# Patient Record
Sex: MALE | Race: BLACK OR AFRICAN AMERICAN | NOT HISPANIC OR LATINO | Employment: OTHER
[De-identification: names, ages, dates, MRNs, and addresses within clinical notes are randomized per-mention and may not be internally consistent; named-entity substitution may affect disease eponyms.]

---

## 2019-09-20 ENCOUNTER — RECORDS - HEALTHEAST (OUTPATIENT)
Dept: ADMINISTRATIVE | Facility: OTHER | Age: 39
End: 2019-09-20

## 2019-09-30 ENCOUNTER — RECORDS - HEALTHEAST (OUTPATIENT)
Dept: ADMINISTRATIVE | Facility: OTHER | Age: 39
End: 2019-09-30

## 2020-04-28 ENCOUNTER — RECORDS - HEALTHEAST (OUTPATIENT)
Dept: ADMINISTRATIVE | Facility: OTHER | Age: 40
End: 2020-04-28

## 2020-05-20 ENCOUNTER — RECORDS - HEALTHEAST (OUTPATIENT)
Dept: ADMINISTRATIVE | Facility: OTHER | Age: 40
End: 2020-05-20

## 2020-06-03 ENCOUNTER — RECORDS - HEALTHEAST (OUTPATIENT)
Dept: ADMINISTRATIVE | Facility: OTHER | Age: 40
End: 2020-06-03

## 2020-06-08 ENCOUNTER — RECORDS - HEALTHEAST (OUTPATIENT)
Dept: ADMINISTRATIVE | Facility: OTHER | Age: 40
End: 2020-06-08

## 2020-06-15 ENCOUNTER — RECORDS - HEALTHEAST (OUTPATIENT)
Dept: ADMINISTRATIVE | Facility: OTHER | Age: 40
End: 2020-06-15

## 2020-07-23 ENCOUNTER — RECORDS - HEALTHEAST (OUTPATIENT)
Dept: ADMINISTRATIVE | Facility: OTHER | Age: 40
End: 2020-07-23

## 2020-08-04 ENCOUNTER — RECORDS - HEALTHEAST (OUTPATIENT)
Dept: ADMINISTRATIVE | Facility: OTHER | Age: 40
End: 2020-08-04

## 2020-08-25 ENCOUNTER — RECORDS - HEALTHEAST (OUTPATIENT)
Dept: ADMINISTRATIVE | Facility: OTHER | Age: 40
End: 2020-08-25

## 2020-09-02 ENCOUNTER — RECORDS - HEALTHEAST (OUTPATIENT)
Dept: ADMINISTRATIVE | Facility: OTHER | Age: 40
End: 2020-09-02

## 2020-10-17 ENCOUNTER — RECORDS - HEALTHEAST (OUTPATIENT)
Dept: ADMINISTRATIVE | Facility: OTHER | Age: 40
End: 2020-10-17

## 2020-11-04 ENCOUNTER — RECORDS - HEALTHEAST (OUTPATIENT)
Dept: ADMINISTRATIVE | Facility: OTHER | Age: 40
End: 2020-11-04

## 2020-12-02 ENCOUNTER — RECORDS - HEALTHEAST (OUTPATIENT)
Dept: ADMINISTRATIVE | Facility: OTHER | Age: 40
End: 2020-12-02

## 2021-05-05 ENCOUNTER — COMMUNICATION - HEALTHEAST (OUTPATIENT)
Dept: ONCOLOGY | Facility: HOSPITAL | Age: 41
End: 2021-05-05

## 2021-05-05 ENCOUNTER — RECORDS - HEALTHEAST (OUTPATIENT)
Dept: ADMINISTRATIVE | Facility: OTHER | Age: 41
End: 2021-05-05

## 2021-05-05 ENCOUNTER — OFFICE VISIT - HEALTHEAST (OUTPATIENT)
Dept: FAMILY MEDICINE | Facility: CLINIC | Age: 41
End: 2021-05-05

## 2021-05-05 DIAGNOSIS — K25.9 GASTRIC ULCER, UNSPECIFIED CHRONICITY, UNSPECIFIED WHETHER GASTRIC ULCER HEMORRHAGE OR PERFORATION PRESENT: ICD-10-CM

## 2021-05-05 DIAGNOSIS — M54.50 CHRONIC LOW BACK PAIN, UNSPECIFIED BACK PAIN LATERALITY, UNSPECIFIED WHETHER SCIATICA PRESENT: ICD-10-CM

## 2021-05-05 DIAGNOSIS — I10 ESSENTIAL HYPERTENSION, BENIGN: ICD-10-CM

## 2021-05-05 DIAGNOSIS — R51.9 NONINTRACTABLE HEADACHE, UNSPECIFIED CHRONICITY PATTERN, UNSPECIFIED HEADACHE TYPE: ICD-10-CM

## 2021-05-05 DIAGNOSIS — I89.0 LYMPHEDEMA: ICD-10-CM

## 2021-05-05 DIAGNOSIS — G89.29 CHRONIC LOW BACK PAIN, UNSPECIFIED BACK PAIN LATERALITY, UNSPECIFIED WHETHER SCIATICA PRESENT: ICD-10-CM

## 2021-05-05 DIAGNOSIS — F43.21 GRIEF REACTION: ICD-10-CM

## 2021-05-05 DIAGNOSIS — G89.4 CHRONIC PAIN SYNDROME: ICD-10-CM

## 2021-05-05 DIAGNOSIS — J30.81 ALLERGIC RHINITIS DUE TO ANIMALS: ICD-10-CM

## 2021-05-05 DIAGNOSIS — Z85.71 H/O HODGKIN'S LYMPHOMA: ICD-10-CM

## 2021-05-05 LAB
ANION GAP SERPL CALCULATED.3IONS-SCNC: 15 MMOL/L (ref 5–18)
BUN SERPL-MCNC: 7 MG/DL (ref 8–22)
CALCIUM SERPL-MCNC: 9.6 MG/DL (ref 8.5–10.5)
CHLORIDE BLD-SCNC: 105 MMOL/L (ref 98–107)
CO2 SERPL-SCNC: 23 MMOL/L (ref 22–31)
CREAT SERPL-MCNC: 0.73 MG/DL (ref 0.7–1.3)
GFR SERPL CREATININE-BSD FRML MDRD: >60 ML/MIN/1.73M2
GLUCOSE BLD-MCNC: 90 MG/DL (ref 70–125)
POTASSIUM BLD-SCNC: 4.1 MMOL/L (ref 3.5–5)
SODIUM SERPL-SCNC: 143 MMOL/L (ref 136–145)

## 2021-05-05 RX ORDER — AMLODIPINE AND BENAZEPRIL HYDROCHLORIDE 5; 10 MG/1; MG/1
1 CAPSULE ORAL DAILY
Status: SHIPPED | COMMUNITY
Start: 2021-05-05 | End: 2021-07-12

## 2021-05-05 RX ORDER — AMLODIPINE BESYLATE 5 MG/1
2.5 TABLET ORAL DAILY
Qty: 45 TABLET | Refills: 0 | Status: SHIPPED | OUTPATIENT
Start: 2021-05-05 | End: 2021-11-02

## 2021-05-05 RX ORDER — LOSARTAN POTASSIUM 50 MG/1
50 TABLET ORAL DAILY
Qty: 90 TABLET | Refills: 5 | Status: SHIPPED | OUTPATIENT
Start: 2021-05-05 | End: 2021-11-17

## 2021-05-05 RX ORDER — HYDROCHLOROTHIAZIDE 50 MG/1
50 TABLET ORAL DAILY
Qty: 90 TABLET | Refills: 0 | Status: SHIPPED | OUTPATIENT
Start: 2021-05-05 | End: 2021-07-12

## 2021-05-05 RX ORDER — FAMOTIDINE 40 MG/1
40 TABLET, FILM COATED ORAL EVERY EVENING
Qty: 180 TABLET | Refills: 0 | Status: SHIPPED | OUTPATIENT
Start: 2021-05-05 | End: 2021-12-27

## 2021-05-05 RX ORDER — LORATADINE 10 MG/1
10 TABLET ORAL DAILY
Qty: 90 TABLET | Refills: 0 | Status: ON HOLD | COMMUNITY
Start: 2021-05-05 | End: 2021-08-26

## 2021-05-06 ENCOUNTER — AMBULATORY - HEALTHEAST (OUTPATIENT)
Dept: VASCULAR SURGERY | Facility: CLINIC | Age: 41
End: 2021-05-06

## 2021-05-06 DIAGNOSIS — M79.89 LEG SWELLING: ICD-10-CM

## 2021-05-07 ENCOUNTER — HOSPITAL ENCOUNTER (OUTPATIENT)
Dept: PHYSICAL MEDICINE AND REHAB | Facility: CLINIC | Age: 41
Discharge: HOME | End: 2021-05-07
Attending: FAMILY MEDICINE

## 2021-05-07 ENCOUNTER — COMMUNICATION - HEALTHEAST (OUTPATIENT)
Dept: INTERNAL MEDICINE | Facility: CLINIC | Age: 41
End: 2021-05-07

## 2021-05-07 DIAGNOSIS — G89.29 CHRONIC BILATERAL THORACIC BACK PAIN: ICD-10-CM

## 2021-05-07 DIAGNOSIS — I89.0 LYMPHEDEMA: ICD-10-CM

## 2021-05-07 DIAGNOSIS — M79.671 CHRONIC PAIN OF BOTH FEET: ICD-10-CM

## 2021-05-07 DIAGNOSIS — M54.16 LUMBAR RADICULITIS: ICD-10-CM

## 2021-05-07 DIAGNOSIS — G89.29 CHRONIC BILATERAL LOW BACK PAIN WITH RIGHT-SIDED SCIATICA: ICD-10-CM

## 2021-05-07 DIAGNOSIS — I89.0 LYMPHEDEMA OF RIGHT LOWER EXTREMITY: ICD-10-CM

## 2021-05-07 DIAGNOSIS — G89.4 CHRONIC PAIN SYNDROME: ICD-10-CM

## 2021-05-07 DIAGNOSIS — G89.29 CHRONIC LEFT SHOULDER PAIN: ICD-10-CM

## 2021-05-07 DIAGNOSIS — Z85.71 H/O HODGKIN'S LYMPHOMA: ICD-10-CM

## 2021-05-07 DIAGNOSIS — G89.29 CHRONIC PAIN OF BOTH FEET: ICD-10-CM

## 2021-05-07 DIAGNOSIS — M79.604 PAIN OF RIGHT LOWER EXTREMITY: ICD-10-CM

## 2021-05-07 DIAGNOSIS — M54.41 CHRONIC BILATERAL LOW BACK PAIN WITH RIGHT-SIDED SCIATICA: ICD-10-CM

## 2021-05-07 DIAGNOSIS — M79.672 CHRONIC PAIN OF BOTH FEET: ICD-10-CM

## 2021-05-07 DIAGNOSIS — M85.00 FIBROUS DYSPLASIA OF BONE: ICD-10-CM

## 2021-05-07 DIAGNOSIS — M54.6 CHRONIC BILATERAL THORACIC BACK PAIN: ICD-10-CM

## 2021-05-07 DIAGNOSIS — M51.369 BULGING LUMBAR DISC: ICD-10-CM

## 2021-05-07 DIAGNOSIS — M25.512 CHRONIC LEFT SHOULDER PAIN: ICD-10-CM

## 2021-05-07 ASSESSMENT — MIFFLIN-ST. JEOR: SCORE: 2179.99

## 2021-05-11 ENCOUNTER — COMMUNICATION - HEALTHEAST (OUTPATIENT)
Dept: ONCOLOGY | Facility: HOSPITAL | Age: 41
End: 2021-05-11

## 2021-05-12 ENCOUNTER — HOSPITAL ENCOUNTER (OUTPATIENT)
Dept: RADIOLOGY | Facility: HOSPITAL | Age: 41
Discharge: HOME | End: 2021-05-12

## 2021-05-12 ENCOUNTER — RECORDS - HEALTHEAST (OUTPATIENT)
Dept: ADMINISTRATIVE | Facility: OTHER | Age: 41
End: 2021-05-12

## 2021-05-12 ENCOUNTER — OFFICE VISIT - HEALTHEAST (OUTPATIENT)
Dept: FAMILY MEDICINE | Facility: CLINIC | Age: 41
End: 2021-05-12

## 2021-05-12 ENCOUNTER — HOSPITAL ENCOUNTER (OUTPATIENT)
Dept: MRI IMAGING | Facility: HOSPITAL | Age: 41
Discharge: HOME | End: 2021-05-12

## 2021-05-12 DIAGNOSIS — M51.369 BULGING LUMBAR DISC: ICD-10-CM

## 2021-05-12 DIAGNOSIS — G89.29 CHRONIC LEFT SHOULDER PAIN: ICD-10-CM

## 2021-05-12 DIAGNOSIS — M54.41 CHRONIC BILATERAL LOW BACK PAIN WITH RIGHT-SIDED SCIATICA: ICD-10-CM

## 2021-05-12 DIAGNOSIS — M25.512 CHRONIC LEFT SHOULDER PAIN: ICD-10-CM

## 2021-05-12 DIAGNOSIS — M79.672 CHRONIC PAIN OF BOTH FEET: ICD-10-CM

## 2021-05-12 DIAGNOSIS — G89.29 CHRONIC PAIN OF BOTH FEET: ICD-10-CM

## 2021-05-12 DIAGNOSIS — Z76.0 ENCOUNTER FOR MEDICATION REFILL: ICD-10-CM

## 2021-05-12 DIAGNOSIS — G89.29 CHRONIC BILATERAL LOW BACK PAIN WITH RIGHT-SIDED SCIATICA: ICD-10-CM

## 2021-05-12 DIAGNOSIS — M79.671 CHRONIC PAIN OF BOTH FEET: ICD-10-CM

## 2021-05-12 DIAGNOSIS — M54.16 LUMBAR RADICULITIS: ICD-10-CM

## 2021-05-12 DIAGNOSIS — G89.4 CHRONIC PAIN SYNDROME: ICD-10-CM

## 2021-05-13 ENCOUNTER — AMBULATORY - HEALTHEAST (OUTPATIENT)
Dept: BEHAVIORAL HEALTH | Facility: CLINIC | Age: 41
End: 2021-05-13

## 2021-05-13 ENCOUNTER — COMMUNICATION - HEALTHEAST (OUTPATIENT)
Dept: PHYSICAL MEDICINE AND REHAB | Facility: CLINIC | Age: 41
End: 2021-05-13

## 2021-05-17 ENCOUNTER — HOSPITAL ENCOUNTER (OUTPATIENT)
Dept: PHYSICAL MEDICINE AND REHAB | Facility: CLINIC | Age: 41
Discharge: HOME | End: 2021-05-17
Attending: NURSE PRACTITIONER

## 2021-05-17 DIAGNOSIS — M54.16 LUMBAR RADICULITIS: ICD-10-CM

## 2021-05-17 DIAGNOSIS — M48.061 LUMBAR FORAMINAL STENOSIS: ICD-10-CM

## 2021-05-17 DIAGNOSIS — M54.41 CHRONIC BILATERAL LOW BACK PAIN WITH RIGHT-SIDED SCIATICA: ICD-10-CM

## 2021-05-17 DIAGNOSIS — M79.672 BILATERAL FOOT PAIN: ICD-10-CM

## 2021-05-17 DIAGNOSIS — M25.512 CHRONIC LEFT SHOULDER PAIN: ICD-10-CM

## 2021-05-17 DIAGNOSIS — G89.29 CHRONIC BILATERAL THORACIC BACK PAIN: ICD-10-CM

## 2021-05-17 DIAGNOSIS — Z85.71 H/O HODGKIN'S LYMPHOMA: ICD-10-CM

## 2021-05-17 DIAGNOSIS — G89.29 CHRONIC LEFT SHOULDER PAIN: ICD-10-CM

## 2021-05-17 DIAGNOSIS — M54.6 CHRONIC BILATERAL THORACIC BACK PAIN: ICD-10-CM

## 2021-05-17 DIAGNOSIS — I89.0 LYMPHEDEMA OF RIGHT LOWER EXTREMITY: ICD-10-CM

## 2021-05-17 DIAGNOSIS — M85.00 FIBROUS DYSPLASIA OF BONE: ICD-10-CM

## 2021-05-17 DIAGNOSIS — M51.369 BULGING LUMBAR DISC: ICD-10-CM

## 2021-05-17 DIAGNOSIS — M79.671 BILATERAL FOOT PAIN: ICD-10-CM

## 2021-05-17 DIAGNOSIS — M79.604 PAIN OF RIGHT LOWER EXTREMITY: ICD-10-CM

## 2021-05-17 DIAGNOSIS — G89.29 CHRONIC BILATERAL LOW BACK PAIN WITH RIGHT-SIDED SCIATICA: ICD-10-CM

## 2021-05-17 RX ORDER — DIAZEPAM 5 MG
TABLET ORAL
Qty: 1 TABLET | Refills: 0 | Status: SHIPPED | OUTPATIENT
Start: 2021-05-17 | End: 2021-07-12

## 2021-05-17 RX ORDER — NABUMETONE 500 MG/1
500-1000 TABLET, FILM COATED ORAL 2 TIMES DAILY PRN
Qty: 60 TABLET | Refills: 1 | Status: SHIPPED | OUTPATIENT
Start: 2021-05-17 | End: 2021-08-09

## 2021-05-19 ENCOUNTER — COMMUNICATION - HEALTHEAST (OUTPATIENT)
Dept: PHYSICAL MEDICINE AND REHAB | Facility: CLINIC | Age: 41
End: 2021-05-19

## 2021-05-20 ENCOUNTER — COMMUNICATION - HEALTHEAST (OUTPATIENT)
Dept: ONCOLOGY | Facility: HOSPITAL | Age: 41
End: 2021-05-20

## 2021-05-21 ENCOUNTER — OFFICE VISIT - HEALTHEAST (OUTPATIENT)
Dept: FAMILY MEDICINE | Facility: CLINIC | Age: 41
End: 2021-05-21

## 2021-05-21 DIAGNOSIS — G89.4 CHRONIC PAIN SYNDROME: ICD-10-CM

## 2021-05-25 ENCOUNTER — OFFICE VISIT - HEALTHEAST (OUTPATIENT)
Dept: FAMILY MEDICINE | Facility: CLINIC | Age: 41
End: 2021-05-25

## 2021-05-25 DIAGNOSIS — G89.29 CHRONIC LOW BACK PAIN, UNSPECIFIED BACK PAIN LATERALITY, UNSPECIFIED WHETHER SCIATICA PRESENT: ICD-10-CM

## 2021-05-25 DIAGNOSIS — M62.9 HAMSTRING TIGHTNESS OF BOTH LOWER EXTREMITIES: ICD-10-CM

## 2021-05-25 DIAGNOSIS — M72.2 BILATERAL PLANTAR FASCIITIS: ICD-10-CM

## 2021-05-25 DIAGNOSIS — I89.0 LYMPHEDEMA OF RIGHT LOWER EXTREMITY: ICD-10-CM

## 2021-05-25 DIAGNOSIS — G89.4 CHRONIC PAIN SYNDROME: ICD-10-CM

## 2021-05-25 DIAGNOSIS — M54.50 CHRONIC LOW BACK PAIN, UNSPECIFIED BACK PAIN LATERALITY, UNSPECIFIED WHETHER SCIATICA PRESENT: ICD-10-CM

## 2021-05-25 RX ORDER — HYDROCODONE BITARTRATE AND ACETAMINOPHEN 10; 325 MG/1; MG/1
1 TABLET ORAL EVERY 6 HOURS PRN
Qty: 28 TABLET | Refills: 0 | Status: SHIPPED | OUTPATIENT
Start: 2021-05-25 | End: 2021-07-12

## 2021-05-25 RX ORDER — AMITRIPTYLINE HYDROCHLORIDE 10 MG/1
TABLET ORAL
Qty: 90 TABLET | Refills: 0 | Status: SHIPPED | OUTPATIENT
Start: 2021-05-25 | End: 2021-07-12

## 2021-05-25 ASSESSMENT — ANXIETY QUESTIONNAIRES
7. FEELING AFRAID AS IF SOMETHING AWFUL MIGHT HAPPEN: NOT AT ALL
6. BECOMING EASILY ANNOYED OR IRRITABLE: NOT AT ALL
5. BEING SO RESTLESS THAT IT IS HARD TO SIT STILL: NOT AT ALL
4. TROUBLE RELAXING: SEVERAL DAYS
1. FEELING NERVOUS, ANXIOUS, OR ON EDGE: NOT AT ALL
3. WORRYING TOO MUCH ABOUT DIFFERENT THINGS: NOT AT ALL
GAD7 TOTAL SCORE: 1
2. NOT BEING ABLE TO STOP OR CONTROL WORRYING: NOT AT ALL

## 2021-05-25 ASSESSMENT — PATIENT HEALTH QUESTIONNAIRE - PHQ9: SUM OF ALL RESPONSES TO PHQ QUESTIONS 1-9: 3

## 2021-05-26 ENCOUNTER — HOSPITAL ENCOUNTER (OUTPATIENT)
Dept: PHYSICAL MEDICINE AND REHAB | Facility: CLINIC | Age: 41
Discharge: HOME | End: 2021-05-26
Attending: PAIN MEDICINE

## 2021-05-26 DIAGNOSIS — M48.061 LUMBAR FORAMINAL STENOSIS: ICD-10-CM

## 2021-05-26 DIAGNOSIS — M54.16 LUMBAR RADICULITIS: ICD-10-CM

## 2021-05-26 DIAGNOSIS — M51.369 BULGING LUMBAR DISC: ICD-10-CM

## 2021-05-26 DIAGNOSIS — M54.41 CHRONIC BILATERAL LOW BACK PAIN WITH RIGHT-SIDED SCIATICA: ICD-10-CM

## 2021-05-26 DIAGNOSIS — G89.29 CHRONIC BILATERAL LOW BACK PAIN WITH RIGHT-SIDED SCIATICA: ICD-10-CM

## 2021-05-27 ENCOUNTER — SURGERY - HEALTHEAST (OUTPATIENT)
Dept: PODIATRY | Facility: CLINIC | Age: 41
End: 2021-05-27

## 2021-05-27 ENCOUNTER — OFFICE VISIT - HEALTHEAST (OUTPATIENT)
Dept: PODIATRY | Facility: CLINIC | Age: 41
End: 2021-05-27

## 2021-05-27 VITALS — BODY MASS INDEX: 38.36 KG/M2 | WEIGHT: 274 LBS | HEIGHT: 71 IN

## 2021-05-27 DIAGNOSIS — M72.2 PLANTAR FASCIITIS: ICD-10-CM

## 2021-05-27 DIAGNOSIS — M77.32 CALCANEAL SPUR, LEFT: ICD-10-CM

## 2021-05-29 ENCOUNTER — RECORDS - HEALTHEAST (OUTPATIENT)
Dept: ADMINISTRATIVE | Facility: CLINIC | Age: 41
End: 2021-05-29

## 2021-06-02 ENCOUNTER — HOSPITAL ENCOUNTER (OUTPATIENT)
Dept: PHYSICAL MEDICINE AND REHAB | Facility: CLINIC | Age: 41
Discharge: HOME | End: 2021-06-02
Attending: PAIN MEDICINE

## 2021-06-02 ENCOUNTER — COMMUNICATION - HEALTHEAST (OUTPATIENT)
Dept: ONCOLOGY | Facility: HOSPITAL | Age: 41
End: 2021-06-02

## 2021-06-09 ENCOUNTER — AMBULATORY - HEALTHEAST (OUTPATIENT)
Dept: SURGERY | Facility: HOSPITAL | Age: 41
End: 2021-06-09

## 2021-06-09 ENCOUNTER — COMMUNICATION - HEALTHEAST (OUTPATIENT)
Dept: PODIATRY | Facility: CLINIC | Age: 41
End: 2021-06-09

## 2021-06-09 DIAGNOSIS — Z11.59 ENCOUNTER FOR SCREENING FOR OTHER VIRAL DISEASES: ICD-10-CM

## 2021-06-15 ENCOUNTER — HOSPITAL ENCOUNTER (OUTPATIENT)
Dept: PALLIATIVE MEDICINE | Facility: OTHER | Age: 41
Discharge: HOME | End: 2021-06-15
Attending: FAMILY MEDICINE
Payer: COMMERCIAL

## 2021-06-15 ENCOUNTER — AMBULATORY - HEALTHEAST (OUTPATIENT)
Dept: LAB | Facility: CLINIC | Age: 41
End: 2021-06-15

## 2021-06-15 ENCOUNTER — COMMUNICATION - HEALTHEAST (OUTPATIENT)
Dept: INTERNAL MEDICINE | Facility: CLINIC | Age: 41
End: 2021-06-15

## 2021-06-15 DIAGNOSIS — G89.4 CHRONIC PAIN SYNDROME: ICD-10-CM

## 2021-06-15 DIAGNOSIS — M51.369 DEGENERATION OF LUMBAR INTERVERTEBRAL DISC: ICD-10-CM

## 2021-06-15 DIAGNOSIS — M77.32 CALCANEAL SPUR, LEFT: ICD-10-CM

## 2021-06-15 DIAGNOSIS — Z85.71 H/O HODGKIN'S LYMPHOMA: ICD-10-CM

## 2021-06-15 DIAGNOSIS — Z79.891 ENCOUNTER FOR LONG-TERM OPIATE ANALGESIC USE: ICD-10-CM

## 2021-06-15 DIAGNOSIS — M85.00 FIBROUS DYSPLASIA OF BONE: ICD-10-CM

## 2021-06-15 DIAGNOSIS — I89.0 LYMPHEDEMA: ICD-10-CM

## 2021-06-16 PROBLEM — R51.9 NONINTRACTABLE HEADACHE, UNSPECIFIED CHRONICITY PATTERN, UNSPECIFIED HEADACHE TYPE: Status: ACTIVE | Noted: 2021-05-06

## 2021-06-16 PROBLEM — Z85.71 H/O HODGKIN'S LYMPHOMA: Status: ACTIVE | Noted: 2021-05-06

## 2021-06-16 PROBLEM — J30.81 ALLERGIC RHINITIS DUE TO ANIMALS: Status: ACTIVE | Noted: 2021-05-06

## 2021-06-16 PROBLEM — M77.32 CALCANEAL SPUR, LEFT: Status: ACTIVE | Noted: 2021-06-09

## 2021-06-16 PROBLEM — K25.9 GASTRIC ULCER, UNSPECIFIED CHRONICITY, UNSPECIFIED WHETHER GASTRIC ULCER HEMORRHAGE OR PERFORATION PRESENT: Status: ACTIVE | Noted: 2021-05-06

## 2021-06-16 PROBLEM — M54.50 CHRONIC LOW BACK PAIN, UNSPECIFIED BACK PAIN LATERALITY, UNSPECIFIED WHETHER SCIATICA PRESENT: Status: ACTIVE | Noted: 2021-05-06

## 2021-06-16 PROBLEM — G89.29 CHRONIC LOW BACK PAIN, UNSPECIFIED BACK PAIN LATERALITY, UNSPECIFIED WHETHER SCIATICA PRESENT: Status: ACTIVE | Noted: 2021-05-06

## 2021-06-16 PROBLEM — E66.01 MORBID OBESITY (H): Status: ACTIVE | Noted: 2021-05-21

## 2021-06-16 PROBLEM — G89.4 CHRONIC PAIN SYNDROME: Status: ACTIVE | Noted: 2021-05-06

## 2021-06-17 ENCOUNTER — HOSPITAL ENCOUNTER (OUTPATIENT)
Dept: PALLIATIVE MEDICINE | Facility: OTHER | Age: 41
Discharge: HOME | End: 2021-06-18
Payer: COMMERCIAL

## 2021-06-17 DIAGNOSIS — G89.4 CHRONIC PAIN SYNDROME: ICD-10-CM

## 2021-06-17 NOTE — PROGRESS NOTES
Assessment / Plan  1. Lymphedema  Ambulatory referral to Lymphedema Care   2. Allergic rhinitis due to animals  loratadine (CLARITIN) 10 mg tablet   3. Nonintractable headache, unspecified chronicity pattern, unspecified headache type  aspirin-acetaminophen-caffeine (EXCEDRIN MIGRAINE) 250-250-65 mg per tablet   4. Essential hypertension, benign  amLODIPine (NORVASC) 5 MG tablet    hydroCHLOROthiazide (HYDRODIURIL) 50 MG tablet    potassium chloride (KLOR-CON) 10 MEQ CR tablet    losartan (COZAAR) 50 MG tablet    Basic Metabolic Panel   5. Chronic pain syndrome  HYDROcodone-acetaminophen (NORCO )  mg per tablet    Ambulatory referral to Pain Clinic   6. Gastric ulcer, unspecified chronicity, unspecified whether gastric ulcer hemorrhage or perforation present  famotidine (PEPCID) 40 MG tablet   7. Chronic low back pain, unspecified back pain laterality, unspecified whether sciatica present  Ambulatory referral to [facility]   8. H/O Hodgkin's lymphoma  Ambulatory referral to Oncology/Hematology Adult   New patient to the clinic, previously residing in [place] for many years.  He reports history of essential hypertension, previous gastric ulcer, chronic low back pain with chronic pain syndrome on chronic opioid pain control, lymphedema, allergic rhinitis, recurrent headache, and a history of Hodgkin's lymphoma.  Patient is given refills on all of his medications except is only given a 10 tablet quantity of previously used hydrocodone/acetaminophen 10/325, as I was unable to query the [place] or [place] prescription monitoring program's to corroborate patient's previous prescriptions for this.    Recommend check of BMP for routine monitoring purposes.     Referrals placed to spine care for patient's ongoing low back pain, oncology for longitudinal follow-up of his history of Hodgkin's Lymphoma, Lymphedema clinic, and pain clinic.  Patient is advised that he will hear from our schedulers  to assist him in setting up these visits.     Patient is provided with information regarding providers here at the [facility] who have open practices.  He was encouraged to establish care with one of them after reviewing their bios.    Return in about 3 months (around 8/5/2021) for establish care with new primary care provider. .     43 minutes spent on the date of the encounter doing chart review, history and exam, documentation and further activities per the note.    Subjective   Romeo Chong is a 40 y.o. year old male new patient to the clinic who presents with the following concerns:     Patient reports here to establish care and for follow-up on the following concerns.    Chronic pain at right leg related to multiple surgeries during his youth and at bilateral low back.  He reports that he was seeing a pain specialist in [place].  He notes that he will try to get previous records from that clinic to bring with him to establish care with new pain provider here he has been out of his previously used hydrocodone/acetaminophen for about a month and finds that his pain control has been rather poor over that time.  Following his surgeries patient has developed chronic lymphedema.  He does have one compression garment available at home to him but is not wearing this today.  He would like to have a referral to see one of our local lymphedema specialist and to review additional treatment options.    Hypertension-patient currently managed on losartan and amlodipine as noted below.  No side effects reported on either of these agents.  He has been out of his blood pressure medicines for the last several days.No chest pain, palpitations, shortness of breath, or peripheral swelling.     Patient has a history of mood dysfunction primarily related to the loss of his young daughter a couple of years ago in [place].  Patient notes continued grief over this loss.  Denies thoughts or plans of harm to himself or  others.    Patient has history of gastric ulcer and GERD for which he uses Pepcid with good response.  No side effects reported on this medication.    Patient notes history of Hodgkin's lymphoma in the past.  He describes that he does not have active changes present currently.  He would like referral to establish care with a local hematologist/oncologist..    Patient Active Problem List   Diagnosis     Asthma     Edema     Fibrous dysplasia of bone     Essential hypertension, benign     Hypertriglyceridemia     Lymphedema     Obesity, unspecified     Stress hyperglycemia     Vitamin D deficiency     Allergic rhinitis due to animals     Nonintractable headache, unspecified chronicity pattern, unspecified headache type     Chronic pain syndrome     Gastric ulcer, unspecified chronicity, unspecified whether gastric ulcer hemorrhage or perforation present     Chronic low back pain, unspecified back pain laterality, unspecified whether sciatica present     H/O Hodgkin's lymphoma     History reviewed. No pertinent surgical history.    Social History     Tobacco Use     Smoking status: Former Smoker     Packs/day: 1.00     Start date: 1999     Quit date: 2009     Years since quittin.0     Smokeless tobacco: Never Used   Substance Use Topics     Alcohol use: Yes     Frequency: Monthly or less     Family History   Problem Relation Age of Onset     Cirrhosis Mother      Hypertension Mother      Diabetes type II Father      Kidney failure Father      Stroke Father      Stroke Sister          Current Outpatient Medications   Medication Sig Dispense Refill     amLODIPine (NORVASC) 5 MG tablet Take 0.5 tablets (2.5 mg total) by mouth daily. 45 tablet 0     amLODIPine-benazepril (LOTREL) 5-10 mg per capsule Take 1 capsule by mouth daily.       aspirin-acetaminophen-caffeine (EXCEDRIN MIGRAINE) 250-250-65 mg per tablet Take 1 tablet by mouth every 6 (six) hours as needed for pain.  0     hydroCHLOROthiazide  (HYDRODIURIL) 50 MG tablet Take 1 tablet (50 mg total) by mouth daily. 90 tablet 0     HYDROcodone-acetaminophen (NORCO )  mg per tablet Take 1 tablet by mouth every 6 (six) hours as needed for pain. 10 tablet 0     loratadine (CLARITIN) 10 mg tablet Take 1 tablet (10 mg total) by mouth daily. 90 tablet 0     potassium chloride (KLOR-CON) 10 MEQ CR tablet 2 tablets by mouth each morning and 1 tablet by mouth each evening 270 tablet 0     vitamin D3-folic acid 5,000 unit- 1 mg Tab Take 5,000 Int'l Units by mouth daily.       famotidine (PEPCID) 40 MG tablet Take 1 tablet (40 mg total) by mouth every evening. 180 tablet 0     losartan (COZAAR) 50 MG tablet Take 1 tablet (50 mg total) by mouth daily. 90 tablet 5     No current facility-administered medications for this visit.      Allergies   Allergen Reactions     Dust [Other Environmental Allergy]      Erythromycin      Pollen Extracts      Latex Rash       ROS:   Negative except as noted above in HPI.     Objective  BP (!) 138/92 (Patient Site: Right Arm, Patient Position: Sitting, Cuff Size: Adult Large)   Pulse 77   Wt (!) 274 lb (124.3 kg)   SpO2 98%      General: Alert, no acute distress.   Affect: pleasant, cooperative.  Lungs: Clear to auscultation without wheezes, rales or rhonci.   Heart: regular rate and rhythm, normal S1 and S2, no murmurs        Shaahb Perez MD   Family medicine physician  [facility]

## 2021-06-17 NOTE — PATIENT INSTRUCTIONS - HE
~Please call our [facility] Nurse Navigation line [phone] with any questions or concerns about your treatment plan, if symptoms worsen and you would like to be seen urgently, or if you have problems controlling bladder and bowel function.    You have been referred for Physical Therapy to [facility]. They will call you to schedule an appointment.  Scheduling phone number is [phone].  Discussed the importance of core strengthening, ROM, stretching exercises and how each of these entities is important in decreasing pain and improving long term spine health.  The purpose of physical therapy is to teach you an individualized home exercise program.  These exercises need to be performed every day in order to decrease pain and prevent future occurrences of pain.          Please complete paperwork for [facility] pain clinic and return to the pain clinic in order to schedule your first evaluation for potential medication management.      [facility]  [address]  [place] [address]   [phone]

## 2021-06-17 NOTE — TELEPHONE ENCOUNTER
Phone call to patient to review results and provider's recommendations. Results given and explained. Patient would like to have the injections, but he would also like to discuss results and recommendations further with [facility]. Transferred to scheduling to make appointment.

## 2021-06-17 NOTE — TELEPHONE ENCOUNTER
Dr. Perez    Patient is calling and he is wondering if you were able to pull his records from [place].  He is wanting a larger quantity of Norco.  He would like a call back at

## 2021-06-17 NOTE — TELEPHONE ENCOUNTER
Patient calling back in regards to VM left below.  Message from Dr Perez relayed.  Patient expressed understanding and is willing to work with Dr Perez on a non-opioid treatment if needed.  Patient did state the muscle relaxers are tough for him as they make him incredibily groggy and the gabapentin has a negative effect on his lymph nodes.  So he's not very interested in those, but he will contact pain clinic to see where things are at with his intake process there.  He's willing to work with Dr Perez on other non-opioid treatments if pain clinic is going to take some time yet.  Patient will call patient clinic today to check status and call back if needed.

## 2021-06-17 NOTE — TELEPHONE ENCOUNTER
PHONE CONSENT:     The patient has been offered other conservative treatment (physical therapy, medications, etc.) but has opted to proceed with an injection.  The risks and benefits of the injection lateral L5-S1 transforaminal epidural steroid injections were discussed with the patient over the phone on 5/19/2021.  The patient was told that the corticosteroid injection will somewhat suppress the immune system.  The patient opted to proceed with the injection at this time.   Other risks of the injection include infection, bleeding, damage to surrounding structures, nerve injury, permanent weakness, permanent paralysis, worsened pain, soreness, headache, allergic reaction, no change in pain.       The patient understands that they cannot have symptoms of an infection or be on antibiotics at the time of the injection as this would increase their risk of infection. If they start antibiotics prior to the procedure, they should call the clinic to see if the procedure will need to be rescheduled.  The patient understands that if they start any blood thinners prior to the injection, the provider must be notified immediately.  The patient denies any allergies to iodine contrast dye or iodine products.  The patient denies any symptoms of an active infection (cough, fevers, myalgias) and denies taking antibiotics.  The patient knows not to come in to clinic if they have any symptoms of an active infection, particularly cough, fevers, myalgias.   The patient denies taking any prescription blood thinning medications. The patient denies any allergies to iodine or iodine contrast.       The patient verbalized understanding of the information given. The patient was given the opportunity to ask questions of the physician.  The patient elected to proceed and gave consent over the phone with Flaca Roberts as a witness.  Informed consent form was signed by the physician and the witness.

## 2021-06-17 NOTE — PROGRESS NOTES
ASSESSMENT: Romeo Chong is a 40 y.o. male who presents for consultation at the request of PCP Provider, No Primary Care, with a past medical history significant for hypertension, hypertriglyceridemia, lymphedema, history of Hodgkin's lymphoma, obesity, asthma, vitamin D deficiency, stress hyperglycemia, gastric ulcer, headache, chronic pain syndrome, chronic low back pain, fibrous dysplasia of the bone-right tibia discovered at age 13 had surgery at age 14 with 17 surgeries since then, who presents today for new patient evaluation of:    -Chronic bilateral low back pain with generalized radiculitis, leg pain greater on the right however he does have chronic right lymphedema therefore difficult to identify how much of his pain is from his lymphedema versus potential radicular symptoms.    Low back pain is actually greater on the left however.    -Chronic left shoulder pain with left shoulder injury, no improvement with prior physical therapy in [place] he reports.    -Bilateral feet pain that is fairly severe, intermittent numbness and tingling.    -Chronic more mild intermittent thoracic pain.    Patient is neurologically intact on exam. No myelopathic or red flag symptoms.      Patient did defer NIC given all of the paperwork that we had him complete today.          Diagnoses and all orders for this visit:    Chronic bilateral low back pain with right-sided sciatica  -     MR Lumbar Spine Without Contrast; Future; Expected date: 05/07/2021  -     Ambulatory referral to PT/OT    Lumbar radiculitis  -     MR Lumbar Spine Without Contrast; Future; Expected date: 05/07/2021  -     Ambulatory referral to PT/OT    Bulging lumbar disc  -     MR Lumbar Spine Without Contrast; Future; Expected date: 05/07/2021  -     Ambulatory referral to PT/OT    Pain of right lower extremity  -     Ambulatory referral to PT/OT    Lymphedema of right lower extremity  -     Ambulatory referral to PT/OT    Chronic left shoulder  pain  -     XR Shoulder Left 2 or More VWS; Future; Expected date: 05/07/2021  -     Ambulatory referral to PT/OT    Chronic bilateral thoracic back pain  -     Ambulatory referral to PT/OT    H/O Hodgkin's lymphoma  -     Ambulatory referral to PT/OT    Fibrous dysplasia of bone  -     Ambulatory referral to PT/OT    Chronic pain of both feet  -     XR Feet Bilateral 2 VWS; Future; Expected date: 05/07/2021  -     Ambulatory referral to PT/OT    Lymphedema  -     Ambulatory referral to PT/OT    Chronic pain syndrome  -     Ambulatory referral to PT/OT      PLAN:  Reviewed spine anatomy and disease process. Discussed diagnosis and treatment options with the patient today. A shared decision making model was used.  The patient's values and choices were respected. The following represents what was discussed and decided upon by the provider and the patient.      -DIAGNOSTIC TESTS:  Images were personally reviewed and interpreted and explained to patient today using spine model.   --Consider bilateral lower extremity EMG pending imaging review for bilateral feet pain, radicular pain and intermittent paresthesias.  --Ordered lumbar spine MRI to further evaluate progressive bilateral low back pain left rater than right and lumbar radiculitis right greater than left.  Has a history of L4-5 disc bulge.  --Ordered left shoulder x-ray to evaluate chronic left shoulder pain.  --Ordered bilateral foot x-ray to evaluate chronic bilateral foot pain.    -PHYSICAL THERAPY: Referral to physical therapy placed for multifactorial pain.  Discussed the importance of core strengthening, ROM, stretching exercises with the patient and how each of these entities is important in decreasing pain.  Explained to the patient that the purpose of physical therapy is to teach the patient a home exercise program.  These exercises need to be performed every day in order to decrease pain and prevent future occurrences of pain.      -Patient was  referred to [facility] pain clinic per his primary care provider for long-term Vicodin management.  Patient is aware that we will not prescribe that here for him at the spine center since it is a chronic medication management.  Patient is aware of this and will schedule with the pain clinic.      -MEDICATIONS: No changes in medications.    -INTERVENTIONS: Definitely consider interventions medial branch block versus LARON pending MRI review.  Discussed risks and benefits of injections with patient today.    -PATIENT EDUCATION:  Total time of 62 minutes spent with the patient, reviewing the chart, placing orders, and documenting.   -Today we also discussed the issues related to the current COVID-19 pandemic, the pros and cons of the current treatment plan, the CDC guidelines such as social distancing, washing hands, masking, and covering the cough.    -FOLLOW-UP:   Follow-up after obtaining MRI and further imaging to discuss injection options which she is interested in.  Can do a video or in person visit.    Advised patient to call the Spine Center if symptoms worsen or you have problems controlling bladder and bowel function.   ______________________________________________________________________    SUBJECTIVE:  HPI:  Romeo Chong  Is a 40 y.o. male who presents today for new patient evaluation of low back pain chronic generalized lumbar spine that is been ongoing for over 10 years that is progressive in the last couple of months and typically worse on the left lumbosacral junction.  With lower extremity pain that is greater on the right with generalized pain below the knee which typically is related to his lymphedema and multiple prior surgeries.  He also has intermittent pain that is fairly significant and burning type of sensation into the anterior shin however.  Patient does report chronic bilateral feet pain specifically with walking, intermittent numbness and tingling but he describes it as more of a  sharp pain versus burning type of sensation.  Patient does report more mild intermittent thoracic pain.  Also endorses chronic left shoulder pain is intermittent.  Does report that he had a lifting injury years ago and has had left shoulder pain since then, did therapy for this back in [place] with no benefit.  Patient has been walking with a cane for right lower extremity weakness due to his multiple reconstructive surgeries since a young teenager.  He denies any new weakness.  Denies any bowel or bladder loss control, denies saddle anesthesia.    *Patient reports that he unfortunately lost his daughter last year to sex trafficking.    -Treatment to Date: No prior spinal surgery.  Again multiple history of right leg reconstructive surgery.  Physical therapy on multiple occasions previously for lymphedema, no specific physical therapy for back pain recently.    -Medications:  Hydrocodone prescribed 5/5/2020 by family medicine for chronic low back pain, referral was placed to the Pain clinic at that time as well as the Spine center.    -Prior failed medications:  Gabapentin with increased swelling in his right lower extremity    Current Outpatient Medications on File Prior to Encounter   Medication Sig Dispense Refill     HYDROcodone-acetaminophen (NORCO )  mg per tablet Take 1 tablet by mouth every 6 (six) hours as needed for pain. 10 tablet 0     amLODIPine (NORVASC) 5 MG tablet Take 0.5 tablets (2.5 mg total) by mouth daily. 45 tablet 0     amLODIPine-benazepril (LOTREL) 5-10 mg per capsule Take 1 capsule by mouth daily.       aspirin-acetaminophen-caffeine (EXCEDRIN MIGRAINE) 250-250-65 mg per tablet Take 1 tablet by mouth every 6 (six) hours as needed for pain.  0     famotidine (PEPCID) 40 MG tablet Take 1 tablet (40 mg total) by mouth every evening. 180 tablet 0     hydroCHLOROthiazide (HYDRODIURIL) 50 MG tablet Take 1 tablet (50 mg total) by mouth daily. 90 tablet 0     loratadine (CLARITIN) 10  mg tablet Take 1 tablet (10 mg total) by mouth daily. 90 tablet 0     losartan (COZAAR) 50 MG tablet Take 1 tablet (50 mg total) by mouth daily. 90 tablet 5     potassium chloride (KLOR-CON) 10 MEQ CR tablet 2 tablets by mouth each morning and 1 tablet by mouth each evening 270 tablet 0     vitamin D3-folic acid 5,000 unit- 1 mg Tab Take 5,000 Int'l Units by mouth daily.       No current facility-administered medications on file prior to encounter.        Allergies   Allergen Reactions     Dust [Other Environmental Allergy]      Erythromycin      Pollen Extracts      Latex Rash       No past medical history on file.     Patient Active Problem List   Diagnosis     Asthma     Edema     Fibrous dysplasia of bone     Essential hypertension, benign     Hypertriglyceridemia     Lymphedema     Obesity, unspecified     Stress hyperglycemia     Vitamin D deficiency     Allergic rhinitis due to animals     Nonintractable headache, unspecified chronicity pattern, unspecified headache type     Chronic pain syndrome     Gastric ulcer, unspecified chronicity, unspecified whether gastric ulcer hemorrhage or perforation present     Chronic low back pain, unspecified back pain laterality, unspecified whether sciatica present     H/O Hodgkin's lymphoma       No past surgical history on file.    Family History   Problem Relation Age of Onset     Cirrhosis Mother      Hypertension Mother      Diabetes type II Father      Kidney failure Father      Stroke Father      Stroke Sister        Reviewed past medical, surgical, and family history with patient found on new patient intake packet located in EMR Media tab.     SOCIAL HX: Patient is single works as an .  Patient denies smoking/tobacco use.  Does report drinking alcohol on rare occasions, has a history of being a heavy drinker but stopped in 2014.  Patient denies recreational drug use.    ROS: Positive for joint pain, muscle pain, sciatica, enlarged lymph nodes due to  "lymphoma, reflux, leg swelling due to lymphedema, headache.  Specifically negative for bowel/bladder dysfunction, dizziness, fevers, chills, appetite changes, nausea/vomiting, unexplained weight loss. Otherwise 13 systems reviewed are negative. Please see the patient's intake questionnaire from today for details.    OBJECTIVE:  BP (!) 154/103 (Patient Site: Right Arm, Patient Position: Sitting, Cuff Size: Adult Regular)   Pulse 77   Temp 98.1  F (36.7  C) (Oral)   Ht 5' 11\" (1.803 m)   Wt (!) 274 lb (124.3 kg)   BMI 38.22 kg/m      PHYSICAL EXAMINATION:    --CONSTITUTIONAL:  Vital signs as above.  No acute distress.  The patient is well nourished and well groomed.  --PSYCHIATRIC:  Appropriate mood and affect. The patient is awake, alert, oriented to person, place, time and answering questions appropriately with clear speech.    --SKIN:  Skin over the face, bilateral lower extremities, and posterior torso is clean, dry, intact without rashes.    --RESPIRATORY: Normal rhythm and effort. No abnormal accessory muscle breathing patterns noted.   --ABDOMINAL:  Non-distended.  --STANDING EXAMINATION:  Normal lumbar lordosis noted, no lateral shift.  --MUSCULOSKELETAL: Lumbar spine inspection reveals no evidence of deformity. Range of motion is limited in all movements forward flexion extension as well as lateral rotation.  No tenderness to palpation lumbar spine. Straight leg raising in the supine position is negative to radicular pain. Sciatic notch non-tender.  --SACROILIAC JOINT: One Finger point test negative.  --GROSS MOTOR: Gait is antalgic.  Patient does walk with a cane due to right lower extremity weakness since teenage years.  --LOWER EXTREMITY MOTOR TESTING:  Plantar flexion left 5/5, right 4/5   Dorsiflexion left 5/5, right 4/5   Great toe MTP extension left 5/5, right 4/5  Knee flexion left 5/5, right 4/5  Knee extension left 5/5, right 4/5   Hip flexion left 5/5, right 4/5  --HIPS: Full range of motion " bilaterally.   --NEUROLOGICAL:  1/4 patellar, medial hamstring, and achilles reflexes bilaterally.  Sensation to light touch is intact on the left diminished on the right globally except for normal sensation right medial thigh. Babinski is negative. No clonus.  Negative Clara reflex bilaterally.  --VASCULAR:  2/4 dorsalis pedis and posterior tibialsi pulses bilaterally.  Bilateral lower extremities are warm.  Significant edema right lower extremity in its entirety.    RESULTS: Prior medical records from [facility] and Care Everywhere were reviewed today.    Imaging: Lumbar spine Imaging was personally reviewed and interpreted today. The images were shown to the patient and the findings were explained using a spine model.

## 2021-06-17 NOTE — TELEPHONE ENCOUNTER
----- Message from Shaina Yu CNP sent at 5/12/2021  4:40 PM CDT -----  Please call patient and notify him that his imaging was reviewed.  Lumbar spine MRI does show degenerative changes greatest at lower level L5-S1 with left disc protrusion with mild right and mild to moderate left nerve compression.  No high-grade nerve compression on the right therefore a lot of his leg pain could likely be from his lymphedema.    Recommend physical therapy as ordered prior.  We definitely could try bilateral L5-S1 transforaminal epidural steroid injection at any time to see if we can improve some of his symptoms as well, can place order if he is interested in this.  He can follow-up to discuss this more in detail if you would like, video or telephone visit.    Otherwise bilateral foot x-ray does show some bone spurring.  Would be reasonable to have him follow-up with podiatry if you would like.  Left shoulder x-ray otherwise looks normal alignment.

## 2021-06-17 NOTE — PROGRESS NOTES
This therapist attempted to connect with patient via [employer] but patient did not connect. This therapist then attempted to call patient at 10:05 and 10:15 but patient did not answer. VM was left indicating pt can call clinic and schedule again in a new pt slot.

## 2021-06-17 NOTE — TELEPHONE ENCOUNTER
Dr. Perez    I notified pt of your message and he is wondering if you can give him some medication until he has an appt with the pain clinic.  He doesn't have an appt set up yet.  He would like a call back at .

## 2021-06-17 NOTE — PROGRESS NOTES
Chief Complaint   Patient presents with     Leg Pain     Lymphedema in the R leg, bone spurs in both feet, has pain management appt set up in June       ASSESSMENT & PLAN:   Diagnoses and all orders for this visit:    Chronic pain syndrome        Patient with multiple chronic medical conditions.  Moved here from [place].  Has no PCP.  Came to the walk-in clinic requesting refills and opiates.  He was told last time that he cannot do this.  It does sound that patient was legitimately confused that we are not walk-in primary care.  He is agreeable to going to the  to make a primary care appointment.  Does have a pain clinic appointment set up that was verified by me on Poppy 15.  Explained walk-in clinic is not appropriate for opiate refills for chronic conditions.    Patient and/or caregiver understood and agreed to plan. Patient was stable for discharge.      SUBJECTIVE    HPI:  HPI  Romeo Chong presents to the walk-in clinic with   Chief Complaint   Patient presents with     Leg Pain     Lymphedema in the R leg, bone spurs in both feet, has pain management appt set up in June     Patient was seen in the walk-in clinic and given 2 days worth of hydrocodone for chronic pain issues.  He is from [place].  He has a pain clinic appointment on June 15th.  He was instructed at that time to not return to the walk-in clinic for refills of medication.  Patient is here for opiate refills.  Patient says he was not aware that this was not primary care.  He is currently on medications.  Is wearing lymphedema wraps right side related to lymphedema secondary to Hodgkin's lymphoma.  Does have a lymphedema clinic appointment set up.    He does not have a PCP appointment set up with the [facility] system.  He has been seeing the spine clinic, and has multiple other specialist appointments set up for the coming month.    Associated with: History of Hodgkin's lymphoma.    See ROS for additional symptoms and/or  pertinent negatives.       History obtained from the patient.      Review of Systems  Denies any acute issues.  OBJECTIVE    Vitals:    05/21/21 1632   BP: 140/88   Patient Site: Right Arm   Patient Position: Sitting   Cuff Size: Adult Large   Pulse: 88   Temp: 98.6  F (37  C)   TempSrc: Oral   SpO2: 96%   Weight: (!) 270 lb (122.5 kg)       Physical Exam  Constitutional:       General: He is not in acute distress.     Appearance: He is well-developed.   Eyes:      General:         Right eye: No discharge.         Left eye: No discharge.      Conjunctiva/sclera: Conjunctivae normal.   Pulmonary:      Effort: Pulmonary effort is normal.   Musculoskeletal: Normal range of motion.         General: Swelling (Wearing lymphedema wraps on right side.  ) present.   Skin:     General: Skin is warm and dry.      Capillary Refill: Capillary refill takes less than 2 seconds.   Neurological:      Mental Status: He is alert and oriented to person, place, and time.   Psychiatric:         Mood and Affect: Mood normal.         Behavior: Behavior normal.         Thought Content: Thought content normal.         Judgment: Judgment normal.         Labs/EKG:          Radiology:      PATIENT INSTRUCTIONS:   Patient Instructions   Schedule primary care appointment at the .  Try for internal medicine.

## 2021-06-17 NOTE — TELEPHONE ENCOUNTER
Please contact patient to notify him that I wouldn't be comfortable with prescribing additional opioid pain medication for him.  If he would like to consider alternative pain medication options, such as gabapentin or a muscle relaxant for use at bedtime, I would be willing to prescribe one of those.   Thanks,  Shahab Perez MD

## 2021-06-17 NOTE — TELEPHONE ENCOUNTER
Please contact patient.  I appreciate that he had his records sent over.  I did have a chance to review these.  It looks like for the last few months of last year (December was the most recent note) his primary care provider had recommended that he consult with a pain specialist, as they weren't comfortable continuing to prescribe his pain medications. I'm not sure exactly what their reasoning was in not wanting to continue to prescribe these meds, but based on that recommendation, I would also have to advise that he see the pain specialist to receive these medications.  A referral had been placed at his appointment with me earlier in the week.  Please ensure that he has connected with the pain clinic for an intake.     Thanks,  Shahab Perez MD

## 2021-06-17 NOTE — TELEPHONE ENCOUNTER
"I called Romeo to see if he can send me his ALVAREZ.  He cannot do so now electronically.  I have now mailed his whole \"welcome packet\" including 2 ALVAREZ's to date and sign.  I have also included a self-addressed stamped envelope to return the ALVAREZ's asap to [facility] medical records.  "

## 2021-06-17 NOTE — PATIENT INSTRUCTIONS - HE
~Please call our [facility] Nurse Navigation line [phone] with any questions or concerns about your treatment plan, if symptoms worsen and you would like to be seen urgently, or if you have problems controlling bladder and bowel function.      You have been referred for Physical Therapy to [facility]. They will call you to schedule an appointment.  Scheduling phone number is [phone].  Discussed the importance of core strengthening, ROM, stretching exercises and how each of these entities is important in decreasing pain and improving long term spine health.  The purpose of physical therapy is to teach you an individualized home exercise program.  These exercises need to be performed every day in order to decrease pain and prevent future occurrences of pain.          Prescribed nabumetone today. Please take as prescribed, as needed for pain control as well as to aid in decreasing inflammation. Take medication with a full glass of water and food.   *Do not take Advil, Ibuprofen, Aleve, or Naproxen while taking this medication as it can cause organ failure if taken together*  This medication does have risks if taken long term, these risks include: gastrointestinal irritation, kidney dysfunction, and cardiovascular effects.      Valium 5 mg prescribed for preprocedure sedation.  Take 1 tablet po q 2 hours prior to the lumbar spine injection procedure.  You will need a  after taking this medication.      A bilateral L5-S1 epidural steroid injection has been ordered today to potentially help with your pain symptoms. These injections do not fix what is going on in your back, therefore they typically do not take away the pain completely, however they can many times help improve symptoms. Injections should always be completed along with other modalities such as physical therapy for the best long term outcomes. If injections alone are done, then pain will likely return.      [facility] Injection Requirements      A  is required for all fluoroscopically-guided injections.    Injection appointments may be cancelled if there are signs/symptoms of an active infection or if the patient is being actively treated with antibiotics for a diagnosed infection.    Patients may have their steroid injection cancelled if they have had another steroid injection within 2 weeks.    Diabetic patients will have their blood glucose levels checked the day of their injection and the appointment will be rescheduled if the blood glucose level is 300 or higher.    Patients with allergies to cortisone, local anesthetics, iodine, or contrast dye should contact the Spine Center to further discuss these considerations.    Patients scheduled for medial branch block diagnostic injections should refrain from taking pain medication the day of the procedure.  The medial branch block injection appointment will be rescheduled if the patient's pain rating is not 5/10 or greater at the time of the procedure.    Patients taking warfarin/Coumadin will have their INR checked the day of the procedure and the procedure may be rescheduled if the INR is greater than 3.0.    Please contact the Spine Center (#[phone]) if you are taking any prescription blood-thinning medications (warfarin, Plavix, Lovenox, Eliquis, Brilinta, Effient, etc.) as special dosing adjustments may need to be made depending on the type of injection you are scheduled to receive.    It is recommended that you delay having your steroid injection if you have received a flu shot or shingles vaccine within 2 weeks.      **Please wait to get COVID-19 vaccine injection until at least 2 weeks after steroid injection.  The steroid can cause that vaccine to be less effective if given 2 weeks before or 2 weeks after steroid injection.    Please note that this injection uses cortisone.  The cortisone may somewhat weaken the  immune system.  It is unknown how much the immune system is weakened.  It is unknown if it is weakened to the point that you may be more likely to get the COVID-19 virus, or if you do get the COVID-19 virus, if you would be sicker than you would have been if you had not had the cortisone injection.  If you do not wish to proceed with the injection, please let the nurse/physician know and do NOT schedule the injection.       Please note that since your immune system is weakened from the cortisone, having a flu vaccine/shot may be less effective if you have this vaccine within 2 weeks from your cortisone injection.  It is advised to wait 2 weeks after your cortisone injection to have the flu shot (or if you have the flu shot first, wait 2 weeks before you have the cortisone injection).     Please schedule this injection at least 1 week  from now to allow time for insurance prior authorization.       On the day of your injection, you cannot be sick or taking antibiotics.  If you become sick and are prescribed, please call the clinic so your injection can be rescheduled for once you have completed your antibiotics.  You will need to bring a  with you for your injection.   If you have any questions or concerns prior to your injection, please do not hesitate to call the nurse navigation line at [phone]

## 2021-06-17 NOTE — TELEPHONE ENCOUNTER
New Patient Oncology Nurse Navigator Note     Referring provider: Dr. Shahab Perez     Referring Clinic/Organization: [facility]      Referred to (specialty): medical oncology    Requested provider (if applicable): n/a     Date Referral Received: 5-5-2021     Evaluation for : h/o Hodgkin lymphoma--verified with patient that he has followed oncology since 2006 for suspected Hodgkin's, but has never had any treatment nor a positive pathology.  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  :  Many issues including cellulitis, sepsis  CT c/a/p:  [facility]  CONCLUSION:    1.  ANTERIOR MEDIASTINAL MASS IN THE DISTRIBUTION OF THE THYMUS.  THIS      IS NONSPECIFIC AND MAY REPRESENT THYMIC HYPERPLASIA, THYMOMA, OR      POSSIBLY LYMPHOMA.    2.  CHEST IS OTHERWISE NEGATIVE.    12-:  Biopsy--[facility]  DIAGNOSIS  A) LYMPH NODE, RIGHT AXILLA, EXCISIONAL BIOPSY:  1. Reactive-appearing lymph node  2. No morphologic or immunophenotypic evidence of malignant lymphoma    B) LYMPH NODE, RIGHT GROIN, EXCISIONAL BIOPSY:  1. Reactive-appearing lymph node  2. No morphologic or immunophenotypic evidence of malignant lymphoma  ~States he has been followed by oncology in [place] ([facility] about 2006 to 2015) then [place] (2015? To present)--recently moved from [place].  ~Recently moved to [place] and is establishing care.  Clinical Assessment / Barriers to Care (Per Nurse): recently moved back from [place]      Records Location (Care Everywhere, Media, etc.): [facility] (2006[facility] Katheryn Levin (. [place] @ [phone]     Records Needed: [facility] and [place] ([facility] e-mailed 5-5-2021)     Additional testing needed prior to consult: none

## 2021-06-17 NOTE — PROGRESS NOTES
Assessment / Plan  1. Chronic pain syndrome  HYDROcodone-acetaminophen (NORCO )  mg per tablet    amitriptyline (ELAVIL) 10 MG tablet   2. Lymphedema of right lower extremity     3. Hamstring tightness of both lower extremities  Ambulatory referral to Adult PT- Internal   4. Chronic low back pain     5. Bilateral plantar fasciitis     Patient with history of chronic pain related to chronic lymphedema at the right lower extremity, chronic hamstring tightness, bilateral plantar fasciitis, and mechanical low back pain.  Currently seeing spine clinic with plans to pursue epidural lumbar spine injections in 2 weeks for radicular low back pain.  Recommend formal physical therapy for the patient's significant hamstring tightness bilaterally.    Reviewed treatment options for patient's chronic pain.  Recommend initiation of amitriptyline at bedtime.  Potential side effects and reasonable expectations of this medication are reviewed with patient.  Recommend visit to establish care with new primary care provider in 4 to 6 weeks and should also follow-up regarding use of the amitriptyline at that time.  Patient is provided with short-term supply of hydrocodone as per orders for severe breakthrough pains.  Would recommend compliance drug screen be performed at next visit if not already performed by Pain Clinic, who he will be seeing on 6/15/21.      Patient Instructions   For your plantar fasciitis, please follow-up with podiatry.      For the hamstring strain, I would recommend Physical therapy  And home exercises.  You could consider applying heat to the stiff muscles as needed, as well.      Please start amitriptyline to help with sleep and pain.  Potential side effects include dry mouth , constipation and sleepiness.      Please follow-up with pain clinic in 2 weeks.      Return in about 4 weeks (around 6/22/2021) for Follow up.     48 minutes spent on the date of the encounter doing chart review, history and  exam, documentation and further activities per the note.    Subjective   Romeo Chong is a 40 y.o. year old male who presents with the following concerns:     Patient with history of chronic lymphedema, chronic bilateral LBP with right sided sciatica, chronic pain syndrome, and history of Hodgkin's Lymphoma presents to clinic to discuss pain modalities. I had seen him for an initial visit on 5/5/21, at which time he did request new prescriptions for Norco 10-325mg previously prescribed for him in [place], which he was taking 4 times a day. As I did not have his records at the time of his visit, I had only provided him with a few days of the hydrocodone.  He has contacted me on a couple of occasions to request further supply of the hydrocodone and I have declined to fill this, as I don't have a diagnosis to justify chronic opioid use.  Previous records from [place] are reviewed.  It is notable that they do not specify the source for his chronic pain in their office notes.      He has been seeing spine clinic for his back pain and is being treated with Relafen and physical therapy.  They had not recommended that his pain be treated with opioid pain medication.  He is planning to have lumbar epidural steroid injections performed next week.    Patient reports he has been noting continued severe pain at the right lower extremity which he relates to chronic lymphedema.  Is scheduled to see the lymphedema clinic next month.  He continues to wrap this leg as he had been instructed to by previous provider in [place].  He notes that there is pain behind both of his knees.  No locking or instability noted.  He does walk with the assistance of a single pronged cane.  He notes that he has an appointment scheduled to see the pain clinic coming up on 6/15/2021.  He relates that he is having a hard time pursuing his typical ADLs without use of hydrocodone, which she has been out of for the past week.  No side effects are noted  from that medication.  He does report difficulties with falling and staying asleep at night secondary to his pain.  He also describes significant muscle tightness at the low back and at the knees.     Patient Active Problem List   Diagnosis     Asthma     Edema     Fibrous dysplasia of bone     Essential hypertension, benign     Hypertriglyceridemia     Lymphedema     Obesity, unspecified     Stress hyperglycemia     Vitamin D deficiency     Allergic rhinitis due to animals     Nonintractable headache, unspecified chronicity pattern, unspecified headache type     Chronic pain syndrome     Gastric ulcer, unspecified chronicity, unspecified whether gastric ulcer hemorrhage or perforation present     Chronic low back pain, unspecified back pain laterality, unspecified whether sciatica present     H/O Hodgkin's lymphoma     Obesity (BMI 35.0-39.9) with comorbidity (H)     No past surgical history on file.    Social History     Tobacco Use     Smoking status: Former Smoker     Packs/day: 1.00     Start date: 1999     Quit date: 2009     Years since quittin.0     Smokeless tobacco: Never Used   Substance Use Topics     Alcohol use: Yes     Frequency: Monthly or less     Family History   Problem Relation Age of Onset     Cirrhosis Mother      Hypertension Mother      Diabetes type II Father      Kidney failure Father      Stroke Father      Stroke Sister          Current Outpatient Medications   Medication Sig Dispense Refill     amLODIPine (NORVASC) 5 MG tablet Take 0.5 tablets (2.5 mg total) by mouth daily. 45 tablet 0     amLODIPine-benazepril (LOTREL) 5-10 mg per capsule Take 1 capsule by mouth daily.       aspirin-acetaminophen-caffeine (EXCEDRIN MIGRAINE) 250-250-65 mg per tablet Take 1 tablet by mouth every 6 (six) hours as needed for pain.  0     diazePAM (VALIUM) 5 MG tablet 5 mg tablets, take 1 tablet PO, 2 hours prior to Procedure. 1 tablet 0     famotidine (PEPCID) 40 MG tablet Take 1 tablet (40 mg  total) by mouth every evening. 180 tablet 0     hydroCHLOROthiazide (HYDRODIURIL) 50 MG tablet Take 1 tablet (50 mg total) by mouth daily. 90 tablet 0     HYDROcodone-acetaminophen (NORCO )  mg per tablet Take 1 tablet by mouth every 6 (six) hours as needed for pain. 28 tablet 0     HYDROcodone-acetaminophen 5-325 mg per tablet Take 1 tablet by mouth 4 (four) times a day as needed for pain. 10 tablet 0     loratadine (CLARITIN) 10 mg tablet Take 1 tablet (10 mg total) by mouth daily. 90 tablet 0     losartan (COZAAR) 50 MG tablet Take 1 tablet (50 mg total) by mouth daily. 90 tablet 5     nabumetone (RELAFEN) 500 MG tablet Take 1-2 tablets (500-1,000 mg total) by mouth 2 (two) times a day as needed. 60 tablet 1     potassium chloride (KLOR-CON) 10 MEQ CR tablet 2 tablets by mouth each morning and 1 tablet by mouth each evening 270 tablet 0     vitamin D3-folic acid 5,000 unit- 1 mg Tab Take 5,000 Int'l Units by mouth daily.       amitriptyline (ELAVIL) 10 MG tablet 1 tab po at bedtime x 3 nights then 2 tabs po at bedtime x 3 nights then 3 tabs po qhs 90 tablet 0     No current facility-administered medications for this visit.      Allergies   Allergen Reactions     Dust [Other Environmental Allergy]      Erythromycin      Pollen Extracts      Latex Rash       ROS:   Negative except as noted above in HPI.     Objective  /68 (Patient Site: Right Arm, Patient Position: Sitting, Cuff Size: Adult Large)   Pulse 67   Resp 16   Wt (!) 279 lb 9.6 oz (126.8 kg) Comment: With Cane/shoes  BMI 39.00 kg/m       General: Alert, no acute distress.   Affect: pleasant, cooperative.  Musculoskeletal: 1+ pitting noted throughout the right lower extremity.  Significant limitation in the patient's ability to flex at bilateral hips with significantly tight hamstring muscles bilaterally.  Knee exams show no ligamentous instability.  Negative Lachman's and Xu's bilaterally.  Patient describes tenderness at the  insertion of hamstrings bilaterally as well as at the medial joint lines.    Shahab Perez MD   Family medicine physician  [facility]

## 2021-06-17 NOTE — PROGRESS NOTES
Romeo Chong is a 40 y.o. male who is being evaluated via a billable video visit.      How would you like to obtain your AVS? Jeovannyhart.        Video Start Time: 9:07 AM      Video-Visit Details    Type of service:  Video Visit    Video End Time (time video stopped): 9:23 AM  Originating Location (pt. Location): Home    Distant Location (provider location):  [facility]     [facility]    Assessment:     Diagnoses and all orders for this visit:    Chronic bilateral low back pain with right-sided sciatica  -     Ambulatory referral to PT/OT  -     OPS TFESI Lumbar Bilateral; Future; Expected date: 05/17/2021  -     Ambulatory referral to Podiatry - [facility]  (includes FPA groups)  -     nabumetone (RELAFEN) 500 MG tablet; Take 1-2 tablets (500-1,000 mg total) by mouth 2 (two) times a day as needed.  Dispense: 60 tablet; Refill: 1  -     diazePAM (VALIUM) 5 MG tablet; 5 mg tablets, take 1 tablet PO, 2 hours prior to Procedure.  Dispense: 1 tablet; Refill: 0    Lumbar radiculitis  -     Ambulatory referral to PT/OT  -     OPS TFESI Lumbar Bilateral; Future; Expected date: 05/17/2021  -     nabumetone (RELAFEN) 500 MG tablet; Take 1-2 tablets (500-1,000 mg total) by mouth 2 (two) times a day as needed.  Dispense: 60 tablet; Refill: 1  -     diazePAM (VALIUM) 5 MG tablet; 5 mg tablets, take 1 tablet PO, 2 hours prior to Procedure.  Dispense: 1 tablet; Refill: 0    Bulging lumbar disc  -     Ambulatory referral to PT/OT  -     OPS TFESI Lumbar Bilateral; Future; Expected date: 05/17/2021    Pain of right lower extremity  -     Ambulatory referral to PT/OT    Lymphedema of right lower extremity  -     Ambulatory referral to PT/OT    Chronic left shoulder pain  -     Ambulatory referral to PT/OT    Chronic bilateral thoracic back pain  -     Ambulatory referral to PT/OT    Fibrous dysplasia of bone    H/O Hodgkin's lymphoma    Lumbar foraminal  stenosis  -     Ambulatory referral to PT/OT  -     OPS TFESI Lumbar Bilateral; Future; Expected date: 05/17/2021    Bilateral foot pain  -     Ambulatory referral to PT/OT  -     Ambulatory referral to Podiatry [facility] (includes FPA groups)  -     nabumetone (RELAFEN) 500 MG tablet; Take 1-2 tablets (500-1,000 mg total) by mouth 2 (two) times a day as needed.  Dispense: 60 tablet; Refill: 1       Romeo Chong is a 40 y.o. y.o. male with past medical history significant for hypertension, hypertriglyceridemia, lymphedema, history of Hodgkin's lymphoma, obesity, asthma, vitamin D deficiency, stress hyperglycemia, gastric ulcer, headache, chronic pain syndrome, chronic low back pain, fibrous dysplasia of the bone-right tibia discovered at age 13 had surgery at age 14 with 17 surgeries since then who presents today for follow-up regarding:    -Chronic significant bilateral low back pain with radiculitis.    -Chronic significant right lower extremity lymphedema with pain.      -Chronic severe bilateral foot pain    -Chronic intermittent thoracic pain more mild in nature.    -Chronic intermittent left shoulder pain.    Plan:     A shared decision making plan was used. The patient's values and choices were respected. Prior medical records from 5/7/2021 to current were reviewed today. The following represents what was discussed and decided upon by the provider and the patient.        -DIAGNOSTIC TESTS: Images were personally reviewed and interpreted.   --Lumbar spine MRI 5/12/2021 with multilevel degenerative changes greatest at L5-S1 with mild to moderate disc height loss, disc bulge, left disc protrusion with mild right and mild to moderate left foraminal stenosis.  L4-5 mild left lateral recess with left L5 nerve root compression.  --Left shoulder x-ray 5/12/2021.  --Bilateral feet x-ray 5/12/2021 with mild valgus valgus deformity and mild plantar calcaneal bone spur on the left.  --Ordered left  shoulder x-ray to evaluate chronic left shoulder pain.  --Ordered bilateral foot x-ray to evaluate chronic bilateral foot pain.    -Podiatry referral was placed today to evaluate chronic bilateral foot pain.    -INTERVENTIONS: Ordered bilateral L5-S1 transforaminal epidural steroid injection.  He does have degenerative changes at this level with disc protrusion with bilateral foraminal stenosis.    -Patient was referred to [facility] pain clinic per primary care provider 5/7/2021 for long-term Vicodin management.    -MEDICATIONS: Prescribe nabumetone 500 mg 1 to 2 tablets twice daily as needed for pain and inflammation.  Advised patient to not take OTC NSAIDs while taking this medication take it with full glass water and food.  This is not a good long-term option due to his history of gastric ulcer as well.  Patient is aware that if this does cause any GI upset he needs to stop taking this medication immediately.  -Prescribed Valium 5 mg to take 1 tablet prior to procedure for preprocedure sedation.  Discussed side effects of medications and proper use. Patient verbalized understanding.  -Did discuss with patient that we will not prescribe chronic opioid medications here at the spine center as he was referred to the pain clinic as well and they will determine if this is something that is reasonable to continue.    -PHYSICAL THERAPY: Referral to physical therapy placed to porsche Gibbs to work on multifactorial pain as well.  Discussed the importance of core strengthening, ROM, stretching exercises with the patient and how each of these entities is important in decreasing pain.  Explained to the patient that the purpose of physical therapy is to teach the patient a home exercise program.  These exercises need to be performed every day in order to decrease pain and prevent future occurrences of pain.        -PATIENT EDUCATION:  Total time of 35 minutes spent with the patient, reviewing the chart, placing  orders, and documenting.   -Today we also discussed the issues related to the current COVID-19 pandemic, the pros and cons of the current treatment plan, the CDC guidelines such as social distancing, washing the hands, and covering the cough.    -FOLLOW UP: Follow-up for injection with Dr. Lange, then 2 weeks postinjection  Advised to contact clinic if symptoms worsen or change.    Subjective:     Romeo Chong is a 40 y.o. male who presents today for follow-up regarding chronic bilateral low back pain for many years however worse in the last couple of months left greater than right with generalized lower extremity pain.  Patient does have chronic right lower extremity pain he reports related to his lymphedema but this is different than the worsening pain he had over the last couple of months.  Patient also endorsing chronic bilateral feet pain with walking that is significant.  Patient does have more mild chronic intermittent thoracic pain.  Chronic left shoulder pain is more intermittent.    *Patient reports that he unfortunately lost his daughter last year to sex trafficking.     -Treatment to Date: No prior spinal surgery.  Again multiple history of right leg reconstructive surgery.  Physical therapy on multiple occasions previously for lymphedema, no specific physical therapy for back pain recently.     -Medications:  Hydrocodone prescribed 5/5/2020 by family medicine for chronic low back pain, referral was placed to the Pain clinic at that time as well as the Spine center.     -Prior failed medications:  Gabapentin with increased swelling in his right lower extremity    Patient Active Problem List   Diagnosis     Asthma     Edema     Fibrous dysplasia of bone     Essential hypertension, benign     Hypertriglyceridemia     Lymphedema     Obesity, unspecified     Stress hyperglycemia     Vitamin D deficiency     Allergic rhinitis due to animals     Nonintractable headache, unspecified chronicity pattern,  unspecified headache type     Chronic pain syndrome     Gastric ulcer, unspecified chronicity, unspecified whether gastric ulcer hemorrhage or perforation present     Chronic low back pain, unspecified back pain laterality, unspecified whether sciatica present     H/O Hodgkin's lymphoma       Current Outpatient Medications on File Prior to Encounter   Medication Sig Dispense Refill     amLODIPine (NORVASC) 5 MG tablet Take 0.5 tablets (2.5 mg total) by mouth daily. 45 tablet 0     amLODIPine-benazepril (LOTREL) 5-10 mg per capsule Take 1 capsule by mouth daily.       aspirin-acetaminophen-caffeine (EXCEDRIN MIGRAINE) 250-250-65 mg per tablet Take 1 tablet by mouth every 6 (six) hours as needed for pain.  0     famotidine (PEPCID) 40 MG tablet Take 1 tablet (40 mg total) by mouth every evening. 180 tablet 0     hydroCHLOROthiazide (HYDRODIURIL) 50 MG tablet Take 1 tablet (50 mg total) by mouth daily. 90 tablet 0     HYDROcodone-acetaminophen (NORCO )  mg per tablet Take 1 tablet by mouth every 6 (six) hours as needed for pain. 10 tablet 0     HYDROcodone-acetaminophen 5-325 mg per tablet Take 1 tablet by mouth 4 (four) times a day as needed for pain. 10 tablet 0     loratadine (CLARITIN) 10 mg tablet Take 1 tablet (10 mg total) by mouth daily. 90 tablet 0     losartan (COZAAR) 50 MG tablet Take 1 tablet (50 mg total) by mouth daily. 90 tablet 5     potassium chloride (KLOR-CON) 10 MEQ CR tablet 2 tablets by mouth each morning and 1 tablet by mouth each evening 270 tablet 0     vitamin D3-folic acid 5,000 unit- 1 mg Tab Take 5,000 Int'l Units by mouth daily.       No current facility-administered medications on file prior to encounter.        Allergies   Allergen Reactions     Dust [Other Environmental Allergy]      Erythromycin      Pollen Extracts      Latex Rash       No past medical history on file.     Review of Systems  ROS:  Specifically negative for bowel/bladder dysfunction, balance changes,  headache, dizziness, foot drop, fevers, chills, appetite changes, nausea/vomiting, unexplained weight loss. Otherwise 13 systems reviewed are negative. Please see the patient's intake questionnaire from today for details.    Reviewed Social, Family, Past Medical and Past Surgical history with patient, no significant changes noted since prior visit.     Objective:     VIRTUAL PHYSICAL EXAM:   --CONSTITUTIONAL: Well developed, well nourished, healthy appearing individual.  --PSYCHIATRIC: Appropriate mood and affect. No difficulty interacting due to temper, social withdrawal, or memory issues.  --SKIN: Lumbar region is visually dry and intact.   --RESPIRATORY: Normal rhythm and effort. No abnormal accessory muscle breathing patterns noted.   --STANDING EXAMINATION:  Normal lumbar lordosis noted, no lateral shift.  --MUSCULOSKELETAL: Lumbar spine inspection reveals no evidence of deformity.    RESULTS:   Imaging: Lumbar spine imaging was reviewed today. The images were shown to the patient and the findings were explained using a spine model.    Mr Lumbar Spine Without Contrast  Result Date: 5/12/2021  EXAM: MR LUMBAR SPINE WO CONTRAST LOCATION: [facility] DATE/TIME: 5/12/2021 7:14 AM INDICATION: Back pain or radiculopathy, > 6 wks; Progressive chronic bilateral low back pain with right lumbar radiculitis, history L4-5 disc bulge on previous MRIs in [place] COMPARISON: None available. TECHNIQUE: Routine Lumbar Spine MRI without IV contrast. FINDINGS: Nomenclature is based on 5 lumbar type vertebral bodies. Normal vertebral body heights. Mild straightening of the normal lumbar lordosis, though without spondylolisthesis. Sclerotic focus in the posterior/inferior L2 vertebral body, likely sclerotic bone  island. No pathologic bone marrow signal abnormality. Normal distal spinal cord and cauda equina with conus medullaris at L1-L2. No extraspinal abnormality. Unremarkable visualized bony pelvis.  T12-L1: Normal disc height and signal. No herniation. No facet arthropathy. No spinal canal stenosis. No right neural foraminal stenosis. No left neural foraminal stenosis. L1-L2: Normal disc height and signal. No herniation. No facet arthropathy. No spinal canal stenosis. No right neural foraminal stenosis. No left neural foraminal stenosis. L2-L3: Normal disc height and signal. Shallow left foraminal disc protrusion. No facet arthropathy. No spinal canal stenosis. No right neural foraminal stenosis. No left neural foraminal stenosis. L3-L4: Mild loss of disc height and signal. Broad-based posterior disc bulge with small superimposed right foraminal/far lateral recess disc protrusion. Mild facet arthropathy. No spinal canal stenosis. Mild right neural foraminal stenosis. No left neural foraminal stenosis. L4-L5: Mild loss of disc height and signal. Broad-based circumferential disc bulge with small superimposed central disc protrusion. Mild facet arthropathy. No central spinal canal stenosis, though there is mild left lateral recess stenosis with possible abutment of the traversing left L5 nerve root. No right neural foraminal stenosis. No left neural foraminal stenosis. L5-S1: Mild to moderate loss of disc height and signal. Broad-based circumferential disc bulge with superimposed left subarticular disc protrusion. Mild facet arthropathy. No central spinal canal stenosis, though there is moderate left lateral recess stenosis with abutment and mild displacement of the traversing left S1 nerve root (axial T2 series 9, image #3). Mild right neural foraminal stenosis. Mild to moderate left neural foraminal stenosis.   CONCLUSION: 1.  Multilevel degenerative changes of the lumbar spine as described in detail above, greatest at L5-S1, where there is moderate lateral recess stenosis with abutment and mild displacement of the traversing left S1 nerve root, mild right neuroforaminal stenosis, and mild to moderate left  neuroforaminal stenosis. 2.  At L4-L5, there is mild left lateral recess stenosis with possible abutment of the traversing left L5 nerve root.    Xr Shoulder Left 2 Or More Vws  Result Date: 5/12/2021  EXAM: XR SHOULDER LEFT 2 OR MORE VWS LOCATION: [facility] DATE/TIME: 5/12/2021 7:36 AM INDICATION: Chronic left shoulder pain COMPARISON: None.   Normal alignment without a fracture or dislocation. Acromiohumeral space is preserved. No evidence of calcific tendinitis. Adjacent chest wall is unremarkable. Numerous clips in left axilla.    Xr Feet Bilateral 2 Vws  Result Date: 5/12/2021  EXAM: XR FEET BILATERAL 2 VWS LOCATION: [facility] DATE/TIME: 5/12/2021 7:36 AM INDICATION: Chronic bilateral foot pain COMPARISON: None.   Normal alignment without a fracture or dislocation. No effusions at the ankle There is a very small plantar calcaneal spur in the left foot. Mild hallux valgus deformity bilaterally. No other degenerative changes noted.

## 2021-06-18 LAB — DRUGS UR SCN NOM: NORMAL

## 2021-06-18 NOTE — PATIENT INSTRUCTIONS - HE
Patient Instructions by Abner Reich PA-C at 5/12/2021 10:10 AM     Author: Abner Reich PA-C Service: -- Author Type: Physician Assistant    Filed: 5/12/2021 10:57 AM Encounter Date: 5/12/2021 Status: Addendum    : Abner Reich PA-C (Physician Assistant)    Related Notes: Original Note by Abner Reich PA-C (Physician Assistant) filed at 5/12/2021 10:57 AM       You were given 2-1/2 days worth of pain medication.  Follow-up with the pain clinic as previously instructed and as under review currently.  No further refills will be given out of the urgent care.  Work with your family practice provider and pain management clinic to come up with a comprehensive long-term pain management plan.      Patient Education     Chronic Pain  Pain serves an important role. It lets you know something is wrong that needs your attention. When the body heals, pain normally goes away.  When pain lasts longer than 6 months, it is called chronic pain. This is pain that is present even after the body has healed. Chronic pain can cause mood problems and get in the way of your relationships and your daily life.  A number of conditions can cause chronic pain. Some of the more common include:    Previous surgery    An old injury    Infection    Diseases such as diabetes    Nerve damage    Back injury    Arthritis    Migraine or other headaches    Fibromyalgia    Cancer  Depression and stress can make chronic pain symptoms worse. In some cases, a cause for the pain can't be found.   Treatment  Treatment can greatly reduce pain. In many cases, pain can become less severe, occur less often, and interfere less with your daily life. Chronic pain is often treated with a combination of medicines, therapies, and lifestyle changes. You will work closely with your healthcare provider to find a treatment plan that works best for you.    Ask your healthcare provider for a referral to a pain management specialty center. These can provide the most  recent and proven pain management strategies, along with emotional support and comprehensive services.    Several different types of medicines may be prescribed for chronic pain. Work with your healthcare provider to develop a medicine plan that helps manage your pain.    Physical therapy can help reduce certain types of chronic pain.    Occupational therapy teaches you how to do routine tasks of daily living in ways that lessen your discomfort.    Counseling can help you cope better with stress and pain.    Other therapies such as meditation, yoga, biofeedback, massage, and acupuncture can also help manage chronic pain.    Changing certain habits can help reduce chronic pain. They include:  ? Eating healthy  ? Developing an exercise routine  ? Getting enough sleep   ? Stopping smoking and limiting alcohol use  ? Losing excess weight  Follow-up care  Follow up with your healthcare provider, or as advised. Let your healthcare provider know if your current treatment plan is working or if changes are needed.  Resources  For more information, contact:    American Headache and Migraine Associationmaycol.memberclPrimekss.net or 222-687-3865    American Chronic Pain Association, theacpa.org or 134-638-3560  Date Last Reviewed: 8/1/2017 2000-2017 [employer]. [address] [place] [address]. All rights reserved. This information is not intended as a substitute for professional medical care. Always follow your healthcare professional's instructions.

## 2021-06-19 ENCOUNTER — COMMUNICATION - HEALTHEAST (OUTPATIENT)
Dept: FAMILY MEDICINE | Facility: CLINIC | Age: 41
End: 2021-06-19

## 2021-06-19 DIAGNOSIS — G89.4 CHRONIC PAIN SYNDROME: ICD-10-CM

## 2021-06-21 ENCOUNTER — COMMUNICATION - HEALTHEAST (OUTPATIENT)
Dept: INTERNAL MEDICINE | Facility: CLINIC | Age: 41
End: 2021-06-21

## 2021-06-21 ENCOUNTER — OFFICE VISIT - HEALTHEAST (OUTPATIENT)
Dept: PHYSICAL THERAPY | Facility: REHABILITATION | Age: 41
End: 2021-06-21

## 2021-06-21 DIAGNOSIS — M25.562 ARTHRALGIA OF BOTH LOWER LEGS: ICD-10-CM

## 2021-06-21 DIAGNOSIS — M54.50 CHRONIC BILATERAL LOW BACK PAIN WITHOUT SCIATICA: ICD-10-CM

## 2021-06-21 DIAGNOSIS — I10 ESSENTIAL HYPERTENSION, BENIGN: ICD-10-CM

## 2021-06-21 DIAGNOSIS — G89.4 CHRONIC PAIN SYNDROME: ICD-10-CM

## 2021-06-21 DIAGNOSIS — M25.561 ARTHRALGIA OF BOTH LOWER LEGS: ICD-10-CM

## 2021-06-21 DIAGNOSIS — G89.29 CHRONIC BILATERAL LOW BACK PAIN WITHOUT SCIATICA: ICD-10-CM

## 2021-06-21 DIAGNOSIS — I89.0 LYMPHEDEMA: ICD-10-CM

## 2021-06-21 DIAGNOSIS — M62.81 GENERALIZED MUSCLE WEAKNESS: ICD-10-CM

## 2021-06-21 RX ORDER — LOSARTAN POTASSIUM 50 MG/1
50 TABLET ORAL DAILY
Qty: 90 TABLET | Refills: 0 | Status: SHIPPED | OUTPATIENT
Start: 2021-06-21 | End: 2021-07-12

## 2021-06-21 RX ORDER — AMITRIPTYLINE HYDROCHLORIDE 10 MG/1
TABLET ORAL
Qty: 90 TABLET | Refills: 0 | Status: SHIPPED | OUTPATIENT
Start: 2021-06-21 | End: 2021-07-12

## 2021-06-21 RX ORDER — AMLODIPINE BESYLATE 5 MG/1
2.5 TABLET ORAL DAILY
Qty: 45 TABLET | Refills: 0 | Status: SHIPPED | OUTPATIENT
Start: 2021-06-21 | End: 2021-07-12

## 2021-06-21 RX ORDER — HYDROCHLOROTHIAZIDE 50 MG/1
50 TABLET ORAL DAILY
Qty: 90 TABLET | Refills: 0 | Status: SHIPPED | OUTPATIENT
Start: 2021-06-21 | End: 2021-07-29

## 2021-06-21 NOTE — LETTER
Letter by Jennifer Godinez RN at      Author: Jennifer Godinez RN Service: -- Author Type: --    Filed:  Encounter Date: 5/5/2021 Status: (Other)       Dear Romeo Chong    Thank you for choosing [facility] for your care.  We are committed to providing you with the highest quality and compassionate healthcare services.  The following information pertains to your first appointment with our clinic.    Date/Time of appointment: Wednesday, May 26th, 2021 arrival of 10:45 am please!    Note: This allows time to complete forms, possible labs and nursing assessment.     Name of your Physician: Fabrice Yuen MD    What to bring to your appointment:    Completed Patient History/Initial Nursing Assessment and Medication/Allergy List (these forms were sent to you).    Any paperwork or films from your physician that we have asked you to bring.    Your current insurance card(s).    Parking:    Please refer to the map included to direct you.  The [facility] is located at the [place] end of [facility] in [place], [place].      After turning onto [address] from [facility], take a right turn at the first stop sign.  We have designated parking on the left, identified as parking for Cancer Care patients (Lot D).     The Code to Enter Lot D is: 0501. This code changes monthly and will always coincide with the current month followed by 01. For example August will be 0801.  The month will continue to change but the 01 will remain constant.  If lot D is full please use Parking Lot A, directly across the street.    Please enter the Cancer Care Center on the north end of the [facility].  You will see a sign on the building.        For Medical Oncology or Hematology appointments, please take the elevator to the second floor to check in.   For Radiation Oncology appointments, please go straight through the double doors and check in.     Also please note appointments can last  1.5-2 hours.      We hope these instructions are helpful to you.  If you have any questions or concerns, please call us at [phone].  It is our pleasure to assist you.    Warm Regards,  Jennifer Godinez  Nurse Navigator  [phone]

## 2021-06-21 NOTE — LETTER
Letter by Jennifer Godinez RN at      Author: Jennifer Godinez RN Service: -- Author Type: --    Filed:  Encounter Date: 5/20/2021 Status: (Other)       Dear Romeo Chong    Thank you for choosing [facility] for your care.  We are committed to providing you with the highest quality and compassionate healthcare services.  The following information pertains to your first appointment with our clinic.    Date/Time of appointment: Friday, June 4th, 2021 arrival of 12:45 pm please!    Note: This allows time to complete forms, possible labs and nursing assessment.     Name of your Physician: Fabrice Yuen MD    What to bring to your appointment:    Completed Patient History/Initial Nursing Assessment and Medication/Allergy List (these forms were sent to you).    Any paperwork or films from your physician that we have asked you to bring.    Your current insurance card(s).    Parking:    Please refer to the map included to direct you.  The [facility] is located at the [place] end of [facility] in [place], [place].      After turning onto [address] from [facility], take a right turn at the first stop sign.  We have designated parking on the left, identified as parking for Cancer Care patients (Lot D).     The Code to Enter Lot D is: 0601 . This code changes monthly and will [id number] coincide with the current month followed by 01. For example August will be 0801.  The month will continue to change but the 01 will remain constant.  If lot D is full please use Parking Lot A, directly across the street.    Please enter the Cancer Care Center on the north end of the [facility].  You will see a sign on the building.        For Medical Oncology or Hematology appointments, please take the elevator to the second floor to check in.   For Radiation Oncology appointments, please go straight through the double doors and check in.     Also please note appointments can last  1.5-2 hours.      We hope these instructions are helpful to you.  If you have any questions or concerns, please call us at [phone].  It is our pleasure to assist you.    Warm Regards,  Jennifer Godinez  Nurse Navigator  [phone]

## 2021-06-22 ENCOUNTER — AMBULATORY - HEALTHEAST (OUTPATIENT)
Dept: LAB | Facility: CLINIC | Age: 41
End: 2021-06-22

## 2021-06-22 DIAGNOSIS — Z11.59 ENCOUNTER FOR SCREENING FOR OTHER VIRAL DISEASES: ICD-10-CM

## 2021-06-22 LAB
6MAM UR QL: NOT DETECTED
7AMINOCLONAZEPAM UR QL: NOT DETECTED
A-OH ALPRAZ UR QL: NOT DETECTED
ALPHA-OH-MIDAZOLAM (CUTOFF 20 NG/ML) - HISTORICAL: NOT DETECTED
ALPRAZ UR QL: NOT DETECTED
AMPHET UR QL SCN: NOT DETECTED
BARBITURATES UR QL: NOT DETECTED
BUPRENORPHINE UR QL: NOT DETECTED
BZE UR QL: NOT DETECTED
CARBOXYTHC UR QL: NOT DETECTED
CARISOPRODOL UR QL: NOT DETECTED
CLONAZEPAM UR QL: NOT DETECTED
CODEINE UR QL: NOT DETECTED
CREAT UR-MCNC: 236.5 MG/DL (ref 20–400)
DIAZEPAM UR QL: NOT DETECTED
ETHYL GLUCURONIDE UR QL: PRESENT
FENTANYL UR QL: NOT DETECTED
GABAPENTIN (CUTOFF 100 NG/ML) - HISTORICAL: NOT DETECTED
HYDROCODONE UR QL: NOT DETECTED
HYDROMORPHONE UR QL: NOT DETECTED
LORAZEPAM UR QL: NOT DETECTED
MDA UR QL: NOT DETECTED
MDEA UR QL: NOT DETECTED
MDMA UR QL: NOT DETECTED
ME-PHENIDATE UR QL: NOT DETECTED
MEPERIDINE UR QL: NOT DETECTED
METHADONE UR QL: NOT DETECTED
METHAMPHET UR QL: NOT DETECTED
MIDAZOLAM UR QL SCN: NOT DETECTED
MORPHINE UR QL: NOT DETECTED
NALOXONE (CUTOFF 100 NG/ML) - HISTORICAL: NOT DETECTED
NORBUPRENORPHINE UR QL CFM: NOT DETECTED
NORDIAZEPAM UR QL: NOT DETECTED
NORFENTANYL UR QL: NOT DETECTED
NORHYDROCODONE UR QL CFM: NOT DETECTED
NOROXYCODONE UR QL CFM: NOT DETECTED
NOROXYMORPHONE UR QL SCN: NOT DETECTED
OXAZEPAM UR QL: NOT DETECTED
OXYCODONE UR QL: NOT DETECTED
OXYMORPHONE UR QL: PRESENT
PATHOLOGY STUDY: NORMAL
PCP UR QL: NOT DETECTED
PHENTERMINE UR QL: NOT DETECTED
PREGABALIN (CUTOFF 100 NG/ML) - HISTORICAL: NOT DETECTED
SERVICE CMNT-IMP: NORMAL
TAPENTADOL UR QL SCN: NOT DETECTED
TAPENTADOL UR QL SCN: NOT DETECTED
TEMAZEPAM UR QL: NOT DETECTED
TRAMADOL UR QL: NOT DETECTED
ZOLPIDEM METABOLITE (CUTOFF 100 NG/ML) - HISTORICAL: NOT DETECTED
ZOLPIDEM UR QL: NOT DETECTED

## 2021-06-23 LAB
SARS-COV-2 PCR COMMENT: NORMAL
SARS-COV-2 RNA SPEC QL NAA+PROBE: NEGATIVE
SARS-COV-2 VIRUS SPECIMEN SOURCE: NORMAL

## 2021-06-24 ENCOUNTER — COMMUNICATION - HEALTHEAST (OUTPATIENT)
Dept: SCHEDULING | Facility: CLINIC | Age: 41
End: 2021-06-24

## 2021-06-25 ENCOUNTER — SURGERY - HEALTHEAST (OUTPATIENT)
Dept: SURGERY | Facility: HOSPITAL | Age: 41
End: 2021-06-25
Payer: COMMERCIAL

## 2021-06-25 ENCOUNTER — RECORDS - HEALTHEAST (OUTPATIENT)
Dept: ADMINISTRATIVE | Facility: OTHER | Age: 41
End: 2021-06-25

## 2021-06-25 NOTE — TELEPHONE ENCOUNTER
I left a message for a call back to schedule COVID and post ops and to ask him to schedule pre-op px with PCP.    Surgery/Procedure: Bone Spur Excision with Plantar fascia Release left foot    Special Equipment: None    Location: [facility]    Date: 06/25/2021    Time: 7:30am    Surgeon: Dr. Singleton    Assist: None    Length of Surgery: 30 minutes    OR Confirmed/ :  Yes with Catie on 06/09/21    Orders In:  Yes    Provider Team Notified:  Yes    Entered on [employer] / [employer] Calendar:  Yes    Post Op: TBD

## 2021-06-25 NOTE — TELEPHONE ENCOUNTER
Patient called and advised as below, patient expressed understanding and will come today at 2pm to leave urine    Kristy Mccarthy, CMA

## 2021-06-25 NOTE — TELEPHONE ENCOUNTER
Refill Request  Did you contact pharmacy: No  Medication name:   Requested Prescriptions     Pending Prescriptions Disp Refills     HYDROcodone-acetaminophen (NORCO )  mg per tablet 28 tablet 0     Sig: Take 1 tablet by mouth every 6 (six) hours as needed for pain.     Who prescribed the medication: Dr. Perez  Requested Pharmacy: [facility]  Is patient out of medication: Yes  Patient notified refills processed in 3 business days:  yes  Okay to leave a detailed message: yes

## 2021-06-25 NOTE — PROGRESS NOTES
Romeo Chong is here today for Bilateral L5-S1 TFESIs.  The patient is not able to have the injection today because he was not able to secure a .  The patient will be rescheduled at his earliest convenience. Pt reports he does not need oral sedation for rescheduled appointment.       No charge for today s visit.

## 2021-06-25 NOTE — PROGRESS NOTES
FOOT AND ANKLE SURGERY/PODIATRY CONSULT NOTE         ASSESSMENT:   Plantar fasciitis bilateral feet  Calcaneal spur left foot      TREATMENT:  I have recommended excision of the plantar calcaneal spur of the left foot and a plantar fasciotomy of the right foot in an attempt to alleviate the patient's symptoms.  I informed the patient that since he has had this heel pain for 10 years I recommend treating his heel pain aggressively.  The patient was told that the procedures will be done on an outpatient basis under local anesthesia with IV sedation.  He was told the procedures take approximately 10 minutes to perform.  He would then be discharged weightbearing in a postop shoe for 2 weeks.  The left foot will be done initially.  I informed the patient he would be required to go n.p.o. at midnight prior to the procedure and he will have to see his primary care physician for preoperative consultation.  All pertinent questions were invited and answered.          HPI: I was asked to see Romeo Chong today to evaluate and treat chronic bilateral heel pain.  The patient stated he has had this heel pain for 10 years.  He has not had any treatment over the past 10 years.  He has tried over-the-counter shoe inserts with very little relief.  He had a history of morbid obesity.  He was able to be successful and significant amount of weight loss.  He stated that his heel pain has never improved during the past 10 years.  The pain is located in the bottom of both heels and is aggravated with weightbearing and ambulation.  He denies any particular trauma to his feet.  He has not had any redness or swelling surrounding his heels.  He does have lymphedema of the right lower extremity which is currently being treated by vascular.  He uses compression therapy to reduce the edema.  The patient was seen in consultation at the request of Shaina Green MD for evaluation and treatment of chronic bilateral heel pain.     History  reviewed. No pertinent past medical history.    History reviewed. No pertinent surgical history.    Allergies   Allergen Reactions     Dust [Other Environmental Allergy]      Erythromycin      Pollen Extracts      Latex Rash         Current Outpatient Medications:      amitriptyline (ELAVIL) 10 MG tablet, 1 tab po at bedtime x 3 nights then 2 tabs po at bedtime x 3 nights then 3 tabs po qhs, Disp: 90 tablet, Rfl: 0     amLODIPine (NORVASC) 5 MG tablet, Take 0.5 tablets (2.5 mg total) by mouth daily., Disp: 45 tablet, Rfl: 0     amLODIPine-benazepril (LOTREL) 5-10 mg per capsule, Take 1 capsule by mouth daily., Disp: , Rfl:      aspirin-acetaminophen-caffeine (EXCEDRIN MIGRAINE) 250-250-65 mg per tablet, Take 1 tablet by mouth every 6 (six) hours as needed for pain., Disp: , Rfl: 0     diazePAM (VALIUM) 5 MG tablet, 5 mg tablets, take 1 tablet PO, 2 hours prior to Procedure., Disp: 1 tablet, Rfl: 0     famotidine (PEPCID) 40 MG tablet, Take 1 tablet (40 mg total) by mouth every evening., Disp: 180 tablet, Rfl: 0     hydroCHLOROthiazide (HYDRODIURIL) 50 MG tablet, Take 1 tablet (50 mg total) by mouth daily., Disp: 90 tablet, Rfl: 0     HYDROcodone-acetaminophen (NORCO )  mg per tablet, Take 1 tablet by mouth every 6 (six) hours as needed for pain., Disp: 28 tablet, Rfl: 0     HYDROcodone-acetaminophen 5-325 mg per tablet, Take 1 tablet by mouth 4 (four) times a day as needed for pain., Disp: 10 tablet, Rfl: 0     loratadine (CLARITIN) 10 mg tablet, Take 1 tablet (10 mg total) by mouth daily., Disp: 90 tablet, Rfl: 0     losartan (COZAAR) 50 MG tablet, Take 1 tablet (50 mg total) by mouth daily., Disp: 90 tablet, Rfl: 5     nabumetone (RELAFEN) 500 MG tablet, Take 1-2 tablets (500-1,000 mg total) by mouth 2 (two) times a day as needed., Disp: 60 tablet, Rfl: 1     potassium chloride (KLOR-CON) 10 MEQ CR tablet, 2 tablets by mouth each morning and 1 tablet by mouth each evening, Disp: 270 tablet, Rfl: 0      vitamin D3-folic acid 5,000 unit- 1 mg Tab, Take 5,000 Int'l Units by mouth daily., Disp: , Rfl:     Family History   Problem Relation Age of Onset     Cirrhosis Mother      Hypertension Mother      Diabetes type II Father      Kidney failure Father      Stroke Father      Stroke Sister        Social History     Socioeconomic History     Marital status: Single     Spouse name: Not on file     Number of children: Not on file     Years of education: Not on file     Highest education level: Not on file   Occupational History     Not on file   Social Needs     Financial resource strain: Not on file     Food insecurity     Worry: Not on file     Inability: Not on file     Transportation needs     Medical: Not on file     Non-medical: Not on file   Tobacco Use     Smoking status: Former Smoker     Packs/day: 1.00     Start date: 1999     Quit date: 2009     Years since quittin.0     Smokeless tobacco: Never Used   Substance and Sexual Activity     Alcohol use: Yes     Frequency: Monthly or less     Drug use: Never     Sexual activity: Not Currently   Lifestyle     Physical activity     Days per week: Not on file     Minutes per session: Not on file     Stress: Not on file   Relationships     Social connections     Talks on phone: Not on file     Gets together: Not on file     Attends [employer] service: Not on file     Active member of club or organization: Not on file     Attends meetings of clubs or organizations: Not on file     Relationship status: Not on file     Intimate partner violence     Fear of current or ex partner: Not on file     Emotionally abused: Not on file     Physically abused: Not on file     Forced sexual activity: Not on file   Other Topics Concern     Not on file   Social History Narrative     Not on file       Review of Systems - Patient denies fever, chills, rash, wound, stiffness, limping, numbness, weakness, heart burn, blood in stool, chest pain with activity, calf pain when  walking, shortness of breath with activity, chronic cough, easy bleeding/bruising, swelling of ankles, excessive thirst, fatigue, depression, anxiety.  Patient admits to severe heel pain both feet.      OBJECTIVE:  Appearance: alert, well appearing, and in no distress.    There were no vitals filed for this visit.    BMI= There is no height or weight on file to calculate BMI.    General appearance: Patient is alert and fully cooperative with history & exam.  No sign of distress is noted during the visit.  Psychiatric: Affect is pleasant & appropriate.  Patient appears motivated to improve health.  Respiratory: Breathing is regular & unlabored while sitting.  HEENT: Hearing is intact to spoken word.  Speech is clear.  No gross evidence of visual impairment that would impact ambulation.    Vascular: Dorsalis pedis and posterior tibial pulses are palpable. There is pedal hair growth bilaterally.  CFT < 3 sec from anterior tibial surface to distal digits bilaterally. There is no appreciable edema noted right lower extremity.  Dermatologic: Turgor and texture are within normal limits. No coloration or temperature changes. No primary or secondary lesions noted.  Neurologic: All epicritic and proprioceptive sensations are grossly intact bilaterally.  Musculoskeletal: All active and passive ankle, subtalar, midtarsal, and 1st MPJ range of motion are grossly intact without pain or crepitus, with the exception of none. Manual muscle strength is within normal limits bilaterally. All dorsiflexors, plantarflexors, invertors, evertors are intact bilaterally. Tenderness present to the plantar medial aspect of both heels on palpation.  No tenderness to bilateral feet or ankles with range of motion. Calf is soft/non-tender without warmth/induration    Imaging:         Mr Lumbar Spine Without Contrast    Result Date: 5/12/2021  EXAM: MR LUMBAR SPINE WO CONTRAST LOCATION: [facility] DATE/TIME: 5/12/2021 7:14  AM INDICATION: Back pain or radiculopathy, > 6 wks; Progressive chronic bilateral low back pain with right lumbar radiculitis, history L4-5 disc bulge on previous MRIs in [place] COMPARISON: None available. TECHNIQUE: Routine Lumbar Spine MRI without IV contrast. FINDINGS: Nomenclature is based on 5 lumbar type vertebral bodies. Normal vertebral body heights. Mild straightening of the normal lumbar lordosis, though without spondylolisthesis. Sclerotic focus in the posterior/inferior L2 vertebral body, likely sclerotic bone  island. No pathologic bone marrow signal abnormality. Normal distal spinal cord and cauda equina with conus medullaris at L1-L2. No extraspinal abnormality. Unremarkable visualized bony pelvis. T12-L1: Normal disc height and signal. No herniation. No facet arthropathy. No spinal canal stenosis. No right neural foraminal stenosis. No left neural foraminal stenosis. L1-L2: Normal disc height and signal. No herniation. No facet arthropathy. No spinal canal stenosis. No right neural foraminal stenosis. No left neural foraminal stenosis. L2-L3: Normal disc height and signal. Shallow left foraminal disc protrusion. No facet arthropathy. No spinal canal stenosis. No right neural foraminal stenosis. No left neural foraminal stenosis. L3-L4: Mild loss of disc height and signal. Broad-based posterior disc bulge with small superimposed right foraminal/far lateral recess disc protrusion. Mild facet arthropathy. No spinal canal stenosis. Mild right neural foraminal stenosis. No left neural foraminal stenosis. L4-L5: Mild loss of disc height and signal. Broad-based circumferential disc bulge with small superimposed central disc protrusion. Mild facet arthropathy. No central spinal canal stenosis, though there is mild left lateral recess stenosis with possible abutment of the traversing left L5 nerve root. No right neural foraminal stenosis. No left neural foraminal stenosis. L5-S1: Mild to moderate loss of  disc height and signal. Broad-based circumferential disc bulge with superimposed left subarticular disc protrusion. Mild facet arthropathy. No central spinal canal stenosis, though there is moderate left lateral recess stenosis with abutment and mild displacement of the traversing left S1 nerve root (axial T2 series 9, image #3). Mild right neural foraminal stenosis. Mild to moderate left neural foraminal stenosis.     CONCLUSION: 1.  Multilevel degenerative changes of the lumbar spine as described in detail above, greatest at L5-S1, where there is moderate lateral recess stenosis with abutment and mild displacement of the traversing left S1 nerve root, mild right neuroforaminal stenosis, and mild to moderate left neuroforaminal stenosis. 2.  At L4-L5, there is mild left lateral recess stenosis with possible abutment of the traversing left L5 nerve root.    Xr Shoulder Left 2 Or More Vws    Result Date: 5/12/2021  EXAM: XR SHOULDER LEFT 2 OR MORE VWS LOCATION: [facility] DATE/TIME: 5/12/2021 7:36 AM INDICATION: Chronic left shoulder pain COMPARISON: None.     Normal alignment without a fracture or dislocation. Acromiohumeral space is preserved. No evidence of calcific tendinitis. Adjacent chest wall is unremarkable. Numerous clips in left axilla.    Xr Feet Bilateral 2 Vws    Result Date: 5/12/2021  EXAM: XR FEET BILATERAL 2 VWS LOCATION: [facility] DATE/TIME: 5/12/2021 7:36 AM INDICATION: Chronic bilateral foot pain COMPARISON: None.     Normal alignment without a fracture or dislocation. No effusions at the ankle There is a very small plantar calcaneal spur in the left foot. Mild hallux valgus deformity bilaterally. No other degenerative changes noted.         Stephon Singleton; TASHA  [facility] Foot & Ankle Surgery/Podiatry

## 2021-06-25 NOTE — TELEPHONE ENCOUNTER
Please advise that patient stop in for lab only visit today for routine compliance drug screen prior to refill being considered.  Order placed for test.    Shahab Perez MD

## 2021-06-25 NOTE — TELEPHONE ENCOUNTER
I called and spoke with Romeo again.  This is my 3rd time calling him asking for his ALVAREZ.  I explained until we get his records from the cancer center in [place], I cannot reschedule him.  I have e-mailed and mailed a ALVAREZ (including a self-addressed stamped envelope) to Romeo.  I told him to call once he has the ALVAREZ in the mail and I can get him on the schedule again.  I have cancelled this Friday's, June 4th appointment.

## 2021-06-26 LAB
6MAM UR CFM-MCNC: <10 NG/ML
CODEINE UR CFM-MCNC: <20 NG/ML
HYDROCODONE UR CFM-MCNC: <20 NG/ML
HYDROMORPHONE UR CFM-MCNC: <20 NG/ML
MORPHINE UR CFM-MCNC: <20 NG/ML
NORHYDROCODONE UR CFM-MCNC: <20 NG/ML
NOROXYCODONE UR CFM-MCNC: 23 NG/ML
NOROXYMORPHONE UR QL SCN: <20 NG/ML
OXYCODONE UR CFM-MCNC: 23 NG/ML
OXYMORPHONE UR CFM-MCNC: <20 NG/ML

## 2021-06-26 NOTE — PROGRESS NOTES
Romeo Chong is a 40 y.o. male who is being evaluated via a billable video visit.      How would you like to obtain your AVS? Mail- pt gave a temporary address  If dropped from the video visit, the video invitation should be resent by: [phone] [facility]  Will anyone else be joining your video visit? no    Pain score: 9  Constant   What does your pain feel like: sharp, dull, aching, shooting, stabbing, deep, throbbing, pressure, tender, swollen  Does the pain interfere with:  Work: yes  Walking/distance: yes  Sleep: yes  Daily activities: yes  Relationships/social life: yes  Mood: yes  F= 8

## 2021-06-26 NOTE — PATIENT INSTRUCTIONS - HE
DISCHARGE INSTRUCTIONS    During office hours (8:00 a.m.- 4:00 p.m.) questions or concerns may be answered  by calling [facility] Center Navigation Nurses at  [phone].  Messages received after hours will be returned the following business day.      In the case of an emergency, please dial 911 or seek assistance at the nearest Emergency Room/Urgent Care facility.     All Patients:    ? You may experience an increase in your symptoms for the first 2 days (It may take anywhere between 2 days- 2 weeks for the steroid to have maximum effect).    ? You may use ice on the injection site, as frequently as 20 minutes each hour if needed.    ? You may take your pain medicine.    ? You may continue taking your regular medication after your injection. If you have had a Medial Branch Block you may resume pain medication once your pain diary is completed.    ? You may shower. No swimming, tub bath or hot tub for 48 hours.  You may remove your bandaid/bandage as soon as you are home.    ? You may resume light activities, as tolerated.    ? Resume your usual diet as tolerated.    ? It is strongly advised that you do not drive for 1-3 hours post injection.    ? If you have had oral sedation:  Do not drive for 8 hours post injection.      ? If you have had IV sedation:  Do not drive for 24 hours post injection.  Do not operate hazardous machinery or make important personal/business decisions for 24 hours.      POSSIBLE STEROID SIDE EFFECTS (If steroid/cortisone was used for your procedure)    -If you experience these symptoms, it should only last for a short period      Swelling of the legs                Skin redness (flushing)       Mouth (oral) irritation     Blood sugar (glucose) levels              Sweats                      Mood changes    Headache    Sleeplessness    Weakened immune system for up to 14 days, which could increase the risk of sandi the COVID-19 virus and/or experiencing more severe symptoms of the  disease, if exposed.    Decreased effectiveness of the flu vaccine if given within 2 weeks of the steroid.         POSSIBLE PROCEDURE SIDE EFFECTS  -Call the Spine Center if you are concerned    Increased Pain             Increased numbness/tingling        Nausea/Vomiting            Bruising/bleeding at site        Redness or swelling                                                Difficulty walking        Weakness             Fever greater than 100.5    *In the event of a severe headache after an epidural steroid injection that is relieved by lying down, please call the [facility] to speak with a clinical staff member*

## 2021-06-26 NOTE — TELEPHONE ENCOUNTER
Please contact patient.  Given the absence of any prescription medications in his urine drug screen, I'm unfortunately not able to refill the hydrocodone/acetaminophen.  I'm happy to fill the other non-opioid medications until he is able to establish care with a new primary care provider.  It looks like he has an appointment scheduled with Dr Correia on Wednesday.     Prescriptions are sent to patient's pharmacy.      Thanks  Shahab Perez MD

## 2021-06-26 NOTE — PROGRESS NOTES
Urine Drug Test: 6/17/2021     Medication:   Last dose of scheduled / tracked / medical cannabis / CBD: N/A- pt is out    Witnessed Patch Location: n/a    Medication Current List    Current Outpatient Medications:      amitriptyline (ELAVIL) 10 MG tablet, 1 tab po at bedtime x 3 nights then 2 tabs po at bedtime x 3 nights then 3 tabs po qhs, Disp: 90 tablet, Rfl: 0     amLODIPine (NORVASC) 5 MG tablet, Take 0.5 tablets (2.5 mg total) by mouth daily., Disp: 45 tablet, Rfl: 0     amLODIPine-benazepril (LOTREL) 5-10 mg per capsule, Take 1 capsule by mouth daily., Disp: , Rfl:      aspirin-acetaminophen-caffeine (EXCEDRIN MIGRAINE) 250-250-65 mg per tablet, Take 1 tablet by mouth every 6 (six) hours as needed for pain., Disp: , Rfl: 0     diazePAM (VALIUM) 5 MG tablet, 5 mg tablets, take 1 tablet PO, 2 hours prior to Procedure., Disp: 1 tablet, Rfl: 0     famotidine (PEPCID) 40 MG tablet, Take 1 tablet (40 mg total) by mouth every evening., Disp: 180 tablet, Rfl: 0     hydroCHLOROthiazide (HYDRODIURIL) 50 MG tablet, Take 1 tablet (50 mg total) by mouth daily., Disp: 90 tablet, Rfl: 0     HYDROcodone-acetaminophen (NORCO )  mg per tablet, Take 1 tablet by mouth every 6 (six) hours as needed for pain., Disp: 28 tablet, Rfl: 0     loratadine (CLARITIN) 10 mg tablet, Take 1 tablet (10 mg total) by mouth daily., Disp: 90 tablet, Rfl: 0     losartan (COZAAR) 50 MG tablet, Take 1 tablet (50 mg total) by mouth daily., Disp: 90 tablet, Rfl: 5     nabumetone (RELAFEN) 500 MG tablet, Take 1-2 tablets (500-1,000 mg total) by mouth 2 (two) times a day as needed., Disp: 60 tablet, Rfl: 1     potassium chloride (KLOR-CON) 10 MEQ CR tablet, 2 tablets by mouth each morning and 1 tablet by mouth each evening, Disp: 270 tablet, Rfl: 0     vitamin D3-folic acid 5,000 unit- 1 mg Tab, Take 5,000 Int'l Units by mouth daily., Disp: , Rfl:     Personal belongings: Left outside the bathroom.    Comments: n/a

## 2021-06-26 NOTE — PATIENT INSTRUCTIONS - HE
Surgery Information    **ALL [facility] PAPERWORK IS TO BE FAXED -[phone], IT WILL BE PROCESSED AFTER SURGERY IS COMPLETE.  You MUST have a Preoperative physical within 30 days before surgery!!!      Surgery Date TBD    Surgery Time: The Preop Nurse will call you 1-2 days before with exact arrival time  ( Arrive at the hospital two hours prior to your surgery and 1 hour prior at the surgery center. Check in at the . The only exception is if you are scheduled for surgery at 7am, you should arrive at 5:30am)    IF YOU NEED TO RESCHEDULE OR CANCEL YOUR SURGERY FOR ANY REASON PLEASE CALL THE CLINIC AS SOON AS POSSIBLE -[phone].    Admission Type: Outpatient    Surgeon: Dr. Singleton    Surgery Procedure: Excision heel spur left foot    Surgery Location: [facility]    COVID TEST:     POST OPERATIVE APPT:     If you take Blood Thinners Such as:  Warfarin, Xarelto, Plavix, Aspirin or Eliquis this will need to be HELD prior to procedure according to primary care provider/doctor or cardiology who prescribes this medication. Generally this is 5 days.    Additional Information: If you use a CPAP machine for sleep apnea please bring it with you for surgery. We will want to monitor your breathing using your normal equipment.   If you need to reach the surgery scheduler please call the main number at [phone] and ask for Nataliia for Dr. Singleton    [facility] [place] AND SURGERY CENTER INFORMATION    We need to know a lot of information about you before surgery.     1-5 Days Before:     A nurse will call you for a pre-screening interview. The phone call with the nurse will be much faster and easier if you  Have organized your information prior to the call.     Please have the following information available:    Preoperative Exam completed and faxed to our office    Primary care provider's and any specialty providers' contact information available. .    If requested by your primary care provider, have any heart and  lung exams at least 3 days before surgery.    Have a complete and accurate list of medications available.     Have a list of your allergies/sensitivities and reactions    Know your health history, surgical history, and medical problems    Know any anesthesia issues with you or within your family.     BE SURE TO NOTIFY US IF YOU ARE TAKING ANY BLOOD THINNING AGENTS: Coumadin, Plavix, Aspirin, Xarelto, Eliquis    Someone planned to bring you and stay with you after the surgery    Day Before Surgery    1. STOP Smoking and Drinking: It is important to stop smoking and drinking at least 24 hours before surgery. Smoking and drinking may cause complications in your recovery from anesthesia and may lengthen the healing process.    2. Pack for your hospital stay: If it will be required for you to stay at the hospital after surgery, bring personal items such as a robe, slippers, pajamas, additional clothing and toiletries. Don't forget:    List of medication    Eyeglass case or contact case with solution. You cannot wear contacts during surgery    Copies of your physical exam , lab results and EKG    Copy of Health Care Directives, Living Will or Power of     Insurance Cards    Photo ID    CPAP machine    3. NOTHING BY MOUTH 8 HOURS BEFORE. This includes gum, hard candy, water and mints. The only exception is if you have been instructed by your doctor to take your medications with sips of water. You may rinse your mouth or brush your teeth, but do not swallow water.     4. Remove Nail Polish  Day of Surgery    1. Medications- Take as indicated with sips of water     2. Wear comfortable loose fitting clothes. Wear your glasses-Not contacts. Do not wear jewelry and remove body piercing's. Surgery may be cancelled if they are not removed.     3. If same day surgery-Have a someone come with you to surgery that can help you understand the surgeon's instructions, drive you home and stay with you overnight the first night.      4. A nurse will call you at home within 72 hours following surgery to see how you are doing. Our nurses and doctors will discuss recovery with you.      The surgery scheduler Nataliia Singleton will contact you to schedule if you were not scheduled today or you need to make any adjustments to your surgery.     You will need a preop physical within 30 days before surgery with your primary care provider. Please note if you do not get a complete History and Physical your surgery will be cancelled.   Please contact your primary to schedule.     A nurse should contact you from the hospital 1-2 days before surgery to review with you.    If you would like a Good [place] Estimate for your upcoming service/procedure contact Cost of Care Estimates at [phone], advocates are available Monday through Friday 8am - 5pm. You may also submit a request online at [email or web address]    [place] [address] [address]  [address]     [phone] for facility charge    Anesthesiology charge  [phone]        Please don't hesitate to call us with any additional question or problems  Our number is [phone]     Instructions for Patients Scheduled for Vascular or Podiatry Procedures During the COVID 19 Pandemic    Your Provider has determined that your condition warrants going ahead with your procedure at this time.  You will need to be tested for COVID19 within 72 hours of your procedure. We highly encourage patients to get tested for COVID-19 at one of our designated [facility] testing sites. We process the tests in our lab, which allows us to get the results quickly. If you choose to get tested at a non-[facility] location, you will need to contact your primary care provider to make those arrangements and ensure the results are available to your surgeon before you are arriving for your procedure. If we do not receive the  results in time, your procedure may be postponed or canceled.  Please make sure your test is collected 3-days prior to your procedure date.  The results will need to get faxed to [phone].  After testing, you will need to remain in self-quarantine until your procedure.  If you are notified of a positive COVID-19 result; you will need to call your provider for further recommendations.    Please call [phone] and press option 2 to speak to a nurse.  If the test is positive; DO NOT PRESENT FOR YOUR PROCEDURE until you have been given further instructions.  You will not be called with negative results so arrive as instructed unless otherwise notified.  Don't:    Don't invite visitors or friends into your home.    Don't leave your home unless absolutely necessary.    Don't share utensils and other household items with others in the home.  Do:    Wash your hands regularly with soap and water and use hand  with at least 60% alcohol if you don't have easy access to soap and water.     Disinfect surface areas daily, including doorknobs, electronics - especially phones, laptops and other devices.     Wash utensils and other items thoroughly.     Separate yourself from others in the household as best you can, including pets.    Keep your hands away from your face.     Practice all other prevention tips the CDC recommends, including covering your coughs and sneezes with a tissue or your sleeve and immediately throwing the tissue into the trash and washing your hands.    Call your hospital if you develop the following signs before your surgery:    Fever.    Cough.    Shortness of breath.    Sore throat.    Runny or stuffy nose.    Muscle or body aches.    Headaches.    Fatigue.    Vomiting and diarrhea.    These steps will help keep you & your family, other patients, and hospital staff safe from COVID19.         Please call one of the [place] locations below to schedule an appointment. If you received a  prescription please bring it with you to your appointment. Some locations are limited to what they carry.    Office Locations    [facility]  [address] [place] [address]  Home Medical Equipment, [address]   Phone: [phone]   Orthotics and Prosthetics, [address]   Phone: [phone]    [facility]  Home Medical Equipment  [address], [place] [address]   Phone: [phone]    Orthotics and Prosthetics ([place] [facility])    [address], [address], [place] [address]  Phone: [phone]    On license of [facility] [place]  [address]   [place] [address]   Phone: [phone]    [facility]  [address]  [place] [address]  Phone: [phone]    [facility]   [address]  [place] [address]  Phone: [phone]    [facility]  [address]  [place] [address]  Phone: [phone]    [facility]  [address]  [place] [address]  Phone: [phone]    [place]   [address] [place] [address]   Phone: [phone]    What are Prescription Custom Orthotics?  Custom orthotics are specially-made devices designed to support and comfort your feet. Prescription orthotics are crafted for you and no one else. They match the contours of your feet precisely and are designed for the way you move. Orthotics are only manufactured after a podiatrist has conducted a complete evaluation of your feet, ankles, and legs, so the orthotic can accommodate your unique foot structure and pathology.  Prescription orthotics are divided into two categories:    Functional orthotics are designed to control abnormal motion. They may be used to treat foot pain  caused by abnormal motion; they can also be used to treat injuries such as shin splints or tendinitis. Functional orthotics are usually crafted of a semi-rigid material such as plastic or graphite.    Accommodative orthotics are softer and meant to provide additional cushioning and support. They can be used to treat diabetic foot ulcers, painful calluses on the bottom of the foot, and other uncomfortable conditions.  Podiatrists use orthotics to treat foot problems such as plantar fasciitis, bursitis, tendinitis, diabetic foot ulcers, and foot, ankle, and heel pain. Clinical research studies have shown that podiatrist-prescribed foot orthotics decrease foot pain and improve function.  Orthotics typically cost more than shoe inserts purchased in a retail store, but the additional cost is usually well worth it. Unlike shoe inserts, orthotics are molded to fit each individual foot, so you can be sure that your orthotics fit and do what they're supposed to do. Prescription orthotics are also made of top-notch materials and last many years when cared for properly. Insurance often helps pay for prescription orthotics.  What are Shoe Inserts?   You've seen them at the grocery store and at the mall. You've probably even seen them on TV and online. Shoe inserts are any kind of non-prescription foot support designed to be worn inside a shoe. Pre-packaged, mass produced, arch supports are shoe inserts. So are the  custom-made  insoles and foot supports that you can order online or at retail stores. Unless the device has been prescribed by a doctor and crafted for your specific foot, it's a shoe insert, not a custom orthotic device--despite what the ads might say.  Shoe inserts can be very helpful for a variety of foot ailments, including flat arches and foot and leg pain. They can cushion your feet, provide comfort, and support your arches, but they can't correct biomechanical foot problems or cure long-standing foot  issues.  The most common types of shoe inserts are:    Arch supports: Some people have high arches. Others have low arches or flat feet. Arch supports generally have a  bumped-up  appearance and are designed to support the foot's natural arch.     Insoles: Insoles slip into your shoe to provide extra cushioning and support. Insoles are often made of gel, foam, or plastic.     Heel liners: Heel liners, sometimes called heel pads or heel cups, provide extra cushioning in the heel region. They may be especially useful for patients who have foot pain caused by age-related thinning of the heels' natural fat pads.     Foot cushions: Do your shoes rub against your heel or your toes? Foot cushions come in many different shapes and sizes and can be used as a barrier between you and your shoe.  Choosing an Over-the-Counter Shoe Insert  Selecting a shoe insert from the wide variety of devices on the market can be overwhelming. Here are some podiatrist-tested tips to help you find the insert that best meets your needs:    Consider your health. Do you have diabetes? Problems with circulation? An over-the-counter insert may not be your best bet. Diabetes and poor circulation increase your risk of foot ulcers and infections, so schedule an appointment with a podiatrist. He or she can help you select a solution that won't cause additional health problems.     Think about the purpose. Are you planning to run a marathon, or do you just need a little arch support in your work shoes? Look for a product that fits your planned level of activity.     Bring your shoes. For the insert to be effective, it has to fit into your shoes. So bring your sneakers, dress shoes, or work boots--whatever you plan to wear with your insert. Look for an insert that will fit the contours of your shoe.     Try them on. If all possible, slip the insert into your shoe and try it out. Walk around a little. How does it feel? Don't assume that feelings of  pressure will go away with continued wear. (If you can't try the inserts at the store, ask about the store's return policy and hold on to your receipt.).pod

## 2021-06-26 NOTE — TELEPHONE ENCOUNTER
Refill Request  Did you contact pharmacy: No  Medication name:   Requested Prescriptions     Pending Prescriptions Disp Refills     HYDROcodone-acetaminophen (NORCO )  mg per tablet 28 tablet 0     Sig: Take 1 tablet by mouth every 6 (six) hours as needed for pain.     Who prescribed the medication: Dr. Perez  [facility]ed Pharmacy[facility]  Is patient out of medication: Yes  Patient notified refills processed in 3 business days:  yes  Okay to leave a detailed message: yes

## 2021-06-26 NOTE — PATIENT INSTRUCTIONS - HE
Plan recommended today:    Follow up in 4-6 weeks with Mony CUELLO     Sign a Release of Information for [facility] in [place], [place] so we can obtain your records for our next visit    Continue your current regimen of alleviating factors as reviewed today    Please see your current provider for any refill of an opioid prescription; they may choose to provide a refill until we have your urine screen back. They will continue to manage your general health and have requested you see the pain center for pain management. Please discuss any health concerns with your primary care physician.    [place] Precautions are taken with every patient which includes a  report and urine drug test which will be taken as scheduled for baseline screening.     Behavioral Therapy- referral to pain psychotherapy for chronic pain and grief therapy    Physical and Occupational Therapy- scheduled 06/21/21    Continue with Dr. Singleton for left foot upcoming surgery    Injections- recently had TFESI with spine center, no benefit.  Follow-up scheduled tomorrow    MTM visit with the pharmacist Kodak Lyons PharmD    Medication management includes - Discussed taking over Vicodin 10/325 mg two times a day prn once UDT returns as expected and paperwork (consent and agreement) completed.  Will review records and consider other non opioid options as well, possibly ketamine trial.    Education was provided today in regards to the patient's specific diagnosis

## 2021-06-26 NOTE — PATIENT INSTRUCTIONS - HE
For your plantar fasciitis, please follow-up with podiatry.      For the hamstring strain, I would recommend Physical therapy  And home exercises.  You could consider applying heat to the stiff muscles as needed, as well.      Please start amitriptyline to help with sleep and pain.  Potential side effects include dry mouth , constipation and sleepiness.      Please follow-up with pain clinic in 2 weeks.

## 2021-06-26 NOTE — TELEPHONE ENCOUNTER
Spoke with patient regarding medications. He stated that he was staying in a house that would not allow him to take the other medications and only was able to take ibuprofen and tylenol. He only took these for a couple of weeks for pain and nothing else.    He is asking for a refill now since he is living in a better place now.    Please advise on medication refill.

## 2021-06-26 NOTE — TELEPHONE ENCOUNTER
Refill Approved    Rx renewed per Medication Renewal Policy. Medication was last renewed on 5/25/21, last OV .5/25/21    Jennifer Rivers, [facility] Triage/Med Refill 6/19/2021     Requested Prescriptions   Pending Prescriptions Disp Refills     amitriptyline (ELAVIL) 10 MG tablet [Pharmacy Med Name: AMITRIPTYLINE 10MG TABLETS] 90 tablet 0     Sig: TAKE 1 TABLET BY MOUTH AT BEDTIME FOR 3 NIGHTS THEN 2 TABLET BY MOUTH AT BEDTIME FOR 3 NIGHTS, THEN 3 BY MOUTH EVERY NIGHT AT BEDTIME.       Tricyclics/Misc Antidepressant/Antianxiety Meds Refill Protocol Passed - 6/19/2021 12:40 PM        Passed - PCP or prescribing provider visit in last year     Last office visit with prescriber/PCP: 5/25/2021 Shahab Perez MD OR same dept: 5/25/2021 Shahab Perez MD OR same specialty: 5/25/2021 Shahab Perez MD  Last physical: Visit date not found Last MTM visit: Visit date not found   Next visit within 3 mo: Visit date not found  Next physical within 3 mo: Visit date not found  Prescriber OR PCP: Shahab Perez MD  Last diagnosis associated with med order: 1. Chronic pain syndrome  - amitriptyline (ELAVIL) 10 MG tablet [Pharmacy Med Name: AMITRIPTYLINE 10MG TABLETS]; TAKE 1 TABLET BY MOUTH AT BEDTIME FOR 3 NIGHTS THEN 2 TABLET BY MOUTH AT BEDTIME FOR 3 NIGHTS, THEN 3 BY MOUTH EVERY NIGHT AT BEDTIME.  Dispense: 90 tablet; Refill: 0    If protocol passes may refill for 12 months if within 3 months of last provider visit (or a total of 15 months).

## 2021-06-28 ENCOUNTER — COMMUNICATION - HEALTHEAST (OUTPATIENT)
Dept: PODIATRY | Facility: CLINIC | Age: 41
End: 2021-06-28

## 2021-06-30 ENCOUNTER — COMMUNICATION - HEALTHEAST (OUTPATIENT)
Dept: PALLIATIVE MEDICINE | Facility: OTHER | Age: 41
End: 2021-06-30

## 2021-06-30 LAB
ETHYL GLUCURONIDE UR CFM-MCNC: 2040 NG/ML
ETHYL SULFATE UR CFM-MCNC: 277 NG/ML

## 2021-06-30 NOTE — PROGRESS NOTES
Progress Notes by Abner Reich PA-C at 5/12/2021 10:10 AM     Author: Abner Reich PA-C Service: -- Author Type: Physician Assistant    Filed: 5/12/2021  1:04 PM Encounter Date: 5/12/2021 Status: Signed    : Abner Reich PA-C (Physician Assistant)       Chief Complaint   Patient presents with   ? Back Pain     chronic back pain and leg pain getting worse, waiting for pain clinic to see him         Clinical Decision Making:  I advised the patient that he should keep his appointment with the pain clinic.  He states that he is on intake with the pain clinic.  Additionally he was not able to make his appointment with his primary care provider.  My intention is to give him 2-1/2 days worth of medication that will allow him to see his primary care provider to discuss a pain contract and chronic pain management also to have intake with the pain clinic.  I advised the patient he should transfer all of his records from [place] for the pain clinic [facility] practice to review.  He was informed that it is our policy that chronic medications do not get refilled out of the urgent care and that this is also problematic with narcotic refills.  Patient is given a short term amount with a one-time understanding that the patient is using this to bridge until he is seen by the pain clinic.  Is instructed not to return to the urgent care for refills.  Patient voiced understanding of this and said he will contact the pain clinic as primary care provider today and tomorrow.    30 min spent on the date of the encounter in chart review, patient visit, review of tests, documentation and/or discussion with other providers about the issues documented above.       1. Chronic pain syndrome  HYDROcodone-acetaminophen 5-325 mg per tablet   2. Encounter for medication refill  HYDROcodone-acetaminophen 5-325 mg per tablet         Patient Instructions   You were given 2-1/2 days worth of pain medication.  Follow-up with the pain clinic as  previously instructed and as under review currently.  No further refills will be given out of the urgent care.  Work with your family practice provider and pain management clinic to come up with a comprehensive long-term pain management plan.      Patient Education     Chronic Pain  Pain serves an important role. It lets you know something is wrong that needs your attention. When the body heals, pain normally goes away.  When pain lasts longer than 6 months, it is called chronic pain. This is pain that is present even after the body has healed. Chronic pain can cause mood problems and get in the way of your relationships and your daily life.  A number of conditions can cause chronic pain. Some of the more common include:    Previous surgery    An old injury    Infection    Diseases such as diabetes    Nerve damage    Back injury    Arthritis    Migraine or other headaches    Fibromyalgia    Cancer  Depression and stress can make chronic pain symptoms worse. In some cases, a cause for the pain can't be found.   Treatment  Treatment can greatly reduce pain. In many cases, pain can become less severe, occur less often, and interfere less with your daily life. Chronic pain is often treated with a combination of medicines, therapies, and lifestyle changes. You will work closely with your healthcare provider to find a treatment plan that works best for you.    Ask your healthcare provider for a referral to a pain management specialty center. These can provide the most recent and proven pain management strategies, along with emotional support and comprehensive services.    Several different types of medicines may be prescribed for chronic pain. Work with your healthcare provider to develop a medicine plan that helps manage your pain.    Physical therapy can help reduce certain types of chronic pain.    Occupational therapy teaches you how to do routine tasks of daily living in ways that lessen your  discomfort.    Counseling can help you cope better with stress and pain.    Other therapies such as meditation, yoga, biofeedback, massage, and acupuncture can also help manage chronic pain.    Changing certain habits can help reduce chronic pain. They include:  ? Eating healthy  ? Developing an exercise routine  ? Getting enough sleep   ? Stopping smoking and limiting alcohol use  ? Losing excess weight  Follow-up care  Follow up with your healthcare provider, or as advised. Let your healthcare provider know if your current treatment plan is working or if changes are needed.  Resources  For more information, contact:    [employer], maycol.memberclicks.net or 713-185-7738    American Chronic Pain Association, theacpa.org or 783-600-6280  Date Last Reviewed: 8/1/2017 2000-2017 [employer]. [address] [place] [address]. All rights reserved. This information is not intended as a substitute for professional medical care. Always follow your healthcare professional's instructions.                HPI:  Romeo Chong is a 40 y.o. male who presents today ostensibly for pain medication refill.  States that he recently relocated from [place] to [place].  Has had a long history of chronic pain medication use for right lower extremity pain, right low back pain and right lower extremity lymphedema.  Patient shares that he has contacted his primary care provider Dr. Perez who gave Norco 10 tablets on 5/5/2021.  This was for the patient to transition and schedule with pain clinic.  Patient has not had intake with pain clinic at this time.  Dr. Perez discussed alternative therapies other than narcotics.  Patient states that he has difficulties with NSAIDs does not use NSAIDs and also has difficulty with muscle relaxers.     History obtained from chart review and the patient.    Problem List:  2021-05: Allergic rhinitis due to animals  2021-05: Nonintractable  headache, unspecified chronicity pattern,   unspecified headache type  2021: Chronic pain syndrome  2021: Gastric ulcer, unspecified chronicity, unspecified whether   gastric ulcer hemorrhage or perforation present  2021: Chronic low back pain, unspecified back pain laterality,   unspecified whether sciatica present  2021: H/O Hodgkin's lymphoma  2012: Lymphedema  2011: Hypertriglyceridemia  2011: Vitamin D deficiency  2010: Asthma  2010: Essential hypertension, benign  2010: Obesity, unspecified  2009: Edema  2009: Fibrous dysplasia of bone  2009: Stress hyperglycemia      History reviewed. No pertinent past medical history.    Social History     Tobacco Use   ? Smoking status: Former Smoker     Packs/day: 1.00     Start date: 1999     Quit date: 2009     Years since quittin.0   ? Smokeless tobacco: Never Used   Substance Use Topics   ? Alcohol use: Yes     Frequency: Monthly or less       Review of Systems  As above in HPI otherwise negative.    Vitals:    21 1012   BP: 145/90   Patient Site: Right Arm   Patient Position: Sitting   Cuff Size: Adult Regular   Pulse: 73   Resp: 16   Temp: 98.4  F (36.9  C)   TempSrc: Oral   SpO2: 98%       Physical Exam    General: Patient is resting in the office and sitting.  He ambulates into the office encounter with a cane.  He has marked swelling on the right lower extremity.  HEENT: Head is normocephalic atraumatic   Skin: Without rash non-diaphoretic  Musculoskeletal: Note was made that the right leg was much larger than the left leg with swelling.     No other physical examination was done.

## 2021-07-02 ENCOUNTER — COMMUNICATION - HEALTHEAST (OUTPATIENT)
Dept: PODIATRY | Facility: CLINIC | Age: 41
End: 2021-07-02

## 2021-07-04 NOTE — PROGRESS NOTES
"Progress Notes by Mony Monsivais PA-C at 6/15/2021  8:40 AM     Author: Mony Monsivais PA-C Service: -- Author Type: Physician Assistant    Filed: 6/15/2021 12:51 PM Date of Service: 6/15/2021  8:40 AM Status: Signed    : Mony Monsivais PA-C (Physician Assistant)         Video Start Time: 8:59 AM     [facility]  New Patient Consultation        HPI  Romeo Chong 40 y.o. is here today, sent to me by  to discuss   Chief Complaint   Patient presents with   ? Consult     right leg, low back and bilateral feet     Comorbid conditions include Asthma, HTN, hypertriglyceridemia, lymphedema, obesity, Vitamin D deficiency, allergic rhinitis, nonintractable headache, gastric ulcer, h/o hodgkin's lymphoma,     Pain Story:     He states pain started when he was 13.  He states diagnosis of fibrous dysplasia of bone and had surgery on his right tibia.  Reports lymphedema didn't come until 2006 with his treatment for lymphoma.  Pain became more chronic than what he was experiencing when he was younger.  He states he has followed a pain center in [place] for many years.    Reports aching stabbing pain daily in his right lower extremity from the knee down and into his achilles tendon.  Pain on left lower extremity but no lymphedema.  He states he is scheduled to see vascular but not until after summer months due to availability.  He denies any current lower extremity sores, drainage, erythema, warmth.  He does report recurrent cellulitis which has turned to sepsis requiring hospitalizations.    He states he did have LESI on 06/02/2021 states procedure was painful but seemed that his chronic pain did not change or improve.  Denies steroid side effects or procedure complications. He states his lower back pain is stabbing, pulling, sometimes aching and feels like \"someone punching him.\"  Location is equal midline bilateral location and radiates into left leg at times " "(shooting) sometimes radiates into buttocks.  Intermittent numbness/tinging into lower extremities but nothing constant.  He states he feels weakness only because of lymphedema.  Denies bowel or bladder incontinence or saddle anesthesia. Denies fever/chills, unexplained weight loss. He states he sometimes has neck pain when he sleeps wrong, has to stretch neck when he gets up.  Denies any history of spine surgery.    Reports accident at work in 2014 slipped and fell which caused some nerve damage in right carpal tunnel - denies surgery.  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  Denies median nerve block.  Has tried acupuncture for this but didn't help after 4 visits.    He has surgery scheduled for left bone spur with plantar fascia release with Dr. Singleton on 06/25/2021. He denies any prior foot surgeries.    He has a history of chronic pain related to chronic lymphedema of right LE, chronic hamstring tightness, bilateral plantar fasciitis and low back pain.     Relocated from [place] in which he was treated for chronic pain.  He had walk in visit on 05/21/21 due to pain:  ASSESSMENT & PLAN:   Diagnoses and all orders for this visit:     Chronic pain syndrome     Patient with multiple chronic medical conditions.  Moved here from [place].  Has no PCP.  Came to the walk-in clinic requesting refills and opiates.  He was told last time that he cannot do this.  It does sound that patient was legitimately confused that we are not walk-in primary care.  He is agreeable to going to the  to make a primary care appointment.  Does have a pain clinic appointment set up that was verified by me on Poppy 15.  Explained walk-in clinic is not appropriate for opiate refills for chronic conditions.     Reviewed last visit with [facility] on 05/17/2021:  \"Video-Visit Details     Type of service:  Video Visit     Video End Time (time video stopped): 9:23 AM  Originating Location (pt. " Location): Home     Distant Location (provider location):  [facility] [place]      Platform used for Video Visit: Doximity     Assessment:      Diagnoses and all orders for this visit:     Chronic bilateral low back pain with right-sided sciatica  -     Ambulatory referral to PT/OT  -     OPS TFESI Lumbar Bilateral; Future; Expected date: 05/17/2021  -     Ambulatory referral to Podiatry - [facility]  (includes FPA groups)  -     nabumetone (RELAFEN) 500 MG tablet; Take 1-2 tablets (500-1,000 mg total) by mouth 2 (two) times a day as needed.  Dispense: 60 tablet; Refill: 1  -     diazePAM (VALIUM) 5 MG tablet; 5 mg tablets, take 1 tablet PO, 2 hours prior to Procedure.  Dispense: 1 tablet; Refill: 0     Lumbar radiculitis  -     Ambulatory referral to PT/OT  -     OPS TFESI Lumbar Bilateral; Future; Expected date: 05/17/2021  -     nabumetone (RELAFEN) 500 MG tablet; Take 1-2 tablets (500-1,000 mg total) by mouth 2 (two) times a day as needed.  Dispense: 60 tablet; Refill: 1  -     diazePAM (VALIUM) 5 MG tablet; 5 mg tablets, take 1 tablet PO, 2 hours prior to Procedure.  Dispense: 1 tablet; Refill: 0     Bulging lumbar disc  -     Ambulatory referral to PT/OT  -     OPS TFESI Lumbar Bilateral; Future; Expected date: 05/17/2021     Pain of right lower extremity  -     Ambulatory referral to PT/OT     Lymphedema of right lower extremity  -     Ambulatory referral to PT/OT     Chronic left shoulder pain  -     Ambulatory referral to PT/OT     Chronic bilateral thoracic back pain  -     Ambulatory referral to PT/OT     Fibrous dysplasia of bone     H/O Hodgkin's lymphoma     Lumbar foraminal stenosis  -     Ambulatory referral to PT/OT  -     OPS TFESI Lumbar Bilateral; Future; Expected date: 05/17/2021     Bilateral foot pain  -     Ambulatory referral to PT/OT  -     Ambulatory referral to Podiatry - [facility]  (includes FPA groups)  -     nabumetone (RELAFEN)  500 MG tablet; Take 1-2 tablets (500-1,000 mg total) by mouth 2 (two) times a day as needed.  Dispense: 60 tablet; Refill: 1        Romeo Chong is a 40 y.o. y.o. male with past medical history significant for hypertension, hypertriglyceridemia, lymphedema, history of Hodgkin's lymphoma, obesity, asthma, vitamin D deficiency, stress hyperglycemia, gastric ulcer, headache, chronic pain syndrome, chronic low back pain, fibrous dysplasia of the bone-right tibia discovered at age 13 had surgery at age 14 with 17 surgeries since then who presents today for follow-up regarding:     -Chronic significant bilateral low back pain with radiculitis.     -Chronic significant right lower extremity lymphedema with pain.      -Chronic severe bilateral foot pain     -Chronic intermittent thoracic pain more mild in nature.     -Chronic intermittent left shoulder pain.     Plan:      A shared decision making plan was used. The patient's values and choices were respected. Prior medical records from 5/7/2021 to current were reviewed today. The following represents what was discussed and decided upon by the provider and the patient.        -DIAGNOSTIC TESTS: Images were personally reviewed and interpreted.   --Lumbar spine MRI 5/12/2021 with multilevel degenerative changes greatest at L5-S1 with mild to moderate disc height loss, disc bulge, left disc protrusion with mild right and mild to moderate left foraminal stenosis.  L4-5 mild left lateral recess with left L5 nerve root compression.  --Left shoulder x-ray 5/12/2021.  --Bilateral feet x-ray 5/12/2021 with mild valgus valgus deformity and mild plantar calcaneal bone spur on the left.  --Ordered left shoulder x-ray to evaluate chronic left shoulder pain.  --Ordered bilateral foot x-ray to evaluate chronic bilateral foot pain.     -Podiatry referral was placed today to evaluate chronic bilateral foot pain.     -INTERVENTIONS: Ordered bilateral L5-S1 transforaminal epidural steroid  injection.  He does have degenerative changes at this level with disc protrusion with bilateral foraminal stenosis.     -Patient was referred to [facility] pain clinic per primary care provider 5/7/2021 for long-term Vicodin management.     -MEDICATIONS: Prescribe nabumetone 500 mg 1 to 2 tablets twice daily as needed for pain and inflammation.  Advised patient to not take OTC NSAIDs while taking this medication take it with full glass water and food.  This is not a good long-term option due to his history of gastric ulcer as well.  Patient is aware that if this does cause any GI upset he needs to stop taking this medication immediately.  -Prescribed Valium 5 mg to take 1 tablet prior to procedure for preprocedure sedation.  Discussed side effects of medications and proper use. Patient verbalized understanding.  -Did discuss with patient that we will not prescribe chronic opioid medications here at the spine center as he was referred to the pain clinic as well and they will determine if this is something that is reasonable to continue.     -PHYSICAL THERAPY: Referral to physical therapy placed to porsche Gibbs to work on multifactorial pain as well.  Discussed the importance of core strengthening, ROM, stretching exercises with the patient and how each of these entities is important in decreasing pain.  Explained to the patient that the purpose of physical therapy is to teach the patient a home exercise program.  These exercises need to be performed every day in order to decrease pain and prevent future occurrences of pain.         -PATIENT EDUCATION:  Total time of 35 minutes spent with the patient, reviewing the chart, placing orders, and documenting.   -Today we also discussed the issues related to the current COVID-19 pandemic, the pros and cons of the current treatment plan, the CDC guidelines such as social distancing, washing the hands, and covering the cough.     -FOLLOW UP: Follow-up for injection  "with Dr. Lange, then 2 weeks postinjection  Advised to contact clinic if symptoms worsen or change.\"    Reviewed most recent note from [facility] on 2020 regarding pain and PMHX:  \"  Past pain specialist was Dr. Nelson Joel in [facility], last seen 2020. He states this location was shut down and opened different practice. Then went to [facility] [place] referred from PCP.    States he is having difficulty sleeping due to pain and mental health.      Alleviating factors: Medications, sleep, laying down  Aggravating factors: Any movement  Pain level today: On a scale of 1-10, the patient rates their pain at a 0.  Pain ratings vary between 5/10 and 10/10.  Function Ratin meaning pain affects the following:     3 - Enjoy     4 - Work, Enjoy     5 - Active, Mood, Work, Enjoy     6 - Sleep, Active, Mood, Work, Enjoy     7 - Walk, Sleep Active, Mood, Work, Enjoy     8 - Relate, Walk, Sleep, Active, Mood, Work, Enjoy  Associated Symptoms:   Previous Diagnostics & Treatments for Pain:  Surgery - Right lower extremity and 18 surgeries total   Injections - TFESI bilateral L5-S1 with Dr. Dobson on 2021  Imaging - See below  Physical Therapy - Lymphedema therapy daily at home.  Chiropractor/Acupuncture - Tried acupuncture for carpal tunel  Massage - lymphedema massage of leg  TENS or bracing - Doesn't use back brace due to lymphedema, denies TENs  Pain Psychology or biofeedback - Previous therapist.    Other - compression wraps, heating pad and tiger balm, compression shirt helps low back pain a bit    Social History: states he is single and unemployed, homeless right now living with family.  Reports he has support from a [employer].  States sometimes needs help with daily routine putting on shoes but tries to remain independent, sometimes people driving him to appointments if pain level is high. He states it is painful to walk and uses a cane.   He states he had appointment for " SSDI yesterday and had physical.  States he cannot keep a job due to physical symptoms and in and out of hospital (reports recurrent cellulitis/sepsis).      Patient set goals today in the office to achieve with the pain centers help. They are:    1. To manage my chronic pain  2. Walk longer to get more cardio done    Past Medical History:   Diagnosis Date   ? Cancer (H)    ? Hyperlipidemia    ? Hypertension      Past Surgical History:   Procedure Laterality Date   ? FRACTURE SURGERY         Social  Family History   Problem Relation Age of Onset   ? Cirrhosis Mother    ? Hypertension Mother    ? Diabetes type II Father    ? Kidney failure Father    ? Stroke Father    ? Stroke Sister      Social History     Tobacco Use   Smoking Status Former Smoker   ? Packs/day: 1.00   ? Start date: 1999   ? Quit date: 2009   ? Years since quittin.1   Smokeless Tobacco Never Used     Social History     Substance and Sexual Activity   Alcohol Use Yes   ? Frequency: Monthly or less     Social History     Substance and Sexual Activity   Drug Use Never     Social History     Substance and Sexual Activity   Sexual Activity Not Currently       Chemical Dependency History: Patient Denies tobacco use has history of 2 ppd x 13 years, quit 2009, denies alcohol use or illicit substance use including THC.  Denies any chemical dependency evaluation or treatment in the past.  Denies any legal issues related to substance use.    Psychiatric History:  Patient reports no current psychiatrist or psychologist.  Was following [facility] in [place], last record from 10/17/2020 reviewed was on Buspar 10 mg 2 tabs at bedtime and trazodone 100 mg at bedtime.  Reporting depression and grief over daughter being murdered age 17 due to sex trafficking. Is speaking with  here for counseling.  Denies any suicidal ideation.  Denies any hospitalizations for mental illness.    Pertinent Pain Medications:  He currently  "takes Vicodin 10/325 mg up to four times a day, usually two times a day dosing or once every 6 hours if pain is severe.  Helped to function during the day or to help sleep at night, dose relieved pain for 4-8 hours.  Denies side effects.  Reports prescription for past 7 years.  Amitriptyline 10 mg titrating up to 30 mg at bedtime states he is only taking Amitriptyline 10 mg at night, was prescribed Relafen 500 mg two times a day for his back not helpful.      Previously tried medications:    NSAIDs: Not helpful, Ibuprofen helps headaches (stress)    Acetaminophen: Not helpful    Antidepressants: Cymbalta in the past,     Gabapentinoids: Gabapentin was \"the worst\" caused lymph node swelling, denies Lyrica    Opioids: oxycodone also helpful, states given other opioids in the ED (fentanyl helped but was scared to have it), Morphine in IV caused headaches, Dilaudid     Topicals: Tiger Balm    Supplements: Tried turmeric, apple cider vinegar     Muscle Relaxants: Tries to avoid as it has him feel groggy in the morning previously on flexeril and tizanidine, valium    Other: Trazodone feels \"zombie\" like in the morning, tried THC in [place] which helped sleep.        Current Outpatient Medications:   ?  amitriptyline (ELAVIL) 10 MG tablet, 1 tab po at bedtime x 3 nights then 2 tabs po at bedtime x 3 nights then 3 tabs po qhs, Disp: 90 tablet, Rfl: 0  ?  amLODIPine (NORVASC) 5 MG tablet, Take 0.5 tablets (2.5 mg total) by mouth daily., Disp: 45 tablet, Rfl: 0  ?  amLODIPine-benazepril (LOTREL) 5-10 mg per capsule, Take 1 capsule by mouth daily., Disp: , Rfl:   ?  aspirin-acetaminophen-caffeine (EXCEDRIN MIGRAINE) 250-250-65 mg per tablet, Take 1 tablet by mouth every 6 (six) hours as needed for pain., Disp: , Rfl: 0  ?  diazePAM (VALIUM) 5 MG tablet, 5 mg tablets, take 1 tablet PO, 2 hours prior to Procedure., Disp: 1 tablet, Rfl: 0  ?  famotidine (PEPCID) 40 MG tablet, Take 1 tablet (40 mg total) by mouth every " evening., Disp: 180 tablet, Rfl: 0  ?  hydroCHLOROthiazide (HYDRODIURIL) 50 MG tablet, Take 1 tablet (50 mg total) by mouth daily., Disp: 90 tablet, Rfl: 0  ?  HYDROcodone-acetaminophen (NORCO )  mg per tablet, Take 1 tablet by mouth every 6 (six) hours as needed for pain., Disp: 28 tablet, Rfl: 0  ?  HYDROcodone-acetaminophen 5-325 mg per tablet, Take 1 tablet by mouth 4 (four) times a day as needed for pain., Disp: 10 tablet, Rfl: 0  ?  loratadine (CLARITIN) 10 mg tablet, Take 1 tablet (10 mg total) by mouth daily., Disp: 90 tablet, Rfl: 0  ?  losartan (COZAAR) 50 MG tablet, Take 1 tablet (50 mg total) by mouth daily., Disp: 90 tablet, Rfl: 5  ?  nabumetone (RELAFEN) 500 MG tablet, Take 1-2 tablets (500-1,000 mg total) by mouth 2 (two) times a day as needed., Disp: 60 tablet, Rfl: 1  ?  potassium chloride (KLOR-CON) 10 MEQ CR tablet, 2 tablets by mouth each morning and 1 tablet by mouth each evening, Disp: 270 tablet, Rfl: 0  ?  vitamin D3-folic acid 5,000 unit- 1 mg Tab, Take 5,000 Int'l Units by mouth daily., Disp: , Rfl:     Review of Systems:  12 point systems were reviewed with patient as documented on the intake form of 6/10/2021. ROS was positive for see above the rest of the systems were pertinent negative.  (General, Cardiac, Pulmonary, Integumentary, Musculoskeletal, Neurological,GI, , GYN, Endocrine, Hematological and Psychological)    Exam  There were no vitals filed for this visit.    Constitutional-General:  Well nourished, well developed, well groomed, healthy appearing individual.  Weight is obese, appears stated age and presents alone in a parked car.  Psych-Mental Status: A & O in no acute distress. Speech is fluent. Thought process normal. Recent and remote memory are intact.  Attention span and concentration are normal. Displays appropriate mood and affect.   H,E,N,T- Symmetrical, Eyes- Perr, Nares- patents bilaterally, throat- trachea is midline, airway is patent, unlabored  respiratory effort.  Pulmonary- No cough, wheezing, shortness of breath.  Musckuloskeletal  Gait:  Seated for entire visit.    Lab:  None reviewed today, UDT will be scheduled for controlled substance use.    Imaging:  MR Lumbar Spine without Contrast 05/12/2021:  IMPRESSION:   CONCLUSION:  1.  Multilevel degenerative changes of the lumbar spine as described in detail above, greatest at L5-S1, where there is moderate lateral recess stenosis with abutment and mild displacement of the traversing left S1 nerve root, mild right neuroforaminal   stenosis, and mild to moderate left neuroforaminal stenosis.  2.  At L4-L5, there is mild left lateral recess stenosis with possible abutment of the traversing left L5 nerve root.    EXAM: XR SHOULDER LEFT 2 OR MORE VWS  LOCATION: [facility]  DATE/TIME: 5/12/2021 7:36 AM     INDICATION: Chronic left shoulder pain  COMPARISON: None.     IMPRESSION:   Normal alignment without a fracture or dislocation. Acromiohumeral space is preserved. No evidence of calcific tendinitis. Adjacent chest wall is unremarkable. Numerous clips in left axilla.    EXAM: XR FEET BILATERAL 2 VWS  LOCATION: [facility]  DATE/TIME: 5/12/2021 7:36 AM     INDICATION: Chronic bilateral foot pain  COMPARISON: None.     IMPRESSION:   Normal alignment without a fracture or dislocation. No effusions at the ankle     There is a very small plantar calcaneal spur in the left foot. Mild hallux valgus deformity bilaterally.     No other degenerative changes noted.    :  Dated 6/10/2021 was reviewed with the patient    ORT: 2 (low)    Assessment :  After reviewing the patients chart and physical findings I agree that the patient would benefit from our pain center multidisciplinary treatment approach.   I have discussed with the  patient today the diagnosis of low back pain, assessed at the [facility] with MRI demonstrating multilevel degenerative changes, greatest at  L5-S1.  Recently had TFESI bilateral L5-S1 which patient reports did not lessen pain symptoms.  Patient also has history of lymphedema in right LE following treatment for Non-Hodgkin's lymphoma and history of right LE surgery for fibrous dysplasia of bone.  He reports right carpal tunnel, left calcaneal spur, and shoulder pain which will need to be followed by podiatry/orthopedic speciality.  We reviewed the natural progression of this diagnosis.  We discussed possible benefits and detriments of physical therapy, integrative care such as acupuncture/chiropractor, medication management, behavorial therapy, and other alternative treatments including supplements and diet.  We also discussed future possible treatment options in a stepwise fashion, including interventional pain procedures and injections and possible surgical referral if needed.   Interventions: I discussed risks versus benefits of repeating LESI possibly another level L4-5 or considering alternate procedure for lower back pain such as lumbar facet injection for facet arthropathy seen at L3-4, L4-5, and L5-S1 levels on MRI.  Will wait to order until able to perform in office physical exam and he does have appointment with Spine Center tomorrow to discuss further.    Physical Medicine and rehabilitations: Patient is already scheduled for a physical therapy evaluation and treatment for pain control, improvement of function, and assisting the patient to develop a daily routine to support achievement and, where necessary, readjustment of habits and roles according to the patient capacity and life situation.   Medication Management: We discussed medication options to manage the patient. After discussion of potential adverse effects versus benefits, the patient will come in for opioid universal precautions and would then resume the Vicodin 10/325 mg two times a day prn pain dosing as this provided good pain relief and function per patient report, within CDC  guideline.  Will attempt other medication trials after review of pain center records.  He is advised to trial increasing his Amitriptyline to 20 mg x 3 nights then 30 mg at night as advised per PCP due to poor sleep.  May consider ketamine as an alternative to opioids, may be limited with out of pocket expense.  Behavioral Health: We discussed the body mind process of pain. We discussed the importance of cognitive behavioral therapy to help patient to control pain, address any psychiatric condition that may contribute to patient's experience of pain. The patient agreed to be referred for a behavioral health diagnostic assessment.    The patient understands that opiate/controlled pain medication is not prescribed during the initial patient consultation at the pain center. If the patient is on pain medications for chronic pain, the PCP or prescribing provider may choose  to prescribe until the patient presents for follow up at the pain center. Medication prescriptions provided by the pain center will depend on the UA results, safe prescribing practices established by the CDC guidelines and patient response to their current treatment regimen at the follow up visit.      Plan recommended today:    Follow up in 4-6 weeks with Mony CUELLO     Sign a Release of Information for [facility] in [place], [place] so we can obtain your records for our next visit    Continue your current regimen of alleviating factors as reviewed today    Please see your current provider for any refill of an opioid prescription; they may choose to provide a refill until we have your urine screen back. They will continue to manage your general health and have requested you see the pain center for pain management. Please discuss any health concerns with your primary care physician.    [place] Precautions are taken with every patient which includes a  report and urine drug test which will be taken as scheduled for baseline screening.      Behavioral Therapy- referral to pain psychotherapy for chronic pain and grief therapy    Physical and Occupational Therapy- scheduled 06/21/21    Continue with Dr. Singleton for left foot upcoming surgery    Injections- recently had TFESI with spine center, no benefit.  Follow-up scheduled tomorrow    MT visit with the pharmacist Kodak Lyons PharmD    Medication management includes - Discussed taking over Vicodin 10/325 mg two times a day prn once UDT returns as expected and paperwork (consent and agreement) completed.  Will review records and consider other non opioid options as well, possibly ketamine trial.    Education was provided today in regards to the patient's specific diagnosis     Diagnosis  1. Chronic pain syndrome    2. H/O Hodgkin's lymphoma    3. Fibrous dysplasia of bone    4. Lymphedema    5. Calcaneal spur, left    6. Degeneration of lumbar intervertebral disc        Patient reminders:   Diagnostics: UDT/Blood will be scheduled and results are pending.  UDT/SWAB:  Patient required a random Urine Drug Testing, due to the need to comply with [place] Model Policy Guidelines and CDC Guideline for the use of any controlled substances. This is to ensure that patient is compliant with treatment, and monitor for risks such as diversion, abuse, or any other aberrant behaviors. Patient is either being considered for or taking a controlled substance. Unexpected findings will be discussed and treatment decision may be adjusted. Testing is being implemented across the board randomly w/o bias related to age, race, gender, socioeconomic status or Sabianism affiliation.     SAFETY REMINDERS  No alcohol while taking controlled substances. Alcohol is not an illegal substance, it is unsafe to use in combination. It is a build up of substances in the body that can be extremely hazardous and may cause respirations to slow to a dangerous rate resulting in hospitalization, brain damage, or death.    Opioid  medications have been associated with sharp rise in unintentional overdose and death.  Overdose is a condition characterized by the consumption in excess of a particular drug causing adverse effects. This can happen b/c you are sick, accidentally or intentionally took an extra dose, are on multiple medication that can interact. Someone took your medication and they are not use to the medication.  Symptoms of overdose include:   !breathing slow and shallow, erratic or not at all  !pinpoint pupils, hallucinations  !confusion  !muscle jerks, slack muscles   !extreme sleepiness or loss of alertness   !awake but not able to talk   !face pale or clammy, vomiting, for lighter skinned people, the skin tone turns bluish purple, for darker skinned people, it turns grayish or ashen   If in a situation where overdose is a concern engage the emergency response system (dial 911).    In one study it was noted that 80% of unintentional overdoses occurred in people who were taking a combination of opioids and benzodiazepines.    Do not sell, loan, borrow or share your opioid medication with anyone. Deaths have occurred as a result of this practice. It is illegal and patients are being prosecuted.     Prevent unexpected access/loss of medication: Keep medication locked. Only carry what you need with you.    The patient agrees to the plan and has no further questions, if questions arise the patient knows to call [phone].     Thank you for this consult and opportunity to assist with this patients care.    Mony Monsivais PA-C  [facility]   [address]  [place] [address]  Ph: [phone]  Fax: [phone]      Video-Visit Details    Type of service:  Video Visit    Video End Time (time video stopped): 9:40 AM   Originating Location (pt. Location): Home    Distant Location (provider location):  [facility]     Platform used for Video Visit: Nancy

## 2021-07-04 NOTE — TELEPHONE ENCOUNTER
"Telephone Encounter by Jasmin Germain RN at 6/30/2021  9:51 AM     Author: Jasmin Germain RN Service: -- Author Type: Registered Nurse    Filed: 6/30/2021 10:01 AM Encounter Date: 6/30/2021 Status: Signed    : Jasmin Germain RN (Registered Nurse)       Pt calls asking if there's anything he can do about his pain until he's seen in clinic. States he's getting a small supply of Vicodin from his doctor, but \"I still has a lot of pain, and I'm not sleeping.\"  Returned call to pt and reminded him that Mony had recommended that he see MTM. Pt agrees with the plan and voices understanding that he needs to keep his follow-up appt with Mony, and that she won't be prescribing opioids at this time.       "

## 2021-07-04 NOTE — ADDENDUM NOTE
Addendum Note by Preeti Pimentel CMA at 6/15/2021  8:40 AM     Author: Preeti Pimentel CMA Service: -- Author Type: Certified Medical Assistant    Filed: 6/23/2021 10:16 AM Date of Service: 6/15/2021  8:40 AM Status: Signed    : Preeti Pimentel CMA (Certified Medical Assistant)    Encounter addended by: Preeti Pimentel CMA on: 6/23/2021 10:16 AM      Actions taken: Order list changed, Diagnosis association updated

## 2021-07-04 NOTE — PROGRESS NOTES
Progress Notes by Kesha Parra PT at 6/21/2021  7:30 AM     Author: Kesha Parra PT Service: -- Author Type: Physical Therapist    Filed: 6/21/2021  8:31 AM Encounter Date: 6/21/2021 Status: Attested    : Kesha Parra PT (Physical Therapist) Cosigner: Shahab Perez MD at 6/21/2021  8:37 AM    Attestation signed by Shahab Perez MD at 6/21/2021  8:37 AM    I agree with the above physical therapist's recommendations and plan of care as documented.   -Shahab Perez MD  6/21/2021                     [facility] Certification Request    June 21, 2021      Patient: Romeo Chong  MR Number: [id number]  YOB: 1980  Date of Visit: 6/21/2021      Dear Shahab Ramos MD:    Thank you for this referral.   We are seeing Romeo Chong in Physical Therapy for low back pain and leg pain.    Medicare and/or Medicaid requires physician review and approval of the treatment plan. Please review the plan of care and verify that you agree with the therapy plan of care by co-signing this note.      Plan of Care  Authorization / Certification Start Date: 06/21/21  Authorization / Certification End Date: 09/19/21  Authorization / Certification Number of Visits: 12  Communication with: Referral Source  Patient Related Instruction: Nature of Condition;Treatment plan and rationale;Self Care instruction;Basis of treatment;Body mechanics;Posture;Precautions;Next steps;Expected outcome  Times per Week: 1-2  Number of Weeks: 6-12  Number of Visits: 6-12  Discharge Planning: goals met or independent in management  Therapeutic Exercise: ROM;Stretching;Strengthening  Neuromuscular Reeducation: kinesio tape;posture;balance/proprioception;TNE;core  Manual Therapy: soft tissue mobilization;myofascial release;lymphatic drainage massage;joint mobilization;muscle energy;visceral manipulation  Modalities: TENS;cold pack;hot pack  Gait Training: as indicated      Goals:  Pt. will  demonstrate/verbalize independence in self-management of condition in : 6 weeks  Pt. will be independent with home exercise program in : 6 weeks    Pt will: improve NIC by 6 points or more to show significant improvement in impact pain is having on daily activities, within 12 weeks.  Pt will: have improved tolerance to standing and sitting for 20 min or longer, within 12 weeks.        If you have any questions or concerns, please don't hesitate to call.    Sincerely,      Kesha Parra, PT, DPT        Physician recommendation:     ___ Follow therapist's recommendation        ___ Modify therapy      *Physician co-signature indicates they certify the need for these services furnished within this plan and while under their care.        [facility]   Low back and LE Initial Evaluation    Patient Name: Romeo Chong  Date of evaluation: 6/21/2021  Referral Diagnosis: Chronic bilateral low back pain with right-sided sciatica  Referring provider: Shahab Perez MD  Visit Diagnosis:     ICD-10-CM    1. Chronic bilateral low back pain without sciatica  M54.5     G89.29    2. Arthralgia of both lower legs  M25.561     M25.562    3. Lymphedema  I89.0    4. Generalized muscle weakness  M62.81        Assessment:      Romeo Chong is a 40 y.o. male who presents to therapy today with chief complaints of low back pain and hamstring tightness. Onset date of sx was many years ago chronic in nature.  Pt reported h/o lymphedema and many surgeries in the right leg, use of cane for years. Has planned plantar fascitis surgery this week, may limit participation in therapy.  Pain symptoms are worse in the low back.  Functional impairments include sleeping through the night, lifting, bending, sitting and standing for longer than 15min.  Pt demo's signs and sx consistent with chronic low back back, decreased hamstring mobility and weakness.   The POC is dynamic and will be modified on an ongoing basis.  Barriers  to achieving goals as noted in the assessment section may affect outcome.  Prognosis to achieve goals is  fair   Pt. is appropriate for skilled PT intervention as outlined in the Plan of Care (POC).  Pt. is a good candidate for skilled PT services to improve pain levels and function.    Goals:  Pt. will demonstrate/verbalize independence in self-management of condition in : 6 weeks  Pt. will be independent with home exercise program in : 6 weeks    Pt will: improve NIC by 6 points or more to show significant improvement in impact pain is having on daily activities, within 12 weeks.  Pt will: have improved tolerance to standing and sitting for 20 min or longer, within 12 weeks.      Patient's expectations/goals are realistic.    Barriers to Learning or Achieving Goals:  Chronicity of the problem.  Co-morbidities or other medical factors.  .  Financial situation.  Living situation.  Mental illness or emotional factors.  .       Plan / Patient Instructions:      Plan of Care:   Authorization / Certification Start Date: 06/21/21  Authorization / Certification End Date: 09/19/21  Authorization / Certification Number of Visits: 12  Communication with: Referral Source  Patient Related Instruction: Nature of Condition;Treatment plan and rationale;Self Care instruction;Basis of treatment;Body mechanics;Posture;Precautions;Next steps;Expected outcome  Times per Week: 1-2  Number of Weeks: 6-12  Number of Visits: 6-12  Discharge Planning: goals met or independent in management  Therapeutic Exercise: ROM;Stretching;Strengthening  Neuromuscular Reeducation: kinesio tape;posture;balance/proprioception;TNE;core  Manual Therapy: soft tissue mobilization;myofascial release;lymphatic drainage massage;joint mobilization;muscle energy;visceral manipulation  Modalities: TENS;cold pack;hot pack  Gait Training: as indicated      POC and pathology of condition were reviewed with patient.  Pt. is in agreement with the Plan of Care  A Home  Exercise Program (HEP) was initiated today.  Pt. was instructed in exercises by PT and patient was given a handout with detailed instructions.  Treatment techniques, plan of care, and goals were discussed with the patient.  The patient agrees to the plan as outlined.  The plan of care is dynamic and will be modified on an ongoing basis.    Plan for next visit: progress exercise, may need to adjust due to foot surgery     Subjective:      Patient reports he has lymphedema in right leg, already working on. Has had this since he was 7. He has a compression for this. Left side hamstring he did pull, is no longer bothering him think it is healed. Feet he is having surgery on Friday left first and then right at a later date.     Patient also reports low back pain that is most bothersome. Back is still painful. He was supposed to follow up but hasnt. Back bothers him all the time-- lower back but not down legs. This started when he was 19, chronic back pain.     Social information:   Living Situation: do not have a residence right now, seeking housing   Occupation: working on getting on disability   Work Status:NA   Equipment Available: cane use today, of fand on since 13 (because of the leg, right leg has ad 18 surgeries.-- fibrodysplagia)    Fall history: pain of plantar fascitis coming down stairs, fell two weeks ago. And slipped with hamstring but did not fall     Pain Ratin-9  Pain rating at best: Always in pain 5/10  Pain rating at worst: 10  Pain description:aching and stabbing ,shooting    Hobbies (like boxing)    Functional limitations are described as occurring with:   Sitting longer than 15 min, standing longer than 15 min before pain, bending, lifting, twisting, sleeping through the night (waking up multiple times).      Patient reports benefit from heating pad and pain medicine, no benefit from injection massage     Objective:      Note: Items left blank indicates the item was not performed or not  indicated at the time of the evaluation.    Patient Outcome Measures :    Modified Oswestry Low Back Pain Disablity Questionnaire  in %: 74     Scores range from 0-100%, where a score of 0% represents minimal pain and maximal function. The minimal clinically important difference is a score reduction of 12%.    Knee Examination  1. Chronic bilateral low back pain without sciatica     2. Arthralgia of both lower legs     3. Lymphedema     4. Generalized muscle weakness       Involved Side: Bilateral  Posture Observation:      General sitting posture is  poor.  General standing posture is poor.  Lumbopelvic complex: Moderately decreased lumbar lordosis    Assistive Device: SEC  Gait Observation: antalgic on right side limp      Lumbar ROM:  Date:      *Indicate scale AROM AROM AROM   Lumbar Flexion 10 pain      Lumbar Extension 15 pain      Right Left Right Left Right Left   Lumbar Sidebending Very limited pain  Very limited more pain       Lumbar Rotation Moderate limitation Moderate limitation       Thoracic Flexion      Thoracic Extension      Thoracic Sidebending         Thoracic Rotation           Sensation Intact to light touch gregg LE and UE         Hip/Knee Strength   gregg LE strength 5/5 unless noted below  Date: 6/21/2021     Hip/Knee Strength (/5) MMT MMT MMT    Right Left Right Left Right Left   Hip Flexion 4- 4       Hip Abduction 4 4+       Hip Adduction         Hip Extension         Hip External Rotation         Hip Internal Rotation         Knee Extension 4 5       Knee Flexion 4 5         Palpation: pain with PA mobilizations of lower lumbar spine    Lumbar Special Tests:  Lumbar Special Tests Right Left SI Tests Right  Left   Quadrant test + + SI Compression - -   Straight leg raise - + SI Distraction - -   Crossover response   POSH Test - -   Slump - - Sacral Thrust     Sit-up test  FADIR     Trunk extensor endurance test  Resisted Abduction     Prone instability test  Other:     Pubic shotgun  Other:          Treatment Today     TREATMENT MINUTES COMMENTS   Evaluation 20 -Patient educated on pathology  -Discussed POC   Self-care/ Home management     Manual therapy 9 sidelying lumbar mobilizations lower lumbar spine grade II-III   Neuromuscular Re-education     Therapeutic Activity     Therapeutic Exercises 15 -Demo/performance of HEP  Exercise #1: LTR x20  Comment #1: Bridge x10  Exercise #2: SLR hamstring mobility x20 gregg  Comment #2: Abdominal strenghtening gregg leg lift     Gait training     Modality__________________                Total 44    Blank areas are intentional and mean the treatment did not include these items.       PT Evaluation Code: (Please list factors)  Patient History/Comorbidities: see PMH  Examination: see above  Clinical Presentation: stable  Clinical Decision Making: low    Patient History/  Comorbidities Examination  (body structures and functions, activity limitations, and/or participation restrictions) Clinical Presentation Clinical Decision Making (Complexity)   No documented Comorbidities or personal factors 1-2 Elements Stable and/or uncomplicated Low   1-2 documented comorbidities or personal factor 3 Elements Evolving clinical presentation with changing characteristics Moderate   3-4 documented comorbidities or personal factors 4 or more Unstable and unpredictable High                Kesha Parra, PT, DPT  6/21/2021  3:18 PM

## 2021-07-04 NOTE — TELEPHONE ENCOUNTER
Telephone Encounter by Flaca Tolentino at 6/30/2021 11:41 AM     Author: Flaca Tolentino Service: -- Author Type: Patient Access    Filed: 6/30/2021 11:41 AM Encounter Date: 6/30/2021 Status: Signed    : Flaca Tolentino (Patient Access)       Scheduled for 1st available, 07-12-21

## 2021-07-04 NOTE — ADDENDUM NOTE
Addendum Note by Shaina Yu CNP at 5/7/2021  8:20 AM     Author: Shaina Yu CNP Service: -- Author Type: Nurse Practitioner    Filed: 5/7/2021  9:23 AM Date of Service: 5/7/2021  8:20 AM Status: Signed    : Shaina Yu CNP (Nurse Practitioner)    Encounter addended by: Shaina Yu CNP on: 5/7/2021  9:23 AM      Actions taken: Clinical Note Signed

## 2021-07-04 NOTE — ADDENDUM NOTE
Addendum Note by Kathy Almonte at 6/15/2021  8:40 AM     Author: Kathy Almonte Service: -- Author Type: --    Filed: 6/17/2021  7:18 AM Date of Service: 6/15/2021  8:40 AM Status: Signed    : Kathy Almonte    Encounter addended by: Kathy Almonte on: 6/17/2021  7:18 AM      Actions taken: Charge Capture section accepted

## 2021-07-06 ENCOUNTER — AMBULATORY - HEALTHEAST (OUTPATIENT)
Dept: PODIATRY | Facility: CLINIC | Age: 41
End: 2021-07-06

## 2021-07-06 VITALS — WEIGHT: 279.7 LBS | BODY MASS INDEX: 39.01 KG/M2

## 2021-07-07 NOTE — TELEPHONE ENCOUNTER
Pt called to discuss some questions he has: dressing has dry blood on it; is this normal if its on the day or or day after surgery? Please call pt to discuss pain issues as well.

## 2021-07-08 ASSESSMENT — ANXIETY QUESTIONNAIRES: GAD7 TOTAL SCORE: 1

## 2021-07-08 ASSESSMENT — ASTHMA QUESTIONNAIRES: ACT_TOTALSCORE: 25

## 2021-07-12 ENCOUNTER — TELEPHONE (OUTPATIENT)
Dept: FAMILY MEDICINE | Facility: CLINIC | Age: 41
End: 2021-07-12

## 2021-07-12 ENCOUNTER — OFFICE VISIT (OUTPATIENT)
Dept: PODIATRY | Facility: CLINIC | Age: 41
End: 2021-07-12
Payer: COMMERCIAL

## 2021-07-12 ENCOUNTER — VIRTUAL VISIT (OUTPATIENT)
Dept: PHARMACY | Facility: OTHER | Age: 41
End: 2021-07-12
Payer: COMMERCIAL

## 2021-07-12 VITALS — DIASTOLIC BLOOD PRESSURE: 110 MMHG | HEART RATE: 60 BPM | SYSTOLIC BLOOD PRESSURE: 158 MMHG | TEMPERATURE: 98.9 F

## 2021-07-12 DIAGNOSIS — G47.00 INSOMNIA, UNSPECIFIED TYPE: ICD-10-CM

## 2021-07-12 DIAGNOSIS — J30.81 ALLERGIC RHINITIS DUE TO ANIMALS: ICD-10-CM

## 2021-07-12 DIAGNOSIS — K25.9 GASTRIC ULCER, UNSPECIFIED CHRONICITY, UNSPECIFIED WHETHER GASTRIC ULCER HEMORRHAGE OR PERFORATION PRESENT: ICD-10-CM

## 2021-07-12 DIAGNOSIS — M54.50 CHRONIC LOW BACK PAIN, UNSPECIFIED BACK PAIN LATERALITY, UNSPECIFIED WHETHER SCIATICA PRESENT: Primary | ICD-10-CM

## 2021-07-12 DIAGNOSIS — G89.4 CHRONIC PAIN SYNDROME: ICD-10-CM

## 2021-07-12 DIAGNOSIS — E55.9 VITAMIN D DEFICIENCY: ICD-10-CM

## 2021-07-12 DIAGNOSIS — M77.32 CALCANEAL SPUR, LEFT: ICD-10-CM

## 2021-07-12 DIAGNOSIS — M77.32 CALCANEAL SPUR, LEFT: Primary | ICD-10-CM

## 2021-07-12 DIAGNOSIS — G89.29 CHRONIC LOW BACK PAIN, UNSPECIFIED BACK PAIN LATERALITY, UNSPECIFIED WHETHER SCIATICA PRESENT: Primary | ICD-10-CM

## 2021-07-12 DIAGNOSIS — I10 ESSENTIAL HYPERTENSION, BENIGN: ICD-10-CM

## 2021-07-12 PROCEDURE — 99605 MTMS BY PHARM NP 15 MIN: CPT | Performed by: PHARMACIST

## 2021-07-12 PROCEDURE — 99607 MTMS BY PHARM ADDL 15 MIN: CPT | Performed by: PHARMACIST

## 2021-07-12 PROCEDURE — 99024 POSTOP FOLLOW-UP VISIT: CPT | Performed by: PODIATRIST

## 2021-07-12 RX ORDER — NORTRIPTYLINE HYDROCHLORIDE 50 MG/1
50 CAPSULE ORAL AT BEDTIME
Qty: 30 CAPSULE | Refills: 1 | Status: SHIPPED | OUTPATIENT
Start: 2021-07-12 | End: 2021-07-29 | Stop reason: SINTOL

## 2021-07-12 ASSESSMENT — PAIN SCALES - GENERAL: PAINLEVEL: SEVERE PAIN (7)

## 2021-07-12 NOTE — PATIENT INSTRUCTIONS
Please call [facility] to schedule an appointment. If you received a prescription please bring it with you to your appointment. You may call one of the locations below, although some locations are limited to what they carry.    Office Locations  New Locations  [facility]  Home Medical Equipment  [address], [place] [address]  Orthotics and Prosthetics ([facility])  [address], [place] [address]  Phone [phone] /Fax [phone]    [place]   [address]   Medical Equipment [address]/Orthotics and Prosthetics [address]  [place] [address]   Phone: [phone]  Fax [phone]    [facility]  [address]  [place] [address]  Phone: [phone]  Fax: [phone]    [facility] Medical [facility]ld[facility]   [address]  [place] [address]  Phone: [phone]  Fax: [phone]    [facility] Professional Bldg[facility]  [address]  [place] [address]  Phone: [phone]  Fax: [phone]    [facility]  [address]  [address]  [place] [address]  Phone: [phone]  Fax: [phone]    [facility]  [address]   [place] [address]   Phone: [phone]  Fax: [phone]    [place]   [address] [place] [address]   Phone: [phone]  Fax: [phone]

## 2021-07-12 NOTE — PROGRESS NOTES
Medication Therapy Management (MTM) Encounter    ASSESSMENT:                            Medication Adherence/Access: No issues identified      Chronic Pain: Continued chronic back pain and leg pain. Higher doses of amitriptyline, though likely to be helpful, would probably worsen dry mouth and daytime grogginess. May benefit from switch to Nortriptyline.     Given GERD and ulcer history, NSAIDs not ideal. Okay to continue for short period in the meantime.     Long-term plan would be for Mony Monsivais PA-C to take over opioid prescribing. Will discuss with PCP if they're willing to provide short supply until next visit with Mony.     No Vitamin D levels on file. Pt was previously using supplement. Will restart and plan for level recheck in 12 weeks (mid October). Target levels 45-60 for mood/chronic pain.       Mood/Sleep: Mood stable, but sleep not well managed with Amitriptyline. As above, may benefit from higher doses of nortriptyline.       Hypertension: Last blood pressure just above goal less than 140/90.  Increase in BP may be due to uncontrolled pain.  Will focus on pain management at this time and recheck at follow-up.  Does have some opportunities for simplification of regimen.  Amlodipine may be increasing edema.  Higher doses of hydrochlorothiazide likely not more helpful for BP and edema, and may be contributing to more K+ wasting.  Increasing doses of losartan may help to hold potassium WNL. Will defer these adjustments until follow-up.     Gastric Ulcer: Reviewed that Rx was sent for 90 day supply. Likely only received 30 days due to insurance limits.     Allergies: No symptoms at this time. Okay to keep Loratadine on hand for PRN use.       PLAN:                              -Stop Amitriptyline. Start Nortriptyline 50 mg at bedtime.   -Start Vitamin D3 5000 units daily   -PharmD to discuss short supply of [place] with PCP clinic.   -Patient to call pharmacy to refill famotidine    Follow-up: No  "follow-ups on file.  7/29    SUBJECTIVE/OBJECTIVE:                          Romeo Chong is a 40 year old male called for an initial visit. He was referred to me from Mony Monsivais PA-C.      Reason for visit: Medication - \"can I get medicine to help with my chronic pain\".    Allergies/ADRs: Reviewed in chart  Tobacco: He reports that he quit smoking about 12 years ago. He started smoking about 22 years ago. He smoked 1.00 pack per day. He has never used smokeless tobacco.  Alcohol: Social History    Substance and Sexual Activity      Alcohol use: Yes    Past Medical History: Reviewed in chart      Medication Adherence/Access: Medications reconciled. Pt able to state prescribed directions and indication for most of his medications.     Out of [place]. Had a pain management provider in [place]. Stretched out his supply for quite a while.     Currently staying at a friends house. Keeping medicines in a locked suitcase.        Chronic Pain: Established with Mony Monsivais PA-C on 6/15/2021. Had bone spur excision on 6/25 with podiatry.     Currently prescribed amitriptyline 10 mg 3 tabs at bedtime, hydrocodone-acetaminophen  mg every 6 hours as needed, nabumetone 500 mg 1-2 tabs BID as needed,    Using 2 amitriptyline at bedtime.     Told not to use with Ibuprofen or Acetaminophen. Hasn't used in the last couple of weeks. Surgeon had recommended using Ibuprofen.     Reports L4-L5 back pain. LESI on 6/2/21 - pain did not change or improve.     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 Chronic Lymphedema and plantars fasciitis.     In the legs notes stabbing in one area, throbbing in another, sharp pain in another. Sometimes they're simultaneous. The constant pain is stabbing in the lower part of the leg.     Finds the amitriptyline somewhat helpful. Able to fall asleep, but not stay asleep. Pain wakes him.     Didn't try three at bedtime. Notes he gets dizzy/groggy in the AM. Some " dry mouth, no constipation.     Has tried Gabapentin in the past and notes it caused swelling.     Ibuprofen is good for a slight headache. Doesn't use much otherwise.       Mood/Sleep: Prescribed amitriptyline 10 mg 3 tabs at bedtime. Difficulty with sleep. Seeing a counselor.     Notes he's very irritable due to pain.   Chart review indicates a history of trauma. Denies any depression concerns.      PHQ-9 score:    PHQ 5/25/2021   PHQ-9 Total Score 3   Q9: Thoughts of better off dead/self-harm past 2 weeks Not at all       Hypertension: Prescribed amlodipine 5 mg 0.5 tab daily, hydrochlorothiazide 50 mg daily, losartan 50 mg daily, potassium 10 mEq 2 tabs AM and 1 tab PM.     Notes this BP regimen is from cardiology - notes no heart issues.     BP Readings from Last 3 Encounters:   06/23/21 (!) 138/98   05/27/21 136/86   05/25/21 (!) 147/68     Pulse Readings from Last 1 Encounters:   07/06/21 88       Gastric Ulcer: Prescribed famotidine 40 mg daily. Using as needed. Notes he ran out and symptoms haven't been very well controlled.     Allergies: Prescribe loratadine 10 mg daily. Using as needed. No current symptoms.       Today's Vitals: There were no vitals taken for this visit.  ----------------      I spent 34 minutes with this patient today. All changes were made via collaborative practice agreement with Mony Monsivais PA-C. A copy of the visit note was provided to the patient's referring provider.    The patient declined a summary of these recommendations.     Kodak Lyons PharmD  Medication Therapy Management (MTM) Pharmacist  [facility]      Telemedicine Visit Details  Type of service:  Telephone visit  Start Time: 11:09 AM  End Time: 11:43 AM  Originating Location (patient location): Home  Distant Location (provider location):  [facility]      Medication Therapy Recommendations  No medication therapy recommendations to display

## 2021-07-12 NOTE — TELEPHONE ENCOUNTER
Dr. Perez    Patient is wondering if you are going to refill his [place] rx.  He would like a call back at

## 2021-07-12 NOTE — PROGRESS NOTES
Subjective findings: The patient return to the clinic today for postop visit #2, 2 weeks status post excision calcaneal spur left  foot.  The patient is in good spirits and had no complaints.    Objective findings: The dressings were removed and wound margins are well coaptated and maintained.  There is no edema, erythema, cellulitis, drainage or bleeding noted.  Neurovascular status is intact.  Vital signs stable.    Assessment: Calcaneal spur left foot    Plan: All sutures were removed today.  The patient was instructed to gradually return to normal activities.  The patient was told to expect some mild to moderate discomfort for the next 3 to 4 weeks.  I have also recommended orthotics.

## 2021-07-13 RX ORDER — HYDROCODONE BITARTRATE AND ACETAMINOPHEN 5; 325 MG/1; MG/1
1 TABLET ORAL 3 TIMES DAILY PRN
Qty: 21 TABLET | Refills: 0 | Status: SHIPPED | OUTPATIENT
Start: 2021-07-13 | End: 2021-07-21

## 2021-07-13 NOTE — TELEPHONE ENCOUNTER
Please contact patient.  I sent in a refill for his hydrocodone/acetaminophen 5/325mg tablets to his [facility] pharmacy.  Rx is for use of 1 tablet three times daily for severe pain.  I can only send one week at a time so he will need to contact us again to request refill in a week.    Thanks,  Shahab Perez MD

## 2021-07-13 NOTE — TELEPHONE ENCOUNTER
Spoke with patient and relayed below message. Patient verbalized understanding with no further questions at this time   Dioni Major CMA on 7/13/2021 at 12:04 PM

## 2021-07-19 ENCOUNTER — VIRTUAL VISIT (OUTPATIENT)
Dept: PALLIATIVE MEDICINE | Facility: OTHER | Age: 41
End: 2021-07-19
Payer: COMMERCIAL

## 2021-07-19 DIAGNOSIS — F43.23 ADJUSTMENT DISORDER WITH MIXED ANXIETY AND DEPRESSED MOOD: Primary | ICD-10-CM

## 2021-07-19 DIAGNOSIS — F43.12 CHRONIC POST-TRAUMATIC STRESS DISORDER (PTSD): ICD-10-CM

## 2021-07-19 DIAGNOSIS — G89.4 CHRONIC PAIN SYNDROME: ICD-10-CM

## 2021-07-19 DIAGNOSIS — F43.21 GRIEF: ICD-10-CM

## 2021-07-19 PROCEDURE — 90834 PSYTX W PT 45 MINUTES: CPT | Mod: 95 | Performed by: COUNSELOR
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
## 2021-07-20 ENCOUNTER — TELEPHONE (OUTPATIENT)
Dept: FAMILY MEDICINE | Facility: CLINIC | Age: 41
End: 2021-07-20
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
## 2021-07-21 ENCOUNTER — NURSE TRIAGE (OUTPATIENT)
Dept: NURSING | Facility: CLINIC | Age: 41
End: 2021-07-21

## 2021-07-21 DIAGNOSIS — M77.32 CALCANEAL SPUR, LEFT: ICD-10-CM

## 2021-07-21 RX ORDER — HYDROCODONE BITARTRATE AND ACETAMINOPHEN 5; 325 MG/1; MG/1
1 TABLET ORAL 3 TIMES DAILY PRN
Qty: 21 TABLET | Refills: 0 | Status: SHIPPED | OUTPATIENT
Start: 2021-07-21 | End: 2021-07-29

## 2021-07-21 NOTE — TELEPHONE ENCOUNTER
Reason for Disposition    Caller requesting a NON-URGENT new prescription or refill and triager unable to refill per department policy    Additional Information    Negative: Drug overdose and triager unable to answer question    Negative: Caller requesting information unrelated to medicine    Negative: Caller requesting a prescription for Strep throat and has a positive culture result    Negative: Rash while taking a medication or within 3 days of stopping it    Negative: Immunization reaction suspected    Negative: Asthma and having symptoms of asthma (cough, wheezing, etc.)    Negative: Breastfeeding questions about mother's medicines and diet    Negative: MORE THAN A DOUBLE DOSE of a prescription or over-the-counter (OTC) drug    Negative: DOUBLE DOSE (an extra dose or lesser amount) of over-the-counter (OTC) drug and any symptoms (e.g., dizziness, nausea, pain, sleepiness)    Negative: DOUBLE DOSE (an extra dose or lesser amount) of prescription drug and any symptoms (e.g., dizziness, nausea, pain, sleepiness)    Negative: Took another person's prescription drug    Negative: DOUBLE DOSE (an extra dose or lesser amount) of prescription drug and NO symptoms (Exception: a double dose of antibiotics)    Negative: Diabetes drug error or overdose (e.g., took wrong type of insulin or took extra dose)    Negative: Caller has medication question about med not prescribed by PCP and triager unable to answer question (e.g., compatibility with other med, storage)    Negative: Request for URGENT new prescription or refill of 'essential' medication (i.e., likelihood of harm to patient if not taken) and triager unable to fill per department policy    Negative: Prescription not at pharmacy and was prescribed today by PCP    Negative: Pharmacy calling with prescription questions and triager unable to answer question    Negative: Caller has urgent medication question about med that PCP prescribed and triager unable to answer  question    Negative: Caller has NON-URGENT medication question about med that PCP prescribed and triager unable to answer question    Protocols used: MEDICATION QUESTION CALL-A-OH

## 2021-07-21 NOTE — TELEPHONE ENCOUNTER
Clinic Action Needed:Yes, Please call patient [phone] (home)     Reason for Call:  Patient calling requesting status of refill request of hydrocodone. Last filled 7/13/21.    HYDROcodone-acetaminophen (NORCO) 5-325 MG tablet 21 tablet 0 7/13/2021 7/20/2021 No   Sig - Route: Take 1 tablet by mouth 3 times daily as needed for severe pain - Oral     See telephone encounter from 7/12/21 per Dr Perez: Rx is for use of 1 tablet three times daily for severe pain.  I can only send one week at a time so he will need to contact us again to request refill in a week.      Patient is currently out of medication.    Hodan Juan, RN  [facility]

## 2021-07-21 NOTE — TELEPHONE ENCOUNTER
Please advise patient that refill was sent to his [facility]'s pharmacy as requested.  It looks like it requires a PA, so may not immediately be available.  Please ask that he contact pharmacy to ensure that it has been filled prior to proceeding to the pharmacy.      Thanks,  Shahab Perez MD

## 2021-07-28 NOTE — PROGRESS NOTES
PAIN CENTER PROGRESS NOTE    Subjective:   Romeo Chong is a 40 year old male who presents for evaluation of low back pain.  Also right leg and bilateral feet, history of fibrous dysplasia of bone since age 13.  Has had previous surgery on right tibia, has resultant lymphedema since 2006.  Comorbid conditions include Asthma, HTN, hypertriglyceridemia, lymphedema, obesity, Vitamin D deficiency, allergic rhinitis, nonintractable headache, gastric ulcer, h/o hodgkin's lymphoma    Major issues:  1. Chronic pain syndrome    2. Fibrous dysplasia of bone    3. Degeneration of lumbar intervertebral disc      Pain location and description: See CMA note  Radiation of pain: legs to feet  Gait disturbance: None reported  Exacerbating factors: Any movement  Alleviating factors: Medications, sleep, laying down  Associated symptoms: Denies weight loss, fever/chills, bowel or bladder incontinence.  Functional pain symptoms:  See CMA note  Adverse effects of medications: None reported  Current treatment efficacy: Fair  Current treatment compliance: New to pain center - unexpected UDT at consult.    Consult recommendations:  LESI L4-5 or lumbar facet injection - will wait to order until after physical exam  PT evaluation and treatment as referred  Medication trial amitriptyline 20 mg x 3 nights, then 30 mg for insomnia/pain.    Discussed Vicodin 10/325 mg BID prn after UDT results, consider ketamine trial (out of pocket expense)  Behavioral health diagnostic assessment    He had PT evaluation on 06/21/2021:  Plan of Care  Authorization / Certification Start Date: 06/21/21  Authorization / Certification End Date: 09/19/21  Authorization / Certification Number of Visits: 12  Communication with: Referral Source  Patient Related Instruction: Nature of Condition;Treatment plan and rationale;Self Care instruction;Basis of treatment;Body mechanics;Posture;Precautions;Next steps;Expected outcome  Times per Week: 1-2  Number of Weeks:  "6-12  Number of Visits: 6-12  Discharge Planning: goals met or independent in management  Therapeutic Exercise: ROM;Stretching;Strengthening  Neuromuscular Reeducation: kinesio tape;posture;balance/proprioception;TNE;core  Manual Therapy: soft tissue mobilization;myofascial release;lymphatic drainage massage;joint mobilization;muscle energy;visceral manipulation  Modalities: TENS;cold pack;hot pack  Gait Training: as indicated     Goals:  Pt. will demonstrate/verbalize independence in self-management of condition in : 6 weeks  Pt. will be independent with home exercise program in : 6 weeks     Pt will: improve NIC by 6 points or more to show significant improvement in impact pain is having on daily activities, within 12 weeks.  Pt will: have improved tolerance to standing and sitting for 20 min or longer, within 12 weeks.\"    He states he stopped follow-up due to surgery but plans to return.    He saw Dr. Correia for preop visit on 06/23/2021 and had Excision calcaneal spur left foot with plantar fasciotomy left foot. 07/07/21 with Dr. Stephon Singleton DPM.  Had surgical follow-up on 07/06/2021:  \"   Assessment: Plantar calcaneal spur and plantar fasciitis left foot     Plan: Applied a large Band-Aid over the incision site.  The patient was instructed to keep the wound clean and dry.  He is to return to the clinic in 1 week for postop visit #2 at which time the sutures will be removed.\"    Was seen back on 07/12/201:  \"Assessment: Calcaneal spur left foot     Plan: All sutures were removed today.  The patient was instructed to gradually return to normal activities.  The patient was told to expect some mild to moderate discomfort for the next 3 to 4 weeks.  I have also recommended orthotics.\"    Feels pain in the left heel and arch since surgery.  Denies incision pain, just tender.  Wearing normal tennis shoes.  Using cane prior to surgery.  Orthotics were ordered but hasn't obtained yet.  He states he couldn't stand " "for long periods of time prior to surgery but now is shorter due to surgery can tolerate 5-10 minutes at a time.  He states trying to relax and not move around too much.   He is using heating pad.  He is elevating to relieve pressure.      States right leg pain from knee down is where most of pain is.  With chronic lymphedema has achilles pain, inflamed from time to time.  Quad muscle surgery in the past and has chronic numbness in right thigh also pain with stabbing burning, weakness.      Low back pain -   06/02/2021 TFESI L5-S1 bilateral \"hurt like hell\" during and after the procedure like someone was stabbing the nerve.  He states didn't help for more than a few days, unsure if pain is worse now.  Trying to find different ways to lay on back and elevate with pillows.  Denies steroid side effects or complications.  He plans [facility] with [facility].  Hasn't seen surgeon yet in consult.    Dr. Perez sent in weekly refills of Vicodin 5/325 mg TID prn.  He states he usually takes 10/325 mg BID in the morning and before bed at night usually helps him throughout the day.      Discuss UDT results unexpected, he reports his sister gave one Percocet 5/325 mg states it was either that or to use THC.  His sister has cancer, he thinks uterine and therefore has these medications prescribed to her.  He states he never mixes alcohol with medications, is a social thing when he drinks.  He states body doesn't agree with it and consumes 1 shot in social outing.  States last night had wine as he has been out of his Vicodin.     Diagnostic assessment with Kiana Masterson on 07/19/2021:  \"Recommendations: Follow pain center plan of care. Schedule and attend psychotherapy to further address mental health and chronic pain symptoms. Consider EMDR. Follow up with psychiatry to address mental health medication concerns.      Diagnosis: Adjustment disorder with anxiety and depressed mood  Chronic Pain Syndrome  Grief/loss  PTSD, " "chronic\"  States this went well and wants to use psychotherapy services..  He is living with elderly aunt right now.  Adaptive apartment which helps him out.  He states he has SSDI shouldn't be working, can't keep a job due to health.  No recent hospitalizations.    Had acute hamstring injury, stretching, ice/heat and this has resolved.     Previous Diagnostics & Treatments for Pain:  Surgery - Right lower extremity and 18 surgeries total   Injections - TFESI bilateral L5-S1 with Dr. Dobson on 06/02/2021  Imaging - See below  Physical Therapy - Lymphedema therapy daily at home.  Chiropractor/Acupuncture - Tried acupuncture for carpal tunel  Massage - lymphedema massage of leg  TENS or bracing - Doesn't use back brace due to lymphedema, denies TENs  Pain Psychology or biofeedback - Previous therapist.    Other - compression wraps, heating pad and tiger balm, compression shirt helps low back pain a bit     Pertinent Pain Medications:  See med list.     Previously tried medications:    NSAIDs: Not helpful, Ibuprofen helps headaches (stress)    Acetaminophen: Not helpful    Antidepressants: Cymbalta in the past, nortriptyline was asleep for 24 hours    Gabapentinoids: Gabapentin was \"the worst\" caused lymph node swelling, denies Lyrica    Opioids: oxycodone also helpful, states given other opioids in the ED (fentanyl helped but was scared to have it), Morphine in IV caused headaches, Dilaudid     Topicals: Tiger Balm    Supplements: Tried turmeric, apple cider vinegar     Muscle Relaxants: Tries to avoid as it has him feel groggy in the morning previously on flexeril and tizanidine, valium    Other: Trazodone feels \"zombie\" like in the morning, tried THC in [place] which helped sleep.      Review of Systems  Constitutional: Sleep disturbance due to pain.  Denies fever, chills, night sweats, lethargy, weight loss, weight gain.  Musculoskeletal: Positive for back pain, foot pain, muscle pain.  Denies recent " "falls.  Gastrointestional: Denies difficulty swallowing, change in appetite, abdominal pain, constipation, nausea, vomiting, diarrhea, fecal incontinence.  Genitourinary: Denies urinary incontinence, dysuria, hematuria, UTI, frequency, hesitancy, change in libido/erectile dysfunction.  Neurologic: Denies headaches, confusion, seizure,  changes in balance, changes in speech.  Psychiatric: Depression, anxiety, grief.  Denies memory loss, psychoses, suicidal ideation, substance use/abuse.     Objective:     Vitals:    07/29/21 1332   BP: (!) 141/92   Pulse: 70   Resp: 16   Weight: 122.5 kg (270 lb)   Height: 1.803 m (5' 11\")   PainSc: Severe Pain (7)     Physical Exam  Constitutional- General appearance: Normal.  Well developed, uncomfortable, obese and appearance reflects stated age.  No acute distress or pain behaviors noted.  Presents alone today.  Psychiatric- Judgment and insight: Fair.  Speech: Normal rhythm.  Thought process: Normal.  No abnormal thoughts reported. Alert & Oriented to person, place, and time.  Recent and remote memory: Normal.  Mood and affect: Normal.  Observed mood: concerned.  Respiratory- Breathing is non-labored; normal rhythm and rate.  Cardiovascular- Extremities warm and well perfused, no peripheral edema or varicosities.  Dermatologic- Exposed skin is clean, dry, and intact to inspection and palpation.  Musculoskeletal- Gait and station: Abnormal, antalgic. Gait evaluation demonstrates ambulating with difficulty, unable to tip toe walk or heel walk.  Tandem walk difficult. Patient does not have difficulty rising from a seated position.  Lumbar Spine- Normal gross alignment, limited lumbar flexion, extension, lateral bend right and left due to pain, focal tenderness on palpation L4-S1, no spasms.   Extremities- Right leg significant lymphedema, scarring, tender to palpation.  No drainage or sores.  Left lower leg normal appearance and non tender to palpation.  Neurologic- Reduced " strength right leg +4/5, left leg +5/5, reduced sensation to light touch in RLE, normal in LLE, bilateral upper extremities equal in strength +5/5 and light touch sensation.    Lab:  Last UDT was reviewed and unexpected for oxycodone and metabolites.  Negative for hydrocodone.  Positive alcohol EtG and EtS.    [place]  Reviewed on 07/29/2021    Imaging:  MR Lumbar Spine without Contrast 05/12/2021:  IMPRESSION:   CONCLUSION:  1.  Multilevel degenerative changes of the lumbar spine as described in detail above, greatest at L5-S1, where there is moderate lateral recess stenosis with abutment and mild displacement of the traversing left S1 nerve root, mild right neuroforaminal   stenosis, and mild to moderate left neuroforaminal stenosis.  2.  At L4-L5, there is mild left lateral recess stenosis with possible abutment of the traversing left L5 nerve root.     EXAM: XR SHOULDER LEFT 2 OR MORE VWS  LOCATION: [facility]  DATE/TIME: 5/12/2021 7:36 AM     INDICATION: Chronic left shoulder pain  COMPARISON: None.     IMPRESSION:   Normal alignment without a fracture or dislocation. Acromiohumeral space is preserved. No evidence of calcific tendinitis. Adjacent chest wall is unremarkable. Numerous clips in left axilla.     EXAM: XR FEET BILATERAL 2 VWS  LOCATION: [facility]  DATE/TIME: 5/12/2021 7:36 AM     INDICATION: Chronic bilateral foot pain  COMPARISON: None.     IMPRESSION:   Normal alignment without a fracture or dislocation. No effusions at the ankle     There is a very small plantar calcaneal spur in the left foot. Mild hallux valgus deformity bilaterally.     No other degenerative changes noted.    Assessment:   Romeo Chong is a 40 year old male seen in clinic today for  low back pain, [facility] with MRI demonstrating multilevel degenerative changes, greatest at L5-S1.  Recently had TFESI bilateral L5-S1 which patient reports did not lessen pain  "symptoms.  He plans to return to discuss plan or surgical consult.  Patient also has history of lymphedema in right LE following treatment for Non-Hodgkin's lymphoma and history of right LE surgery for fibrous dysplasia of bone.  He recently had left foot surgery and recovering as expected per Dr. Singleton, will obtain orthotics.      He asks about refilling Vicodin today - review that his UDT came back unexpected and he took medication not prescribed to him which is unsafe and illegal.  He is aware and also advised never to mix alcohol with opioids due to risk of respiratory depression.  He states drinking is social and not a problem to stop with medications.  His DA did not indicate substance use concerns.  Will need close monitoring and recheck UDT today - if expected, will resume Vicodin 10/325 mg BID prn dosing for pain.  He is also interested in medical cannabis trial to assist in managing chronic pain, review that in detail today and he does qualify for enrollment due to chronic pain.  He will continue with behavioral health and physical therapy recommendations as well.    Plan:   Repeat UDT today - will wait for results to come back.  If as expected, will prescribe Vicodin 10/325 mg 1 tab twice a day as needed for pain.  Avoid alcohol while taking controlled substances, as the combination can be extremely hazardous and may cause respirations to slow to a dangerous rate resulting in hospitalization, brain damage, or death  Will also enroll for medical cannabis here in [place] for pain.  Please review the following websites for information on locations, and also other frequently asked questions:  [email or web address]  [email or web address]  Http://[email or web address]/index.html (look on the right column \"Featured Sites\" and choose Medical Cannabis tab  Important information and warnings about using medical cannabis:  *Use of medical cannabis products is experimental  *Common side effects " include dizziness, fatigue, dry mouth, lightheadedness, drowsiness, nausea  *Tell all your health care professionals about the medical cannabis you are using  *The following groups are at increased risk of harm from use - those under 18, women who are pregnant or breast-feeding, persons with a personal or family history of psychotic disorder  *Risks for use by persons with serious heart or liver disease  *Do not drive, operate machinery, or do work that could harm people under the influence of medical cannabis  *Keep medication secure and in their original containers  *Do not use medical cannabis where it is illegal  *Do not give or sell to others medical cannabis that you purchase  *Risk of dependence and addiction (similar to caffeine)  Continue with [facility] for further lumbar recommendations or spine surgery consult  Resume PT at [facility] as you are done with foot surgery  Continue with Kiana Masterson for behavioral health visits.  Follow-up in 8 weeks with Mony CUELLO to evaluate the above plan of care.    Mony Monsivais PA-C  [facility]   [address]  [place] [address]  Ph: [phone]  Fax: [phone]    45 minutes spent on the date of the encounter doing chart review, history and exam, documentation,and further activities.

## 2021-07-29 ENCOUNTER — OFFICE VISIT (OUTPATIENT)
Dept: PALLIATIVE MEDICINE | Facility: OTHER | Age: 41
End: 2021-07-29
Payer: COMMERCIAL

## 2021-07-29 VITALS
BODY MASS INDEX: 37.8 KG/M2 | DIASTOLIC BLOOD PRESSURE: 92 MMHG | RESPIRATION RATE: 16 BRPM | HEIGHT: 71 IN | WEIGHT: 270 LBS | HEART RATE: 70 BPM | SYSTOLIC BLOOD PRESSURE: 141 MMHG

## 2021-07-29 DIAGNOSIS — M85.00 FIBROUS DYSPLASIA OF BONE: ICD-10-CM

## 2021-07-29 DIAGNOSIS — G89.4 CHRONIC PAIN SYNDROME: Primary | ICD-10-CM

## 2021-07-29 DIAGNOSIS — M51.369 DEGENERATION OF LUMBAR INTERVERTEBRAL DISC: ICD-10-CM

## 2021-07-29 PROCEDURE — G0463 HOSPITAL OUTPT CLINIC VISIT: HCPCS

## 2021-07-29 PROCEDURE — 99215 OFFICE O/P EST HI 40 MIN: CPT | Performed by: PHYSICIAN ASSISTANT

## 2021-07-29 ASSESSMENT — PAIN SCALES - GENERAL: PAINLEVEL: SEVERE PAIN (7)

## 2021-07-29 ASSESSMENT — MIFFLIN-ST. JEOR: SCORE: 2161.84

## 2021-07-29 NOTE — LETTER
7/29/2021         RE: Romeo Chong  [address]  [place] [address]        Dear Colleague,    Thank you for referring your patient, Romeo Chong, to the [facility]. Please see a copy of my visit note below.    PAIN CENTER PROGRESS NOTE    Subjective:   Romeo Chong is a 40 year old male who presents for evaluation of low back pain.  Also right leg and bilateral feet, history of fibrous dysplasia of bone since age 13.  Has had previous surgery on right tibia, has resultant lymphedema since 2006.  Comorbid conditions include Asthma, HTN, hypertriglyceridemia, lymphedema, obesity, Vitamin D deficiency, allergic rhinitis, nonintractable headache, gastric ulcer, h/o hodgkin's lymphoma    Major issues:  1. Chronic pain syndrome    2. Fibrous dysplasia of bone    3. Degeneration of lumbar intervertebral disc      Pain location and description: See CMA note  Radiation of pain: legs to feet  Gait disturbance: None reported  Exacerbating factors: Any movement  Alleviating factors: Medications, sleep, laying down  Associated symptoms: Denies weight loss, fever/chills, bowel or bladder incontinence.  Functional pain symptoms:  See CMA note  Adverse effects of medications: None reported  Current treatment efficacy: Fair  Current treatment compliance: New to pain center - unexpected UDT at consult.    Consult recommendations:  LESI L4-5 or lumbar facet injection - will wait to order until after physical exam  PT evaluation and treatment as referred  Medication trial amitriptyline 20 mg x 3 nights, then 30 mg for insomnia/pain.    Discussed Vicodin 10/325 mg BID prn after UDT results, consider ketamine trial (out of pocket expense)  Behavioral health diagnostic assessment    He had PT evaluation on 06/21/2021:  Plan of Care  Authorization / Certification Start Date: 06/21/21  Authorization / Certification End Date: 09/19/21  Authorization / Certification Number of Visits: 12  Communication with:  "Referral Source  Patient Related Instruction: Nature of Condition;Treatment plan and rationale;Self Care instruction;Basis of treatment;Body mechanics;Posture;Precautions;Next steps;Expected outcome  Times per Week: 1-2  Number of Weeks: 6-12  Number of Visits: 6-12  Discharge Planning: goals met or independent in management  Therapeutic Exercise: ROM;Stretching;Strengthening  Neuromuscular Reeducation: kinesio tape;posture;balance/proprioception;TNE;core  Manual Therapy: soft tissue mobilization;myofascial release;lymphatic drainage massage;joint mobilization;muscle energy;visceral manipulation  Modalities: TENS;cold pack;hot pack  Gait Training: as indicated     Goals:  Pt. will demonstrate/verbalize independence in self-management of condition in : 6 weeks  Pt. will be independent with home exercise program in : 6 weeks     Pt will: improve NIC by 6 points or more to show significant improvement in impact pain is having on daily activities, within 12 weeks.  Pt will: have improved tolerance to standing and sitting for 20 min or longer, within 12 weeks.\"    He states he stopped follow-up due to surgery but plans to return.    He saw Dr. Correia for preop visit on 06/23/2021 and had Excision calcaneal spur left foot with plantar fasciotomy left foot. 07/07/21 with Dr. Stephon Singleton DPM.  Had surgical follow-up on 07/06/2021:  \"   Assessment: Plantar calcaneal spur and plantar fasciitis left foot     Plan: Applied a large Band-Aid over the incision site.  The patient was instructed to keep the wound clean and dry.  He is to return to the clinic in 1 week for postop visit #2 at which time the sutures will be removed.\"    Was seen back on 07/12/201:  \"Assessment: Calcaneal spur left foot     Plan: All sutures were removed today.  The patient was instructed to gradually return to normal activities.  The patient was told to expect some mild to moderate discomfort for the next 3 to 4 weeks.  I have also recommended " "orthotics.\"    Feels pain in the left heel and arch since surgery.  Denies incision pain, just tender.  Wearing normal tennis shoes.  Using cane prior to surgery.  Orthotics were ordered but hasn't obtained yet.  He states he couldn't stand for long periods of time prior to surgery but now is shorter due to surgery can tolerate 5-10 minutes at a time.  He states trying to relax and not move around too much.   He is using heating pad.  He is elevating to relieve pressure.      States right leg pain from knee down is where most of pain is.  With chronic lymphedema has achilles pain, inflamed from time to time.  Quad muscle surgery in the past and has chronic numbness in right thigh also pain with stabbing burning, weakness.      Low back pain -   06/02/2021 TFESI L5-S1 bilateral \"hurt like hell\" during and after the procedure like someone was stabbing the nerve.  He states didn't help for more than a few days, unsure if pain is worse now.  Trying to find different ways to lay on back and elevate with pillows.  Denies steroid side effects or complications.  [facility].  Hasn't seen surgeon yet in consult.    Dr. Perez sent in weekly refills of Vicodin 5/325 mg TID prn.  He states he usually takes 10/325 mg BID in the morning and before bed at night usually helps him throughout the day.      Discuss UDT results unexpected, he reports his sister gave one Percocet 5/325 mg states it was either that or to use THC.  His sister has cancer, he thinks uterine and therefore has these medications prescribed to her.  He states he never mixes alcohol with medications, is a social thing when he drinks.  He states body doesn't agree with it and consumes 1 shot in social outing.  States last night had wine as he has been out of his Vicodin.     Diagnostic assessment with Kiana Masterson on 07/19/2021:  \"Recommendations: Follow pain center plan of care. Schedule and attend psychotherapy to further address " "mental health and chronic pain symptoms. Consider EMDR. Follow up with psychiatry to address mental health medication concerns.      Diagnosis: Adjustment disorder with anxiety and depressed mood  Chronic Pain Syndrome  Grief/loss  PTSD, chronic\"  States this went well and wants to use psychotherapy services..  He is living with elderly aunt right now.  Adaptive apartment which helps him out.  He states he has SSDI shouldn't be working, can't keep a job due to health.  No recent hospitalizations.    Had acute hamstring injury, stretching, ice/heat and this has resolved.     Previous Diagnostics & Treatments for Pain:  Surgery - Right lower extremity and 18 surgeries total   Injections - TFESI bilateral L5-S1 with Dr. Dobson on 06/02/2021  Imaging - See below  Physical Therapy - Lymphedema therapy daily at home.  Chiropractor/Acupuncture - Tried acupuncture for carpal tunel  Massage - lymphedema massage of leg  TENS or bracing - Doesn't use back brace due to lymphedema, denies TENs  Pain Psychology or biofeedback - Previous therapist.    Other - compression wraps, heating pad and tiger balm, compression shirt helps low back pain a bit     Pertinent Pain Medications:  See med list.     Previously tried medications:    NSAIDs: Not helpful, Ibuprofen helps headaches (stress)    Acetaminophen: Not helpful    Antidepressants: Cymbalta in the past, nortriptyline was asleep for 24 hours    Gabapentinoids: Gabapentin was \"the worst\" caused lymph node swelling, denies Lyrica    Opioids: oxycodone also helpful, states given other opioids in the ED (fentanyl helped but was scared to have it), Morphine in IV caused headaches, Dilaudid     Topicals: Tiger Balm    Supplements: Tried turmeric, apple cider vinegar     Muscle Relaxants: Tries to avoid as it has him feel groggy in the morning previously on flexeril and tizanidine, valium    Other: Trazodone feels \"zombie\" like in the morning, tried THC in [place] which helped " "sleep.      Review of Systems  Constitutional: Sleep disturbance due to pain.  Denies fever, chills, night sweats, lethargy, weight loss, weight gain.  Musculoskeletal: Positive for back pain, foot pain, muscle pain.  Denies recent falls.  Gastrointestional: Denies difficulty swallowing, change in appetite, abdominal pain, constipation, nausea, vomiting, diarrhea, fecal incontinence.  Genitourinary: Denies urinary incontinence, dysuria, hematuria, UTI, frequency, hesitancy, change in libido/erectile dysfunction.  Neurologic: Denies headaches, confusion, seizure,  changes in balance, changes in speech.  Psychiatric: Depression, anxiety, grief.  Denies memory loss, psychoses, suicidal ideation, substance use/abuse.     Objective:     Vitals:    07/29/21 1332   BP: (!) 141/92   Pulse: 70   Resp: 16   Weight: 122.5 kg (270 lb)   Height: 1.803 m (5' 11\")   PainSc: Severe Pain (7)     Physical Exam  Constitutional- General appearance: Normal.  Well developed, uncomfortable, obese and appearance reflects stated age.  No acute distress or pain behaviors noted.  Presents alone today.  Psychiatric- Judgment and insight: Fair.  Speech: Normal rhythm.  Thought process: Normal.  No abnormal thoughts reported. Alert & Oriented to person, place, and time.  Recent and remote memory: Normal.  Mood and affect: Normal.  Observed mood: concerned.  Respiratory- Breathing is non-labored; normal rhythm and rate.  Cardiovascular- Extremities warm and well perfused, no peripheral edema or varicosities.  Dermatologic- Exposed skin is clean, dry, and intact to inspection and palpation.  Musculoskeletal- Gait and station: Abnormal, antalgic. Gait evaluation demonstrates ambulating with difficulty, unable to tip toe walk or heel walk.  Tandem walk difficult. Patient does not have difficulty rising from a seated position.  Lumbar Spine- Normal gross alignment, limited lumbar flexion, extension, lateral bend right and left due to pain, focal " tenderness on palpation L4-S1, no spasms.   Extremities- Right leg significant lymphedema, scarring, tender to palpation.  No drainage or sores.  Left lower leg normal appearance and non tender to palpation.  Neurologic- Reduced strength right leg +4/5, left leg +5/5, reduced sensation to light touch in RLE, normal in LLE, bilateral upper extremities equal in strength +5/5 and light touch sensation.    Lab:  Last UDT was reviewed and unexpected for oxycodone and metabolites.  Negative for hydrocodone.  Positive alcohol EtG and EtS.    [place]  Reviewed on 07/29/2021    Imaging:  MR Lumbar Spine without Contrast 05/12/2021:  IMPRESSION:   CONCLUSION:  1.  Multilevel degenerative changes of the lumbar spine as described in detail above, greatest at L5-S1, where there is moderate lateral recess stenosis with abutment and mild displacement of the traversing left S1 nerve root, mild right neuroforaminal   stenosis, and mild to moderate left neuroforaminal stenosis.  2.  At L4-L5, there is mild left lateral recess stenosis with possible abutment of the traversing left L5 nerve root.     EXAM: XR SHOULDER LEFT 2 OR MORE VWS  LOCATION: [facility]  DATE/TIME: 5/12/2021 7:36 AM     INDICATION: Chronic left shoulder pain  COMPARISON: None.     IMPRESSION:   Normal alignment without a fracture or dislocation. Acromiohumeral space is preserved. No evidence of calcific tendinitis. Adjacent chest wall is unremarkable. Numerous clips in left axilla.     EXAM: XR FEET BILATERAL 2 VWS  LOCATION: [facility]  DATE/TIME: 5/12/2021 7:36 AM     INDICATION: Chronic bilateral foot pain  COMPARISON: None.     IMPRESSION:   Normal alignment without a fracture or dislocation. No effusions at the ankle     There is a very small plantar calcaneal spur in the left foot. Mild hallux valgus deformity bilaterally.     No other degenerative changes noted.    Assessment:   Romeo UMESH Chong is a 40 year  old male seen in clinic today for  low back pain, [facility] with [facility] demonstrating multilevel degenerative changes, greatest at L5-S1.  Recently had TFESI bilateral L5-S1 which patient reports did not lessen pain symptoms.  He plans to return to discuss plan or surgical consult.  Patient also has history of lymphedema in right LE following treatment for Non-Hodgkin's lymphoma and history of right LE surgery for fibrous dysplasia of bone.  He recently had left foot surgery and recovering as expected per Dr. Singleton, will obtain orthotics.      He asks about refilling Vicodin today - review that his UDT came back unexpected and he took medication not prescribed to him which is unsafe and illegal.  He is aware and also advised never to mix alcohol with opioids due to risk of respiratory depression.  He states drinking is social and not a problem to stop with medications.  His DA did not indicate substance use concerns.  Will need close monitoring and recheck UDT today - if expected, will resume Vicodin 10/325 mg BID prn dosing for pain.  He is also interested in medical cannabis trial to assist in managing chronic pain, review that in detail today and he does qualify for enrollment due to chronic pain.  He will continue with behavioral health and physical therapy recommendations as well.    Plan:   Repeat UDT today - will wait for results to come back.  If as expected, will prescribe Vicodin 10/325 mg 1 tab twice a day as needed for pain.  Avoid alcohol while taking controlled substances, as the combination can be extremely hazardous and may cause respirations to slow to a dangerous rate resulting in hospitalization, brain damage, or death  Will also enroll for medical cannabis here in [place] for pain.  Please review the following websites for information on locations, and also other frequently asked  "questions:  https://[employer]  https://[employer]idee  Http://www.[employer].[facility].[place]/index.html (look on the right column \"Featured Sites\" and choose Medical Cannabis tab  Important information and warnings about using medical cannabis:  *Use of medical cannabis products is experimental  *Common side effects include dizziness, fatigue, dry mouth, lightheadedness, drowsiness, nausea  *Tell all your health care professionals about the medical cannabis you are using  *The following groups are at increased risk of harm from use - those under 18, women who are pregnant or breast-feeding, persons with a personal or family history of psychotic disorder  *Risks for use by persons with serious heart or liver disease  *Do not drive, operate machinery, or do work that could harm people under the influence of medical cannabis  *Keep medication secure and in their original containers  *Do not use medical cannabis where it is illegal  *Do not give or sell to others medical cannabis that you purchase  *Risk of dependence and addiction (similar to caffeine)  [facility] with [facility] for further lumbar recommendations or spine surgery consult  Resume PT at [facility] as you are done with foot surgery  Continue with Kiana Masterson for behavioral health visits.  Follow-up in 8 weeks with Mony CUELLO to evaluate the above plan of care.    Mony Monsivais PA-C  [facility]   [address]  [place] [address]  Ph: [phone]  Fax: [phone]    45 minutes spent on the date of the encounter doing chart review, history and exam, documentation,and further activities.    Patient presents to the clinic today for a follow up with Mony Monsivais PA-C.    Pain score: 7  Constant   What does your pain feel like: sharp, dull, aching, shooting, stabbing, deep, throbbing, pressure, tender, swollen  Does the pain interfere with:  Work: yes  Walking/distance: yes  Sleep: " yes  Daily activities: yes  Relationships/social life: yes  Mood: yes  F= 8      Again, thank you for allowing me to participate in the care of your patient.        Sincerely,        Mony Monsivais PA-C

## 2021-07-29 NOTE — PATIENT INSTRUCTIONS
"Repeat UDT today - will wait for results to come back.  If as expected, will prescribe Vicodin 10/325 mg 1 tab twice a day as needed for pain.  Avoid alcohol while taking controlled substances, as the combination can be extremely hazardous and may cause respirations to slow to a dangerous rate resulting in hospitalization, brain damage, or death  Will also enroll for medical cannabis here in [place] for pain.  Please review the following websites for information on locations, and also other frequently asked questions:  https://[employer]  https://[employer]  Http://www.[employer].[facility].[place]/index.html (look on the right column \"Featured Sites\" and choose Medical Cannabis tab  Important information and warnings about using medical cannabis:  *Use of medical cannabis products is experimental  *Common side effects include dizziness, fatigue, dry mouth, lightheadedness, drowsiness, nausea  *Tell all your health care professionals about the medical cannabis you are using  *The following groups are at increased risk of harm from use - those under 18, women who are pregnant or breast-feeding, persons with a personal or family history of psychotic disorder  *Risks for use by persons with serious heart or liver disease  *Do not drive, operate machinery, or do work that could harm people under the influence of medical cannabis  *Keep medication secure and in their original containers  *Do not use medical cannabis where it is illegal  *Do not give or sell to others medical cannabis that you purchase  *Risk of dependence and addiction (similar to caffeine)  Continue with [facility] for further lumbar recommendations or spine surgery consult  Resume PT at [facility] as you are done with foot surgery  Continue with Kiana Masterson for behavioral health visits.  Follow-up in 8 weeks with Mony CUELLO to evaluate the above plan of care.    "

## 2021-07-29 NOTE — PROGRESS NOTES
Patient presents to the clinic today for a follow up with Mony Monsivais PA-C.    Pain score: 7  Constant   What does your pain feel like: sharp, dull, aching, shooting, stabbing, deep, throbbing, pressure, tender, swollen  Does the pain interfere with:  Work: yes  Walking/distance: yes  Sleep: yes  Daily activities: yes  Relationships/social life: yes  Mood: yes  F= 8

## 2021-07-30 ENCOUNTER — TELEPHONE (OUTPATIENT)
Dept: FAMILY MEDICINE | Facility: CLINIC | Age: 41
End: 2021-07-30

## 2021-07-30 DIAGNOSIS — G89.4 CHRONIC PAIN SYNDROME: Primary | ICD-10-CM

## 2021-07-30 PROCEDURE — 80320 DRUG SCREEN QUANTALCOHOLS: CPT | Performed by: PHYSICIAN ASSISTANT

## 2021-07-30 PROCEDURE — 80307 DRUG TEST PRSMV CHEM ANLYZR: CPT | Performed by: PHYSICIAN ASSISTANT

## 2021-07-30 PROCEDURE — 82570 ASSAY OF URINE CREATININE: CPT | Performed by: PHYSICIAN ASSISTANT

## 2021-07-30 RX ORDER — HYDROCODONE BITARTRATE AND ACETAMINOPHEN 5; 325 MG/1; MG/1
2 TABLET ORAL 2 TIMES DAILY PRN
Qty: 18 TABLET | Refills: 0 | Status: SHIPPED | OUTPATIENT
Start: 2021-07-30 | End: 2021-08-06

## 2021-07-30 NOTE — TELEPHONE ENCOUNTER
Rx request approved and sent to pharmacy.   Please advise patient that he should schedule appointment with new primary care provider at his convenience to establish care.   Shahab Perez MD

## 2021-07-30 NOTE — TELEPHONE ENCOUNTER
Dr. Perez    Patient said that he needs a refill of his hydrocodone.  He said that he saw his pain Dr yesterday, and they are still waiting for his urine test to come back before they will start filling his medication.  He is wondering if you will give him one more week of hydrocodone.  He uses [facility] in his chart.  He would like a call back at .

## 2021-07-31 LAB
BARBITURATES UR QL: NORMAL
CANNABINOIDS UR QL SCN: NORMAL
CREAT UR-MCNC: 340 MG/DL
CREAT UR-MCNC: 340 MG/DL
ETHANOL UR QL SCN: NORMAL

## 2021-08-02 DIAGNOSIS — I10 ESSENTIAL HYPERTENSION, BENIGN: ICD-10-CM

## 2021-08-03 LAB — ETHYL GLUCURONIDE UR QL SCN: NEGATIVE NG/ML

## 2021-08-04 LAB
CREATININE URINE MG/DL  (SYNCED VALUE): 340 MG/DL
DHC UR CFM-MCNC: 79 NG/ML
DHC/CREAT UR: 23 NG/MG {CREAT}
HYDROCODONE UR CFM-MCNC: 90 NG/ML
HYDROCODONE/CREAT UR: 26 NG/MG {CREAT}
HYDROMORPHONE UR CFM-MCNC: 68 NG/ML
HYDROMORPHONE/CREAT UR: 20 NG/MG {CREAT}

## 2021-08-05 ENCOUNTER — DOCUMENTATION ONLY (OUTPATIENT)
Dept: PALLIATIVE MEDICINE | Facility: OTHER | Age: 41
End: 2021-08-05

## 2021-08-05 RX ORDER — POTASSIUM CHLORIDE 750 MG/1
TABLET, EXTENDED RELEASE ORAL
Qty: 270 TABLET | Refills: 2 | Status: SHIPPED | OUTPATIENT
Start: 2021-08-05 | End: 2023-02-09

## 2021-08-05 NOTE — TELEPHONE ENCOUNTER
D) Sent patient a video invitation for his scheduled psychotherapy visit today. He did not respond, therefore, the patient was called regarding the visit. He stated he had a family emergency and could not follow through on the visit today. Patient was informed his appointment would be cancelled today and scheduling would call him later to re-schedule. He agreed with this plan.

## 2021-08-05 NOTE — TELEPHONE ENCOUNTER
"Last Written Prescription Date:  5/5/21  Last Fill Quantity: 270,  # refills: 0   Last office visit provider:  6/23/21     Requested Prescriptions   Pending Prescriptions Disp Refills     potassium chloride ER (K-TAB/KLOR-CON) 10 MEQ CR tablet [Pharmacy Med Name: POTASSIUM CL 10MEQ ER TABLETS] 270 tablet 0     Sig: TAKE 2 TABLETS BY MOUTH EVERY MORNING AND 1 EVERY EVENING       Potassium Supplements Protocol Passed - 8/2/2021 11:26 AM        Passed - Recent (12 mo) or future (30 days) visit within the authorizing provider's department     Patient has had an office visit with the authorizing provider or a provider within the authorizing providers department within the previous 12 mos or has a future within next 30 days. See \"Patient Info\" tab in inbasket, or \"Choose Columns\" in Meds & Orders section of the refill encounter.              Passed - Medication is active on med list        Passed - Patient is age 18 or older        Passed - Normal serum potassium in past 12 months     Recent Labs   Lab Test 06/23/21  0804   POTASSIUM 4.1                         Salinas Hebert RN 08/05/21 2:52 PM  "

## 2021-08-09 ENCOUNTER — VIRTUAL VISIT (OUTPATIENT)
Dept: PHARMACY | Facility: OTHER | Age: 41
End: 2021-08-09
Payer: COMMERCIAL

## 2021-08-09 DIAGNOSIS — G89.29 CHRONIC LOW BACK PAIN, UNSPECIFIED BACK PAIN LATERALITY, UNSPECIFIED WHETHER SCIATICA PRESENT: Primary | ICD-10-CM

## 2021-08-09 DIAGNOSIS — M54.50 CHRONIC LOW BACK PAIN, UNSPECIFIED BACK PAIN LATERALITY, UNSPECIFIED WHETHER SCIATICA PRESENT: Primary | ICD-10-CM

## 2021-08-09 DIAGNOSIS — E55.9 VITAMIN D DEFICIENCY: ICD-10-CM

## 2021-08-09 DIAGNOSIS — G47.00 INSOMNIA, UNSPECIFIED TYPE: ICD-10-CM

## 2021-08-09 DIAGNOSIS — K25.9 GASTRIC ULCER, UNSPECIFIED CHRONICITY, UNSPECIFIED WHETHER GASTRIC ULCER HEMORRHAGE OR PERFORATION PRESENT: ICD-10-CM

## 2021-08-09 DIAGNOSIS — J30.81 ALLERGIC RHINITIS DUE TO ANIMALS: ICD-10-CM

## 2021-08-09 PROCEDURE — 99606 MTMS BY PHARM EST 15 MIN: CPT | Performed by: PHARMACIST

## 2021-08-09 PROCEDURE — 99607 MTMS BY PHARM ADDL 15 MIN: CPT | Performed by: PHARMACIST

## 2021-08-09 RX ORDER — NORTRIPTYLINE HCL 25 MG
25 CAPSULE ORAL AT BEDTIME
Qty: 30 CAPSULE | Refills: 1 | Status: ON HOLD | OUTPATIENT
Start: 2021-08-09 | End: 2021-08-26

## 2021-08-09 NOTE — PROGRESS NOTES
Medication Therapy Management (MTM) Encounter    ASSESSMENT:                            Medication Adherence/Access: No issues identified      Chronic Pain: Pain was improved when using Norco - ran out due to confusion on the process and who would continue prescribed. Last urine was as expected. Will discuss refills with Mony Monsivais PA-C.     Appropriately started Vitamin D supplement. Plan for repeat levels after 12 weeks of therapy (Mid October).     Mood/Sleep: Excessive sedation with Nortriptyline 50 mg. As pt was in increased pain at last MTM visit, this was an increased dose from previous equivalent of amitriptyline. Pt agreeable to trying a lower dose. Preferred to try 25 mg vs 10 mg. However, if continued excessive sedation on 25 mg, pt will call back and we can decrease dose.       Hypertension: Last blood pressure just above goal less than 140/90.  Pt was encouraged to schedule with a primary to manage (pt thought Dr. Perez was his primary, but Dr. Perez is part of the covering pool and not permanent provider at [employer]).  Does have some opportunities for simplification of regimen.  Amlodipine may be increasing edema.  Higher doses of hydrochlorothiazide likely not more helpful for BP and edema, and may be contributing to more K+ wasting.  Increasing doses of losartan may help to hold potassium WNL. Will defer these adjustments until follow-up.     Gastric Ulcer: Improved with continued Famotidine use. Continue to monitor ibuprofen use as this can worsen symptoms, but likely okay to continue for now.     Allergies: No symptoms at this time. Okay to keep Loratadine on hand for PRN use.       PLAN:                            -PharmD to discuss [place] refills with Mony Monsivais PA-C   -Restart Nortriptyline at 25 mg daily   -Pt to establish with new PCP       Follow-up: 9/21 with Mony Monsivais PA-C   Pt preferred to wait on scheduling with MTM until 9/21 visit.     SUBJECTIVE/OBJECTIVE:         "                  Romeo Chong is a 40 year old male called for an initial visit. He was referred to me from Mony Monsivais PA-C.      Reason for visit: Medication - Norco - took a urine test out of pain medicine.       Allergies/ADRs: Reviewed in chart  Tobacco: He reports that he quit smoking about 12 years ago. He started smoking about 22 years ago. He smoked 1.00 pack per day. He has never used smokeless tobacco.  Alcohol: Social History    Substance and Sexual Activity      Alcohol use: Yes    Past Medical History: Reviewed in chart      Medication Adherence/Access: Medications reconciled. Pt able to state prescribed directions and indication for most of his medications.       Currently staying at a friends house. Keeping medicines in a locked suitcase.        Chronic Pain: Established with Mony Monsivais PA-C on 6/15/2021. Had bone spur excision on 6/25 with podiatry.     Had UDS on 7/29. Received 9 day prescription for [place] from PCP. Ran out last Saturday. Notes he was unable to get out of bed yesterday.       At last [facility] visit, was prescribed Nortriptyline 50 mg daily. It caused a lot of drowsiness. Pt has discontinued use. Notes that it was very similar to what he has experienced when using Benadryl - which is also severe drowsiness.     Has previously used gabapentin which affects lymph nodes and caused a lot of swelling.      No longer taking Nabumetone. Was told he can't take Acetaminophen with this, so he switched to Ibuprofen 200 mg as needed.     When he had Norco, pain control was \"fairly decent.\" Pain dials down from 9-10 to a 5-7 and it's more manageable. Able to walk, able to go to the movies with the kids. Able to do simple cleaning -  his clothes. Still uses cane to help  instead of bending.          Mood/Sleep: As noted above, nortriptyline caused excessive sedation. Amitriptyline caused a lot of grogginess.     Chart indicates that he has tried Duloxetine, but doesn't " recall today.     Has previously had Buspirone. That was helpful for falling asleep, but not staying asleep. Was then given trazodone to stay asleep - caused priapism.     Doesn't feel down/depressed. Had a few days of anxiety over the couple days. Used to have more of a problem with it, but has some coping tools from previous therapists.     Missed recent visit with Shel[facility].     PHQ-9 score:    PHQ 5/25/2021   PHQ-9 Total Score 3   Q9: Thoughts of better off dead/self-harm past 2 weeks Not at all         Low Vitamin D: Prescribed Vitamin D3 5000 units daily. .     Vitamin D Deficiency Screening Results:  No results found for: VITDT       Hypertension: Prescribed amlodipine 5 mg 0.5 tab daily, hydrochlorothiazide 50 mg daily, losartan 50 mg daily, potassium 10 mEq 2 tabs AM and 1 tab PM.     Notes this BP regimen is from cardiology - notes no heart issues.     BP Readings from Last 3 Encounters:   07/29/21 (!) 141/92   07/12/21 (!) 158/110   06/23/21 (!) 138/98     Pulse Readings from Last 1 Encounters:   07/29/21 70       Gastric Ulcer: Prescribed famotidine 40 mg daily. Using as needed. Denies heartburn symptoms.       Allergies: Prescribe loratadine 10 mg daily. Using as needed. No current symptoms.       Today's Vitals: There were no vitals taken for this visit.  ----------------      I spent 21 minutes with this patient today. All changes were made via collaborative practice agreement with Mony Monsivais PA-C. A copy of the visit note was provided to the patient's referring provider.    The patient declined a summary of these recommendations.     Kodak Lyons, GraceD  Medication Therapy Management (MTM) Pharmacist  [facility]      Telemedicine Visit Details  Type of service:  Telephone visit  Start Time: 11:08 AM  End Time: 11:29 AM  Originating Location (patient location): Home  Distant Location (provider location):  [facility]        Medication Therapy  Recommendations  Chronic low back pain, unspecified back pain laterality, unspecified whether sciatica present    Current Medication: HYDROcodone-acetaminophen (NORCO) 5-325 MG tablet ()   Rationale: Medication product not available - Adherence - Adherence   Recommendation: Provide Adherence Intervention   Status: Accepted per CPA          Current Medication: ibuprofen (ADVIL,MOTRIN) 200 MG tablet   Rationale: Synergistic therapy - Needs additional medication therapy - Indication   Recommendation: Start Medication - nortriptyline 25 MG capsule - HS   Status: Accepted per CPA

## 2021-08-09 NOTE — Clinical Note
Ran out of [place] from Dr. Perez. Looks like urine came back as expected. Are you okay sending the Norco  mg. Pt didn't know if you were going to call after the urine or if he needed to call.     Also - Dr. Perez is a covering provider, not a permanent PCP at [facility], which pt was also a little confused about. I did ask him to establish with a new PCP there.     He wanted to wait on MTM follow-up and see how things go in September. Certainly feel free to have him schedule with me again, if you think that would be beneficial.

## 2021-08-10 DIAGNOSIS — G89.4 CHRONIC PAIN SYNDROME: Primary | ICD-10-CM

## 2021-08-10 RX ORDER — HYDROCODONE BITARTRATE AND ACETAMINOPHEN 10; 325 MG/1; MG/1
1 TABLET ORAL 2 TIMES DAILY PRN
Qty: 56 TABLET | Refills: 0 | Status: SHIPPED | OUTPATIENT
Start: 2021-08-10 | End: 2021-08-31

## 2021-08-17 ENCOUNTER — HOSPITAL ENCOUNTER (INPATIENT)
Facility: HOSPITAL | Age: 41
LOS: 8 days | Discharge: HOME | End: 2021-08-26
Attending: EMERGENCY MEDICINE | Admitting: INTERNAL MEDICINE
Payer: COMMERCIAL

## 2021-08-17 DIAGNOSIS — R79.89 ELEVATED LACTIC ACID LEVEL: ICD-10-CM

## 2021-08-17 DIAGNOSIS — M54.50 CHRONIC LOW BACK PAIN, UNSPECIFIED BACK PAIN LATERALITY, UNSPECIFIED WHETHER SCIATICA PRESENT: ICD-10-CM

## 2021-08-17 DIAGNOSIS — R00.0 TACHYCARDIA: ICD-10-CM

## 2021-08-17 DIAGNOSIS — G89.29 CHRONIC LOW BACK PAIN, UNSPECIFIED BACK PAIN LATERALITY, UNSPECIFIED WHETHER SCIATICA PRESENT: ICD-10-CM

## 2021-08-17 DIAGNOSIS — L03.115 CELLULITIS OF RIGHT LOWER EXTREMITY: ICD-10-CM

## 2021-08-17 DIAGNOSIS — G89.4 CHRONIC PAIN SYNDROME: ICD-10-CM

## 2021-08-17 DIAGNOSIS — R78.81 GRAM-POSITIVE BACTEREMIA: Primary | ICD-10-CM

## 2021-08-17 LAB
ALBUMIN SERPL-MCNC: 3.9 G/DL (ref 3.5–5)
ALP SERPL-CCNC: 72 U/L (ref 45–120)
ALT SERPL W P-5'-P-CCNC: 33 U/L (ref 0–45)
ANION GAP SERPL CALCULATED.3IONS-SCNC: 12 MMOL/L (ref 5–18)
AST SERPL W P-5'-P-CCNC: 25 U/L (ref 0–40)
BASOPHILS # BLD AUTO: 0 10E3/UL (ref 0–0.2)
BASOPHILS NFR BLD AUTO: 0 %
BILIRUB SERPL-MCNC: 0.5 MG/DL (ref 0–1)
BUN SERPL-MCNC: 6 MG/DL (ref 8–22)
CALCIUM SERPL-MCNC: 9.5 MG/DL (ref 8.5–10.5)
CHLORIDE BLD-SCNC: 105 MMOL/L (ref 98–107)
CO2 SERPL-SCNC: 22 MMOL/L (ref 22–31)
CREAT SERPL-MCNC: 0.73 MG/DL (ref 0.7–1.3)
EOSINOPHIL # BLD AUTO: 0.1 10E3/UL (ref 0–0.7)
EOSINOPHIL NFR BLD AUTO: 2 %
ERYTHROCYTE [DISTWIDTH] IN BLOOD BY AUTOMATED COUNT: 14.3 % (ref 10–15)
GFR SERPL CREATININE-BSD FRML MDRD: >90 ML/MIN/1.73M2
GLUCOSE BLD-MCNC: 150 MG/DL (ref 70–125)
HCT VFR BLD AUTO: 48.5 % (ref 40–53)
HGB BLD-MCNC: 15.6 G/DL (ref 13.3–17.7)
IMM GRANULOCYTES # BLD: 0 10E3/UL
IMM GRANULOCYTES NFR BLD: 0 %
LACTATE SERPL-SCNC: 3 MMOL/L (ref 0.7–2)
LYMPHOCYTES # BLD AUTO: 0.5 10E3/UL (ref 0.8–5.3)
LYMPHOCYTES NFR BLD AUTO: 12 %
MCH RBC QN AUTO: 26.1 PG (ref 26.5–33)
MCHC RBC AUTO-ENTMCNC: 32.2 G/DL (ref 31.5–36.5)
MCV RBC AUTO: 81 FL (ref 78–100)
MONOCYTES # BLD AUTO: 0.1 10E3/UL (ref 0–1.3)
MONOCYTES NFR BLD AUTO: 2 %
NEUTROPHILS # BLD AUTO: 3.3 10E3/UL (ref 1.6–8.3)
NEUTROPHILS NFR BLD AUTO: 84 %
NRBC # BLD AUTO: 0 10E3/UL
NRBC BLD AUTO-RTO: 0 /100
PLATELET # BLD AUTO: 229 10E3/UL (ref 150–450)
POTASSIUM BLD-SCNC: 3.7 MMOL/L (ref 3.5–5)
PROT SERPL-MCNC: 7.8 G/DL (ref 6–8)
RBC # BLD AUTO: 5.98 10E6/UL (ref 4.4–5.9)
SODIUM SERPL-SCNC: 139 MMOL/L (ref 136–145)
WBC # BLD AUTO: 4 10E3/UL (ref 4–11)

## 2021-08-17 PROCEDURE — 87186 SC STD MICRODIL/AGAR DIL: CPT | Performed by: EMERGENCY MEDICINE

## 2021-08-17 PROCEDURE — 36415 COLL VENOUS BLD VENIPUNCTURE: CPT | Performed by: EMERGENCY MEDICINE

## 2021-08-17 PROCEDURE — 87149 DNA/RNA DIRECT PROBE: CPT | Performed by: EMERGENCY MEDICINE

## 2021-08-17 PROCEDURE — 96367 TX/PROPH/DG ADDL SEQ IV INF: CPT

## 2021-08-17 PROCEDURE — C9803 HOPD COVID-19 SPEC COLLECT: HCPCS

## 2021-08-17 PROCEDURE — 96365 THER/PROPH/DIAG IV INF INIT: CPT

## 2021-08-17 PROCEDURE — 96366 THER/PROPH/DIAG IV INF ADDON: CPT

## 2021-08-17 PROCEDURE — 85025 COMPLETE CBC W/AUTO DIFF WBC: CPT | Performed by: EMERGENCY MEDICINE

## 2021-08-17 PROCEDURE — 83605 ASSAY OF LACTIC ACID: CPT | Performed by: EMERGENCY MEDICINE

## 2021-08-17 PROCEDURE — 83735 ASSAY OF MAGNESIUM: CPT | Performed by: EMERGENCY MEDICINE

## 2021-08-17 PROCEDURE — 80053 COMPREHEN METABOLIC PANEL: CPT | Performed by: EMERGENCY MEDICINE

## 2021-08-17 PROCEDURE — 96361 HYDRATE IV INFUSION ADD-ON: CPT

## 2021-08-17 PROCEDURE — 99285 EMERGENCY DEPT VISIT HI MDM: CPT | Mod: 25

## 2021-08-17 PROCEDURE — 96376 TX/PRO/DX INJ SAME DRUG ADON: CPT

## 2021-08-17 PROCEDURE — 96375 TX/PRO/DX INJ NEW DRUG ADDON: CPT

## 2021-08-17 ASSESSMENT — MIFFLIN-ST. JEOR: SCORE: 2139.16

## 2021-08-18 ENCOUNTER — APPOINTMENT (OUTPATIENT)
Dept: RADIOLOGY | Facility: HOSPITAL | Age: 41
End: 2021-08-18
Attending: EMERGENCY MEDICINE
Payer: COMMERCIAL

## 2021-08-18 ENCOUNTER — APPOINTMENT (OUTPATIENT)
Dept: MRI IMAGING | Facility: HOSPITAL | Age: 41
End: 2021-08-18
Attending: INTERNAL MEDICINE
Payer: COMMERCIAL

## 2021-08-18 ENCOUNTER — APPOINTMENT (OUTPATIENT)
Dept: ULTRASOUND IMAGING | Facility: HOSPITAL | Age: 41
End: 2021-08-18
Attending: EMERGENCY MEDICINE
Payer: COMMERCIAL

## 2021-08-18 ENCOUNTER — APPOINTMENT (OUTPATIENT)
Dept: CT IMAGING | Facility: HOSPITAL | Age: 41
End: 2021-08-18
Attending: INTERNAL MEDICINE
Payer: COMMERCIAL

## 2021-08-18 PROBLEM — L03.115 CELLULITIS OF RIGHT LOWER EXTREMITY: Status: ACTIVE | Noted: 2021-08-18

## 2021-08-18 PROBLEM — R00.0 TACHYCARDIA: Status: ACTIVE | Noted: 2021-08-18

## 2021-08-18 PROBLEM — R79.89 ELEVATED LACTIC ACID LEVEL: Status: ACTIVE | Noted: 2021-08-18

## 2021-08-18 LAB
ALBUMIN UR-MCNC: 10 MG/DL
ANION GAP SERPL CALCULATED.3IONS-SCNC: 11 MMOL/L (ref 5–18)
APPEARANCE UR: CLEAR
BILIRUB UR QL STRIP: NEGATIVE
BUN SERPL-MCNC: 8 MG/DL (ref 8–22)
C REACTIVE PROTEIN LHE: 20 MG/DL (ref 0–0.8)
CALCIUM SERPL-MCNC: 8.5 MG/DL (ref 8.5–10.5)
CHLORIDE BLD-SCNC: 107 MMOL/L (ref 98–107)
CK SERPL-CCNC: 115 U/L (ref 30–190)
CK SERPL-CCNC: 116 U/L (ref 30–190)
CO2 SERPL-SCNC: 19 MMOL/L (ref 22–31)
COLOR UR AUTO: YELLOW
CREAT SERPL-MCNC: 0.74 MG/DL (ref 0.7–1.3)
ENTEROCOCCUS FAECALIS: NOT DETECTED
ENTEROCOCCUS FAECIUM: NOT DETECTED
ERYTHROCYTE [DISTWIDTH] IN BLOOD BY AUTOMATED COUNT: 14.7 % (ref 10–15)
GFR SERPL CREATININE-BSD FRML MDRD: >90 ML/MIN/1.73M2
GLUCOSE BLD-MCNC: 163 MG/DL (ref 70–125)
GLUCOSE UR STRIP-MCNC: NEGATIVE MG/DL
HCT VFR BLD AUTO: 42.1 % (ref 40–53)
HGB BLD-MCNC: 13.2 G/DL (ref 13.3–17.7)
HGB UR QL STRIP: NEGATIVE
KETONES UR STRIP-MCNC: NEGATIVE MG/DL
LACTATE SERPL-SCNC: 1.8 MMOL/L (ref 0.7–2)
LACTATE SERPL-SCNC: 3.4 MMOL/L (ref 0.7–2)
LACTATE SERPL-SCNC: 4 MMOL/L (ref 0.7–2)
LEUKOCYTE ESTERASE UR QL STRIP: NEGATIVE
LISTERIA SPECIES (DETECTED/NOT DETECTED): NOT DETECTED
MAGNESIUM SERPL-MCNC: 1.5 MG/DL (ref 1.8–2.6)
MCH RBC QN AUTO: 25.9 PG (ref 26.5–33)
MCHC RBC AUTO-ENTMCNC: 31.4 G/DL (ref 31.5–36.5)
MCV RBC AUTO: 83 FL (ref 78–100)
MUCOUS THREADS #/AREA URNS LPF: PRESENT /LPF
NITRATE UR QL: NEGATIVE
PH UR STRIP: 7 [PH] (ref 5–7)
PLATELET # BLD AUTO: 184 10E3/UL (ref 150–450)
POTASSIUM BLD-SCNC: 4 MMOL/L (ref 3.5–5)
PROCALCITONIN SERPL-MCNC: 16.05 NG/ML (ref 0–0.49)
RBC # BLD AUTO: 5.09 10E6/UL (ref 4.4–5.9)
RBC URINE: 1 /HPF
SARS-COV-2 RNA RESP QL NAA+PROBE: NEGATIVE
SODIUM SERPL-SCNC: 137 MMOL/L (ref 136–145)
SP GR UR STRIP: 1.02 (ref 1–1.03)
SQUAMOUS EPITHELIAL: <1 /HPF
STAPHYLOCOCCUS AUREUS: NOT DETECTED
STAPHYLOCOCCUS EPIDERMIDIS: NOT DETECTED
STAPHYLOCOCCUS LUGDUNENSIS: NOT DETECTED
STAPHYLOCOCCUS SPECIES: NOT DETECTED
STREPTOCOCCUS AGALACTIAE: DETECTED
STREPTOCOCCUS ANGINOSUS GROUP: NOT DETECTED
STREPTOCOCCUS PNEUMONIAE: NOT DETECTED
STREPTOCOCCUS PYOGENES: NOT DETECTED
UROBILINOGEN UR STRIP-MCNC: <2 MG/DL
WBC # BLD AUTO: 11.3 10E3/UL (ref 4–11)
WBC URINE: <1 /HPF

## 2021-08-18 PROCEDURE — 258N000003 HC RX IP 258 OP 636: Performed by: EMERGENCY MEDICINE

## 2021-08-18 PROCEDURE — 86141 C-REACTIVE PROTEIN HS: CPT | Performed by: INTERNAL MEDICINE

## 2021-08-18 PROCEDURE — 82550 ASSAY OF CK (CPK): CPT | Performed by: INTERNAL MEDICINE

## 2021-08-18 PROCEDURE — 250N000011 HC RX IP 250 OP 636: Performed by: EMERGENCY MEDICINE

## 2021-08-18 PROCEDURE — 99207 PR CONSULT E&M CHANGED TO INITIAL LEVEL: CPT | Performed by: CLINICAL NURSE SPECIALIST

## 2021-08-18 PROCEDURE — G0378 HOSPITAL OBSERVATION PER HR: HCPCS

## 2021-08-18 PROCEDURE — 83605 ASSAY OF LACTIC ACID: CPT | Performed by: INTERNAL MEDICINE

## 2021-08-18 PROCEDURE — 99207 PR CDG-CODE CATEGORY CHANGED: CPT | Performed by: INTERNAL MEDICINE

## 2021-08-18 PROCEDURE — 36415 COLL VENOUS BLD VENIPUNCTURE: CPT | Performed by: STUDENT IN AN ORGANIZED HEALTH CARE EDUCATION/TRAINING PROGRAM

## 2021-08-18 PROCEDURE — 87040 BLOOD CULTURE FOR BACTERIA: CPT | Performed by: INTERNAL MEDICINE

## 2021-08-18 PROCEDURE — 81001 URINALYSIS AUTO W/SCOPE: CPT | Performed by: EMERGENCY MEDICINE

## 2021-08-18 PROCEDURE — 93005 ELECTROCARDIOGRAM TRACING: CPT | Performed by: EMERGENCY MEDICINE

## 2021-08-18 PROCEDURE — 250N000009 HC RX 250: Performed by: INTERNAL MEDICINE

## 2021-08-18 PROCEDURE — 999N000127 HC STATISTIC PERIPHERAL IV START W US GUIDANCE

## 2021-08-18 PROCEDURE — 84145 PROCALCITONIN (PCT): CPT | Performed by: INTERNAL MEDICINE

## 2021-08-18 PROCEDURE — 99223 1ST HOSP IP/OBS HIGH 75: CPT | Performed by: INTERNAL MEDICINE

## 2021-08-18 PROCEDURE — 73720 MRI LWR EXTREMITY W/O&W/DYE: CPT | Mod: RT,XS

## 2021-08-18 PROCEDURE — 71250 CT THORAX DX C-: CPT

## 2021-08-18 PROCEDURE — 258N000001 HC RX 258: Performed by: INTERNAL MEDICINE

## 2021-08-18 PROCEDURE — 87635 SARS-COV-2 COVID-19 AMP PRB: CPT | Performed by: EMERGENCY MEDICINE

## 2021-08-18 PROCEDURE — 258N000003 HC RX IP 258 OP 636: Performed by: STUDENT IN AN ORGANIZED HEALTH CARE EDUCATION/TRAINING PROGRAM

## 2021-08-18 PROCEDURE — 85027 COMPLETE CBC AUTOMATED: CPT | Performed by: INTERNAL MEDICINE

## 2021-08-18 PROCEDURE — 71045 X-RAY EXAM CHEST 1 VIEW: CPT

## 2021-08-18 PROCEDURE — 80048 BASIC METABOLIC PNL TOTAL CA: CPT | Performed by: INTERNAL MEDICINE

## 2021-08-18 PROCEDURE — 73720 MRI LWR EXTREMITY W/O&W/DYE: CPT | Mod: RT

## 2021-08-18 PROCEDURE — 36415 COLL VENOUS BLD VENIPUNCTURE: CPT | Performed by: INTERNAL MEDICINE

## 2021-08-18 PROCEDURE — 250N000011 HC RX IP 250 OP 636

## 2021-08-18 PROCEDURE — 250N000013 HC RX MED GY IP 250 OP 250 PS 637: Performed by: CLINICAL NURSE SPECIALIST

## 2021-08-18 PROCEDURE — A9585 GADOBUTROL INJECTION: HCPCS | Performed by: INTERNAL MEDICINE

## 2021-08-18 PROCEDURE — 99223 1ST HOSP IP/OBS HIGH 75: CPT | Mod: AI | Performed by: INTERNAL MEDICINE

## 2021-08-18 PROCEDURE — 93971 EXTREMITY STUDY: CPT | Mod: RT

## 2021-08-18 PROCEDURE — 99223 1ST HOSP IP/OBS HIGH 75: CPT | Performed by: CLINICAL NURSE SPECIALIST

## 2021-08-18 PROCEDURE — 250N000011 HC RX IP 250 OP 636: Performed by: INTERNAL MEDICINE

## 2021-08-18 PROCEDURE — 258N000003 HC RX IP 258 OP 636: Performed by: INTERNAL MEDICINE

## 2021-08-18 PROCEDURE — 250N000013 HC RX MED GY IP 250 OP 250 PS 637: Performed by: EMERGENCY MEDICINE

## 2021-08-18 PROCEDURE — 250N000013 HC RX MED GY IP 250 OP 250 PS 637: Performed by: INTERNAL MEDICINE

## 2021-08-18 PROCEDURE — 120N000001 HC R&B MED SURG/OB

## 2021-08-18 PROCEDURE — 83605 ASSAY OF LACTIC ACID: CPT | Performed by: STUDENT IN AN ORGANIZED HEALTH CARE EDUCATION/TRAINING PROGRAM

## 2021-08-18 PROCEDURE — 255N000002 HC RX 255 OP 636: Performed by: INTERNAL MEDICINE

## 2021-08-18 RX ORDER — KETOROLAC TROMETHAMINE 15 MG/ML
15 INJECTION, SOLUTION INTRAMUSCULAR; INTRAVENOUS ONCE
Status: COMPLETED | OUTPATIENT
Start: 2021-08-18 | End: 2021-08-18

## 2021-08-18 RX ORDER — AMLODIPINE BESYLATE 2.5 MG/1
2.5 TABLET ORAL DAILY
Status: DISCONTINUED | OUTPATIENT
Start: 2021-08-18 | End: 2021-08-26 | Stop reason: HOSPADM

## 2021-08-18 RX ORDER — DIPHENHYDRAMINE HYDROCHLORIDE 50 MG/ML
50 INJECTION INTRAMUSCULAR; INTRAVENOUS ONCE
Status: COMPLETED | OUTPATIENT
Start: 2021-08-18 | End: 2021-08-18

## 2021-08-18 RX ORDER — NALOXONE HYDROCHLORIDE 0.4 MG/ML
0.4 INJECTION, SOLUTION INTRAMUSCULAR; INTRAVENOUS; SUBCUTANEOUS
Status: DISCONTINUED | OUTPATIENT
Start: 2021-08-18 | End: 2021-08-26 | Stop reason: HOSPADM

## 2021-08-18 RX ORDER — FENTANYL CITRATE 50 UG/ML
75 INJECTION, SOLUTION INTRAMUSCULAR; INTRAVENOUS ONCE
Status: COMPLETED | OUTPATIENT
Start: 2021-08-18 | End: 2021-08-18

## 2021-08-18 RX ORDER — DIPHENHYDRAMINE HYDROCHLORIDE 50 MG/ML
INJECTION INTRAMUSCULAR; INTRAVENOUS
Status: COMPLETED
Start: 2021-08-18 | End: 2021-08-18

## 2021-08-18 RX ORDER — POLYETHYLENE GLYCOL 3350 17 G/17G
17 POWDER, FOR SOLUTION ORAL DAILY
Status: DISCONTINUED | OUTPATIENT
Start: 2021-08-18 | End: 2021-08-26 | Stop reason: HOSPADM

## 2021-08-18 RX ORDER — HYDROXYZINE HYDROCHLORIDE 25 MG/1
25-50 TABLET, FILM COATED ORAL EVERY 4 HOURS PRN
Status: DISCONTINUED | OUTPATIENT
Start: 2021-08-18 | End: 2021-08-24

## 2021-08-18 RX ORDER — SODIUM CHLORIDE 9 MG/ML
INJECTION, SOLUTION INTRAVENOUS CONTINUOUS
Status: DISCONTINUED | OUTPATIENT
Start: 2021-08-18 | End: 2021-08-21

## 2021-08-18 RX ORDER — HYDROXYZINE PAMOATE 25 MG/1
25 CAPSULE ORAL EVERY 4 HOURS PRN
Status: DISCONTINUED | OUTPATIENT
Start: 2021-08-18 | End: 2021-08-18 | Stop reason: CLARIF

## 2021-08-18 RX ORDER — DIPHENHYDRAMINE HYDROCHLORIDE 50 MG/ML
INJECTION INTRAMUSCULAR; INTRAVENOUS
Status: DISCONTINUED
Start: 2021-08-18 | End: 2021-08-18 | Stop reason: HOSPADM

## 2021-08-18 RX ORDER — AZTREONAM 2 G/1
2 INJECTION, POWDER, LYOPHILIZED, FOR SOLUTION INTRAMUSCULAR; INTRAVENOUS EVERY 6 HOURS
Status: DISCONTINUED | OUTPATIENT
Start: 2021-08-18 | End: 2021-08-19

## 2021-08-18 RX ORDER — CEFTRIAXONE 2 G/1
2 INJECTION, POWDER, FOR SOLUTION INTRAMUSCULAR; INTRAVENOUS ONCE
Status: COMPLETED | OUTPATIENT
Start: 2021-08-18 | End: 2021-08-18

## 2021-08-18 RX ORDER — NALOXONE HYDROCHLORIDE 0.4 MG/ML
0.2 INJECTION, SOLUTION INTRAMUSCULAR; INTRAVENOUS; SUBCUTANEOUS
Status: DISCONTINUED | OUTPATIENT
Start: 2021-08-18 | End: 2021-08-26 | Stop reason: HOSPADM

## 2021-08-18 RX ORDER — DOXYCYCLINE 100 MG/10ML
100 INJECTION, POWDER, LYOPHILIZED, FOR SOLUTION INTRAVENOUS EVERY 12 HOURS
Status: DISCONTINUED | OUTPATIENT
Start: 2021-08-18 | End: 2021-08-20

## 2021-08-18 RX ORDER — HYDROCODONE BITARTRATE AND ACETAMINOPHEN 5; 325 MG/1; MG/1
1-2 TABLET ORAL EVERY 4 HOURS PRN
Status: DISCONTINUED | OUTPATIENT
Start: 2021-08-18 | End: 2021-08-18

## 2021-08-18 RX ORDER — PIPERACILLIN SODIUM, TAZOBACTAM SODIUM 3; .375 G/15ML; G/15ML
3.38 INJECTION, POWDER, LYOPHILIZED, FOR SOLUTION INTRAVENOUS ONCE
Status: COMPLETED | OUTPATIENT
Start: 2021-08-18 | End: 2021-08-18

## 2021-08-18 RX ORDER — CEFTRIAXONE 2 G/1
2 INJECTION, POWDER, FOR SOLUTION INTRAMUSCULAR; INTRAVENOUS EVERY 24 HOURS
Status: DISCONTINUED | OUTPATIENT
Start: 2021-08-19 | End: 2021-08-19

## 2021-08-18 RX ORDER — MAGNESIUM SULFATE 4 G/50ML
4 INJECTION INTRAVENOUS ONCE
Status: COMPLETED | OUTPATIENT
Start: 2021-08-18 | End: 2021-08-18

## 2021-08-18 RX ORDER — HYDROXYZINE HYDROCHLORIDE 25 MG/1
25 TABLET, FILM COATED ORAL EVERY 4 HOURS PRN
Status: DISCONTINUED | OUTPATIENT
Start: 2021-08-18 | End: 2021-08-18

## 2021-08-18 RX ORDER — METOPROLOL TARTRATE 25 MG/1
25 TABLET, FILM COATED ORAL 2 TIMES DAILY
Status: DISCONTINUED | OUTPATIENT
Start: 2021-08-18 | End: 2021-08-19

## 2021-08-18 RX ORDER — GADOBUTROL 604.72 MG/ML
10 INJECTION INTRAVENOUS ONCE
Status: COMPLETED | OUTPATIENT
Start: 2021-08-18 | End: 2021-08-18

## 2021-08-18 RX ORDER — NORTRIPTYLINE HCL 25 MG
25 CAPSULE ORAL AT BEDTIME
Status: DISCONTINUED | OUTPATIENT
Start: 2021-08-18 | End: 2021-08-21

## 2021-08-18 RX ORDER — ACETAMINOPHEN 325 MG/1
975 TABLET ORAL 3 TIMES DAILY
Status: DISCONTINUED | OUTPATIENT
Start: 2021-08-18 | End: 2021-08-24

## 2021-08-18 RX ORDER — BISACODYL 10 MG
10 SUPPOSITORY, RECTAL RECTAL DAILY PRN
Status: DISCONTINUED | OUTPATIENT
Start: 2021-08-18 | End: 2021-08-26 | Stop reason: HOSPADM

## 2021-08-18 RX ORDER — AMOXICILLIN 250 MG
1 CAPSULE ORAL 2 TIMES DAILY PRN
Status: DISCONTINUED | OUTPATIENT
Start: 2021-08-18 | End: 2021-08-26 | Stop reason: HOSPADM

## 2021-08-18 RX ORDER — ACETAMINOPHEN 325 MG/1
650 TABLET ORAL ONCE
Status: COMPLETED | OUTPATIENT
Start: 2021-08-18 | End: 2021-08-18

## 2021-08-18 RX ORDER — ACETAMINOPHEN 325 MG/1
325 TABLET ORAL ONCE
Status: COMPLETED | OUTPATIENT
Start: 2021-08-18 | End: 2021-08-18

## 2021-08-18 RX ORDER — OXYCODONE HYDROCHLORIDE 5 MG/1
10 TABLET ORAL EVERY 4 HOURS PRN
Status: DISCONTINUED | OUTPATIENT
Start: 2021-08-18 | End: 2021-08-19

## 2021-08-18 RX ORDER — KETOROLAC TROMETHAMINE 30 MG/ML
30 INJECTION, SOLUTION INTRAMUSCULAR; INTRAVENOUS EVERY 6 HOURS
Status: DISCONTINUED | OUTPATIENT
Start: 2021-08-18 | End: 2021-08-20

## 2021-08-18 RX ORDER — FAMOTIDINE 20 MG/1
40 TABLET, FILM COATED ORAL EVERY MORNING
Status: DISCONTINUED | OUTPATIENT
Start: 2021-08-18 | End: 2021-08-26 | Stop reason: HOSPADM

## 2021-08-18 RX ADMIN — FAMOTIDINE 40 MG: 20 TABLET, FILM COATED ORAL at 11:23

## 2021-08-18 RX ADMIN — OXYCODONE HYDROCHLORIDE 10 MG: 5 TABLET ORAL at 16:34

## 2021-08-18 RX ADMIN — ACETAMINOPHEN 975 MG: 325 TABLET ORAL at 16:34

## 2021-08-18 RX ADMIN — HYDROCODONE BITARTRATE AND ACETAMINOPHEN 2 TABLET: 5; 325 TABLET ORAL at 14:21

## 2021-08-18 RX ADMIN — ACETAMINOPHEN 650 MG: 325 TABLET ORAL at 00:40

## 2021-08-18 RX ADMIN — HYDROXYZINE HYDROCHLORIDE 25 MG: 25 TABLET, FILM COATED ORAL at 08:18

## 2021-08-18 RX ADMIN — METOPROLOL TARTRATE 25 MG: 25 TABLET, FILM COATED ORAL at 21:00

## 2021-08-18 RX ADMIN — HYDROCODONE BITARTRATE AND ACETAMINOPHEN 2 TABLET: 5; 325 TABLET ORAL at 09:54

## 2021-08-18 RX ADMIN — DIPHENHYDRAMINE HYDROCHLORIDE 50 MG: 50 INJECTION INTRAMUSCULAR; INTRAVENOUS at 01:10

## 2021-08-18 RX ADMIN — FENTANYL CITRATE 75 MCG: 50 INJECTION, SOLUTION INTRAMUSCULAR; INTRAVENOUS at 03:32

## 2021-08-18 RX ADMIN — FENTANYL CITRATE 75 MCG: 50 INJECTION, SOLUTION INTRAMUSCULAR; INTRAVENOUS at 00:18

## 2021-08-18 RX ADMIN — KETOROLAC TROMETHAMINE 30 MG: 30 INJECTION, SOLUTION INTRAMUSCULAR; INTRAVENOUS at 12:20

## 2021-08-18 RX ADMIN — SODIUM CHLORIDE 1000 ML: 9 INJECTION, SOLUTION INTRAVENOUS at 04:46

## 2021-08-18 RX ADMIN — SODIUM CHLORIDE 1000 ML: 4.5 INJECTION, SOLUTION INTRAVENOUS at 12:20

## 2021-08-18 RX ADMIN — ACETAMINOPHEN 650 MG: 325 TABLET ORAL at 20:58

## 2021-08-18 RX ADMIN — METOPROLOL TARTRATE 25 MG: 25 TABLET, FILM COATED ORAL at 14:18

## 2021-08-18 RX ADMIN — HYDROXYZINE HYDROCHLORIDE 25 MG: 25 TABLET, FILM COATED ORAL at 12:20

## 2021-08-18 RX ADMIN — KETOROLAC TROMETHAMINE 15 MG: 15 INJECTION, SOLUTION INTRAMUSCULAR; INTRAVENOUS at 08:18

## 2021-08-18 RX ADMIN — KETOROLAC TROMETHAMINE 15 MG: 15 INJECTION, SOLUTION INTRAMUSCULAR; INTRAVENOUS at 05:42

## 2021-08-18 RX ADMIN — SODIUM CHLORIDE: 9 INJECTION, SOLUTION INTRAVENOUS at 08:41

## 2021-08-18 RX ADMIN — SODIUM CHLORIDE 1000 ML: 9 INJECTION, SOLUTION INTRAVENOUS at 18:32

## 2021-08-18 RX ADMIN — CEFTRIAXONE SODIUM 2 G: 2 INJECTION, POWDER, FOR SOLUTION INTRAMUSCULAR; INTRAVENOUS at 03:33

## 2021-08-18 RX ADMIN — DOXYCYCLINE 100 MG: 100 INJECTION, POWDER, LYOPHILIZED, FOR SOLUTION INTRAVENOUS at 19:56

## 2021-08-18 RX ADMIN — OXYCODONE HYDROCHLORIDE 10 MG: 5 TABLET ORAL at 20:57

## 2021-08-18 RX ADMIN — GADOBUTROL 10 ML: 604.72 INJECTION INTRAVENOUS at 22:59

## 2021-08-18 RX ADMIN — PIPERACILLIN SODIUM AND TAZOBACTAM SODIUM 3.38 G: 3; .375 INJECTION, POWDER, LYOPHILIZED, FOR SOLUTION INTRAVENOUS at 00:39

## 2021-08-18 RX ADMIN — AMLODIPINE BESYLATE 2.5 MG: 2.5 TABLET ORAL at 11:23

## 2021-08-18 RX ADMIN — SODIUM CHLORIDE: 9 INJECTION, SOLUTION INTRAVENOUS at 19:56

## 2021-08-18 RX ADMIN — KETOROLAC TROMETHAMINE 30 MG: 30 INJECTION, SOLUTION INTRAMUSCULAR; INTRAVENOUS at 18:32

## 2021-08-18 RX ADMIN — SODIUM CHLORIDE 1000 ML: 9 INJECTION, SOLUTION INTRAVENOUS at 03:33

## 2021-08-18 RX ADMIN — ACETAMINOPHEN 325 MG: 325 TABLET ORAL at 05:37

## 2021-08-18 RX ADMIN — MAGNESIUM SULFATE HEPTAHYDRATE 4 G: 80 INJECTION, SOLUTION INTRAVENOUS at 08:36

## 2021-08-18 RX ADMIN — SODIUM CHLORIDE 1000 ML: 9 INJECTION, SOLUTION INTRAVENOUS at 01:28

## 2021-08-18 RX ADMIN — AZTREONAM 2 G: 2 INJECTION, POWDER, LYOPHILIZED, FOR SOLUTION INTRAMUSCULAR; INTRAVENOUS at 17:26

## 2021-08-18 ASSESSMENT — ACTIVITIES OF DAILY LIVING (ADL): DEPENDENT_IADLS:: INDEPENDENT

## 2021-08-18 ASSESSMENT — MIFFLIN-ST. JEOR: SCORE: 2335.12

## 2021-08-18 NOTE — ED NOTES
The patient's temp continues to be elevated. The patient has received 3 liters of fluid. The patient is complaining of continued pain in the right leg and rated by the patient as 10/10. The patient is alert and oriented x 3.  The patient's GU=055, 130/66, O2 sat = 97% on RA. The Lungs are clear bilaterally.  The right leg is grossly edematous.

## 2021-08-18 NOTE — CONSULTS
"[facility]    Infectious Disease Consultation     Date of Admission:  8/17/2021  Date of Consult (When I saw the patient): 08/18/21    Assessment & Plan   Romeo Chong is a 40 year old male who was admitted on 8/17/2021.     Impression:  1. Sepsis  2. Gram-positive cocci in pairs and chains, bacteremia  3. History of Hodgkin's lymphoma, right lower extremity severe lymphedema  4. Right lower extremity cellulitis  5. Severe allergies, anaphylaxis with vancomycin, developed tinnitus and dizziness with Zosyn, and patient was told in [facility] where he was previously living, that he should avoid all antibiotics that end with \"mycin\".  Patient is very hesitant to take daptomycin even though it is from a different class as compared to vancomycin  6. Elevated BMI  7. According to patient's records, he has been on cephalexin 500 mg twice daily for 7 days from 6/25/2021 to 7/2/2021.    8. We need to obtain detailed records from [facility] in [facility] where patient had been previously hospitalized and received antibiotics for previous episodes of sepsis    Photo taken with patient's permission on 8/18/2021:        Recommendations:    1. Detailed discussion with patient regarding severe allergies  2. Empiric regimen: Ceftriaxone as patient has tolerated this previously, aztreonam, doxycycline, metronidazole  3. Follow identification and susceptibility results of gram-positive cocci in pairs and chains  4. CT chest abdomen and pelvis, imaging lower extremity, echocardiogram  5. Monitor CBC CMP  6. Patient will need allergy referral as an outpatient  7. Please obtain records from [facility] in [place] to determine which antibiotics patient has tolerated in the past and has received a flu during previous hospitalizations in [place]  8. Needs Lymphedema management  9. Discussed with patient and questions answered  10. Updated patient's nurse at " bedside  11. Thank you for consulting infectious disease.  Will follow with you    Sherley Cabrera MD  [facility]  [phone]        Reason for Consult   Reason for consult: I was asked to evaluate this patient for cellulitis, bacteremia    Primary Care Physician   Physician No Ref-Primary    Chief Complaint   Cellulitis, worsening right lower extremity swelling    History is obtained from the patient and medical records    History of Present Illness   Romeo Chong is a 40 year old male who presents with increasing right lower extremity swelling, fevers, weakness and not feeling well.  Patient has history of Hodgkin's lymphoma and lymphedema, attributed to large lymph nodes.  He has been treated with resection and radiation therapy.  Patient stated that he has had previous episodes of sepsis and was hospitalized at [facility] in [facility].  He relocated to [place] in April 2021.  Patient stated that over the last few days he has been having worsening weakness, fevers, and swelling as described above  He has severe pain in the leg      Past Medical History   I have reviewed this patient's medical history and updated it with pertinent information if needed.   Past Medical History:   Diagnosis Date     Cancer (H)      Hyperlipidemia      Hypertension    Hodgkin's lymphoma  Elevated BMI Body mass index is 42.99 kg/m .      Past Surgical History   I have reviewed this patient's surgical history and updated it with pertinent information if needed.  Past Surgical History:   Procedure Laterality Date     FRACTURE SURGERY       HC REMOVAL HEEL SPUR, CALCANEUS Left 6/25/2021    Procedure: EXCISION, BONE SPUR, FOOT, WITH PLANTAR FASCIA RELEASE;  Surgeon: Stephon Singleton DPM;  Location: [facility];  Service: Podiatry       Prior to Admission Medications   Prior to Admission Medications   Prescriptions Last Dose Informant Patient Reported? Taking?    HYDROcodone-acetaminophen (NORCO)  MG per tablet 8/17/2021 at Unknown time  No Yes   Sig: Take 1 tablet by mouth 2 times daily as needed for severe pain   amLODIPine (NORVASC) 5 MG tablet 8/17/2021 at AM  No Yes   Sig: [AMLODIPINE (NORVASC) 5 MG TABLET] Take 0.5 tablets (2.5 mg total) by mouth daily.   aspirin-acetaminophen-caffeine (EXCEDRIN MIGRAINE) 250-250-65 mg per tablet   No Yes   Sig: [ASPIRIN-ACETAMINOPHEN-CAFFEINE (EXCEDRIN MIGRAINE) 250-250-65 MG PER TABLET] Take 1 tablet by mouth every 6 (six) hours as needed for pain.   famotidine (PEPCID) 40 MG tablet 8/16/2021 at AM  No Yes   Sig: [FAMOTIDINE (PEPCID) 40 MG TABLET] Take 1 tablet (40 mg total) by mouth every evening.   Patient taking differently: Take 40 mg by mouth every morning    hydrochlorothiazide (HYDRODIURIL) 50 MG tablet Past Week at Unknown time  No Yes   Sig: Take 1 tablet (50 mg) by mouth daily   ibuprofen (ADVIL,MOTRIN) 200 MG tablet 8/17/2021 at Unknown time  No Yes   Sig: [IBUPROFEN (ADVIL,MOTRIN) 200 MG TABLET] Take 3 tablets (600 mg total) by mouth every 6 (six) hours as needed for pain.   loratadine (CLARITIN) 10 mg tablet   No Yes   Sig: [LORATADINE (CLARITIN) 10 MG TABLET] Take 1 tablet (10 mg total) by mouth daily.   Patient taking differently: Take 10 mg by mouth daily as needed for allergies    losartan (COZAAR) 50 MG tablet 8/16/2021 at AM  No Yes   Sig: [LOSARTAN (COZAAR) 50 MG TABLET] Take 1 tablet (50 mg total) by mouth daily.   nortriptyline (PAMELOR) 25 MG capsule not started yet  No Yes   Sig: Take 1 capsule (25 mg) by mouth At Bedtime   potassium chloride ER (K-TAB/KLOR-CON) 10 MEQ CR tablet 8/16/2021  No Yes   Sig: TAKE 2 TABLETS BY MOUTH EVERY MORNING AND 1 EVERY EVENING   vitamin D3 (CHOLECALCIFEROL) 125 MCG (5000 UT) tablet 8/16/2021  No Yes   Sig: Take 1 tablet (125 mcg) by mouth daily      Facility-Administered Medications: None     Allergies   Allergies   Allergen Reactions     Vancomycin Anaphylaxis      Peanut Oil Itching     Tree Nuts [Nuts] Itching     Erythromycin Unknown     Latex Unknown     Added based on information entered during case entry, please review and add reactions, type, and severity as needed     Other Environmental Allergy Unknown     DUST     Pollen Extracts [Pollen Extract] Unknown     Zosyn [Piperacillin-Tazobactam In D5w] Tinnitus and Itching     Oxycodone Itching and Rash       Immunization History   Immunization History   Administered Date(s) Administered     Influenza (IIV3) PF 10/03/2009     TD (ADULT, 7+) 10/22/2004     Tdap (Adacel,Boostrix) 04/22/2011       Social History   I have reviewed this patient's social history and updated it with pertinent information if needed. Romeo Chong  reports that he quit smoking about 12 years ago. He started smoking about 22 years ago. He smoked 1.00 pack per day. He has never used smokeless tobacco. He reports current alcohol use. He reports that he does not use drugs.    Family History   I have reviewed this patient's family history and updated it with pertinent information if needed.   Family History   Problem Relation Age of Onset     Cirrhosis Mother      Hypertension Mother      Diabetes Type 2  Father      Kidney failure Father      Cerebrovascular Disease Father      Hypertension Father      Cerebrovascular Disease Sister      Hypertension Paternal Grandmother      Hypertension Paternal Grandfather        Review of Systems   The 10 point Review of Systems is negative other than noted in the HPI or here.     Physical Exam   Temp: 99.1  F (37.3  C) Temp src: Oral BP: 139/85 Pulse: 118   Resp: 22 SpO2: 98 % O2 Device: None (Room air)    Vital Signs with Ranges  Temp:  [99.1  F (37.3  C)-103  F (39.4  C)] 99.1  F (37.3  C)  Pulse:  [113-150] 118  Resp:  [18-38] 22  BP: (102-159)/(52-91) 139/85  SpO2:  [95 %-99 %] 98 %  308 lbs 3.2 oz  Body mass index is 42.99 kg/m .    GENERAL APPEARANCE:  Awake, moderate distress, febrile  EYES: Eyes grossly  normal to inspection,  and conjunctivae and sclerae normal  HENT:mouth without ulcers or lesions  NECK: Right groin tenderness  RESP:distant  CV: tachycardia, distant S1 S2, no S3 or S4  LYMPHATICS: edema, RLE, inguinal tenderness  ABDOMEN: firm, RLQ tenderness  MS: edema  SKIN: RLE tenderness, edema    Photo 8/18/2021              Data   Reviewed    Lab Results   Component Value Date    WBC 11.3 08/18/2021     Lab Results   Component Value Date    RBC 5.09 08/18/2021     Lab Results   Component Value Date    HGB 13.2 08/18/2021     Lab Results   Component Value Date    HCT 42.1 08/18/2021     Lab Results   Component Value Date    MCV 83 08/18/2021     Lab Results   Component Value Date    MCH 25.9 08/18/2021     Lab Results   Component Value Date    MCHC 31.4 08/18/2021     Lab Results   Component Value Date    RDW 14.7 08/18/2021     Lab Results   Component Value Date     08/18/2021     Last Comprehensive Metabolic Panel:  Sodium   Date Value Ref Range Status   08/18/2021 137 136 - 145 mmol/L Final     Potassium   Date Value Ref Range Status   08/18/2021 4.0 3.5 - 5.0 mmol/L Final     Chloride   Date Value Ref Range Status   08/18/2021 107 98 - 107 mmol/L Final     Carbon Dioxide (CO2)   Date Value Ref Range Status   08/18/2021 19 (L) 22 - 31 mmol/L Final     Anion Gap   Date Value Ref Range Status   08/18/2021 11 5 - 18 mmol/L Final     Glucose   Date Value Ref Range Status   08/18/2021 163 (H) 70 - 125 mg/dL Final     Urea Nitrogen   Date Value Ref Range Status   08/18/2021 8 8 - 22 mg/dL Final     Creatinine   Date Value Ref Range Status   08/18/2021 0.74 0.70 - 1.30 mg/dL Final     GFR Estimate   Date Value Ref Range Status   08/18/2021 >90 >60 mL/min/1.73m2 Final     Comment:     As of July 11, 2021, eGFR is calculated by the CKD-EPI creatinine equation, without race adjustment. eGFR can be influenced by muscle mass, exercise, and diet. The reported eGFR is an estimation only and is only applicable if  the renal function is stable.   06/23/2021 >60 >60 mL/min/1.73m2 Final     Calcium   Date Value Ref Range Status   08/18/2021 8.5 8.5 - 10.5 mg/dL Final     Bilirubin Total   Date Value Ref Range Status   08/17/2021 0.5 0.0 - 1.0 mg/dL Final     Alkaline Phosphatase   Date Value Ref Range Status   08/17/2021 72 45 - 120 U/L Final     ALT   Date Value Ref Range Status   08/17/2021 33 0 - 45 U/L Final     AST   Date Value Ref Range Status   08/17/2021 25 0 - 40 U/L Final     30-Day Micro Results    Collected Updated Procedure Result Status    08/18/2021 1608 08/18/2021 1618 Blood Culture Peripheral Blood [[id number]]   Peripheral Blood    In process Component Value   No component results              08/18/2021 1415 08/18/2021 1415 Blood Culture Peripheral Blood [[id number]]   Peripheral Blood    In process Component Value   No component results              08/18/2021 0129 08/18/2021 0201 Symptomatic COVID-19 Virus (Coronavirus) by PCR Nasopharyngeal [[id number]]    Swab from Nasopharyngeal    Final result Component Value   SARS CoV2 PCR Negative   NEGATIVE: SARS-CoV-2 (COVID-19) RNA not detected, presumed negative.           08/17/2021 2256 08/18/2021 1855 Blood Culture Line, venous [[id number]]   (Abnormal)   Blood from Line, venous    Preliminary result Component Value   Culture Positive on the 1st day of incubationAbnormal  P    Gram positive cocci in pairs and chainsPanic  P    2 of 2 bottles              08/17/2021 2256 08/18/2021 1855 Verigene GP Panel [[id number]]   Blood from Line, venous    In process Component Value   No component results           06/22/2021 1027 06/23/2021 0839 SARS-CoV-2 COVID-19 Virus (Coronavirus) by PCR [[id number]]   Respiratory    Final result Component Value   SARS-CoV-2 Virus Specimen Source Nasopharyngeal   SARS-CoV-2 PCR Result NEGATIVE   SARS-CoV2 (COVID-19) RNA not detected, presumed negative.   SARS-CoV-2 PCR Comment Testing was performed using the [employer]  SARS-CoV-2 Assay on the [place]                    RADIOLOGY:  XR Chest 1 View    Result Date: 8/18/2021  EXAM: XR CHEST 1 VIEW LOCATION: [facility] DATE/TIME: 8/18/2021 1:26 AM INDICATION: fever, chills COMPARISON: 10/29/2012.     IMPRESSION: Negative chest.    US Lower Extremity Venous Duplex Right    Result Date: 8/18/2021  EXAM: US LOWER EXTREMITY VENOUS DUPLEX RIGHT LOCATION: [facility] DATE/TIME: 8/18/2021 1:40 AM INDICATION: Right leg pain, swelling and redness. COMPARISON: None. TECHNIQUE: Venous Duplex ultrasound of the right lower extremity with and without compression, augmentation and duplex. Color flow and spectral Doppler with waveform analysis performed. FINDINGS: Exam includes the common femoral, femoral, popliteal, and contralateral common femoral veins as well as segmentally visualized deep calf veins and greater saphenous vein. Evaluation of the calf veins is limited by leg swelling and body habitus. RIGHT: No deep vein thrombosis. No superficial thrombophlebitis. No popliteal cyst.     IMPRESSION: 1.  No deep venous thrombosis in the right lower extremity.

## 2021-08-18 NOTE — ED NOTES
"[facility] ED Handoff Report    ED Chief Complaint:  chief complaint    ED Diagnosis:  (L03.115) Cellulitis of right lower extremity  Comment: Reports the swelling has increased, but the right extremity is always more swollen.   Plan: ID consult for antibiotics.     (R79.89) Elevated lactic acid level  Comment: over 4 liters have been administered.   Plan: Continue to watch     (R00.0) Tachycardia  Comment: Unknown   Plan: Monitor for signs of opioid withdrawal.        PMH:    Past Medical History:   Diagnosis Date     Cancer (H)      Hyperlipidemia      Hypertension         Code Status:  Full Code     Falls Risk: Yes    Current Living Situation/Residence: Home    Elimination Status:  Makes needs known. Bedside urinal     Activity Level:  Assist of 1-2 with cane     Patients Preferred Language:  English     Needed: No    Infection:  [unfilled]     Vital Signs:  /64   Pulse 114   Temp 99.4  F (37.4  C)   Resp 20   Ht 1.803 m (5' 11\")   Wt 120.2 kg (265 lb)   SpO2 97%   BMI 36.96 kg/m       Cardiac Rhythm: ST    Pain Score:  7/10    Is the Patient Confused:  No    Last Food or Drink: 1330 on 8/18/2021    Focused Assessment:  Right lower leg is swollen and warm to touch.     Tests Performed:  Ultra sound, chest x-ray, and labs     Abnormal Results:    Abnormal Labs Reviewed   COMPREHENSIVE METABOLIC PANEL - Abnormal; Notable for the following components:       Result Value    Urea Nitrogen 6 (*)     Glucose 150 (*)     All other components within normal limits   LACTIC ACID WHOLE BLOOD - Abnormal; Notable for the following components:    Lactic Acid 3.0 (*)     All other components within normal limits   CBC WITH PLATELETS AND DIFFERENTIAL - Abnormal; Notable for the following components:    RBC Count 5.98 (*)     MCH 26.1 (*)     Absolute Lymphocytes 0.5 (*)     All other components within normal limits   MAGNESIUM - Abnormal; Notable for the following components:    Magnesium 1.5 (*)  "    All other components within normal limits   ROUTINE UA WITH MICROSCOPIC REFLEX TO CULTURE - Abnormal; Notable for the following components:    Protein Albumin Urine 10  (*)     Mucus Urine Present (*)     All other components within normal limits    Narrative:     Urine Culture not indicated       US Lower Extremity Venous Duplex Right   Final Result   IMPRESSION:   1.  No deep venous thrombosis in the right lower extremity.      XR Chest 1 View   Final Result   IMPRESSION: Negative chest.      MR Tibia Fibula Lower Leg Left wo & w Contr    (Results Pending)        Treatments Provided:  Ultrasound of extremity, chest x-ray, and labs     Family Dynamics/Concerns: No    Family Updated On Visitor Policy: Yes    Plan of Care Communicated to Family: Yes    Who Was Updated about Plan of Care: N/A     Belongings Checklist Done and Signed by Patient: Yes    Negative     ED Medications:    Medications   sodium chloride 0.9% infusion ( Intravenous Rate/Dose Verify 8/18/21 1117)   ketorolac (TORADOL) injection 30 mg (30 mg Intravenous Given 8/18/21 1220)   HYDROcodone-acetaminophen (NORCO) 5-325 MG per tablet 1-2 tablet (2 tablets Oral Given 8/18/21 0954)   hydrOXYzine (ATARAX) tablet 25 mg (25 mg Oral Given 8/18/21 1220)   famotidine (PEPCID) tablet 40 mg (40 mg Oral Given 8/18/21 1123)   nortriptyline (PAMELOR) capsule 25 mg (has no administration in time range)   amLODIPine (NORVASC) tablet 2.5 mg (2.5 mg Oral Given 8/18/21 1123)   enoxaparin ANTICOAGULANT (LOVENOX) injection 40 mg (40 mg Subcutaneous Not Given 8/18/21 1116)   bisacodyl (DULCOLAX) Suppository 10 mg (has no administration in time range)   magnesium hydroxide (MILK OF MAGNESIA) suspension 30 mL (has no administration in time range)   polyethylene glycol (MIRALAX) Packet 17 g (17 g Oral Not Given 8/18/21 1116)   senna-docusate (SENOKOT-S/PERICOLACE) 8.6-50 MG per tablet 1 tablet (has no administration in time range)   sodium chloride 0.45% BOLUS (1,000 mLs  Intravenous New Bag 8/18/21 1220)   metoprolol tartrate (LOPRESSOR) tablet 25 mg (has no administration in time range)   fentaNYL (PF) (SUBLIMAZE) injection 75 mcg (75 mcg Intravenous Given 8/18/21 0018)   0.9% sodium chloride BOLUS (0 mLs Intravenous Stopped 8/18/21 0334)   piperacillin-tazobactam (ZOSYN) 3.375 g vial to attach to  mL bag (0 g Intravenous Stopped 8/18/21 0111)   acetaminophen (TYLENOL) tablet 650 mg (650 mg Oral Given 8/18/21 0040)   diphenhydrAMINE (BENADRYL) injection 50 mg (0 mg Intravenous Hold 8/18/21 0328)   cefTRIAXone (ROCEPHIN) 2 g vial to attach to  ml bag for ADULTS or NS 50 ml bag for PEDS (0 g Intravenous Stopped 8/18/21 0409)   0.9% sodium chloride BOLUS (0 mLs Intravenous Stopped 8/18/21 0446)   fentaNYL (PF) (SUBLIMAZE) injection 75 mcg (75 mcg Intravenous Given 8/18/21 0332)   0.9% sodium chloride BOLUS (0 mLs Intravenous Stopped 8/18/21 0740)   acetaminophen (TYLENOL) tablet 325 mg (325 mg Oral Given 8/18/21 0537)   ketorolac (TORADOL) injection 15 mg (15 mg Intravenous Given 8/18/21 0542)   magnesium sulfate 4 g in 50 mL sterile water (premade) (0 g Intravenous Stopped 8/18/21 1117)   ketorolac (TORADOL) injection 15 mg (15 mg Intravenous Given 8/18/21 0818)        Additional Information: Pt reports pain in lower extremity. Per hospitalist avoid using IV pain medication as the patient has a pain team as an outpatient. ID and Pain team consulted.      Brandie Andrews RN  8/18/2021 1:22 PM

## 2021-08-18 NOTE — CONSULTS
[facility] ACUTE PAIN SERVICE    ([facility], [facility], [facility])   Consult Note    Date of Admission:  8/17/2021  Date of Consult: 08/18/21    Physician requesting consult: Yessenia Guzmán MD   Reason for consult: acute on chronic pain please assist in pain management     Assessment/Plan:     Romeo Chong is a 40 year old male who was admitted on 8/17/2021.   Pain Service is asked to see the patient for acute on chronic pain to assist with pain management. Admitted for evaluation of leg pain with fever chills and warmth of lower extremity. History of obesity, fibrous dysplasia of bone, chronic lymphedema, chronic pain syndrome, hypertension and asthma, gastric ulcer.  Patient identifies 2 days of warmth his right lower leg with concern due to previous sepsis from cellulitis of RLE.  He developed,fevers, chills and vomiting yesterday.  He was unable to control leg pain with his home prescription of Norco.      Patient follows with [facility].  Established care 6/2021.   From note dated 7/29/21  Major issues:  Chronic pain Syndrome, fibrous dysplasia of bone since age 14 yo, degenerative lumbar disc disease.     Consult recommendations:  LESI L4-5 or lumbar facet injection - will wait to order until after physical exam  PT evaluation and treatment as referred  Medication trial amitriptyline 20 mg x 3 nights, then 30 mg for insomnia/pain.    Discussed Vicodin 10/325 mg BID prn after UDT results, consider ketamine trial (out of pocket expense)  Behavioral health diagnostic assessment (patient was a no show for an appointment 8/5/21)  .      Patient rests in bed, appears painful, discussed pain plan with patient and he was agreeable to oxycodone from hydrocodone/APAP.        PLAN:   1) Pain is consistent with acute upon chronic pain. The patient's home MME was 22-30mg daily. Agree with avoid IV opioids at this time.   2)Multimodal Medication Therapy  Topical:  "consider  NSAID'S: Toradol 30 mg every 6 hours prn   Muscle Relaxants: none  Adjuvants: Tylenol 650 mg tid, vistaril 25-50 mg every 4 hours prn (pain, pruritis)  Antidepressants/anxiolytics: nortriptyline 25 mg every hs  Opioids: hydrocodone/APAP 10/325 mg tablet every 6 hours prn   IV Pain medication: none (consider 1 mg IV hydromorphone prior to MRI)   3)Non-medication interventions  Pharmacy consult- appreciate recommendations   Acupuncture consult- as available Mon and Thursday   Integrative consult - called referral to [phone]   4)Constipation Prophylaxis  Daily stool softener/laxative  5) Follow up   -Opioid prescriber has been Pain Specialist  -Discharge Recommendations - We recommend prescribing the following at the time of discharge: follow up with Chronic Pain Provider.  Should not require any additional prescriptions          History of Present Illness (HPI):       Romeo Chong is a 40 year old old male with acute upon chronic pain.  The pain is reported to be acute pain in right lower extremity with increased swelling.  Patient has history of chronic pain for many years.  States he is originallly from [place] and moved back her this past April 2021.  He has recently established care with [facility].    Current pain entire leg is \"10\" out of \"10\".      Past pain treatments have included pain clinic, injections.  Noted allergies.     Last UA:[facility] notes.  Utox with alcohol, oxycodone     5 A's of treatment:   1. Level of analgesia: poor  2. Presence of adverse effects: mild/pruritis from oxycodone in the past  3. Level of activity decreased  4. Aberrant drug related behavior: none  5. Affect flat      [place]  pulled from system on 08/18/21. Last refill on 8/10/21. This indicated chronic daily opioid use. Last prescription 56 tablets for 28 days.  Hydrocodone/APAP 5/325 mg tablets and 10/325 mg tablets between 20-30 MME daily. Most recent prescriber is BRANDON Roberson " [facility].        Medical History  Patient Active Problem List    Diagnosis Date Noted     Tachycardia 08/18/2021     Priority: Medium     Cellulitis of right lower extremity 08/18/2021     Priority: Medium     Elevated lactic acid level 08/18/2021     Priority: Medium     Calcaneal spur, left 06/09/2021     Priority: Medium     Added automatically from request for surgery [id number]         Obesity (BMI 35.0-39.9) with comorbidity (H) 05/21/2021     Priority: Medium     Allergic rhinitis due to animals 05/06/2021     Priority: Medium     Nonintractable headache, unspecified chronicity pattern, unspecified headache type 05/06/2021     Priority: Medium     Chronic pain syndrome 05/06/2021     Priority: Medium     Gastric ulcer, unspecified chronicity, unspecified whether gastric ulcer hemorrhage or perforation present 05/06/2021     Priority: Medium     Chronic low back pain, unspecified back pain laterality, unspecified whether sciatica present 05/06/2021     Priority: Medium     H/O Hodgkin's lymphoma 05/06/2021     Priority: Medium     Lymphedema 01/10/2012     Priority: Medium     Hypertriglyceridemia 05/08/2011     Priority: Medium     Vitamin D deficiency 05/08/2011     Priority: Medium     Asthma 07/14/2010     Priority: Medium     Essential hypertension, benign 07/14/2010     Priority: Medium     Obesity, unspecified 07/14/2010     Priority: Medium     Edema 04/27/2009     Priority: Medium     Formatting of this note might be different from the original.  Chronic right lower extremity edema, S/P multiple surgical procedures         Fibrous dysplasia of bone 04/26/2009     Priority: Medium     Formatting of this note might be different from the original.  Discovered in right tibia and femur at 13 years old.  He had surgery when   he was 14 years old.    Last Assessment & Plan:   Formatting of this note might be different from the original.  Diagnosed at 13, 14 surgeries since that time          Stress hyperglycemia 2009     Priority: Medium        Surgical History  He  has a past surgical history that includes fracture surgery and REMOVAL OF HEEL SPUR (Left, 2021).     Past Surgical History:   Procedure Laterality Date     FRACTURE SURGERY       HC REMOVAL HEEL SPUR, CALCANEUS Left 2021    Procedure: EXCISION, BONE SPUR, FOOT, WITH PLANTAR FASCIA RELEASE;  Surgeon: Stephon Singleton DPM;  Location: [facility];  Service: Podiatry       Allergies  Allergies   Allergen Reactions     Drug Ingredient [Vancomycin] Anaphylaxis     Peanut Oil Itching     Tree Nuts [Nuts] Itching     Erythromycin Unknown     Latex Unknown     Added based on information entered during case entry, please review and add reactions, type, and severity as needed     Other Environmental Allergy Unknown     DUST     Pollen Extracts [Pollen Extract] Unknown     Zosyn [Piperacillin-Tazobactam In D5w] Tinnitus and Itching     Oxycodone Itching and Rash       Prior to Admission Medications   (Not in a hospital admission)      Social History  Reviewed, and he  reports that he quit smoking about 12 years ago. He started smoking about 22 years ago. He smoked 1.00 pack per day. He has never used smokeless tobacco. He reports current alcohol use. He reports that he does not use drugs.  Social History     Tobacco Use     Smoking status: Former Smoker     Packs/day: 1.00     Start date: 1999     Quit date: 2009     Years since quittin.3     Smokeless tobacco: Never Used   Substance Use Topics     Alcohol use: Yes       Family History  Reviewed, and family history includes Cerebrovascular Disease in his father and sister; Cirrhosis in his mother; Diabetes Type 2  in his father; Hypertension in his father, mother, paternal grandfather, and paternal grandmother; Kidney failure in his father.    Review of Systems  Complete ROS reviewed, unless noted, all other systems reviewed and found to be negative.       "  Objective:     Physical Exam:  /64   Pulse 114   Temp 99.4  F (37.4  C)   Resp 20   Ht 1.803 m (5' 11\")   Wt 120.2 kg (265 lb)   SpO2 97%   BMI 36.96 kg/m    Weight:   Weight change:   Body mass index is 36.96 kg/m .      General Appearance:  Alert, cooperative, rests in bed, shaking lower extremity appears uncomfortable   Head:  Normocephalic, without obvious abnormality, atraumatic   Eyes:  PERRL, conjunctiva/corneas clear, EOM's intact   ENT/Throat: Lips, mucosa, and tongue normal; teeth and gums normal   Lymph/Neck: Supple, symmetrical, trachea midline   Lungs:   respirations unlabored   Chest Wall:  No tenderness or deformity   Cardiovascular/Heart:  Regular rate and rhythm Edema: right lower extremity   Abdomen:   Soft, non-tender, bowel sounds active all four quadrants,  no masses, no organomegaly   Musculoskeletal: Right lower extremity with lymphedema, foot is warm, pedal pulses    Skin: Skin color, texture, turgor normal, no apparent redness or breakdown noted   Neurologic: Reflexes intact, cooridanated movement           Psych: Affect is limited range, cooperative, A/O x3            Imaging Reviewed   XR Chest 1 View    Result Date: 8/18/2021  EXAM: XR CHEST 1 VIEW LOCATION: [facility] DATE/TIME: 8/18/2021 1:26 AM INDICATION: fever, chills COMPARISON: 10/29/2012.     IMPRESSION: Negative chest.    US Lower Extremity Venous Duplex Right    Result Date: 8/18/2021  EXAM: US LOWER EXTREMITY VENOUS DUPLEX RIGHT LOCATION: [facility] DATE/TIME: 8/18/2021 1:40 AM INDICATION: Right leg pain, swelling and redness. COMPARISON: None. TECHNIQUE: Venous Duplex ultrasound of the right lower extremity with and without compression, augmentation and duplex. Color flow and spectral Doppler with waveform analysis performed. FINDINGS: Exam includes the common femoral, femoral, popliteal, and contralateral common femoral veins as well as segmentally visualized " deep calf veins and greater saphenous vein. Evaluation of the calf veins is limited by leg swelling and body habitus. RIGHT: No deep vein thrombosis. No superficial thrombophlebitis. No popliteal cyst.     IMPRESSION: 1.  No deep venous thrombosis in the right lower extremity.      Labs Reviewed Personally By Myself  Results for orders placed or performed during the hospital encounter of 08/17/21   US Lower Extremity Venous Duplex Right     Status: None    Narrative    EXAM: US LOWER EXTREMITY VENOUS DUPLEX RIGHT  LOCATION: [facility]  DATE/TIME: 8/18/2021 1:40 AM    INDICATION: Right leg pain, swelling and redness.  COMPARISON: None.  TECHNIQUE: Venous Duplex ultrasound of the right lower extremity with and without compression, augmentation and duplex. Color flow and spectral Doppler with waveform analysis performed.    FINDINGS: Exam includes the common femoral, femoral, popliteal, and contralateral common femoral veins as well as segmentally visualized deep calf veins and greater saphenous vein. Evaluation of the calf veins is limited by leg swelling and body habitus.    RIGHT: No deep vein thrombosis. No superficial thrombophlebitis. No popliteal cyst.      Impression    IMPRESSION:  1.  No deep venous thrombosis in the right lower extremity.   XR Chest 1 View     Status: None    Narrative    EXAM: XR CHEST 1 VIEW  LOCATION: [facility]  DATE/TIME: 8/18/2021 1:26 AM    INDICATION: fever, chills  COMPARISON: 10/29/2012.      Impression    IMPRESSION: Negative chest.   CBC with platelets + differential     Status: Abnormal    Narrative    The following orders were created for panel order CBC with platelets + differential.  Procedure                               Abnormality         Status                     ---------                               -----------         ------                     CBC with platelets and d...[[id number]]  Abnormal            Final result                  Please view results for these tests on the individual orders.   Comprehensive metabolic panel     Status: Abnormal   Result Value Ref Range    Sodium 139 136 - 145 mmol/L    Potassium 3.7 3.5 - 5.0 mmol/L    Chloride 105 98 - 107 mmol/L    Carbon Dioxide (CO2) 22 22 - 31 mmol/L    Anion Gap 12 5 - 18 mmol/L    Urea Nitrogen 6 (L) 8 - 22 mg/dL    Creatinine 0.73 0.70 - 1.30 mg/dL    Calcium 9.5 8.5 - 10.5 mg/dL    Glucose 150 (H) 70 - 125 mg/dL    Alkaline Phosphatase 72 45 - 120 U/L    AST 25 0 - 40 U/L    ALT 33 0 - 45 U/L    Protein Total 7.8 6.0 - 8.0 g/dL    Albumin 3.9 3.5 - 5.0 g/dL    Bilirubin Total 0.5 0.0 - 1.0 mg/dL    GFR Estimate >90 >60 mL/min/1.73m2   Lactic acid whole blood     Status: Abnormal   Result Value Ref Range    Lactic Acid 3.0 (H) 0.7 - 2.0 mmol/L   CBC with platelets and differential     Status: Abnormal   Result Value Ref Range    WBC Count 4.0 4.0 - 11.0 10e3/uL    RBC Count 5.98 (H) 4.40 - 5.90 10e6/uL    Hemoglobin 15.6 13.3 - 17.7 g/dL    Hematocrit 48.5 40.0 - 53.0 %    MCV 81 78 - 100 fL    MCH 26.1 (L) 26.5 - 33.0 pg    MCHC 32.2 31.5 - 36.5 g/dL    RDW 14.3 10.0 - 15.0 %    Platelet Count 229 150 - 450 10e3/uL    % Neutrophils 84 %    % Lymphocytes 12 %    % Monocytes 2 %    % Eosinophils 2 %    % Basophils 0 %    % Immature Granulocytes 0 %    NRBCs per 100 WBC 0 <1 /100    Absolute Neutrophils 3.3 1.6 - 8.3 10e3/uL    Absolute Lymphocytes 0.5 (L) 0.8 - 5.3 10e3/uL    Absolute Monocytes 0.1 0.0 - 1.3 10e3/uL    Absolute Eosinophils 0.1 0.0 - 0.7 10e3/uL    Absolute Basophils 0.0 0.0 - 0.2 10e3/uL    Absolute Immature Granulocytes 0.0 <=0.0 10e3/uL    Absolute NRBCs 0.0 10e3/uL   Symptomatic COVID-19 Virus (Coronavirus) by PCR Nasopharyngeal     Status: Normal    Specimen: Nasopharyngeal; Swab   Result Value Ref Range    SARS CoV2 PCR Negative Negative    Narrative    Testing was performed using the [employer]  SARS-CoV-2 & Influenza A/B Assay on the [employer]  Julia   System.  This test should be ordered for the detection of SARS-COV-2 in individuals who meet SARS-CoV-2 clinical and/or epidemiological criteria. Test performance is unknown in asymptomatic patients.  This test is for in vitro diagnostic use under the FDA EUA for laboratories certified under CLIA to perform moderate and/or high complexity testing. This test has not been FDA cleared or approved.  A negative test does not rule out the presence of PCR inhibitors in the specimen or target RNA in concentration below the limit of detection for the assay. The possibility of a false negative should be considered if the patient's recent exposure or clinical presentation suggests COVID-19.  [facility] are certified under the Clinical Laboratory Improvement Amendments of 1988 (CLIA-88) as qualified to perform moderate and/or high complexity laboratory testing.   Magnesium     Status: Abnormal   Result Value Ref Range    Magnesium 1.5 (L) 1.8 - 2.6 mg/dL   UA with Microscopic reflex to Culture     Status: Abnormal    Specimen: Urine, Clean Catch   Result Value Ref Range    Color Urine Yellow Colorless, Straw, Light Yellow, Yellow    Appearance Urine Clear Clear    Glucose Urine Negative Negative mg/dL    Bilirubin Urine Negative Negative    Ketones Urine Negative Negative mg/dL    Specific Gravity Urine 1.024 1.001 - 1.030    Blood Urine Negative Negative    pH Urine 7.0 5.0 - 7.0    Protein Albumin Urine 10  (A) Negative mg/dL    Urobilinogen Urine <2.0 <2.0 mg/dL    Nitrite Urine Negative Negative    Leukocyte Esterase Urine Negative Negative    Mucus Urine Present (A) None Seen /LPF    RBC Urine 1 <=2 /HPF    WBC Urine <1 <=5 /HPF    Squamous Epithelials Urine <1 <=1 /HPF    Narrative    Urine Culture not indicated   Social Work/ Care Management IP Consult     Status: None ()    Narrative    Darby Garner, RN     8/18/2021 11:22 AM  Care Management Initial Consult    General Information  Assessment  "completed with: Romeo Rivera  Type of CM/SW Visit: Initial Assessment    Primary Care Provider verified and updated as needed: No (\"no   PMD\")   Readmission within the last 30 days: no previous admission in   last 30 days      Reason for Consult: discharge planning  Advance Care Planning: Advance Care Planning Reviewed: other   (comment) (\"I don't have one\")          Communication Assessment  Patient's communication style: spoken language (English or   Bilingual)    Hearing Difficulty or Deaf: no   Wear Glasses or Blind: yes    Cognitive  Cognitive/Neuro/Behavioral: WDL                      Living Environment:   People in home: other relative(s) (\"with my Auntie\")     Current living Arrangements: apartment (\"first level apartment\")        Able to return to prior arrangements: yes       Family/Social Support:  Care provided by: self  Provides care for: no one     Other (specify) (Auntie)          Description of Support System: Supportive, Involved    Support Assessment: Adequate family and caregiver support,   Adequate social supports    Current Resources:   Patient receiving home care services: No     Community Resources: None  Equipment currently used at home: cane, straight  Supplies currently used at home: Other (\"glasses\")    Employment/Financial:  Employment Status: unemployed     Employment/ Comments: \"no  history\"  Financial Concerns:     Referral to Financial Counselor: No       Lifestyle & Psychosocial Needs:  Social Determinants of Health     Tobacco Use: Medium Risk     Smoking Tobacco Use: Former Smoker     Smokeless Tobacco Use: Never Used   Alcohol Use:      Frequency of Alcohol Consumption:      Average Number of Drinks:      Frequency of Binge Drinking:    Financial Resource Strain:      Difficulty of Paying Living Expenses:    Food Insecurity:      Worried About Running Out of Food in the Last Year:      Ran Out of Food in the Last Year:    Transportation Needs:      Lack of " Transportation (Medical):      Lack of Transportation (Non-Medical):    Physical Activity:      Days of Exercise per Week:      Minutes of Exercise per Session:    Stress:      Feeling of Stress :    Social Connections:      Frequency of Communication with Friends and Family:      Frequency of Social Gatherings with Friends and Family:      Attends Jehovah's witness Services:      Active Member of Clubs or Organizations:      Attends Club or Organization Meetings:      Marital Status:    Intimate Partner Violence:      Fear of Current or Ex-Partner:      Emotionally Abused:      Physically Abused:      Sexually Abused:    Depression: Not at risk     PHQ-2 Score: 0   Housing Stability:      Unable to Pay for Housing in the Last Year:      Number of Places Lived in the Last Year:      Unstable Housing in the Last Year:        Functional Status:  Prior to admission patient needed assistance:   Dependent ADLs:: Ambulation-cane  Dependent IADLs:: Independent       Mental Health Status:          Chemical Dependency Status:                Values/Beliefs:  Spiritual, Cultural Beliefs, Jehovah's witness Practices, Values that   affect care:                 Additional Information:  Romeo lives in an apartment with his Auntie. He is independent   with ADLs at baseline. Unknown discharge needs at this time.    He uses a cane for mobility.    Family to transport at discharge.    Darby Garner RN              Total time spent 78 minutes with greater than 50% in consultation, education and coordination of care.     Also discussed with RN and MD    Thank you for this consultation.      Libby Carmona APRN, CNS-BC, DNP  Acute Care Pain Management Program  [facility] ([facility], [place])   With questions call [phone]  Preference if for Daniele Carmona  Click HERE to page Hannah

## 2021-08-18 NOTE — SIGNIFICANT EVENT
CODE SEPSIS CALLED AT 1657. Lactic acid of 4.0    Patient is drowsy but easily rousable to verbal stimuli. Oriented x4.  98.9F l  26  l  130bpm  l  136/68mmHg l 96% room air.   Apical pulse is 125bpm. Placed on tele monitoring - sinus tachycardia.  Pain on right leg is 9/10, aching/sharp/stabbing.  Seen by ID at bedside at an earlier time. Awaiting for IV abx med supply to administer.   Maintenance IV infusing.   Primary hospitalist notified @ 1756.  Please see resident's note re: code sepsis response.   Devin Castellanos RN  2003  08/18/21

## 2021-08-18 NOTE — PLAN OF CARE
Romeo Chong arrived to P2, room 217 via cart at 1510.  Settled into room.  Call light within reach.  Primary Problem: Tachycardia.    [facility], Dr. Guzmán admitted.    Patient reports chills and is found to be experiencing full body shakes upon arrival to unit.  Warming blanket applied for comfort.  T: 99.1, RR: 22, HR: 118, /85, O2: 98% on RA.    Bedside handoff report completed with oncoming Nurse. Per [facility] note it appears this patient should be on telemetry monitor.  No order currently found.  Evening nurse to clarify.  Patient is tachycardic.    Patent reports right lower extremity leg pain 8/10.   Pain team here now to see.  Will medicate as appropriate based off new orders for pain management.     Patient arrives with NS @ 125ml/hour.    Infectious Diease to see.    Rosalie Hall RN

## 2021-08-18 NOTE — CODE DOCUMENTATION
Sepsis Rapid Response called due to Lactic acid 4.0--see vitals and labs. Pt admitted with cellulitis of right leg. Per MD, litre bolus and start abx as soon as they arrive to floor.

## 2021-08-18 NOTE — ED TRIAGE NOTES
Pt c/o fever, chills, right leg pain intermittently for several days, worse today.. has hx lymphedema and hodgkins

## 2021-08-18 NOTE — ED NOTES
Pt presents nausea and vomited x1 today. Fever and chills started a couple hours ago (8pm). Right leg swollen at baseline, abnormal for it to be red and painful. Pain rated 10/10 in right leg. Pt has history of sepsis, and lymphoedema   SKIN: right leg edema/swelling and hot  HEENT: Normocephalic, alert and oriented x 3.   CHEST: Symmetrical rise, breath sounds are equal and clear bilaterally. No reported CP or SOB.  ABD: NTND. Nausea and vomiting  EXT: THOMPSON x 4  Rest of exam unremarkable.

## 2021-08-18 NOTE — H&P
Admission History and Physical   Romeo Chong, 1980, [id number]  [facility]  Tachycardia [R00.0]  PCP:No Ref-Primary, Physician, None   Admitting provider: Yessenia Guzmán MD.    Code status:  No Order          Extended Emergency Contact Information  Primary Emergency Contact: Dioni Doherty Phone: [phone]  Relation: Cousin       Assessment and Plan  Active Problems:    Fibrous dysplasia of bone    Essential hypertension, benign    Lymphedema    Obesity, unspecified    H/O Hodgkin's lymphoma    Tachycardia    Cellulitis of right lower extremity    Elevated lactic acid level    Romeo Chong is a 40 year old old male presenting with increased leg swelling and fevers boarding in the ED.     Cellulitis possible sepsis with elevated lactic but normal WBC and febrile  - no blood cx taken previously will check now 2 sets, procal, CBC and lactic and CRP   - given 2gm IV ceftriaxone x1 at 0230 this am,   - planned for vanco but patient has allergy just listed incorrectly, will await ID to see for further abx recs   - ID consult for further abx regiment   - IVF and 1liter fluid bolus  - check MRI w/wo contrast for osteo, or deep tissue infection     Elevated lactic  - given liter bolus and continuous IVF  - recheck once completed     Tachycardia   - treating infection   - IVF  - telemetry   - trial of BB     Chronic pain   - norco 1-2 tabs with pain scale  - pain team to see  -hold on IV pain med for now   - vistaril prn   - IV toradol scheduled     HTN  - continue norvasc  - holding hydrochlorothiazide   - trial of BB for tachycardiac, aware of AA.     Lymphedema with lymphoma in this leg treated outside state.     COVID STATUS: negative  Date:8/18     VTE prophylaxis:  Enoxaparin (Lovenox) SQ  DIET: Orders Placed This Encounter      Regular Diet Adult    Drains/Lines: none  Weight bearing status: WBAT  Disposition/Barriers to discharge: patient boarding IN ED   Code Status:No  Order    HPI: Romeo Chong is a 40 year old old male with h/o hypertension, asthma, chronic pain syndrome, fibrous dysplasia of bone, chronic lymphedema, and obesity who presents to this ED via private car for evaluation of chills and leg pain.      2 days ago patient noticed warmth and pain to his right lower extremity. This concerned him as he has a history of sepsis from cellulitis in his right lower extremity. Yesterday, patient developed fevers, chills, and vomiting. He felt like his pain was no longer tolerable so he took one dose of Narco this morning without any improvement. The pain radiates diffusely over the right leg and is exacerbated with touch or movement of his right lower extremity. Patient denies any recent falls or trauma to his lower extremities. Denies chest pain, cough, shortness of breath, bowel or bladder changes, diarrhea, dysuria, focal weakness, no open wounds or drainage. He does have chronic lymphedema and this leg following his hodgkin lymphoma and multiple surgeries and states his leg is twice the size as it normally is but has been using his compression stockings           Past Medical History:   Diagnosis Date     Cancer (H)      Hyperlipidemia      Hypertension      Past Surgical History:   Procedure Laterality Date     FRACTURE SURGERY       HC REMOVAL HEEL SPUR, CALCANEUS Left 6/25/2021    Procedure: EXCISION, BONE SPUR, FOOT, WITH PLANTAR FASCIA RELEASE;  Surgeon: Stephon Singleton DPM;  Location: [facility] [place];  Service: Podiatry     Family History   Problem Relation Age of Onset     Cirrhosis Mother      Hypertension Mother      Diabetes Type 2  Father      Kidney failure Father      Cerebrovascular Disease Father      Hypertension Father      Cerebrovascular Disease Sister      Hypertension Paternal Grandmother      Hypertension Paternal Grandfather      Social History     Socioeconomic History     Marital status: Single     Spouse name: Not on file     Number of  children: Not on file     Years of education: Not on file     Highest education level: Not on file   Occupational History     Not on file   Tobacco Use     Smoking status: Former Smoker     Packs/day: 1.00     Start date: 1999     Quit date: 2009     Years since quittin.3     Smokeless tobacco: Never Used   Substance and Sexual Activity     Alcohol use: Yes     Drug use: Never     Sexual activity: Not Currently   Other Topics Concern     Not on file   Social History Narrative    , 3 children, 1  recently. Non smoker. Socially drinks alcohol. Not working.      Social Determinants of Health     Financial Resource Strain:      Difficulty of Paying Living Expenses:    Food Insecurity:      Worried About Running Out of Food in the Last Year:      Ran Out of Food in the Last Year:    Transportation Needs:      Lack of Transportation (Medical):      Lack of Transportation (Non-Medical):    Physical Activity:      Days of Exercise per Week:      Minutes of Exercise per Session:    Stress:      Feeling of Stress :    Social Connections:      Frequency of Communication with Friends and Family:      Frequency of Social Gatherings with Friends and Family:      Attends Caodaism Services:      Active Member of Clubs or Organizations:      Attends Club or Organization Meetings:      Marital Status:    Intimate Partner Violence:      Fear of Current or Ex-Partner:      Emotionally Abused:      Physically Abused:      Sexually Abused:      Allergies   Allergen Reactions     Drug Ingredient [Vancomycin] Anaphylaxis     Peanut Oil Itching     Tree Nuts [Nuts] Itching     Erythromycin Unknown     Latex Unknown     Added based on information entered during case entry, please review and add reactions, type, and severity as needed     Other Environmental Allergy Unknown     DUST     Pollen Extracts [Pollen Extract] Unknown     Zosyn [Piperacillin-Tazobactam In D5w] Tinnitus and Itching     Oxycodone Itching and  Rash       PRIOR TO ADMISSION MEDICATIONS   (Not in a hospital admission)       REVIEW OF SYSTEMS:  12 point reviewed pertinent negatives and positives in HPI all others negative     PHYSICAL EXAM  B/P:141/78 T:99.4 P:126 R: 23   Body mass index is 36.96 kg/m .  Constitutional: ill-appearing, uncomfortable nontoxic, morbidly obese   Eyes: anicteric   ENT: Normocephalic, without obvious abnormality, atraumatic, sinuses nontender on palpation, external ears without lesions, oral pharynx with moist mucous membranes, tonsils without erythema or exudates, gums normal and good dentition.  Respiratory: nonlabored breathing no W/R/R  Cardiovascular: tachycardic reg rhythm No M/R/G but tachy   GI: No scars, normal bowel sounds, soft, non-distended, non-tender, no masses palpated, no hepatosplenomegally  Skin: dark skin color unable to discern erythema, no open cuts, lacerations or drainage on RLE    Musculoskeletal: RLE significantly edematous large scar on right anterior leg, no fluctuance  tight skin and hot to touch. LLE no edema, scar healing on heal  Neurologic: Awake, alert, oriented to name, place and time.    Neuropsychiatric: General: normal, calm and normal eye contact    PERTINENT LABS and RADIOLOGY   Results for orders placed or performed during the hospital encounter of 08/17/21   US Lower Extremity Venous Duplex Right    Impression    IMPRESSION:  1.  No deep venous thrombosis in the right lower extremity.   XR Chest 1 View    Impression    IMPRESSION: Negative chest.     Most Recent 3 CBC's:Recent Labs   Lab Test 08/17/21 2256 06/23/21  0804   WBC 4.0 4.6   HGB 15.6 14.8   MCV 81 81    255     Most Recent 3 BMP's:Recent Labs   Lab Test 08/17/21 2256 06/23/21  0804 05/05/21  1137    142 143   POTASSIUM 3.7 4.1 4.1   CHLORIDE 105 105 105   CO2 22 25 23   BUN 6* 5* 7*   CR 0.73 0.69* 0.73   ANIONGAP 12 12 15   NATALYA 9.5 9.7 9.6   * 84 90     Most Recent 2 LFT's:Recent Labs   Lab Test  08/17/21  2256   AST 25   ALT 33   ALKPHOS 72   BILITOTAL 0.5     Most Recent 3 INR's:No lab results found.  Most Recent D-dimer:No lab results found.  Most Recent 6 Bacteria Isolates From Any Culture (See EPIC Reports for Culture Details):No lab results found.  Most Recent 6 glucoses:Recent Labs   Lab Test 08/17/21  2256 06/23/21  0804 05/05/21  1137   * 84 90     Most Recent ESR & CRP:No lab results found.  EKG:sinus tachycardia       Yessenia Guzmán MD  [facility]  [phone]

## 2021-08-18 NOTE — PHARMACY-ADMISSION MEDICATION HISTORY
Pharmacy Note - Admission Medication History    Pertinent Provider Information: Pt reports he hasn't taken his nortriptyline recently since he has been feeling like a zombie. Dose was recently decreased from 50 mg to 25 mg.      ______________________________________________________________________    Prior To Admission (PTA) med list completed and updated in EMR.       PTA Med List   Medication Sig Last Dose     amLODIPine (NORVASC) 5 MG tablet [AMLODIPINE (NORVASC) 5 MG TABLET] Take 0.5 tablets (2.5 mg total) by mouth daily. 8/17/2021 at AM     aspirin-acetaminophen-caffeine (EXCEDRIN MIGRAINE) 250-250-65 mg per tablet [ASPIRIN-ACETAMINOPHEN-CAFFEINE (EXCEDRIN MIGRAINE) 250-250-65 MG PER TABLET] Take 1 tablet by mouth every 6 (six) hours as needed for pain.      famotidine (PEPCID) 40 MG tablet [FAMOTIDINE (PEPCID) 40 MG TABLET] Take 1 tablet (40 mg total) by mouth every evening. (Patient taking differently: Take 40 mg by mouth every morning ) 8/16/2021 at AM     hydrochlorothiazide (HYDRODIURIL) 50 MG tablet Take 1 tablet (50 mg) by mouth daily Past Week at Unknown time     HYDROcodone-acetaminophen (NORCO)  MG per tablet Take 1 tablet by mouth 2 times daily as needed for severe pain 8/17/2021 at Unknown time     ibuprofen (ADVIL,MOTRIN) 200 MG tablet [IBUPROFEN (ADVIL,MOTRIN) 200 MG TABLET] Take 3 tablets (600 mg total) by mouth every 6 (six) hours as needed for pain. 8/17/2021 at Unknown time     loratadine (CLARITIN) 10 mg tablet [LORATADINE (CLARITIN) 10 MG TABLET] Take 1 tablet (10 mg total) by mouth daily. (Patient taking differently: Take 10 mg by mouth daily as needed for allergies )      losartan (COZAAR) 50 MG tablet [LOSARTAN (COZAAR) 50 MG TABLET] Take 1 tablet (50 mg total) by mouth daily. 8/16/2021 at AM     nortriptyline (PAMELOR) 25 MG capsule Take 1 capsule (25 mg) by mouth At Bedtime not started yet     potassium chloride ER (K-TAB/KLOR-CON) 10 MEQ CR tablet TAKE 2 TABLETS BY MOUTH EVERY  MORNING AND 1 EVERY EVENING 8/16/2021     vitamin D3 (CHOLECALCIFEROL) 125 MCG (5000 UT) tablet Take 1 tablet (125 mcg) by mouth daily 8/16/2021       Information source(s): Patient and CareEverywhere/SureScripts  Method of interview communication: in-person    Summary of Changes to PTA Med List  New: N/A  Discontinued: N/A  Changed: Claritin prn    Patient was asked about OTC/herbal products specifically.  PTA med list reflects this.    In the past week, patient estimated taking medication this percent of the time:  50-90% due to illness.    Allergies were reviewed, assessed, and updated with the patient.      Patient does not use any multi-dose medications prior to admission.    The information provided in this note is only as accurate as the sources available at the time of the update(s).    Thank you for the opportunity to participate in the care of this patient.    Sivakumar Pate [facility]  8/18/2021 9:00 AM

## 2021-08-18 NOTE — CONSULTS
"Care Management Initial Consult    General Information  Assessment completed with: Romeo Rivera  Type of CM/SW Visit: Initial Assessment    Primary Care Provider verified and updated as needed: No (\"no PMD\")   Readmission within the last 30 days: no previous admission in last 30 days      Reason for Consult: discharge planning  Advance Care Planning: Advance Care Planning Reviewed: other (comment) (\"I don't have one\")          Communication Assessment  Patient's communication style: spoken language (English or Bilingual)    Hearing Difficulty or Deaf: no   Wear Glasses or Blind: yes    Cognitive  Cognitive/Neuro/Behavioral: WDL                      Living Environment:   People in home: other relative(s) (\"with my Auntie\")     Current living Arrangements: apartment (\"first level apartment\")      Able to return to prior arrangements: yes       Family/Social Support:  Care provided by: self  Provides care for: no one     Other (specify) (Auntie)          Description of Support System: Supportive, Involved    Support Assessment: Adequate family and caregiver support, Adequate social supports    Current Resources:   Patient receiving home care services: No     Community Resources: None  Equipment currently used at home: cane, straight  Supplies currently used at home: Other (\"glasses\")    Employment/Financial:  Employment Status: unemployed     Employment/ Comments: \"no  history\"  Financial Concerns:     Referral to Financial Counselor: No       Lifestyle & Psychosocial Needs:  Social Determinants of Health     Tobacco Use: Medium Risk     Smoking Tobacco Use: Former Smoker     Smokeless Tobacco Use: Never Used   Alcohol Use:      Frequency of Alcohol Consumption:      Average Number of Drinks:      Frequency of Binge Drinking:    Financial Resource Strain:      Difficulty of Paying Living Expenses:    Food Insecurity:      Worried About Running Out of Food in the Last Year:      Ran Out of Food in the " Last Year:    Transportation Needs:      Lack of Transportation (Medical):      Lack of Transportation (Non-Medical):    Physical Activity:      Days of Exercise per Week:      Minutes of Exercise per Session:    Stress:      Feeling of Stress :    Social Connections:      Frequency of Communication with Friends and Family:      Frequency of Social Gatherings with Friends and Family:      Attends Rastafari Services:      Active Member of Clubs or Organizations:      Attends Club or Organization Meetings:      Marital Status:    Intimate Partner Violence:      Fear of Current or Ex-Partner:      Emotionally Abused:      Physically Abused:      Sexually Abused:    Depression: Not at risk     PHQ-2 Score: 0   Housing Stability:      Unable to Pay for Housing in the Last Year:      Number of Places Lived in the Last Year:      Unstable Housing in the Last Year:        Functional Status:  Prior to admission patient needed assistance:   Dependent ADLs:: Ambulation-cane  Dependent IADLs:: Independent       Mental Health Status:          Chemical Dependency Status:                Values/Beliefs:  Spiritual, Cultural Beliefs, Rastafari Practices, Values that affect care:                 Additional Information:  Romeo lives in an apartment with his Auntie. He is independent with ADLs at baseline. Unknown discharge needs at this time.    He uses a cane for mobility.    Family to transport at discharge.    Darby Garner RN

## 2021-08-18 NOTE — SIGNIFICANT EVENT
Sepsis Evaluation Progress Note    I was called to see Romeo Chong due to a lactate of 4 triggering the Code Sepsis alert. He is known to have an infection. Admitted today for cellulitis of RLE (history of lymphedema) and possible sepsis. He was given 2g Ceftri in ED but has allergies to Vanc. ID consulted for antibiotic guidance. Given 1L bolus in ED. Patient feels okay but has leg pain in RLE, but not worsened. Doesn't feel feverish right now.     Physical Exam   Vital Signs:  Temp: 98.9  F (37.2  C) Temp src: Oral BP: 136/84 Pulse: (!) 130   Resp: 26 SpO2: 98 % O2 Device: None (Room air)       General: mild distress   CV: tachycardic, regular rhythm  Resp: CTABL  Extremities: RLE significantly edematous, tight skin, not obviously threatened. No erythema but hot to touch. Sensation and motor intact. Unable to palpate pulses due to edema  Mental Status: AAOx4.         Data   Lactic Acid   Date Value Ref Range Status   08/18/2021 4.0 (HH) 0.7 - 2.0 mmol/L Final   08/18/2021 3.4 (H) 0.7 - 2.0 mmol/L Final       Assessment & Plan   Romeo Chong meets SIRS criteria AND has a lactate >2 or other evidence of acute organ damage.  These vital signs, lab and physical exam findings constitute a diagnosis of SEVERE SEPSIS.    Sepsis Time-Zero (time severe sepsis diagnosis confirmed): 1700  08/18/21 as this was the time when Lactate resulted, and the level was > 2.0     Anti-infectives (From now, onward)    Start     Dose/Rate Route Frequency Ordered Stop    08/18/21 1900  metroNIDAZOLE (FLAGYL) infusion 500 mg      500 mg  over 60 Minutes Intravenous EVERY 12 HOURS 08/18/21 1614 08/18/21 1800  aztreonam (AZACTAM) 2 g vial to attach to  mL bag      2 g  over 20-60 Minutes Intravenous EVERY 6 HOURS 08/18/21 1614 08/18/21 1730  doxycycline (VIBRAMYCIN) 100 mg vial to attach to  mL bag      100 mg  over 1-2 Hours Intravenous EVERY 12 HOURS 08/18/21 1718          Current antibiotic coverage is  "appropriate for source of infection. ID has been consulted and placed patient on the above regimen.    3 Hour Severe Sepsis Bundle Completion:  1. Initial Lactic Acid result shown above. Repeat lactic acid ordered for 2 hours from now and q6 hours.  2. Blood Cultures before Antibiotics: Yes  3. Broad Spectrum Antibiotics Administered: yes  4. Fluids: Patient received 1L IVF. 2nd liter ordered to be given now. Continue mIVF. 30 ml/kg fluid order = 4,190 mL    Disposition: The patient will remain on the current unit. We will continue to monitor this patient closely.     Leila \"Delores\" MD Sal  [facility] Resident  P: [phone]    Sepsis Criteria   Sepsis: 2+ SIRS criteria due to infection  Severe Sepsis: Sepsis AND 1+ new sign of acute organ dysfunction (Note: lactate >2 or acute encephalopathy each qualify as organ dysfunction)  Septic Shock: Sepsis AND hypotension despite volume resuscitation with 30 ml/kg crystalloid or lactate >=4  Note: HYPOTENSION is defined as 2 BP readings measured 3 hrs apart that have a SBP <90, MAP <65, or decrease >40 mmHg, occurring 6 hrs before or after t-zero    "

## 2021-08-18 NOTE — ED PROVIDER NOTES
EMERGENCY DEPARTMENT ENCOUNTER      NAME: Romeo Chong  AGE: 40 year old male  YOB: 1980  MRN: [id number]  EVALUATION DATE & TIME: 8/17/2021 11:47 PM    PCP: No Ref-Primary, Physician    ED PROVIDER: Humberto Carroll M.D.      Chief Complaint   Patient presents with     Chills     Leg Pain         FINAL IMPRESSION:  1. Cellulitis of right lower extremity    2. Elevated lactic acid level    3. Tachycardia          ED COURSE & MEDICAL DECISION MAKING:    Pertinent Labs & Imaging studies reviewed. (See chart for details)    40 year old male presents to the Emergency Department for evaluation of fever, chills, leg pain and warmth. Patient appears non toxic with stable vitals signs, patient is tachycardic, respiratory rate of 20, temp of 99.1  F.  Exam significant for chronic swelling of the right lower extremity but right lower extremity is warm to touch and painful, I did not appreciate any specific joint swelling or warmth, no specific skin changes.  Concern for cellulitis, considered but less likely DVT, concern for sepsis.  Considered but lower suspicion for pneumonia or Covid, abdomen is benign with no focal tenderness suggest cholecystitis, pancreatitis, appendicitis or diverticulitis, moves neck freely and is not reporting new headache, nothing to suggest meningitis.  We will obtain screening labs, blood cultures, lactate, ultrasound imaging of the right lower extremity and chest x-ray, Covid screen.  Patient was given Tylenol, fluids and pain medications.    12:10 AM I met with the patient, obtained history, performed an initial exam, and discussed options and plan for diagnostics and treatment here in the ED. Proper PPE was worn during the entire patient encounter.  1:03 AM Nurse reports patient is feeling itchy.   2:06 AM I spoke with the pharmacist and discussed antibiotic coverage.   2:55 AM I updated the patient on care plan and need for admission.   4:24 AM I spoke with , the  Hospitalist who agrees with plan for care and accepted the patient for admission.  5:06 AM did obtain ECG which showed no ischemic changes, did note QTC of 508, we did add on a magnesium level, the rest the electrolytes are within normal limits.      Reassessment: Labs significant for elevated lactic acid, otherwise no elevated white blood cell count, the rest of labs showed no acute concerning findings.  Chest x-ray and ultrasound imaging reported no acute concerning findings.  Patient remained tachycardic likely secondary to infection and pain.  Given second liter of fluid and second round of pain medications, discussed antibiotic selection with pharmacy as the patient stated he felt itchy after a dose of Zosyn, was given Benadryl.  He was never given Vanco as I noted this as an allergy and we discontinued the Zosyn and switch patient over to ceftriaxone after discussion with pharmacy.  Patient will need to be admitted for continued antibiotics and observation, pain control.  Blood cultures are pending, Covid was negative.  Currently, no beds available, patient will board until inpatient placement can be obtained.  Discussed these findings and recommendations with the patient who is in agreement. Patient was signed out to the hospitalist service.    At the conclusion of the encounter I discussed the results of all of the tests and the disposition. The questions were answered and return precautions provided. The patient or family acknowledged understanding and was agreeable with the care plan.         MEDICATIONS GIVEN IN THE EMERGENCY:  Medications   fentaNYL (PF) (SUBLIMAZE) injection 75 mcg (75 mcg Intravenous Given 8/18/21 0018)   0.9% sodium chloride BOLUS (0 mLs Intravenous Stopped 8/18/21 0334)   piperacillin-tazobactam (ZOSYN) 3.375 g vial to attach to  mL bag (0 g Intravenous Stopped 8/18/21 0111)   acetaminophen (TYLENOL) tablet 650 mg (650 mg Oral Given 8/18/21 0040)   diphenhydrAMINE (BENADRYL)  injection 50 mg (0 mg Intravenous Hold 8/18/21 0328)   cefTRIAXone (ROCEPHIN) 2 g vial to attach to  ml bag for ADULTS or NS 50 ml bag for PEDS (0 g Intravenous Stopped 8/18/21 0409)   0.9% sodium chloride BOLUS (1,000 mLs Intravenous New Bag 8/18/21 0333)   fentaNYL (PF) (SUBLIMAZE) injection 75 mcg (75 mcg Intravenous Given 8/18/21 0332)       NEW PRESCRIPTIONS STARTED AT TODAY'S ER VISIT  New Prescriptions    No medications on file            =================================================================    HPI    Patient information was obtained from: patient    Use of Intrepreter: N/A         Romeo Chong is a 40 year old male with a pertinent history of hypertension, asthma, chronic pain syndrome, fibrous dysplasia of bone, chronic lymphedema, and obesity who presents to this ED via private car for evaluation of chills and leg pain.     2 days ago patient noticed warmth and pain to his right lower extremity. This concerned him as he has a history of sepsis from cellulitis in his right lower extremity. Yesterday, patient developed fevers, chills, and vomiting. He felt like his pain was no longer tolerable so he took one dose of Narco this morning without any improvement. The pain radiates diffusely over the right leg and is exacerbated with touch or movement of his right lower extremity. Patient denies any recent falls or trauma to his lower extremities. Denies chest pain, cough, shortness of breath, bowel or bladder changes, diarrhea, dysuria, focal weakness, or any additional symptoms at this time.       REVIEW OF SYSTEMS   Constitutional:  Positive for fevers and chills.  Respiratory:  Denies productive cough or increased work of breathing  Cardiovascular:  Denies chest pain, palpitations  GI:  Denies abdominal pain, nausea, or change in bowel or bladder habits. Positive for vomiting.  Musculoskeletal:  Denies any new muscle/joint swelling  Skin:  Denies rash   Neurologic:  Denies focal  weakness  All systems negative except as marked.     PAST MEDICAL HISTORY:  Past Medical History:   Diagnosis Date     Cancer (H)      Hyperlipidemia      Hypertension        PAST SURGICAL HISTORY:  Past Surgical History:   Procedure Laterality Date     FRACTURE SURGERY       HC REMOVAL HEEL SPUR, CALCANEUS Left 6/25/2021    Procedure: EXCISION, BONE SPUR, FOOT, WITH PLANTAR FASCIA RELEASE;  Surgeon: Stephon Singleton DPM;  Location: [facility];  Service: Podiatry         CURRENT MEDICATIONS:    Prior to Admission medications    Medication Sig Start Date End Date Taking? Authorizing Provider   amLODIPine (NORVASC) 5 MG tablet [AMLODIPINE (NORVASC) 5 MG TABLET] Take 0.5 tablets (2.5 mg total) by mouth daily. 5/5/21   Shahab Perez MD   aspirin-acetaminophen-caffeine (EXCEDRIN MIGRAINE) 250-250-65 mg per tablet [ASPIRIN-ACETAMINOPHEN-CAFFEINE (EXCEDRIN MIGRAINE) 250-250-65 MG PER TABLET] Take 1 tablet by mouth every 6 (six) hours as needed for pain. 5/5/21   Shahab Perez MD   famotidine (PEPCID) 40 MG tablet [FAMOTIDINE (PEPCID) 40 MG TABLET] Take 1 tablet (40 mg total) by mouth every evening. 5/5/21   Shahab Perez MD   hydrochlorothiazide (HYDRODIURIL) 50 MG tablet Take 1 tablet (50 mg) by mouth daily 7/29/21   Shahab Perez MD   HYDROcodone-acetaminophen (NORCO)  MG per tablet Take 1 tablet by mouth 2 times daily as needed for severe pain 8/10/21 9/7/21  Mony Monsivais PA-C   ibuprofen (ADVIL,MOTRIN) 200 MG tablet [IBUPROFEN (ADVIL,MOTRIN) 200 MG TABLET] Take 3 tablets (600 mg total) by mouth every 6 (six) hours as needed for pain. 6/25/21   Stephon Singleton DPM   loratadine (CLARITIN) 10 mg tablet [LORATADINE (CLARITIN) 10 MG TABLET] Take 1 tablet (10 mg total) by mouth daily. 5/5/21   Shahab Perez MD   losartan (COZAAR) 50 MG tablet [LOSARTAN (COZAAR) 50 MG TABLET] Take 1 tablet (50 mg total) by mouth daily. 5/5/21   Shahab Perez MD nortriptyline (PAMELOR)  25 MG capsule Take 1 capsule (25 mg) by mouth At Bedtime 21   Mony Monsivais PA-C   potassium chloride ER (K-TAB/KLOR-CON) 10 MEQ CR tablet TAKE 2 TABLETS BY MOUTH EVERY MORNING AND 1 EVERY EVENING 21   Shahab Perez MD   vitamin D3 (CHOLECALCIFEROL) 125 MCG (5000 UT) tablet Take 1 tablet (125 mcg) by mouth daily 21   Mony Monsivais PA-C        ALLERGIES:  Allergies   Allergen Reactions     Drug Ingredient [Vancomycin] Anaphylaxis     Peanut Oil Itching     Tree Nuts [Nuts] Itching     Erythromycin Unknown     Latex Unknown     Added based on information entered during case entry, please review and add reactions, type, and severity as needed     Other Environmental Allergy Unknown     DUST     Pollen Extracts [Pollen Extract] Unknown     Zosyn [Piperacillin-Tazobactam In D5w] Tinnitus and Itching     Oxycodone Itching and Rash       FAMILY HISTORY:  Family History   Problem Relation Age of Onset     Cirrhosis Mother      Hypertension Mother      Diabetes Type 2  Father      Kidney failure Father      Cerebrovascular Disease Father      Hypertension Father      Cerebrovascular Disease Sister      Hypertension Paternal Grandmother      Hypertension Paternal Grandfather        SOCIAL HISTORY:   Social History     Socioeconomic History     Marital status: Single     Spouse name: Not on file     Number of children: Not on file     Years of education: Not on file     Highest education level: Not on file   Occupational History     Not on file   Tobacco Use     Smoking status: Former Smoker     Packs/day: 1.00     Start date: 1999     Quit date: 2009     Years since quittin.3     Smokeless tobacco: Never Used   Substance and Sexual Activity     Alcohol use: Yes     Drug use: Never     Sexual activity: Not Currently   Other Topics Concern     Not on file   Social History Narrative    , 3 children, 1  recently. Non smoker. Socially drinks alcohol. Not working.      Social  "Determinants of Health     Financial Resource Strain:      Difficulty of Paying Living Expenses:    Food Insecurity:      Worried About Running Out of Food in the Last Year:      Ran Out of Food in the Last Year:    Transportation Needs:      Lack of Transportation (Medical):      Lack of Transportation (Non-Medical):    Physical Activity:      Days of Exercise per Week:      Minutes of Exercise per Session:    Stress:      Feeling of Stress :    Social Connections:      Frequency of Communication with Friends and Family:      Frequency of Social Gatherings with Friends and Family:      Attends [employer] Services:      Active Member of Clubs or Organizations:      Attends Club or Organization Meetings:      Marital Status:    Intimate Partner Violence:      Fear of Current or Ex-Partner:      Emotionally Abused:      Physically Abused:      Sexually Abused:        VITALS:  Patient Vitals for the past 24 hrs:   BP Temp Pulse Resp SpO2 Height Weight   08/18/21 0330 132/77 -- (!) 149 30 98 % -- --   08/18/21 0300 (!) 143/79 -- (!) 144 -- 97 % -- --   08/18/21 0100 134/74 -- (!) 127 24 98 % -- --   08/18/21 0045 136/80 -- (!) 125 23 98 % -- --   08/18/21 0030 133/81 -- (!) 131 18 98 % -- --   08/18/21 0015 (!) 140/85 -- (!) 132 22 98 % -- --   08/18/21 0000 136/79 -- (!) 130 (!) 38 98 % -- --   08/17/21 2348 133/75 -- -- -- -- -- --   08/17/21 2242 (!) 150/79 99.1  F (37.3  C) (!) 150 20 97 % 1.803 m (5' 11\") 120.2 kg (265 lb)        PHYSICAL EXAM    Constitutional:  Awake, alert, in no apparent distress  HENT:  Normocephalic, Atraumatic. Bilateral external ears normal. Oropharynx moist. Nose normal. Neck- Normal range of motion with no guarding, No midline cervical tenderness, Supple, No stridor.   Eyes:  PERRL, EOMI with no signs of entrapment, Conjunctiva normal, No discharge.   Respiratory:  Normal breath sounds, No respiratory distress, No wheezing.    Cardiovascular: Tachycardia, Normal rhythm, No appreciable rubs " or gallops.   GI:  Soft, No tenderness, No distension, No palpable masses  Musculoskeletal:  Intact distal pulses, chronic edema right lower extremity.  No gross deformities, diffuse tenderness to palpation over the right lower extremity with no specific joint swelling, erythema or warmth, right lower extremity is warm to touch.  Integument:  Warm, Dry, No erythema, No rash.   Neurologic:  Alert & oriented, Normal motor function, Normal sensory function, No focal deficits noted.   Psychiatric:  Affect normal, Judgment normal, Mood normal.     LAB:  All pertinent labs reviewed and interpreted.  Results for orders placed or performed during the hospital encounter of 08/17/21   US Lower Extremity Venous Duplex Right    Impression    IMPRESSION:  1.  No deep venous thrombosis in the right lower extremity.   XR Chest 1 View    Impression    IMPRESSION: Negative chest.   Comprehensive metabolic panel   Result Value Ref Range    Sodium 139 136 - 145 mmol/L    Potassium 3.7 3.5 - 5.0 mmol/L    Chloride 105 98 - 107 mmol/L    Carbon Dioxide (CO2) 22 22 - 31 mmol/L    Anion Gap 12 5 - 18 mmol/L    Urea Nitrogen 6 (L) 8 - 22 mg/dL    Creatinine 0.73 0.70 - 1.30 mg/dL    Calcium 9.5 8.5 - 10.5 mg/dL    Glucose 150 (H) 70 - 125 mg/dL    Alkaline Phosphatase 72 45 - 120 U/L    AST 25 0 - 40 U/L    ALT 33 0 - 45 U/L    Protein Total 7.8 6.0 - 8.0 g/dL    Albumin 3.9 3.5 - 5.0 g/dL    Bilirubin Total 0.5 0.0 - 1.0 mg/dL    GFR Estimate >90 >60 mL/min/1.73m2   Lactic acid whole blood   Result Value Ref Range    Lactic Acid 3.0 (H) 0.7 - 2.0 mmol/L   CBC with platelets and differential   Result Value Ref Range    WBC Count 4.0 4.0 - 11.0 10e3/uL    RBC Count 5.98 (H) 4.40 - 5.90 10e6/uL    Hemoglobin 15.6 13.3 - 17.7 g/dL    Hematocrit 48.5 40.0 - 53.0 %    MCV 81 78 - 100 fL    MCH 26.1 (L) 26.5 - 33.0 pg    MCHC 32.2 31.5 - 36.5 g/dL    RDW 14.3 10.0 - 15.0 %    Platelet Count 229 150 - 450 10e3/uL    % Neutrophils 84 %    %  Lymphocytes 12 %    % Monocytes 2 %    % Eosinophils 2 %    % Basophils 0 %    % Immature Granulocytes 0 %    NRBCs per 100 WBC 0 <1 /100    Absolute Neutrophils 3.3 1.6 - 8.3 10e3/uL    Absolute Lymphocytes 0.5 (L) 0.8 - 5.3 10e3/uL    Absolute Monocytes 0.1 0.0 - 1.3 10e3/uL    Absolute Eosinophils 0.1 0.0 - 0.7 10e3/uL    Absolute Basophils 0.0 0.0 - 0.2 10e3/uL    Absolute Immature Granulocytes 0.0 <=0.0 10e3/uL    Absolute NRBCs 0.0 10e3/uL   Symptomatic COVID-19 Virus (Coronavirus) by PCR Nasopharyngeal    Specimen: Nasopharyngeal; Swab   Result Value Ref Range    SARS CoV2 PCR Negative Negative       RADIOLOGY:  US Lower Extremity Venous Duplex Right   Final Result   IMPRESSION:   1.  No deep venous thrombosis in the right lower extremity.      XR Chest 1 View   Final Result   IMPRESSION: Negative chest.             EKG:    Sinus tachycardia, right bundle branch block, no specific ST acute ischemic changes, no concerning dysrhythmias, did note QTC of 508 ms    PROCEDURES:   None        I, Teri Thomas, am serving as a scribe to document services personally performed by Humberto Carroll MD, based on my observation and the provider's statements to me. I, Humberto Carroll MD attest that Teri Thomas is acting in a scribe capacity, has observed my performance of the services and has documented them in accordance with my direction.    Humberto Carroll M.D.  Emergency Medicine  [facility] EMERGENCY DEPARTMENT  [facility] [facility] [id number] [phone]  Dept: [phone]       Humberto Carroll MD  08/18/21 8463       Humberto Carroll MD  08/18/21 6906

## 2021-08-18 NOTE — ED NOTES
Pt started to c/o tongue burning and itching, ears itching.  Zosyn stopped, Dr. Carroll alerted, md evaluated pt.  Benadryl given.

## 2021-08-19 ENCOUNTER — APPOINTMENT (OUTPATIENT)
Dept: MRI IMAGING | Facility: HOSPITAL | Age: 41
End: 2021-08-19
Attending: INTERNAL MEDICINE
Payer: COMMERCIAL

## 2021-08-19 ENCOUNTER — APPOINTMENT (OUTPATIENT)
Dept: CT IMAGING | Facility: HOSPITAL | Age: 41
End: 2021-08-19
Attending: PHYSICIAN ASSISTANT
Payer: COMMERCIAL

## 2021-08-19 ENCOUNTER — APPOINTMENT (OUTPATIENT)
Dept: CARDIOLOGY | Facility: HOSPITAL | Age: 41
End: 2021-08-19
Attending: INTERNAL MEDICINE
Payer: COMMERCIAL

## 2021-08-19 PROBLEM — R78.81 GRAM-POSITIVE BACTEREMIA: Status: ACTIVE | Noted: 2021-08-19

## 2021-08-19 PROBLEM — A41.9 BACTERIAL SEPSIS (H): Status: ACTIVE | Noted: 2021-08-19

## 2021-08-19 LAB
ANION GAP SERPL CALCULATED.3IONS-SCNC: 10 MMOL/L (ref 5–18)
ATRIAL RATE - MUSE: 148 BPM
BASOPHILS # BLD MANUAL: 0 10E3/UL (ref 0–0.2)
BASOPHILS NFR BLD MANUAL: 0 %
BUN SERPL-MCNC: 14 MG/DL (ref 8–22)
C REACTIVE PROTEIN LHE: 41.3 MG/DL (ref 0–0.8)
CALCIUM SERPL-MCNC: 9.2 MG/DL (ref 8.5–10.5)
CHLORIDE BLD-SCNC: 108 MMOL/L (ref 98–107)
CO2 SERPL-SCNC: 21 MMOL/L (ref 22–31)
CREAT SERPL-MCNC: 0.83 MG/DL (ref 0.7–1.3)
DIASTOLIC BLOOD PRESSURE - MUSE: NORMAL MMHG
EOSINOPHIL # BLD MANUAL: 0 10E3/UL (ref 0–0.7)
EOSINOPHIL NFR BLD MANUAL: 0 %
ERYTHROCYTE [DISTWIDTH] IN BLOOD BY AUTOMATED COUNT: 15.2 % (ref 10–15)
GFR SERPL CREATININE-BSD FRML MDRD: >90 ML/MIN/1.73M2
GLUCOSE BLD-MCNC: 111 MG/DL (ref 70–125)
HCT VFR BLD AUTO: 40.8 % (ref 40–53)
HGB BLD-MCNC: 12.9 G/DL (ref 13.3–17.7)
HOLD SPECIMEN: NORMAL
INTERPRETATION ECG - MUSE: NORMAL
LACTATE SERPL-SCNC: 1.4 MMOL/L (ref 0.7–2)
LACTATE SERPL-SCNC: 1.9 MMOL/L (ref 0.7–2)
LACTATE SERPL-SCNC: 2.1 MMOL/L (ref 0.7–2)
LACTATE SERPL-SCNC: 3.8 MMOL/L (ref 0.7–2)
LVEF ECHO: NORMAL
LYMPHOCYTES # BLD MANUAL: 0.5 10E3/UL (ref 0.8–5.3)
LYMPHOCYTES NFR BLD MANUAL: 4 %
MCH RBC QN AUTO: 26.1 PG (ref 26.5–33)
MCHC RBC AUTO-ENTMCNC: 31.6 G/DL (ref 31.5–36.5)
MCV RBC AUTO: 82 FL (ref 78–100)
METAMYELOCYTES # BLD MANUAL: 1.4 10E3/UL
METAMYELOCYTES NFR BLD MANUAL: 11 %
MONOCYTES # BLD MANUAL: 0.4 10E3/UL (ref 0–1.3)
MONOCYTES NFR BLD MANUAL: 3 %
MYELOCYTES # BLD MANUAL: 0.3 10E3/UL
MYELOCYTES NFR BLD MANUAL: 2 %
NEUTROPHILS # BLD MANUAL: 10 10E3/UL (ref 1.6–8.3)
NEUTROPHILS NFR BLD MANUAL: 80 %
P AXIS - MUSE: 51 DEGREES
PLAT MORPH BLD: ABNORMAL
PLATELET # BLD AUTO: 203 10E3/UL (ref 150–450)
POTASSIUM BLD-SCNC: 3.8 MMOL/L (ref 3.5–5)
PR INTERVAL - MUSE: 134 MS
QRS DURATION - MUSE: 124 MS
QT - MUSE: 324 MS
QTC - MUSE: 508 MS
R AXIS - MUSE: -55 DEGREES
RBC # BLD AUTO: 4.95 10E6/UL (ref 4.4–5.9)
RBC MORPH BLD: ABNORMAL
SODIUM SERPL-SCNC: 139 MMOL/L (ref 136–145)
SYSTOLIC BLOOD PRESSURE - MUSE: NORMAL MMHG
T AXIS - MUSE: 19 DEGREES
VENTRICULAR RATE- MUSE: 148 BPM
WBC # BLD AUTO: 12.5 10E3/UL (ref 4–11)

## 2021-08-19 PROCEDURE — 87040 BLOOD CULTURE FOR BACTERIA: CPT | Performed by: INTERNAL MEDICINE

## 2021-08-19 PROCEDURE — A9585 GADOBUTROL INJECTION: HCPCS | Performed by: INTERNAL MEDICINE

## 2021-08-19 PROCEDURE — 99233 SBSQ HOSP IP/OBS HIGH 50: CPT | Performed by: CLINICAL NURSE SPECIALIST

## 2021-08-19 PROCEDURE — 258N000003 HC RX IP 258 OP 636: Performed by: INTERNAL MEDICINE

## 2021-08-19 PROCEDURE — 250N000011 HC RX IP 250 OP 636: Performed by: INTERNAL MEDICINE

## 2021-08-19 PROCEDURE — 73723 MRI JOINT LWR EXTR W/O&W/DYE: CPT | Mod: RT

## 2021-08-19 PROCEDURE — 36415 COLL VENOUS BLD VENIPUNCTURE: CPT | Performed by: INTERNAL MEDICINE

## 2021-08-19 PROCEDURE — 83605 ASSAY OF LACTIC ACID: CPT | Performed by: STUDENT IN AN ORGANIZED HEALTH CARE EDUCATION/TRAINING PROGRAM

## 2021-08-19 PROCEDURE — 99233 SBSQ HOSP IP/OBS HIGH 50: CPT | Performed by: INTERNAL MEDICINE

## 2021-08-19 PROCEDURE — 85027 COMPLETE CBC AUTOMATED: CPT | Performed by: INTERNAL MEDICINE

## 2021-08-19 PROCEDURE — 255N000002 HC RX 255 OP 636: Performed by: INTERNAL MEDICINE

## 2021-08-19 PROCEDURE — 86141 C-REACTIVE PROTEIN HS: CPT | Performed by: INTERNAL MEDICINE

## 2021-08-19 PROCEDURE — 36415 COLL VENOUS BLD VENIPUNCTURE: CPT | Performed by: STUDENT IN AN ORGANIZED HEALTH CARE EDUCATION/TRAINING PROGRAM

## 2021-08-19 PROCEDURE — 258N000001 HC RX 258: Performed by: INTERNAL MEDICINE

## 2021-08-19 PROCEDURE — 999N000208 ECHOCARDIOGRAM COMPLETE

## 2021-08-19 PROCEDURE — 250N000013 HC RX MED GY IP 250 OP 250 PS 637: Performed by: CLINICAL NURSE SPECIALIST

## 2021-08-19 PROCEDURE — 120N000001 HC R&B MED SURG/OB

## 2021-08-19 PROCEDURE — 82374 ASSAY BLOOD CARBON DIOXIDE: CPT | Performed by: INTERNAL MEDICINE

## 2021-08-19 PROCEDURE — 250N000013 HC RX MED GY IP 250 OP 250 PS 637: Performed by: INTERNAL MEDICINE

## 2021-08-19 PROCEDURE — 93306 TTE W/DOPPLER COMPLETE: CPT | Mod: 26 | Performed by: GENERAL ACUTE CARE HOSPITAL

## 2021-08-19 PROCEDURE — 250N000009 HC RX 250: Performed by: INTERNAL MEDICINE

## 2021-08-19 PROCEDURE — 73700 CT LOWER EXTREMITY W/O DYE: CPT | Mod: RT

## 2021-08-19 PROCEDURE — 99253 IP/OBS CNSLTJ NEW/EST LOW 45: CPT | Performed by: INTERNAL MEDICINE

## 2021-08-19 RX ORDER — HYDROMORPHONE HYDROCHLORIDE 2 MG/1
2-4 TABLET ORAL
Status: DISCONTINUED | OUTPATIENT
Start: 2021-08-19 | End: 2021-08-24

## 2021-08-19 RX ORDER — PREGABALIN 25 MG/1
25 CAPSULE ORAL AT BEDTIME
Status: DISCONTINUED | OUTPATIENT
Start: 2021-08-20 | End: 2021-08-20

## 2021-08-19 RX ORDER — CEFTRIAXONE 2 G/1
2 INJECTION, POWDER, FOR SOLUTION INTRAMUSCULAR; INTRAVENOUS EVERY 12 HOURS
Status: DISCONTINUED | OUTPATIENT
Start: 2021-08-19 | End: 2021-08-21

## 2021-08-19 RX ORDER — GADOBUTROL 604.72 MG/ML
10 INJECTION INTRAVENOUS ONCE
Status: COMPLETED | OUTPATIENT
Start: 2021-08-19 | End: 2021-08-19

## 2021-08-19 RX ORDER — METOPROLOL TARTRATE 25 MG/1
25 TABLET, FILM COATED ORAL EVERY 6 HOURS
Status: DISCONTINUED | OUTPATIENT
Start: 2021-08-19 | End: 2021-08-20

## 2021-08-19 RX ORDER — LIDOCAINE 40 MG/G
CREAM TOPICAL 4 TIMES DAILY
Status: ACTIVE | OUTPATIENT
Start: 2021-08-19 | End: 2021-08-19

## 2021-08-19 RX ADMIN — PERFLUTREN 3 ML: 6.52 INJECTION, SUSPENSION INTRAVENOUS at 09:15

## 2021-08-19 RX ADMIN — PROCHLORPERAZINE EDISYLATE 10 MG: 5 INJECTION INTRAMUSCULAR; INTRAVENOUS at 16:40

## 2021-08-19 RX ADMIN — METOPROLOL TARTRATE 25 MG: 25 TABLET, FILM COATED ORAL at 06:54

## 2021-08-19 RX ADMIN — ACETAMINOPHEN 975 MG: 325 TABLET ORAL at 06:55

## 2021-08-19 RX ADMIN — HYDROMORPHONE HYDROCHLORIDE 4 MG: 2 TABLET ORAL at 14:16

## 2021-08-19 RX ADMIN — OXYCODONE HYDROCHLORIDE 10 MG: 5 TABLET ORAL at 04:35

## 2021-08-19 RX ADMIN — DOXYCYCLINE 100 MG: 100 INJECTION, POWDER, LYOPHILIZED, FOR SOLUTION INTRAVENOUS at 09:12

## 2021-08-19 RX ADMIN — SODIUM CHLORIDE: 9 INJECTION, SOLUTION INTRAVENOUS at 17:39

## 2021-08-19 RX ADMIN — ACETAMINOPHEN 975 MG: 325 TABLET ORAL at 22:49

## 2021-08-19 RX ADMIN — AZTREONAM 2 G: 2 INJECTION, POWDER, LYOPHILIZED, FOR SOLUTION INTRAMUSCULAR; INTRAVENOUS at 09:26

## 2021-08-19 RX ADMIN — METRONIDAZOLE 500 MG: 500 INJECTION, SOLUTION INTRAVENOUS at 00:59

## 2021-08-19 RX ADMIN — KETOROLAC TROMETHAMINE 30 MG: 30 INJECTION, SOLUTION INTRAMUSCULAR; INTRAVENOUS at 13:34

## 2021-08-19 RX ADMIN — KETOROLAC TROMETHAMINE 30 MG: 30 INJECTION, SOLUTION INTRAMUSCULAR; INTRAVENOUS at 00:23

## 2021-08-19 RX ADMIN — OXYCODONE HYDROCHLORIDE 10 MG: 5 TABLET ORAL at 00:58

## 2021-08-19 RX ADMIN — KETOROLAC TROMETHAMINE 30 MG: 30 INJECTION, SOLUTION INTRAMUSCULAR; INTRAVENOUS at 06:23

## 2021-08-19 RX ADMIN — METRONIDAZOLE 500 MG: 500 INJECTION, SOLUTION INTRAVENOUS at 10:57

## 2021-08-19 RX ADMIN — METRONIDAZOLE 500 MG: 500 INJECTION, SOLUTION INTRAVENOUS at 23:47

## 2021-08-19 RX ADMIN — KETOROLAC TROMETHAMINE 30 MG: 30 INJECTION, SOLUTION INTRAMUSCULAR; INTRAVENOUS at 18:05

## 2021-08-19 RX ADMIN — GADOBUTROL 10 ML: 604.72 INJECTION INTRAVENOUS at 19:29

## 2021-08-19 RX ADMIN — ENOXAPARIN SODIUM 40 MG: 100 INJECTION SUBCUTANEOUS at 13:35

## 2021-08-19 RX ADMIN — METOPROLOL TARTRATE 25 MG: 25 TABLET, FILM COATED ORAL at 20:36

## 2021-08-19 RX ADMIN — DOXYCYCLINE 100 MG: 100 INJECTION, POWDER, LYOPHILIZED, FOR SOLUTION INTRAVENOUS at 20:37

## 2021-08-19 RX ADMIN — ACETAMINOPHEN 975 MG: 325 TABLET ORAL at 13:34

## 2021-08-19 RX ADMIN — CEFTRIAXONE SODIUM 2 G: 2 INJECTION, POWDER, FOR SOLUTION INTRAMUSCULAR; INTRAVENOUS at 16:10

## 2021-08-19 RX ADMIN — AMLODIPINE BESYLATE 2.5 MG: 2.5 TABLET ORAL at 09:16

## 2021-08-19 RX ADMIN — SODIUM CHLORIDE: 9 INJECTION, SOLUTION INTRAVENOUS at 11:01

## 2021-08-19 RX ADMIN — OXYCODONE HYDROCHLORIDE 10 MG: 5 TABLET ORAL at 09:26

## 2021-08-19 RX ADMIN — CEFTRIAXONE SODIUM 2 G: 2 INJECTION, POWDER, FOR SOLUTION INTRAMUSCULAR; INTRAVENOUS at 02:13

## 2021-08-19 RX ADMIN — SODIUM CHLORIDE 1000 ML: 4.5 INJECTION, SOLUTION INTRAVENOUS at 13:35

## 2021-08-19 RX ADMIN — AZTREONAM 2 G: 2 INJECTION, POWDER, LYOPHILIZED, FOR SOLUTION INTRAMUSCULAR; INTRAVENOUS at 01:45

## 2021-08-19 RX ADMIN — PROCHLORPERAZINE EDISYLATE 10 MG: 5 INJECTION INTRAMUSCULAR; INTRAVENOUS at 23:59

## 2021-08-19 RX ADMIN — ACETAMINOPHEN, ASPIRIN AND CAFFEINE 1 TABLET: 250; 250; 65 TABLET, FILM COATED ORAL at 17:39

## 2021-08-19 RX ADMIN — FAMOTIDINE 40 MG: 20 TABLET, FILM COATED ORAL at 09:15

## 2021-08-19 RX ADMIN — METOPROLOL TARTRATE 25 MG: 25 TABLET, FILM COATED ORAL at 14:42

## 2021-08-19 RX ADMIN — HYDROMORPHONE HYDROCHLORIDE 4 MG: 2 TABLET ORAL at 20:36

## 2021-08-19 ASSESSMENT — MIFFLIN-ST. JEOR: SCORE: 2353.26

## 2021-08-19 NOTE — PROGRESS NOTES
[facility]    PROGRESS NOTE - Hospitalist Service    Assessment and Plan    Active Problems:    Fibrous dysplasia of bone    Essential hypertension, benign    Lymphedema    Obesity, unspecified    H/O Hodgkin's lymphoma    Tachycardia    Cellulitis of right lower extremity    Elevated lactic acid level    Bacterial sepsis (H)    Gram-positive bacteremia    Romeo Chong is a 40 year old old male with h/o increased leg swelling and fevers.     Sepsis with GPC bacteremia   - Appreciate ID consult   - IV aztreonam, ceftriaxone and doxycycline   - MRI of leg suggest possible osteo, Ortho consulted    - IVF  - trending labs   - Echo negative   - elevated procal and lactic, WBC trending up slightly was normal on admission      Cellulitis possible sepsis with elevated lactic but normal WBC and febrile  - no blood cx taken previously in ED and unfortunately obtained after IV abx started  2 sets,   - given 2gm IV ceftriaxone x1 at 0230 on admission   - planned for vanco but patient has allergy   - ID consult, managing IV abx as above   - IVF  - MRI completed   - ortho following and rec'd CT with h/o Fibrous dysplasia with history of previous pathologic femur and tibia fractures if needs surgery they recommend [facility] to [facility] or [facility] Otherwise immobilizer otherwise  - NWB RLE   - pain control   - lymphedema care pending CTand Orthos further recs if ok to start compression   - CK total normal      Elevated lactic still elevated   - given liter bolus and continuous IVF  - checking Q6hrs      Tachycardia persists likely form infection   - treating infection   - IVF  - telemetry   - trial of BB metoprolol 25mg Q6hrs with hold parameters      H/O NHL  - oncology consulted  -Chest CT shows increased in mass in chest patient aware of this and has not followed up with oncology for some time     Chronic pain   - norco 1-2 tabs with pain scale  - pain team to see  -hold on IV pain med for now   -  vistaril prn   - IV toradol scheduled for a few doses      HTN  - continue norvasc  - holding hydrochlorothiazide   - trial of BB for tachycardiac, aware of AA.     VTE prophylaxis:  Enoxaparin (Lovenox) SQ  DIET: Orders Placed This Encounter      Regular Diet Adult    Drains/Lines: none  Weight bearing status: NWB RLE   Disposition/Barriers to discharge: multiple days   Code Status: Full Code    Subjective:  Is feeling better since admission     PHYSICAL EXAM  Vitals:    08/17/21 2242 08/18/21 1523   Weight: 120.2 kg (265 lb) 139.8 kg (308 lb 3.2 oz)     B/P:115/62 T:100.7[rn notified [ P:132 R:20     Intake/Output Summary (Last 24 hours) at 8/19/2021 0808  Last data filed at 8/19/2021 0625  Gross per 24 hour   Intake 2470 ml   Output 750 ml   Net 1720 ml      Body mass index is 42.99 kg/m .    Constitutional: awake, alert, cooperative, appears stated age and morbidly obese  Eyes: EOM intact no scleral icterus   ENT: Normocephalic, without obvious abnormality, atraumatic, sinuses nontender on palpation, external ears without lesions, oral pharynx with moist mucous membranes, tonsils without erythema or exudates, gums normal and good dentition.  Respiratory: CTA bl/ No W/R/R  Cardiovascular: tachycardic reg rhythm   GI: No scars, normal bowel sounds, soft, non-distended, non-tender, no masses palpated, no hepatosplenomegally  Skin: no erythema noted but skin dark, surgical scar right knee, warm and tender to touch   Musculoskeletal: significant edema RLE, see Dr. Ann note for picture from 8/18.  Neurologic: no focal deficits   Neuropsychiatric: General: normal, calm and normal eye contact      PERTINENT LABS/IMAGING:  Results for orders placed or performed during the hospital encounter of 08/17/21   US Lower Extremity Venous Duplex Right    Impression    IMPRESSION:  1.  No deep venous thrombosis in the right lower extremity.   XR Chest 1 View    Impression    IMPRESSION: Negative chest.   CT Chest Abdomen  Pelvis w/o Contrast    Impression    IMPRESSION:  1.  Inflammatory lymphadenopathy of the retroperitoneum of the abdomen, right pelvis, and right inguinal station likely secondary to the presumed infectious process involving the right leg as demonstrated on the MR examination from earlier today.   Involvement with lymphoma is not excluded.  2.  Lobulated soft tissue of the anterior mediastinum has mildly increased in size compared back to the remote study of 04/22/2013. There are now some enlarged lymph nodes superior to this near the left carotid and jugular vessels. Findings are   concerning for active lymphoma. Clinical correlation recommended.  3.  Hepatic steatosis.    I discussed the findings with at on .   MR Femur Thigh Right wo & w Contrast    Impression    IMPRESSION:  1.  Findings highly suspicious for osteomyelitis involving the distal aspect of the right femur with replacement of normal T1 signal distally and surrounding reactive edema. Additionally, there are linear areas of increased T2 signal abnormality   extending through the anterior cortex possibly representing small sinus tracts draining from the right femur suggesting chronic osteomyelitis. There is cortical thickening throughout this area and fluid along the anterior cortex in the distal right   thigh.    2.  Additionally, there is diffuse edema in the anterior musculature of the distal right thigh with minimal layering fluid which can be seen with myositis and fasciitis.    3.  Subcutaneous edema diffusely throughout the right thigh distally and anteriorly in the right thigh with reactive lymphadenopathy right inguinal region.    4.  Final report following review by musculoskeletal radiologist.    Preliminary Interpretation Dictated By: Curt L. Behrns, MD  Date: 8/19/2021   MR Tibia Fibula Lower Leg Right wo & w Contr    Impression    IMPRESSION:  1.  Heterogeneous decreased T1 signal throughout the proximal right tibia with irregularity of  the anterior cortex with draining linear areas of fluid signal intensity into the soft tissue suspicious for draining sinus tracts from an area of suspected   chronic osteomyelitis in the proximal right tibia.    2.  Severe subcutaneous edema right lower extremity.    3.  Final report following review by musculoskeletal radiologist.    Preliminary Interpretation Dictated By: Curt L. Behrns, MD  Date: 8/19/2021      Most Recent 3 CBC's:Recent Labs   Lab Test 08/19/21  0746 08/18/21  1410 08/17/21  2256   WBC 12.5* 11.3* 4.0   HGB 12.9* 13.2* 15.6   MCV 82 83 81    184 229     Most Recent 3 BMP's:Recent Labs   Lab Test 08/19/21  0746 08/18/21  1410 08/17/21  2256    137 139   POTASSIUM 3.8 4.0 3.7   CHLORIDE 108* 107 105   CO2 21* 19* 22   BUN 14 8 6*   CR 0.83 0.74 0.73   ANIONGAP 10 11 12   NATALYA 9.2 8.5 9.5    163* 150*     Most Recent 2 LFT's:Recent Labs   Lab Test 08/17/21  2256   AST 25   ALT 33   ALKPHOS 72   BILITOTAL 0.5     Most Recent D-dimer:No lab results found.  Most Recent 6 Bacteria Isolates From Any Culture (See EPIC Reports for Culture Details):No lab results found.  Most Recent ESR & CRP:Recent Labs   Lab Test 08/19/21  0746   CRP 41.3*     Most Recent CPK:Recent Labs   Lab Test 08/18/21  1410     116     Yessenia Guzmán MD  [facility] Medicine Service  [phone]

## 2021-08-19 NOTE — PROVIDER NOTIFICATION
Text paged Dr Shepard about fever and HR. Verbal order received to ok to give scheduled tylenol and scheduled metoprolol early.

## 2021-08-19 NOTE — PLAN OF CARE
Problem: Pain Chronic (Persistent)  Goal: Acceptable Pain Control and Functional Ability  Outcome: No Change  Intervention: Develop Pain Management Plan  Recent Flowsheet Documentation  Taken 8/19/2021 1416 by Isis Baumann, RN  Pain Management Interventions: medication (see MAR)  Taken 8/19/2021 0926 by Isis Baumann, RN  Pain Management Interventions:   medication (see MAR)   pillow support provided   Patient remains in bed with RLE elevated. Reports 9/10 pain RLE. Given Oxycodone prn once today, and Dilaudid po at 1400. Some relief noted.   Scheduled for MRI and CT sometime tonight.

## 2021-08-19 NOTE — CONSULTS
[facility]ology [facility] Inpatient Consult Note    Patient: Romeo Chong  MRN: [id number]  Date of Service: 8/19/2021      Reason for Visit    I was consulted by   regarding cancer history    Assessment/Plan    Hodgkin's lymphoma, type not available and stage not available, managed in [phone] by Fran Escobar MD from [facility] with radiation therapy to the upper body.  The patient has never had chemotherapy and after follow up for several years, has not been getting any interval surveillance for cancer.    The current admission for lymphedema and cellulits appears to be unrelated to the remote history of Hodgkins.    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    ECOG Performance Status: 4 due to apparent infection.    ______________________________________________________________________________      Staging History    Cancer Staging  No matching staging information was found for the patient.      History  . Romeo Chong is a 40 year old with history per above.  Records of treatment are unobtainable.  His care was initially through Dr. Escobar, and he subsequently was seen by a provider in [place].  He has not had any active oncologic oversight for a number of years    Review of systems.  No fever or night sweats.  No loss of weight.  No lumps or bumps anywhere.  No unusual headaches or eyesight issues.  No dizziness.  No bleeding from the nose.  No sores in the mouth. No problems with swallowing.  No chest pain. No shortness of breath. No cough.  No abdominal pain. No nausea or vomiting.  No diarrhea or constipation.  No blood in stool or black colored stools.  No problems passing urine.    His limbs, especially his right leg, are quite sore and swollen.0    No skin rashes.  A 14 point review of systems is otherwise negative.        Past History  Past Medical History:    Diagnosis Date     Cancer (H)      Hyperlipidemia      Hypertension      Past Surgical History:   Procedure Laterality Date     FRACTURE SURGERY       HC REMOVAL HEEL SPUR, CALCANEUS Left 2021    Procedure: EXCISION, BONE SPUR, FOOT, WITH PLANTAR FASCIA RELEASE;  Surgeon: Stephon Singleton DPM;  Location: [facility] [place];  Service: Podiatry     Family History   Problem Relation Age of Onset     Cirrhosis Mother      Hypertension Mother      Diabetes Type 2  Father      Kidney failure Father      Cerebrovascular Disease Father      Hypertension Father      Cerebrovascular Disease Sister      Hypertension Paternal Grandmother      Hypertension Paternal Grandfather      Social History     Socioeconomic History     Marital status: Single     Spouse name: Not on file     Number of children: Not on file     Years of education: Not on file     Highest education level: Not on file   Occupational History     Not on file   Tobacco Use     Smoking status: Former Smoker     Packs/day: 1.00     Start date: 1999     Quit date: 2009     Years since quittin.3     Smokeless tobacco: Never Used   Substance and Sexual Activity     Alcohol use: Yes     Drug use: Never     Sexual activity: Not Currently   Other Topics Concern     Not on file   Social History Narrative    , 3 children, 1  recently. Non smoker. Socially drinks alcohol. Not working.      Social Determinants of Health     Financial Resource Strain:      Difficulty of Paying Living Expenses:    Food Insecurity:      Worried About Running Out of Food in the Last Year:      Ran Out of Food in the Last Year:    Transportation Needs:      Lack of Transportation (Medical):      Lack of Transportation (Non-Medical):    Physical Activity:      Days of Exercise per Week:      Minutes of Exercise per Session:    Stress:      Feeling of Stress :    Social Connections:      Frequency of Communication with Friends and Family:      Frequency of Social  "Gatherings with Friends and Family:      Attends [employer] Services:      Active Member of Clubs or Organizations:      Attends Club or Organization Meetings:      Marital Status:    Intimate Partner Violence:      Fear of Current or Ex-Partner:      Emotionally Abused:      Physically Abused:      Sexually Abused:        Allergies    Allergies   Allergen Reactions     Vancomycin Anaphylaxis     Peanut Oil Itching     Tree Nuts [Nuts] Itching     Erythromycin Unknown     Latex Unknown     Added based on information entered during case entry, please review and add reactions, type, and severity as needed     Other Environmental Allergy Unknown     DUST     Pollen Extracts [Pollen Extract] Unknown     Zosyn [Piperacillin-Tazobactam In D5w] Tinnitus and Itching     Oxycodone Itching and Rash          Physical Exam    BP (!) 140/79 (BP Location: Left arm)   Pulse 108   Temp 98.4  F (36.9  C) (Oral)   Resp 20   Ht 1.803 m (5' 11\")   Wt 139.8 kg (308 lb 3.2 oz)   SpO2 97%   BMI 42.99 kg/m      Obese Black male.    GENERAL: Alert and oriented to time place and person. Lying in bed.  Cooperative.  Somewhat uncomfortable    HEAD: Atraumatic and normocephalic.    EYES: AMRIE, EOMI.  No pallor.  No icterus.    Oral cavity: no mucosal lesion or tonsillar enlargement.    NECK: supple. JVP normal.  No thyroid enlargement.    LYMPH NODES: No palpable, cervical, axillary or inguinal lymphadenopathy.    CHEST: clear to auscultation bilaterally.  Resonant to percussion throughout bilaterally.  Symmetrical breath movements bilaterally.    CVS: S1 and S2 are heard. Regular rate and rhythm.  No murmur or gallop or rub heard.  No peripheral edema.    ABDOMEN: Soft. Not tender. Not distended.  No palpable hepatomegaly or splenomegaly.  No other mass palpable.  Bowel sounds heard.    EXTREMITIES: Right leg is quite swollen and warm to the touch.    SKIN: no rash, or bruising or purpura.  Has a full head of hair.    Lab " Results  Recent Results (from the past 24 hour(s))   Lactic acid whole blood    Collection Time: 08/18/21  7:57 PM   Result Value Ref Range    Lactic Acid 1.8 0.7 - 2.0 mmol/L   Lactic acid whole blood    Collection Time: 08/19/21 12:58 AM   Result Value Ref Range    Lactic Acid 1.4 0.7 - 2.0 mmol/L   Lactic acid whole blood    Collection Time: 08/19/21  7:45 AM   Result Value Ref Range    Lactic Acid 2.1 (H) 0.7 - 2.0 mmol/L   Basic metabolic panel    Collection Time: 08/19/21  7:46 AM   Result Value Ref Range    Sodium 139 136 - 145 mmol/L    Potassium 3.8 3.5 - 5.0 mmol/L    Chloride 108 (H) 98 - 107 mmol/L    Carbon Dioxide (CO2) 21 (L) 22 - 31 mmol/L    Anion Gap 10 5 - 18 mmol/L    Urea Nitrogen 14 8 - 22 mg/dL    Creatinine 0.83 0.70 - 1.30 mg/dL    Calcium 9.2 8.5 - 10.5 mg/dL    Glucose 111 70 - 125 mg/dL    GFR Estimate >90 >60 mL/min/1.73m2   CRP inflammation    Collection Time: 08/19/21  7:46 AM   Result Value Ref Range    CRP 41.3 (H) 0.0-<0.8 mg/dL   Extra Red Top Tube    Collection Time: 08/19/21  7:46 AM   Result Value Ref Range    Hold Specimen JIC    CBC with platelets and differential    Collection Time: 08/19/21  7:46 AM   Result Value Ref Range    WBC Count 12.5 (H) 4.0 - 11.0 10e3/uL    RBC Count 4.95 4.40 - 5.90 10e6/uL    Hemoglobin 12.9 (L) 13.3 - 17.7 g/dL    Hematocrit 40.8 40.0 - 53.0 %    MCV 82 78 - 100 fL    MCH 26.1 (L) 26.5 - 33.0 pg    MCHC 31.6 31.5 - 36.5 g/dL    RDW 15.2 (H) 10.0 - 15.0 %    Platelet Count 203 150 - 450 10e3/uL   Manual Differential    Collection Time: 08/19/21  7:46 AM   Result Value Ref Range    % Neutrophils 80 %    % Lymphocytes 4 %    % Monocytes 3 %    % Eosinophils 0 %    % Basophils 0 %    % Metamyelocytes 11 %    % Myelocytes 2 %    Absolute Neutrophils 10.0 (H) 1.6 - 8.3 10e3/uL    Absolute Lymphocytes 0.5 (L) 0.8 - 5.3 10e3/uL    Absolute Monocytes 0.4 0.0 - 1.3 10e3/uL    Absolute Eosinophils 0.0 0.0 - 0.7 10e3/uL    Absolute Basophils 0.0 0.0 - 0.2  10e3/uL    Absolute Metamyelocytes 1.4 (H) <=0.0 10e3/uL    Absolute Myelocytes 0.3 (H) <=0.0 10e3/uL    RBC Morphology Confirmed RBC Indices     Platelet Assessment  Automated Count Confirmed. Platelet morphology is normal.     Automated Count Confirmed. Platelet morphology is normal.   Echocardiogram Complete    Collection Time: 08/19/21  9:22 AM   Result Value Ref Range    LVEF  55-60%    Lactic acid whole blood    Collection Time: 08/19/21  1:04 PM   Result Value Ref Range    Lactic Acid 3.8 (H) 0.7 - 2.0 mmol/L        Imaging Results    XR Chest 1 View    Result Date: 8/18/2021  EXAM: XR CHEST 1 VIEW LOCATION: [facility] DATE/TIME: 8/18/2021 1:26 AM INDICATION: fever, chills COMPARISON: 10/29/2012.     IMPRESSION: Negative chest.    MR Femur Thigh Right wo & w Contrast    Result Date: 8/19/2021  FINAL REPORT I agree with the preliminary report. There are findings highly suspicious for acute on chronic osteomyelitis involving the distal aspect of the right femur. Linear areas of signal abnormality may represent small sinus tract. There are surrounding soft tissue changes in the distal, anterior thigh which can be seen with infection including myositis and fasciitis. Superimposed pathologic fracture of the right femur may also be present as there is some cortical offset in the distal femoral shaft. Correlation with CT of the femur may be useful for further assessment of possible distal femoral fracture. Subcutaneous edema diffusely throughout the right thigh distally and anteriorly in the right thigh with reactive lymphadenopathy right inguinal region. Final Interpretation Dictated By: Lee Leos Date: 8/19/2021 PRELIMINARY REPORT EXAM: MR FEMUR THIGH RIGHT WO AND W CONTRAST LOCATION: [facility] DATE/TIME: 8/18/2021 10:18 PM INDICATION: Cellulitis with sepsis with elevated lactate and right leg pain. Evaluate for osteomyelitis or deep tissue space infection.  COMPARISON: MRI of the tibia and fibula 08/18/2021. TECHNIQUE: Routine. Additional postgadolinium T1 sequences were obtained. IV CONTRAST: 10 ml. FINDINGS: JOINTS AND BONES: -Replacement of the normal T1 signal involving the distal right femur particularly along the lateral aspect of the distal right femur above the level of the condyles (series 2, image 54). There is associated reactive edema in this area. Additionally, there appears to be breach of the cortex by linear fluid extending along the anterior cortex and through the anterior cortex into the intramedullary space (series 5, image 48-50). These findings are suspicious for osteomyelitis with draining sinus tracts. More proximal aspect of the right femur demonstrates no evidence for replacement of the normal T1 signal or evidence for intramedullary edema. No fracture or dislocation. TENDONS: -No tendon tear, tendinopathy, or tenosynovitis. MUSCLES AND SOFT TISSUES: -Diffuse muscular edema involving the anterior compartment of the distal right thigh particularly surrounding the area of the draining presumed sinus tracts. There is small amount of fluid between the muscle and the anterior cortex of the distal right femur. Minimal amount of interfascial fluid involving the distal medial and lateral aspects of the right thigh. Severe subcutaneous edema diffusely throughout the right thigh. Moderate fluid involving the subcutaneous tissues of the mid and anterior right thigh. Enlarged right inguinal lymph node, likely reactive.     IMPRESSION: 1.  Findings highly suspicious for osteomyelitis involving the distal aspect of the right femur with replacement of normal T1 signal distally and surrounding reactive edema. Additionally, there are linear areas of increased T2 signal abnormality extending through the anterior cortex possibly representing small sinus tracts draining from the right femur suggesting chronic osteomyelitis. There is cortical thickening throughout  this area and fluid along the anterior cortex in the distal right thigh. 2.  Additionally, there is diffuse edema in the anterior musculature of the distal right thigh with minimal layering fluid which can be seen with myositis and fasciitis. 3.  Subcutaneous edema diffusely throughout the right thigh distally and anteriorly in the right thigh with reactive lymphadenopathy right inguinal region. 4.  Final report following review by musculoskeletal radiologist. Preliminary Interpretation Dictated By: Curt L. Behrns, MD Date: 8/19/2021    MR Tibia Fibula Lower Leg Right wo & w Contr    Result Date: 8/19/2021  FINAL REPORT I agree with the preliminary report. There are signal changes in the right proximal tibia which raises concern for an acute on chronic osteomyelitis. Additional soft tissue findings suspicious for draining sinus tracts as described. Final Interpretation Dictated By: Lee Leos Date: 8/19/2021 PRELIMINARY REPORT EXAM: MR TIBIA FIBULA LOWER LEG RIGHT WO AND W CONTR LOCATION: [facility] DATE/TIME: 8/18/2021 10:18 PM INDICATION: Evaluate for osteomyelitis involving the tibia and fibula with severe right lower extremity pain and soft tissue swelling. COMPARISON: None. TECHNIQUE: Routine. Additional postgadolinium T1 sequences were obtained. IV CONTRAST: 10 ml Gadavist. FINDINGS: BONES: -Heterogeneous areas of decreased T1 signal throughout the proximal right tibia with irregularity to the cortex with linear fluidlike areas in the anterior right cortex. These linear areas of fluid in the anterior medial right cortex (series 6, image 13-17) are suspicious for linear draining sinus tracts. Overall findings are suspicious for osteomyelitis in the proximal right tibia, likely chronic. Distal right tibia and fibula demonstrate no evidence for other areas suspicious for osteomyelitis. SOFT TISSUES:  -Severe subcutaneous edema diffusely throughout the right lower extremity greatest  near the mid to lower right lower extremity down to the level of the ankle. MUSCLES: -No abnormality in the visualized musculature.     IMPRESSION: 1.  Heterogeneous decreased T1 signal throughout the proximal right tibia with irregularity of the anterior cortex with draining linear areas of fluid signal intensity into the soft tissue suspicious for draining sinus tracts from an area of suspected chronic osteomyelitis in the proximal right tibia. 2.  Severe subcutaneous edema right lower extremity. 3.  Final report following review by musculoskeletal radiologist. Preliminary Interpretation Dictated By: Curt L. Behrns, MD Date: 2021     Echocardiogram Complete    Result Date: 2021  [id number]^[address] [address]  Name: CABRERA GHOSH MRN: [id number] : 1980 Study Date: 2021 08:46 AM Age: 40 yrs Gender: Male Patient Location: [facility] Reason For Study: Endocarditis Ordering Physician: VANNA GARCIA Performed By: CANDIDO  BSA: 2.5 m2 Height: 71 in Weight: 308 lb HR: 107 BP: 114/59 mmHg ______________________________________________________________________________ Procedure Definity ([facility]C #64152-057) given intravenously. Complete Echo Adult. Technically difficult study.Extremely difficult acoustic windows despite the use of contrast for endcardial border definition. ______________________________________________________________________________ Interpretation Summary  1. Left ventricular size, wall thickness, and systolic function are normal. The estimated left ventricular ejection fraction is 55%. 2. Right ventricular size and systolic function are normal. 3. No hemodynamically significant valvular abnormalities. 4. No prior study available for comparison. ______________________________________________________________________________ I      WMSI = 1.00     % Normal = 100  X - Cannot   0 -                      (2) - Mildly 2 -           Segments  Size Interpret    Hyperkinetic 1 - Normal  Hypokinetic  Hypokinetic  1-2     small                                                    7 -          3-5    moderate 3 - Akinetic 4 -          5 -         6 - Akinetic Dyskinetic   6-14    large              Dyskinetic   Aneurysmal  w/scar       w/scar       15-16   diffuse  Left Ventricle The left ventricle is normal in size. There is mild concentric left ventricular hypertrophy. Left ventricular systolic function is normal. The visual ejection fraction is 55-60%. Diastolic function not assessed due to tachycardia. No regional wall motion abnormalities noted.  Right Ventricle Normal right ventricle size and systolic function.  Atria Normal left atrial size. Right atrial size is normal.  Mitral Valve Mitral valve leaflets appear normal. There is trace mitral regurgitation. There is no mitral valve stenosis.  Tricuspid Valve Tricuspid valve leaflets appear normal. There is trace to mild tricuspid regurgitation. Right ventricle systolic pressure estimate normal. The right ventricular systolic pressure is elevated at 12.4 mmHg. There is no tricuspid stenosis.  Aortic Valve Aortic valve leaflets appear normal. No aortic regurgitation is present. No aortic stenosis is present.  Pulmonic Valve The pulmonic valve is not well visualized. There is trace pulmonic valvular regurgitation. There is no pulmonic valvular stenosis.  Vessels The aorta root is normal. IVC diameter <2.1 cm collapsing >50% with sniff suggests a normal RA pressure of 3 mmHg.  Pericardium There is no pericardial effusion.  Rhythm The rhythm was sinus tachycardia.  ______________________________________________________________________________ MMode/2D Measurements & Calculations RVDd: 4.2 cm IVSd: 1.2 cm LVIDd: 4.5 cm LVIDs: 3.0 cm LVPWd: 1.2 cm FS: 32.9 %  LV mass(C)d: 203.0 grams LV mass(C)dI: 80.1 grams/m2 Ao root diam: 3.9 cm LA dimension: 3.6 cm asc Aorta Diam: 3.1 cm LA/Ao: 0.93 LVOT  diam: 2.3 cm LVOT area: 4.0 cm2 LA Volume Indexed (AL/bp): 27.4 ml/m2 RWT: 0.54  Doppler Measurements & Calculations Ao V2 max: 156.1 cm/sec Ao max PG: 10.0 mmHg Ao V2 mean: 105.9 cm/sec Ao mean P.3 mmHg Ao V2 VTI: 28.7 cm GREGORIO(I,D): 3.4 cm2 GREGORIO(V,D): 3.3 cm2 LV V1 max P.5 mmHg LV V1 max: 127.9 cm/sec LV V1 VTI: 24.3 cm  SV(LVOT): 97.4 ml SI(LVOT): 38.4 ml/m2 PA acc time: 0.12 sec TR max aaron: 176.2 cm/sec TR max P.4 mmHg AV Aaron Ratio (DI): 0.82 GREGORIO Index (cm2/m2): 1.3  ______________________________________________________________________________ Report approved by: Miladis Holguin 2021 09:44 AM       CT Chest Abdomen Pelvis w/o Contrast    Result Date: 2021  EXAM: CT CHEST ABDOMEN PELVIS W/O CONTRAST LOCATION: [facility] DATE/TIME: 2021 11:42 PM INDICATION: Sepsis COMPARISON: CTA chest 2013. CT abdomen and pelvis 2005. Chest radiographs 2021. MR of the right lower extremity earlier today. TECHNIQUE: CT scan of the chest, abdomen, and pelvis was performed without IV contrast. Multiplanar reformats were obtained. Dose reduction techniques were used. CONTRAST: None. FINDINGS: LUNGS AND PLEURA: Mild atelectasis bilaterally. Lungs otherwise clear. No pleural fluid or pneumothorax. MEDIASTINUM/AXILLAE: There is lobulated soft tissue intermingled with lobules of fat in the left anterior mediastinum which measures up to 7.5 x 3.6 cm on axial images (image 51 series 4). A similar finding with less intermixed fat measured up to 6.7 x 3.2 cm on 2013. There are now enlarged lymph nodes extending superiorly lateral to the left common carotid artery and internal jugular vein which measure up to 2 cm axillary dissection clips on the left. (image 16 of series 4). CORONARY ARTERY CALCIFICATION: None. HEPATOBILIARY: Hepatic steatosis. Normal gallbladder and bile ducts. PANCREAS: Normal. SPLEEN: Normal. ADRENAL GLANDS: Normal. KIDNEYS/BLADDER: Excreted  contrast in the urinary system from a prior study. Kidneys and bladder otherwise normal. BOWEL: Normal. No obstruction or inflammation. Normal appendix. LYMPH NODES: Multiple enlarged and inflamed lymph nodes of the abdominal retroperitoneum measure up to 2.3 cm near the IVC. Inflamed retroperitoneal lymphadenopathy in the pelvis near the iliac vessels is asymmetric on the right, notably the same side as  the presumed infectious process involving the right leg on the prior MR lower extremity study from today. The largest right external iliac chain lymph node is 3.2 cm. The largest included right inguinal lymph node is 3.0 cm. Right inguinal lymphadenectomy clips are present. VASCULATURE: Unremarkable. PELVIC ORGANS: Normal. MUSCULOSKELETAL: Edema of the subcutaneous fat of the right groin and included anterior right thigh consistent with cellulitis. This was more fully imaged on the MR examination from earlier today.     IMPRESSION: 1.  Inflammatory lymphadenopathy of the retroperitoneum of the abdomen, right pelvis, and right inguinal station likely secondary to the presumed infectious process involving the right leg as demonstrated on the MR examination from earlier today. Involvement with lymphoma is not excluded. 2.  Lobulated soft tissue of the anterior mediastinum has mildly increased in size compared back to the remote study of 04/22/2013. There are now some enlarged lymph nodes superior to this near the left carotid and jugular vessels. Findings are concerning for active lymphoma. Clinical correlation recommended. 3.  Hepatic steatosis. I discussed the findings with at on .    US Lower Extremity Venous Duplex Right    Result Date: 8/18/2021  EXAM: US LOWER EXTREMITY VENOUS DUPLEX RIGHT LOCATION: [facility] DATE/TIME: 8/18/2021 1:40 AM INDICATION: Right leg pain, swelling and redness. COMPARISON: None. TECHNIQUE: Venous Duplex ultrasound of the right lower extremity with and without  compression, augmentation and duplex. Color flow and spectral Doppler with waveform analysis performed. FINDINGS: Exam includes the common femoral, femoral, popliteal, and contralateral common femoral veins as well as segmentally visualized deep calf veins and greater saphenous vein. Evaluation of the calf veins is limited by leg swelling and body habitus. RIGHT: No deep vein thrombosis. No superficial thrombophlebitis. No popliteal cyst.     IMPRESSION: 1.  No deep venous thrombosis in the right lower extremity.       Signed by: Lani Fox MD

## 2021-08-19 NOTE — PROGRESS NOTES
Paged regarding positive BC's 8/17 GPC chains/pairs.  ID has on CTX, doxy, flagyl. Daily BC's ordered.

## 2021-08-19 NOTE — PROGRESS NOTES
[facility]    Infectious Disease Progress Note    Date of Service (when I saw the patient): 08/19/2021     Assessment & Plan   Romeo Chong is a 40 year old male who was admitted on 8/17/2021.     1. Right LE cellulitis, osteomyelitis, lymphedema  2. Group B Streptococcus bacteremia- Streptococcus agalactiae  3. Orthopedic following  4. Sepsis  5. History of Lymphoma  6. Inguinal lymphadenopathy  Multiple enlarged and inflamed lymph nodes of the abdominal retroperitoneum measure up to 2.3 cm near the IVC. Inflamed retroperitoneal lymphadenopathy in the pelvis near the iliac vessels is asymmetric on the right, notably the same side as   the presumed infectious process involving the right leg on the prior MR lower extremity study from today. The largest right external iliac chain lymph node is 3.2 cm. The largest included right inguinal lymph node is 3.0 cm. Right inguinal lymphadenectomy clips are present.  7. Morbid obesity Body mass index is 42.99 kg/m .   8. 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 Several allergies    Recommendations:    1. Patient has tolerated Ceftriaxone and dose increased  2. Orthopedic Surgery recommending CT scan and transfer [facility] or [facility]. Agree with transfer  3. Stopped Aztreonam.   4. Doxycycline and Metronidazole until cultures finalized. Vancomycin, and Zosyn allergy so abx choice limited. Will deescalate based on final susceptibilities results  5. Echocardiogram technically difficult- report reviewed  6. Monitor CBC, CMP, blood cultures  7. Discussed with patient, and nurse  8. Awaiting records from [facility]. Patient had been hospitalized for cellulitis and Sepsis in the past.           Sherley Cabrera MD  [facility]  [phone]      Interval History   Fever, swelling and pain in leg  No new allergic reaction    Physical Exam   Temp: 98.8  F (37.1  C) Temp src: Oral BP: 137/83  Pulse: 115   Resp: 20 SpO2: 96 % O2 Device: None (Room air)    Vitals:    08/17/21 2242 08/18/21 1523   Weight: 120.2 kg (265 lb) 139.8 kg (308 lb 3.2 oz)     Vital Signs with Ranges  Temp:  [97.9  F (36.6  C)-102.8  F (39.3  C)] 98.8  F (37.1  C)  Pulse:  [104-138] 115  Resp:  [19-26] 20  BP: (110-145)/(59-90) 137/83  SpO2:  [90 %-98 %] 96 %    Constitutional: Awake, alert, cooperative, fatigued  Lungs: Distant  Cardiovascular: S1 S2  Abdomen: obese tenderness  Skin: RLE lymphedema, swelling, tenderness  Neuro: AOx 3    Medications     sodium chloride 125 mL/hr at 08/19/21 1101       acetaminophen  975 mg Oral TID     amLODIPine  2.5 mg Oral Daily     aztreonam  2 g Intravenous Q6H     cefTRIAXone  2 g Intravenous Q24H     doxycycline (VIBRAMYCIN) IV  100 mg Intravenous Q12H     enoxaparin ANTICOAGULANT  40 mg Subcutaneous Q12H     famotidine  40 mg Oral QAM     ketorolac  30 mg Intravenous Q6H     lidocaine 4%   Topical 4x Daily     metoprolol tartrate  25 mg Oral Q6H     metroNIDAZOLE  500 mg Intravenous Q12H     nortriptyline  25 mg Oral At Bedtime     polyethylene glycol  17 g Oral Daily     sodium chloride 0.45%  1,000 mL Intravenous Once       Data   All microbiology laboratory data reviewed.  Recent Labs   Lab Test 08/19/21  0746 08/18/21  1410 08/17/21  2256   WBC 12.5* 11.3* 4.0   HGB 12.9* 13.2* 15.6   HCT 40.8 42.1 48.5   MCV 82 83 81    184 229     Recent Labs   Lab Test 08/19/21  0746 08/18/21  1410 08/17/21  2256   CR 0.83 0.74 0.73     No lab results found.  No lab results found.    Invalid input(s):   08/17/2021 2256 08/19/2021 1219 Blood Culture Line, venous [[id number]]   (Abnormal)   Blood from Line, venous    Preliminary result Component Value   Culture Positive on the 1st day of incubationAbnormal  P    Streptococcus agalactiae (Group B Streptococcus)Panic  P    2 of 2 bottles              08/17/2021 2256 08/18/2021 2137 Verigene GP Panel [[id number]]    (Abnormal)   Blood from  Line, venous    Final result Component Value   Staphylococcus species Not Detected   Staphylococcus aureus Not Detected   Staphylococcus epidermidis Not Detected   Staphylococcus lugdunensis Not Detected   Enterococcus faecalis Not Detected   Enterococcus faecium Not Detected   Streptococcus agalactiae DetectedAbnormal    Positive for Streptococcus agalactiae (Group B Streptococcus) by [employer] multiplex nucleic acid test. Penicillin and ampicillin are drugs of choice; non-susceptible isolates have not been reported. Final identification and antimicrobial susceptibility testing will be verified by standard methods.   Streptococcus anginosus group Not Detected   Streptococcus pneumoniae Not Detected   Streptococcus pyogenes Not Detected   Listeria species Not Detected             RADIOLOGY:    XR Chest 1 View    Result Date: 8/18/2021  EXAM: XR CHEST 1 VIEW LOCATION: [facility] DATE/TIME: 8/18/2021 1:26 AM INDICATION: fever, chills COMPARISON: 10/29/2012.     IMPRESSION: Negative chest.    MR Femur Thigh Right wo & w Contrast    Result Date: 8/19/2021  FINAL REPORT I agree with the preliminary report. There are findings highly suspicious for acute on chronic osteomyelitis involving the distal aspect of the right femur. Linear areas of signal abnormality may represent small sinus tract. There are surrounding soft tissue changes in the distal, anterior thigh which can be seen with infection including myositis and fasciitis. Superimposed pathologic fracture of the right femur may also be present as there is some cortical offset in the distal femoral shaft. Correlation with CT of the femur may be useful for further assessment of possible distal femoral fracture. Subcutaneous edema diffusely throughout the right thigh distally and anteriorly in the right thigh with reactive lymphadenopathy right inguinal region. Final Interpretation Dictated By: Lee Leos Date: 8/19/2021 PRELIMINARY REPORT  EXAM: MR FEMUR THIGH RIGHT WO AND W CONTRAST LOCATION: [facility] DATE/TIME: 8/18/2021 10:18 PM INDICATION: Cellulitis with sepsis with elevated lactate and right leg pain. Evaluate for osteomyelitis or deep tissue space infection. COMPARISON: MRI of the tibia and fibula 08/18/2021. TECHNIQUE: Routine. Additional postgadolinium T1 sequences were obtained. IV CONTRAST: 10 ml. FINDINGS: JOINTS AND BONES: -Replacement of the normal T1 signal involving the distal right femur particularly along the lateral aspect of the distal right femur above the level of the condyles (series 2, image 54). There is associated reactive edema in this area. Additionally, there appears to be breach of the cortex by linear fluid extending along the anterior cortex and through the anterior cortex into the intramedullary space (series 5, image 48-50). These findings are suspicious for osteomyelitis with draining sinus tracts. More proximal aspect of the right femur demonstrates no evidence for replacement of the normal T1 signal or evidence for intramedullary edema. No fracture or dislocation. TENDONS: -No tendon tear, tendinopathy, or tenosynovitis. MUSCLES AND SOFT TISSUES: -Diffuse muscular edema involving the anterior compartment of the distal right thigh particularly surrounding the area of the draining presumed sinus tracts. There is small amount of fluid between the muscle and the anterior cortex of the distal right femur. Minimal amount of interfascial fluid involving the distal medial and lateral aspects of the right thigh. Severe subcutaneous edema diffusely throughout the right thigh. Moderate fluid involving the subcutaneous tissues of the mid and anterior right thigh. Enlarged right inguinal lymph node, likely reactive.     IMPRESSION: 1.  Findings highly suspicious for osteomyelitis involving the distal aspect of the right femur with replacement of normal T1 signal distally and surrounding reactive  edema. Additionally, there are linear areas of increased T2 signal abnormality extending through the anterior cortex possibly representing small sinus tracts draining from the right femur suggesting chronic osteomyelitis. There is cortical thickening throughout this area and fluid along the anterior cortex in the distal right thigh. 2.  Additionally, there is diffuse edema in the anterior musculature of the distal right thigh with minimal layering fluid which can be seen with myositis and fasciitis. 3.  Subcutaneous edema diffusely throughout the right thigh distally and anteriorly in the right thigh with reactive lymphadenopathy right inguinal region. 4.  Final report following review by musculoskeletal radiologist. Preliminary Interpretation Dictated By: Curt L. Behrns, MD Date: 8/19/2021    MR Tibia Fibula Lower Leg Right wo & w Contr    Result Date: 8/19/2021  FINAL REPORT I agree with the preliminary report. There are signal changes in the right proximal tibia which raises concern for an acute on chronic osteomyelitis. Additional soft tissue findings suspicious for draining sinus tracts as described. Final Interpretation Dictated By: Lee Leos Date: 8/19/2021 PRELIMINARY REPORT EXAM: MR TIBIA FIBULA LOWER LEG RIGHT WO AND W CONTR LOCATION: [facility] DATE/TIME: 8/18/2021 10:18 PM INDICATION: Evaluate for osteomyelitis involving the tibia and fibula with severe right lower extremity pain and soft tissue swelling. COMPARISON: None. TECHNIQUE: Routine. Additional postgadolinium T1 sequences were obtained. IV CONTRAST: 10 ml Gadavist. FINDINGS: BONES: -Heterogeneous areas of decreased T1 signal throughout the proximal right tibia with irregularity to the cortex with linear fluidlike areas in the anterior right cortex. These linear areas of fluid in the anterior medial right cortex (series 6, image 13-17) are suspicious for linear draining sinus tracts. Overall findings are suspicious  for osteomyelitis in the proximal right tibia, likely chronic. Distal right tibia and fibula demonstrate no evidence for other areas suspicious for osteomyelitis. SOFT TISSUES:  -Severe subcutaneous edema diffusely throughout the right lower extremity greatest near the mid to lower right lower extremity down to the level of the ankle. MUSCLES: -No abnormality in the visualized musculature.     IMPRESSION: 1.  Heterogeneous decreased T1 signal throughout the proximal right tibia with irregularity of the anterior cortex with draining linear areas of fluid signal intensity into the soft tissue suspicious for draining sinus tracts from an area of suspected chronic osteomyelitis in the proximal right tibia. 2.  Severe subcutaneous edema right lower extremity. 3.  Final report following review by musculoskeletal radiologist. Preliminary Interpretation Dictated By: Curt L. Behrns, MD Date: 2021     Echocardiogram Complete    Result Date: 2021  [id number]^[place]SE^[place] [address]  Name: CABRERA GHOSH MRN: [id number] : 1980 Study Date: 2021 08:46 AM Age: 40 yrs Gender: Male Patient Location: [facility] Reason For Study: Endocarditis Ordering Physician: VANNA GARCIA Performed By: CANDIDO  BSA: 2.5 m2 Height: 71 in Weight: 308 lb HR: 107 BP: 114/59 mmHg ______________________________________________________________________________ Procedure Definity (NDC #97665-559) given intravenously. Complete Echo Adult. Technically difficult study.Extremely difficult acoustic windows despite the use of contrast for endcardial border definition. ______________________________________________________________________________ Interpretation Summary  1. Left ventricular size, wall thickness, and systolic function are normal. The estimated left ventricular ejection fraction is 55%. 2. Right ventricular size and systolic function are normal. 3. No  hemodynamically significant valvular abnormalities. 4. No prior study available for comparison. ______________________________________________________________________________ I      WMSI = 1.00     % Normal = 100  X - Cannot   0 -                      (2) - Mildly 2 -          Segments  Size Interpret    Hyperkinetic 1 - Normal  Hypokinetic  Hypokinetic  1-2     small                                                    7 -          3-5    moderate 3 - Akinetic 4 -          5 -         6 - Akinetic Dyskinetic   6-14    large              Dyskinetic   Aneurysmal  w/scar       w/scar       15-16   diffuse  Left Ventricle The left ventricle is normal in size. There is mild concentric left ventricular hypertrophy. Left ventricular systolic function is normal. The visual ejection fraction is 55-60%. Diastolic function not assessed due to tachycardia. No regional wall motion abnormalities noted.  Right Ventricle Normal right ventricle size and systolic function.  Atria Normal left atrial size. Right atrial size is normal.  Mitral Valve Mitral valve leaflets appear normal. There is trace mitral regurgitation. There is no mitral valve stenosis.  Tricuspid Valve Tricuspid valve leaflets appear normal. There is trace to mild tricuspid regurgitation. Right ventricle systolic pressure estimate normal. The right ventricular systolic pressure is elevated at 12.4 mmHg. There is no tricuspid stenosis.  Aortic Valve Aortic valve leaflets appear normal. No aortic regurgitation is present. No aortic stenosis is present.  Pulmonic Valve The pulmonic valve is not well visualized. There is trace pulmonic valvular regurgitation. There is no pulmonic valvular stenosis.  Vessels The aorta root is normal. IVC diameter <2.1 cm collapsing >50% with sniff suggests a normal RA pressure of 3 mmHg.  Pericardium There is no pericardial effusion.  Rhythm The rhythm was sinus tachycardia.   ______________________________________________________________________________ MMode/2D Measurements & Calculations RVDd: 4.2 cm IVSd: 1.2 cm LVIDd: 4.5 cm LVIDs: 3.0 cm LVPWd: 1.2 cm FS: 32.9 %  LV mass(C)d: 203.0 grams LV mass(C)dI: 80.1 grams/m2 Ao root diam: 3.9 cm LA dimension: 3.6 cm asc Aorta Diam: 3.1 cm LA/Ao: 0.93 LVOT diam: 2.3 cm LVOT area: 4.0 cm2 LA Volume Indexed (AL/bp): 27.4 ml/m2 RWT: 0.54  Doppler Measurements & Calculations Ao V2 max: 156.1 cm/sec Ao max PG: 10.0 mmHg Ao V2 mean: 105.9 cm/sec Ao mean P.3 mmHg Ao V2 VTI: 28.7 cm GREGORIO(I,D): 3.4 cm2 GREGORIO(V,D): 3.3 cm2 LV V1 max P.5 mmHg LV V1 max: 127.9 cm/sec LV V1 VTI: 24.3 cm  SV(LVOT): 97.4 ml SI(LVOT): 38.4 ml/m2 PA acc time: 0.12 sec TR max aaron: 176.2 cm/sec TR max P.4 mmHg AV Aaron Ratio (DI): 0.82 GREGORIO Index (cm2/m2): 1.3  ______________________________________________________________________________ Report approved by: Miladis Holguin 2021 09:44 AM       CT Chest Abdomen Pelvis w/o Contrast    Result Date: 2021  EXAM: CT CHEST ABDOMEN PELVIS W/O CONTRAST LOCATION: [facility] DATE/TIME: 2021 11:42 PM INDICATION: Sepsis COMPARISON: CTA chest 2013. CT abdomen and pelvis 2005. Chest radiographs 2021. MR of the right lower extremity earlier today. TECHNIQUE: CT scan of the chest, abdomen, and pelvis was performed without IV contrast. Multiplanar reformats were obtained. Dose reduction techniques were used. CONTRAST: None. FINDINGS: LUNGS AND PLEURA: Mild atelectasis bilaterally. Lungs otherwise clear. No pleural fluid or pneumothorax. MEDIASTINUM/AXILLAE: There is lobulated soft tissue intermingled with lobules of fat in the left anterior mediastinum which measures up to 7.5 x 3.6 cm on axial images (image 51 series 4). A similar finding with less intermixed fat measured up to 6.7 x 3.2 cm on 2013. There are now enlarged lymph nodes extending superiorly lateral to the  left common carotid artery and internal jugular vein which measure up to 2 cm axillary dissection clips on the left. (image 16 of series 4). CORONARY ARTERY CALCIFICATION: None. HEPATOBILIARY: Hepatic steatosis. Normal gallbladder and bile ducts. PANCREAS: Normal. SPLEEN: Normal. ADRENAL GLANDS: Normal. KIDNEYS/BLADDER: Excreted contrast in the urinary system from a prior study. Kidneys and bladder otherwise normal. BOWEL: Normal. No obstruction or inflammation. Normal appendix. LYMPH NODES: Multiple enlarged and inflamed lymph nodes of the abdominal retroperitoneum measure up to 2.3 cm near the IVC. Inflamed retroperitoneal lymphadenopathy in the pelvis near the iliac vessels is asymmetric on the right, notably the same side as  the presumed infectious process involving the right leg on the prior MR lower extremity study from today. The largest right external iliac chain lymph node is 3.2 cm. The largest included right inguinal lymph node is 3.0 cm. Right inguinal lymphadenectomy clips are present. VASCULATURE: Unremarkable. PELVIC ORGANS: Normal. MUSCULOSKELETAL: Edema of the subcutaneous fat of the right groin and included anterior right thigh consistent with cellulitis. This was more fully imaged on the MR examination from earlier today.     IMPRESSION: 1.  Inflammatory lymphadenopathy of the retroperitoneum of the abdomen, right pelvis, and right inguinal station likely secondary to the presumed infectious process involving the right leg as demonstrated on the MR examination from earlier today. Involvement with lymphoma is not excluded. 2.  Lobulated soft tissue of the anterior mediastinum has mildly increased in size compared back to the remote study of 04/22/2013. There are now some enlarged lymph nodes superior to this near the left carotid and jugular vessels. Findings are concerning for active lymphoma. Clinical correlation recommended. 3.  Hepatic steatosis. I discussed the findings with at on .    US  Lower Extremity Venous Duplex Right    Result Date: 8/18/2021  EXAM: US LOWER EXTREMITY VENOUS DUPLEX RIGHT LOCATION: [facility] DATE/TIME: 8/18/2021 1:40 AM INDICATION: Right leg pain, swelling and redness. COMPARISON: None. TECHNIQUE: Venous Duplex ultrasound of the right lower extremity with and without compression, augmentation and duplex. Color flow and spectral Doppler with waveform analysis performed. FINDINGS: Exam includes the common femoral, femoral, popliteal, and contralateral common femoral veins as well as segmentally visualized deep calf veins and greater saphenous vein. Evaluation of the calf veins is limited by leg swelling and body habitus. RIGHT: No deep vein thrombosis. No superficial thrombophlebitis. No popliteal cyst.     IMPRESSION: 1.  No deep venous thrombosis in the right lower extremity.    Attestation:  Total time on the floor involved in the patient's care: 35 minutes. Total time spent in counseling/care coordination: >50%

## 2021-08-19 NOTE — PROGRESS NOTES
[facility] ACUTE PAIN SERVICE    ([facility], [facility], [facility])   Daily PAIN Progress Note    Assessment/Plan:  Romeo Chong is a 40 year old male who was admitted on 8/17/2021.   Pain Service is asked to see the patient for acute on chronic pain to assist with pain management. Admitted for evaluation of leg pain with fever chills and warmth of lower extremity. History of obesity, Hodgkins Lymphoma, fibrous dysplasia of bone, chronic lymphedema, chronic pain syndrome, hypertension and asthma, gastric ulcer.  Patient identifies 2 days of warmth in his right lower leg with concern due to previous sepsis from cellulitis of RLE.  He developed,fevers, chills and vomiting yesterday.  He was unable to control leg pain with his home prescription of Norco.     Patient currently being followed by Infectious Disease for sepsis, severe allergic reaction to antibiotics in the past, on IV antibiotics, lactic acid elevated to 4.0, having fevers.  Lymphedema worsened.      Over the past 24 hours patient received 4(5/325mg Hydrocodone/APAP) and 4(10mg oxycodone) for MME of around 80 mg.      He is receiving 30 mg toradol every 6 hours scheduled.      Patient with ongoing complaint of pain, not noticing much relief from the oxycodone.  States the vistaril only makes him sleepy.      Discussed adding lyrica, change oxycodone to hydromorphone and lidocaine cream.    He is in agreement with this.    Ortho notes reviewed plan for Will MRI this afternoon.            PLAN:   1) Pain is consistent with acute associated with cellulitis of lower extremity with worsening lymphedema.  Patient on chronic opioids with opioid tolerance.  The patient's home MME was 22-30mg daily.   2)Multimodal Medication Therapy  Topical: lidocaine cream qid   NSAID'S: Toradol 30 mg every 6 hours per Hospital Medicine    Muscle Relaxants: none  Adjuvants: Tylenol 975 mg tid , vistaril 25-50 mg every 4 hours prn (pain, pruritis), lyrica  "25 mg will start tomorrow PM  Antidepressants/anxiolytics: nortriptyline 25 mg every hs  Opioids: hydromorphone 2-4 mg every three hours prn   IV Pain medication: none (consider 1 mg IV hydromorphone prior to MRI)   3)Non-medication interventions  Pharmacy consult- appreciate recommendations   Acupuncture consult- as available Mon and Thursday   Integrative consult - called referral to [phone]   4)Constipation Prophylaxis  Daily stool softener/laxative  5) Follow up   -Opioid prescriber has been Pain Specialist  -Discharge Recommendations - We recommend prescribing the following at the time of discharge: follow up with Chronic Pain Provider.  Should not require any additional prescriptions          Active Problems:    Fibrous dysplasia of bone    Essential hypertension, benign    Lymphedema    Obesity, unspecified    H/O Hodgkin's lymphoma    Tachycardia    Cellulitis of right lower extremity    Elevated lactic acid level    Bacterial sepsis (H)     LOS: 1 day       Subjective:  Patient reports pain is right leg sharp stabbing pain, constant \"8-10\"  Chronic pain around a \"5\" after receiving pain medications.  Not getting much noticeable relief from pain medications.      acetaminophen  975 mg Oral TID     amLODIPine  2.5 mg Oral Daily     aztreonam  2 g Intravenous Q6H     cefTRIAXone  2 g Intravenous Q24H     doxycycline (VIBRAMYCIN) IV  100 mg Intravenous Q12H     enoxaparin ANTICOAGULANT  40 mg Subcutaneous Q24H     famotidine  40 mg Oral QAM     ketorolac  30 mg Intravenous Q6H     metoprolol tartrate  25 mg Oral BID     metroNIDAZOLE  500 mg Intravenous Q12H     nortriptyline  25 mg Oral At Bedtime     polyethylene glycol  17 g Oral Daily       Objective:  Vital signs in last 24 hours:  Temp:  [97.9  F (36.6  C)-102.8  F (39.3  C)] 100.7  F (38.2  C)  Pulse:  [104-138] 132  Resp:  [19-28] 20  BP: (110-157)/(58-90) 115/62  SpO2:  [90 %-99 %] 90 %  Weight:   Weight change: 19.6 kg (43 lb 3.2 oz)  Body mass index " is 42.99 kg/m .    Intake/Output last 3 shifts:  I/O last 3 completed shifts:  In: 3470 [P.O.:480; I.V.:1990; IV Piggyback:1000]  Out: 750 [Urine:750]  Intake/Output this shift:  No intake/output data recorded.    Review of Systems:   As per subjective, all others negative.    Physical Exam:    General Appearance:  Rests in bed, calm cooperative, fatigued   Head:  Normocephalic, without obvious abnormality, atraumatic   Eyes:  PERRL, conjunctiva/corneas clear, EOM's intact   Nose: Nares normal, septum midline, mucosa normal, no drainage   Throat: Lips, mucosa, and tongue normal; teeth and gums normal   Neck: Supple, symmetrical, trachea midline, no adenopathy, thyroid: not enlarged, symmetric, no carotid bruit or JVD   Back:   Symmetric, no curvature, ROM normal, no CVA tenderness   Lungs:   respirations unlabored   Chest Wall:  No tenderness or deformity   Heart:  Regular rate and rhythm   Abdomen:   Soft, non-tender, non distended   Extremities: Right leg with extensive lymphedema, tender   Skin: Skin color, texture, turgor normal, no rashes or lesions   Neurologic: Alert and oriented X 3, Moves all 4 extremities          Imaging:  Personally Reviewed.  XR Chest 1 View    Result Date: 8/18/2021  EXAM: XR CHEST 1 VIEW LOCATION: [facility] DATE/TIME: 8/18/2021 1:26 AM INDICATION: fever, chills COMPARISON: 10/29/2012.     IMPRESSION: Negative chest.    MR Femur Thigh Right wo & w Contrast    Result Date: 8/19/2021  FINAL REPORT I agree with the preliminary report. There are findings highly suspicious for acute on chronic osteomyelitis involving the distal aspect of the right femur. Linear areas of signal abnormality may represent small sinus tract. There are surrounding soft tissue changes in the distal, anterior thigh which can be seen with infection including myositis and fasciitis. Superimposed pathologic fracture of the right femur may also be present as there is some cortical offset in  the distal femoral shaft. Correlation with CT of the femur may be useful for further assessment of possible distal femoral fracture. Subcutaneous edema diffusely throughout the right thigh distally and anteriorly in the right thigh with reactive lymphadenopathy right inguinal region. Final Interpretation Dictated By: Lee Leos Date: 8/19/2021 PRELIMINARY REPORT EXAM: MR FEMUR THIGH RIGHT WO AND W CONTRAST LOCATION: [facility] DATE/TIME: 8/18/2021 10:18 PM INDICATION: Cellulitis with sepsis with elevated lactate and right leg pain. Evaluate for osteomyelitis or deep tissue space infection. COMPARISON: MRI of the tibia and fibula 08/18/2021.      IMPRESSION: 1.  Findings highly suspicious for osteomyelitis involving the distal aspect of the right femur with replacement of normal T1 signal distally and surrounding reactive edema. Additionally, there are linear areas of increased T2 signal abnormality extending through the anterior cortex possibly representing small sinus tracts draining from the right femur suggesting chronic osteomyelitis. There is cortical thickening throughout this area and fluid along the anterior cortex in the distal right thigh. 2.  Additionally, there is diffuse edema in the anterior musculature of the distal right thigh with minimal layering fluid which can be seen with myositis and fasciitis. 3.  Subcutaneous edema diffusely throughout the right thigh distally and anteriorly in the right thigh with reactive lymphadenopathy right inguinal region. 4.  Final report following review by musculoskeletal radiologist. Preliminary Interpretation Dictated By: Curt L. Behrns, MD Date: 8/19/2021    MR Tibia Fibula Lower Leg Right wo & w Contr    Result Date: 8/19/2021  FINAL REPORT I agree with the preliminary report. There are signal changes in the right proximal tibia which raises concern for an acute on chronic osteomyelitis. Additional soft tissue findings suspicious for  draining sinus tracts as described. Final Interpretation Dictated By: Lee Leos Date: 8/19/2021 PRELIMINARY REPORT EXAM: MR TIBIA FIBULA LOWER LEG RIGHT WO AND W CONTR LOCATION: [facility] DATE/TIME: 8/18/2021 10:18 PM INDICATION: Evaluate for osteomyelitis involving the tibia and fibula with severe right lower extremity pain and soft tissue swelling. COMPARISON: None. TECHNIQUE: Routine. Additional postgadolinium T1 sequences were obtained. IV CONTRAST: 10 ml Gadavist. FINDINGS: BONES: -Heterogeneous areas of decreased T1 signal throughout the proximal right tibia with irregularity to the cortex with linear fluidlike areas in the anterior right cortex. These linear areas of fluid in the anterior medial right cortex (series 6, image 13-17) are suspicious for linear draining sinus tracts. Overall findings are suspicious for osteomyelitis in the proximal right tibia, likely chronic. Distal right tibia and fibula demonstrate no evidence for other areas suspicious for osteomyelitis. SOFT TISSUES:  -Severe subcutaneous edema diffusely throughout the right lower extremity greatest near the mid to lower right lower extremity down to the level of the ankle. MUSCLES: -No abnormality in the visualized musculature.     IMPRESSION: 1.  Heterogeneous decreased T1 signal throughout the proximal right tibia with irregularity of the anterior cortex with draining linear areas of fluid signal intensity into the soft tissue suspicious for draining sinus tracts from an area of suspected chronic osteomyelitis in the proximal right tibia. 2.  Severe subcutaneous edema right lower extremity. 3.  Final report following review by musculoskeletal radiologist. Preliminary Interpretation Dictated By: Curt L. Behrns, MD Date: 8/19/2021     CT Chest Abdomen Pelvis w/o Contrast    Result Date: 8/19/2021  EXAM: CT CHEST ABDOMEN PELVIS W/O CONTRAST LOCATION: [facility] DATE/TIME: 8/18/2021  11:42 PM INDICATION: Sepsis COMPARISON: CTA chest 04/22/2013. CT abdomen and pelvis 12/07/2005. Chest radiographs 08/18/2021. MR of the right lower extremity earlier today. TECHNIQUE: CT scan of the chest, abdomen, and pelvis was performed without IV contrast. Multiplanar reformats were obtained. Dose reduction techniques were used. CONTRAST: None. FINDINGS: LUNGS AND PLEURA: Mild atelectasis bilaterally. Lungs otherwise clear. No pleural fluid or pneumothorax. MEDIASTINUM/AXILLAE: There is lobulated soft tissue intermingled with lobules of fat in the left anterior mediastinum which measures up to 7.5 x 3.6 cm on axial images (image 51 series 4). A similar finding with less intermixed fat measured up to 6.7 x 3.2 cm on 04/22/2013. There are now enlarged lymph nodes extending superiorly lateral to the left common carotid artery and internal jugular vein which measure up to 2 cm axillary dissection clips on the left. (image 16 of series 4). CORONARY ARTERY CALCIFICATION: None. HEPATOBILIARY: Hepatic steatosis. Normal gallbladder and bile ducts. PANCREAS: Normal. SPLEEN: Normal. ADRENAL GLANDS: Normal. KIDNEYS/BLADDER: Excreted contrast in the urinary system from a prior study. Kidneys and bladder otherwise normal. BOWEL: Normal. No obstruction or inflammation. Normal appendix. LYMPH NODES: Multiple enlarged and inflamed lymph nodes of the abdominal retroperitoneum measure up to 2.3 cm near the IVC. Inflamed retroperitoneal lymphadenopathy in the pelvis near the iliac vessels is asymmetric on the right, notably the same side as  the presumed infectious process involving the right leg on the prior MR lower extremity study from today. The largest right external iliac chain lymph node is 3.2 cm. The largest included right inguinal lymph node is 3.0 cm. Right inguinal lymphadenectomy clips are present. VASCULATURE: Unremarkable. PELVIC ORGANS: Normal. MUSCULOSKELETAL: Edema of the subcutaneous fat of the right groin and  included anterior right thigh consistent with cellulitis. This was more fully imaged on the MR examination from earlier today.     IMPRESSION: 1.  Inflammatory lymphadenopathy of the retroperitoneum of the abdomen, right pelvis, and right inguinal station likely secondary to the presumed infectious process involving the right leg as demonstrated on the MR examination from earlier today. Involvement with lymphoma is not excluded. 2.  Lobulated soft tissue of the anterior mediastinum has mildly increased in size compared back to the remote study of 04/22/2013. There are now some enlarged lymph nodes superior to this near the left carotid and jugular vessels. Findings are concerning for active lymphoma. Clinical correlation recommended. 3.  Hepatic steatosis. I discussed the findings with at on .    US Lower Extremity Venous Duplex Right    Result Date: 8/18/2021  EXAM: US LOWER EXTREMITY VENOUS DUPLEX RIGHT LOCATION: [facility] DATE/TIME: 8/18/2021 1:40 AM INDICATION: Right leg pain, swelling and redness. COMPARISON: None. TECHNIQUE: Venous Duplex ultrasound of the right lower extremity with and without compression, augmentation and duplex. Color flow and spectral Doppler with waveform analysis performed. FINDINGS: Exam includes the common femoral, femoral, popliteal, and contralateral common femoral veins as well as segmentally visualized deep calf veins and greater saphenous vein. Evaluation of the calf veins is limited by leg swelling and body habitus. RIGHT: No deep vein thrombosis. No superficial thrombophlebitis. No popliteal cyst.     IMPRESSION: 1.  No deep venous thrombosis in the right lower extremity.      Lab Results:  Personally Reviewed.   Recent Labs   Lab 08/18/21  1410 08/17/21  2256   WBC 11.3* 4.0   HGB 13.2* 15.6   HCT 42.1 48.5    229     Recent Labs   Lab 08/18/21  1410 08/17/21  2256    139   CO2 19* 22   BUN 8 6*   ALBUMIN  --  3.9   ALKPHOS  --  72   ALT   --  33   AST  --  25     No results for input(s): INR in the last 168 hours.      Total time spent 35 minutes with greater than 50% in consultation, education and coordination of care.     Also discussed with RN, MD and  time spent in discussion with Acute Inpatient Pain Pharmacist.       Libby Carmona APRN, CNS-BC, DNP  Acute Care Pain Management Program  [facility] ([place], [place], [place])   With questions call [phone]  Preference if for [facility]  Click HERE to page Hannah

## 2021-08-19 NOTE — PLAN OF CARE
Pain on RLE managed with prn oxycodone and scheduled toradol. Offered patient prn hydroxyzine as well but he says he will think about it. Pain at 8/10 at rest. Receiving I.v antibiotics for cellulitis and I.v fluids. R LE lymphedema. Uses urinal at bedside. Urine is dark denny, not new per patient. Sinus tachycardia on tele monitor and fever of 102.7. Scheduled metoprolol and tylenol given.will continue to monitor.

## 2021-08-19 NOTE — PROVIDER NOTIFICATION
PROVIDER NOTIFICATION    Reason for communication:  c/o headache, uses Excedrin @ home. Not due for Toradol/Tylenol yet. Declines Atarax.  Team member name: Dr. Guzmán  Team member role: Hospitalist  Method of Communication: sharla paged  Response: awaiting for response  Response time:

## 2021-08-19 NOTE — CONSULTS
ORTHOPEDIC CONSULTATION    Consultation  Romeo Chong,  1980, MRN [id number]    [unfilled]  Tachycardia [R00.0]  Cellulitis of right lower extremity [L03.115]  Elevated lactic acid level [R79.89]    PCP: No Ref-Primary, Physician, None   Code status:  Full Code       Extended Emergency Contact Information  Primary Emergency Contact: Dioni Doherty  Home Phone: [phone]  Relation: Cousin         CHIEF COMPLAINT: Right thigh pain and swelling      HISTORY OF PRESENT ILLNESS:  The patient is seen in orthopedic consultation at the request of Dr. Ramirez MD. Patient has history of chronic lymphedema right lower extremity since . Also history of fibrous dysplasia resulting in multiple fractures of tibia/femur.  He presented to ED with increased pain, swelling, fever.   He reports he has chronic pain from the lymphedema, but that is usually localized to the calf. Thigh pain is new. He endorses nausea.   History of 18 surgeries over the years on right lower extremity. Multiple surgeries were for pathologic fractures of femur and tibia. Surgeries for quad resection and knee washout as well.   On antibiotics per ID for sepsis. Gram pos cocci. Ceftriaxone, aztreonam, doxycycline, metronidazole.     ALLERGIES:   Review of patient's allergies indicates   Allergies   Allergen Reactions     Vancomycin Anaphylaxis     Peanut Oil Itching     Tree Nuts [Nuts] Itching     Erythromycin Unknown     Latex Unknown     Added based on information entered during case entry, please review and add reactions, type, and severity as needed     Other Environmental Allergy Unknown     DUST     Pollen Extracts [Pollen Extract] Unknown     Zosyn [Piperacillin-Tazobactam In D5w] Tinnitus and Itching     Oxycodone Itching and Rash         MEDICATIONS UPON ADMISSION:  Medications were reviewed.  They include:   Medications Prior to Admission   Medication Sig Dispense Refill Last Dose     amLODIPine (NORVASC) 5 MG tablet  [AMLODIPINE (NORVASC) 5 MG TABLET] Take 0.5 tablets (2.5 mg total) by mouth daily. 45 tablet 0 8/17/2021 at AM     aspirin-acetaminophen-caffeine (EXCEDRIN MIGRAINE) 250-250-65 mg per tablet [ASPIRIN-ACETAMINOPHEN-CAFFEINE (EXCEDRIN MIGRAINE) 250-250-65 MG PER TABLET] Take 1 tablet by mouth every 6 (six) hours as needed for pain.  0      famotidine (PEPCID) 40 MG tablet [FAMOTIDINE (PEPCID) 40 MG TABLET] Take 1 tablet (40 mg total) by mouth every evening. (Patient taking differently: Take 40 mg by mouth every morning ) 180 tablet 0 8/16/2021 at AM     hydrochlorothiazide (HYDRODIURIL) 50 MG tablet Take 1 tablet (50 mg) by mouth daily 90 tablet 0 Past Week at Unknown time     HYDROcodone-acetaminophen (NORCO)  MG per tablet Take 1 tablet by mouth 2 times daily as needed for severe pain 56 tablet 0 8/17/2021 at Unknown time     ibuprofen (ADVIL,MOTRIN) 200 MG tablet [IBUPROFEN (ADVIL,MOTRIN) 200 MG TABLET] Take 3 tablets (600 mg total) by mouth every 6 (six) hours as needed for pain. 42 tablet 0 8/17/2021 at Unknown time     loratadine (CLARITIN) 10 mg tablet [LORATADINE (CLARITIN) 10 MG TABLET] Take 1 tablet (10 mg total) by mouth daily. (Patient taking differently: Take 10 mg by mouth daily as needed for allergies ) 90 tablet 0      losartan (COZAAR) 50 MG tablet [LOSARTAN (COZAAR) 50 MG TABLET] Take 1 tablet (50 mg total) by mouth daily. 90 tablet 5 8/16/2021 at AM     nortriptyline (PAMELOR) 25 MG capsule Take 1 capsule (25 mg) by mouth At Bedtime 30 capsule 1 not started yet     potassium chloride ER (K-TAB/KLOR-CON) 10 MEQ CR tablet TAKE 2 TABLETS BY MOUTH EVERY MORNING AND 1 EVERY EVENING 270 tablet 2 8/16/2021     vitamin D3 (CHOLECALCIFEROL) 125 MCG (5000 UT) tablet Take 1 tablet (125 mcg) by mouth daily 30 tablet 1 8/16/2021         SOCIAL HISTORY:   he  reports that he quit smoking about 12 years ago. He started smoking about 22 years ago. He smoked 1.00 pack per day. He has never used smokeless  tobacco. He reports current alcohol use. He reports that he does not use drugs.      FAMILY HISTORY:  family history includes Cerebrovascular Disease in his father and sister; Cirrhosis in his mother; Diabetes Type 2  in his father; Hypertension in his father, mother, paternal grandfather, and paternal grandmother; Kidney failure in his father.      REVIEW OF SYSTEMS:   See HPI, otherwise negative      PHYSICAL EXAMINATION:  Vitals: Temp:  [97.9  F (36.6  C)-102.8  F (39.3  C)] 100.7  F (38.2  C)  Pulse:  [104-138] 132  Resp:  [19-26] 20  BP: (110-145)/(59-90) 115/62  SpO2:  [90 %-98 %] 90 %  General: On examination, the patient is resting comfortably, NAD, awake and alert and oriented to person, place, time, and and general circumstances   SKIN: lymphedema right lower extremity. No open wounds. Right lower leg is warm to the touch.   Pulses: difficult to palpate due to lymphedema. Skin is warm to touch with brisk cap refill.   Sensation: intact and equal bilaterally  Tenderness: Pain with palpation of thigh and calf.   ROM: not assessed   Motor: decreased dorsiflexion right ankle. Likely secondary to pain.   Contralateral side= Full range of motion, Negative joint instability findings, 5/5 motor groups about the joint, Non-tender.       /RADIOGRAPHIC EVALUATION:  US LOWER EXTREMITY  8/17/21:  IMPRESSION:  1.  No deep venous thrombosis in the right lower extremity.    MR RIGHT FEMUR 8/1721  IMPRESSION:  1.  Findings highly suspicious for osteomyelitis involving the distal aspect of the right femur with replacement of normal T1 signal distally and surrounding reactive edema. Additionally, there are linear areas of increased T2 signal abnormality   extending through the anterior cortex possibly representing small sinus tracts draining from the right femur suggesting chronic osteomyelitis. There is cortical thickening throughout this area and fluid along the anterior cortex in the distal right   thigh.     2.   Additionally, there is diffuse edema in the anterior musculature of the distal right thigh with minimal layering fluid which can be seen with myositis and fasciitis.     3.  Subcutaneous edema diffusely throughout the right thigh distally and anteriorly in the right thigh with reactive lymphadenopathy right inguinal region.    MR RIGHT TIBIA 8/17/21  IMPRESSION:  1.  Heterogeneous decreased T1 signal throughout the proximal right tibia with irregularity of the anterior cortex with draining linear areas of fluid signal intensity into the soft tissue suspicious for draining sinus tracts from an area of suspected   chronic osteomyelitis in the proximal right tibia.     2.  Severe subcutaneous edema right lower extremity.  Pertinent Labs  Lab Results: personally reviewed.   Lab Results   Component Value Date     08/19/2021    CO2 21 08/19/2021    BUN 14 08/19/2021       IMPRESSION:  Right thigh pain in the setting of chronic lymphedema. Rule out pathologic fracture.  Fibrous dysplasia with history of previous pathologic femur and tibia fractures  Chronic lymphedema      PLAN:  This patient was discussed with Dr. Mckenzie, on-call surgeon for [facility] and they are in agreement with the following plan.  - NWB right lower extremity  - CT right femur to rule out fracture. If fracture, immobilizer.   - Pain control  - Abx per ID  - consider IR obtaining bone biopsy  - If surgical management needed, will need to be transferred to [facility] or of.     Thank you for including [facility] in the care of Romeo Chong. It has been a pleasure participating in their care.    Julio Alvarez PA-C      CC1:   Yessenia Guzmán MD    CC2:   No Ref-Primary, Physician

## 2021-08-19 NOTE — PLAN OF CARE
NURSING NOTE  1500 - 2300    Problem: Adult Inpatient Plan of Care  Goal: Patient-Specific Goal (Individualized)  Outcome: Improving    D: Reports of constant pain on RLE, aching/stabbing/sharp <9/10.      Decreased sensation. Limited ROM, unable to lift leg without assistance.      Pedal & post tibial pulses per doppler present.   A: Due scheduled meds given. PRN Oxycodone given x2.   R: 6-7/10, able to rest in between cares. Pain team following.      Problem: Skin or Soft Tissue Infection  Goal: Infection Symptom Resolution  Outcome: Improving    D: Upon admission, was drowsy but easily rousable to verbal stimuli.        Tmax: 99.1F, tachycardic, tachypneic. Improved later in the evening,        VSS except for HR <109bpm. RLE swollen, warm to touch; not weeping.       Code sepsis called r/t lactic acid of 4 (see note). Voiding. ID following.  A: Due IV abx given. 1L NS bolus given. Ed given re: plan of care. Positive      blood cx from 08/17/21 called per lab - relayed to evening hospitalist.  R: Post bolus, lactic acid WNL; on serial check q6hrs. Off unit for MRI & CT.      Echo ordered.

## 2021-08-19 NOTE — PROGRESS NOTES
Care Management Follow Up    Length of Stay (days): 1    Expected Discharge Date: 08/20/2021     Concerns to be Addressed:  Medical mgmt of Tachycardia / sepsis; Pain mgmt and ortho following    Anticipated Discharge Disposition:  Goal is home     Anticipated Discharge Services:  No therapy RECs yet  Anticipated Discharge DME:        Additional Information:  Per notes, pt is from an apartment with his Auntie. He is mostly independent with ADLs at baseline, uses a cane. Goal to return home with family to transport at discharge, pend therapy RECs and medical clearance.    TONY Owens

## 2021-08-20 LAB
ALBUMIN SERPL-MCNC: 2.2 G/DL (ref 3.5–5)
ALP SERPL-CCNC: 53 U/L (ref 45–120)
ALT SERPL W P-5'-P-CCNC: 28 U/L (ref 0–45)
ANION GAP SERPL CALCULATED.3IONS-SCNC: 9 MMOL/L (ref 5–18)
AST SERPL W P-5'-P-CCNC: 23 U/L (ref 0–40)
ATRIAL RATE - MUSE: 127 BPM
BASOPHILS # BLD MANUAL: 0 10E3/UL (ref 0–0.2)
BASOPHILS NFR BLD MANUAL: 0 %
BILIRUB SERPL-MCNC: 0.4 MG/DL (ref 0–1)
BUN SERPL-MCNC: 8 MG/DL (ref 8–22)
C REACTIVE PROTEIN LHE: 44.1 MG/DL (ref 0–0.8)
CALCIUM SERPL-MCNC: 8.8 MG/DL (ref 8.5–10.5)
CHLORIDE BLD-SCNC: 107 MMOL/L (ref 98–107)
CO2 SERPL-SCNC: 22 MMOL/L (ref 22–31)
CREAT SERPL-MCNC: 0.63 MG/DL (ref 0.7–1.3)
DIASTOLIC BLOOD PRESSURE - MUSE: NORMAL MMHG
EOSINOPHIL # BLD MANUAL: 0 10E3/UL (ref 0–0.7)
EOSINOPHIL NFR BLD MANUAL: 0 %
ERYTHROCYTE [DISTWIDTH] IN BLOOD BY AUTOMATED COUNT: 15.4 % (ref 10–15)
GFR SERPL CREATININE-BSD FRML MDRD: >90 ML/MIN/1.73M2
GLUCOSE BLD-MCNC: 85 MG/DL (ref 70–125)
HCT VFR BLD AUTO: 34.9 % (ref 40–53)
HGB BLD-MCNC: 11.1 G/DL (ref 13.3–17.7)
INTERPRETATION ECG - MUSE: NORMAL
LACTATE SERPL-SCNC: 1.2 MMOL/L (ref 0.7–2)
LACTATE SERPL-SCNC: 1.4 MMOL/L (ref 0.7–2)
LYMPHOCYTES # BLD MANUAL: 0.4 10E3/UL (ref 0.8–5.3)
LYMPHOCYTES NFR BLD MANUAL: 3 %
MCH RBC QN AUTO: 26 PG (ref 26.5–33)
MCHC RBC AUTO-ENTMCNC: 31.8 G/DL (ref 31.5–36.5)
MCV RBC AUTO: 82 FL (ref 78–100)
MONOCYTES # BLD MANUAL: 0.2 10E3/UL (ref 0–1.3)
MONOCYTES NFR BLD MANUAL: 2 %
NEUTROPHILS # BLD MANUAL: 11.5 10E3/UL (ref 1.6–8.3)
NEUTROPHILS NFR BLD MANUAL: 95 %
P AXIS - MUSE: 58 DEGREES
PLAT MORPH BLD: ABNORMAL
PLATELET # BLD AUTO: 167 10E3/UL (ref 150–450)
POTASSIUM BLD-SCNC: 3.4 MMOL/L (ref 3.5–5)
PR INTERVAL - MUSE: 152 MS
PROT SERPL-MCNC: 6.3 G/DL (ref 6–8)
QRS DURATION - MUSE: 132 MS
QT - MUSE: 314 MS
QTC - MUSE: 456 MS
R AXIS - MUSE: -12 DEGREES
RBC # BLD AUTO: 4.27 10E6/UL (ref 4.4–5.9)
RBC MORPH BLD: ABNORMAL
SODIUM SERPL-SCNC: 138 MMOL/L (ref 136–145)
SYSTOLIC BLOOD PRESSURE - MUSE: NORMAL MMHG
T AXIS - MUSE: 19 DEGREES
VENTRICULAR RATE- MUSE: 127 BPM
WBC # BLD AUTO: 12.1 10E3/UL (ref 4–11)

## 2021-08-20 PROCEDURE — 36415 COLL VENOUS BLD VENIPUNCTURE: CPT | Performed by: INTERNAL MEDICINE

## 2021-08-20 PROCEDURE — 86141 C-REACTIVE PROTEIN HS: CPT | Performed by: INTERNAL MEDICINE

## 2021-08-20 PROCEDURE — 258N000003 HC RX IP 258 OP 636: Performed by: INTERNAL MEDICINE

## 2021-08-20 PROCEDURE — 250N000013 HC RX MED GY IP 250 OP 250 PS 637: Performed by: CLINICAL NURSE SPECIALIST

## 2021-08-20 PROCEDURE — 99233 SBSQ HOSP IP/OBS HIGH 50: CPT | Performed by: INTERNAL MEDICINE

## 2021-08-20 PROCEDURE — 93005 ELECTROCARDIOGRAM TRACING: CPT | Performed by: STUDENT IN AN ORGANIZED HEALTH CARE EDUCATION/TRAINING PROGRAM

## 2021-08-20 PROCEDURE — 83605 ASSAY OF LACTIC ACID: CPT | Performed by: STUDENT IN AN ORGANIZED HEALTH CARE EDUCATION/TRAINING PROGRAM

## 2021-08-20 PROCEDURE — 83605 ASSAY OF LACTIC ACID: CPT | Performed by: INTERNAL MEDICINE

## 2021-08-20 PROCEDURE — 250N000011 HC RX IP 250 OP 636: Performed by: INTERNAL MEDICINE

## 2021-08-20 PROCEDURE — 36415 COLL VENOUS BLD VENIPUNCTURE: CPT | Performed by: STUDENT IN AN ORGANIZED HEALTH CARE EDUCATION/TRAINING PROGRAM

## 2021-08-20 PROCEDURE — 250N000013 HC RX MED GY IP 250 OP 250 PS 637: Performed by: INTERNAL MEDICINE

## 2021-08-20 PROCEDURE — 99232 SBSQ HOSP IP/OBS MODERATE 35: CPT | Performed by: CLINICAL NURSE SPECIALIST

## 2021-08-20 PROCEDURE — 85027 COMPLETE CBC AUTOMATED: CPT | Performed by: INTERNAL MEDICINE

## 2021-08-20 PROCEDURE — 93005 ELECTROCARDIOGRAM TRACING: CPT

## 2021-08-20 PROCEDURE — 250N000009 HC RX 250: Performed by: INTERNAL MEDICINE

## 2021-08-20 PROCEDURE — 87040 BLOOD CULTURE FOR BACTERIA: CPT | Performed by: INTERNAL MEDICINE

## 2021-08-20 PROCEDURE — 93010 ELECTROCARDIOGRAM REPORT: CPT | Performed by: INTERNAL MEDICINE

## 2021-08-20 PROCEDURE — 120N000001 HC R&B MED SURG/OB

## 2021-08-20 PROCEDURE — 250N000011 HC RX IP 250 OP 636: Performed by: CLINICAL NURSE SPECIALIST

## 2021-08-20 PROCEDURE — 82374 ASSAY BLOOD CARBON DIOXIDE: CPT | Performed by: INTERNAL MEDICINE

## 2021-08-20 RX ORDER — KETOROLAC TROMETHAMINE 30 MG/ML
30 INJECTION, SOLUTION INTRAMUSCULAR; INTRAVENOUS EVERY 8 HOURS
Status: DISCONTINUED | OUTPATIENT
Start: 2021-08-20 | End: 2021-08-20

## 2021-08-20 RX ORDER — PREGABALIN 25 MG/1
25 CAPSULE ORAL DAILY
Status: DISCONTINUED | OUTPATIENT
Start: 2021-08-20 | End: 2021-08-21

## 2021-08-20 RX ORDER — METOPROLOL TARTRATE 25 MG/1
50 TABLET, FILM COATED ORAL EVERY 6 HOURS
Status: DISCONTINUED | OUTPATIENT
Start: 2021-08-20 | End: 2021-08-21

## 2021-08-20 RX ORDER — KETOROLAC TROMETHAMINE 15 MG/ML
15 INJECTION, SOLUTION INTRAMUSCULAR; INTRAVENOUS EVERY 6 HOURS
Status: DISCONTINUED | OUTPATIENT
Start: 2021-08-20 | End: 2021-08-21

## 2021-08-20 RX ADMIN — SODIUM CHLORIDE: 9 INJECTION, SOLUTION INTRAVENOUS at 10:45

## 2021-08-20 RX ADMIN — PREGABALIN 25 MG: 25 CAPSULE ORAL at 10:56

## 2021-08-20 RX ADMIN — METRONIDAZOLE 500 MG: 500 INJECTION, SOLUTION INTRAVENOUS at 10:45

## 2021-08-20 RX ADMIN — ACETAMINOPHEN 975 MG: 325 TABLET ORAL at 08:16

## 2021-08-20 RX ADMIN — KETOROLAC TROMETHAMINE 30 MG: 30 INJECTION, SOLUTION INTRAMUSCULAR; INTRAVENOUS at 06:14

## 2021-08-20 RX ADMIN — CEFTRIAXONE SODIUM 2 G: 2 INJECTION, POWDER, FOR SOLUTION INTRAMUSCULAR; INTRAVENOUS at 02:20

## 2021-08-20 RX ADMIN — DOXYCYCLINE 100 MG: 100 INJECTION, POWDER, LYOPHILIZED, FOR SOLUTION INTRAVENOUS at 08:18

## 2021-08-20 RX ADMIN — HYDROMORPHONE HYDROCHLORIDE 4 MG: 2 TABLET ORAL at 21:56

## 2021-08-20 RX ADMIN — FAMOTIDINE 40 MG: 20 TABLET, FILM COATED ORAL at 08:17

## 2021-08-20 RX ADMIN — AMLODIPINE BESYLATE 2.5 MG: 2.5 TABLET ORAL at 08:16

## 2021-08-20 RX ADMIN — KETOROLAC TROMETHAMINE 15 MG: 15 INJECTION, SOLUTION INTRAMUSCULAR; INTRAVENOUS at 17:05

## 2021-08-20 RX ADMIN — METOPROLOL TARTRATE 50 MG: 25 TABLET, FILM COATED ORAL at 13:08

## 2021-08-20 RX ADMIN — METRONIDAZOLE 500 MG: 500 INJECTION, SOLUTION INTRAVENOUS at 22:03

## 2021-08-20 RX ADMIN — ACETAMINOPHEN 975 MG: 325 TABLET ORAL at 13:08

## 2021-08-20 RX ADMIN — HYDROMORPHONE HYDROCHLORIDE 4 MG: 2 TABLET ORAL at 06:14

## 2021-08-20 RX ADMIN — METOPROLOL TARTRATE 25 MG: 25 TABLET, FILM COATED ORAL at 02:27

## 2021-08-20 RX ADMIN — ACETAMINOPHEN, ASPIRIN AND CAFFEINE 1 TABLET: 250; 250; 65 TABLET, FILM COATED ORAL at 19:56

## 2021-08-20 RX ADMIN — HYDROMORPHONE HYDROCHLORIDE 4 MG: 2 TABLET ORAL at 00:16

## 2021-08-20 RX ADMIN — METOPROLOL TARTRATE 50 MG: 25 TABLET, FILM COATED ORAL at 19:56

## 2021-08-20 RX ADMIN — KETOROLAC TROMETHAMINE 15 MG: 15 INJECTION, SOLUTION INTRAMUSCULAR; INTRAVENOUS at 23:59

## 2021-08-20 RX ADMIN — SODIUM CHLORIDE 125 ML/HR: 9 INJECTION, SOLUTION INTRAVENOUS at 21:51

## 2021-08-20 RX ADMIN — HYDROMORPHONE HYDROCHLORIDE 4 MG: 2 TABLET ORAL at 10:45

## 2021-08-20 RX ADMIN — KETOROLAC TROMETHAMINE 30 MG: 30 INJECTION, SOLUTION INTRAMUSCULAR; INTRAVENOUS at 00:24

## 2021-08-20 RX ADMIN — CEFTRIAXONE SODIUM 2 G: 2 INJECTION, POWDER, FOR SOLUTION INTRAMUSCULAR; INTRAVENOUS at 13:08

## 2021-08-20 RX ADMIN — HYDROMORPHONE HYDROCHLORIDE 4 MG: 2 TABLET ORAL at 17:05

## 2021-08-20 RX ADMIN — KETOROLAC TROMETHAMINE 15 MG: 15 INJECTION, SOLUTION INTRAMUSCULAR; INTRAVENOUS at 11:51

## 2021-08-20 RX ADMIN — METOPROLOL TARTRATE 25 MG: 25 TABLET, FILM COATED ORAL at 08:16

## 2021-08-20 RX ADMIN — ACETAMINOPHEN 975 MG: 325 TABLET ORAL at 21:08

## 2021-08-20 NOTE — PROGRESS NOTES
[facility]    PROGRESS NOTE - Hospitalist Service    Assessment and Plan    Active Problems:    Fibrous dysplasia of bone    Essential hypertension, benign    Lymphedema    Obesity, unspecified    H/O Hodgkin's lymphoma    Tachycardia    Cellulitis of right lower extremity    Elevated lactic acid level    Bacterial sepsis (H)    Gram-positive bacteremia    Romeo Chong is a 40 year old old male with h/o increased leg swelling and fevers.     Sepsis with GPC bacteremia   - Appreciate ID consult discussed with Dr. Cabrera   - IV aztreonam, ceftriaxone and doxycycline, changed to IV ceftriaxone alone for now based on cultures   - repeating daily blood cultures and once negative can place PICC line if needed. Defer to ID  - MRI of leg suggests chronic osteo, per patient he denies every being told he has osteomyelitis. ID awaiting outside records from [place]  where he was hospitalized for this   - IVF  - trending labs   - Echo negative   - elevated procal and lactic on admission   -  WBC trending up slightly was normal on admission   - lactic now normal checking Q12 now      Cellulitis possible sepsis with elevated lactic but normal WBC and febrile  - no blood cx taken previously in ED and unfortunately obtained after IV abx started  2 sets,   - given 2gm IV ceftriaxone x1 at 0230 on admission   - planned for vanco but patient has allergy   - ID consult, managing IV abx as above   - IVF  - MRI completed as above ortho rec'd CT with h/o Fibrous dysplasia with history of previous pathologic femur and tibia fractures. Reviewed with ortho and no surgery at this time. If he sofia  need surgery for this recommend transferring to [facility] or [facility], but for sure follow-up with either after discharge and improvement. Per ID Patient prefers to go to [facility]. He does not want to go to [facility] due to previous bad experience  - WBAT per ortho, hold on compression at this time  until improving   - pain control   - elevated leg  - CK normal      Elevated lactic now normal changed to Q12 hrs   - continuous IVF      Tachycardia persists likely form infection   - treating infection   - IVF  - telemetry   - metoprolol 50mg Q6hrs better then previous but still 110's      H/O NHL  - oncology consulted  -Chest CT shows increased in mass in chest patient aware of this and has not followed up with oncology for some time     Chronic pain   - norco 1-2 tabs with pain scale  - pain team to see  -hold on IV pain med for now   - vistaril prn   - IV toradol scheduled for a few doses      HTN  - continue norvasc  - holding hydrochlorothiazide   - trial of BB for tachycardiac, aware of AA.     VTE prophylaxis:  Enoxaparin (Lovenox) subcutaneous, patient refusing and discussed with and will continue to offer this. He is aware of the risks of a blood clot   DIET: Orders Placed This Encounter      Regular Diet Adult    Drains/Lines: none  Weight bearing status: NWB RLE   Disposition/Barriers to discharge: multiple days pending negative blood cultures and further abx regiment   Code Status: Full Code    Subjective:  Pain controlled.patient disappointed that he wasn't aware of chronic osteo and why no one has told him this in the past.     PHYSICAL EXAM  Vitals:    08/17/21 2242 08/18/21 1523 08/19/21 2017   Weight: 120.2 kg (265 lb) 139.8 kg (308 lb 3.2 oz) 141.6 kg (312 lb 3.2 oz)     B/P:115/62 T:100.7 P:132 R:20     Intake/Output Summary (Last 24 hours) at 8/19/2021 0808  Last data filed at 8/19/2021 0625  Gross per 24 hour   Intake 2470 ml   Output 750 ml   Net 1720 ml      Body mass index is 43.54 kg/m .    Constitutional: awake, alert, cooperative, appears stated age and morbidly obese  Eyes: EOM intact no scleral icterus   ENT: Normocephalic, without obvious abnormality, atraumatic, sinuses nontender on palpation, external ears without lesions, oral pharynx with moist mucous membranes, tonsils without  erythema or exudates, gums normal and good dentition.  Respiratory: CTA bl/ No W/R/R  Cardiovascular: tachycardic reg rhythm   GI: No scars, normal bowel sounds, soft, non-distended, non-tender, no masses palpated, no hepatosplenomegally  Skin: no erythema noted but skin dark, surgical scar right knee, warm and tender to touch   Musculoskeletal: significant edema RLE, see Dr. Ann note for picture   Neurologic: no focal deficits   Neuropsychiatric: General: normal, calm and normal eye contact      PERTINENT LABS/IMAGING:  Results for orders placed or performed during the hospital encounter of 08/17/21   US Lower Extremity Venous Duplex Right    Impression    IMPRESSION:  1.  No deep venous thrombosis in the right lower extremity.   XR Chest 1 View    Impression    IMPRESSION: Negative chest.   CT Chest Abdomen Pelvis w/o Contrast    Impression    IMPRESSION:  1.  Inflammatory lymphadenopathy of the retroperitoneum of the abdomen, right pelvis, and right inguinal station likely secondary to the presumed infectious process involving the right leg as demonstrated on the MR examination from earlier today.   Involvement with lymphoma is not excluded.  2.  Lobulated soft tissue of the anterior mediastinum has mildly increased in size compared back to the remote study of 04/22/2013. There are now some enlarged lymph nodes superior to this near the left carotid and jugular vessels. Findings are   concerning for active lymphoma. Clinical correlation recommended.  3.  Hepatic steatosis.    I discussed the findings with at on .   MR Femur Thigh Right wo & w Contrast    Impression    IMPRESSION:  1.  Findings highly suspicious for osteomyelitis involving the distal aspect of the right femur with replacement of normal T1 signal distally and surrounding reactive edema. Additionally, there are linear areas of increased T2 signal abnormality   extending through the anterior cortex possibly representing small sinus tracts  draining from the right femur suggesting chronic osteomyelitis. There is cortical thickening throughout this area and fluid along the anterior cortex in the distal right   thigh.    2.  Additionally, there is diffuse edema in the anterior musculature of the distal right thigh with minimal layering fluid which can be seen with myositis and fasciitis.    3.  Subcutaneous edema diffusely throughout the right thigh distally and anteriorly in the right thigh with reactive lymphadenopathy right inguinal region.    4.  Final report following review by musculoskeletal radiologist.    Preliminary Interpretation Dictated By: Curt L. Behrns, MD  Date: 8/19/2021   MR Tibia Fibula Lower Leg Right wo & w Contr    Impression    IMPRESSION:  1.  Heterogeneous decreased T1 signal throughout the proximal right tibia with irregularity of the anterior cortex with draining linear areas of fluid signal intensity into the soft tissue suspicious for draining sinus tracts from an area of suspected   chronic osteomyelitis in the proximal right tibia.    2.  Severe subcutaneous edema right lower extremity.    3.  Final report following review by musculoskeletal radiologist.    Preliminary Interpretation Dictated By: Curt L. Behrns, MD  Date: 8/19/2021      Most Recent 3 CBC's:  Recent Labs   Lab Test 08/19/21  0746 08/18/21  1410 08/17/21  2256   WBC 12.5* 11.3* 4.0   HGB 12.9* 13.2* 15.6   MCV 82 83 81    184 229     Most Recent 3 BMP's:  Recent Labs   Lab Test 08/19/21  0746 08/18/21  1410 08/17/21  2256    137 139   POTASSIUM 3.8 4.0 3.7   CHLORIDE 108* 107 105   CO2 21* 19* 22   BUN 14 8 6*   CR 0.83 0.74 0.73   ANIONGAP 10 11 12   NATALYA 9.2 8.5 9.5    163* 150*     Most Recent 2 LFT's:  Recent Labs   Lab Test 08/17/21  2256   AST 25   ALT 33   ALKPHOS 72   BILITOTAL 0.5     Most Recent D-dimer:No lab results found.  Most Recent 6 Bacteria Isolates From Any Culture (See EPIC Reports for Culture Details):No lab results  found.  Most Recent ESR & CRP:  Recent Labs   Lab Test 08/19/21  0746   CRP 41.3*     Most Recent CPK:  Recent Labs   Lab Test 08/18/21  1410     116     Yessenia Guzmán MD  [facility] Medicine Service  [phone]

## 2021-08-20 NOTE — PROGRESS NOTES
[facility]    Infectious Disease Progress Note    Date of Service (when I saw the patient): 08/20/2021     Assessment & Plan      Romeo Chong is a 40 year old male who was admitted on 8/17/2021.     1. Right LE cellulitis, osteomyelitis, lymphedema, severe.  2. Group B Streptococcus bacteremia- Streptococcus agalactiae. Sensitive to Ceftriaxone and patient tolerating Ceftriaxone  3. Orthopedic following  4. Sepsis  5. History of Lymphoma  6. Inguinal lymphadenopathy  Multiple enlarged and inflamed lymph nodes of the abdominal retroperitoneum measure up to 2.3 cm near the IVC. Inflamed retroperitoneal lymphadenopathy in the pelvis near the iliac vessels is asymmetric on the right, notably the same side as   the presumed infectious process involving the right leg on the prior MR lower extremity study from today. The largest right external iliac chain lymph node is 3.2 cm. The largest included right inguinal lymph node is 3.0 cm. Right inguinal lymphadenectomy clips are present.  7. Morbid obesity Body mass index is 43.54 kg/m .   8. 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 Several allergies    Recommendations:    1. Continue IV Ceftriaxone.  Patient may need IV antibiotic on discharge depending on clinical response.  Chronic osteomyelitis, though patient unaware and awaiting records from [place].  2. Follow repeat blood cultures  3. Orthopedic Surgery recommended CT scan and transfer [facility] or [facility]. Agree with transfer if there is no clinical improvement. If patient improves, would need referral to orthopedic surgery specializing in complex cases.  Patient prefers to go to [facility].  He does not want to go to [facility] due to previous bad experience.  4. Stopped Aztreonam.   5. Stop doxycycline.  Vancomycin, and Zosyn allergy so abx choice limited. Will deescalate based on final susceptibilities results  6. Echocardiogram  technically difficult- report reviewed  7. Would stop metronidazole once there is significant clinical improvement  8. Monitor CBC, CMP, blood cultures  9. Discussed with patient, and nurse  10. Awaiting records from [facility]. Patient had been hospitalized for cellulitis and Sepsis in the past.  Discussed with nely Branch on 8/20/2021  11. Detailed discussion with patient, hospitalist and patient's nurse.    Previous photo        Sherley Cabrera MD  [facility]  [phone]      Interval History   Fever, swelling and pain in leg  Discussed imaging findings  No new allergic reaction    Physical Exam   Temp: 100.1  F (37.8  C) Temp src: Oral BP: 133/70 Pulse: 113   Resp: 20 SpO2: 93 % O2 Device: None (Room air) Oxygen Delivery: 2 LPM  Vitals:    08/17/21 2242 08/18/21 1523 08/19/21 2017   Weight: 120.2 kg (265 lb) 139.8 kg (308 lb 3.2 oz) 141.6 kg (312 lb 3.2 oz)     Vital Signs with Ranges  Temp:  [98.3  F (36.8  C)-101.3  F (38.5  C)] 100.1  F (37.8  C)  Pulse:  [108-136] 113  Resp:  [20-28] 20  BP: (132-154)/(70-87) 133/70  SpO2:  [93 %-100 %] 93 %    Exam on 8/20/2021   Constitutional: Awake, alert, cooperative, fatigued  Lungs: Distant  Cardiovascular: S1 S2  Abdomen: obese tenderness  Skin: RLE lymphedema, swelling, tenderness  Neuro: AOx 3    Medications     sodium chloride Stopped (08/19/21 1805)       acetaminophen  975 mg Oral TID     amLODIPine  2.5 mg Oral Daily     cefTRIAXone  2 g Intravenous Q12H     doxycycline (VIBRAMYCIN) IV  100 mg Intravenous Q12H     enoxaparin ANTICOAGULANT  40 mg Subcutaneous Q12H     famotidine  40 mg Oral QAM     ketorolac  30 mg Intravenous Q6H     metoprolol tartrate  25 mg Oral Q6H     metroNIDAZOLE  500 mg Intravenous Q12H     nortriptyline  25 mg Oral At Bedtime     polyethylene glycol  17 g Oral Daily     pregabalin  25 mg Oral At Bedtime       Data   All microbiology laboratory data reviewed.  Recent Labs   Lab Test  08/19/21  0746 08/18/21  1410 08/17/21  2256   WBC 12.5* 11.3* 4.0   HGB 12.9* 13.2* 15.6   HCT 40.8 42.1 48.5   MCV 82 83 81    184 229     Recent Labs   Lab Test 08/19/21  0746 08/18/21  1410 08/17/21  2256   CR 0.83 0.74 0.73     08/17/2021 2256 08/20/2021 0951 Blood Culture Line, venous [[id number]]    (Abnormal)   Blood from Line, venous    Preliminary result Component Value   Culture Positive on the 1st day of incubationAbnormal  P    Streptococcus agalactiae (Group B Streptococcus)Panic  P    2 of 2 bottles       Susceptibility     Streptococcus agalactiae (Group B Streptococcus)     MEAGAN     Ampicillin 0.12 ug/mL Susceptible     Cefotaxime <=0.25 ug/mL Susceptible     Ceftriaxone <=0.25 ug/mL Susceptible     Clindamycin >0.5 ug/mL Resistant     Meropenem <=0.06 ug/mL Susceptible     Penicillin 0.06 ug/mL Susceptible     Vancomycin 0.5 ug/mL Susceptible                  08/17/2021 2256 08/18/2021 2137 Verigene GP Panel [[id number]]    (Abnormal)   Blood from Line, venous    Final result Component Value   Staphylococcus species Not Detected   Staphylococcus aureus Not Detected   Staphylococcus epidermidis Not Detected   Staphylococcus lugdunensis Not Detected   Enterococcus faecalis Not Detected   Enterococcus faecium Not Detected   Streptococcus agalactiae DetectedAbnormal    Positive for Streptococcus agalactiae (Group B Streptococcus) by Quixhopigene multiplex nucleic acid test. Penicillin and ampicillin are drugs of choice; non-susceptible isolates have not been reported. Final identification and antimicrobial susceptibility testing will be verified by standard methods.   Streptococcus anginosus group Not Detected   Streptococcus pneumoniae Not Detected   Streptococcus pyogenes Not Detected   Listeria species Not Detected           06/22/2021 1027 06/23/2021 0839 SARS-CoV-2 COVID-19 Virus (Coronavirus) by PCR [[id number]]   Respiratory    Final result Component Value   SARS-CoV-2 Virus Specimen  Source Nasopharyngeal   SARS-CoV-2 PCR Result NEGATIVE                08/17/2021 2256 08/19/2021 1219 Blood Culture Line, venous [[id number]]   (Abnormal)   Blood from Line, venous    Preliminary result Component Value   Culture Positive on the 1st day of incubationAbnormal  P    Streptococcus agalactiae (Group B Streptococcus)Panic  P    2 of 2 bottles              08/17/2021 2256 08/18/2021 2137 Verigene GP Panel [[id number]]    (Abnormal)   Blood from Line, venous    Final result Component Value   Staphylococcus species Not Detected   Staphylococcus aureus Not Detected   Staphylococcus epidermidis Not Detected   Staphylococcus lugdunensis Not Detected   Enterococcus faecalis Not Detected   Enterococcus faecium Not Detected   Streptococcus agalactiae DetectedAbnormal    Positive for Streptococcus agalactiae (Group B Streptococcus) by XTRMigene multiplex nucleic acid test. Penicillin and ampicillin are drugs of choice; non-susceptible isolates have not been reported. Final identification and antimicrobial susceptibility testing will be verified by standard methods.   Streptococcus anginosus group Not Detected   Streptococcus pneumoniae Not Detected   Streptococcus pyogenes Not Detected   Listeria species Not Detected             RADIOLOGY:    XR Chest 1 View    Result Date: 8/18/2021  EXAM: XR CHEST 1 VIEW LOCATION: [facility] DATE/TIME: 8/18/2021 1:26 AM INDICATION: fever, chills COMPARISON: 10/29/2012.     IMPRESSION: Negative chest.    MR Femur Thigh Right wo & w Contrast    Result Date: 8/19/2021  FINAL REPORT I agree with the preliminary report. There are findings highly suspicious for acute on chronic osteomyelitis involving the distal aspect of the right femur. Linear areas of signal abnormality may represent small sinus tract. There are surrounding soft tissue changes in the distal, anterior thigh which can be seen with infection including myositis and fasciitis. Superimposed  pathologic fracture of the right femur may also be present as there is some cortical offset in the distal femoral shaft. Correlation with CT of the femur may be useful for further assessment of possible distal femoral fracture. Subcutaneous edema diffusely throughout the right thigh distally and anteriorly in the right thigh with reactive lymphadenopathy right inguinal region. Final Interpretation Dictated By: Lee Leos Date: 8/19/2021 PRELIMINARY REPORT EXAM: MR FEMUR THIGH RIGHT WO AND W CONTRAST LOCATION: [facility] DATE/TIME: 8/18/2021 10:18 PM INDICATION: Cellulitis with sepsis with elevated lactate and right leg pain. Evaluate for osteomyelitis or deep tissue space infection. COMPARISON: MRI of the tibia and fibula 08/18/2021. TECHNIQUE: Routine. Additional postgadolinium T1 sequences were obtained. IV CONTRAST: 10 ml. FINDINGS: JOINTS AND BONES: -Replacement of the normal T1 signal involving the distal right femur particularly along the lateral aspect of the distal right femur above the level of the condyles (series 2, image 54). There is associated reactive edema in this area. Additionally, there appears to be breach of the cortex by linear fluid extending along the anterior cortex and through the anterior cortex into the intramedullary space (series 5, image 48-50). These findings are suspicious for osteomyelitis with draining sinus tracts. More proximal aspect of the right femur demonstrates no evidence for replacement of the normal T1 signal or evidence for intramedullary edema. No fracture or dislocation. TENDONS: -No tendon tear, tendinopathy, or tenosynovitis. MUSCLES AND SOFT TISSUES: -Diffuse muscular edema involving the anterior compartment of the distal right thigh particularly surrounding the area of the draining presumed sinus tracts. There is small amount of fluid between the muscle and the anterior cortex of the distal right femur. Minimal amount of interfascial  fluid involving the distal medial and lateral aspects of the right thigh. Severe subcutaneous edema diffusely throughout the right thigh. Moderate fluid involving the subcutaneous tissues of the mid and anterior right thigh. Enlarged right inguinal lymph node, likely reactive.     IMPRESSION: 1.  Findings highly suspicious for osteomyelitis involving the distal aspect of the right femur with replacement of normal T1 signal distally and surrounding reactive edema. Additionally, there are linear areas of increased T2 signal abnormality extending through the anterior cortex possibly representing small sinus tracts draining from the right femur suggesting chronic osteomyelitis. There is cortical thickening throughout this area and fluid along the anterior cortex in the distal right thigh. 2.  Additionally, there is diffuse edema in the anterior musculature of the distal right thigh with minimal layering fluid which can be seen with myositis and fasciitis. 3.  Subcutaneous edema diffusely throughout the right thigh distally and anteriorly in the right thigh with reactive lymphadenopathy right inguinal region. 4.  Final report following review by musculoskeletal radiologist. Preliminary Interpretation Dictated By: Curt L. Behrns, MD Date: 8/19/2021    MR Tibia Fibula Lower Leg Right wo & w Contr    Result Date: 8/19/2021  FINAL REPORT I agree with the preliminary report. There are signal changes in the right proximal tibia which raises concern for an acute on chronic osteomyelitis. Additional soft tissue findings suspicious for draining sinus tracts as described. Final Interpretation Dictated By: Lee Leos Date: 8/19/2021 PRELIMINARY REPORT EXAM: MR TIBIA FIBULA LOWER LEG RIGHT WO AND W CONTR LOCATION: [facility] DATE/TIME: 8/18/2021 10:18 PM INDICATION: Evaluate for osteomyelitis involving the tibia and fibula with severe right lower extremity pain and soft tissue swelling. COMPARISON: None.  TECHNIQUE: Routine. Additional postgadolinium T1 sequences were obtained. IV CONTRAST: 10 ml Gadavist. FINDINGS: BONES: -Heterogeneous areas of decreased T1 signal throughout the proximal right tibia with irregularity to the cortex with linear fluidlike areas in the anterior right cortex. These linear areas of fluid in the anterior medial right cortex (series 6, image 13-17) are suspicious for linear draining sinus tracts. Overall findings are suspicious for osteomyelitis in the proximal right tibia, likely chronic. Distal right tibia and fibula demonstrate no evidence for other areas suspicious for osteomyelitis. SOFT TISSUES:  -Severe subcutaneous edema diffusely throughout the right lower extremity greatest near the mid to lower right lower extremity down to the level of the ankle. MUSCLES: -No abnormality in the visualized musculature.     IMPRESSION: 1.  Heterogeneous decreased T1 signal throughout the proximal right tibia with irregularity of the anterior cortex with draining linear areas of fluid signal intensity into the soft tissue suspicious for draining sinus tracts from an area of suspected chronic osteomyelitis in the proximal right tibia. 2.  Severe subcutaneous edema right lower extremity. 3.  Final report following review by musculoskeletal radiologist. Preliminary Interpretation Dictated By: Curt L. Behrns, MD Date: 2021     Echocardiogram Complete    Result Date: 2021  [id number]^JOSE^[place] [address]  Name: CABRERA GHOSH MRN: [id number] : 1980 Study Date: 2021 08:46 AM Age: 40 yrs Gender: Male Patient Location: [facility] Reason For Study: Endocarditis Ordering Physician: VANNA GARCIA Performed By: CANDIDO  BSA: 2.5 m2 Height: 71 in Weight: 308 lb HR: 107 BP: 114/59 mmHg ______________________________________________________________________________ Procedure Definity (NDC #61703-086) given intravenously.  Complete Echo Adult. Technically difficult study.Extremely difficult acoustic windows despite the use of contrast for endcardial border definition. ______________________________________________________________________________ Interpretation Summary  1. Left ventricular size, wall thickness, and systolic function are normal. The estimated left ventricular ejection fraction is 55%. 2. Right ventricular size and systolic function are normal. 3. No hemodynamically significant valvular abnormalities. 4. No prior study available for comparison. ______________________________________________________________________________ I      WMSI = 1.00     % Normal = 100  X - Cannot   0 -                      (2) - Mildly 2 -          Segments  Size Interpret    Hyperkinetic 1 - Normal  Hypokinetic  Hypokinetic  1-2     small                                                    7 -          3-5    moderate 3 - Akinetic 4 -          5 -         6 - Akinetic Dyskinetic   6-14    large              Dyskinetic   Aneurysmal  w/scar       w/scar       15-16   diffuse  Left Ventricle The left ventricle is normal in size. There is mild concentric left ventricular hypertrophy. Left ventricular systolic function is normal. The visual ejection fraction is 55-60%. Diastolic function not assessed due to tachycardia. No regional wall motion abnormalities noted.  Right Ventricle Normal right ventricle size and systolic function.  Atria Normal left atrial size. Right atrial size is normal.  Mitral Valve Mitral valve leaflets appear normal. There is trace mitral regurgitation. There is no mitral valve stenosis.  Tricuspid Valve Tricuspid valve leaflets appear normal. There is trace to mild tricuspid regurgitation. Right ventricle systolic pressure estimate normal. The right ventricular systolic pressure is elevated at 12.4 mmHg. There is no tricuspid stenosis.  Aortic Valve Aortic valve leaflets appear normal. No aortic regurgitation is present. No  aortic stenosis is present.  Pulmonic Valve The pulmonic valve is not well visualized. There is trace pulmonic valvular regurgitation. There is no pulmonic valvular stenosis.  Vessels The aorta root is normal. IVC diameter <2.1 cm collapsing >50% with sniff suggests a normal RA pressure of 3 mmHg.  Pericardium There is no pericardial effusion.  Rhythm The rhythm was sinus tachycardia.  ______________________________________________________________________________ MMode/2D Measurements & Calculations RVDd: 4.2 cm IVSd: 1.2 cm LVIDd: 4.5 cm LVIDs: 3.0 cm LVPWd: 1.2 cm FS: 32.9 %  LV mass(C)d: 203.0 grams LV mass(C)dI: 80.1 grams/m2 Ao root diam: 3.9 cm LA dimension: 3.6 cm asc Aorta Diam: 3.1 cm LA/Ao: 0.93 LVOT diam: 2.3 cm LVOT area: 4.0 cm2 LA Volume Indexed (AL/bp): 27.4 ml/m2 RWT: 0.54  Doppler Measurements & Calculations Ao V2 max: 156.1 cm/sec Ao max PG: 10.0 mmHg Ao V2 mean: 105.9 cm/sec Ao mean P.3 mmHg Ao V2 VTI: 28.7 cm GREGORIO(I,D): 3.4 cm2 GREGORIO(V,D): 3.3 cm2 LV V1 max P.5 mmHg LV V1 max: 127.9 cm/sec LV V1 VTI: 24.3 cm  SV(LVOT): 97.4 ml SI(LVOT): 38.4 ml/m2 PA acc time: 0.12 sec TR max aaron: 176.2 cm/sec TR max P.4 mmHg AV Aaron Ratio (DI): 0.82 GREGORIO Index (cm2/m2): 1.3  ______________________________________________________________________________ Report approved by: Miladis Holguin 2021 09:44 AM       CT Chest Abdomen Pelvis w/o Contrast    Result Date: 2021  EXAM: CT CHEST ABDOMEN PELVIS W/O CONTRAST LOCATION: [facility] DATE/TIME: 2021 11:42 PM INDICATION: Sepsis COMPARISON: CTA chest 2013. CT abdomen and pelvis 2005. Chest radiographs 2021. MR of the right lower extremity earlier today. TECHNIQUE: CT scan of the chest, abdomen, and pelvis was performed without IV contrast. Multiplanar reformats were obtained. Dose reduction techniques were used. CONTRAST: None. FINDINGS: LUNGS AND PLEURA: Mild atelectasis bilaterally. Lungs otherwise  clear. No pleural fluid or pneumothorax. MEDIASTINUM/AXILLAE: There is lobulated soft tissue intermingled with lobules of fat in the left anterior mediastinum which measures up to 7.5 x 3.6 cm on axial images (image 51 series 4). A similar finding with less intermixed fat measured up to 6.7 x 3.2 cm on 04/22/2013. There are now enlarged lymph nodes extending superiorly lateral to the left common carotid artery and internal jugular vein which measure up to 2 cm axillary dissection clips on the left. (image 16 of series 4). CORONARY ARTERY CALCIFICATION: None. HEPATOBILIARY: Hepatic steatosis. Normal gallbladder and bile ducts. PANCREAS: Normal. SPLEEN: Normal. ADRENAL GLANDS: Normal. KIDNEYS/BLADDER: Excreted contrast in the urinary system from a prior study. Kidneys and bladder otherwise normal. BOWEL: Normal. No obstruction or inflammation. Normal appendix. LYMPH NODES: Multiple enlarged and inflamed lymph nodes of the abdominal retroperitoneum measure up to 2.3 cm near the IVC. Inflamed retroperitoneal lymphadenopathy in the pelvis near the iliac vessels is asymmetric on the right, notably the same side as  the presumed infectious process involving the right leg on the prior MR lower extremity study from today. The largest right external iliac chain lymph node is 3.2 cm. The largest included right inguinal lymph node is 3.0 cm. Right inguinal lymphadenectomy clips are present. VASCULATURE: Unremarkable. PELVIC ORGANS: Normal. MUSCULOSKELETAL: Edema of the subcutaneous fat of the right groin and included anterior right thigh consistent with cellulitis. This was more fully imaged on the MR examination from earlier today.     IMPRESSION: 1.  Inflammatory lymphadenopathy of the retroperitoneum of the abdomen, right pelvis, and right inguinal station likely secondary to the presumed infectious process involving the right leg as demonstrated on the MR examination from earlier today. Involvement with lymphoma is not  excluded. 2.  Lobulated soft tissue of the anterior mediastinum has mildly increased in size compared back to the remote study of 04/22/2013. There are now some enlarged lymph nodes superior to this near the left carotid and jugular vessels. Findings are concerning for active lymphoma. Clinical correlation recommended. 3.  Hepatic steatosis. I discussed the findings with at on .    US Lower Extremity Venous Duplex Right    Result Date: 8/18/2021  EXAM: US LOWER EXTREMITY VENOUS DUPLEX RIGHT LOCATION: [facility] DATE/TIME: 8/18/2021 1:40 AM INDICATION: Right leg pain, swelling and redness. COMPARISON: None. TECHNIQUE: Venous Duplex ultrasound of the right lower extremity with and without compression, augmentation and duplex. Color flow and spectral Doppler with waveform analysis performed. FINDINGS: Exam includes the common femoral, femoral, popliteal, and contralateral common femoral veins as well as segmentally visualized deep calf veins and greater saphenous vein. Evaluation of the calf veins is limited by leg swelling and body habitus. RIGHT: No deep vein thrombosis. No superficial thrombophlebitis. No popliteal cyst.     IMPRESSION: 1.  No deep venous thrombosis in the right lower extremity.    Attestation:  Total time on the floor involved in the patient's care: 45 minutes. Total time spent in counseling/care coordination: >50%

## 2021-08-20 NOTE — PLAN OF CARE
"NURSING NOTE  1500 - 2300    Problem: Pain Chronic (Persistent)  Goal: Acceptable Pain Control and Functional Ability  Outcome: Improving  Intervention: Manage Persistent Pain    D: Reports of continuous pain on RLE with numbness & tingling, <8/10.       Swollen. Able to wiggle toes. Pedal & post tibial pulses via doppler only.       Limited ROM RLE.   A: Due scheduled meds given. PRN Dilaudid PO given x1 (see MAR).  R: 6/10.     Problem: Adult Inpatient Plan of Care  Goal: Plan of Care Review  Outcome: Improving    - Afebrile, VSS. On IV abx. On medical tele - sinus tachycardia.  - On maintenance IVF. Fair appetite & fluid intakes orally. Good UOP.   - 1800 lactic acid not drawn on time as he was off unit. When phlebotomist      came to draw at a later time, unable to do so as he has had \"poor veins.\"     PICC RN paged to assist with the draw, also for a new PIV access. Both     were done after 2330.                        "

## 2021-08-20 NOTE — PLAN OF CARE
Problem: Adult Inpatient Plan of Care  Goal: Plan of Care Review  Outcome: No Change     Problem: Skin or Soft Tissue Infection  Goal: Infection Symptom Resolution  Outcome: No Change     Problem: Pain Chronic (Persistent)  Goal: Acceptable Pain Control and Functional Ability  Intervention: Develop Pain Management Plan  Recent Flowsheet Documentation  Taken 8/20/2021 0231 by Valencia Soto RN  Pain Management Interventions: (refused atarax) declines     Patient had chest pain at the start of the shift that lasted for about 15-30 mins.  PRN dilaudid given for right leg pain and mild chest pain.  This was helpful and pt was able to sleep.  Declined atarax stating that this makes him too sleepy throughout the day.  Doppler on right, audible.  Refused lovenox even after MD spoke to pt.   Febrile at the start of the shift, improved.

## 2021-08-20 NOTE — PLAN OF CARE
ORTHO    Discussed CT femur results with Dr. Phillips. Negative for acute fracture, chronic osteomyelitis noted. There is no indication for urgent orthopedic surgery. Recommending medical management of bacteremia and osteomyelitis. If surgery for his osteomyelitis is indicated, recommending [facility]     ADDENDUM: Patient can be WBAT    Dioni Miranda PA-C

## 2021-08-20 NOTE — SIGNIFICANT EVENT
"Patient refusing lovenox, but wants SCDs. Discussed at length pros/cons of both, and he continues to refuse lovenox tonight. Will have SCD on left leg only, due to cellulitis on right leg.     BP (!) 154/87 (BP Location: Right arm)   Pulse (!) 136   Temp (!) 101.3  F (38.5  C) (Oral)   Resp 28   Ht 1.803 m (5' 11\")   Wt 141.6 kg (312 lb 3.2 oz)   SpO2 98%   BMI 43.54 kg/m        This note was routed to the hospitalist service.    Bryon Myers DO, MBA (PGY3)  [facility] Medicine Resident  Pager: [phone]      "

## 2021-08-20 NOTE — SIGNIFICANT EVENT
"2354 - Patient endorses of mid to left sided localized chest pain 9/10, feels like \"someone is sitting on my chest.\" Pain with inspiration. Visibly not on distress/SOB. Alert & oriented. Also endorses nausea, no emesis. 101.3F l 28 l 136bpm l 154/87 l 97% room air. IV Compazine given @ 7091. Resident paged. Devin Castellanos RN  0017  08/20/21  "

## 2021-08-20 NOTE — PLAN OF CARE
Problem: Pain Chronic (Persistent)  Goal: Acceptable Pain Control and Functional Ability  Outcome: Improving  Intervention: Develop Pain Management Plan  Recent Flowsheet Documentation  Taken 8/20/2021 0816 by Isis Baumann RN  Pain Management Interventions: medication (see MAR)   Patient states his RLE pain 8/10, with some improvement after po Dilaudid.   Lyrica was started for pain control. Good appetite. Ate 100% BF.  Continues on IV antibiotics. Norris to update Dr. Guzmán regarding recommendations.

## 2021-08-20 NOTE — PROVIDER NOTIFICATION
Notified Resident at 0030 AM regarding Pt refused lovenox shot.  Requests SCDs..      Spoke with: Dr. Myers    Orders Resident to come talk to patient.

## 2021-08-20 NOTE — PROGRESS NOTES
"Spiritual Assessment:     Living with chronic health issues for many years    Hopes to gain clarity around his plan of care going forward    Identifies asking lots of questions as helpful    Patient comes from [employer] background and derives meaning, purpose, and comfort from disha    Welcomes support from spiritual care     Care Provided:   Empathic listening and presence  Normalized/validated his/her feelings of concern, hopefulness, and trust  Discussed coping strategies   Affirmed self-care practices  Prayer shared    Full Spiritual Care Note: Saw Romeo due to admission screening response. He was accompanied by his friend Ej. Romeo says that he does not need surgery at this time and is unclear what benefit it would offer him. He hopes to get some answers today and to gain some clarity around his plan of care. He shares that he has had over 26 surgeries in his lifetime. When asked what he has been helpful for him over multiple admissions, he says that he has learned to ask questions, such as \"Why do I need this?\" and \"What are my other options?\"  He shares that he trusts his care team.     Romeo is an . He finds journalling helpful in processing his thoughts and finds encouragement through looking back at different points in his life and seeing specific reminders of God's faithfulness. Andres 4:6-7 is a meaningful verse for him. He says \"I try to give it to God and not worry.\" He enjoys cooking all different kinds of food. Romeo says that he feels comfortable asking others for help and identifies Brendalacey as a source of support. Ej shared some of her own experiences in life, including how she cared for her mom before she  two years ago. She states that being back in the hospital is difficult but she is doing her best to support and encourage Romeo, while trying to be mindful of her own needs. Prayer shared for both of them. Brought lavender oil aromatherapy, a Bible, " and devotionals.     Plan of Care: Will remain available for further support as patient/family needs/desires.    Ann-Marie Madison M.Div.  Staff   [phone]

## 2021-08-20 NOTE — PLAN OF CARE
Problem: Adult Inpatient Plan of Care  Goal: Plan of Care Review  Outcome: No Change  Flowsheets (Taken 8/20/2021 1732)  Plan of Care Reviewed With: patient  Progress: no change   A/O x 4    Pain 8-9/10 right lower extremity as well as headache. PRN PO dilaudid administered as well as scheduled toradol. Ice pack given for headache.     Right lower extremity +4 edema. +CMS. Painful.    Voiding in urinal.     Encouraged repositioning frequently.    Remains on IV antibiotics.

## 2021-08-20 NOTE — PROGRESS NOTES
Care Management Follow Up    Length of Stay (days): 2    Expected Discharge Date: 08/23/2021     Concerns to be Addressed:  Medical mgmt of Tachycardia / sepsis; Pain mgmt, oncology, ID and ortho following    Anticipated Discharge Disposition:  Goal is home     Anticipated Discharge Services:  No therapy RECs yet  Anticipated Discharge DME:        Additional Information:  Per notes, pt is from an apartment with his Auntie. He is mostly independent with ADLs at baseline, uses a cane. Goal to return home with family to transport at discharge, pend therapy RECs and medical clearance.    TONY Owens

## 2021-08-20 NOTE — PROVIDER NOTIFICATION
PROVIDER NOTIFICATION    Reason for communication: re: lactic acid draws, phlebotomist unable to draw & find a vein  Team member name: Dr. Jones  Team member role: Hospitalist  Method of Communication: text paged  Response: awaiting for restponse  Response time:  ADDENDUM 2243 - draw needed, PICC RN to draw

## 2021-08-20 NOTE — PROGRESS NOTES
"[facility] ACUTE PAIN SERVICE    ([facility], [facility], [facility])   Daily PAIN Progress Note    Assessment/Plan:  Romeo Chong is a 40 year old male who was admitted on 8/17/2021.  Pain Service is asked to see the patient for acute on chronic pain to assist with pain management. Admitted for evaluation of leg pain with fever chills and warmth of lower extremity. History of obesity, Hodgkins Lymphoma, fibrous dysplasia of bone, chronic lymphedema, chronic pain syndrome, hypertension and asthma, gastric ulcer.  Patient identifies 2 days of warmth in his right lower leg with concern due to previous sepsis from cellulitis of RLE.  He developed,fevers, chills and vomiting yesterday.  He was unable to control leg pain with his home prescription of Norco.      Patient currently being followed by Infectious Disease for sepsis, severe allergic reaction to antibiotics in the past, on IV antibiotics, lactic acid elevated to 4.0, having fevers.  Lymphedema worsened.       Over the past 24 hours patient received 1(10mg) oxycodone and 4(4mg) hydromorphone    Adjusting toradol to 15 mg every 6 hours today.    Lyrica 25 mg to start today.       Patient states that the hydromorphone brings pain down to about a \"5\" which is close to what his home dosing of Norco does.      Ortho and Infectious Disease notes reviewed.     PLAN:   1) Pain is consistent with acute associated with cellulitis of lower extremity with worsening lymphedema.  Patient on chronic opioids with opioid tolerance.  The patient's home MME was 22-30mg daily.   2)Multimodal Medication Therapy  Topical: lidocaine cream qid   NSAID'S: Toradol 30 mg every 6 hours per Hospital Medicine    Muscle Relaxants: none  Adjuvants: Tylenol 975 mg tid , vistaril 25-50 mg every 4 hours prn (pain, pruritis), lyrica 25 mg will start tomorrow PM  Antidepressants/anxiolytics: nortriptyline 25 mg every hs  Opioids: hydromorphone 2-4 mg every three hours prn   IV " Pain medication: none  3)Non-medication interventions  Pharmacy consult- appreciate recommendations   Acupuncture consult- as available Mon and Thursday   Integrative consult - called referral to [phone]   4)Constipation Prophylaxis  Daily stool softener/laxative  5) Follow up   -Opioid prescriber has been Pain Specialist  -Discharge Recommendations - We recommend prescribing the following at the time of discharge: follow up with Chronic Pain Provider.       Active Problems:    Fibrous dysplasia of bone    Essential hypertension, benign    Lymphedema    Obesity, unspecified    H/O Hodgkin's lymphoma    Tachycardia    Cellulitis of right lower extremity    Elevated lactic acid level    Bacterial sepsis (H)    Gram-positive bacteremia     LOS: 2 days     Subjective:  Patient reports pain is right lower extremity pain, throbbing pain with sharp shooting pain up and down entire leg at times.      acetaminophen  975 mg Oral TID     amLODIPine  2.5 mg Oral Daily     cefTRIAXone  2 g Intravenous Q12H     doxycycline (VIBRAMYCIN) IV  100 mg Intravenous Q12H     enoxaparin ANTICOAGULANT  40 mg Subcutaneous Q12H     famotidine  40 mg Oral QAM     ketorolac  30 mg Intravenous Q6H     metoprolol tartrate  25 mg Oral Q6H     metroNIDAZOLE  500 mg Intravenous Q12H     nortriptyline  25 mg Oral At Bedtime     polyethylene glycol  17 g Oral Daily     pregabalin  25 mg Oral At Bedtime       Objective:  Vital signs in last 24 hours:  Temp:  [98.3  F (36.8  C)-101.3  F (38.5  C)] 100.1  F (37.8  C)  Pulse:  [108-136] 113  Resp:  [20-28] 20  BP: (132-154)/(70-87) 133/70  SpO2:  [93 %-100 %] 93 %  Weight:   Weight change: 1.814 kg (4 lb)  Body mass index is 43.54 kg/m .    Intake/Output last 3 shifts:  I/O last 3 completed shifts:  In: 1441 [P.O.:360; I.V.:1081]  Out: 1780 [Urine:1780]  Intake/Output this shift:  I/O this shift:  In: 1000 [I.V.:1000]  Out: 400 [Urine:400]    Review of Systems:   As per subjective, all others  negative.    Physical Exam:    General Appearance:  Alert, cooperative, rests in bed.   Head:  Normocephalic, without obvious abnormality, atraumatic   Eyes:  PERRL, conjunctiva/corneas clear, EOM's intact   Nose: Nares normal, septum midline, mucosa normal, no drainage   Throat: Lips, mucosa, and tongue normal; teeth and gums normal   Neck: Supple, symmetrical, trachea midline, no adenopathy, thyroid: not enlarged, symmetric, no carotid bruit or JVD   Back:   Symmetric, no curvature, ROM normal, no CVA tenderness   Lungs:   respirations unlabored   Chest Wall:  No tenderness or deformity   Heart:  Regular rate and rhythm   Abdomen:   Soft, non-tender, non distended   Extremities: Right leg with significant edema, skin warm tender, no noticeable breakdown   Skin: Skin color, texture, turgor normal, no rashes or lesions   Neurologic: Alert and oriented X 3, Moves all 4 extremities          Imaging:   Reviewed.  XR Chest 1 View    Result Date: 8/18/2021  EXAM: XR CHEST 1 VIEW LOCATION: [facility] DATE/TIME: 8/18/2021 1:26 AM INDICATION: fever, chills COMPARISON: 10/29/2012.     IMPRESSION: Negative chest.    MR Ankle Right w/o & w Contrast    Result Date: 8/19/2021  EXAM: MR ANKLE RIGHT W/O and W CONTRAST LOCATION: [facility] DATE/TIME: 8/18/2021 10:18 PM INDICATION: Right lower extremity pain and swelling. Tibial osteomyelitis. COMPARISON: MRI of the tibia fibula 08/18/2021 TECHNIQUE: Routine. Additional postgadolinium T1 sequences were obtained. IV CONTRAST: 10ml gadavist.     IMPRESSION: 1.  Extensive cellulitis. 2.  No focal abscess. 3.  No evidence of osteomyelitis.    MR Femur Thigh Right wo & w Contrast    Result Date: 8/19/2021  FINAL REPORT I agree with the preliminary report. There are findings highly suspicious for acute on chronic osteomyelitis involving the distal aspect of the right femur. Linear areas of signal abnormality may represent small sinus  tract. There are surrounding soft tissue changes in the distal, anterior thigh which can be seen with infection including myositis and fasciitis. Superimposed pathologic fracture of the right femur may also be present as there is some cortical offset in the distal femoral shaft. Correlation with CT of the femur may be useful for further assessment of possible distal femoral fracture. Subcutaneous edema diffusely throughout the right thigh distally and anteriorly in the right thigh with reactive lymphadenopathy right inguinal region. Final Interpretation Dictated By: Lee Leos Date: 8/19/2021 PRELIMINARY REPORT EXAM: MR FEMUR THIGH RIGHT WO AND W CONTRAST LOCATION: [facility] DATE/TIME: 8/18/2021 10:18 PM INDICATION: Cellulitis with sepsis with elevated lactate and right leg pain. Evaluate for osteomyelitis or deep tissue space infection. COMPARISON: MRI of the tibia and fibula 08/18/2021. .     IMPRESSION: 1.  Findings highly suspicious for osteomyelitis involving the distal aspect of the right femur with replacement of normal T1 signal distally and surrounding reactive edema. Additionally, there are linear areas of increased T2 signal abnormality extending through the anterior cortex possibly representing small sinus tracts draining from the right femur suggesting chronic osteomyelitis. There is cortical thickening throughout this area and fluid along the anterior cortex in the distal right thigh. 2.  Additionally, there is diffuse edema in the anterior musculature of the distal right thigh with minimal layering fluid which can be seen with myositis and fasciitis. 3.  Subcutaneous edema diffusely throughout the right thigh distally and anteriorly in the right thigh with reactive lymphadenopathy right inguinal region. 4.  Final report following review by musculoskeletal radiologist. Preliminary Interpretation Dictated By: Curt L. Behrns, MD Date: 8/19/2021    MR Tibia Fibula Lower Leg  Right wo & w Contr    Result Date: 8/19/2021  FINAL REPORT I agree with the preliminary report. There are signal changes in the right proximal tibia which raises concern for an acute on chronic osteomyelitis. Additional soft tissue findings suspicious for draining sinus tracts as described. Final Interpretation Dictated By: Lee Leos Date: 8/19/2021 PRELIMINARY REPORT EXAM: MR TIBIA FIBULA LOWER LEG RIGHT WO AND W CONTR LOCATION: [facility] DATE/TIME: 8/18/2021 10:18 PM INDICATION: Evaluate for osteomyelitis involving the tibia and fibula with severe right lower extremity pain and soft tissue swelling. COMPARISON: None.     IMPRESSION: 1.  Heterogeneous decreased T1 signal throughout the proximal right tibia with irregularity of the anterior cortex with draining linear areas of fluid signal intensity into the soft tissue suspicious for draining sinus tracts from an area of suspected chronic osteomyelitis in the proximal right tibia. 2.  Severe subcutaneous edema right lower extremity. 3.  Final report following review by musculoskeletal radiologist. Preliminary Interpretation Dictated By: Curt L. Behrns, MD Date: 8/19/2021     CT Femur Thigh Right w/o Contrast    Result Date: 8/19/2021  EXAM: CT FEMUR THIGH RIGHT WITHOUT CONTRAST LOCATION: [facility] DATE/TIME: 8/19/2021 7:43 PM INDICATION: Upper leg pain, stress fracture suspected, negative x-ray. COMPARISON: None. TECHNIQUE: Noncontrast. Axial, sagittal and coronal thin-section reconstruction. Dose reduction techniques were used. FINDINGS: There is cortical deformity involving the femoral distal diametaphysis with intramedullary lucency and sclerosis. This is of indeterminate etiology but could be related to an old healed fracture. Chronic osteomyelitis might have this appearance. No evidence of acute fracture. The remainder of the femur appears within normal limits. Similar sclerosis and lucency is noted in  the proximal tibia, only partly included on the scan, also of indeterminate etiology. Subcutaneous stranding and  nodularity is noted throughout the thigh. Immediately distal to the inguinal region, there is a 3.8 x 2.6 cm soft tissue density nodule within the deep subcutaneous fat. External iliac lymphadenopathy is suspected on the proximal edge of the scan.     IMPRESSION: 1.  Distal femoral region of mild cortical deformity and adjacent intramedullary lucency and sclerosis of indeterminate etiology. Possibilities include an old healed fracture. Chronic osteomyelitis might have a similar appearance. A similar finding is seen within the proximal tibia but only partly included on the scan. 2.  No evidence of femoral fracture. 3.  Suspected pelvic external iliac lymphadenopathy, not well evaluated with this scan. There is also a 3.8 x 2.6 cm soft tissue mass in the anteromedial aspect of the upper thigh/distal inguinal region. Immediately proximal to this, there are multiple surgical clips. 4.  Extensive subcutaneous stranding and nodularity circumferentially within the thigh, greatest distally.     Echocardiogram Complete    Result Date: 2021  [id number]^JOSE^[place] [address]  Name: CABRERA GHOSH MRN: [id number] : 1980 Study Date: 2021 08:46 AM Age: 40 yrs Gender: Male Patient Location: [facility] Reason For Study: Endocarditis Ordering Physician: VANNA GARCIA Performed By: CANDIDO  BSA: 2.5 m2 Height: 71 in Weight: 308 lb HR: 107 BP: 114/59 mmHg ______________________________________________________________________________ Procedure Definity (NDC #44414-425) given intravenously. Complete Echo Adult. Technically difficult study.Extremely difficult acoustic windows despite the use of contrast for endcardial border definition. ______________________________________________________________________________ Interpretation Summary  1.  Left ventricular size, wall thickness, and systolic function are normal. The estimated left ventricular ejection fraction is 55%. 2. Right ventricular size and systolic function are normal. 3. No hemodynamically significant valvular abnormalities. 4. No prior study available for comparison. ______________________________________________________________________________ I      WMSI = 1.00     % Normal = 100  X - Cannot   0 -                      (2) - Mildly 2 -          Segments  Size Interpret    Hyperkinetic 1 - Normal  Hypokinetic  Hypokinetic  1-2     small                                                    7 -          3-5    moderate 3 - Akinetic 4 -          5 -         6 - Akinetic Dyskinetic   6-14    large              Dyskinetic   Aneurysmal  w/scar       w/scar       15-16   diffuse  Left Ventricle The left ventricle is normal in size. There is mild concentric left ventricular hypertrophy. Left ventricular systolic function is normal. The visual ejection fraction is 55-60%. Diastolic function not assessed due to tachycardia. No regional wall motion abnormalities noted.  Right Ventricle Normal right ventricle size and systolic function.  Atria Normal left atrial size. Right atrial size is normal.  Mitral Valve Mitral valve leaflets appear normal. There is trace mitral regurgitation. There is no mitral valve stenosis.  Tricuspid Valve Tricuspid valve leaflets appear normal. There is trace to mild tricuspid regurgitation. Right ventricle systolic pressure estimate normal. The right ventricular systolic pressure is elevated at 12.4 mmHg. There is no tricuspid stenosis.  Aortic Valve Aortic valve leaflets appear normal. No aortic regurgitation is present. No aortic stenosis is present.  Pulmonic Valve The pulmonic valve is not well visualized. There is trace pulmonic valvular regurgitation. There is no pulmonic valvular stenosis.  Vessels The aorta root is normal. IVC diameter <2.1 cm collapsing >50% with  sniff suggests a normal RA pressure of 3 mmHg.  Pericardium There is no pericardial effusion.  Rhythm The rhythm was sinus tachycardia.   ______________________________________________________________________________ Report approved by: Miladis Holguin 08/19/2021 09:44 AM       CT Chest Abdomen Pelvis w/o Contrast    Result Date: 8/19/2021  EXAM: CT CHEST ABDOMEN PELVIS W/O CONTRAST LOCATION: [facility] DATE/TIME: 8/18/2021 11:42 PM INDICATION: Sepsis COMPARISON: CTA chest 04/22/2013. CT abdomen and pelvis 12/07/2005. Chest radiographs 08/18/2021. MR of the right lower extremity earlier today. TECHNIQUE: CT scan of the chest, abdomen, and pelvis was performed without IV contrast. Multiplanar reformats were obtained. Dose reduction techniques were used. CONTRAST: None.     IMPRESSION: 1.  Inflammatory lymphadenopathy of the retroperitoneum of the abdomen, right pelvis, and right inguinal station likely secondary to the presumed infectious process involving the right leg as demonstrated on the MR examination from earlier today. Involvement with lymphoma is not excluded. 2.  Lobulated soft tissue of the anterior mediastinum has mildly increased in size compared back to the remote study of 04/22/2013. There are now some enlarged lymph nodes superior to this near the left carotid and jugular vessels. Findings are concerning for active lymphoma. Clinical correlation recommended. 3.  Hepatic steatosis. I discussed the findings with at on .    US Lower Extremity Venous Duplex Right    Result Date: 8/18/2021  EXAM: US LOWER EXTREMITY VENOUS DUPLEX RIGHT LOCATION: [facility] DATE/TIME: 8/18/2021 1:40 AM INDICATION: Right leg pain, swelling and redness. COMPARISON: None. TECHNIQUE: Venous Duplex ultrasound of the right lower extremity with and without compression, augmentation and duplex. Color flow and spectral Doppler with waveform analysis performed.      IMPRESSION: 1.   No deep venous thrombosis in the right lower extremity.      Lab Results:  Personally Reviewed.   Recent Labs   Lab 08/19/21  0746 08/18/21  1410 08/17/21  2256   WBC 12.5* 11.3* 4.0   HGB 12.9* 13.2* 15.6   HCT 40.8 42.1 48.5    184 229     Recent Labs   Lab 08/19/21  0746 08/18/21  1410 08/17/21  2256    137 139   CO2 21* 19* 22   BUN 14 8 6*   ALBUMIN  --   --  3.9   ALKPHOS  --   --  72   ALT  --   --  33   AST  --   --  25     No results for input(s): INR in the last 168 hours.    Total time spent 25 minutes with greater than 50% in consultation, education and coordination of care.     Also discussed with RN      Libby XAVIER, CNS-BC, DNP  Acute Care Pain Management Program  [facility] ([place], [place], [place])   With questions call [phone]  Preference if for [facility]  Click HERE to page Hannah

## 2021-08-21 ENCOUNTER — APPOINTMENT (OUTPATIENT)
Dept: PHYSICAL THERAPY | Facility: HOSPITAL | Age: 41
End: 2021-08-21
Attending: INTERNAL MEDICINE
Payer: COMMERCIAL

## 2021-08-21 ENCOUNTER — APPOINTMENT (OUTPATIENT)
Dept: RADIOLOGY | Facility: HOSPITAL | Age: 41
End: 2021-08-21
Attending: INTERNAL MEDICINE
Payer: COMMERCIAL

## 2021-08-21 ENCOUNTER — APPOINTMENT (OUTPATIENT)
Dept: OCCUPATIONAL THERAPY | Facility: HOSPITAL | Age: 41
End: 2021-08-21
Attending: INTERNAL MEDICINE
Payer: COMMERCIAL

## 2021-08-21 LAB
ALBUMIN SERPL-MCNC: 2.1 G/DL (ref 3.5–5)
ALP SERPL-CCNC: 54 U/L (ref 45–120)
ALT SERPL W P-5'-P-CCNC: 24 U/L (ref 0–45)
ANION GAP SERPL CALCULATED.3IONS-SCNC: 11 MMOL/L (ref 5–18)
AST SERPL W P-5'-P-CCNC: 25 U/L (ref 0–40)
BACTERIA BLD CULT: ABNORMAL
BACTERIA BLD CULT: ABNORMAL
BASOPHILS # BLD MANUAL: 0 10E3/UL (ref 0–0.2)
BASOPHILS NFR BLD MANUAL: 0 %
BILIRUB SERPL-MCNC: 0.5 MG/DL (ref 0–1)
BUN SERPL-MCNC: 8 MG/DL (ref 8–22)
CALCIUM SERPL-MCNC: 8.9 MG/DL (ref 8.5–10.5)
CHLORIDE BLD-SCNC: 106 MMOL/L (ref 98–107)
CO2 SERPL-SCNC: 19 MMOL/L (ref 22–31)
CREAT SERPL-MCNC: 0.63 MG/DL (ref 0.7–1.3)
EOSINOPHIL # BLD MANUAL: 0 10E3/UL (ref 0–0.7)
EOSINOPHIL NFR BLD MANUAL: 0 %
ERYTHROCYTE [DISTWIDTH] IN BLOOD BY AUTOMATED COUNT: 15.2 % (ref 10–15)
GFR SERPL CREATININE-BSD FRML MDRD: >90 ML/MIN/1.73M2
GLUCOSE BLD-MCNC: 89 MG/DL (ref 70–125)
HCT VFR BLD AUTO: 33.9 % (ref 40–53)
HGB BLD-MCNC: 10.6 G/DL (ref 13.3–17.7)
HOLD SPECIMEN: NORMAL
LACTATE SERPL-SCNC: 1.3 MMOL/L (ref 0.7–2)
LYMPHOCYTES # BLD MANUAL: 0.7 10E3/UL (ref 0.8–5.3)
LYMPHOCYTES NFR BLD MANUAL: 6 %
MCH RBC QN AUTO: 26 PG (ref 26.5–33)
MCHC RBC AUTO-ENTMCNC: 31.3 G/DL (ref 31.5–36.5)
MCV RBC AUTO: 83 FL (ref 78–100)
MONOCYTES # BLD MANUAL: 0.4 10E3/UL (ref 0–1.3)
MONOCYTES NFR BLD MANUAL: 3 %
NEUTROPHILS # BLD MANUAL: 11 10E3/UL (ref 1.6–8.3)
NEUTROPHILS NFR BLD MANUAL: 91 %
PLAT MORPH BLD: ABNORMAL
PLATELET # BLD AUTO: 172 10E3/UL (ref 150–450)
POTASSIUM BLD-SCNC: 3.2 MMOL/L (ref 3.5–5)
POTASSIUM BLD-SCNC: 3.2 MMOL/L (ref 3.5–5)
PROT SERPL-MCNC: 6.2 G/DL (ref 6–8)
RBC # BLD AUTO: 4.08 10E6/UL (ref 4.4–5.9)
RBC MORPH BLD: ABNORMAL
SODIUM SERPL-SCNC: 136 MMOL/L (ref 136–145)
WBC # BLD AUTO: 12.1 10E3/UL (ref 4–11)

## 2021-08-21 PROCEDURE — 250N000011 HC RX IP 250 OP 636: Performed by: INTERNAL MEDICINE

## 2021-08-21 PROCEDURE — 99233 SBSQ HOSP IP/OBS HIGH 50: CPT | Performed by: INTERNAL MEDICINE

## 2021-08-21 PROCEDURE — 36415 COLL VENOUS BLD VENIPUNCTURE: CPT | Performed by: INTERNAL MEDICINE

## 2021-08-21 PROCEDURE — 83605 ASSAY OF LACTIC ACID: CPT | Performed by: INTERNAL MEDICINE

## 2021-08-21 PROCEDURE — 250N000013 HC RX MED GY IP 250 OP 250 PS 637: Performed by: CLINICAL NURSE SPECIALIST

## 2021-08-21 PROCEDURE — 84132 ASSAY OF SERUM POTASSIUM: CPT | Performed by: INTERNAL MEDICINE

## 2021-08-21 PROCEDURE — 87040 BLOOD CULTURE FOR BACTERIA: CPT | Performed by: INTERNAL MEDICINE

## 2021-08-21 PROCEDURE — 250N000011 HC RX IP 250 OP 636: Performed by: CLINICAL NURSE SPECIALIST

## 2021-08-21 PROCEDURE — 250N000013 HC RX MED GY IP 250 OP 250 PS 637: Performed by: INTERNAL MEDICINE

## 2021-08-21 PROCEDURE — 85014 HEMATOCRIT: CPT | Performed by: INTERNAL MEDICINE

## 2021-08-21 PROCEDURE — 258N000003 HC RX IP 258 OP 636: Performed by: INTERNAL MEDICINE

## 2021-08-21 PROCEDURE — 82040 ASSAY OF SERUM ALBUMIN: CPT | Performed by: INTERNAL MEDICINE

## 2021-08-21 PROCEDURE — 120N000001 HC R&B MED SURG/OB

## 2021-08-21 PROCEDURE — 71045 X-RAY EXAM CHEST 1 VIEW: CPT

## 2021-08-21 PROCEDURE — 99232 SBSQ HOSP IP/OBS MODERATE 35: CPT | Performed by: CLINICAL NURSE SPECIALIST

## 2021-08-21 PROCEDURE — 97166 OT EVAL MOD COMPLEX 45 MIN: CPT | Mod: GO

## 2021-08-21 PROCEDURE — 97535 SELF CARE MNGMENT TRAINING: CPT | Mod: GO

## 2021-08-21 RX ORDER — PREGABALIN 25 MG/1
25 CAPSULE ORAL 2 TIMES DAILY
Status: DISCONTINUED | OUTPATIENT
Start: 2021-08-21 | End: 2021-08-24

## 2021-08-21 RX ORDER — NORTRIPTYLINE HCL 25 MG
25 CAPSULE ORAL
Status: DISCONTINUED | OUTPATIENT
Start: 2021-08-21 | End: 2021-08-23

## 2021-08-21 RX ORDER — DIPHENHYDRAMINE HYDROCHLORIDE 50 MG/ML
25 INJECTION INTRAMUSCULAR; INTRAVENOUS EVERY 6 HOURS PRN
Status: DISCONTINUED | OUTPATIENT
Start: 2021-08-21 | End: 2021-08-24

## 2021-08-21 RX ORDER — FUROSEMIDE 10 MG/ML
40 INJECTION INTRAMUSCULAR; INTRAVENOUS ONCE
Status: COMPLETED | OUTPATIENT
Start: 2021-08-21 | End: 2021-08-21

## 2021-08-21 RX ORDER — KETOROLAC TROMETHAMINE 15 MG/ML
15 INJECTION, SOLUTION INTRAMUSCULAR; INTRAVENOUS EVERY 6 HOURS PRN
Status: DISCONTINUED | OUTPATIENT
Start: 2021-08-21 | End: 2021-08-23

## 2021-08-21 RX ORDER — POTASSIUM CHLORIDE 1500 MG/1
40 TABLET, EXTENDED RELEASE ORAL ONCE
Status: COMPLETED | OUTPATIENT
Start: 2021-08-21 | End: 2021-08-21

## 2021-08-21 RX ORDER — CEFAZOLIN SODIUM 2 G/100ML
2 INJECTION, SOLUTION INTRAVENOUS EVERY 8 HOURS
Status: DISCONTINUED | OUTPATIENT
Start: 2021-08-21 | End: 2021-08-22

## 2021-08-21 RX ADMIN — SODIUM CHLORIDE: 9 INJECTION, SOLUTION INTRAVENOUS at 09:26

## 2021-08-21 RX ADMIN — HYDROMORPHONE HYDROCHLORIDE 4 MG: 2 TABLET ORAL at 22:41

## 2021-08-21 RX ADMIN — ACETAMINOPHEN 975 MG: 325 TABLET ORAL at 08:45

## 2021-08-21 RX ADMIN — PROCHLORPERAZINE EDISYLATE 10 MG: 5 INJECTION INTRAMUSCULAR; INTRAVENOUS at 09:16

## 2021-08-21 RX ADMIN — METOPROLOL TARTRATE 50 MG: 25 TABLET, FILM COATED ORAL at 02:20

## 2021-08-21 RX ADMIN — PREGABALIN 25 MG: 25 CAPSULE ORAL at 08:45

## 2021-08-21 RX ADMIN — KETOROLAC TROMETHAMINE 15 MG: 15 INJECTION, SOLUTION INTRAMUSCULAR; INTRAVENOUS at 06:23

## 2021-08-21 RX ADMIN — MAGNESIUM HYDROXIDE 30 ML: 400 SUSPENSION ORAL at 09:06

## 2021-08-21 RX ADMIN — HYDROMORPHONE HYDROCHLORIDE 4 MG: 2 TABLET ORAL at 19:05

## 2021-08-21 RX ADMIN — POTASSIUM CHLORIDE 40 MEQ: 1500 TABLET, EXTENDED RELEASE ORAL at 22:25

## 2021-08-21 RX ADMIN — FAMOTIDINE 40 MG: 20 TABLET, FILM COATED ORAL at 08:44

## 2021-08-21 RX ADMIN — PREGABALIN 25 MG: 25 CAPSULE ORAL at 21:02

## 2021-08-21 RX ADMIN — HYDROMORPHONE HYDROCHLORIDE 4 MG: 2 TABLET ORAL at 06:38

## 2021-08-21 RX ADMIN — ACETAMINOPHEN 975 MG: 325 TABLET ORAL at 14:12

## 2021-08-21 RX ADMIN — METOPROLOL TARTRATE 50 MG: 25 TABLET, FILM COATED ORAL at 08:45

## 2021-08-21 RX ADMIN — CEFAZOLIN SODIUM 2 G: 2 INJECTION, SOLUTION INTRAVENOUS at 16:14

## 2021-08-21 RX ADMIN — AMLODIPINE BESYLATE 2.5 MG: 2.5 TABLET ORAL at 08:45

## 2021-08-21 RX ADMIN — POLYETHYLENE GLYCOL 3350 17 G: 17 POWDER, FOR SOLUTION ORAL at 08:44

## 2021-08-21 RX ADMIN — CEFTRIAXONE SODIUM 2 G: 2 INJECTION, POWDER, FOR SOLUTION INTRAMUSCULAR; INTRAVENOUS at 02:19

## 2021-08-21 RX ADMIN — FUROSEMIDE 40 MG: 10 INJECTION, SOLUTION INTRAVENOUS at 10:16

## 2021-08-21 RX ADMIN — CEFAZOLIN SODIUM 2 G: 2 INJECTION, SOLUTION INTRAVENOUS at 22:25

## 2021-08-21 RX ADMIN — METRONIDAZOLE 500 MG: 500 INJECTION, SOLUTION INTRAVENOUS at 22:25

## 2021-08-21 RX ADMIN — METRONIDAZOLE 500 MG: 500 INJECTION, SOLUTION INTRAVENOUS at 10:17

## 2021-08-21 RX ADMIN — ACETAMINOPHEN 975 MG: 325 TABLET ORAL at 21:02

## 2021-08-21 RX ADMIN — HYDROXYZINE HYDROCHLORIDE 50 MG: 25 TABLET, FILM COATED ORAL at 09:06

## 2021-08-21 RX ADMIN — HYDROMORPHONE HYDROCHLORIDE 4 MG: 2 TABLET ORAL at 09:26

## 2021-08-21 RX ADMIN — Medication 12.5 MG: at 21:02

## 2021-08-21 RX ADMIN — DOCUSATE SODIUM 50 MG AND SENNOSIDES 8.6 MG 1 TABLET: 8.6; 5 TABLET, FILM COATED ORAL at 09:06

## 2021-08-21 RX ADMIN — POTASSIUM CHLORIDE 40 MEQ: 1500 TABLET, EXTENDED RELEASE ORAL at 14:12

## 2021-08-21 RX ADMIN — HYDROMORPHONE HYDROCHLORIDE 4 MG: 2 TABLET ORAL at 14:28

## 2021-08-21 NOTE — PROGRESS NOTES
Progress Note    Assessment/Plan  Gram-positive sepsis secondary due to right leg cellulitis, chronic osteomyelitis and lymphedema   --Initially treated with IV aztreonam, Rocephin and doxycycline and then changed to Rocephin with metronidazole  --Today ID changed to cefazolin with metronidazole  --Awaiting input regarding PICC line placement  --Blood cultures grew group B streptococcus sensitive to cephalosporins  --Orthopedic recommended transfer to [facility] versus [facility] of  if patient does not show clinical improvement.  Awaiting input from ID.  Patient prefers to go to [facility]    Acute dyspnea on IV fluids  --Chest x-ray shows infiltrate in the right costophrenic angle.  Differential diagnosis include atelectasis, fluid overload versus pneumonia  --Patient currently on IV antibiotics  --Ordered IV Lasix once and DC IV fluids  --Order incentive spirometry  --No evidence of wheezing and therefore will hold off on steroids and bronchodilators    Lactic acidosis resolved  --DC IV fluids    Tachycardia  --Resolved with beta-blockers  --Given shortness of breath will de-escalate beta-blockers 12.5 twice daily x2 days and then stop  --DC IV fluids    Hypokalemia mild  --Order potassium replacement protocol    History of Hodgkin's lymphoma  --Diagnosed 15 years ago  --Stage I or II way as per oncology consultation  --No inpatient work-up necessary    Acute on chronic pain   pain control issues  --Patient currently on Toradol and scheduled Tylenol with as needed hydromorphone, Lyrica Vistaril and nortriptyline  --Appreciate pain team input  --Monitor BMP while on Toradol    Essential hypertension suboptimal control  --Improved after IV Lasix  --De-escalate beta-blockers    Inguinal lymphadenopathy causing right lymphedema    Morbid obesity with BMI of 43.54 kg/m     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  Chart reviewed.  Patient feels unwell and has increased pain in the right leg as well as shortness of breath.  Currently on IV fluids.  DC'd IV fluids and ordered 1 dose of IV Lasix.  Order chest x-ray which shows infiltrate in the right costophrenic angle.  Initially treated with Rocephin & metronidazole which is currently changed to cefazolin and metronidazole   Potassium was low at 3.2.  Ordered replacement.  Patient denies any chest pain lightheadedness dizziness nausea vomiting diarrhea.    Objective    /67 (BP Location: Right arm)   Pulse 75   Temp 99  F (37.2  C) (Oral)   Resp 20   Ht 1.803 m (5' 11\")   Wt 141.6 kg (312 lb 3.2 oz)   SpO2 95%   BMI 43.54 kg/m    Weight:   Wt Readings from Last 5 Encounters:   08/19/21 141.6 kg (312 lb 3.2 oz)   07/29/21 122.5 kg (270 lb)   07/06/21 126.6 kg (279 lb)   06/25/21 126.9 kg (279 lb 11.2 oz)   06/23/21 127 kg (280 lb)       I/O last 3 completed shifts:  In: 5261.25 [P.O.:1430; I.V.:3831.25]  Out: 2425 [Urine:2425]  I/O this shift:  In: -   Out: 1350 [Urine:1350]          Physical Exam  Alert, oriented*3  Does not appear to be in respiratory distress  Body mass index is 43.54 kg/m .  Does not appear to be in distress  No sinus tenderness  Moist membranes  Neck supple  CVS: S1 S2-N, no murmurs, gallops, rubs  Resp: Right lower lobe infiltrate inspiratory crackles   abd: soft, No t/g/r  Neuro: no involuntary movements such as tremors  Vasc: Chronic right leg lymphedema  Pertinent Labs  ----------------------  Recent Labs   Lab 08/21/21  0904 08/20/21  1827 08/19/21  0746 08/17/21  2256    138 139 139   POTASSIUM 3.2* 3.4* 3.8 3.7   CO2 19* 22 21* 22   BUN 8 8 14 6*   CR 0.63* 0.63* 0.83 0.73   MAG  --   --   --  1.5*   GLC 89 85 111 150* "   ALBUMIN 2.1* 2.2*  --  3.9   BILITOTAL 0.5 0.4  --  0.5   ALKPHOS 54 53  --  72   ALT 24 28  --  33   AST 25 23  --  25     Recent Labs   Lab 08/21/21  0904 08/20/21  1827 08/19/21  0746   WBC 12.1* 12.1* 12.5*   HGB 10.6* 11.1* 12.9*   HCT 33.9* 34.9* 40.8    167 203     No results for input(s): INR in the last 168 hours.  Glucose Values Latest Ref Rng & Units 8/17/2021 8/18/2021 8/19/2021 8/20/2021 8/21/2021   Bedside Glucose (mg/dl )  - -- -- -- -- --   GLUCOSE 70 - 125 mg/dL 150(H) 163(H) 111 85 89   Some recent data might be hidden         Pertinent Radiology   Radiology Results: Personally reviewed impression/s  MR Ankle Right w/o & w Contrast    Result Date: 8/19/2021  EXAM: MR ANKLE RIGHT W/O and W CONTRAST LOCATION: [facility] DATE/TIME: 8/18/2021 10:18 PM INDICATION: Right lower extremity pain and swelling. Tibial osteomyelitis. COMPARISON: MRI of the tibia fibula 08/18/2021 TECHNIQUE: Routine. Additional postgadolinium T1 sequences were obtained. IV CONTRAST: 10ml gadavist FINDINGS: There is extensive subcutaneous edema with enhancement surrounding the ankle and extending into the foot and lower leg compatible with cellulitis. There is some poorly defined fluid within the subcutaneous fat about the ankle. No well-defined organized fluid collection to suggest an abscess. No involvement of the deeper soft tissues. No associated marrow abnormality to suggest osteomyelitis. There is a small focus of subchondral marrow edema and likely developing cystic change involving the cuboid adjacent to the calcaneocuboid joint. No joint effusion or synovitis. Mild chronic thickening of the Achilles tendon. No abnormal tendon sheath fluid.     IMPRESSION: 1.  Extensive cellulitis. 2.  No focal abscess. 3.  No evidence of osteomyelitis.    MR Femur Thigh Right wo & w Contrast    Result Date: 8/19/2021  FINAL REPORT I agree with the preliminary report. There are findings highly  suspicious for acute on chronic osteomyelitis involving the distal aspect of the right femur. Linear areas of signal abnormality may represent small sinus tract. There are surrounding soft tissue changes in the distal, anterior thigh which can be seen with infection including myositis and fasciitis. Superimposed pathologic fracture of the right femur may also be present as there is some cortical offset in the distal femoral shaft. Correlation with CT of the femur may be useful for further assessment of possible distal femoral fracture. Subcutaneous edema diffusely throughout the right thigh distally and anteriorly in the right thigh with reactive lymphadenopathy right inguinal region. Final Interpretation Dictated By: Lee Leos Date: 8/19/2021 PRELIMINARY REPORT EXAM: MR FEMUR THIGH RIGHT WO AND W CONTRAST LOCATION: [facility] DATE/TIME: 8/18/2021 10:18 PM INDICATION: Cellulitis with sepsis with elevated lactate and right leg pain. Evaluate for osteomyelitis or deep tissue space infection. COMPARISON: MRI of the tibia and fibula 08/18/2021. TECHNIQUE: Routine. Additional postgadolinium T1 sequences were obtained. IV CONTRAST: 10 ml. FINDINGS: JOINTS AND BONES: -Replacement of the normal T1 signal involving the distal right femur particularly along the lateral aspect of the distal right femur above the level of the condyles (series 2, image 54). There is associated reactive edema in this area. Additionally, there appears to be breach of the cortex by linear fluid extending along the anterior cortex and through the anterior cortex into the intramedullary space (series 5, image 48-50). These findings are suspicious for osteomyelitis with draining sinus tracts. More proximal aspect of the right femur demonstrates no evidence for replacement of the normal T1 signal or evidence for intramedullary edema. No fracture or dislocation. TENDONS: -No tendon tear, tendinopathy, or tenosynovitis.  MUSCLES AND SOFT TISSUES: -Diffuse muscular edema involving the anterior compartment of the distal right thigh particularly surrounding the area of the draining presumed sinus tracts. There is small amount of fluid between the muscle and the anterior cortex of the distal right femur. Minimal amount of interfascial fluid involving the distal medial and lateral aspects of the right thigh. Severe subcutaneous edema diffusely throughout the right thigh. Moderate fluid involving the subcutaneous tissues of the mid and anterior right thigh. Enlarged right inguinal lymph node, likely reactive.     IMPRESSION: 1.  Findings highly suspicious for osteomyelitis involving the distal aspect of the right femur with replacement of normal T1 signal distally and surrounding reactive edema. Additionally, there are linear areas of increased T2 signal abnormality extending through the anterior cortex possibly representing small sinus tracts draining from the right femur suggesting chronic osteomyelitis. There is cortical thickening throughout this area and fluid along the anterior cortex in the distal right thigh. 2.  Additionally, there is diffuse edema in the anterior musculature of the distal right thigh with minimal layering fluid which can be seen with myositis and fasciitis. 3.  Subcutaneous edema diffusely throughout the right thigh distally and anteriorly in the right thigh with reactive lymphadenopathy right inguinal region. 4.  Final report following review by musculoskeletal radiologist. Preliminary Interpretation Dictated By: Curt L. Behrns, MD Date: 8/19/2021    MR Tibia Fibula Lower Leg Right wo & w Contr    Result Date: 8/19/2021  FINAL REPORT I agree with the preliminary report. There are signal changes in the right proximal tibia which raises concern for an acute on chronic osteomyelitis. Additional soft tissue findings suspicious for draining sinus tracts as described. Final Interpretation Dictated By: Lee Leos  Date: 8/19/2021 PRELIMINARY REPORT EXAM: MR TIBIA FIBULA LOWER LEG RIGHT WO AND W CONTR LOCATION: [facility] DATE/TIME: 8/18/2021 10:18 PM INDICATION: Evaluate for osteomyelitis involving the tibia and fibula with severe right lower extremity pain and soft tissue swelling. COMPARISON: None. TECHNIQUE: Routine. Additional postgadolinium T1 sequences were obtained. IV CONTRAST: 10 ml Gadavist. FINDINGS: BONES: -Heterogeneous areas of decreased T1 signal throughout the proximal right tibia with irregularity to the cortex with linear fluidlike areas in the anterior right cortex. These linear areas of fluid in the anterior medial right cortex (series 6, image 13-17) are suspicious for linear draining sinus tracts. Overall findings are suspicious for osteomyelitis in the proximal right tibia, likely chronic. Distal right tibia and fibula demonstrate no evidence for other areas suspicious for osteomyelitis. SOFT TISSUES:  -Severe subcutaneous edema diffusely throughout the right lower extremity greatest near the mid to lower right lower extremity down to the level of the ankle. MUSCLES: -No abnormality in the visualized musculature.     IMPRESSION: 1.  Heterogeneous decreased T1 signal throughout the proximal right tibia with irregularity of the anterior cortex with draining linear areas of fluid signal intensity into the soft tissue suspicious for draining sinus tracts from an area of suspected chronic osteomyelitis in the proximal right tibia. 2.  Severe subcutaneous edema right lower extremity. 3.  Final report following review by musculoskeletal radiologist. Preliminary Interpretation Dictated By: Curt L. Behrns, MD Date: 8/19/2021     CT Femur Thigh Right w/o Contrast    Result Date: 8/19/2021  EXAM: CT FEMUR THIGH RIGHT WITHOUT CONTRAST LOCATION: [facility] DATE/TIME: 8/19/2021 7:43 PM INDICATION: Upper leg pain, stress fracture suspected, negative x-ray. COMPARISON:  None. TECHNIQUE: Noncontrast. Axial, sagittal and coronal thin-section reconstruction. Dose reduction techniques were used. FINDINGS: There is cortical deformity involving the femoral distal diametaphysis with intramedullary lucency and sclerosis. This is of indeterminate etiology but could be related to an old healed fracture. Chronic osteomyelitis might have this appearance. No evidence of acute fracture. The remainder of the femur appears within normal limits. Similar sclerosis and lucency is noted in the proximal tibia, only partly included on the scan, also of indeterminate etiology. Subcutaneous stranding and  nodularity is noted throughout the thigh. Immediately distal to the inguinal region, there is a 3.8 x 2.6 cm soft tissue density nodule within the deep subcutaneous fat. External iliac lymphadenopathy is suspected on the proximal edge of the scan.     IMPRESSION: 1.  Distal femoral region of mild cortical deformity and adjacent intramedullary lucency and sclerosis of indeterminate etiology. Possibilities include an old healed fracture. Chronic osteomyelitis might have a similar appearance. A similar finding is seen within the proximal tibia but only partly included on the scan. 2.  No evidence of femoral fracture. 3.  Suspected pelvic external iliac lymphadenopathy, not well evaluated with this scan. There is also a 3.8 x 2.6 cm soft tissue mass in the anteromedial aspect of the upper thigh/distal inguinal region. Immediately proximal to this, there are multiple surgical clips. 4.  Extensive subcutaneous stranding and nodularity circumferentially within the thigh, greatest distally.     Echocardiogram Complete    Result Date: 2021  [id number]^GARCIA^[place] [address]  Name: CABRERA GHOSH MRN: [id number] : 1980 Study Date: 2021 08:46 AM Age: 40 yrs Gender: Male Patient Location: [facility] Reason For Study:  Endocarditis Ordering Physician: VANNA GARCIA Performed By: CANDIDO  BSA: 2.5 m2 Height: 71 in Weight: 308 lb HR: 107 BP: 114/59 mmHg ______________________________________________________________________________ Procedure Definity ([facility] #23373-247) given intravenously. Complete Echo Adult. Technically difficult study.Extremely difficult acoustic windows despite the use of contrast for endcardial border definition. ______________________________________________________________________________ Interpretation Summary  1. Left ventricular size, wall thickness, and systolic function are normal. The estimated left ventricular ejection fraction is 55%. 2. Right ventricular size and systolic function are normal. 3. No hemodynamically significant valvular abnormalities. 4. No prior study available for comparison. ______________________________________________________________________________ I      WMSI = 1.00     % Normal = 100  X - Cannot   0 -                      (2) - Mildly 2 -          Segments  Size Interpret    Hyperkinetic 1 - Normal  Hypokinetic  Hypokinetic  1-2     small                                                    7 -          3-5    moderate 3 - Akinetic 4 -          5 -         6 - Akinetic Dyskinetic   6-14    large              Dyskinetic   Aneurysmal  w/scar       w/scar       15-16   diffuse  Left Ventricle The left ventricle is normal in size. There is mild concentric left ventricular hypertrophy. Left ventricular systolic function is normal. The visual ejection fraction is 55-60%. Diastolic function not assessed due to tachycardia. No regional wall motion abnormalities noted.  Right Ventricle Normal right ventricle size and systolic function.  Atria Normal left atrial size. Right atrial size is normal.  Mitral Valve Mitral valve leaflets appear normal. There is trace mitral regurgitation. There is no mitral valve stenosis.  Tricuspid Valve Tricuspid valve leaflets appear normal. There is trace to  mild tricuspid regurgitation. Right ventricle systolic pressure estimate normal. The right ventricular systolic pressure is elevated at 12.4 mmHg. There is no tricuspid stenosis.  Aortic Valve Aortic valve leaflets appear normal. No aortic regurgitation is present. No aortic stenosis is present.  Pulmonic Valve The pulmonic valve is not well visualized. There is trace pulmonic valvular regurgitation. There is no pulmonic valvular stenosis.  Vessels The aorta root is normal. IVC diameter <2.1 cm collapsing >50% with sniff suggests a normal RA pressure of 3 mmHg.  Pericardium There is no pericardial effusion.  Rhythm The rhythm was sinus tachycardia.  ______________________________________________________________________________ MMode/2D Measurements & Calculations RVDd: 4.2 cm IVSd: 1.2 cm LVIDd: 4.5 cm LVIDs: 3.0 cm LVPWd: 1.2 cm FS: 32.9 %  LV mass(C)d: 203.0 grams LV mass(C)dI: 80.1 grams/m2 Ao root diam: 3.9 cm LA dimension: 3.6 cm asc Aorta Diam: 3.1 cm LA/Ao: 0.93 LVOT diam: 2.3 cm LVOT area: 4.0 cm2 LA Volume Indexed (AL/bp): 27.4 ml/m2 RWT: 0.54  Doppler Measurements & Calculations Ao V2 max: 156.1 cm/sec Ao max PG: 10.0 mmHg Ao V2 mean: 105.9 cm/sec Ao mean P.3 mmHg Ao V2 VTI: 28.7 cm GREGORIO(I,D): 3.4 cm2 GREGORIO(V,D): 3.3 cm2 LV V1 max P.5 mmHg LV V1 max: 127.9 cm/sec LV V1 VTI: 24.3 cm  SV(LVOT): 97.4 ml SI(LVOT): 38.4 ml/m2 PA acc time: 0.12 sec TR max aaron: 176.2 cm/sec TR max P.4 mmHg AV Aaron Ratio (DI): 0.82 GREGORIO Index (cm2/m2): 1.3  ______________________________________________________________________________ Report approved by: Miladis Holguin 2021 09:44 AM       XR Chest Port 1 View    Result Date: 2021  EXAM: XR CHEST PORT 1 VIEW LOCATION: [facility] DATE/TIME: 2021 10:45 AM INDICATION: Shortness of breath. COMPARISON: 2021     IMPRESSION: New suspicious area of increased opacity right lung base laterally near the right costophrenic angle could  represent a small focal area of alveolar infiltrate related to pneumonia in the proper clinical setting. Left lung clear. Normal heart size and pulmonary vascularity. No overt osseous abnormality.    CT Chest Abdomen Pelvis w/o Contrast    Result Date: 8/19/2021  EXAM: CT CHEST ABDOMEN PELVIS W/O CONTRAST LOCATION: [facility] DATE/TIME: 8/18/2021 11:42 PM INDICATION: Sepsis COMPARISON: CTA chest 04/22/2013. CT abdomen and pelvis 12/07/2005. Chest radiographs 08/18/2021. MR of the right lower extremity earlier today. TECHNIQUE: CT scan of the chest, abdomen, and pelvis was performed without IV contrast. Multiplanar reformats were obtained. Dose reduction techniques were used. CONTRAST: None. FINDINGS: LUNGS AND PLEURA: Mild atelectasis bilaterally. Lungs otherwise clear. No pleural fluid or pneumothorax. MEDIASTINUM/AXILLAE: There is lobulated soft tissue intermingled with lobules of fat in the left anterior mediastinum which measures up to 7.5 x 3.6 cm on axial images (image 51 series 4). A similar finding with less intermixed fat measured up to 6.7 x 3.2 cm on 04/22/2013. There are now enlarged lymph nodes extending superiorly lateral to the left common carotid artery and internal jugular vein which measure up to 2 cm axillary dissection clips on the left. (image 16 of series 4). CORONARY ARTERY CALCIFICATION: None. HEPATOBILIARY: Hepatic steatosis. Normal gallbladder and bile ducts. PANCREAS: Normal. SPLEEN: Normal. ADRENAL GLANDS: Normal. KIDNEYS/BLADDER: Excreted contrast in the urinary system from a prior study. Kidneys and bladder otherwise normal. BOWEL: Normal. No obstruction or inflammation. Normal appendix. LYMPH NODES: Multiple enlarged and inflamed lymph nodes of the abdominal retroperitoneum measure up to 2.3 cm near the IVC. Inflamed retroperitoneal lymphadenopathy in the pelvis near the iliac vessels is asymmetric on the right, notably the same side as  the presumed infectious  process involving the right leg on the prior MR lower extremity study from today. The largest right external iliac chain lymph node is 3.2 cm. The largest included right inguinal lymph node is 3.0 cm. Right inguinal lymphadenectomy clips are present. VASCULATURE: Unremarkable. PELVIC ORGANS: Normal. MUSCULOSKELETAL: Edema of the subcutaneous fat of the right groin and included anterior right thigh consistent with cellulitis. This was more fully imaged on the MR examination from earlier today.     IMPRESSION: 1.  Inflammatory lymphadenopathy of the retroperitoneum of the abdomen, right pelvis, and right inguinal station likely secondary to the presumed infectious process involving the right leg as demonstrated on the MR examination from earlier today. Involvement with lymphoma is not excluded. 2.  Lobulated soft tissue of the anterior mediastinum has mildly increased in size compared back to the remote study of 04/22/2013. There are now some enlarged lymph nodes superior to this near the left carotid and jugular vessels. Findings are concerning for active lymphoma. Clinical correlation recommended. 3.  Hepatic steatosis. I discussed the findings with at on .    EKG Results: not reviewed.

## 2021-08-21 NOTE — PROGRESS NOTES
[facility]    Infectious Disease Progress Note    Date of Service (when I saw the patient): 08/21/2021     Assessment & Plan      Romeo Chong is a 40 year old male who was admitted on 8/17/2021.     1. Right LE cellulitis, osteomyelitis, lymphedema, severe.  2. Group B Streptococcus bacteremia- Streptococcus agalactiae. Sensitive to Ceftriaxone and patient tolerating Ceftriaxone. Patient stated that he has had better improvement with Keflex in the past when he was being discharged from hospital in [place].  3. Orthopedic following   4. Sepsis  5. History of Lymphoma  6. Inguinal lymphadenopathy  Multiple enlarged and inflamed lymph nodes of the abdominal retroperitoneum measure up to 2.3 cm near the IVC. Inflamed retroperitoneal lymphadenopathy in the pelvis near the iliac vessels is asymmetric on the right, notably the same side as   the presumed infectious process involving the right leg on the prior MR lower extremity study from today. The largest right external iliac chain lymph node is 3.2 cm. The largest included right inguinal lymph node is 3.0 cm. Right inguinal lymphadenectomy clips are present.  7. Morbid obesity Body mass index is 43.54 kg/m .   8. 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 Several allergies    Recommendations:    1. Switch IV ceftriaxone to IV cefazolin and monitor for clinical response  2. Patient may need IV antibiotic on discharge depending on clinical response.  Chronic osteomyelitis, though patient unaware and awaiting records from [place].  3. Follow repeat blood cultures  4. Orthopedic Surgery recommended CT scan and transfer [facility] or [facility]. Agree with transfer as there is no clinical improvement, underlying osteomyelitis. If patient improves, would need referral to orthopedic surgery specializing in complex cases.  Patient prefers to go to [facility].  He does not want to go to [facility]  due to previous bad experience.  Discussed previously with hospitalist.  5. Stopped Aztreonam.   6. Stop doxycycline.  Vancomycin, and Zosyn allergy so abx choice limited. Will deescalate based on final susceptibilities results  7. Echocardiogram technically difficult- report reviewed  8. Would stop metronidazole once there is significant clinical improvement  9. Monitor CBC, CMP, blood cultures  10. Discussed with patient, and nurse  11. Awaiting records from [facility]. Patient had been hospitalized for cellulitis and Sepsis in the past.  Discussed with had kiet Branch on 8/20/2021  12. Detailed discussion with patient and questions answered  13. Pain control    Previous photo        Sherley Cabrera MD  [facility]  [phone]      Interval History   Fever, swelling and pain in leg, does not have significant improvement.  Feels that Keflex had worked better in the past discussed imaging findings  No new allergic reaction  Discussed with patient that we could switch ceftriaxone to cefazolin and monitor.  Also stated that as there is no significant improvement, transfer to specialized orthopedic surgery at [facility] would be considered.  Patient is agreeable.    Physical Exam   Temp: 99  F (37.2  C) Temp src: Oral BP: 128/67 Pulse: 75   Resp: 20 SpO2: 95 % O2 Device: None (Room air)    Vitals:    08/17/21 2242 08/18/21 1523 08/19/21 2017   Weight: 120.2 kg (265 lb) 139.8 kg (308 lb 3.2 oz) 141.6 kg (312 lb 3.2 oz)     Vital Signs with Ranges  Temp:  [98.5  F (36.9  C)-101  F (38.3  C)] 99  F (37.2  C)  Pulse:  [] 75  Resp:  [18-22] 20  BP: (128-143)/(66-82) 128/67  SpO2:  [95 %-97 %] 95 %    Exam on 8/21/2021   Constitutional: Awake, alert, cooperative, fatigued  Lungs: Distant  Cardiovascular: S1 S2  Abdomen: obese tenderness  Skin: RLE lymphedema, swelling, tenderness, warmth  Severely deconditioned    Neuro: AOx 3    Medications       acetaminophen  975 mg Oral  TID     amLODIPine  2.5 mg Oral Daily     ceFAZolin  2 g Intravenous Q8H     enoxaparin ANTICOAGULANT  40 mg Subcutaneous Q12H     famotidine  40 mg Oral QAM     metoprolol tartrate  50 mg Oral Q6H     metroNIDAZOLE  500 mg Intravenous Q12H     polyethylene glycol  17 g Oral Daily     potassium chloride  40 mEq Oral Once     pregabalin  25 mg Oral BID       Data   All microbiology laboratory data reviewed.  Recent Labs   Lab Test 08/21/21  0904 08/20/21 1827 08/19/21  0746   WBC 12.1* 12.1* 12.5*   HGB 10.6* 11.1* 12.9*   HCT 33.9* 34.9* 40.8   MCV 83 82 82    167 203     Recent Labs   Lab Test 08/21/21  0904 08/20/21 1827 08/19/21  0746   CR 0.63* 0.63* 0.83     08/17/2021 2256 08/20/2021 0951 Blood Culture Line, venous [[id number]]    (Abnormal)   Blood from Line, venous    Preliminary result Component Value   Culture Positive on the 1st day of incubationAbnormal  P    Streptococcus agalactiae (Group B Streptococcus)Panic  P    2 of 2 bottles       Susceptibility     Streptococcus agalactiae (Group B Streptococcus)     MEAGAN     Ampicillin 0.12 ug/mL Susceptible     Cefotaxime <=0.25 ug/mL Susceptible     Ceftriaxone <=0.25 ug/mL Susceptible     Clindamycin >0.5 ug/mL Resistant     Meropenem <=0.06 ug/mL Susceptible     Penicillin 0.06 ug/mL Susceptible     Vancomycin 0.5 ug/mL Susceptible                  08/17/2021 2256 08/18/2021 2137 Verigene GP Panel [[id number]]    (Abnormal)   Blood from Line, venous    Final result Component Value   Staphylococcus species Not Detected   Staphylococcus aureus Not Detected   Staphylococcus epidermidis Not Detected   Staphylococcus lugdunensis Not Detected   Enterococcus faecalis Not Detected   Enterococcus faecium Not Detected   Streptococcus agalactiae DetectedAbnormal    Positive for Streptococcus agalactiae (Group B Streptococcus) by AppSheetigene multiplex nucleic acid test. Penicillin and ampicillin are drugs of choice; non-susceptible isolates have not been  reported. Final identification and antimicrobial susceptibility testing will be verified by standard methods.   Streptococcus anginosus group Not Detected   Streptococcus pneumoniae Not Detected   Streptococcus pyogenes Not Detected   Listeria species Not Detected           06/22/2021 1027 06/23/2021 0839 SARS-CoV-2 COVID-19 Virus (Coronavirus) by PCR [[id number]]   Respiratory    Final result Component Value   SARS-CoV-2 Virus Specimen Source Nasopharyngeal   SARS-CoV-2 PCR Result NEGATIVE                08/17/2021 2256 08/19/2021 1219 Blood Culture Line, venous [[id number]]   (Abnormal)   Blood from Line, venous    Preliminary result Component Value   Culture Positive on the 1st day of incubationAbnormal  P    Streptococcus agalactiae (Group B Streptococcus)Panic  P    2 of 2 bottles              08/17/2021 2256 08/18/2021 2137 Verigene GP Panel [[id number]]    (Abnormal)   Blood from Line, venous    Final result Component Value   Staphylococcus species Not Detected   Staphylococcus aureus Not Detected   Staphylococcus epidermidis Not Detected   Staphylococcus lugdunensis Not Detected   Enterococcus faecalis Not Detected   Enterococcus faecium Not Detected   Streptococcus agalactiae DetectedAbnormal    Positive for Streptococcus agalactiae (Group B Streptococcus) by Verigene multiplex nucleic acid test. Penicillin and ampicillin are drugs of choice; non-susceptible isolates have not been reported. Final identification and antimicrobial susceptibility testing will be verified by standard methods.   Streptococcus anginosus group Not Detected   Streptococcus pneumoniae Not Detected   Streptococcus pyogenes Not Detected   Listeria species Not Detected             RADIOLOGY:    XR Chest 1 View    Result Date: 8/18/2021  EXAM: XR CHEST 1 VIEW LOCATION: [facility] DATE/TIME: 8/18/2021 1:26 AM INDICATION: fever, chills COMPARISON: 10/29/2012.     IMPRESSION: Negative chest.    MR Femur Thigh  Right wo & w Contrast    Result Date: 8/19/2021  FINAL REPORT I agree with the preliminary report. There are findings highly suspicious for acute on chronic osteomyelitis involving the distal aspect of the right femur. Linear areas of signal abnormality may represent small sinus tract. There are surrounding soft tissue changes in the distal, anterior thigh which can be seen with infection including myositis and fasciitis. Superimposed pathologic fracture of the right femur may also be present as there is some cortical offset in the distal femoral shaft. Correlation with CT of the femur may be useful for further assessment of possible distal femoral fracture. Subcutaneous edema diffusely throughout the right thigh distally and anteriorly in the right thigh with reactive lymphadenopathy right inguinal region. Final Interpretation Dictated By: Lee Leos Date: 8/19/2021 PRELIMINARY REPORT EXAM: MR FEMUR THIGH RIGHT WO AND W CONTRAST LOCATION: [facility] DATE/TIME: 8/18/2021 10:18 PM INDICATION: Cellulitis with sepsis with elevated lactate and right leg pain. Evaluate for osteomyelitis or deep tissue space infection. COMPARISON: MRI of the tibia and fibula 08/18/2021. TECHNIQUE: Routine. Additional postgadolinium T1 sequences were obtained. IV CONTRAST: 10 ml. FINDINGS: JOINTS AND BONES: -Replacement of the normal T1 signal involving the distal right femur particularly along the lateral aspect of the distal right femur above the level of the condyles (series 2, image 54). There is associated reactive edema in this area. Additionally, there appears to be breach of the cortex by linear fluid extending along the anterior cortex and through the anterior cortex into the intramedullary space (series 5, image 48-50). These findings are suspicious for osteomyelitis with draining sinus tracts. More proximal aspect of the right femur demonstrates no evidence for replacement of the normal T1 signal or  evidence for intramedullary edema. No fracture or dislocation. TENDONS: -No tendon tear, tendinopathy, or tenosynovitis. MUSCLES AND SOFT TISSUES: -Diffuse muscular edema involving the anterior compartment of the distal right thigh particularly surrounding the area of the draining presumed sinus tracts. There is small amount of fluid between the muscle and the anterior cortex of the distal right femur. Minimal amount of interfascial fluid involving the distal medial and lateral aspects of the right thigh. Severe subcutaneous edema diffusely throughout the right thigh. Moderate fluid involving the subcutaneous tissues of the mid and anterior right thigh. Enlarged right inguinal lymph node, likely reactive.     IMPRESSION: 1.  Findings highly suspicious for osteomyelitis involving the distal aspect of the right femur with replacement of normal T1 signal distally and surrounding reactive edema. Additionally, there are linear areas of increased T2 signal abnormality extending through the anterior cortex possibly representing small sinus tracts draining from the right femur suggesting chronic osteomyelitis. There is cortical thickening throughout this area and fluid along the anterior cortex in the distal right thigh. 2.  Additionally, there is diffuse edema in the anterior musculature of the distal right thigh with minimal layering fluid which can be seen with myositis and fasciitis. 3.  Subcutaneous edema diffusely throughout the right thigh distally and anteriorly in the right thigh with reactive lymphadenopathy right inguinal region. 4.  Final report following review by musculoskeletal radiologist. Preliminary Interpretation Dictated By: Curt L. Behrns, MD Date: 8/19/2021    MR Tibia Fibula Lower Leg Right wo & w Contr    Result Date: 8/19/2021  FINAL REPORT I agree with the preliminary report. There are signal changes in the right proximal tibia which raises concern for an acute on chronic osteomyelitis. Additional  soft tissue findings suspicious for draining sinus tracts as described. Final Interpretation Dictated By: Lee Leos Date: 8/19/2021 PRELIMINARY REPORT EXAM: MR TIBIA FIBULA LOWER LEG RIGHT WO AND W CONTR LOCATION: [facility] DATE/TIME: 8/18/2021 10:18 PM INDICATION: Evaluate for osteomyelitis involving the tibia and fibula with severe right lower extremity pain and soft tissue swelling. COMPARISON: None. TECHNIQUE: Routine. Additional postgadolinium T1 sequences were obtained. IV CONTRAST: 10 ml Gadavist. FINDINGS: BONES: -Heterogeneous areas of decreased T1 signal throughout the proximal right tibia with irregularity to the cortex with linear fluidlike areas in the anterior right cortex. These linear areas of fluid in the anterior medial right cortex (series 6, image 13-17) are suspicious for linear draining sinus tracts. Overall findings are suspicious for osteomyelitis in the proximal right tibia, likely chronic. Distal right tibia and fibula demonstrate no evidence for other areas suspicious for osteomyelitis. SOFT TISSUES:  -Severe subcutaneous edema diffusely throughout the right lower extremity greatest near the mid to lower right lower extremity down to the level of the ankle. MUSCLES: -No abnormality in the visualized musculature.     IMPRESSION: 1.  Heterogeneous decreased T1 signal throughout the proximal right tibia with irregularity of the anterior cortex with draining linear areas of fluid signal intensity into the soft tissue suspicious for draining sinus tracts from an area of suspected chronic osteomyelitis in the proximal right tibia. 2.  Severe subcutaneous edema right lower extremity. 3.  Final report following review by musculoskeletal radiologist. Preliminary Interpretation Dictated By: Curt L. Behrns, MD Date: 8/19/2021     Echocardiogram Complete    Result Date: 8/19/2021  [id number]^GARCIA^VANNA  [address] [place] [address]  Name: CABRERA GHOSH MRN: [id number] : 1980 Study Date: 2021 08:46 AM Age: 40 yrs Gender: Male Patient Location: [facility] Reason For Study: Endocarditis Ordering Physician: VANNA GARCIA Performed By: CANDIDO  BSA: 2.5 m2 Height: 71 in Weight: 308 lb HR: 107 BP: 114/59 mmHg ______________________________________________________________________________ Procedure Definity (NDC #84582-197) given intravenously. Complete Echo Adult. Technically difficult study.Extremely difficult acoustic windows despite the use of contrast for endcardial border definition. ______________________________________________________________________________ Interpretation Summary  1. Left ventricular size, wall thickness, and systolic function are normal. The estimated left ventricular ejection fraction is 55%. 2. Right ventricular size and systolic function are normal. 3. No hemodynamically significant valvular abnormalities. 4. No prior study available for comparison. ______________________________________________________________________________ I      WMSI = 1.00     % Normal = 100  X - Cannot   0 -                      (2) - Mildly 2 -          Segments  Size Interpret    Hyperkinetic 1 - Normal  Hypokinetic  Hypokinetic  1-2     small                                                    7 -          3-5    moderate 3 - Akinetic 4 -          5 -         6 - Akinetic Dyskinetic   6-14    large              Dyskinetic   Aneurysmal  w/scar       w/scar       15-16   diffuse  Left Ventricle The left ventricle is normal in size. There is mild concentric left ventricular hypertrophy. Left ventricular systolic function is normal. The visual ejection fraction is 55-60%. Diastolic function not assessed due to tachycardia. No regional wall motion abnormalities noted.  Right Ventricle Normal right ventricle size and systolic function.  Atria Normal left atrial size. Right atrial size is normal.  Mitral Valve Mitral valve  leaflets appear normal. There is trace mitral regurgitation. There is no mitral valve stenosis.  Tricuspid Valve Tricuspid valve leaflets appear normal. There is trace to mild tricuspid regurgitation. Right ventricle systolic pressure estimate normal. The right ventricular systolic pressure is elevated at 12.4 mmHg. There is no tricuspid stenosis.  Aortic Valve Aortic valve leaflets appear normal. No aortic regurgitation is present. No aortic stenosis is present.  Pulmonic Valve The pulmonic valve is not well visualized. There is trace pulmonic valvular regurgitation. There is no pulmonic valvular stenosis.  Vessels The aorta root is normal. IVC diameter <2.1 cm collapsing >50% with sniff suggests a normal RA pressure of 3 mmHg.  Pericardium There is no pericardial effusion.  Rhythm The rhythm was sinus tachycardia.  ______________________________________________________________________________ MMode/2D Measurements & Calculations RVDd: 4.2 cm IVSd: 1.2 cm LVIDd: 4.5 cm LVIDs: 3.0 cm LVPWd: 1.2 cm FS: 32.9 %  LV mass(C)d: 203.0 grams LV mass(C)dI: 80.1 grams/m2 Ao root diam: 3.9 cm LA dimension: 3.6 cm asc Aorta Diam: 3.1 cm LA/Ao: 0.93 LVOT diam: 2.3 cm LVOT area: 4.0 cm2 LA Volume Indexed (AL/bp): 27.4 ml/m2 RWT: 0.54  Doppler Measurements & Calculations Ao V2 max: 156.1 cm/sec Ao max PG: 10.0 mmHg Ao V2 mean: 105.9 cm/sec Ao mean P.3 mmHg Ao V2 VTI: 28.7 cm GREGORIO(I,D): 3.4 cm2 GREGORIO(V,D): 3.3 cm2 LV V1 max P.5 mmHg LV V1 max: 127.9 cm/sec LV V1 VTI: 24.3 cm  SV(LVOT): 97.4 ml SI(LVOT): 38.4 ml/m2 PA acc time: 0.12 sec TR max aaron: 176.2 cm/sec TR max P.4 mmHg AV Aaron Ratio (DI): 0.82 GREGORIO Index (cm2/m2): 1.3  ______________________________________________________________________________ Report approved by: Miladis Holguin 2021 09:44 AM       CT Chest Abdomen Pelvis w/o Contrast    Result Date: 2021  EXAM: CT CHEST ABDOMEN PELVIS W/O CONTRAST LOCATION: [facility]  DATE/TIME: 8/18/2021 11:42 PM INDICATION: Sepsis COMPARISON: CTA chest 04/22/2013. CT abdomen and pelvis 12/07/2005. Chest radiographs 08/18/2021. MR of the right lower extremity earlier today. TECHNIQUE: CT scan of the chest, abdomen, and pelvis was performed without IV contrast. Multiplanar reformats were obtained. Dose reduction techniques were used. CONTRAST: None. FINDINGS: LUNGS AND PLEURA: Mild atelectasis bilaterally. Lungs otherwise clear. No pleural fluid or pneumothorax. MEDIASTINUM/AXILLAE: There is lobulated soft tissue intermingled with lobules of fat in the left anterior mediastinum which measures up to 7.5 x 3.6 cm on axial images (image 51 series 4). A similar finding with less intermixed fat measured up to 6.7 x 3.2 cm on 04/22/2013. There are now enlarged lymph nodes extending superiorly lateral to the left common carotid artery and internal jugular vein which measure up to 2 cm axillary dissection clips on the left. (image 16 of series 4). CORONARY ARTERY CALCIFICATION: None. HEPATOBILIARY: Hepatic steatosis. Normal gallbladder and bile ducts. PANCREAS: Normal. SPLEEN: Normal. ADRENAL GLANDS: Normal. KIDNEYS/BLADDER: Excreted contrast in the urinary system from a prior study. Kidneys and bladder otherwise normal. BOWEL: Normal. No obstruction or inflammation. Normal appendix. LYMPH NODES: Multiple enlarged and inflamed lymph nodes of the abdominal retroperitoneum measure up to 2.3 cm near the IVC. Inflamed retroperitoneal lymphadenopathy in the pelvis near the iliac vessels is asymmetric on the right, notably the same side as  the presumed infectious process involving the right leg on the prior MR lower extremity study from today. The largest right external iliac chain lymph node is 3.2 cm. The largest included right inguinal lymph node is 3.0 cm. Right inguinal lymphadenectomy clips are present. VASCULATURE: Unremarkable. PELVIC ORGANS: Normal. MUSCULOSKELETAL: Edema of the subcutaneous fat of  the right groin and included anterior right thigh consistent with cellulitis. This was more fully imaged on the MR examination from earlier today.     IMPRESSION: 1.  Inflammatory lymphadenopathy of the retroperitoneum of the abdomen, right pelvis, and right inguinal station likely secondary to the presumed infectious process involving the right leg as demonstrated on the MR examination from earlier today. Involvement with lymphoma is not excluded. 2.  Lobulated soft tissue of the anterior mediastinum has mildly increased in size compared back to the remote study of 04/22/2013. There are now some enlarged lymph nodes superior to this near the left carotid and jugular vessels. Findings are concerning for active lymphoma. Clinical correlation recommended. 3.  Hepatic steatosis. I discussed the findings with at on .    US Lower Extremity Venous Duplex Right    Result Date: 8/18/2021  EXAM: US LOWER EXTREMITY VENOUS DUPLEX RIGHT LOCATION: [facility] DATE/TIME: 8/18/2021 1:40 AM INDICATION: Right leg pain, swelling and redness. COMPARISON: None. TECHNIQUE: Venous Duplex ultrasound of the right lower extremity with and without compression, augmentation and duplex. Color flow and spectral Doppler with waveform analysis performed. FINDINGS: Exam includes the common femoral, femoral, popliteal, and contralateral common femoral veins as well as segmentally visualized deep calf veins and greater saphenous vein. Evaluation of the calf veins is limited by leg swelling and body habitus. RIGHT: No deep vein thrombosis. No superficial thrombophlebitis. No popliteal cyst.     IMPRESSION: 1.  No deep venous thrombosis in the right lower extremity.    Attestation:  Total time on the floor involved in the patient's care: 35 minutes. Total time spent in counseling/care coordination: >50%

## 2021-08-21 NOTE — PROGRESS NOTES
08/21/21 1410   Quick Adds   Type of Visit Initial Occupational Therapy Evaluation   Living Environment   People in home other (see comments)  (aunt)   Current Living Arrangements apartment   Home Accessibility no concerns  (no stairs)   Self-Care   Usual Activity Tolerance fair   Current Activity Tolerance poor   Equipment Currently Used at Home cane, straight   Activity/Exercise/Self-Care Comment stated aunt is disabled but able to take care of self   Instrumental Activities of Daily Living (IADL)   IADL Comments stated has been getting help w/ household tasks and self care tasks have been difficult    Disability/Function   Hearing Difficulty or Deaf no   Wear Glasses or Blind no   Concentrating, Remembering or Making Decisions Difficulty no   Difficulty Communicating no   Dressing/Bathing Difficulty yes   Doing Errands Independently Difficulty (such as shopping) no   General Information   Onset of Illness/Injury or Date of Surgery 08/17/21   Patient/Family Therapy Goal Statement (OT) none stated    Limitations/Impairments other (see comments)  (pain)   Right Lower Extremity (Weight-bearing Status) weight-bearing as tolerated (WBAT)   Cognitive Status Examination   Affect/Mental Status (Cognitive) WFL   Visual Perception   Visual Impairment/Limitations WFL   Sensory   Sensory Quick Adds   (R LE decreased sensation)   Pain Assessment   Patient Currently in Pain Yes, see Vital Sign flowsheet   Range of Motion Comprehensive   General Range of Motion no range of motion deficits identified   Strength Comprehensive (MMT)   General Manual Muscle Testing (MMT) Assessment no strength deficits identified   Coordination   Upper Extremity Coordination No deficits were identified   Bed Mobility   Bed Mobility supine-sit   Supine-Sit Hyde (Bed Mobility) contact guard   Assistive Device (Bed Mobility) leg    Comment (Bed Mobility) increased time, used bedrail, painful, used leg  R LE, dizzy upon sitting up    Transfers   Transfers bed-chair transfer   Transfer Skill: Bed to Chair/Chair to Bed   Bed-Chair Faulk (Transfers) contact guard   Assistive Device (Bed-Chair Transfers) rolling walker   Transfer Comments slow moving, painful R LE   Balance   Balance Assessment sitting balance: static   Sitting Balance: Static WFL   Lower Body Dressing Assessment   Faulk Level (Lower Body Dressing) dependent (less than 25% patient effort)   Position (Lower Body Dressing) supine   Comment (Lower Body Dressing) socks   Clinical Impression   Criteria for Skilled Therapeutic Interventions Met (OT) yes   OT Diagnosis decreased ADLs   OT Problem List-Impairments impacting ADL pain;mobility   Assessment of Occupational Performance 1-3 Performance Deficits   Planned Therapy Interventions (OT) ADL retraining;transfer training   Clinical Decision Making Complexity (OT) moderate complexity   Predicted Duration of Therapy 7 days   Anticipated Equipment Needs Upon Discharge (OT)   (cont.to assess)   Risk & Benefits of therapy have been explained patient   OT Discharge Planning    OT Discharge Recommendation (DC Rec) home with home care occupational therapy  (pending pain control)   OT Rationale for DC Rec pain, assist w/ drsg, trsfs   Total Evaluation Time (Minutes)   Total Evaluation Time (Minutes) 10

## 2021-08-21 NOTE — PROGRESS NOTES
[facility] ACUTE PAIN SERVICE    ([facility], [facility], [facility])   Daily PAIN Progress Note    Assessment/Plan:  Romeo Chong is a 40 year old male who was admitted on 8/17/2021.  Pain Service is asked to see the patient for acute on chronic pain to assist with pain management. Admitted for evaluation of leg pain with fever chills and warmth of lower extremity. History of obesity, Hodgkins Lymphoma, fibrous dysplasia of bone, chronic lymphedema, chronic pain syndrome, hypertension and asthma, gastric ulcer.  Patient identifies 2 days of warmth in his right lower leg with concern due to previous sepsis from cellulitis of RLE.  He developed,fevers, chills and vomiting yesterday.  He was unable to control leg pain with his home prescription of Norco.      Patient currently being followed by Infectious Disease for sepsis, severe allergic reaction to antibiotics in the past, on IV antibiotics, lactic acid elevated to 4.0, having fevers.  Lymphedema worsened, Imaging demonstrating chronic osteomyelitis per Ortho.         Over the past 24 hours patient received 4(4mg hydromorphone)  MME of around 64 mg.       Patient states that the hydromorphone along with the lyrica is the most helpful for pain control.  Interested in increasing dose.       Ortho and Infectious Disease notes reviewed.     PLAN:   1) Pain is consistent with acute associated with cellulitis of lower extremity with worsening lymphedema.  Patient on chronic opioids with opioid tolerance.  The patient's home MME was 22-30mg daily.   2)Multimodal Medication Therapy  Topical: lidocaine cream qid   NSAID'S: Toradol 15 mg every 6 hours prn (change from scheduled)  Muscle Relaxants: none  Adjuvants: Tylenol 975 mg tid , vistaril 25-50 mg every 4 hours prn (pain, pruritis), lyrica 25 mg bid   Antidepressants/anxiolytics: nortriptyline 25 mg every hs (has not been taking)   Change to as needed.    Opioids: hydromorphone 2-4 mg every  three hours prn   IV Pain medication: none  3)Non-medication interventions  Pharmacy consult- appreciate recommendations   Acupuncture consult- as available Mon and Thursday   Integrative consult - called referral to [phone]   4)Constipation Prophylaxis  Daily stool softener/laxative  5) Follow up   -Opioid prescriber has been Pain Specialist  -Discharge Recommendations - We recommend prescribing the following at the time of discharge: follow up with Chronic Pain Provider.     .     Right LE cellulitis, chronic osteomyelitis, lymphedema.   Active Problems:    Fibrous dysplasia of bone    Essential hypertension, benign    Lymphedema    Obesity, unspecified    H/O Hodgkin's lymphoma    Tachycardia    Cellulitis of right lower extremity    Elevated lactic acid level    Bacterial sepsis (H)    Gram-positive bacteremia     LOS: 3 days       Subjective:  Patient reports pain is ongoing pain entire leg.  Difficulty sleeping at night.      acetaminophen  975 mg Oral TID     amLODIPine  2.5 mg Oral Daily     cefTRIAXone  2 g Intravenous Q12H     enoxaparin ANTICOAGULANT  40 mg Subcutaneous Q12H     famotidine  40 mg Oral QAM     ketorolac  15 mg Intravenous Q6H     metoprolol tartrate  50 mg Oral Q6H     metroNIDAZOLE  500 mg Intravenous Q12H     nortriptyline  25 mg Oral At Bedtime     polyethylene glycol  17 g Oral Daily     pregabalin  25 mg Oral Daily       Objective:  Vital signs in last 24 hours:  Temp:  [98.5  F (36.9  C)-101  F (38.3  C)] 100.3  F (37.9  C)  Pulse:  [] 77  Resp:  [18-22] 20  BP: (130-143)/(66-82) 137/66  SpO2:  [95 %-97 %] 95 %  Weight:   Weight change:   Body mass index is 43.54 kg/m .    Intake/Output last 3 shifts:  I/O last 3 completed shifts:  In: 5261.25 [P.O.:1430; I.V.:3831.25]  Out: 2425 [Urine:2425]  Intake/Output this shift:  No intake/output data recorded.    Review of Systems:   As per subjective, all others negative.    Physical Exam:    General Appearance:  Patient resting with  eyes closed, stated poor sleep last PM, rest comfortably states getting relief from dilaudid and lyrica, no side effects, respirations smooth and even, right leg continues with significant lymphedema          Imaging:  Reviewed.      Lab Results:  Personally Reviewed.   Recent Labs   Lab 08/20/21 1827 08/19/21  0746 08/18/21  1410   WBC 12.1* 12.5* 11.3*   HGB 11.1* 12.9* 13.2*   HCT 34.9* 40.8 42.1    203 184     Recent Labs   Lab 08/20/21  1827 08/19/21  0746 08/18/21  1410 08/17/21  2256    139 137 139   CO2 22 21* 19* 22   BUN 8 14 8 6*   ALBUMIN 2.2*  --   --  3.9   ALKPHOS 53  --   --  72   ALT 28  --   --  33   AST 23  --   --  25     No results for input(s): INR in the last 168 hours.      Total time spent 25 minutes with greater than 50% in consultation, education and coordination of care.     Also discussed with RN    Libby XAVIER, CNS-BC, DNP  Acute Care Pain Management Program  [facility] ([place], [place], [place])   With questions call [phone]  Preference if for Daniele Carmona  Click HERE to page Hannah

## 2021-08-22 ENCOUNTER — APPOINTMENT (OUTPATIENT)
Dept: PHYSICAL THERAPY | Facility: HOSPITAL | Age: 41
End: 2021-08-22
Payer: COMMERCIAL

## 2021-08-22 ENCOUNTER — HEALTH MAINTENANCE LETTER (OUTPATIENT)
Age: 41
End: 2021-08-22

## 2021-08-22 LAB
ALBUMIN SERPL-MCNC: 2.1 G/DL (ref 3.5–5)
ALP SERPL-CCNC: 53 U/L (ref 45–120)
ALT SERPL W P-5'-P-CCNC: 21 U/L (ref 0–45)
ANION GAP SERPL CALCULATED.3IONS-SCNC: 10 MMOL/L (ref 5–18)
AST SERPL W P-5'-P-CCNC: 21 U/L (ref 0–40)
BASOPHILS # BLD MANUAL: 0 10E3/UL (ref 0–0.2)
BASOPHILS NFR BLD MANUAL: 0 %
BILIRUB SERPL-MCNC: 0.4 MG/DL (ref 0–1)
BUN SERPL-MCNC: 7 MG/DL (ref 8–22)
CALCIUM SERPL-MCNC: 8.7 MG/DL (ref 8.5–10.5)
CHLORIDE BLD-SCNC: 104 MMOL/L (ref 98–107)
CO2 SERPL-SCNC: 22 MMOL/L (ref 22–31)
CREAT SERPL-MCNC: 0.69 MG/DL (ref 0.7–1.3)
EOSINOPHIL # BLD MANUAL: 0 10E3/UL (ref 0–0.7)
EOSINOPHIL NFR BLD MANUAL: 0 %
ERYTHROCYTE [DISTWIDTH] IN BLOOD BY AUTOMATED COUNT: 14.7 % (ref 10–15)
GFR SERPL CREATININE-BSD FRML MDRD: >90 ML/MIN/1.73M2
GLUCOSE BLD-MCNC: 91 MG/DL (ref 70–125)
HCT VFR BLD AUTO: 34.3 % (ref 40–53)
HGB BLD-MCNC: 11.2 G/DL (ref 13.3–17.7)
LYMPHOCYTES # BLD MANUAL: 0.9 10E3/UL (ref 0.8–5.3)
LYMPHOCYTES NFR BLD MANUAL: 8 %
MCH RBC QN AUTO: 25.9 PG (ref 26.5–33)
MCHC RBC AUTO-ENTMCNC: 32.7 G/DL (ref 31.5–36.5)
MCV RBC AUTO: 79 FL (ref 78–100)
MONOCYTES # BLD MANUAL: 0.7 10E3/UL (ref 0–1.3)
MONOCYTES NFR BLD MANUAL: 6 %
NEUTROPHILS # BLD MANUAL: 9.6 10E3/UL (ref 1.6–8.3)
NEUTROPHILS NFR BLD MANUAL: 86 %
PLAT MORPH BLD: ABNORMAL
PLATELET # BLD AUTO: 204 10E3/UL (ref 150–450)
POTASSIUM BLD-SCNC: 3.2 MMOL/L (ref 3.5–5)
POTASSIUM BLD-SCNC: 3.2 MMOL/L (ref 3.5–5)
POTASSIUM BLD-SCNC: 3.6 MMOL/L (ref 3.5–5)
PROT SERPL-MCNC: 6.3 G/DL (ref 6–8)
RBC # BLD AUTO: 4.33 10E6/UL (ref 4.4–5.9)
RBC MORPH BLD: ABNORMAL
SODIUM SERPL-SCNC: 136 MMOL/L (ref 136–145)
TOXIC GRANULES BLD QL SMEAR: PRESENT
WBC # BLD AUTO: 11.2 10E3/UL (ref 4–11)

## 2021-08-22 PROCEDURE — 84132 ASSAY OF SERUM POTASSIUM: CPT | Performed by: INTERNAL MEDICINE

## 2021-08-22 PROCEDURE — 85027 COMPLETE CBC AUTOMATED: CPT | Performed by: INTERNAL MEDICINE

## 2021-08-22 PROCEDURE — 250N000013 HC RX MED GY IP 250 OP 250 PS 637: Performed by: CLINICAL NURSE SPECIALIST

## 2021-08-22 PROCEDURE — 99232 SBSQ HOSP IP/OBS MODERATE 35: CPT | Performed by: CLINICAL NURSE SPECIALIST

## 2021-08-22 PROCEDURE — 999N000127 HC STATISTIC PERIPHERAL IV START W US GUIDANCE

## 2021-08-22 PROCEDURE — 250N000013 HC RX MED GY IP 250 OP 250 PS 637: Performed by: INTERNAL MEDICINE

## 2021-08-22 PROCEDURE — 36415 COLL VENOUS BLD VENIPUNCTURE: CPT | Performed by: HOSPITALIST

## 2021-08-22 PROCEDURE — 250N000011 HC RX IP 250 OP 636: Performed by: INTERNAL MEDICINE

## 2021-08-22 PROCEDURE — 99233 SBSQ HOSP IP/OBS HIGH 50: CPT | Performed by: INTERNAL MEDICINE

## 2021-08-22 PROCEDURE — 120N000001 HC R&B MED SURG/OB

## 2021-08-22 PROCEDURE — 82040 ASSAY OF SERUM ALBUMIN: CPT | Performed by: INTERNAL MEDICINE

## 2021-08-22 PROCEDURE — 84132 ASSAY OF SERUM POTASSIUM: CPT | Performed by: HOSPITALIST

## 2021-08-22 PROCEDURE — 258N000003 HC RX IP 258 OP 636: Performed by: INTERNAL MEDICINE

## 2021-08-22 PROCEDURE — 250N000013 HC RX MED GY IP 250 OP 250 PS 637: Performed by: HOSPITALIST

## 2021-08-22 PROCEDURE — 97116 GAIT TRAINING THERAPY: CPT | Mod: GP

## 2021-08-22 PROCEDURE — 36415 COLL VENOUS BLD VENIPUNCTURE: CPT | Performed by: INTERNAL MEDICINE

## 2021-08-22 PROCEDURE — 250N000011 HC RX IP 250 OP 636: Performed by: CLINICAL NURSE SPECIALIST

## 2021-08-22 PROCEDURE — 97163 PT EVAL HIGH COMPLEX 45 MIN: CPT | Mod: GP

## 2021-08-22 RX ORDER — POTASSIUM CHLORIDE 1500 MG/1
20 TABLET, EXTENDED RELEASE ORAL DAILY
Status: DISCONTINUED | OUTPATIENT
Start: 2021-08-22 | End: 2021-08-22

## 2021-08-22 RX ORDER — POTASSIUM CHLORIDE 1500 MG/1
20 TABLET, EXTENDED RELEASE ORAL 2 TIMES DAILY
Status: DISCONTINUED | OUTPATIENT
Start: 2021-08-22 | End: 2021-08-26 | Stop reason: HOSPADM

## 2021-08-22 RX ORDER — POTASSIUM CHLORIDE 1500 MG/1
40 TABLET, EXTENDED RELEASE ORAL ONCE
Status: COMPLETED | OUTPATIENT
Start: 2021-08-22 | End: 2021-08-22

## 2021-08-22 RX ORDER — POTASSIUM CHLORIDE 1500 MG/1
20 TABLET, EXTENDED RELEASE ORAL ONCE
Status: COMPLETED | OUTPATIENT
Start: 2021-08-22 | End: 2021-08-22

## 2021-08-22 RX ADMIN — KETOROLAC TROMETHAMINE 15 MG: 15 INJECTION, SOLUTION INTRAMUSCULAR; INTRAVENOUS at 08:20

## 2021-08-22 RX ADMIN — FAMOTIDINE 40 MG: 20 TABLET, FILM COATED ORAL at 08:23

## 2021-08-22 RX ADMIN — PREGABALIN 25 MG: 25 CAPSULE ORAL at 10:06

## 2021-08-22 RX ADMIN — MAGNESIUM HYDROXIDE 30 ML: 400 SUSPENSION ORAL at 08:20

## 2021-08-22 RX ADMIN — HYDROMORPHONE HYDROCHLORIDE 4 MG: 2 TABLET ORAL at 02:46

## 2021-08-22 RX ADMIN — MEROPENEM 2 G: 1 INJECTION, POWDER, FOR SOLUTION INTRAVENOUS at 20:13

## 2021-08-22 RX ADMIN — KETOROLAC TROMETHAMINE 15 MG: 15 INJECTION, SOLUTION INTRAMUSCULAR; INTRAVENOUS at 16:22

## 2021-08-22 RX ADMIN — CEFAZOLIN SODIUM 2 G: 2 INJECTION, SOLUTION INTRAVENOUS at 08:31

## 2021-08-22 RX ADMIN — POTASSIUM CHLORIDE 20 MEQ: 1500 TABLET, EXTENDED RELEASE ORAL at 10:07

## 2021-08-22 RX ADMIN — ACETAMINOPHEN 975 MG: 325 TABLET ORAL at 20:14

## 2021-08-22 RX ADMIN — HYDROMORPHONE HYDROCHLORIDE 4 MG: 2 TABLET ORAL at 12:50

## 2021-08-22 RX ADMIN — AMLODIPINE BESYLATE 2.5 MG: 2.5 TABLET ORAL at 08:22

## 2021-08-22 RX ADMIN — PREGABALIN 25 MG: 25 CAPSULE ORAL at 20:14

## 2021-08-22 RX ADMIN — ACETAMINOPHEN 975 MG: 325 TABLET ORAL at 08:24

## 2021-08-22 RX ADMIN — POTASSIUM CHLORIDE 40 MEQ: 1500 TABLET, EXTENDED RELEASE ORAL at 03:24

## 2021-08-22 RX ADMIN — POTASSIUM CHLORIDE 20 MEQ: 1500 TABLET, EXTENDED RELEASE ORAL at 08:24

## 2021-08-22 RX ADMIN — POLYETHYLENE GLYCOL 3350 17 G: 17 POWDER, FOR SOLUTION ORAL at 08:20

## 2021-08-22 RX ADMIN — DOCUSATE SODIUM 50 MG AND SENNOSIDES 8.6 MG 1 TABLET: 8.6; 5 TABLET, FILM COATED ORAL at 08:23

## 2021-08-22 RX ADMIN — HYDROMORPHONE HYDROCHLORIDE 4 MG: 2 TABLET ORAL at 23:29

## 2021-08-22 RX ADMIN — Medication 12.5 MG: at 20:14

## 2021-08-22 RX ADMIN — POTASSIUM CHLORIDE 20 MEQ: 20 TABLET, EXTENDED RELEASE ORAL at 20:14

## 2021-08-22 RX ADMIN — HYDROMORPHONE HYDROCHLORIDE 4 MG: 2 TABLET ORAL at 16:22

## 2021-08-22 RX ADMIN — HYDROMORPHONE HYDROCHLORIDE 4 MG: 2 TABLET ORAL at 19:39

## 2021-08-22 RX ADMIN — ACETAMINOPHEN 975 MG: 325 TABLET ORAL at 14:28

## 2021-08-22 RX ADMIN — Medication 12.5 MG: at 08:23

## 2021-08-22 RX ADMIN — ENOXAPARIN SODIUM 40 MG: 100 INJECTION SUBCUTANEOUS at 17:45

## 2021-08-22 RX ADMIN — HYDROMORPHONE HYDROCHLORIDE 4 MG: 2 TABLET ORAL at 08:20

## 2021-08-22 RX ADMIN — MEROPENEM 2 G: 1 INJECTION, POWDER, FOR SOLUTION INTRAVENOUS at 12:51

## 2021-08-22 NOTE — PROVIDER NOTIFICATION
Spoke to Dr. Cabrera from ID and updated about fevers on eves and this morning.   Stated patient needs to transfer to [facility] and see her last note.No change in antibiotics.

## 2021-08-22 NOTE — PLAN OF CARE
Problem: Pain Chronic (Persistent)  Goal: Acceptable Pain Control and Functional Ability  Intervention: Develop Pain Management Plan  Recent Flowsheet Documentation  Taken 8/22/2021 0246 by Amalia Downs, RN  Pain Management Interventions: medication (see MAR)

## 2021-08-22 NOTE — PROGRESS NOTES
[facility] ACUTE PAIN SERVICE    ([facility], [facility], [facility])   Daily PAIN Progress Note    Assessment/Plan:  Romeo Chong is a 40 year old male who was admitted on 8/17/2021. Pain Service is asked to see the patient for acute on chronic pain to assist with pain management. Admitted for evaluation of leg pain with fever chills and warmth of lower extremity. History of obesity, Hodgkins Lymphoma, fibrous dysplasia of bone, chronic lymphedema, chronic pain syndrome, hypertension and asthma, gastric ulcer.  Patient identifies 2 days of warmth in his right lower leg with concern due to previous sepsis from cellulitis of RLE.  He developed,fevers, chills and vomiting yesterday.  He was unable to control leg pain with his home prescription of Norco.      Patient currently being followed by Infectious Disease for sepsis, severe allergic reaction to antibiotics in the past, on IV antibiotics, lactic acid elevated to 4.0, having fevers.  Lymphedema worsened, Imaging demonstrating chronic osteomyelitis per Ortho.    Consideration if patient will need to be transferred to [facility] or [facility] for further management of osteomyelitis if doesn't improve with current treatments.  referral to orthopedic surgery specializing in complex cases.    Ortho and Infectious Disease notes reviewed.        Over the past 24 hours patient received 6(4mg hydromorphone)  MME of around 96 mg.  one toradol dose yesterday, none since changed to prn dosing.       Patient states that the hydromorphone along with the lyrica is the most helpful for pain control.  States that the pain medication is helping him tolerate ambulating.         PLAN:   1) Pain is consistent with acute associated with cellulitis of lower extremity with worsening lymphedema.  Patient on chronic opioids with opioid tolerance.  The patient's home MME was 22-30mg daily.   2)Multimodal Medication Therapy  Topical: lidocaine cream  qid   NSAID'S: Toradol 15 mg every 6 hours prn (change from scheduled)  Muscle Relaxants: none  Adjuvants: Tylenol 975 mg tid , vistaril 25-50 mg every 4 hours prn (pain, pruritis), lyrica 25 mg bid   Antidepressants/anxiolytics: nortriptyline 25 mg every hs (has not been taking)   Change to as needed.    Opioids: hydromorphone 2-4 mg every three hours prn   IV Pain medication: none  3)Non-medication interventions  Pharmacy consult- appreciate recommendations   Acupuncture consult- as available Mon and Thursday   Integrative consult - called referral to [phone]   4)Constipation Prophylaxis  Daily stool softener/laxative  5) Follow up   -Opioid prescriber has been Pain Specialist  -Discharge Recommendations - We recommend prescribing the following at the time of discharge: follow up with Chronic Pain Provider.     .    Continue lyrica at 25 mg bid if tolerates  Hydromorphone for acute pain.       Active Problems:    Fibrous dysplasia of bone    Essential hypertension, benign    Lymphedema    Obesity, unspecified    H/O Hodgkin's lymphoma    Tachycardia    Cellulitis of right lower extremity    Elevated lactic acid level    Bacterial sepsis (H)    Gram-positive bacteremia     LOS: 4 days       Subjective:  Patient reports pain is right lower extremity pain.      acetaminophen  975 mg Oral TID     amLODIPine  2.5 mg Oral Daily     ceFAZolin  2 g Intravenous Q8H     enoxaparin ANTICOAGULANT  40 mg Subcutaneous Q12H     famotidine  40 mg Oral QAM     metoprolol tartrate  12.5 mg Oral BID     metroNIDAZOLE  500 mg Intravenous Q12H     polyethylene glycol  17 g Oral Daily     potassium chloride  20 mEq Oral Daily     pregabalin  25 mg Oral BID       Objective:  Vital signs in last 24 hours:  Temp:  [99  F (37.2  C)-103  F (39.4  C)] 101.7  F (38.7  C)  Pulse:  [66-88] 71  Resp:  [20] 20  BP: (123-139)/(58-73) 139/68  SpO2:  [95 %-98 %] 96 %  Weight:   Weight change:   Body mass index is 43.54 kg/m .    Intake/Output last 3  shifts:  I/O last 3 completed shifts:  In: 920 [P.O.:920]  Out: 3800 [Urine:3800]  Intake/Output this shift:  I/O this shift:  In: -   Out: 350 [Urine:350]    Review of Systems:   As per subjective, all others negative.    Physical Exam:    General Appearance:  Alert, cooperative, sitting up in bed   Head:  Normocephalic, without obvious abnormality, atraumatic   Eyes:  PERRL, conjunctiva/corneas clear, EOM's intact   Nose: Nares normal, septum midline, mucosa normal, no drainage   Throat: Lips, mucosa, and tongue normal; teeth and gums normal   Neck: Supple, symmetrical, trachea midline   Back:   Symmetric, no curvature, ROM normal, no CVA tenderness   Lungs:   respirations unlabored   Chest Wall:  No tenderness or deformity   Heart:  Regular rate and rhythm, S1, S2 normal,no murmur, rub or gallop   Abdomen:   Soft, non-tender, non distended   Extremities: Right lower extremity lymphedema, skin is warm   Skin: Skin color, texture, turgor normal, no rashes or lesions   Neurologic: Alert and oriented X 3, Moves all 4 extremities          Imaging:  Personally Reviewed.  XR Chest 1 View    Result Date: 8/18/2021  EXAM: XR CHEST 1 VIEW LOCATION: [facility] DATE/TIME: 8/18/2021 1:26 AM INDICATION: fever, chills COMPARISON: 10/29/2012.     IMPRESSION: Negative chest.    MR Ankle Right w/o & w Contrast    Result Date: 8/19/2021  EXAM: MR ANKLE RIGHT W/O and W CONTRAST LOCATION: [facility] DATE/TIME: 8/18/2021 10:18 PM INDICATION: Right lower extremity pain and swelling. Tibial osteomyelitis. COMPARISON: MRI of the tibia fibula 08/18/2021 TECHNIQUE: Routine. Additional postgadolinium T1 sequences were obtained. IV CONTRAST: 10ml gadavist FINDINGS: There is extensive subcutaneous edema with enhancement surrounding the ankle and extending into the foot and lower leg compatible with cellulitis. There is some poorly defined fluid within the subcutaneous fat about the ankle. No  well-defined organized fluid collection to suggest an abscess. No involvement of the deeper soft tissues. No associated marrow abnormality to suggest osteomyelitis. There is a small focus of subchondral marrow edema and likely developing cystic change involving the cuboid adjacent to the calcaneocuboid joint. No joint effusion or synovitis. Mild chronic thickening of the Achilles tendon. No abnormal tendon sheath fluid.     IMPRESSION: 1.  Extensive cellulitis. 2.  No focal abscess. 3.  No evidence of osteomyelitis.    MR Femur Thigh Right wo & w Contrast    Result Date: 8/19/2021  FINAL REPORT I agree with the preliminary report. There are findings highly suspicious for acute on chronic osteomyelitis involving the distal aspect of the right femur. Linear areas of signal abnormality may represent small sinus tract. There are surrounding soft tissue changes in the distal, anterior thigh which can be seen with infection including myositis and fasciitis. Superimposed pathologic fracture of the right femur may also be present as there is some cortical offset in the distal femoral shaft. Correlation with CT of the femur may be useful for further assessment of possible distal femoral fracture. Subcutaneous edema diffusely throughout the right thigh distally and anteriorly in the right thigh with reactive lymphadenopathy right inguinal region. Final Interpretation Dictated By: Lee Leos Date: 8/19/2021 PRELIMINARY REPORT EXAM: MR FEMUR THIGH RIGHT WO AND W CONTRAST LOCATION: [facility] DATE/TIME: 8/18/2021 10:18 PM INDICATION: Cellulitis with sepsis with elevated lactate and right leg pain. Evaluate for osteomyelitis or deep tissue space infection. COMPARISON: MRI of the tibia and fibula 08/18/2021. TECHNIQUE: Routine. Additional postgadolinium T1 sequences were obtained. IV CONTRAST: 10 ml. FINDINGS: JOINTS AND BONES: -Replacement of the normal T1 signal involving the distal right femur  particularly along the lateral aspect of the distal right femur above the level of the condyles (series 2, image 54). There is associated reactive edema in this area. Additionally, there appears to be breach of the cortex by linear fluid extending along the anterior cortex and through the anterior cortex into the intramedullary space (series 5, image 48-50). These findings are suspicious for osteomyelitis with draining sinus tracts. More proximal aspect of the right femur demonstrates no evidence for replacement of the normal T1 signal or evidence for intramedullary edema. No fracture or dislocation. TENDONS: -No tendon tear, tendinopathy, or tenosynovitis. MUSCLES AND SOFT TISSUES: -Diffuse muscular edema involving the anterior compartment of the distal right thigh particularly surrounding the area of the draining presumed sinus tracts. There is small amount of fluid between the muscle and the anterior cortex of the distal right femur. Minimal amount of interfascial fluid involving the distal medial and lateral aspects of the right thigh. Severe subcutaneous edema diffusely throughout the right thigh. Moderate fluid involving the subcutaneous tissues of the mid and anterior right thigh. Enlarged right inguinal lymph node, likely reactive.     IMPRESSION: 1.  Findings highly suspicious for osteomyelitis involving the distal aspect of the right femur with replacement of normal T1 signal distally and surrounding reactive edema. Additionally, there are linear areas of increased T2 signal abnormality extending through the anterior cortex possibly representing small sinus tracts draining from the right femur suggesting chronic osteomyelitis. There is cortical thickening throughout this area and fluid along the anterior cortex in the distal right thigh. 2.  Additionally, there is diffuse edema in the anterior musculature of the distal right thigh with minimal layering fluid which can be seen with myositis and fasciitis.  3.  Subcutaneous edema diffusely throughout the right thigh distally and anteriorly in the right thigh with reactive lymphadenopathy right inguinal region. 4.  Final report following review by musculoskeletal radiologist. Preliminary Interpretation Dictated By: Curt L. Behrns, MD Date: 8/19/2021    MR Tibia Fibula Lower Leg Right wo & w Contr    Result Date: 8/19/2021  FINAL REPORT I agree with the preliminary report. There are signal changes in the right proximal tibia which raises concern for an acute on chronic osteomyelitis. Additional soft tissue findings suspicious for draining sinus tracts as described. Final Interpretation Dictated By: Lee Leos Date: 8/19/2021     IMPRESSION: 1.  Heterogeneous decreased T1 signal throughout the proximal right tibia with irregularity of the anterior cortex with draining linear areas of fluid signal intensity into the soft tissue suspicious for draining sinus tracts from an area of suspected chronic osteomyelitis in the proximal right tibia. 2.  Severe subcutaneous edema right lower extremity. 3.  Final report following review by musculoskeletal radiologist. Preliminary Interpretation Dictated By: Curt L. Behrns, MD Date: 8/19/2021     CT Femur Thigh Right w/o Contrast    Result Date: 8/19/2021  EXAM: CT FEMUR THIGH RIGHT WITHOUT CONTRAST LOCATION: [facility] DATE/TIME: 8/19/2021 7:43 PM INDICATION: Upper leg pain, stress fracture suspected, negative x-ray. COMPARISON: None.   IMPRESSION: 1.  Distal femoral region of mild cortical deformity and adjacent intramedullary lucency and sclerosis of indeterminate etiology. Possibilities include an old healed fracture. Chronic osteomyelitis might have a similar appearance. A similar finding is seen within the proximal tibia but only partly included on the scan. 2.  No evidence of femoral fracture. 3.  Suspected pelvic external iliac lymphadenopathy, not well evaluated with this scan. There is also a 3.8  x 2.6 cm soft tissue mass in the anteromedial aspect of the upper thigh/distal inguinal region. Immediately proximal to this, there are multiple surgical clips. 4.  Extensive subcutaneous stranding and nodularity circumferentially within the thigh, greatest distally.     Echocardiogram Complete    Result Date: 2021  [id number]^JOSE^[place] [address]  Name: CABRERA GHOSH MRN: [id number] : 1980 Study Date: 2021 08:46 AM Age: 40 yrs Gender: Male Patient Location: [facility] Reason For Study: Endocarditis Ordering Physician: VANNA GARCIA Performed By: CANDIDO  BSA: 2.5 m2 Height: 71 in Weight: 308 lb HR: 107 BP: 114/59 mmHg  ______________________________________________________________________________ Interpretation Summary  1. Left ventricular size, wall thickness, and systolic function are normal. The estimated left ventricular ejection fraction is 55%. 2. Right ventricular size and systolic function are normal. 3. No hemodynamically significant valvular abnormalities. 4. No prior study available for comparison. ______________________________________________________________________________ I      WMSI = 1.00     % Normal = 100  X - Cannot   0 -                      (2) - Mildly 2 -          Segments  Size Interpret    Hyperkinetic 1 - Normal  Hypokinetic  Hypokinetic  1-2     small                                                    7 -          3-5    moderate 3 - Akinetic 4 -          5 -         6 - Akinetic Dyskinetic   6-14    large              Dyskinetic   Aneurysmal  w/scar       w/scar       15-16   diffuse  Left Ventricle The left ventricle is normal in size. There is mild concentric left ventricular hypertrophy. Left ventricular systolic function is normal. The visual ejection fraction is 55-60%. Diastolic function not assessed due to tachycardia. No regional wall motion abnormalities noted.  Right Ventricle Normal right  ventricle size and systolic function.  Atria Normal left atrial size. Right atrial size is normal.  Mitral Valve Mitral valve leaflets appear normal. There is trace mitral regurgitation. There is no mitral valve stenosis.  Tricuspid Valve Tricuspid valve leaflets appear normal. There is trace to mild tricuspid regurgitation. Right ventricle systolic pressure estimate normal. The right ventricular systolic pressure is elevated at 12.4 mmHg. There is no tricuspid stenosis.  Aortic Valve Aortic valve leaflets appear normal. No aortic regurgitation is present. No aortic stenosis is present.  Pulmonic Valve The pulmonic valve is not well visualized. There is trace pulmonic valvular regurgitation. There is no pulmonic valvular stenosis.  Vessels The aorta root is normal. IVC diameter <2.1 cm collapsing >50% with sniff suggests a normal RA pressure of 3 mmHg.  Pericardium There is no pericardial effusion.  Rhythm The rhythm was sinus tachycardia.  ______________________________________________________________________________ MMode/2D  Miladis Holguin 08/19/2021 09:44 AM       XR Chest Port 1 View    Result Date: 8/21/2021  EXAM: XR CHEST PORT 1 VIEW LOCATION: [facility] DATE/TIME: 8/21/2021 10:45 AM INDICATION: Shortness of breath. COMPARISON: 08/18/2021     IMPRESSION: New suspicious area of increased opacity right lung base laterally near the right costophrenic angle could represent a small focal area of alveolar infiltrate related to pneumonia in the proper clinical setting. Left lung clear. Normal heart size and pulmonary vascularity. No overt osseous abnormality.    CT Chest Abdomen Pelvis w/o Contrast    Result Date: 8/19/2021  EXAM: CT CHEST ABDOMEN PELVIS W/O CONTRAST LOCATION: [facility] DATE/TIME: 8/18/2021 11:42 PM INDICATION: Sepsis COMPARISON: CTA chest 04/22/2013. CT abdomen and pelvis 12/07/2005. Chest radiographs 08/18/2021. MR of the right lower extremity  earlier today. TECHNIQUE: CT scan of the chest, abdomen, and pelvis was performed without IV contrast. Multiplanar reformats were obtained. Dose reduction techniques were used. CONTRAST: None.     IMPRESSION: 1.  Inflammatory lymphadenopathy of the retroperitoneum of the abdomen, right pelvis, and right inguinal station likely secondary to the presumed infectious process involving the right leg as demonstrated on the MR examination from earlier today. Involvement with lymphoma is not excluded. 2.  Lobulated soft tissue of the anterior mediastinum has mildly increased in size compared back to the remote study of 04/22/2013. There are now some enlarged lymph nodes superior to this near the left carotid and jugular vessels. Findings are concerning for active lymphoma. Clinical correlation recommended. 3.  Hepatic steatosis. I discussed the findings with at on .    US Lower Extremity Venous Duplex Right    Result Date: 8/18/2021  EXAM: US LOWER EXTREMITY VENOUS DUPLEX RIGHT LOCATION: [facility] DATE/TIME: 8/18/2021 1:40 AM INDICATION: Right leg pain, swelling and redness. COMPARISON: None. d by leg swelling and body habitus. RIGHT: No deep vein thrombosis. No superficial thrombophlebitis. No popliteal cyst.     IMPRESSION: 1.  No deep venous thrombosis in the right lower extremity.      Lab Results:  Personally Reviewed.   Recent Labs   Lab 08/22/21 0224 08/21/21  0904 08/20/21  1827   WBC 11.2* 12.1* 12.1*   HGB 11.2* 10.6* 11.1*   HCT 34.3* 33.9* 34.9*    172 167     Recent Labs   Lab 08/22/21 0224 08/21/21  0904 08/20/21  1827    136 138   CO2 22 19* 22   BUN 7* 8 8   ALBUMIN 2.1* 2.1* 2.2*   ALKPHOS 53 54 53   ALT 21 24 28   AST 21 25 23     No results for input(s): INR in the last 168 hours.      Total time spent 25 minutes with greater than 50% in consultation, education and coordination of care.     Also discussed with OSVALDO XAVIER, CNS-BC, DNP  Acute Care  Pain Management Program  [facility] ([facility], [facility])   With questions call [phone]  Preference if for [employer] Paging - Mathew  Click HERE to page Hannah

## 2021-08-22 NOTE — PROGRESS NOTES
Progress Note    Assessment/Plan  Gram-positive sepsis secondary due to right leg cellulitis, acute on chronic chronic osteomyelitis of the distal femur and proximal tibia along with myositis and fasciitis as seen on MRI of the thigh.  --Recurrent in nature patient claims that he has had similar infections about 7 times in the past.  --Initially treated with IV aztreonam, Rocephin and doxycycline and then changed to Rocephin with metronidazole  --On 2/21 ID changed to cefazolin with metronidazole.  However patient continues to spike fever and therefore changed to meropenem on 2/22  --Awaiting input regarding PICC line placement  --Blood cultures grew group B streptococcus sensitive to cephalosporins  --Orthopedic recommended transfer to [facility] versus [facility] if patient does not show clinical improvement.  ID recommending transfer to [facility] if patient is agreeable.  Discussed with the patient and is agreeable..      Acute dyspnea on IV fluids  --Chest x-ray shows infiltrate in the right costophrenic angle.  Differential diagnosis include atelectasis, fluid overload versus pneumonia patient changed to meropenem on 8/22.  --Patient currently on IV antibiotics  --Improved with 1 dose of IV Lasix and incentive spirometry  --Continue incentive spirometry  --No evidence of wheezing and therefore will hold off on steroids and bronchodilators    Lactic acidosis resolved  --DC IV fluids    Tachycardia  --Resolved with beta-blockers  --Given shortness of breath will de-escalate beta-blockers 12.5 twice daily x2 days and then stop.  --DC IV fluids    Hypokalemia mild  --Patient claims he takes 30 mEq/day at home.  Increase potassium 20 mg to twice daily    History of Hodgkin's lymphoma  --Diagnosed 15 years ago  --Stage I or II way as per oncology consultation  --No inpatient work-up necessary  --CT of the abdomen pelvis shows multiple enlarged and inflamed lymph nodes of the abdominal retroperitoneum measure up to  2.3 cm near the IVC. Inflamed retroperitoneal lymphadenopathy in the pelvis near the iliac vessels is asymmetric on the right, notably the same side as    the presumed infectious process involving the right leg on the prior MR lower extremity study from today. The largest right external iliac chain lymph node is 3.2 cm. The largest included right inguinal lymph node is 3.0 cm. Right inguinal   lymphadenectomy clips are present.     Acute on chronic pain   pain control issues  --Patient currently on Toradol and scheduled Tylenol with as needed hydromorphone, Lyrica Vistaril and nortriptyline  --Appreciate pain team input  --Monitor BMP while on Toradol    Essential hypertension suboptimal control  --Improved after IV Lasix  --De-escalate beta-blockers    Inguinal lymphadenopathy causing right lymphedema  CT of the abdomen pelvis shows multiple enlarged and inflamed lymph nodes of the abdominal retroperitoneum measure up to 2.3 cm near the IVC. Inflamed retroperitoneal lymphadenopathy in the pelvis near the iliac vessels is asymmetric on the right, notably the same side as    the presumed infectious process involving the right leg on the prior MR lower extremity study from today. The largest right external iliac chain lymph node is 3.2 cm. The largest included right inguinal lymph node is 3.0 cm. Right inguinal   lymphadenectomy clips are present.   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  Did  the patient today.  Will decrease Lovenox to once daily to increase adherence.    Addendum Tony Herndon MD, Hospitalist,08/22/21,3:29 PM  --Contacted [facility] for hospital hospital transfer however I was informed that no beds are available at this time.  They  "were unable to put the patient on a waiting list.  Will discuss this with the patient.  Patient is agreeable to going to [facility] if needed.  Called [facility] and no beds available at this time.  Requested HUC to send facesheet to [facility] for review.    Barriers to discharge: fever spikes    Anticipated discharge date: 8/23        Subjective  Patient had fever spikes of 101.7 this morning.  Discussed with Dr. Cabrera from infectious disease who changed to IV meropenem but requested me to contact [facility] for transfer. Patient has had sepsis S/S cellulitis of the right leg about 7 times in the past few years.  Patient continues to have right thigh pain which has not improved.  Shortness of breath is improved. Patient refused Lovenox.  Counseling done regarding importance of Lovenox.  Will decrease Lovenox to once daily to increase compliance.    Patient denies any chest pain, lightheadedness, dizziness, nausea, vomiting, diarrhea.    Objective    /83 (BP Location: Left arm)   Pulse 75   Temp 98.8  F (37.1  C) (Oral)   Resp 20   Ht 1.803 m (5' 11\")   Wt 141.6 kg (312 lb 3.2 oz)   SpO2 97%   BMI 43.54 kg/m    Weight:   Wt Readings from Last 5 Encounters:   08/19/21 141.6 kg (312 lb 3.2 oz)   07/29/21 122.5 kg (270 lb)   07/06/21 126.6 kg (279 lb)   06/25/21 126.9 kg (279 lb 11.2 oz)   06/23/21 127 kg (280 lb)       I/O last 3 completed shifts:  In: 920 [P.O.:920]  Out: 3800 [Urine:3800]  I/O this shift:  In: 680 [P.O.:480; I.V.:200]  Out: 1150 [Urine:1150]          Physical Exam  Alert, oriented*3  Does not appear to be in respiratory distress  Body mass index is 43.54 kg/m .  Does not appear to be in distress  No sinus tenderness  Moist membranes  Right thigh is tender to touch with darkish area  Neck supple  CVS: S1 S2-N, no murmurs, gallops, rubs  Resp: Mild left lower crackles   abd: soft, No t/g/r  Neuro: no involuntary movements such as tremors  Vasc: Chronic right leg " lymphedema  Pertinent Labs  ----------------------  Recent Labs   Lab 08/22/21  0649 08/22/21 0224 08/21/21 2033 08/21/21 0904 08/20/21 1827 08/17/21 2256   NA  --  136  --  136 138 139   POTASSIUM 3.6 3.2*  3.2* 3.2* 3.2* 3.4* 3.7   CO2  --  22  --  19* 22 22   BUN  --  7*  --  8 8 6*   CR  --  0.69*  --  0.63* 0.63* 0.73   MAG  --   --   --   --   --  1.5*   GLC  --  91  --  89 85 150*   ALBUMIN  --  2.1*  --  2.1* 2.2* 3.9   BILITOTAL  --  0.4  --  0.5 0.4 0.5   ALKPHOS  --  53  --  54 53 72   ALT  --  21  --  24 28 33   AST  --  21  --  25 23 25     Recent Labs   Lab 08/22/21 0224 08/21/21 0904 08/20/21 1827   WBC 11.2* 12.1* 12.1*   HGB 11.2* 10.6* 11.1*   HCT 34.3* 33.9* 34.9*    172 167     No results for input(s): INR in the last 168 hours.  Glucose Values Latest Ref Rng & Units 8/17/2021 8/18/2021 8/19/2021 8/20/2021 8/21/2021 8/22/2021   Bedside Glucose (mg/dl )  - -- -- -- -- -- --   GLUCOSE 70 - 125 mg/dL 150(H) 163(H) 111 85 89 91   Some recent data might be hidden         Pertinent Radiology   Radiology Results: Personally reviewed impression/s  MR Ankle Right w/o & w Contrast    Result Date: 8/19/2021  EXAM: MR ANKLE RIGHT W/O and W CONTRAST LOCATION: [facility] DATE/TIME: 8/18/2021 10:18 PM INDICATION: Right lower extremity pain and swelling. Tibial osteomyelitis. COMPARISON: MRI of the tibia fibula 08/18/2021 TECHNIQUE: Routine. Additional postgadolinium T1 sequences were obtained. IV CONTRAST: 10ml gadavist FINDINGS: There is extensive subcutaneous edema with enhancement surrounding the ankle and extending into the foot and lower leg compatible with cellulitis. There is some poorly defined fluid within the subcutaneous fat about the ankle. No well-defined organized fluid collection to suggest an abscess. No involvement of the deeper soft tissues. No associated marrow abnormality to suggest osteomyelitis. There is a small focus of subchondral marrow  edema and likely developing cystic change involving the cuboid adjacent to the calcaneocuboid joint. No joint effusion or synovitis. Mild chronic thickening of the Achilles tendon. No abnormal tendon sheath fluid.     IMPRESSION: 1.  Extensive cellulitis. 2.  No focal abscess. 3.  No evidence of osteomyelitis.    MR Femur Thigh Right wo & w Contrast    Result Date: 8/19/2021  FINAL REPORT I agree with the preliminary report. There are findings highly suspicious for acute on chronic osteomyelitis involving the distal aspect of the right femur. Linear areas of signal abnormality may represent small sinus tract. There are surrounding soft tissue changes in the distal, anterior thigh which can be seen with infection including myositis and fasciitis. Superimposed pathologic fracture of the right femur may also be present as there is some cortical offset in the distal femoral shaft. Correlation with CT of the femur may be useful for further assessment of possible distal femoral fracture. Subcutaneous edema diffusely throughout the right thigh distally and anteriorly in the right thigh with reactive lymphadenopathy right inguinal region. Final Interpretation Dictated By: Lee Leos Date: 8/19/2021 PRELIMINARY REPORT EXAM: MR FEMUR THIGH RIGHT WO AND W CONTRAST LOCATION: [facility] DATE/TIME: 8/18/2021 10:18 PM INDICATION: Cellulitis with sepsis with elevated lactate and right leg pain. Evaluate for osteomyelitis or deep tissue space infection. COMPARISON: MRI of the tibia and fibula 08/18/2021. TECHNIQUE: Routine. Additional postgadolinium T1 sequences were obtained. IV CONTRAST: 10 ml. FINDINGS: JOINTS AND BONES: -Replacement of the normal T1 signal involving the distal right femur particularly along the lateral aspect of the distal right femur above the level of the condyles (series 2, image 54). There is associated reactive edema in this area. Additionally, there appears to be breach of the  cortex by linear fluid extending along the anterior cortex and through the anterior cortex into the intramedullary space (series 5, image 48-50). These findings are suspicious for osteomyelitis with draining sinus tracts. More proximal aspect of the right femur demonstrates no evidence for replacement of the normal T1 signal or evidence for intramedullary edema. No fracture or dislocation. TENDONS: -No tendon tear, tendinopathy, or tenosynovitis. MUSCLES AND SOFT TISSUES: -Diffuse muscular edema involving the anterior compartment of the distal right thigh particularly surrounding the area of the draining presumed sinus tracts. There is small amount of fluid between the muscle and the anterior cortex of the distal right femur. Minimal amount of interfascial fluid involving the distal medial and lateral aspects of the right thigh. Severe subcutaneous edema diffusely throughout the right thigh. Moderate fluid involving the subcutaneous tissues of the mid and anterior right thigh. Enlarged right inguinal lymph node, likely reactive.     IMPRESSION: 1.  Findings highly suspicious for osteomyelitis involving the distal aspect of the right femur with replacement of normal T1 signal distally and surrounding reactive edema. Additionally, there are linear areas of increased T2 signal abnormality extending through the anterior cortex possibly representing small sinus tracts draining from the right femur suggesting chronic osteomyelitis. There is cortical thickening throughout this area and fluid along the anterior cortex in the distal right thigh. 2.  Additionally, there is diffuse edema in the anterior musculature of the distal right thigh with minimal layering fluid which can be seen with myositis and fasciitis. 3.  Subcutaneous edema diffusely throughout the right thigh distally and anteriorly in the right thigh with reactive lymphadenopathy right inguinal region. 4.  Final report following review by musculoskeletal  radiologist. Preliminary Interpretation Dictated By: Curt L. Behrns, MD Date: 8/19/2021    MR Tibia Fibula Lower Leg Right wo & w Contr    Result Date: 8/19/2021  FINAL REPORT I agree with the preliminary report. There are signal changes in the right proximal tibia which raises concern for an acute on chronic osteomyelitis. Additional soft tissue findings suspicious for draining sinus tracts as described. Final Interpretation Dictated By: Lee Leos Date: 8/19/2021 PRELIMINARY REPORT EXAM: MR TIBIA FIBULA LOWER LEG RIGHT WO AND W CONTR LOCATION: [facility] DATE/TIME: 8/18/2021 10:18 PM INDICATION: Evaluate for osteomyelitis involving the tibia and fibula with severe right lower extremity pain and soft tissue swelling. COMPARISON: None. TECHNIQUE: Routine. Additional postgadolinium T1 sequences were obtained. IV CONTRAST: 10 ml Gadavist. FINDINGS: BONES: -Heterogeneous areas of decreased T1 signal throughout the proximal right tibia with irregularity to the cortex with linear fluidlike areas in the anterior right cortex. These linear areas of fluid in the anterior medial right cortex (series 6, image 13-17) are suspicious for linear draining sinus tracts. Overall findings are suspicious for osteomyelitis in the proximal right tibia, likely chronic. Distal right tibia and fibula demonstrate no evidence for other areas suspicious for osteomyelitis. SOFT TISSUES:  -Severe subcutaneous edema diffusely throughout the right lower extremity greatest near the mid to lower right lower extremity down to the level of the ankle. MUSCLES: -No abnormality in the visualized musculature.     IMPRESSION: 1.  Heterogeneous decreased T1 signal throughout the proximal right tibia with irregularity of the anterior cortex with draining linear areas of fluid signal intensity into the soft tissue suspicious for draining sinus tracts from an area of suspected chronic osteomyelitis in the proximal right tibia. 2.   Severe subcutaneous edema right lower extremity. 3.  Final report following review by musculoskeletal radiologist. Preliminary Interpretation Dictated By: Curt L. Behrns, MD Date: 8/19/2021     CT Femur Thigh Right w/o Contrast    Result Date: 8/19/2021  EXAM: CT FEMUR THIGH RIGHT WITHOUT CONTRAST LOCATION: [facility] DATE/TIME: 8/19/2021 7:43 PM INDICATION: Upper leg pain, stress fracture suspected, negative x-ray. COMPARISON: None. TECHNIQUE: Noncontrast. Axial, sagittal and coronal thin-section reconstruction. Dose reduction techniques were used. FINDINGS: There is cortical deformity involving the femoral distal diametaphysis with intramedullary lucency and sclerosis. This is of indeterminate etiology but could be related to an old healed fracture. Chronic osteomyelitis might have this appearance. No evidence of acute fracture. The remainder of the femur appears within normal limits. Similar sclerosis and lucency is noted in the proximal tibia, only partly included on the scan, also of indeterminate etiology. Subcutaneous stranding and  nodularity is noted throughout the thigh. Immediately distal to the inguinal region, there is a 3.8 x 2.6 cm soft tissue density nodule within the deep subcutaneous fat. External iliac lymphadenopathy is suspected on the proximal edge of the scan.     IMPRESSION: 1.  Distal femoral region of mild cortical deformity and adjacent intramedullary lucency and sclerosis of indeterminate etiology. Possibilities include an old healed fracture. Chronic osteomyelitis might have a similar appearance. A similar finding is seen within the proximal tibia but only partly included on the scan. 2.  No evidence of femoral fracture. 3.  Suspected pelvic external iliac lymphadenopathy, not well evaluated with this scan. There is also a 3.8 x 2.6 cm soft tissue mass in the anteromedial aspect of the upper thigh/distal inguinal region. Immediately proximal to this, there are  multiple surgical clips. 4.  Extensive subcutaneous stranding and nodularity circumferentially within the thigh, greatest distally.     Echocardiogram Complete    Result Date: 2021  [id number]^JOSE^[place] [address]  Name: CABRERA GHOSH MRN: [id number] : 1980 Study Date: 2021 08:46 AM Age: 40 yrs Gender: Male Patient Location: [facility] Reason For Study: Endocarditis Ordering Physician: VANNA GARCIA Performed By: CANDIDO  BSA: 2.5 m2 Height: 71 in Weight: 308 lb HR: 107 BP: 114/59 mmHg ______________________________________________________________________________ Procedure Definity ([facility]C #60817-160) given intravenously. Complete Echo Adult. Technically difficult study.Extremely difficult acoustic windows despite the use of contrast for endcardial border definition. ______________________________________________________________________________ Interpretation Summary  1. Left ventricular size, wall thickness, and systolic function are normal. The estimated left ventricular ejection fraction is 55%. 2. Right ventricular size and systolic function are normal. 3. No hemodynamically significant valvular abnormalities. 4. No prior study available for comparison. ______________________________________________________________________________ I      WMSI = 1.00     % Normal = 100  X - Cannot   0 -                      (2) - Mildly 2 -          Segments  Size Interpret    Hyperkinetic 1 - Normal  Hypokinetic  Hypokinetic  1-2     small                                                    7 -          3-5    moderate 3 - Akinetic 4 -          5 -         6 - Akinetic Dyskinetic   6-14    large              Dyskinetic   Aneurysmal  w/scar       w/scar       15-16   diffuse  Left Ventricle The left ventricle is normal in size. There is mild concentric left ventricular hypertrophy. Left ventricular systolic function is normal. The visual ejection  fraction is 55-60%. Diastolic function not assessed due to tachycardia. No regional wall motion abnormalities noted.  Right Ventricle Normal right ventricle size and systolic function.  Atria Normal left atrial size. Right atrial size is normal.  Mitral Valve Mitral valve leaflets appear normal. There is trace mitral regurgitation. There is no mitral valve stenosis.  Tricuspid Valve Tricuspid valve leaflets appear normal. There is trace to mild tricuspid regurgitation. Right ventricle systolic pressure estimate normal. The right ventricular systolic pressure is elevated at 12.4 mmHg. There is no tricuspid stenosis.  Aortic Valve Aortic valve leaflets appear normal. No aortic regurgitation is present. No aortic stenosis is present.  Pulmonic Valve The pulmonic valve is not well visualized. There is trace pulmonic valvular regurgitation. There is no pulmonic valvular stenosis.  Vessels The aorta root is normal. IVC diameter <2.1 cm collapsing >50% with sniff suggests a normal RA pressure of 3 mmHg.  Pericardium There is no pericardial effusion.  Rhythm The rhythm was sinus tachycardia.  ______________________________________________________________________________ MMode/2D Measurements & Calculations RVDd: 4.2 cm IVSd: 1.2 cm LVIDd: 4.5 cm LVIDs: 3.0 cm LVPWd: 1.2 cm FS: 32.9 %  LV mass(C)d: 203.0 grams LV mass(C)dI: 80.1 grams/m2 Ao root diam: 3.9 cm LA dimension: 3.6 cm asc Aorta Diam: 3.1 cm LA/Ao: 0.93 LVOT diam: 2.3 cm LVOT area: 4.0 cm2 LA Volume Indexed (AL/bp): 27.4 ml/m2 RWT: 0.54  Doppler Measurements & Calculations Ao V2 max: 156.1 cm/sec Ao max PG: 10.0 mmHg Ao V2 mean: 105.9 cm/sec Ao mean P.3 mmHg Ao V2 VTI: 28.7 cm GREGORIO(I,D): 3.4 cm2 GREGORIO(V,D): 3.3 cm2 LV V1 max P.5 mmHg LV V1 max: 127.9 cm/sec LV V1 VTI: 24.3 cm  SV(LVOT): 97.4 ml SI(LVOT): 38.4 ml/m2 PA acc time: 0.12 sec TR max aaron: 176.2 cm/sec TR max P.4 mmHg AV Aaron Ratio (DI): 0.82 GREGORIO Index (cm2/m2): 1.3   ______________________________________________________________________________ Report approved by: Miladis Holguin 08/19/2021 09:44 AM       XR Chest Port 1 View    Result Date: 8/21/2021  EXAM: XR CHEST PORT 1 VIEW LOCATION: [facility] DATE/TIME: 8/21/2021 10:45 AM INDICATION: Shortness of breath. COMPARISON: 08/18/2021     IMPRESSION: New suspicious area of increased opacity right lung base laterally near the right costophrenic angle could represent a small focal area of alveolar infiltrate related to pneumonia in the proper clinical setting. Left lung clear. Normal heart size and pulmonary vascularity. No overt osseous abnormality.    CT Chest Abdomen Pelvis w/o Contrast    Result Date: 8/19/2021  EXAM: CT CHEST ABDOMEN PELVIS W/O CONTRAST LOCATION: [facility] DATE/TIME: 8/18/2021 11:42 PM INDICATION: Sepsis COMPARISON: CTA chest 04/22/2013. CT abdomen and pelvis 12/07/2005. Chest radiographs 08/18/2021. MR of the right lower extremity earlier today. TECHNIQUE: CT scan of the chest, abdomen, and pelvis was performed without IV contrast. Multiplanar reformats were obtained. Dose reduction techniques were used. CONTRAST: None. FINDINGS: LUNGS AND PLEURA: Mild atelectasis bilaterally. Lungs otherwise clear. No pleural fluid or pneumothorax. MEDIASTINUM/AXILLAE: There is lobulated soft tissue intermingled with lobules of fat in the left anterior mediastinum which measures up to 7.5 x 3.6 cm on axial images (image 51 series 4). A similar finding with less intermixed fat measured up to 6.7 x 3.2 cm on 04/22/2013. There are now enlarged lymph nodes extending superiorly lateral to the left common carotid artery and internal jugular vein which measure up to 2 cm axillary dissection clips on the left. (image 16 of series 4). CORONARY ARTERY CALCIFICATION: None. HEPATOBILIARY: Hepatic steatosis. Normal gallbladder and bile ducts. PANCREAS: Normal. SPLEEN: Normal. ADRENAL  GLANDS: Normal. KIDNEYS/BLADDER: Excreted contrast in the urinary system from a prior study. Kidneys and bladder otherwise normal. BOWEL: Normal. No obstruction or inflammation. Normal appendix. LYMPH NODES: Multiple enlarged and inflamed lymph nodes of the abdominal retroperitoneum measure up to 2.3 cm near the IVC. Inflamed retroperitoneal lymphadenopathy in the pelvis near the iliac vessels is asymmetric on the right, notably the same side as  the presumed infectious process involving the right leg on the prior MR lower extremity study from today. The largest right external iliac chain lymph node is 3.2 cm. The largest included right inguinal lymph node is 3.0 cm. Right inguinal lymphadenectomy clips are present. VASCULATURE: Unremarkable. PELVIC ORGANS: Normal. MUSCULOSKELETAL: Edema of the subcutaneous fat of the right groin and included anterior right thigh consistent with cellulitis. This was more fully imaged on the MR examination from earlier today.     IMPRESSION: 1.  Inflammatory lymphadenopathy of the retroperitoneum of the abdomen, right pelvis, and right inguinal station likely secondary to the presumed infectious process involving the right leg as demonstrated on the MR examination from earlier today. Involvement with lymphoma is not excluded. 2.  Lobulated soft tissue of the anterior mediastinum has mildly increased in size compared back to the remote study of 04/22/2013. There are now some enlarged lymph nodes superior to this near the left carotid and jugular vessels. Findings are concerning for active lymphoma. Clinical correlation recommended. 3.  Hepatic steatosis. I discussed the findings with at on .    EKG Results: not reviewed.

## 2021-08-22 NOTE — PLAN OF CARE
Shift from 0700 to 1530-  Problem: Pain Chronic (Persistent)  Goal: Acceptable Pain Control and Functional Ability  Outcome: Improving  Intervention: Develop Pain Management Plan  Recent Flowsheet Documentation  Taken 8/21/2021 1905 by Tricia Cotton, RN  Pain Management Interventions:   medication (see MAR)   emotional support  Taken 8/21/2021 1428 by Tricia Cotton, RN  Pain Management Interventions:   medication (see MAR)   emotional support  Intervention: Manage Persistent Pain  Recent Flowsheet Documentation  Taken 8/21/2021 1600 by Tricia Cotton, RN  Bowel Elimination Promotion: ambulation promoted   Patient complained of constipation; Given miralax, senna and MOM. Waiting for a BM but is passing flatus. Encourage activity. Difficult to motivate. Saw PT an got up to chair in afternoon. Tolerated only for 1 hrs.   Needs encouragement.   Pain controlled with lyrica, scheduled tylenol and prn dilaudid. See assessments of pain. Seeing pain team.   Continues to have fevers. Patient on multiple antibiotics; changed today. See MAR.   IVF Sl'd; Chest xray done. Started on IS.   K+ 3.2 this morning and started on K+ protocol; Lab results delayed at times because  unable to draw blood.   Given multiple dose of Kdur. Patient states he is on at home.

## 2021-08-22 NOTE — PROGRESS NOTES
[facility]    Infectious Disease Progress Note    Date of Service (when I saw the patient): 08/22/2021     Assessment & Plan      Romeo Chong is a 40 year old male who was admitted on 8/17/2021.     1. Right LE cellulitis, osteomyelitis, lymphedema, severe.  2. Group B Streptococcus bacteremia- Streptococcus agalactiae. Sensitive to Ceftriaxone and patient tolerating Ceftriaxone. Patient stated that he has had better improvement with Keflex in the past when he was being discharged from hospital in [place].  3. 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  This was done at [facility]  4. Sepsis  5. History of Lymphoma  6. Inguinal lymphadenopathy  Multiple enlarged and inflamed lymph nodes of the abdominal retroperitoneum measure up to 2.3 cm near the IVC. Inflamed retroperitoneal lymphadenopathy in the pelvis near the iliac vessels is asymmetric on the right, notably the same side as   the presumed infectious process involving the right leg on the prior MR lower extremity study from today. The largest right external iliac chain lymph node is 3.2 cm. The largest included right inguinal lymph node is 3.0 cm. Right inguinal lymphadenectomy clips are present.  7. Morbid obesity Body mass index is 43.54 kg/m .   8. 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 Several allergies    Recommendations:    1. Due to persistent fevers, and possible pneumonia, switch to meropenem (based on susceptibility testing)  2. Stopped cefazolin on 8/22/2021   3. Patient may need IV antibiotic on discharge depending on clinical response.  Chronic osteomyelitis, though patient unaware and awaiting records from [place].  History of bone tumor status post resection.  This was performed in the 1990s.  4. Follow repeat blood cultures  5. Orthopedic Surgery recommended CT scan and transfer [facility] or [facility]. Agree  with transfer as there is no clinical improvement, underlying osteomyelitis. If patient improves, would need referral to orthopedic surgery specializing in complex cases.  Patient prefers to go to [facility].  He does not want to go to [facility] due to previous bad experience.  Discussed with hospitalist.  6. Stopped Aztreonam.   7. Stop doxycycline.  Vancomycin, and Zosyn allergy so abx choice limited. Will deescalate based on final susceptibilities results  8. Echocardiogram technically difficult- report reviewed  9. Monitor CBC, CMP, blood cultures  10. Discussed with patient, and nurse  11. Awaiting records from [facility]. Patient had been hospitalized for cellulitis and Sepsis in the past.  Discussed with had kiet Branch on 8/20/2021  12. Detailed discussion with patient and questions answered  13. Pain control  14. Discussed with hospitalist in detail.    Previous photo        Sherley Cabrera MD  [facility]  [phone]      Interval History   Overall feels slightly better, though ongoing fever.  Previous note:     Fever, swelling and pain in leg, does not have significant improvement.  Feels that Keflex had worked better in the past discussed imaging findings  No new allergic reaction  Discussed with patient that we could switch ceftriaxone to cefazolin and monitor.  Also stated that as there is no significant improvement, transfer to specialized orthopedic surgery at [facility] would be considered.  Patient is agreeable.    Physical Exam   Temp: (S) (!) 101.7  F (38.7  C) (Dr. Herndon and Dr. Cabrera notified from ID) Temp src: Oral BP: 139/68 Pulse: 71   Resp: 20 SpO2: 96 % O2 Device: None (Room air)    Vitals:    08/17/21 2242 08/18/21 1523 08/19/21 2017   Weight: 120.2 kg (265 lb) 139.8 kg (308 lb 3.2 oz) 141.6 kg (312 lb 3.2 oz)     Vital Signs with Ranges  Temp:  [99  F (37.2  C)-103  F (39.4  C)] 101.7  F (38.7  C)  Pulse:  [66-88]  71  Resp:  [20] 20  BP: (123-139)/(58-73) 139/68  SpO2:  [95 %-98 %] 96 %    Exam on 8/22/2021   Constitutional: Awake, alert, cooperative, fatigued  Lungs: Distant  Cardiovascular: S1 S2  Abdomen: obese tenderness  Skin: RLE lymphedema, swelling, tenderness, warmth, minimal improvement  Severely deconditioned  Neuro: AOx 3    Medications       acetaminophen  975 mg Oral TID     amLODIPine  2.5 mg Oral Daily     enoxaparin ANTICOAGULANT  40 mg Subcutaneous Q12H     famotidine  40 mg Oral QAM     meropenem  2 g Intravenous Q8H     metoprolol tartrate  12.5 mg Oral BID     polyethylene glycol  17 g Oral Daily     potassium chloride  20 mEq Oral Daily     pregabalin  25 mg Oral BID       Data   All microbiology laboratory data reviewed.  Recent Labs   Lab Test 08/22/21 0224 08/21/21  0904 08/20/21  1827   WBC 11.2* 12.1* 12.1*   HGB 11.2* 10.6* 11.1*   HCT 34.3* 33.9* 34.9*   MCV 79 83 82    172 167     Recent Labs   Lab Test 08/22/21 0224 08/21/21  0904 08/20/21  1827   CR 0.69* 0.63* 0.63*     08/17/2021 2256 08/20/2021 0951 Blood Culture Line, venous [[id number]]    (Abnormal)   Blood from Line, venous    Preliminary result Component Value   Culture Positive on the 1st day of incubationAbnormal  P    Streptococcus agalactiae (Group B Streptococcus)Panic  P    2 of 2 bottles       Susceptibility     Streptococcus agalactiae (Group B Streptococcus)     MEAGAN     Ampicillin 0.12 ug/mL Susceptible     Cefotaxime <=0.25 ug/mL Susceptible     Ceftriaxone <=0.25 ug/mL Susceptible     Clindamycin >0.5 ug/mL Resistant     Meropenem <=0.06 ug/mL Susceptible     Penicillin 0.06 ug/mL Susceptible     Vancomycin 0.5 ug/mL Susceptible                  08/17/2021 2256 08/18/2021 2137 Verigene GP Panel [[id number]]    (Abnormal)   Blood from Line, venous    Final result Component Value   Staphylococcus species Not Detected   Staphylococcus aureus Not Detected   Staphylococcus epidermidis Not Detected   Staphylococcus  lugdunensis Not Detected   Enterococcus faecalis Not Detected   Enterococcus faecium Not Detected   Streptococcus agalactiae DetectedAbnormal    Positive for Streptococcus agalactiae (Group B Streptococcus) by Verigene multiplex nucleic acid test. Penicillin and ampicillin are drugs of choice; non-susceptible isolates have not been reported. Final identification and antimicrobial susceptibility testing will be verified by standard methods.   Streptococcus anginosus group Not Detected   Streptococcus pneumoniae Not Detected   Streptococcus pyogenes Not Detected   Listeria species Not Detected           06/22/2021 1027 06/23/2021 0839 SARS-CoV-2 COVID-19 Virus (Coronavirus) by PCR [[id number]]   Respiratory    Final result Component Value   SARS-CoV-2 Virus Specimen Source Nasopharyngeal   SARS-CoV-2 PCR Result NEGATIVE                08/17/2021 2256 08/19/2021 1219 Blood Culture Line, venous [[id number]]   (Abnormal)   Blood from Line, venous    Preliminary result Component Value   Culture Positive on the 1st day of incubationAbnormal  P    Streptococcus agalactiae (Group B Streptococcus)Panic  P    2 of 2 bottles              08/17/2021 2256 08/18/2021 2137 Verigene GP Panel [[id number]]    (Abnormal)   Blood from Line, venous    Final result Component Value   Staphylococcus species Not Detected   Staphylococcus aureus Not Detected   Staphylococcus epidermidis Not Detected   Staphylococcus lugdunensis Not Detected   Enterococcus faecalis Not Detected   Enterococcus faecium Not Detected   Streptococcus agalactiae DetectedAbnormal    Positive for Streptococcus agalactiae (Group B Streptococcus) by Verigene multiplex nucleic acid test. Penicillin and ampicillin are drugs of choice; non-susceptible isolates have not been reported. Final identification and antimicrobial susceptibility testing will be verified by standard methods.   Streptococcus anginosus group Not Detected   Streptococcus pneumoniae Not Detected    Streptococcus pyogenes Not Detected   Listeria species Not Detected             RADIOLOGY:    XR Chest 1 View    Result Date: 8/18/2021  EXAM: XR CHEST 1 VIEW LOCATION: [facility] DATE/TIME: 8/18/2021 1:26 AM INDICATION: fever, chills COMPARISON: 10/29/2012.     IMPRESSION: Negative chest.    MR Femur Thigh Right wo & w Contrast    Result Date: 8/19/2021  FINAL REPORT I agree with the preliminary report. There are findings highly suspicious for acute on chronic osteomyelitis involving the distal aspect of the right femur. Linear areas of signal abnormality may represent small sinus tract. There are surrounding soft tissue changes in the distal, anterior thigh which can be seen with infection including myositis and fasciitis. Superimposed pathologic fracture of the right femur may also be present as there is some cortical offset in the distal femoral shaft. Correlation with CT of the femur may be useful for further assessment of possible distal femoral fracture. Subcutaneous edema diffusely throughout the right thigh distally and anteriorly in the right thigh with reactive lymphadenopathy right inguinal region. Final Interpretation Dictated By: Lee Leos Date: 8/19/2021 PRELIMINARY REPORT EXAM: MR FEMUR THIGH RIGHT WO AND W CONTRAST LOCATION: [facility] DATE/TIME: 8/18/2021 10:18 PM INDICATION: Cellulitis with sepsis with elevated lactate and right leg pain. Evaluate for osteomyelitis or deep tissue space infection. COMPARISON: MRI of the tibia and fibula 08/18/2021. TECHNIQUE: Routine. Additional postgadolinium T1 sequences were obtained. IV CONTRAST: 10 ml. FINDINGS: JOINTS AND BONES: -Replacement of the normal T1 signal involving the distal right femur particularly along the lateral aspect of the distal right femur above the level of the condyles (series 2, image 54). There is associated reactive edema in this area. Additionally, there appears to be breach  of the cortex by linear fluid extending along the anterior cortex and through the anterior cortex into the intramedullary space (series 5, image 48-50). These findings are suspicious for osteomyelitis with draining sinus tracts. More proximal aspect of the right femur demonstrates no evidence for replacement of the normal T1 signal or evidence for intramedullary edema. No fracture or dislocation. TENDONS: -No tendon tear, tendinopathy, or tenosynovitis. MUSCLES AND SOFT TISSUES: -Diffuse muscular edema involving the anterior compartment of the distal right thigh particularly surrounding the area of the draining presumed sinus tracts. There is small amount of fluid between the muscle and the anterior cortex of the distal right femur. Minimal amount of interfascial fluid involving the distal medial and lateral aspects of the right thigh. Severe subcutaneous edema diffusely throughout the right thigh. Moderate fluid involving the subcutaneous tissues of the mid and anterior right thigh. Enlarged right inguinal lymph node, likely reactive.     IMPRESSION: 1.  Findings highly suspicious for osteomyelitis involving the distal aspect of the right femur with replacement of normal T1 signal distally and surrounding reactive edema. Additionally, there are linear areas of increased T2 signal abnormality extending through the anterior cortex possibly representing small sinus tracts draining from the right femur suggesting chronic osteomyelitis. There is cortical thickening throughout this area and fluid along the anterior cortex in the distal right thigh. 2.  Additionally, there is diffuse edema in the anterior musculature of the distal right thigh with minimal layering fluid which can be seen with myositis and fasciitis. 3.  Subcutaneous edema diffusely throughout the right thigh distally and anteriorly in the right thigh with reactive lymphadenopathy right inguinal region. 4.  Final report following review by musculoskeletal  radiologist. Preliminary Interpretation Dictated By: Curt L. Behrns, MD Date: 8/19/2021    MR Tibia Fibula Lower Leg Right wo & w Contr    Result Date: 8/19/2021  FINAL REPORT I agree with the preliminary report. There are signal changes in the right proximal tibia which raises concern for an acute on chronic osteomyelitis. Additional soft tissue findings suspicious for draining sinus tracts as described. Final Interpretation Dictated By: Lee Leos Date: 8/19/2021 PRELIMINARY REPORT EXAM: MR TIBIA FIBULA LOWER LEG RIGHT WO AND W CONTR LOCATION: [facility] DATE/TIME: 8/18/2021 10:18 PM INDICATION: Evaluate for osteomyelitis involving the tibia and fibula with severe right lower extremity pain and soft tissue swelling. COMPARISON: None. TECHNIQUE: Routine. Additional postgadolinium T1 sequences were obtained. IV CONTRAST: 10 ml Gadavist. FINDINGS: BONES: -Heterogeneous areas of decreased T1 signal throughout the proximal right tibia with irregularity to the cortex with linear fluidlike areas in the anterior right cortex. These linear areas of fluid in the anterior medial right cortex (series 6, image 13-17) are suspicious for linear draining sinus tracts. Overall findings are suspicious for osteomyelitis in the proximal right tibia, likely chronic. Distal right tibia and fibula demonstrate no evidence for other areas suspicious for osteomyelitis. SOFT TISSUES:  -Severe subcutaneous edema diffusely throughout the right lower extremity greatest near the mid to lower right lower extremity down to the level of the ankle. MUSCLES: -No abnormality in the visualized musculature.     IMPRESSION: 1.  Heterogeneous decreased T1 signal throughout the proximal right tibia with irregularity of the anterior cortex with draining linear areas of fluid signal intensity into the soft tissue suspicious for draining sinus tracts from an area of suspected chronic osteomyelitis in the proximal right tibia. 2.   Severe subcutaneous edema right lower extremity. 3.  Final report following review by musculoskeletal radiologist. Preliminary Interpretation Dictated By: Curt L. Behrns, MD Date: 2021     Echocardiogram Complete    Result Date: 2021  [id number]^JOSE^[place] [address]  Name: CABRERA GHOSH MRN: [id number] : 1980 Study Date: 2021 08:46 AM Age: 40 yrs Gender: Male Patient Location: [facility] Reason For Study: Endocarditis Ordering Physician: VANNA GARCIA Performed By: CANDIDO  BSA: 2.5 m2 Height: 71 in Weight: 308 lb HR: 107 BP: 114/59 mmHg ______________________________________________________________________________ Procedure Definity (NDC #92291-611) given intravenously. Complete Echo Adult. Technically difficult study.Extremely difficult acoustic windows despite the use of contrast for endcardial border definition. ______________________________________________________________________________ Interpretation Summary  1. Left ventricular size, wall thickness, and systolic function are normal. The estimated left ventricular ejection fraction is 55%. 2. Right ventricular size and systolic function are normal. 3. No hemodynamically significant valvular abnormalities. 4. No prior study available for comparison. ______________________________________________________________________________ I      WMSI = 1.00     % Normal = 100  X - Cannot   0 -                      (2) - Mildly 2 -          Segments  Size Interpret    Hyperkinetic 1 - Normal  Hypokinetic  Hypokinetic  1-2     small                                                    7 -          3-5    moderate 3 - Akinetic 4 -          5 -         6 - Akinetic Dyskinetic   6-14    large              Dyskinetic   Aneurysmal  w/scar       w/scar       15-16   diffuse  Left Ventricle The left ventricle is normal in size. There is mild concentric left ventricular hypertrophy. Left  ventricular systolic function is normal. The visual ejection fraction is 55-60%. Diastolic function not assessed due to tachycardia. No regional wall motion abnormalities noted.  Right Ventricle Normal right ventricle size and systolic function.  Atria Normal left atrial size. Right atrial size is normal.  Mitral Valve Mitral valve leaflets appear normal. There is trace mitral regurgitation. There is no mitral valve stenosis.  Tricuspid Valve Tricuspid valve leaflets appear normal. There is trace to mild tricuspid regurgitation. Right ventricle systolic pressure estimate normal. The right ventricular systolic pressure is elevated at 12.4 mmHg. There is no tricuspid stenosis.  Aortic Valve Aortic valve leaflets appear normal. No aortic regurgitation is present. No aortic stenosis is present.  Pulmonic Valve The pulmonic valve is not well visualized. There is trace pulmonic valvular regurgitation. There is no pulmonic valvular stenosis.  Vessels The aorta root is normal. IVC diameter <2.1 cm collapsing >50% with sniff suggests a normal RA pressure of 3 mmHg.  Pericardium There is no pericardial effusion.  Rhythm The rhythm was sinus tachycardia.  ______________________________________________________________________________ MMode/2D Measurements & Calculations RVDd: 4.2 cm IVSd: 1.2 cm LVIDd: 4.5 cm LVIDs: 3.0 cm LVPWd: 1.2 cm FS: 32.9 %  LV mass(C)d: 203.0 grams LV mass(C)dI: 80.1 grams/m2 Ao root diam: 3.9 cm LA dimension: 3.6 cm asc Aorta Diam: 3.1 cm LA/Ao: 0.93 LVOT diam: 2.3 cm LVOT area: 4.0 cm2 LA Volume Indexed (AL/bp): 27.4 ml/m2 RWT: 0.54  Doppler Measurements & Calculations Ao V2 max: 156.1 cm/sec Ao max PG: 10.0 mmHg Ao V2 mean: 105.9 cm/sec Ao mean P.3 mmHg Ao V2 VTI: 28.7 cm GREGORIO(I,D): 3.4 cm2 GREGORIO(V,D): 3.3 cm2 LV V1 max P.5 mmHg LV V1 max: 127.9 cm/sec LV V1 VTI: 24.3 cm  SV(LVOT): 97.4 ml SI(LVOT): 38.4 ml/m2 PA acc time: 0.12 sec TR max aaron: 176.2 cm/sec TR max P.4 mmHg AV Aaron Ratio (DI):  0.82 GREGORIO Index (cm2/m2): 1.3  ______________________________________________________________________________ Report approved by: Miladis Holguin 08/19/2021 09:44 AM       CT Chest Abdomen Pelvis w/o Contrast    Result Date: 8/19/2021  EXAM: CT CHEST ABDOMEN PELVIS W/O CONTRAST LOCATION: [facility] DATE/TIME: 8/18/2021 11:42 PM INDICATION: Sepsis COMPARISON: CTA chest 04/22/2013. CT abdomen and pelvis 12/07/2005. Chest radiographs 08/18/2021. MR of the right lower extremity earlier today. TECHNIQUE: CT scan of the chest, abdomen, and pelvis was performed without IV contrast. Multiplanar reformats were obtained. Dose reduction techniques were used. CONTRAST: None. FINDINGS: LUNGS AND PLEURA: Mild atelectasis bilaterally. Lungs otherwise clear. No pleural fluid or pneumothorax. MEDIASTINUM/AXILLAE: There is lobulated soft tissue intermingled with lobules of fat in the left anterior mediastinum which measures up to 7.5 x 3.6 cm on axial images (image 51 series 4). A similar finding with less intermixed fat measured up to 6.7 x 3.2 cm on 04/22/2013. There are now enlarged lymph nodes extending superiorly lateral to the left common carotid artery and internal jugular vein which measure up to 2 cm axillary dissection clips on the left. (image 16 of series 4). CORONARY ARTERY CALCIFICATION: None. HEPATOBILIARY: Hepatic steatosis. Normal gallbladder and bile ducts. PANCREAS: Normal. SPLEEN: Normal. ADRENAL GLANDS: Normal. KIDNEYS/BLADDER: Excreted contrast in the urinary system from a prior study. Kidneys and bladder otherwise normal. BOWEL: Normal. No obstruction or inflammation. Normal appendix. LYMPH NODES: Multiple enlarged and inflamed lymph nodes of the abdominal retroperitoneum measure up to 2.3 cm near the IVC. Inflamed retroperitoneal lymphadenopathy in the pelvis near the iliac vessels is asymmetric on the right, notably the same side as  the presumed infectious process involving the  right leg on the prior MR lower extremity study from today. The largest right external iliac chain lymph node is 3.2 cm. The largest included right inguinal lymph node is 3.0 cm. Right inguinal lymphadenectomy clips are present. VASCULATURE: Unremarkable. PELVIC ORGANS: Normal. MUSCULOSKELETAL: Edema of the subcutaneous fat of the right groin and included anterior right thigh consistent with cellulitis. This was more fully imaged on the MR examination from earlier today.     IMPRESSION: 1.  Inflammatory lymphadenopathy of the retroperitoneum of the abdomen, right pelvis, and right inguinal station likely secondary to the presumed infectious process involving the right leg as demonstrated on the MR examination from earlier today. Involvement with lymphoma is not excluded. 2.  Lobulated soft tissue of the anterior mediastinum has mildly increased in size compared back to the remote study of 04/22/2013. There are now some enlarged lymph nodes superior to this near the left carotid and jugular vessels. Findings are concerning for active lymphoma. Clinical correlation recommended. 3.  Hepatic steatosis. I discussed the findings with at on .    US Lower Extremity Venous Duplex Right    Result Date: 8/18/2021  EXAM: US LOWER EXTREMITY VENOUS DUPLEX RIGHT LOCATION: [facility] DATE/TIME: 8/18/2021 1:40 AM INDICATION: Right leg pain, swelling and redness. COMPARISON: None. TECHNIQUE: Venous Duplex ultrasound of the right lower extremity with and without compression, augmentation and duplex. Color flow and spectral Doppler with waveform analysis performed. FINDINGS: Exam includes the common femoral, femoral, popliteal, and contralateral common femoral veins as well as segmentally visualized deep calf veins and greater saphenous vein. Evaluation of the calf veins is limited by leg swelling and body habitus. RIGHT: No deep vein thrombosis. No superficial thrombophlebitis. No popliteal cyst.      IMPRESSION: 1.  No deep venous thrombosis in the right lower extremity.    Attestation:  Total time on the floor involved in the patient's care: 45 minutes. Total time spent in counseling/care coordination: >50%

## 2021-08-22 NOTE — PLAN OF CARE
Problem: Pain Chronic (Persistent)  Goal: Acceptable Pain Control and Functional Ability  8/22/2021 0029 by Tricia Cotton, RN  Outcome: Improving  8/22/2021 0023 by Tricia Cotton RN  Outcome: Improving  Intervention: Develop Pain Management Plan  Recent Flowsheet Documentation  Taken 8/21/2021 1905 by Tricia Cotton, RN  Pain Management Interventions:    medication (see MAR)    emotional support  Taken 8/21/2021 1428 by Tricia Cotton, RN  Pain Management Interventions:    medication (see MAR)    emotional support  Intervention: Manage Persistent Pain  Recent Flowsheet Documentation  Taken 8/21/2021 1600 by Tricia Cotton, OSVALDO  Bowel Elimination Promotion: ambulation promoted   Patient continued to have fevers. Resident updated. On scheduled tylenol and antibiotics.   Continue on K+ protocol; Last K+ was 3.2 and Kdur given; Recheck at 0230 on 8/22;  Patient given lasix on days.     Refused to get out of bed on eves and refused lovenox; SCD's in uses and RLE elevated.    Pt felt overwhelmed; daughter was murdered in May 2021.

## 2021-08-22 NOTE — PROGRESS NOTES
PT     08/22/21 1000   Quick Adds   Type of Visit Initial PT Evaluation   Living Environment   Living Environment Comments see OT eval   Self-Care   Equipment Currently Used at Home cane, straight   General Information   Onset of Illness/Injury or Date of Surgery 08/17/21   Referring Physician Yessenia Guzmán   Patient/Family Therapy Goals Statement (PT) pain control   Weight-Bearing Status - LLE full weight-bearing   Weight-Bearing Status - RLE weight-bearing as tolerated   Pain Assessment   Patient Currently in Pain Yes, see Vital Sign flowsheet  (but states dilaudid is helping)   Integumentary/Edema   Integumentary/Edema Comments chronic RLE lymphedema   Posture    Posture Not impaired   Range of Motion (ROM)   ROM Quick Adds ROM deficits secondary to swelling;ROM deficits secondary to pain   Strength   Manual Muscle Testing Quick Adds Able to perform L SLR   Bed Mobility   Bed Mobility sit-supine   Sit-Supine [place] (Bed Mobility) modified independence   Bed Mobility Limitations decreased ability to use legs for bridging/pushing   Comment (Bed Mobility) uses gait belt as leg  to perform sit to supine   Transfers   Transfer Safety Concerns Noted stepping too close to front of assistive device   Gait/Stairs (Locomotion)   Assistive Device (Gait) walker, front-wheeled   Distance in Feet (Required for LE Total Joints) 45   Deviations/Abnormal Patterns (Gait) antalgic   Balance   Balance no deficits were identified   Clinical Impression   Criteria for Skilled Therapeutic Intervention yes, treatment indicated   PT Diagnosis (PT) difficulty walking   Influenced by the following impairments pain/edema   Functional limitations due to impairments household mobility   Clinical Presentation Stable/Uncomplicated   Clinical Presentation Rationale presents as medically diagnosed   Clinical Decision Making (Complexity) moderate complexity   Therapy Frequency (PT) Daily   Predicted Duration of Therapy Intervention  (days/wks) 1week   Planned Therapy Interventions (PT) balance training;bed mobility training;gait training;home exercise program;neuromuscular re-education;patient/family education;ROM (range of motion);strengthening;transfer training;progressive activity/exercise   Anticipated Equipment Needs at Discharge (PT) bariatric equipment;walker, rolling  (FWW)   Risk & Benefits of therapy have been explained evaluation/treatment results reviewed;care plan/treatment goals reviewed;participants voiced agreement with care plan;participants included;patient   PT Discharge Planning    PT Discharge Recommendation (DC Rec) home with assist   PT Rationale for DC Rec safe/steady. Will need FWW issued at [facility] for pain control   Total Evaluation Time   Total Evaluation Time (Minutes) 10   Rosmery Gibson, PT

## 2021-08-23 ENCOUNTER — APPOINTMENT (OUTPATIENT)
Dept: CT IMAGING | Facility: HOSPITAL | Age: 41
End: 2021-08-23
Attending: STUDENT IN AN ORGANIZED HEALTH CARE EDUCATION/TRAINING PROGRAM
Payer: COMMERCIAL

## 2021-08-23 ENCOUNTER — APPOINTMENT (OUTPATIENT)
Dept: OCCUPATIONAL THERAPY | Facility: HOSPITAL | Age: 41
End: 2021-08-23
Payer: COMMERCIAL

## 2021-08-23 LAB
BACTERIA BLD CULT: NO GROWTH
BACTERIA BLD CULT: NO GROWTH
CK SERPL-CCNC: 118 U/L (ref 30–190)
ERYTHROCYTE [DISTWIDTH] IN BLOOD BY AUTOMATED COUNT: 14.7 % (ref 10–15)
HCT VFR BLD AUTO: 34.6 % (ref 40–53)
HGB BLD-MCNC: 11.2 G/DL (ref 13.3–17.7)
HOLD SPECIMEN: NORMAL
MCH RBC QN AUTO: 25.4 PG (ref 26.5–33)
MCHC RBC AUTO-ENTMCNC: 32.4 G/DL (ref 31.5–36.5)
MCV RBC AUTO: 79 FL (ref 78–100)
PLATELET # BLD AUTO: 256 10E3/UL (ref 150–450)
POTASSIUM BLD-SCNC: 3.6 MMOL/L (ref 3.5–5)
RBC # BLD AUTO: 4.41 10E6/UL (ref 4.4–5.9)
WBC # BLD AUTO: 13 10E3/UL (ref 4–11)

## 2021-08-23 PROCEDURE — 258N000003 HC RX IP 258 OP 636: Performed by: INTERNAL MEDICINE

## 2021-08-23 PROCEDURE — 250N000011 HC RX IP 250 OP 636: Performed by: INTERNAL MEDICINE

## 2021-08-23 PROCEDURE — 99233 SBSQ HOSP IP/OBS HIGH 50: CPT | Performed by: STUDENT IN AN ORGANIZED HEALTH CARE EDUCATION/TRAINING PROGRAM

## 2021-08-23 PROCEDURE — 250N000013 HC RX MED GY IP 250 OP 250 PS 637: Performed by: CLINICAL NURSE SPECIALIST

## 2021-08-23 PROCEDURE — 73701 CT LOWER EXTREMITY W/DYE: CPT | Mod: RT

## 2021-08-23 PROCEDURE — 250N000013 HC RX MED GY IP 250 OP 250 PS 637: Performed by: INTERNAL MEDICINE

## 2021-08-23 PROCEDURE — 120N000001 HC R&B MED SURG/OB

## 2021-08-23 PROCEDURE — 97110 THERAPEUTIC EXERCISES: CPT | Mod: GO | Performed by: OCCUPATIONAL THERAPIST

## 2021-08-23 PROCEDURE — 250N000011 HC RX IP 250 OP 636: Performed by: STUDENT IN AN ORGANIZED HEALTH CARE EDUCATION/TRAINING PROGRAM

## 2021-08-23 PROCEDURE — 99221 1ST HOSP IP/OBS SF/LOW 40: CPT | Performed by: PHYSICIAN ASSISTANT

## 2021-08-23 PROCEDURE — 99233 SBSQ HOSP IP/OBS HIGH 50: CPT | Performed by: INTERNAL MEDICINE

## 2021-08-23 PROCEDURE — 84132 ASSAY OF SERUM POTASSIUM: CPT | Performed by: INTERNAL MEDICINE

## 2021-08-23 PROCEDURE — 82550 ASSAY OF CK (CPK): CPT | Performed by: STUDENT IN AN ORGANIZED HEALTH CARE EDUCATION/TRAINING PROGRAM

## 2021-08-23 PROCEDURE — 36415 COLL VENOUS BLD VENIPUNCTURE: CPT | Performed by: INTERNAL MEDICINE

## 2021-08-23 PROCEDURE — 85027 COMPLETE CBC AUTOMATED: CPT | Performed by: INTERNAL MEDICINE

## 2021-08-23 RX ORDER — HYDROMORPHONE HYDROCHLORIDE 4 MG/1
4 TABLET ORAL
Status: DISCONTINUED | OUTPATIENT
Start: 2021-08-23 | End: 2021-08-24

## 2021-08-23 RX ORDER — POTASSIUM CHLORIDE 1500 MG/1
20 TABLET, EXTENDED RELEASE ORAL ONCE
Status: COMPLETED | OUTPATIENT
Start: 2021-08-23 | End: 2021-08-23

## 2021-08-23 RX ORDER — IOPAMIDOL 755 MG/ML
100 INJECTION, SOLUTION INTRAVASCULAR ONCE
Status: COMPLETED | OUTPATIENT
Start: 2021-08-23 | End: 2021-08-23

## 2021-08-23 RX ORDER — LINEZOLID 2 MG/ML
600 INJECTION, SOLUTION INTRAVENOUS EVERY 12 HOURS
Status: DISCONTINUED | OUTPATIENT
Start: 2021-08-23 | End: 2021-08-26

## 2021-08-23 RX ADMIN — POTASSIUM CHLORIDE 20 MEQ: 1500 TABLET, EXTENDED RELEASE ORAL at 13:29

## 2021-08-23 RX ADMIN — MEROPENEM 2 G: 1 INJECTION, POWDER, FOR SOLUTION INTRAVENOUS at 22:33

## 2021-08-23 RX ADMIN — ACETAMINOPHEN 975 MG: 325 TABLET ORAL at 09:19

## 2021-08-23 RX ADMIN — IOPAMIDOL 100 ML: 755 INJECTION, SOLUTION INTRAVENOUS at 11:49

## 2021-08-23 RX ADMIN — MEROPENEM 2 G: 1 INJECTION, POWDER, FOR SOLUTION INTRAVENOUS at 13:29

## 2021-08-23 RX ADMIN — HYDROMORPHONE HYDROCHLORIDE 4 MG: 2 TABLET ORAL at 20:01

## 2021-08-23 RX ADMIN — ACETAMINOPHEN 975 MG: 325 TABLET ORAL at 20:02

## 2021-08-23 RX ADMIN — ACETAMINOPHEN 975 MG: 325 TABLET ORAL at 13:48

## 2021-08-23 RX ADMIN — AMLODIPINE BESYLATE 2.5 MG: 2.5 TABLET ORAL at 09:19

## 2021-08-23 RX ADMIN — MEROPENEM 2 G: 1 INJECTION, POWDER, FOR SOLUTION INTRAVENOUS at 04:22

## 2021-08-23 RX ADMIN — POTASSIUM CHLORIDE 20 MEQ: 20 TABLET, EXTENDED RELEASE ORAL at 20:01

## 2021-08-23 RX ADMIN — FAMOTIDINE 40 MG: 20 TABLET, FILM COATED ORAL at 09:20

## 2021-08-23 RX ADMIN — ENOXAPARIN SODIUM 40 MG: 100 INJECTION SUBCUTANEOUS at 18:21

## 2021-08-23 RX ADMIN — HYDROMORPHONE HYDROCHLORIDE 4 MG: 2 TABLET ORAL at 04:22

## 2021-08-23 RX ADMIN — LINEZOLID 600 MG: 600 INJECTION, SOLUTION INTRAVENOUS at 14:28

## 2021-08-23 RX ADMIN — HYDROMORPHONE HYDROCHLORIDE 4 MG: 2 TABLET ORAL at 09:29

## 2021-08-23 RX ADMIN — POTASSIUM CHLORIDE 20 MEQ: 20 TABLET, EXTENDED RELEASE ORAL at 09:19

## 2021-08-23 RX ADMIN — HYDROMORPHONE HYDROCHLORIDE 4 MG: 2 TABLET ORAL at 13:48

## 2021-08-23 RX ADMIN — PREGABALIN 25 MG: 25 CAPSULE ORAL at 13:28

## 2021-08-23 ASSESSMENT — MIFFLIN-ST. JEOR: SCORE: 2304.44

## 2021-08-23 NOTE — PLAN OF CARE
Shift from [id number]    Problem: Pain Chronic (Persistent)  Goal: Acceptable Pain Control and Functional Ability  Outcome: Improving  Intervention: Develop Pain Management Plan  Recent Flowsheet Documentation  Taken 8/22/2021 2014 by Tricia Cotton RN  Pain Management Interventions:    medication (see MAR)    emotional support  Taken 8/22/2021 1939 by Tricia Cotton RN  Intervention: Manage Persistent Pain  Recent Flowsheet Documentation  Taken 8/22/2021 1602 by Tricia Cotton RN  Bowel Elimination Promotion:    adequate fluid intake promoted    ambulation promoted  Taken 8/22/2021 0830 by Tricia Cotton RN  Bowel Elimination Promotion:    adequate fluid intake promoted    ambulation promoted   Patient up in chair briefly with PT/OT then got back to bed and dangled on side of bed for only 30minutes then went back to bed for rest of the day.   Informed refusal in am again for Lovenox. Dr. Herndon spoke to him about Lovenox and he decided to try it this evening. Tolerating diet.   IV antibiotics changed to meropenem. ID seeing patient and pain team.   Pain controlled with scheduled tylenol, prn toradol, and prn dilaudid po.   LLE edema; see assessment.   K+ was 3.6; recheck tomorrow. Started on scheduled Kdur bid- see MAR.

## 2021-08-23 NOTE — PLAN OF CARE
Problem: Pain Chronic (Persistent)  Goal: Acceptable Pain Control and Functional Ability  Intervention: Manage Persistent Pain  Recent Flowsheet Documentation  Taken 8/23/2021 0049 by Ellie Mo RN  Bowel Elimination Promotion:    ambulation promoted    adequate fluid intake promoted   Pain on RLE managed with prn dilaudid. Low grade fever of 99.1.

## 2021-08-23 NOTE — PROGRESS NOTES
[facility] ACUTE PAIN SERVICE    ([facility], [facility], [facility])   Daily PAIN Progress Note    Assessment/Plan:  Romeo Chong is a 40 year old male who was admitted on 8/17/2021.  Pain Service is asked to see the patient for acute on chronic pain to assist with pain management. Admitted for evaluation of leg pain with fever chills and warmth of lower extremity. History of obesity, Hodgkins Lymphoma, fibrous dysplasia of bone, chronic lymphedema, chronic pain syndrome, hypertension and asthma, gastric ulcer.  Patient identifies 2 days of warmth in his right lower leg with concern due to previous sepsis from cellulitis of RLE.  He developed,fevers, chills and vomiting yesterday.  He was unable to control leg pain with his home prescription of Norco.      Patient currently being followed by Infectious Disease for sepsis, severe allergic reaction to antibiotics in the past, on IV antibiotics, lactic acid elevated to 4.0, having fevers.  Lymphedema worsened, Imaging demonstrating chronic osteomyelitis per Ortho.    Consideration if patient will need to be transferred to [facility] or [facility] for further management of osteomyelitis if doesn't improve with current treatments.  referral to orthopedic surgery specializing in complex cases.    Ortho and Infectious Disease notes reviewed.  [facility] with no beds available, referral to [facility] for review, they also currently don't have bed available.        Over the past 24 hours patient received 6(4mg hydromorphone)  MME of around 96 mg.  one toradol dose yesterday at 1622    Patient having increased pain this afternoon.  Rests in darkened room, appears uncomfortable.      Addendum:  Call received from RN that patient was complaining of headache, blurred vision and bad dream after receiving extra dose of hydromorphone.  Spoke with patient on phone.  He states he started having headaches yesterday AM.      Discussed that this might be associated with  the lyrica more so than the hydromorphone since he had been tolerating the hydromorphone previously.    For now we will stop the lyrica.  Continue to monitor headaches and visual disturbances.    Could switch dilaudid to his home vicodin which he has tolerated in the past.  Would recommend dosing it at 7.5/325 mg tablets 1-2 every four hours.        PLAN:   1) Pain is consistent with acute associated with cellulitis of lower extremity with worsening lymphedema.  Patient on chronic opioids with opioid tolerance.  The patient's home MME was 22-30mg daily.   2)Multimodal Medication Therapy  Topical: lidocaine cream qid   NSAID'S: avoid  Muscle Relaxants: none  Adjuvants: Tylenol 975 mg tid , vistaril 25-50 mg every 4 hours prn (pain, pruritis)  Antidepressants/anxiolytics: nortriptyline 25 mg every hs (has not been taking) stopped by ID  Change to as needed.    Opioids: hydromorphone 2-4 mg every three hours prn, Okay to give a one time extra prn dose now.    IV Pain medication: none  3)Non-medication interventions  Pharmacy consult- appreciate recommendations   Acupuncture consult- as available Mon and Thursday   Integrative consult - called referral to [phone]   4)Constipation Prophylaxis  Daily stool softener/laxative  5) Follow up   -Opioid prescriber has been Pain Specialist  -Discharge Recommendations - We recommend prescribing the following at the time of discharge: follow up with Chronic Pain Provider.     .    Continue lyrica at 25 mg bid if tolerates  Hydromorphone for acute pain.         Active Problems:    Fibrous dysplasia of bone    Essential hypertension, benign    Lymphedema    Obesity, unspecified    H/O Hodgkin's lymphoma    Tachycardia    Cellulitis of right lower extremity    Elevated lactic acid level    Bacterial sepsis (H)    Gram-positive bacteremia     LOS: 5 days       Subjective:  Patient reports pain is right leg entire leg constant, worse after being moved around by physician for  examination.      acetaminophen  975 mg Oral TID     amLODIPine  2.5 mg Oral Daily     enoxaparin ANTICOAGULANT  40 mg Subcutaneous Q24H     famotidine  40 mg Oral QAM     meropenem  2 g Intravenous Q8H     polyethylene glycol  17 g Oral Daily     potassium chloride  20 mEq Oral BID     pregabalin  25 mg Oral BID       Objective:  Vital signs in last 24 hours:  Temp:  [98.3  F (36.8  C)-102.2  F (39  C)] 100.1  F (37.8  C)  Pulse:  [75-96] 96  Resp:  [20] 20  BP: (127-174)/(77-85) 151/77  SpO2:  [97 %-100 %] 98 %  Weight:   Weight change:   Body mass index is 43.54 kg/m .    Intake/Output last 3 shifts:  I/O last 3 completed shifts:  In: 2240 [P.O.:1440; I.V.:800]  Out: 4250 [Urine:4250]  Intake/Output this shift:  I/O this shift:  In: -   Out: 750 [Urine:750]    Review of Systems:   As per subjective, all others negative.    Physical Exam:    General Appearance:  Rests in bed, grimaces, appears uncomfortable   Head:  Normocephalic, without obvious abnormality, atraumatic   Eyes:  PERRL, conjunctiva/corneas clear, EOM's intact   Nose: Nares normal, septum midline, mucosa normal, no drainage   Throat: Lips, mucosa, and tongue normal; teeth and gums normal   Neck: Supple, symmetrical, trachea midline   Back:   Symmetric, no curvature, ROM normal, no CVA tenderness   Lungs:   respirations unlabored   Abdomen:   Soft, non-tender, bowel sounds active all four quadrants,  no masses, no organomegaly   Extremities: Right leg with lymphedema, painful   Skin: Skin color, texture, turgor normal, no rashes or lesions   Neurologic: Alert and oriented X 3, Moves all 4 extremities          Imaging:  Personally Reviewed.  XR Chest 1 View    Result Date: 8/18/2021  EXAM: XR CHEST 1 VIEW LOCATION: [facility] DATE/TIME: 8/18/2021 1:26 AM INDICATION: fever, chills COMPARISON: 10/29/2012.     IMPRESSION: Negative chest.    MR Ankle Right w/o & w Contrast    Result Date: 8/19/2021  EXAM: MR ANKLE RIGHT W/O and W  CONTRAST LOCATION: [facility] DATE/TIME: 8/18/2021 10:18 PM INDICATION: Right lower extremity pain and swelling. Tibial osteomyelitis. COMPARISON: MRI of the tibia fibula 08/18/2021 TECHNIQUE: Routine. Additional postgadolinium T1 sequences were obtained. IV CONTRAST: 10ml gadavist FINDINGS: There is extensive subcutaneous edema with enhancement surrounding the ankle and extending into the foot and lower leg compatible with cellulitis. There is some poorly defined fluid within the subcutaneous fat about the ankle. No well-defined organized fluid collection to suggest an abscess. No involvement of the deeper soft tissues. No associated marrow abnormality to suggest osteomyelitis. There is a small focus of subchondral marrow edema and likely developing cystic change involving the cuboid adjacent to the calcaneocuboid joint. No joint effusion or synovitis. Mild chronic thickening of the Achilles tendon. No abnormal tendon sheath fluid.     IMPRESSION: 1.  Extensive cellulitis. 2.  No focal abscess. 3.  No evidence of osteomyelitis.    MR Femur Thigh Right wo & w Contrast    Result Date: 8/19/2021  FINAL REPORT I agree with the preliminary report. There are findings highly suspicious for acute on chronic osteomyelitis involving the distal aspect of the right femur. Linear areas of signal abnormality may represent small sinus tract. There are surrounding soft tissue changes in the distal, anterior thigh which can be seen with infection including myositis and fasciitis. Superimposed pathologic fracture of the right femur may also be present as there is some cortical offset in the distal femoral shaft. Correlation with CT of the femur may be useful for further assessment of possible distal femoral fracture. Subcutaneous edema diffusely throughout the right thigh distally and anteriorly in the right thigh with reactive lymphadenopathy right inguinal region. Final Interpretation Dictated By: Lee  Dafne Date:     IMPRESSION: 1.  Findings highly suspicious for osteomyelitis involving the distal aspect of the right femur with replacement of normal T1 signal distally and surrounding reactive edema. Additionally, there are linear areas of increased T2 signal abnormality extending through the anterior cortex possibly representing small sinus tracts draining from the right femur suggesting chronic osteomyelitis. There is cortical thickening throughout this area and fluid along the anterior cortex in the distal right thigh. 2.  Additionally, there is diffuse edema in the anterior musculature of the distal right thigh with minimal layering fluid which can be seen with myositis and fasciitis. 3.  Subcutaneous edema diffusely throughout the right thigh distally and anteriorly in the right thigh with reactive lymphadenopathy right inguinal region. 4.  Final report following review by musculoskeletal radiologist. Preliminary Interpretation Dictated By: Curt L. Behrns, MD Date: 8/19/2021    MR Tibia Fibula Lower Leg Right wo & w Contr    Result Date: 8/19/2021  FINAL REPORT I agree with the preliminary report. There are signal changes in the right proximal tibia which raises concern for an acute on chronic osteomyelitis. Additional soft tissue findings suspicious for draining sinus tracts as described. Final Interpretation Dictated By: Lee Leos Date: 8/19/2021 PRELIMINARY REPORT EXAM: MR TIBIA FIBULA LOWER LEG RIGHT WO AND W CONTR LOCATION: [facility] DATE/TIME: 8/18/2021 10:18 PM INDICATION: Evaluate for osteomyelitis involving the tibia and fibula with severe right lower extremity pain and soft tissue swelling. COMPARISON: None. TECHNIQUE: Routine. Additional postgadolinium T1 sequences were obtained. IV CONTRAST: 10 ml Gadavist. FINDINGS: BONES: -Heterogeneous areas of decreased T1 signal throughout the proximal right tibia with irregularity to the cortex with linear fluidlike areas in  the anterior right cortex. These linear areas of fluid in the anterior medial right cortex (series 6, image 13-17) are suspicious for linear draining sinus tracts. Overall findings are suspicious for osteomyelitis in the proximal right tibia, likely chronic. Distal right tibia and fibula demonstrate no evidence for other areas suspicious for osteomyelitis. SOFT TISSUES:  -Severe subcutaneous edema diffusely throughout the right lower extremity greatest near the mid to lower right lower extremity down to the level of the ankle. MUSCLES: -No abnormality in the visualized musculature.     IMPRESSION: 1.  Heterogeneous decreased T1 signal throughout the proximal right tibia with irregularity of the anterior cortex with draining linear areas of fluid signal intensity into the soft tissue suspicious for draining sinus tracts from an area of suspected chronic osteomyelitis in the proximal right tibia. 2.  Severe subcutaneous edema right lower extremity. 3.  Final report following review by musculoskeletal radiologist. Preliminary Interpretation Dictated By: Curt L. Behrns, MD Date: 8/19/2021     CT Femur Thigh Right w/o Contrast    Result Date: 8/19/2021  EXAM: CT FEMUR THIGH RIGHT WITHOUT CONTRAST LOCATION: [facility] DATE/TIME: 8/19/2021 7:43 PM INDICATION: Upper leg pain, stress fracture suspected, negative x-ray. COMPARISON: None. TECHNIQUE: Noncontrast. Axial, sagittal and coronal thin-section reconstruction. Dose reduction techniques were used. FINDINGS: There is cortical deformity involving the femoral distal diametaphysis with intramedullary lucency and sclerosis. This is of indeterminate etiology but could be related to an old healed fracture. Chronic osteomyelitis might have this appearance. No evidence of acute fracture. The remainder of the femur appears within normal limits. Similar sclerosis and lucency is noted in the proximal tibia, only partly included on the scan, also of  indeterminate etiology. Subcutaneous stranding and  nodularity is noted throughout the thigh. Immediately distal to the inguinal region, there is a 3.8 x 2.6 cm soft tissue density nodule within the deep subcutaneous fat. External iliac lymphadenopathy is suspected on the proximal edge of the scan.     IMPRESSION: 1.  Distal femoral region of mild cortical deformity and adjacent intramedullary lucency and sclerosis of indeterminate etiology. Possibilities include an old healed fracture. Chronic osteomyelitis might have a similar appearance. A similar finding is seen within the proximal tibia but only partly included on the scan. 2.  No evidence of femoral fracture. 3.  Suspected pelvic external iliac lymphadenopathy, not well evaluated with this scan. There is also a 3.8 x 2.6 cm soft tissue mass in the anteromedial aspect of the upper thigh/distal inguinal region. Immediately proximal to this, there are multiple surgical clips. 4.  Extensive subcutaneous stranding and nodularity circumferentially within the thigh, greatest distally.     Echocardiogram Complete    Result Date: 2021  [id number]^JOSE^[place] [address]  Name: CABRERA GHOSH MRN: [id number] : 1980 Study Date: 2021 08:46 AM Age: 40 yrs Gender: Male Patient [facility] Reason For Study: Endocarditis Ordering Physician: VANNA GARCIA Performed By: CANDIDO  BSA: 2.5 m2 Height: 71 in Weight: 308 lb HR: 107 BP: 114/59 mmHg ______________________________________________________________________________ Procedure Definity (NDC #42726-147) given intravenously. Complete Echo Adult. Technically difficult study.Extremely difficult acoustic windows despite the use of contrast for endcardial border definition. ______________________________________________________________________________ Interpretation Summary  1. Left ventricular size, wall thickness, and systolic function  are normal. The estimated left ventricular ejection fraction is 55%. 2. Right ventricular size and systolic function are normal. 3. No hemodynamically significant valvular abnormalities. 4. No prior study available for comparison. ______________________________________________________________________________ I      WMSI = 1.00     % Normal = 100  X - Cannot   0 -                      (2) - Mildly 2 -          Segments  Size Interpret    Hyperkinetic 1 - Normal  Hypokinetic  Hypokinetic  1-2     small                                                    7 -          3-5    moderate 3 - Akinetic 4 -          5 -         6 - Akinetic Dyskinetic   6-14    large              Dyskinetic   Aneurysmal  w/scar       w/scar       15-16   diffuse  Left Ventricle The left ventricle is normal in size. There is mild concentric left ventricular hypertrophy. Left ventricular systolic function is normal. The visual ejection fraction is 55-60%. Diastolic function not assessed due to tachycardia. No regional wall motion abnormalities noted.  Right Ventricle Normal right ventricle size and systolic function.  Atria Normal left atrial size. Right atrial size is normal.  Mitral Valve Mitral valve leaflets appear normal. There is trace mitral regurgitation. There is no mitral valve stenosis.  Tricuspid Valve Tricuspid valve leaflets appear normal. There is trace to mild tricuspid regurgitation. Right ventricle systolic pressure estimate normal. The right ventricular systolic pressure is elevated at 12.4 mmHg. There is no tricuspid stenosis.  Aortic Valve Aortic valve leaflets appear normal. No aortic regurgitation is present. No aortic stenosis is present.  Pulmonic Valve The pulmonic valve is not well visualized. There is trace pulmonic valvular regurgitation. There is no pulmonic valvular stenosis.  Vessels The aorta root is normal. IVC diameter <2.1 cm collapsing >50% with sniff suggests a normal RA pressure of 3 mmHg.  Pericardium There  is no pericardial effusion.  Rhythm The rhythm was sinus tachycardia.  ______________________________________________________________________________ MMode/2D Measurements & Calculations RVDd: 4.2 cm IVSd: 1.2 cm LVIDd: 4.5 cm LVIDs: 3.0 cm LVPWd: 1.2 cm FS: 32.9 %  LV mass(C)d: 203.0 grams LV mass(C)dI: 80.1 grams/m2 Ao root diam: 3.9 cm LA dimension: 3.6 cm asc Aorta Diam: 3.1 cm LA/Ao: 0.93 LVOT diam: 2.3 cm LVOT area: 4.0 cm2 LA Volume Indexed (AL/bp): 27.4 ml/m2 RWT: 0.54  Doppler Measurements & Calculations Ao V2 max: 156.1 cm/sec Ao max PG: 10.0 mmHg Ao V2 mean: 105.9 cm/sec Ao mean P.3 mmHg Ao V2 VTI: 28.7 cm GREGORIO(I,D): 3.4 cm2 GREGORIO(V,D): 3.3 cm2 LV V1 max P.5 mmHg LV V1 max: 127.9 cm/sec LV V1 VTI: 24.3 cm  SV(LVOT): 97.4 ml SI(LVOT): 38.4 ml/m2 PA acc time: 0.12 sec TR max aaron: 176.2 cm/sec TR max P.4 mmHg AV Aaron Ratio (DI): 0.82 GREGORIO Index (cm2/m2): 1.3  ______________________________________________________________________________ Report approved by: Miladis Holguin 2021 09:44 AM       XR Chest Port 1 View    Result Date: 2021  EXAM: XR CHEST PORT 1 VIEW LOCATION: [facility] DATE/TIME: 2021 10:45 AM INDICATION: Shortness of breath. COMPARISON: 2021     IMPRESSION: New suspicious area of increased opacity right lung base laterally near the right costophrenic angle could represent a small focal area of alveolar infiltrate related to pneumonia in the proper clinical setting. Left lung clear. Normal heart size and pulmonary vascularity. No overt osseous abnormality.    CT Chest Abdomen Pelvis w/o Contrast    Result Date: 2021  EXAM: CT CHEST ABDOMEN PELVIS W/O CONTRAST LOCATION: [facility] DATE/TIME: 2021 11:42 PM INDICATION: Sepsis COMPARISON: CTA chest 2013. CT abdomen and pelvis 2005. Chest radiographs 2021. MR of the right lower extremity earlier today. TECHNIQUE: CT scan of the chest, abdomen,  and pelvis was performed without IV contrast. Multiplanar reformats were obtained. Dose reduction techniques were used. CONTRAST: None. FINDINGS: LUNGS AND PLEURA: Mild atelectasis bilaterally. Lungs otherwise clear. No pleural fluid or pneumothorax. MEDIASTINUM/AXILLAE: There is lobulated soft tissue intermingled with lobules of fat in the left anterior mediastinum which measures up to 7.5 x 3.6 cm on axial images (image 51 series 4). A similar finding with less intermixed fat measured up to 6.7 x 3.2 cm on 04/22/2013. There are now enlarged lymph nodes extending superiorly lateral to the left common carotid artery and internal jugular vein which measure up to 2 cm axillary dissection clips on the left. (image 16 of series 4). CORONARY ARTERY CALCIFICATION: None. HEPATOBILIARY: Hepatic steatosis. Normal gallbladder and bile ducts. PANCREAS: Normal. SPLEEN: Normal. ADRENAL GLANDS: Normal. KIDNEYS/BLADDER: Excreted contrast in the urinary system from a prior study. Kidneys and bladder otherwise normal. BOWEL: Normal. No obstruction or inflammation. Normal appendix. LYMPH NODES: Multiple enlarged and inflamed lymph nodes of the abdominal retroperitoneum measure up to 2.3 cm near the IVC. Inflamed retroperitoneal lymphadenopathy in the pelvis near the iliac vessels is asymmetric on the right, notably the same side as  the presumed infectious process involving the right leg on the prior MR lower extremity study from today. The largest right external iliac chain lymph node is 3.2 cm. The largest included right inguinal lymph node is 3.0 cm. Right inguinal lymphadenectomy clips are present. VASCULATURE: Unremarkable. PELVIC ORGANS: Normal. MUSCULOSKELETAL: Edema of the subcutaneous fat of the right groin and included anterior right thigh consistent with cellulitis. This was more fully imaged on the MR examination from earlier today.     IMPRESSION: 1.  Inflammatory lymphadenopathy of the retroperitoneum of the abdomen,  right pelvis, and right inguinal station likely secondary to the presumed infectious process involving the right leg as demonstrated on the MR examination from earlier today. Involvement with lymphoma is not excluded. 2.  Lobulated soft tissue of the anterior mediastinum has mildly increased in size compared back to the remote study of 04/22/2013. There are now some enlarged lymph nodes superior to this near the left carotid and jugular vessels. Findings are concerning for active lymphoma. Clinical correlation recommended. 3.  Hepatic steatosis. I discussed the findings with at on .    US Lower Extremity Venous Duplex Right    Result Date: 8/18/2021  EXAM: US LOWER EXTREMITY VENOUS DUPLEX RIGHT LOCATION: [facility] DATE/TIME: 8/18/2021 1:40 AM INDICATION: Right leg pain, swelling and redness. COMPARISON: None. TECHNIQUE: Venous Duplex ultrasound of the right lower extremity with and without compression, augmentation and duplex. Color flow and spectral Doppler with waveform analysis performed. FINDINGS: Exam includes the common femoral, femoral, popliteal, and contralateral common femoral veins as well as segmentally visualized deep calf veins and greater saphenous vein. Evaluation of the calf veins is limited by leg swelling and body habitus. RIGHT: No deep vein thrombosis. No superficial thrombophlebitis. No popliteal cyst.     IMPRESSION: 1.  No deep venous thrombosis in the right lower extremity.      Lab Results:  Personally Reviewed.   Recent Labs   Lab 08/23/21  0737 08/22/21  0224 08/21/21  0904   WBC 13.0* 11.2* 12.1*   HGB 11.2* 11.2* 10.6*   HCT 34.6* 34.3* 33.9*    204 172     Recent Labs   Lab 08/22/21  0224 08/21/21  0904 08/20/21  1827    136 138   CO2 22 19* 22   BUN 7* 8 8   ALBUMIN 2.1* 2.1* 2.2*   ALKPHOS 53 54 53   ALT 21 24 28   AST 21 25 23     No results for input(s): INR in the last 168 hours.      Total time spent 25 minutes with greater than 50% in  consultation, education and coordination of care.     Also discussed with RN and MD Libby Carmona APRN, CNS-BC, DNP  Acute Care Pain Management Program  [facility] ([place], [place], [place])   With questions call [phone]  Preference if for [facility]  Click HERE to page Hannah

## 2021-08-23 NOTE — PROGRESS NOTES
[facility] Inpatient follow up       Patient:  Romeo Chong  Date of birth 1980, Medical record number [id number]  Date of Visit:  08/23/2021  Attending Physician: Tony Herndon MD         Assessment and Recommendations:   Assessment:  Romeo Chong is a 40 year old male with   History of Hodgkin lymphoma diagnosed 15 years ago.  Right lower extremity lymphedema secondary to inguinal lymphadenopathy from Hodgkin lymphoma?  Multiple intra-abdominal and retroperitoneal adenopathy  Morbid obesity with BMI of 43.54.  Right lower extremity cellulitis with osteomyelitis.  Records from outpatient not available yet.  Clinically feels worse with persistent fever, increasing white count and pain quite out of proportion for clinical appearance.  This is worrisome for necrotizing fasciitis.  Streptococcus agalactiae bacteremia.  Positive blood culture on 8/17.  Follow blood cultures on 8/18 -19 -20 -21 no growth    Antimicrobials:  Meropenem: 8/22-  Ceftriaxone: 8/18-21  Ancef 8/21-22  Flagyl 8/19-21  Zosyn 1 dose 8/18  Aztreonam 8/18-19        Recommendations:  1. Continue IV meropenem.  2. Add IV linezolid.  3. Stop nortriptyline  4. CK total.  CT right lower extremity  5. Personally discussed with Dr. Herndon.  Surgery will be called again.    Discussed with the patient, nursing staff, and Dr. Herndon.    ID will follow.    35 minutes of total care with greater than 50% coordinating care.      Maegan Sandoval MD.  [facility].   [facility]  Office Telephone [phone].  Fax [phone]  [facility] paging            Interval History:     HPI:  The interval history was reviewed.   New to me today.  Extensive chart review.  Clinically feels worse.  Still having high-grade fever with temp 102.  Pain is worse.  On nortriptyline    Pertinent cultures include:  No results found for: CULT    Recent Inflammatory Biomarkers:   Recent Labs   Lab  Test 21  0737 21  0224 21  0904 21  1827 21  0746 21  1410   CRP  --   --   --  44.1* 41.3* 20.0*   PCAL  --   --   --   --   --  16.05*   WBC 13.0* 11.2* 12.1* 12.1* 12.5* 11.3*            Review of Systems:   CONSTITUTIONAL:    Temp Max: Temp (24hrs), Av.5  F (37.5  C), Min:98.3  F (36.8  C), Max:102.2  F (39  C)  ROS negative except for findings in the HPI.         Current Medications (antimicrobials listed in bold):       acetaminophen  975 mg Oral TID     amLODIPine  2.5 mg Oral Daily     enoxaparin ANTICOAGULANT  40 mg Subcutaneous Q24H     famotidine  40 mg Oral QAM     meropenem  2 g Intravenous Q8H     polyethylene glycol  17 g Oral Daily     potassium chloride  20 mEq Oral BID     pregabalin  25 mg Oral BID              Allergies:     Allergies   Allergen Reactions     Vancomycin Anaphylaxis     Peanut Oil Itching     Tree Nuts [Nuts] Itching     Erythromycin Unknown     Latex Unknown     Added based on information entered during case entry, please review and add reactions, type, and severity as needed     Other Environmental Allergy Unknown     DUST     Pollen Extracts [Pollen Extract] Unknown     Zosyn [Piperacillin-Tazobactam In D5w] Tinnitus and Itching     Oxycodone Itching and Rash            Physical Exam:   Vitals were reviewed  Patient Vitals for the past 24 hrs:   BP Temp Temp src Pulse Resp SpO2   21 0919 -- 100.1  F (37.8  C) Oral -- -- --   21 0729 (!) 151/77 (!) 102.2  F (39  C) Oral 96 20 98 %   21 0431 (!) 170/79 -- -- 81 -- --   21 0429 (!) 174/81 99.1  F (37.3  C) Oral 81 20 97 %   219 (!) 147/83 99.9  F (37.7  C) Oral 82 20 97 %   21 1924 (!) 155/85 98.3  F (36.8  C) Oral 79 20 100 %   21 1522 (!) 148/82 98.4  F (36.9  C) Oral 80 20 97 %   21 1110 127/83 98.8  F (37.1  C) Oral 75 20 97 %       Physical Examination:  Gen: Pleasant in a moderate distress.  Looks toxic.  HEENT: NCAT. EOMI.  PERRL.  Neck: No bruit, JVD or thyromegaly.  Lungs: Clear to ascultation bilat with no crackles or wheezes.  Card: RRR. NSR. No RMG. Peripheral pulses present and symmetric. No edema.  Abd: Soft NT ND. No mass. Normal bowel sounds.  Skin: No rash.  Extr: Significant right lower extremity lymphedema with cellulitis.  Severe pain on palpation of the lower extremity from the ankle all the way to the base of the thigh.  Neuro: Alert and oriented to place time and person. Cranial nerves II to XII intact. Motor and sensory intact. Normal gait.           Laboratory Data:   ID Labs:  Recent Labs   Lab Test 08/20/21 1827 08/19/21  0746 08/18/21  1410   CRP 44.1* 41.3* 20.0*     Recent Labs   Lab Test 08/23/21  0737 08/22/21  0224 08/21/21  0904 08/20/21  1827 08/19/21  0746 08/18/21  1410   WBC 13.0* 11.2* 12.1* 12.1* 12.5* 11.3*     Recent Labs   Lab Test 08/22/21 0224 08/21/21  0904 08/20/21  1827 08/19/21  0746   CR 0.69* 0.63* 0.63* 0.83   GFRESTIMATED >90 >90 >90 >90       Hematology Studies  Recent Labs   Lab Test 08/23/21 0737 08/22/21 0224 08/21/21  0904 08/20/21  1827 08/19/21  0746 08/18/21  1410   WBC 13.0* 11.2* 12.1* 12.1* 12.5* 11.3*   ANEU  --  9.6* 11.0* 11.5* 10.0*  --    AEOS  --  0.0 0.0 0.0 0.0  --    HCT 34.6* 34.3* 33.9* 34.9* 40.8 42.1    204 172 167 203 184       Metabolic  Recent Labs   Lab Test 08/22/21 0224 08/21/21  0904 08/20/21  1827    136 138   BUN 7* 8 8   CO2 22 19* 22   CR 0.69* 0.63* 0.63*   GFRESTIMATED >90 >90 >90       Hepatic Studies  Recent Labs   Lab Test 08/22/21  0224 08/21/21  0904 08/20/21  1827   BILITOTAL 0.4 0.5 0.4   ALKPHOS 53 54 53   ALBUMIN 2.1* 2.1* 2.2*   AST 21 25 23   ALT 21 24 28     Blood Culture Line, venous  Order: [id number]  Collected:  8/17/2021 10:56 PM Status:  Final result   Visible to patient:  Yes (MyChart)  Specimen Information: Line, venous; Blood         0 Result Notes  Culture Positive on the 1st day of incubationAbnormal         Streptococcus agalactiae (Group B Streptococcus)Panic     2 of 2 bottles      Resulting Agency: [facility]   Susceptibility     Streptococcus agalactiae (Group B Streptococcus)     MEAGAN     Ampicillin 0.12 ug/mL Susceptible     Cefotaxime <=0.25 ug/mL Susceptible     Ceftriaxone <=0.25 ug/mL Susceptible     Clindamycin >0.5 ug/mL Resistant     Meropenem <=0.06 ug/mL Susceptible     Penicillin 0.06 ug/mL Susceptible     Vancomycin 0.5 ug/mL Susceptible                 Specimen Collected: 08/17/21 10:56 PM Last Resulted: 08/21/21  9:32 AM               ImmunologlobulinsNo lab results found.         Imaging Data:     Reviewed

## 2021-08-23 NOTE — CONSULTS
General Surgery Consultation  oRmeo Chong MRN# [id number]   Age/Sex: 40 year old male YOB: 1980     Reason for consult: 1. Cellulitis of right lower extremity    2. Elevated lactic acid level    3. Tachycardia            Requesting physician: Dr Herndon                   Assessment and Plan:   Assessment:  Right leg chronic lymphedema with acute cellulitis, swelling without evidence of necrotizing fasciitis    Plan:  -Continue IV antibiotics per ID  -Agree with recommendations from orthopedics to transfer to higher care facility such as the [facility], [facility], or [facility].  - No surgical intervention planned. Will continue to follow  -should check doppler pulses q shift.   Thank you for  consulting us involving us in his care.          Chief Complaint:     Chief Complaint   Patient presents with     Chills     Leg Pain        History is obtained from the patient    HPI:   Romeo Chong is a 40 year old male who presents with acute increase in right leg swelling, warmth and fevers.  Symptoms started 2 days prior to arriving to the emergency room on 8/18/2021.  He started noticing warmth and increasing tenderness the lower leg.  He then developed increasing swelling about 3 times the size of his normal lymphedema.  Pain was more significant and not able to wrap his leg like he normally could to help with the edema.  Also developed fevers.  Patient has been febrile during his hospital stay.  Is being followed by infectious disease.  CT images show evidence of chronic osteomyelitis and acute acute findings as well.  Has extensive subcutaneous soft tissue stranding and does have some distal fluid around the ankle.  Patient with a significant history of Hodgkin's lymphoma with history of right inguinal canal lymph node dissection.  Has had chronic lymphedema since in 2006.  He in the past is also had sepsis and cellulitis from his lymphedema.  Currently white count is trending upwards  at 13.0 in comparison to yesterday 11.2.  His CK was 118.  Lactic acid levels have been within normal limits.          Past Medical History:     Past Medical History:   Diagnosis Date     Cancer (H)      Hyperlipidemia      Hypertension               Past Surgical History:     Past Surgical History:   Procedure Laterality Date     FRACTURE SURGERY       HC REMOVAL HEEL SPUR, CALCANEUS Left 6/25/2021    Procedure: EXCISION, BONE SPUR, FOOT, WITH PLANTAR FASCIA RELEASE;  Surgeon: Stephon Singleton DPM;  Location: [facility];  Service: Podiatry             Social History:    reports that he quit smoking about 12 years ago. He started smoking about 22 years ago. He smoked 1.00 pack per day. He has never used smokeless tobacco. He reports current alcohol use. He reports that he does not use drugs.           Family History:     Family History   Problem Relation Age of Onset     Cirrhosis Mother      Hypertension Mother      Diabetes Type 2  Father      Kidney failure Father      Cerebrovascular Disease Father      Hypertension Father      Cerebrovascular Disease Sister      Hypertension Paternal Grandmother      Hypertension Paternal Grandfather               Allergies:     Allergies   Allergen Reactions     Vancomycin Anaphylaxis     Peanut Oil Itching     Tree Nuts [Nuts] Itching     Erythromycin Unknown     Latex Unknown     Added based on information entered during case entry, please review and add reactions, type, and severity as needed     Other Environmental Allergy Unknown     DUST     Pollen Extracts [Pollen Extract] Unknown     Zosyn [Piperacillin-Tazobactam In D5w] Tinnitus and Itching     Oxycodone Itching and Rash              Medications:     Prior to Admission medications    Medication Sig Start Date End Date Taking? Authorizing Provider   amLODIPine (NORVASC) 5 MG tablet [AMLODIPINE (NORVASC) 5 MG TABLET] Take 0.5 tablets (2.5 mg total) by mouth daily. 5/5/21  Yes Shahab Perez MD    aspirin-acetaminophen-caffeine (EXCEDRIN MIGRAINE) 250-250-65 mg per tablet [ASPIRIN-ACETAMINOPHEN-CAFFEINE (EXCEDRIN MIGRAINE) 250-250-65 MG PER TABLET] Take 1 tablet by mouth every 6 (six) hours as needed for pain. 5/5/21  Yes Shahab Perez MD   famotidine (PEPCID) 40 MG tablet [FAMOTIDINE (PEPCID) 40 MG TABLET] Take 1 tablet (40 mg total) by mouth every evening.  Patient taking differently: Take 40 mg by mouth every morning  5/5/21  Yes Shahab Perez MD   hydrochlorothiazide (HYDRODIURIL) 50 MG tablet Take 1 tablet (50 mg) by mouth daily 7/29/21  Yes Shahab Perez MD   HYDROcodone-acetaminophen (NORCO)  MG per tablet Take 1 tablet by mouth 2 times daily as needed for severe pain 8/10/21 9/7/21 Yes Mony Monsivais PA-C   ibuprofen (ADVIL,MOTRIN) 200 MG tablet [IBUPROFEN (ADVIL,MOTRIN) 200 MG TABLET] Take 3 tablets (600 mg total) by mouth every 6 (six) hours as needed for pain. 6/25/21  Yes Stephon Singleton DPM   loratadine (CLARITIN) 10 mg tablet [LORATADINE (CLARITIN) 10 MG TABLET] Take 1 tablet (10 mg total) by mouth daily.  Patient taking differently: Take 10 mg by mouth daily as needed for allergies  5/5/21  Yes Shahab Perez MD   losartan (COZAAR) 50 MG tablet [LOSARTAN (COZAAR) 50 MG TABLET] Take 1 tablet (50 mg total) by mouth daily. 5/5/21  Yes Shahab Perez MD   nortriptyline (PAMELOR) 25 MG capsule Take 1 capsule (25 mg) by mouth At Bedtime 8/9/21  Yes Mony Monsivais PA-C   potassium chloride ER (K-TAB/KLOR-CON) 10 MEQ CR tablet TAKE 2 TABLETS BY MOUTH EVERY MORNING AND 1 EVERY EVENING 8/5/21  Yes Shahab Perez MD   vitamin D3 (CHOLECALCIFEROL) 125 MCG (5000 UT) tablet Take 1 tablet (125 mcg) by mouth daily 7/12/21  Yes Mony Monsivais PA-C              Review of Systems:   The Review of Systems is negative other than noted in the HPI            Physical Exam:     Patient Vitals for the past 24 hrs:   BP Temp Temp src Pulse Resp SpO2 Weight   08/23/21  1159 -- -- -- -- -- -- 136.7 kg (301 lb 7 oz)   08/23/21 1107 (!) 152/85 99.6  F (37.6  C) Oral 78 20 95 % --   08/23/21 0919 -- 100.1  F (37.8  C) Oral -- -- -- --   08/23/21 0729 (!) 151/77 (!) 102.2  F (39  C) Oral 96 20 98 % --   08/23/21 0431 (!) 170/79 -- -- 81 -- -- --   08/23/21 0429 (!) 174/81 99.1  F (37.3  C) Oral 81 20 97 % --   08/22/21 2329 (!) 147/83 99.9  F (37.7  C) Oral 82 20 97 % --   08/22/21 1924 (!) 155/85 98.3  F (36.8  C) Oral 79 20 100 % --   08/22/21 1522 (!) 148/82 98.4  F (36.9  C) Oral 80 20 97 % --          Intake/Output Summary (Last 24 hours) at 8/23/2021 1444  Last data filed at 8/23/2021 1322  Gross per 24 hour   Intake 680 ml   Output 4150 ml   Net -3470 ml      Constitutional:   awake, alert, cooperative, no apparent distress, and appears stated age       EyHematologic / Lymphatic:   No palpable lymphadenopathy right groin       Lungs:   Normal respiratory effort, no accessory muscle use       Cardiovascular:   Regular rate and rhythm       Abdomen:   Soft nontender no mass organomegaly.       Musculoskeletal:  Right lower leg Kniffen cannot edema starting mid thigh but with significantly worsens below knee with softness and exquisite tenderness.  There is some firmness with a fluid collection is seen on the CT and on the medial aspect of the distal leg.  Some erythema no warmth.  Edema to the dorsum of the foot.  Tender throughout.  Foot is warm and capillary refill is brisk not able to palpate pulses to the pain and edema per chart Doppler report has been checked routinely without evidence of loss of pulse.                Data:         All imaging studies reviewed by me.    Results for orders placed or performed during the hospital encounter of 08/17/21 (from the past 24 hour(s))   Potassium   Result Value Ref Range    Potassium 3.6 3.5 - 5.0 mmol/L   CBC with platelets   Result Value Ref Range    WBC Count 13.0 (H) 4.0 - 11.0 10e3/uL    RBC Count 4.41 4.40 - 5.90 10e6/uL     Hemoglobin 11.2 (L) 13.3 - 17.7 g/dL    Hematocrit 34.6 (L) 40.0 - 53.0 %    MCV 79 78 - 100 fL    MCH 25.4 (L) 26.5 - 33.0 pg    MCHC 32.4 31.5 - 36.5 g/dL    RDW 14.7 10.0 - 15.0 %    Platelet Count 256 150 - 450 10e3/uL   Extra Tube ([place] Draw)    Narrative    The following orders were created for panel order Extra Tube ([facility] Draw).  Procedure                               Abnormality         Status                     ---------                               -----------         ------                     Extra Purple Top Tube[[id number]]                            Final result                 Please view results for these tests on the individual orders.   Extra Purple Top Tube   Result Value Ref Range    Hold Specimen JIC    CK total   Result Value Ref Range     30 - 190 U/L   CT Femur Thigh Right w Contrast    Narrative    EXAM: CT FEMUR THIGH RIGHT WITH CONTRAST  LOCATION: [facility]  DATE/TIME: 8/23/2021 11:51 AM    INDICATION: Right lower extremity cellulitis and leg swelling. Suspected osteomyelitis. History of lymphoma.  COMPARISON: CT 8/19/2021, MRI 8/18/2021.  TECHNIQUE: Noncontrast. Axial, sagittal and coronal thin-section reconstruction. Dose reduction techniques were used.     FINDINGS: Extensive subcutaneous soft tissue stranding involving the right thigh and visualized lower leg with nodularity or small foci of fluid, mainly distally, is unchanged. Right iliac, inguinal, and proximal thigh lymphadenopathy is unchanged. There   is some surrounding soft tissue stranding suggesting inflammatory change. There are surgical clips in the right inguinal region.    Chronic deformity with mixed lucency and chronic appearing sclerosis of the distal right femur and the visualized proximal right tibia unchanged. Lucent cortical defects and/or sinus tracts within the distal right femur are stable. There is an old screw   track in the distal femur. Suspected additional  postoperative tracks in the proximal tibia. No new bony destruction.    There is an 8 mm circumscribed lytic lesion in the right iliac wing on series 4, image 1; this area was not included on the prior exams.      Impression    IMPRESSION:  1.  Overall no significant change in the right thigh since prior. There is extensive subcutaneous soft tissue stranding in the right thigh and lower leg which could represent cellulitis or edema. Associated foci of subcutaneous nodularity or fluid   unchanged.  2.  Chronic deformity, mixed lucency and sclerosis of the distal right femur and proximal tibia are unchanged.  3.  Small cortical defects and/or sinus tracts in the distal right femur are stable and may be related to superimposed acute osteomyelitis as suggested on previous MRI.  4.  Right inguinal and iliac lymphadenopathy stable.  5.  Small indeterminate lytic lesion right iliac wing, not included on prior exams.          Jigar Abreu PA-C

## 2021-08-23 NOTE — PROGRESS NOTES
Progress Note    Assessment/Plan  Gram-positive sepsis secondary due to right leg cellulitis, acute on chronic chronic osteomyelitis of the distal femur and proximal tibia along with myositis and fasciitis as seen on MRI of the thigh.  --Poor response to antibiotics.  Currently on meropenem and Zyvox.  Continues to have increased pains.  CT of the right thigh ordered by ID.  Will consult surgery for second opinion and to rule out necrotizing fasciitis.  CPK is normal.   no evidence of hypotension  --Recurrent in nature patient claims that he has had similar infections about 7 times in the past.  --Initially treated with IV aztreonam, Rocephin and doxycycline and then changed to Rocephin with metronidazole  --On 8 /21 ID changed to cefazolin with metronidazole.  However patient continues to spike fever and therefore changed to meropenem on 8 /22.  Zyvox added on 8/23  --Awaiting input regarding PICC line placement  --Blood cultures grew group B streptococcus sensitive to cephalosporins  --Orthopedic recommended transfer to [facility] versus [facility] if patient does not show clinical improvement.  ID recommending transfer to [facility] if patient is agreeable.Unable to place the patient at [facility], [facility] and [facility] due to lack of bed.      Acute dyspnea on IV fluids  --Chest x-ray shows infiltrate in the right costophrenic angle.  Differential diagnosis include atelectasis, fluid overload versus pneumonia patient changed to meropenem on 8/22.  --Patient currently on IV antibiotics  --Improved with 1 dose of IV Lasix and incentive spirometry  --Continue incentive spirometry  --No evidence of wheezing and therefore will hold off on steroids and bronchodilators    Lactic acidosis resolved  --DC IV fluids    Tachycardia  --Resolved with beta-blockers  --DC metoprolol today  --Of IV fluids due to shortness of breath    Hypokalemia mild  --Patient claims he takes 30 mEq/day at home.  Increase potassium 20 mg to  twice daily    History of Hodgkin's lymphoma  --Diagnosed 15 years ago  --Stage I or II way as per oncology consultation  --No inpatient work-up necessary  --CT of the abdomen pelvis shows multiple enlarged and inflamed lymph nodes of the abdominal retroperitoneum measure up to 2.3 cm near the IVC. Inflamed retroperitoneal lymphadenopathy in the pelvis near the iliac vessels is asymmetric on the right, notably the same side as    the presumed infectious process involving the right leg on the prior MR lower extremity study from today. The largest right external iliac chain lymph node is 3.2 cm. The largest included right inguinal lymph node is 3.0 cm. Right inguinal   lymphadenectomy clips are present.     Acute on chronic pain   pain control issues  --Patient currently on Toradol and scheduled Tylenol with as needed hydromorphone, Lyrica Vistaril and nortriptyline  --Appreciate pain team input  --Monitor BMP while on Toradol    Essential hypertension suboptimal control  --Improved after IV Lasix  --De-escalate beta-blockers    Inguinal lymphadenopathy causing right lymphedema  CT of the abdomen pelvis shows multiple enlarged and inflamed lymph nodes of the abdominal retroperitoneum measure up to 2.3 cm near the IVC. Inflamed retroperitoneal lymphadenopathy in the pelvis near the iliac vessels is asymmetric on the right, notably the same side as    the presumed infectious process involving the right leg on the prior MR lower extremity study from today. The largest right external iliac chain lymph node is 3.2 cm. The largest included right inguinal lymph node is 3.0 cm. Right inguinal   lymphadenectomy clips are present.   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  Did  " the patient today.  Will decrease Lovenox to once daily to increase adherence.      Addendum Tony Herndon MD, Hospitalist,08/23/21,3:29 PM  Called [facility] again for for direct admission but no beds available.  Also discussed with Dr. Noguera who does not think patient has necrotizing fasciitis and continue medical management for now.    Barriers to discharge: fever spikes, worsening WBC    Anticipated discharge date: Unknown        Subjective  Patient continues to have fever spikes and feels tired.  He has increased right lower extremity pain.  White cell count has worsened.  Discussed with Dr. Lopez from and infectious disease and he ordered CT of the thigh.  Will get second opinion from surgery to see if patient has necrotizing fasciitis given poor response to antibiotics.  Called the [facility] for direct admission but no beds are available.  Patient denies any chest pain, shortness of breath, nausea or vomiting  Objective    BP (!) 152/85 (BP Location: Right arm)   Pulse 78   Temp 99.6  F (37.6  C) (Oral)   Resp 20   Ht 1.803 m (5' 11\")   Wt 136.7 kg (301 lb 7 oz)   SpO2 95%   BMI 42.04 kg/m    Weight:   Wt Readings from Last 5 Encounters:   08/23/21 136.7 kg (301 lb 7 oz)   07/29/21 122.5 kg (270 lb)   07/06/21 126.6 kg (279 lb)   06/25/21 126.9 kg (279 lb 11.2 oz)   06/23/21 127 kg (280 lb)       I/O last 3 completed shifts:  In: 2240 [P.O.:1440; I.V.:800]  Out: 4250 [Urine:4250]  I/O this shift:  In: -   Out: 1650 [Urine:1650]          Physical Exam  In mild to moderate distress due to pain  Does not appear to be in respiratory distress  Body mass index is 42.04 kg/m .  Does not appear to be in distress  No sinus tenderness  Moist membranes  Right thigh is tender to touch with darkish area more swollen than yesterday  Neck supple  CVS: S1 S2-N, no murmurs, gallops, rubs  Resp: Lungs are clear to auscultation  abd: soft, No t/g/r  Neuro: no involuntary movements such as tremors  Vasc: Chronic " right leg lymphedema  Pertinent Labs  ----------------------  Recent Labs   Lab 08/23/21  0737 08/22/21  0649 08/22/21 0224 08/21/21  0904 08/20/21  1827 08/17/21 2256   NA  --   --  136 136 138 139   POTASSIUM 3.6 3.6 3.2*  3.2* 3.2* 3.4* 3.7   CO2  --   --  22 19* 22 22   BUN  --   --  7* 8 8 6*   CR  --   --  0.69* 0.63* 0.63* 0.73   MAG  --   --   --   --   --  1.5*   GLC  --   --  91 89 85 150*   ALBUMIN  --   --  2.1* 2.1* 2.2* 3.9   BILITOTAL  --   --  0.4 0.5 0.4 0.5   ALKPHOS  --   --  53 54 53 72   ALT  --   --  21 24 28 33   AST  --   --  21 25 23 25     Recent Labs   Lab 08/23/21  0737 08/22/21 0224 08/21/21  0904   WBC 13.0* 11.2* 12.1*   HGB 11.2* 11.2* 10.6*   HCT 34.6* 34.3* 33.9*    204 172     No results for input(s): INR in the last 168 hours.  Glucose Values Latest Ref Rng & Units 8/17/2021 8/18/2021 8/19/2021 8/20/2021 8/21/2021 8/22/2021   Bedside Glucose (mg/dl )  - -- -- -- -- -- --   GLUCOSE 70 - 125 mg/dL 150(H) 163(H) 111 85 89 91   Some recent data might be hidden         Pertinent Radiology   Radiology Results: Personally reviewed impression/s  MR Ankle Right w/o & w Contrast    Result Date: 8/19/2021  EXAM: MR ANKLE RIGHT W/O and W CONTRAST LOCATION: [facility] DATE/TIME: 8/18/2021 10:18 PM INDICATION: Right lower extremity pain and swelling. Tibial osteomyelitis. COMPARISON: MRI of the tibia fibula 08/18/2021 TECHNIQUE: Routine. Additional postgadolinium T1 sequences were obtained. IV CONTRAST: 10ml gadavist FINDINGS: There is extensive subcutaneous edema with enhancement surrounding the ankle and extending into the foot and lower leg compatible with cellulitis. There is some poorly defined fluid within the subcutaneous fat about the ankle. No well-defined organized fluid collection to suggest an abscess. No involvement of the deeper soft tissues. No associated marrow abnormality to suggest osteomyelitis. There is a small focus of subchondral  marrow edema and likely developing cystic change involving the cuboid adjacent to the calcaneocuboid joint. No joint effusion or synovitis. Mild chronic thickening of the Achilles tendon. No abnormal tendon sheath fluid.     IMPRESSION: 1.  Extensive cellulitis. 2.  No focal abscess. 3.  No evidence of osteomyelitis.    MR Femur Thigh Right wo & w Contrast    Result Date: 8/19/2021  FINAL REPORT I agree with the preliminary report. There are findings highly suspicious for acute on chronic osteomyelitis involving the distal aspect of the right femur. Linear areas of signal abnormality may represent small sinus tract. There are surrounding soft tissue changes in the distal, anterior thigh which can be seen with infection including myositis and fasciitis. Superimposed pathologic fracture of the right femur may also be present as there is some cortical offset in the distal femoral shaft. Correlation with CT of the femur may be useful for further assessment of possible distal femoral fracture. Subcutaneous edema diffusely throughout the right thigh distally and anteriorly in the right thigh with reactive lymphadenopathy right inguinal region. Final Interpretation Dictated By: Lee Leos Date: 8/19/2021 PRELIMINARY REPORT EXAM: MR FEMUR THIGH RIGHT WO AND W CONTRAST LOCATION: [facility] DATE/TIME: 8/18/2021 10:18 PM INDICATION: Cellulitis with sepsis with elevated lactate and right leg pain. Evaluate for osteomyelitis or deep tissue space infection. COMPARISON: MRI of the tibia and fibula 08/18/2021. TECHNIQUE: Routine. Additional postgadolinium T1 sequences were obtained. IV CONTRAST: 10 ml. FINDINGS: JOINTS AND BONES: -Replacement of the normal T1 signal involving the distal right femur particularly along the lateral aspect of the distal right femur above the level of the condyles (series 2, image 54). There is associated reactive edema in this area. Additionally, there appears to be breach  of the cortex by linear fluid extending along the anterior cortex and through the anterior cortex into the intramedullary space (series 5, image 48-50). These findings are suspicious for osteomyelitis with draining sinus tracts. More proximal aspect of the right femur demonstrates no evidence for replacement of the normal T1 signal or evidence for intramedullary edema. No fracture or dislocation. TENDONS: -No tendon tear, tendinopathy, or tenosynovitis. MUSCLES AND SOFT TISSUES: -Diffuse muscular edema involving the anterior compartment of the distal right thigh particularly surrounding the area of the draining presumed sinus tracts. There is small amount of fluid between the muscle and the anterior cortex of the distal right femur. Minimal amount of interfascial fluid involving the distal medial and lateral aspects of the right thigh. Severe subcutaneous edema diffusely throughout the right thigh. Moderate fluid involving the subcutaneous tissues of the mid and anterior right thigh. Enlarged right inguinal lymph node, likely reactive.     IMPRESSION: 1.  Findings highly suspicious for osteomyelitis involving the distal aspect of the right femur with replacement of normal T1 signal distally and surrounding reactive edema. Additionally, there are linear areas of increased T2 signal abnormality extending through the anterior cortex possibly representing small sinus tracts draining from the right femur suggesting chronic osteomyelitis. There is cortical thickening throughout this area and fluid along the anterior cortex in the distal right thigh. 2.  Additionally, there is diffuse edema in the anterior musculature of the distal right thigh with minimal layering fluid which can be seen with myositis and fasciitis. 3.  Subcutaneous edema diffusely throughout the right thigh distally and anteriorly in the right thigh with reactive lymphadenopathy right inguinal region. 4.  Final report following review by musculoskeletal  radiologist. Preliminary Interpretation Dictated By: Curt L. Behrns, MD Date: 8/19/2021    MR Tibia Fibula Lower Leg Right wo & w Contr    Result Date: 8/19/2021  FINAL REPORT I agree with the preliminary report. There are signal changes in the right proximal tibia which raises concern for an acute on chronic osteomyelitis. Additional soft tissue findings suspicious for draining sinus tracts as described. Final Interpretation Dictated By: Lee Leos Date: 8/19/2021 PRELIMINARY REPORT EXAM: MR TIBIA FIBULA LOWER LEG RIGHT WO AND W CONTR LOCATION: [facility] DATE/TIME: 8/18/2021 10:18 PM INDICATION: Evaluate for osteomyelitis involving the tibia and fibula with severe right lower extremity pain and soft tissue swelling. COMPARISON: None. TECHNIQUE: Routine. Additional postgadolinium T1 sequences were obtained. IV CONTRAST: 10 ml Gadavist. FINDINGS: BONES: -Heterogeneous areas of decreased T1 signal throughout the proximal right tibia with irregularity to the cortex with linear fluidlike areas in the anterior right cortex. These linear areas of fluid in the anterior medial right cortex (series 6, image 13-17) are suspicious for linear draining sinus tracts. Overall findings are suspicious for osteomyelitis in the proximal right tibia, likely chronic. Distal right tibia and fibula demonstrate no evidence for other areas suspicious for osteomyelitis. SOFT TISSUES:  -Severe subcutaneous edema diffusely throughout the right lower extremity greatest near the mid to lower right lower extremity down to the level of the ankle. MUSCLES: -No abnormality in the visualized musculature.     IMPRESSION: 1.  Heterogeneous decreased T1 signal throughout the proximal right tibia with irregularity of the anterior cortex with draining linear areas of fluid signal intensity into the soft tissue suspicious for draining sinus tracts from an area of suspected chronic osteomyelitis in the proximal right tibia. 2.   Severe subcutaneous edema right lower extremity. 3.  Final report following review by musculoskeletal radiologist. Preliminary Interpretation Dictated By: Curt L. Behrns, MD Date: 8/19/2021     CT Femur Thigh Right w/o Contrast    Result Date: 8/19/2021  EXAM: CT FEMUR THIGH RIGHT WITHOUT CONTRAST LOCATION: [facility] DATE/TIME: 8/19/2021 7:43 PM INDICATION: Upper leg pain, stress fracture suspected, negative x-ray. COMPARISON: None. TECHNIQUE: Noncontrast. Axial, sagittal and coronal thin-section reconstruction. Dose reduction techniques were used. FINDINGS: There is cortical deformity involving the femoral distal diametaphysis with intramedullary lucency and sclerosis. This is of indeterminate etiology but could be related to an old healed fracture. Chronic osteomyelitis might have this appearance. No evidence of acute fracture. The remainder of the femur appears within normal limits. Similar sclerosis and lucency is noted in the proximal tibia, only partly included on the scan, also of indeterminate etiology. Subcutaneous stranding and  nodularity is noted throughout the thigh. Immediately distal to the inguinal region, there is a 3.8 x 2.6 cm soft tissue density nodule within the deep subcutaneous fat. External iliac lymphadenopathy is suspected on the proximal edge of the scan.     IMPRESSION: 1.  Distal femoral region of mild cortical deformity and adjacent intramedullary lucency and sclerosis of indeterminate etiology. Possibilities include an old healed fracture. Chronic osteomyelitis might have a similar appearance. A similar finding is seen within the proximal tibia but only partly included on the scan. 2.  No evidence of femoral fracture. 3.  Suspected pelvic external iliac lymphadenopathy, not well evaluated with this scan. There is also a 3.8 x 2.6 cm soft tissue mass in the anteromedial aspect of the upper thigh/distal inguinal region. Immediately proximal to this, there are  multiple surgical clips. 4.  Extensive subcutaneous stranding and nodularity circumferentially within the thigh, greatest distally.     Echocardiogram Complete    Result Date: 2021  [id number]^JOSE^[place] [address]  Name: CABRERA GHOSH MRN: [id number] : 1980 Study Date: 2021 08:46 AM Age: 40 yrs Gender: Male Patient Location: [facility] Reason For Study: Endocarditis Ordering Physician: VANNA GARCIA Performed By: CANDIDO  BSA: 2.5 m2 Height: 71 in Weight: 308 lb HR: 107 BP: 114/59 mmHg ______________________________________________________________________________ Procedure Definity (NDC #00803-412) given intravenously. Complete Echo Adult. Technically difficult study.Extremely difficult acoustic windows despite the use of contrast for endcardial border definition. ______________________________________________________________________________ Interpretation Summary  1. Left ventricular size, wall thickness, and systolic function are normal. The estimated left ventricular ejection fraction is 55%. 2. Right ventricular size and systolic function are normal. 3. No hemodynamically significant valvular abnormalities. 4. No prior study available for comparison. ______________________________________________________________________________ I      WMSI = 1.00     % Normal = 100  X - Cannot   0 -                      (2) - Mildly 2 -          Segments  Size Interpret    Hyperkinetic 1 - Normal  Hypokinetic  Hypokinetic  1-2     small                                                    7 -          3-5    moderate 3 - Akinetic 4 -          5 -         6 - Akinetic Dyskinetic   6-14    large              Dyskinetic   Aneurysmal  w/scar       w/scar       15-16   diffuse  Left Ventricle The left ventricle is normal in size. There is mild concentric left ventricular hypertrophy. Left ventricular systolic function is normal. The visual ejection  fraction is 55-60%. Diastolic function not assessed due to tachycardia. No regional wall motion abnormalities noted.  Right Ventricle Normal right ventricle size and systolic function.  Atria Normal left atrial size. Right atrial size is normal.  Mitral Valve Mitral valve leaflets appear normal. There is trace mitral regurgitation. There is no mitral valve stenosis.  Tricuspid Valve Tricuspid valve leaflets appear normal. There is trace to mild tricuspid regurgitation. Right ventricle systolic pressure estimate normal. The right ventricular systolic pressure is elevated at 12.4 mmHg. There is no tricuspid stenosis.  Aortic Valve Aortic valve leaflets appear normal. No aortic regurgitation is present. No aortic stenosis is present.  Pulmonic Valve The pulmonic valve is not well visualized. There is trace pulmonic valvular regurgitation. There is no pulmonic valvular stenosis.  Vessels The aorta root is normal. IVC diameter <2.1 cm collapsing >50% with sniff suggests a normal RA pressure of 3 mmHg.  Pericardium There is no pericardial effusion.  Rhythm The rhythm was sinus tachycardia.  ______________________________________________________________________________ MMode/2D Measurements & Calculations RVDd: 4.2 cm IVSd: 1.2 cm LVIDd: 4.5 cm LVIDs: 3.0 cm LVPWd: 1.2 cm FS: 32.9 %  LV mass(C)d: 203.0 grams LV mass(C)dI: 80.1 grams/m2 Ao root diam: 3.9 cm LA dimension: 3.6 cm asc Aorta Diam: 3.1 cm LA/Ao: 0.93 LVOT diam: 2.3 cm LVOT area: 4.0 cm2 LA Volume Indexed (AL/bp): 27.4 ml/m2 RWT: 0.54  Doppler Measurements & Calculations Ao V2 max: 156.1 cm/sec Ao max PG: 10.0 mmHg Ao V2 mean: 105.9 cm/sec Ao mean P.3 mmHg Ao V2 VTI: 28.7 cm GREGORIO(I,D): 3.4 cm2 GREGORIO(V,D): 3.3 cm2 LV V1 max P.5 mmHg LV V1 max: 127.9 cm/sec LV V1 VTI: 24.3 cm  SV(LVOT): 97.4 ml SI(LVOT): 38.4 ml/m2 PA acc time: 0.12 sec TR max aaron: 176.2 cm/sec TR max P.4 mmHg AV Aaron Ratio (DI): 0.82 GREGORIO Index (cm2/m2): 1.3   ______________________________________________________________________________ Report approved by: Miladis Holguin 08/19/2021 09:44 AM       XR Chest Port 1 View    Result Date: 8/21/2021  EXAM: XR CHEST PORT 1 VIEW LOCATION: [facility] DATE/TIME: 8/21/2021 10:45 AM INDICATION: Shortness of breath. COMPARISON: 08/18/2021     IMPRESSION: New suspicious area of increased opacity right lung base laterally near the right costophrenic angle could represent a small focal area of alveolar infiltrate related to pneumonia in the proper clinical setting. Left lung clear. Normal heart size and pulmonary vascularity. No overt osseous abnormality.    CT Chest Abdomen Pelvis w/o Contrast    Result Date: 8/19/2021  EXAM: CT CHEST ABDOMEN PELVIS W/O CONTRAST LOCATION: [facility] DATE/TIME: 8/18/2021 11:42 PM INDICATION: Sepsis COMPARISON: CTA chest 04/22/2013. CT abdomen and pelvis 12/07/2005. Chest radiographs 08/18/2021. MR of the right lower extremity earlier today. TECHNIQUE: CT scan of the chest, abdomen, and pelvis was performed without IV contrast. Multiplanar reformats were obtained. Dose reduction techniques were used. CONTRAST: None. FINDINGS: LUNGS AND PLEURA: Mild atelectasis bilaterally. Lungs otherwise clear. No pleural fluid or pneumothorax. MEDIASTINUM/AXILLAE: There is lobulated soft tissue intermingled with lobules of fat in the left anterior mediastinum which measures up to 7.5 x 3.6 cm on axial images (image 51 series 4). A similar finding with less intermixed fat measured up to 6.7 x 3.2 cm on 04/22/2013. There are now enlarged lymph nodes extending superiorly lateral to the left common carotid artery and internal jugular vein which measure up to 2 cm axillary dissection clips on the left. (image 16 of series 4). CORONARY ARTERY CALCIFICATION: None. HEPATOBILIARY: Hepatic steatosis. Normal gallbladder and bile ducts. PANCREAS: Normal. SPLEEN: Normal. ADRENAL  GLANDS: Normal. KIDNEYS/BLADDER: Excreted contrast in the urinary system from a prior study. Kidneys and bladder otherwise normal. BOWEL: Normal. No obstruction or inflammation. Normal appendix. LYMPH NODES: Multiple enlarged and inflamed lymph nodes of the abdominal retroperitoneum measure up to 2.3 cm near the IVC. Inflamed retroperitoneal lymphadenopathy in the pelvis near the iliac vessels is asymmetric on the right, notably the same side as  the presumed infectious process involving the right leg on the prior MR lower extremity study from today. The largest right external iliac chain lymph node is 3.2 cm. The largest included right inguinal lymph node is 3.0 cm. Right inguinal lymphadenectomy clips are present. VASCULATURE: Unremarkable. PELVIC ORGANS: Normal. MUSCULOSKELETAL: Edema of the subcutaneous fat of the right groin and included anterior right thigh consistent with cellulitis. This was more fully imaged on the MR examination from earlier today.     IMPRESSION: 1.  Inflammatory lymphadenopathy of the retroperitoneum of the abdomen, right pelvis, and right inguinal station likely secondary to the presumed infectious process involving the right leg as demonstrated on the MR examination from earlier today. Involvement with lymphoma is not excluded. 2.  Lobulated soft tissue of the anterior mediastinum has mildly increased in size compared back to the remote study of 04/22/2013. There are now some enlarged lymph nodes superior to this near the left carotid and jugular vessels. Findings are concerning for active lymphoma. Clinical correlation recommended. 3.  Hepatic steatosis. I discussed the findings with at on .    EKG Results: not reviewed.

## 2021-08-23 NOTE — PLAN OF CARE
"Neuro: Patient is alert, oriented, and knowledgeable regarding current condition.    Pain: Patient has intermittent headache pain of 10/10 and ongoing right leg pain related to infection and edema. PRN dilaudid administered twice with slight relief. Patient concerned regarding blurred vision and \"weird dreams\" in which he felt he wasn't breathing. Pain team contacted and scheduled Lyrica discontinued. Continue to monitor.    Infection: Patient seen by ID and general surgery regarding right leg infection. Patient remains on scheduled tylenol and has started an additional IV antibiotic (Zyvox). Oral temperature trending down throughout the day: 102.2, 100.1, 98.4. Discussed with ID if patient would benefit from PICC line for draws and antibiotics. Not ordered at this time.    Mobility: Patient unable to ambulate due to edema and pain in right leg. Leg skin temperature normal and pulse found with doppler.   "

## 2021-08-23 NOTE — PLAN OF CARE
Shift from 1500 to 2330-    Problem: Pain Chronic (Persistent)  Goal: Acceptable Pain Control and Functional Ability  8/22/2021 2125 by Tricia Cotton RN  Outcome: No Change  8/22/2021 2105 by Tricia Cotton RN  Outcome: Improving  Intervention: Develop Pain Management Plan  Recent Flowsheet Documentation  Taken 8/22/2021 2014 by Tricia Cotton RN  Pain Management Interventions:    medication (see MAR)    emotional support  Taken 8/22/2021 1939 by Tricia Cotton RN  Pain Management Interventions:    medication (see MAR)    emotional support  Taken 8/22/2021 1622 by Tricia Cotton RN  Pain Management Interventions:    medication (see MAR)    emotional support  Intervention: Manage Persistent Pain  Recent Flowsheet Documentation  Taken 8/22/2021 1602 by Tricia Cotton RN  Bowel Elimination Promotion:    adequate fluid intake promoted    ambulation promoted  Taken 8/22/2021 0830 by Tricia Cotton RN  Bowel Elimination Promotion:    adequate fluid intake promoted    ambulation promoted   Pain continues to be controlled with prn toradol, prn dilaudid and scheduled tylenol.   Patient refused to get OOB this evening up to chair. Informed refusal. He did finally agree to start lovenox.   Continue on IV abx.   No fevers this evening.   Tolerating diet.

## 2021-08-24 ENCOUNTER — APPOINTMENT (OUTPATIENT)
Dept: PHYSICAL THERAPY | Facility: HOSPITAL | Age: 41
End: 2021-08-24
Payer: COMMERCIAL

## 2021-08-24 ENCOUNTER — APPOINTMENT (OUTPATIENT)
Dept: ULTRASOUND IMAGING | Facility: HOSPITAL | Age: 41
End: 2021-08-24
Attending: INTERNAL MEDICINE
Payer: COMMERCIAL

## 2021-08-24 LAB
ALBUMIN SERPL-MCNC: 2.4 G/DL (ref 3.5–5)
ALP SERPL-CCNC: 77 U/L (ref 45–120)
ALT SERPL W P-5'-P-CCNC: 23 U/L (ref 0–45)
AMPHETAMINES UR QL SCN: ABNORMAL
ANION GAP SERPL CALCULATED.3IONS-SCNC: 12 MMOL/L (ref 5–18)
AST SERPL W P-5'-P-CCNC: 22 U/L (ref 0–40)
BACTERIA BLD CULT: NO GROWTH
BACTERIA BLD CULT: NO GROWTH
BARBITURATES UR QL: ABNORMAL
BENZODIAZ UR QL: ABNORMAL
BILIRUB SERPL-MCNC: 0.6 MG/DL (ref 0–1)
BUN SERPL-MCNC: 4 MG/DL (ref 8–22)
CALCIUM SERPL-MCNC: 9.3 MG/DL (ref 8.5–10.5)
CANNABINOIDS UR QL SCN: ABNORMAL
CHLORIDE BLD-SCNC: 101 MMOL/L (ref 98–107)
CO2 SERPL-SCNC: 22 MMOL/L (ref 22–31)
COCAINE UR QL: ABNORMAL
CREAT SERPL-MCNC: 0.64 MG/DL (ref 0.7–1.3)
CREAT UR-MCNC: 46 MG/DL
ERYTHROCYTE [DISTWIDTH] IN BLOOD BY AUTOMATED COUNT: 14.6 % (ref 10–15)
GFR SERPL CREATININE-BSD FRML MDRD: >90 ML/MIN/1.73M2
GLUCOSE BLD-MCNC: 90 MG/DL (ref 70–125)
HCT VFR BLD AUTO: 36 % (ref 40–53)
HGB BLD-MCNC: 11.7 G/DL (ref 13.3–17.7)
MCH RBC QN AUTO: 25.7 PG (ref 26.5–33)
MCHC RBC AUTO-ENTMCNC: 32.5 G/DL (ref 31.5–36.5)
MCV RBC AUTO: 79 FL (ref 78–100)
OPIATES UR QL SCN: ABNORMAL
OXYCODONE UR QL: ABNORMAL
PCP UR QL SCN: ABNORMAL
PLATELET # BLD AUTO: 313 10E3/UL (ref 150–450)
POTASSIUM BLD-SCNC: 3.9 MMOL/L (ref 3.5–5)
PROT SERPL-MCNC: 7.5 G/DL (ref 6–8)
RBC # BLD AUTO: 4.55 10E6/UL (ref 4.4–5.9)
SODIUM SERPL-SCNC: 135 MMOL/L (ref 136–145)
WBC # BLD AUTO: 16.3 10E3/UL (ref 4–11)

## 2021-08-24 PROCEDURE — 99232 SBSQ HOSP IP/OBS MODERATE 35: CPT | Performed by: STUDENT IN AN ORGANIZED HEALTH CARE EDUCATION/TRAINING PROGRAM

## 2021-08-24 PROCEDURE — 258N000003 HC RX IP 258 OP 636: Performed by: INTERNAL MEDICINE

## 2021-08-24 PROCEDURE — 36415 COLL VENOUS BLD VENIPUNCTURE: CPT | Performed by: INTERNAL MEDICINE

## 2021-08-24 PROCEDURE — 97530 THERAPEUTIC ACTIVITIES: CPT | Mod: GP

## 2021-08-24 PROCEDURE — 99233 SBSQ HOSP IP/OBS HIGH 50: CPT | Performed by: INTERNAL MEDICINE

## 2021-08-24 PROCEDURE — 99231 SBSQ HOSP IP/OBS SF/LOW 25: CPT | Performed by: SURGERY

## 2021-08-24 PROCEDURE — 80307 DRUG TEST PRSMV CHEM ANLYZR: CPT | Performed by: PAIN MEDICINE

## 2021-08-24 PROCEDURE — 93971 EXTREMITY STUDY: CPT | Mod: RT

## 2021-08-24 PROCEDURE — 85014 HEMATOCRIT: CPT | Performed by: INTERNAL MEDICINE

## 2021-08-24 PROCEDURE — 120N000001 HC R&B MED SURG/OB

## 2021-08-24 PROCEDURE — 250N000013 HC RX MED GY IP 250 OP 250 PS 637: Performed by: INTERNAL MEDICINE

## 2021-08-24 PROCEDURE — 250N000011 HC RX IP 250 OP 636: Performed by: STUDENT IN AN ORGANIZED HEALTH CARE EDUCATION/TRAINING PROGRAM

## 2021-08-24 PROCEDURE — 250N000013 HC RX MED GY IP 250 OP 250 PS 637: Performed by: PAIN MEDICINE

## 2021-08-24 PROCEDURE — 80053 COMPREHEN METABOLIC PANEL: CPT | Performed by: INTERNAL MEDICINE

## 2021-08-24 PROCEDURE — 250N000011 HC RX IP 250 OP 636: Performed by: INTERNAL MEDICINE

## 2021-08-24 PROCEDURE — 99232 SBSQ HOSP IP/OBS MODERATE 35: CPT | Performed by: PAIN MEDICINE

## 2021-08-24 PROCEDURE — 250N000013 HC RX MED GY IP 250 OP 250 PS 637: Performed by: CLINICAL NURSE SPECIALIST

## 2021-08-24 PROCEDURE — 97116 GAIT TRAINING THERAPY: CPT | Mod: GP

## 2021-08-24 RX ORDER — HYDROXYZINE HYDROCHLORIDE 50 MG/1
50 TABLET, FILM COATED ORAL EVERY 4 HOURS PRN
Status: DISCONTINUED | OUTPATIENT
Start: 2021-08-24 | End: 2021-08-26 | Stop reason: HOSPADM

## 2021-08-24 RX ORDER — HYDROCODONE BITARTRATE AND ACETAMINOPHEN 10; 325 MG/1; MG/1
2 TABLET ORAL EVERY 4 HOURS PRN
Status: DISCONTINUED | OUTPATIENT
Start: 2021-08-24 | End: 2021-08-26 | Stop reason: HOSPADM

## 2021-08-24 RX ADMIN — MEROPENEM 2 G: 1 INJECTION, POWDER, FOR SOLUTION INTRAVENOUS at 12:30

## 2021-08-24 RX ADMIN — FAMOTIDINE 40 MG: 20 TABLET, FILM COATED ORAL at 09:52

## 2021-08-24 RX ADMIN — DICLOFENAC SODIUM 4 G: 10 GEL TOPICAL at 09:56

## 2021-08-24 RX ADMIN — AMLODIPINE BESYLATE 2.5 MG: 2.5 TABLET ORAL at 09:52

## 2021-08-24 RX ADMIN — HYDROMORPHONE HYDROCHLORIDE 2 MG: 2 TABLET ORAL at 07:09

## 2021-08-24 RX ADMIN — HYDROMORPHONE HYDROCHLORIDE 4 MG: 2 TABLET ORAL at 00:29

## 2021-08-24 RX ADMIN — HYDROCODONE BITARTRATE AND ACETAMINOPHEN 2 TABLET: 10; 325 TABLET ORAL at 11:28

## 2021-08-24 RX ADMIN — ENOXAPARIN SODIUM 40 MG: 100 INJECTION SUBCUTANEOUS at 17:53

## 2021-08-24 RX ADMIN — MEROPENEM 2 G: 1 INJECTION, POWDER, FOR SOLUTION INTRAVENOUS at 20:17

## 2021-08-24 RX ADMIN — DICLOFENAC SODIUM 4 G: 10 GEL TOPICAL at 17:58

## 2021-08-24 RX ADMIN — HYDROMORPHONE HYDROCHLORIDE 4 MG: 2 TABLET ORAL at 03:58

## 2021-08-24 RX ADMIN — HYDROXYZINE HYDROCHLORIDE 50 MG: 25 TABLET, FILM COATED ORAL at 02:16

## 2021-08-24 RX ADMIN — POTASSIUM CHLORIDE 20 MEQ: 20 TABLET, EXTENDED RELEASE ORAL at 21:40

## 2021-08-24 RX ADMIN — HYDROCODONE BITARTRATE AND ACETAMINOPHEN 2 TABLET: 10; 325 TABLET ORAL at 16:37

## 2021-08-24 RX ADMIN — DICLOFENAC SODIUM 4 G: 10 GEL TOPICAL at 21:40

## 2021-08-24 RX ADMIN — LINEZOLID 600 MG: 600 INJECTION, SOLUTION INTRAVENOUS at 00:19

## 2021-08-24 RX ADMIN — LINEZOLID 600 MG: 600 INJECTION, SOLUTION INTRAVENOUS at 23:44

## 2021-08-24 RX ADMIN — MEROPENEM 2 G: 1 INJECTION, POWDER, FOR SOLUTION INTRAVENOUS at 03:58

## 2021-08-24 RX ADMIN — HYDROCODONE BITARTRATE AND ACETAMINOPHEN 2 TABLET: 10; 325 TABLET ORAL at 21:40

## 2021-08-24 RX ADMIN — LINEZOLID 600 MG: 600 INJECTION, SOLUTION INTRAVENOUS at 13:30

## 2021-08-24 RX ADMIN — SODIUM CHLORIDE 500 ML: 9 INJECTION, SOLUTION INTRAVENOUS at 17:52

## 2021-08-24 RX ADMIN — POTASSIUM CHLORIDE 20 MEQ: 20 TABLET, EXTENDED RELEASE ORAL at 09:51

## 2021-08-24 RX ADMIN — DICLOFENAC SODIUM 4 G: 10 GEL TOPICAL at 14:56

## 2021-08-24 NOTE — PLAN OF CARE
Pain/Infection:     Continued right lower extremity pain, relieved somewhat with PRN Narco. New Vultaren gel has no apparent effect at this point per patient. Leg elevated twice today per patient tolerance for 60-90 minutes each time.  Leg negative for DVT and is warm but not hot to the touch. Good doppler reading.    Temperature resolved. WBC 16.3, up from 13. Recheck in the morning.    Mobility: Patient able to ambulate in terrell with therapy.

## 2021-08-24 NOTE — PROGRESS NOTES
[facility] ID Inpatient follow up       Patient:  Romeo Chong  Date of birth 1980, Medical record number [id number]  Date of Visit:  08/24/2021  Attending Physician: Tony Herndon MD         Assessment and Recommendations:   Assessment:  Romeo Chong is a 40 year old male with   1.  History of Hodgkin lymphoma diagnosed 15 years ago.  2.  Right lower extremity lymphedema secondary to inguinal lymphadenopathy from Hodgkin lymphoma?  3.  Multiple intra-abdominal and retroperitoneal adenopathy  4.  Morbid obesity with BMI of 43.54.  5.  Right lower extremity cellulitis with osteomyelitis.  Records from outpatient not available yet.  Clinically felt worse with persistent fever, increasing white count and pain quite out of proportion for clinical appearance on 8/23.  CK was however normal.  And CT scan of the lower extremity did not show any worsening disease.  Surgery saw and had low suspicion for necrotizing fasciitis.  Input is greatly appreciated.  IV linezolid added on 8/23/2021.  Initial plan was to add clindamycin for antitoxin activity but the patient reports significant reaction to clindamycin although no documentation on this.  Feels much better today.  No fever since then.   6.  Streptococcus agalactiae bacteremia.  Positive blood culture on 8/17.  Follow blood cultures on 8/18 -19 -20 -21 no growth    Antimicrobials:  Linezolid 8/23-  Meropenem: 8/22-  Ceftriaxone: 8/18-21  Ancef 8/21-22  Flagyl 8/19-21  Zosyn 1 dose 8/18  Aztreonam 8/18-19        Recommendations:  1. Continue IV meropenem and linezolid for now.  2. Nortriptyline on hold for now  3. Input from surgery is appreciated.  Elevation of the leg.  4. Monitor right lower extremity for abscess especially on the lateral aspect in light of worsening white count.    Discussed with the patient, nursing staff.     ID will follow.      Maegan Sandoval MD.  [facility].    Formerly [facility]  Office Telephone [phone].  Fax [phone]  [facility] paging            Interval History:     HPI:  The interval history was reviewed.   Feels much better today.  No fever since yesterday.  Does not feel as sick as he was doing yesterday.    Pertinent cultures include:  No results found for: CULT    Recent Inflammatory Biomarkers:   Recent Labs   Lab Test 21  0635 21  0737 21  0224 21  0904 21  1827 21  0746 21  1410   CRP  --   --   --   --  44.1* 41.3* 20.0*   PCAL  --   --   --   --   --   --  16.05*   WBC 16.3* 13.0* 11.2* 12.1* 12.1* 12.5* 11.3*            Review of Systems:   CONSTITUTIONAL:    Temp Max: Temp (24hrs), Av.5  F (37.5  C), Min:98.3  F (36.8  C), Max:102.2  F (39  C)  ROS negative except for findings in the HPI.         Current Medications (antimicrobials listed in bold):       amLODIPine  2.5 mg Oral Daily     diclofenac  4 g Topical 4x Daily     enoxaparin ANTICOAGULANT  40 mg Subcutaneous Q24H     famotidine  40 mg Oral QAM     linezolid  600 mg Intravenous Q12H     meropenem  2 g Intravenous Q8H     polyethylene glycol  17 g Oral Daily     potassium chloride  20 mEq Oral BID              Allergies:     Allergies   Allergen Reactions     Vancomycin Anaphylaxis     Peanut Oil Itching     Tree Nuts [Nuts] Itching     Erythromycin Unknown     Latex Unknown     Added based on information entered during case entry, please review and add reactions, type, and severity as needed     Other Environmental Allergy Unknown     DUST     Pollen Extracts [Pollen Extract] Unknown     Zosyn [Piperacillin-Tazobactam In D5w] Tinnitus and Itching     Oxycodone Itching and Rash            Physical Exam:   Vitals were reviewed  Patient Vitals for the past 24 hrs:   BP Temp Temp src Pulse Resp SpO2   21 0733 132/73 99.8  F (37.7  C) Oral 82 20 97 %   21 0157 (!) 153/84 99.3  F (37.4  C) Oral 83 20 98 %   21 2300 (!)  159/92 98.3  F (36.8  C) Oral 76 20 95 %   08/23/21 1806 (!) 142/77 98.4  F (36.9  C) Oral 77 18 92 %       Physical Examination:  Gen: Pleasant in a moderate distress.  Looks toxic.  HEENT: NCAT. EOMI. PERRL.  Neck: No bruit, JVD or thyromegaly.  Lungs: Clear to ascultation bilat with no crackles or wheezes.  Card: RRR. NSR. No RMG. Peripheral pulses present and symmetric. No edema.  Abd: Soft NT ND. No mass. Normal bowel sounds.  Skin: No rash.  Extr: Thigh less painful compared to yesterday.  Leg elevated with some wrinkles on the medial aspect of the leg.  A questionable phlegmon on the lateral aspect of the leg.  Neuro: Alert and oriented to place time and person. Cranial nerves II to XII intact. Motor and sensory intact. Normal gait.           Laboratory Data:   ID Labs:  Recent Labs   Lab Test 08/20/21 1827 08/19/21  0746 08/18/21  1410   CRP 44.1* 41.3* 20.0*     Recent Labs   Lab Test 08/24/21  0635 08/23/21  0737 08/22/21  0224 08/21/21  0904 08/20/21  1827 08/19/21  0746   WBC 16.3* 13.0* 11.2* 12.1* 12.1* 12.5*     Recent Labs   Lab Test 08/24/21  0635 08/22/21  0224 08/21/21  0904 08/20/21  1827   CR 0.64* 0.69* 0.63* 0.63*   GFRESTIMATED >90 >90 >90 >90       Hematology Studies  Recent Labs   Lab Test 08/24/21  0635 08/23/21  0737 08/22/21  0224 08/21/21  0904 08/20/21  1827 08/19/21  0746   WBC 16.3* 13.0* 11.2* 12.1* 12.1* 12.5*   ANEU  --   --  9.6* 11.0* 11.5* 10.0*   AEOS  --   --  0.0 0.0 0.0 0.0   HCT 36.0* 34.6* 34.3* 33.9* 34.9* 40.8    256 204 172 167 203       Metabolic  Recent Labs   Lab Test 08/24/21  0635 08/22/21  0224 08/21/21  0904   * 136 136   BUN 4* 7* 8   CO2 22 22 19*   CR 0.64* 0.69* 0.63*   GFRESTIMATED >90 >90 >90       Hepatic Studies  Recent Labs   Lab Test 08/24/21  0635 08/22/21  0224 08/21/21  0904   BILITOTAL 0.6 0.4 0.5   ALKPHOS 77 53 54   ALBUMIN 2.4* 2.1* 2.1*   AST 22 21 25   ALT 23 21 24     Blood Culture Line, venous  Order: [id number]  Collected:   8/17/2021 10:56 PM Status:  Final result   Visible to patient:  Yes (MyChart)  Specimen Information: Line, venous; Blood         0 Result Notes  Culture Positive on the 1st day of incubationAbnormal        Streptococcus agalactiae (Group B Streptococcus)Panic     2 of 2 bottles      Resulting Agency: [facility]   Susceptibility     Streptococcus agalactiae (Group B Streptococcus)     MEAGAN     Ampicillin 0.12 ug/mL Susceptible     Cefotaxime <=0.25 ug/mL Susceptible     Ceftriaxone <=0.25 ug/mL Susceptible     Clindamycin >0.5 ug/mL Resistant     Meropenem <=0.06 ug/mL Susceptible     Penicillin 0.06 ug/mL Susceptible     Vancomycin 0.5 ug/mL Susceptible                 Specimen Collected: 08/17/21 10:56 PM Last Resulted: 08/21/21  9:32 AM               ImmunologlobulinsNo lab results found.         Imaging Data:     Reviewed  Study Result    Narrative & Impression   EXAM: CT FEMUR THIGH RIGHT WITH CONTRAST  LOCATION: [facility]  DATE/TIME: 8/23/2021 11:51 AM     INDICATION: Right lower extremity cellulitis and leg swelling. Suspected osteomyelitis. History of lymphoma.  COMPARISON: CT 8/19/2021, MRI 8/18/2021.  TECHNIQUE: Noncontrast. Axial, sagittal and coronal thin-section reconstruction. Dose reduction techniques were used.      FINDINGS: Extensive subcutaneous soft tissue stranding involving the right thigh and visualized lower leg with nodularity or small foci of fluid, mainly distally, is unchanged. Right iliac, inguinal, and proximal thigh lymphadenopathy is unchanged. There   is some surrounding soft tissue stranding suggesting inflammatory change. There are surgical clips in the right inguinal region.     Chronic deformity with mixed lucency and chronic appearing sclerosis of the distal right femur and the visualized proximal right tibia unchanged. Lucent cortical defects and/or sinus tracts within the distal right femur are stable. There is an old screw   track in the distal femur.  Suspected additional postoperative tracks in the proximal tibia. No new bony destruction.     There is an 8 mm circumscribed lytic lesion in the right iliac wing on series 4, image 1; this area was not included on the prior exams.                                                                      IMPRESSION:  1.  Overall no significant change in the right thigh since prior. There is extensive subcutaneous soft tissue stranding in the right thigh and lower leg which could represent cellulitis or edema. Associated foci of subcutaneous nodularity or fluid   unchanged.  2.  Chronic deformity, mixed lucency and sclerosis of the distal right femur and proximal tibia are unchanged.  3.  Small cortical defects and/or sinus tracts in the distal right femur are stable and may be related to superimposed acute osteomyelitis as suggested on previous MRI.  4.  Right inguinal and iliac lymphadenopathy stable.  5.  Small indeterminate lytic lesion right iliac wing, not included on prior exams.

## 2021-08-24 NOTE — PROGRESS NOTES
[facility] ACUTE PAIN SERVICE    ([facility], [facility], [facility])  Daily PAIN Progress Note    Assessment/Plan:  Romeo Chong is a 40 year old male who was admitted on 8/17/2021.   . I was asked to see the patient for leg pain, in the setting of chronic hydrocodone since May. Admitted for cellulitis with possible sepsis. History of hypertension, lymphedema, obesity, Hodgkin's lymphoma.  Pain remains and patient states no benefit with Dilaudid.    PLAN:   1) Pain is consistent with acute associated with cellulitis of lower extremity with worsening lymphedema.  Patient on chronic opioids with opioid tolerance.  The patient's home MME was 22-30mg daily.   2)Multimodal Medication Therapy  Topical: lidocaine cream qid   NSAID'S: add diclofenac gel   Adjuvants: Tylenol-stopped due to combination product use, vistaril 50 mg every 4 hours prn (pain, pruritis)  Opioids: hydromorphone -changed to hydrocodone/acetaminophen 10/325 2 tabs every 4 as needed  3)Non-medication interventions-suggest lymphedema wrapping  4)Constipation Prophylaxis- Daily stool softener/laxative  5) Follow up   -Opioid prescriber has been Pain Specialist  -Discharge Recommendations - We recommend prescribing the following at the time of discharge: suggest weaning off opioids when able           Subjective:  Patient notes that he slept well.  He did take a dose of the 2 mg Dilaudid this morning.  He notes that this has not been helpful.  He is requesting to transition back to hydrocodone.  The right leg is painful throughout from the thigh to the toes.  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    Active Problems:    Fibrous dysplasia of bone    Essential hypertension, benign    Lymphedema    Obesity, unspecified    H/O Hodgkin's lymphoma    Tachycardia    Cellulitis of right lower extremity    Elevated lactic acid level    Bacterial sepsis (H)    Gram-positive  bacteremia     LOS: 6 days       amLODIPine  2.5 mg Oral Daily     diclofenac  4 g Topical 4x Daily     enoxaparin ANTICOAGULANT  40 mg Subcutaneous Q24H     famotidine  40 mg Oral QAM     linezolid  600 mg Intravenous Q12H     meropenem  2 g Intravenous Q8H     polyethylene glycol  17 g Oral Daily     potassium chloride  20 mEq Oral BID       Objective:  Vital signs in last 24 hours:  Temp:  [98.3  F (36.8  C)-100.1  F (37.8  C)] 99.8  F (37.7  C)  Pulse:  [76-83] 82  Resp:  [18-20] 20  BP: (132-159)/(73-92) 132/73  SpO2:  [92 %-98 %] 97 %  Weight:   Weight:   @THISENCWEIGHTS(1)@  Weight change:   Body mass index is 42.04 kg/m .    Intake/Output last 3 shifts:  I/O last 3 completed shifts:  In: 1070 [P.O.:1070]  Out: 4425 [Urine:4425]  Intake/Output this shift:  I/O this shift:  In: -   Out: 500 [Urine:500]    Review of Systems:   As per subjective, all others negative.    Physical Exam:    General Appearance:  Alert, cooperative, no distress, appears stated age    Head:  Normocephalic, without obvious abnormality, atraumatic   Eyes:  PERRL, conjunctiva/corneas clear, EOM's intact   Nose: Nares normal, septum midline, mucosa normal, no drainage   Throat: Lips normal    Neck: Supple, symmetrical, trachea midline, no adenopathy, thyroid: not enlarged, symmetric    Back:   Symmetric, no curvature, ROM normal   Lungs:   Clear to auscultation bilaterally, respirations unlabored   Chest Wall:  No tenderness or deformity   Heart:  Regular rate and rhythm, S1, S2 normal,no murmur, rub or gallop   Abdomen:   Soft, non-tender, bowel sounds active all four quadrants,  no masses, no organomegaly   Extremities: RLE lymphedema noted   Skin: Skin color normal, tattoos    Neurologic: Alert and oriented X 3, Moves all 4 extremities    Lab Results:  Personally Reviewed.   Recent Labs   Lab 08/24/21  0635 08/23/21  0737 08/22/21  0224   WBC 16.3* 13.0* 11.2*   HGB 11.7* 11.2* 11.2*   HCT 36.0* 34.6* 34.3*    256 204     Recent  Labs   Lab 08/24/21  0635 08/22/21  0224 08/21/21  0904   * 136 136   CO2 22 22 19*   BUN 4* 7* 8   ALBUMIN 2.4* 2.1* 2.1*   ALKPHOS 77 53 54   ALT 23 21 24   AST 22 21 25     No results for input(s): INR in the last 168 hours.       Total unit/floor time 25  minutes, time consisted of the following, examination of patient, reviewing the record and lab results, and completing documentation. Coordination of care time with changing medications.         Fernanda XAVIER, CNS-BC, CNP, ACHPN  Acute Care Pain Management Program Hour 7a-1700  [facility] ([place], [place], [place])   Page via [facility]- Click HERE to page Fernanda or call [phone]

## 2021-08-24 NOTE — PROGRESS NOTES
General Surgery Progress Note:    Hospital Day # 6    ASSESSMENT:   1. Cellulitis of right lower extremity    2. Elevated lactic acid level    3. Tachycardia        Romeo Chong is a 40 year old male with cellulitis and underlying presumption of osteomyelitis.  The leg needs better elevation given our inability to wrap the leg with the infection.      PLAN:   1. Continue with IV antibiotics.  2. No role for surgical soft tissue debridement at this point in time  3. Would benefit from improved elevation of the leg; could attempt to elevate it in a sling from the lift in the room, as the pillow fort doesn't seem to be cutting it.        SUBJECTIVE:   No significant changes overnight.     Patient Vitals for the past 24 hrs:   BP Temp Temp src Pulse Resp SpO2 Weight   08/24/21 0733 132/73 99.8  F (37.7  C) Oral 82 20 97 % --   08/24/21 0157 (!) 153/84 99.3  F (37.4  C) Oral 83 20 98 % --   08/23/21 2300 (!) 159/92 98.3  F (36.8  C) Oral 76 20 95 % --   08/23/21 1806 (!) 142/77 98.4  F (36.9  C) Oral 77 18 92 % --   08/23/21 1159 -- -- -- -- -- -- 136.7 kg (301 lb 7 oz)   08/23/21 1107 (!) 152/85 99.6  F (37.6  C) Oral 78 20 95 % --   08/23/21 0919 -- 100.1  F (37.8  C) Oral -- -- -- --       Physical Exam:  General: NAD, pleasant  CV:RRR  LUNGS:CTA bilaterally  EXT:Marked edema of the RLE, slightly more warmth than the left leg.  Tender with palpation up to the thigh.         Jose Krishna MD

## 2021-08-24 NOTE — PLAN OF CARE
Problem: Skin or Soft Tissue Infection  Goal: Infection Symptom Resolution  Outcome: No Change     Problem: Pain Chronic (Persistent)  Goal: Acceptable Pain Control and Functional Ability  Outcome: No Change  Intervention: Develop Pain Management Plan  Recent Flowsheet Documentation  Taken 8/24/2021 0029 by Shabnam Cates, RN  Pain Management Interventions: medication (see MAR)       Slightly elevated BP, below PRN parameters, endorses RLE pain, PRN's, elevation and rest provide intermittent relief.  Continues on IV ABX.  IS encouraged.

## 2021-08-25 LAB
ANION GAP SERPL CALCULATED.3IONS-SCNC: 8 MMOL/L (ref 5–18)
BACTERIA BLD CULT: NO GROWTH
BUN SERPL-MCNC: 6 MG/DL (ref 8–22)
CALCIUM SERPL-MCNC: 9.1 MG/DL (ref 8.5–10.5)
CHLORIDE BLD-SCNC: 102 MMOL/L (ref 98–107)
CO2 SERPL-SCNC: 25 MMOL/L (ref 22–31)
CREAT SERPL-MCNC: 0.65 MG/DL (ref 0.7–1.3)
ERYTHROCYTE [DISTWIDTH] IN BLOOD BY AUTOMATED COUNT: 14.7 % (ref 10–15)
GFR SERPL CREATININE-BSD FRML MDRD: >90 ML/MIN/1.73M2
GLUCOSE BLD-MCNC: 102 MG/DL (ref 70–125)
HCT VFR BLD AUTO: 37.3 % (ref 40–53)
HGB BLD-MCNC: 12 G/DL (ref 13.3–17.7)
MCH RBC QN AUTO: 25.7 PG (ref 26.5–33)
MCHC RBC AUTO-ENTMCNC: 32.2 G/DL (ref 31.5–36.5)
MCV RBC AUTO: 80 FL (ref 78–100)
PLATELET # BLD AUTO: 383 10E3/UL (ref 150–450)
POTASSIUM BLD-SCNC: 3.9 MMOL/L (ref 3.5–5)
RBC # BLD AUTO: 4.67 10E6/UL (ref 4.4–5.9)
SARS-COV-2 RNA RESP QL NAA+PROBE: NEGATIVE
SODIUM SERPL-SCNC: 135 MMOL/L (ref 136–145)
WBC # BLD AUTO: 13 10E3/UL (ref 4–11)

## 2021-08-25 PROCEDURE — 250N000011 HC RX IP 250 OP 636: Performed by: INTERNAL MEDICINE

## 2021-08-25 PROCEDURE — 250N000013 HC RX MED GY IP 250 OP 250 PS 637: Performed by: INTERNAL MEDICINE

## 2021-08-25 PROCEDURE — 120N000001 HC R&B MED SURG/OB

## 2021-08-25 PROCEDURE — 99232 SBSQ HOSP IP/OBS MODERATE 35: CPT | Performed by: PAIN MEDICINE

## 2021-08-25 PROCEDURE — 250N000011 HC RX IP 250 OP 636: Performed by: STUDENT IN AN ORGANIZED HEALTH CARE EDUCATION/TRAINING PROGRAM

## 2021-08-25 PROCEDURE — 99232 SBSQ HOSP IP/OBS MODERATE 35: CPT | Performed by: STUDENT IN AN ORGANIZED HEALTH CARE EDUCATION/TRAINING PROGRAM

## 2021-08-25 PROCEDURE — 99232 SBSQ HOSP IP/OBS MODERATE 35: CPT | Performed by: INTERNAL MEDICINE

## 2021-08-25 PROCEDURE — 80048 BASIC METABOLIC PNL TOTAL CA: CPT | Performed by: INTERNAL MEDICINE

## 2021-08-25 PROCEDURE — 87635 SARS-COV-2 COVID-19 AMP PRB: CPT | Performed by: INTERNAL MEDICINE

## 2021-08-25 PROCEDURE — 99231 SBSQ HOSP IP/OBS SF/LOW 25: CPT | Performed by: SURGERY

## 2021-08-25 PROCEDURE — 36415 COLL VENOUS BLD VENIPUNCTURE: CPT | Performed by: INTERNAL MEDICINE

## 2021-08-25 PROCEDURE — 250N000013 HC RX MED GY IP 250 OP 250 PS 637: Performed by: PAIN MEDICINE

## 2021-08-25 PROCEDURE — 258N000003 HC RX IP 258 OP 636: Performed by: INTERNAL MEDICINE

## 2021-08-25 PROCEDURE — 85027 COMPLETE CBC AUTOMATED: CPT | Performed by: INTERNAL MEDICINE

## 2021-08-25 RX ADMIN — MEROPENEM 2 G: 1 INJECTION, POWDER, FOR SOLUTION INTRAVENOUS at 20:18

## 2021-08-25 RX ADMIN — LINEZOLID 600 MG: 600 INJECTION, SOLUTION INTRAVENOUS at 11:42

## 2021-08-25 RX ADMIN — ENOXAPARIN SODIUM 40 MG: 100 INJECTION SUBCUTANEOUS at 16:52

## 2021-08-25 RX ADMIN — HYDROCODONE BITARTRATE AND ACETAMINOPHEN 2 TABLET: 10; 325 TABLET ORAL at 04:04

## 2021-08-25 RX ADMIN — POTASSIUM CHLORIDE 20 MEQ: 20 TABLET, EXTENDED RELEASE ORAL at 09:15

## 2021-08-25 RX ADMIN — HYDROCODONE BITARTRATE AND ACETAMINOPHEN 2 TABLET: 10; 325 TABLET ORAL at 10:45

## 2021-08-25 RX ADMIN — AMLODIPINE BESYLATE 2.5 MG: 2.5 TABLET ORAL at 09:15

## 2021-08-25 RX ADMIN — MEROPENEM 2 G: 1 INJECTION, POWDER, FOR SOLUTION INTRAVENOUS at 11:40

## 2021-08-25 RX ADMIN — HYDROCODONE BITARTRATE AND ACETAMINOPHEN 2 TABLET: 10; 325 TABLET ORAL at 23:49

## 2021-08-25 RX ADMIN — LINEZOLID 600 MG: 600 INJECTION, SOLUTION INTRAVENOUS at 23:49

## 2021-08-25 RX ADMIN — HYDROCODONE BITARTRATE AND ACETAMINOPHEN 2 TABLET: 10; 325 TABLET ORAL at 16:57

## 2021-08-25 RX ADMIN — MEROPENEM 2 G: 1 INJECTION, POWDER, FOR SOLUTION INTRAVENOUS at 04:15

## 2021-08-25 RX ADMIN — FAMOTIDINE 40 MG: 20 TABLET, FILM COATED ORAL at 09:14

## 2021-08-25 RX ADMIN — POTASSIUM CHLORIDE 20 MEQ: 20 TABLET, EXTENDED RELEASE ORAL at 20:18

## 2021-08-25 NOTE — PROGRESS NOTES
[facility] ACUTE PAIN SERVICE    ([facility], [facility], [facility])  Daily PAIN Progress Note    Assessment/Plan:  Romeo Chong is a 40 year old male who was admitted on 8/17/2021.   I was asked to see the patient for leg pain, in the setting of chronic hydrocodone since May. Admitted for cellulitis with possible sepsis. History of hypertension, lymphedema, obesity, Hodgkin's lymphoma.  Pain significantly improved with transition onto hydrocodone.     PLAN:   1) Pain is consistent with acute associated with cellulitis of lower extremity with worsening lymphedema.  Patient on chronic opioids with opioid tolerance.  The patient's home MME was 22-30mg daily. Yesterday stopped dilaudid and rotated back to hydrocodone. Pt appears to be doing well on hydrocodone. Will not offer NSAIDs, rare case studies (2003 with 21 participants) indicate NSAID's (sepcifically rofecoxib- which is now off the market) may impede timely diagnosis of nec fasc or accelerate course of infection (GAS). Although it should be noted that no proscriptive studies indicate a risk, thus the role of NSAIDs with this complex picture is unclear. Topical NSAIDs have not been studied. Alternatively, NSAIDs may benefit the pt by reducing skin thickness associated with lymphedema.  Opioids should be weaned off after treatment completed and cellulitis has improved.    2)Multimodal Medication Therapy  Topical: lidocaine cream qid   NSAID'S: none-  Reports suggest that nonsteroidal antiinflammatory drugs (NSAIDs) may impede its timely recognition and management   Adjuvants: Tylenol-stopped due to combination product use, vistaril 50 mg every 4 hours prn (pain, pruritis)  Opioids: hydromorphone -changed to hydrocodone/acetaminophen 10/325 2 tabs every 4 as needed  3)Non-medication interventions-suggest lymphedema wrapping  4)Constipation Prophylaxis- Daily stool softener/laxative  5) Follow up   -Opioid prescriber has been Pain  Specialist  -Discharge Recommendations - We recommend prescribing the following at the time of discharge: suggest weaning off opioids when able           Subjective:  Patient notes that he slept well.  He is pleased with the current pain plan. Edema has decreased. Discussed with nurse. Received text from attending MD.     Reviewed relevant literature:     MANUELA Dodge., HAILEY Brown, SIDNEY Hwang, BRITT Vasquez, LEIF Carrington, GIL Roche., VIDA Zepeda., LIV Lam., DOUGLAS Ruano., GIL Flowers., PACO Francis. (2018).  studies demonstrate the potential benefits of antiinflammatory therapy in human lymphedema. [employer], 3, https://dx.doi.org/10.1172/jci.insight.054008    Alana HERNANDES, Bloch KC. Assessing the relationship between the use of nonsteroidal antiinflammatory drugs and necrotizing fasciitis caused by group A streptococcus. [facility]). 2003 Jul;82(4):225-35. doi: 10.1097/01.[id number]. PMID: 50430965.      Active Problems:    Fibrous dysplasia of bone    Essential hypertension, benign    Lymphedema    Obesity, unspecified    H/O Hodgkin's lymphoma    Tachycardia    Cellulitis of right lower extremity    Elevated lactic acid level    Bacterial sepsis (H)    Gram-positive bacteremia     LOS: 6 days       amLODIPine  2.5 mg Oral Daily     enoxaparin ANTICOAGULANT  40 mg Subcutaneous Q24H     famotidine  40 mg Oral QAM     linezolid  600 mg Intravenous Q12H     meropenem  2 g Intravenous Q8H     polyethylene glycol  17 g Oral Daily     potassium chloride  20 mEq Oral BID       Objective:  Vital signs in last 24 hours:  Temp:  [97.9  F (36.6  C)-98.5  F (36.9  C)] 98.3  F (36.8  C)  Pulse:  [71-83] 81  Resp:  [17-20] 18  BP: (118-147)/(64-86) 130/64  SpO2:  [94 %-98 %] 94 %  Weight:   Weight:   @THISENCWEIGHTS(1)@  Weight change:   Body mass index is 42.04 kg/m .    Intake/Output last 3 shifts:  I/O last 3 completed shifts:  In: 1540 [P.O.:1540]  Out: 3075 [Urine:3075]  Intake/Output this shift:  No  intake/output data recorded.    Review of Systems:   As per subjective, all others negative.    Physical Exam:    General Appearance:  Alert, cooperative, no distress, appears stated age    Head:  Normocephalic, without obvious abnormality, atraumatic   Eyes:  PERRL, conjunctiva/corneas clear, EOM's intact   Nose: Nares normal, septum midline, mucosa normal, no drainage   Throat: Lips normal    Neck: Supple, symmetrical, trachea midline, no adenopathy, thyroid: not enlarged, symmetric    Back:   Symmetric, no curvature, ROM normal   Lungs:   Clear to auscultation bilaterally, respirations unlabored   Chest Wall:  No tenderness or deformity   Heart:  Regular rate and rhythm, S1, S2 normal,no murmur, rub or gallop   Abdomen:   Soft, non-tender, bowel sounds active all four quadrants,  no masses, no organomegaly   Extremities: RLE lymphedema noted  Improved and less painful    Skin: Skin color normal, tattoos    Neurologic: Alert and oriented X 3, Moves all 4 extremities    Lab Results:  Personally Reviewed.   Recent Labs   Lab 08/25/21  0629 08/24/21  0635 08/23/21  0737   WBC 13.0* 16.3* 13.0*   HGB 12.0* 11.7* 11.2*   HCT 37.3* 36.0* 34.6*    313 256     Recent Labs   Lab 08/25/21  0629 08/24/21  0635 08/22/21  0224 08/21/21  0904   * 135* 136 136   CO2 25 22 22 19*   BUN 6* 4* 7* 8   ALBUMIN  --  2.4* 2.1* 2.1*   ALKPHOS  --  77 53 54   ALT  --  23 21 24   AST  --  22 21 25     No results for input(s): INR in the last 168 hours.       Total unit/floor time 25 minutes, time consisted of the following, examination of patient, reviewing the record and lab results, and completing documentation. Coordination of care time with nurse. Discussed with pain pharmacist.         Fernanda Goncalves APRN, CNS-BC, CNP, ACHPN  Acute Care Pain Management Program Hour 7a-1700  [facility] ([place], [place], [place])   Page via [facility]- Click HERE to page Fernanda or call [phone]

## 2021-08-25 NOTE — PROGRESS NOTES
General Surgery Progress Note:    Hospital Day # 7    ASSESSMENT:   1. Cellulitis of right lower extremity    2. Elevated lactic acid level    3. Tachycardia        Romeo Chong is a 40 year old male with right leg edema, cellulitis.  Edema has improved over past 24 hours.      PLAN:   -Continue with elevation as often as possible  -No indication for surgical intervention for soft tissue cellulitis      SUBJECTIVE:   Romeo Chong is doing ok this morning, notes his leg hurts less and feels less swollen.  Kept his leg elevated a good portion of the past 24 hours.     Patient Vitals for the past 24 hrs:   BP Temp Temp src Pulse Resp SpO2   08/25/21 0708 130/64 98.3  F (36.8  C) Oral 81 18 94 %   08/25/21 0412 (!) 147/80 98.3  F (36.8  C) Oral 80 17 95 %   08/24/21 2355 136/86 98.5  F (36.9  C) Oral 80 20 98 %   08/24/21 2036 118/66 98.1  F (36.7  C) Oral 71 20 97 %   08/24/21 1554 132/81 97.9  F (36.6  C) Oral 83 20 94 %       Physical Exam:  General: NAD, pleasant  CV:RRR  LUNGS:CTA bilaterally  EXT:Still significant edema, but decreased compared to yesterday's exam.   Tender, but again less tenderness th an yesterday.      No results displayed because visit has over 200 results.           Jose Krishna MD

## 2021-08-25 NOTE — PROGRESS NOTES
Progress Note    Assessment/Plan  Gram-positive sepsis secondary due to right leg cellulitis, acute on chronic chronic osteomyelitis of the distal femur and proximal tibia along with myositis and fasciitis as seen on MRI of the thigh.  --Improving on  meropenem and Zyvox.  Continue raising the leg with sling.  Patient encouraged to walk.  Follow-up with lymphedema clinic as outpatient.  --WBC is improving from 16-13.  -Repeat CT of the right hand done on 8/23 does not show necrotizing fasciitis or abscess  --No evidence of necrotizing fasciitis as per surgery.  Avoid NSAIDs use since it is associated with poor outcomes in patient with lymphedema and cellulitis.  Continue g  --No PICC line yet.  --Repeat duplex scan done on 8/24 was negative for DVT of the right leg.  --Recurrent in nature patient claims that he has had similar infections about 7 times in the past.  Requested records from [facility] [place] about 7 days ago.  Did not receive any discharge summary.  Did receive list of medication which is not much use.  --Initially treated with IV aztreonam, Rocephin and doxycycline and then changed to Rocephin with metronidazole  ---Blood cultures grew group B streptococcus sensitive to cephalosporins  --Orthopedic recommended transfer to [facility] versus [facility] if patient does not show clinical improvement.  ID recommending transfer to [facility] if patient is agreeable.Unable to place the patient at [facility], [facility] and [facility] due to lack of bed.  Multiple attempts made      Acute dyspnea on IV fluids  --Resolved with incentive spirometry and 1 dose of IV Lasix.  Avoid IV fluids for now  --Chest x-ray shows infiltrate in the right costophrenic angle.  Differential diagnosis include atelectasis, fluid overload versus pneumonia patient changed to meropenem on 8/22.  --Patient currently on IV antibiotics        Lactic acidosis resolved  --DC IV fluids    Tachycardia  --Resolved with  beta-blockers  --DC metoprolol today  --Of IV fluids due to shortness of breath    Hypokalemia mild  --Resolved with scheduled potassium chloride  --Patient claims he takes 30 mEq/day at home.  Increase potassium 20 mg to twice daily  --Stable    History of Hodgkin's lymphoma  --Diagnosed 15 years ago  --Stage I or II way as per oncology consultation  --No inpatient work-up necessary  --CT of the abdomen pelvis shows multiple enlarged and inflamed lymph nodes of the abdominal retroperitoneum measure up to 2.3 cm near the IVC. Inflamed retroperitoneal lymphadenopathy in the pelvis near the iliac vessels is asymmetric on the right, notably the same side as    the presumed infectious process involving the right leg on the prior MR lower extremity study from today. The largest right external iliac chain lymph node is 3.2 cm. The largest included right inguinal lymph node is 3.0 cm. Right inguinal   lymphadenectomy clips are present.     Acute on chronic pain   pain control issues  --Patient currently on Toradol and scheduled Tylenol with as needed hydromorphone, Lyrica Vistaril and nortriptyline  --Appreciate pain team input  --Monitor BMP while on Toradol    Essential hypertension suboptimal control  --Improved after IV Lasix  --De-escalate beta-blockers    Inguinal lymphadenopathy causing right lymphedema  CT of the abdomen pelvis shows multiple enlarged and inflamed lymph nodes of the abdominal retroperitoneum measure up to 2.3 cm near the IVC. Inflamed retroperitoneal lymphadenopathy in the pelvis near the iliac vessels is asymmetric on the right, notably the same side as    the presumed infectious process involving the right leg on the prior MR lower extremity study from today. The largest right external iliac chain lymph node is 3.2 cm. The largest included right inguinal lymph node is 3.0 cm. Right inguinal   lymphadenectomy clips are present.   --Clinical importance is unclear but likely causing lymphedema  "leading to recurrent cellulitis  --Agree with sling to keep the leg right leg elevated.  --May need regular follow-up with lymphedema clinic as outpatient  --Patient advised to ambulate    Morbid obesity with BMI of 43.54 kg/m     Moderate protein calorie malnutrition with albumin of 2.4  --Continue protein supplement    Hyponatremia mild  --Stable even after normal saline bolus.    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  Did  the patient today.  Will decrease Lovenox to once daily to increase adherence.  Repeat duplex ultrasound x2 of the right lower extremity is negative for DVT        Barriers to discharge: Change to oral antibiotics only    Anticipated discharge date: 1 to 2 days        Subjective    No fever for 48 hours.  WBC is improving to 13.  Overall patient feels better.  Leg swelling is improving with raising the legs.  Patient is encouraged to walk. Duplex US of RLE is negative.  Denies any chills, night sweats chest pain shortness of breath.    Currently on IV meropenem and Zyvox      Objective    /64 (BP Location: Left arm)   Pulse 81   Temp 98.3  F (36.8  C) (Oral)   Resp 18   Ht 1.803 m (5' 11\")   Wt 136.7 kg (301 lb 7 oz)   SpO2 94%   BMI 42.04 kg/m    Weight:   Wt Readings from Last 5 Encounters:   08/23/21 136.7 kg (301 lb 7 oz)   07/29/21 122.5 kg (270 lb)   07/06/21 126.6 kg (279 lb)   06/25/21 126.9 kg (279 lb 11.2 oz)   06/23/21 127 kg (280 lb)       I/O last 3 completed shifts:  In: 1540 [P.O.:1540]  Out: 3075 [Urine:3075]  I/O this shift:  In: 360 [P.O.:360]  Out: 950 [Urine:950]          Physical Exam  No apparent distress alert oriented x3  Does not appear to be in respiratory distress  Body mass index is 42.04 kg/m .  Does not appear to be in distress  No sinus tenderness  Moist membranes  Right leg edema has gone down with wrinkling seen.  Much " improved tenderness and induration    neck supple  CVS: S1 S2-N, no murmurs, gallops, rubs  Resp: Lungs are clear to auscultation  abd: soft, No t/g/r  Neuro: no involuntary movements such as tremors  Severe right leg lymphedema  Pertinent Labs  ----------------------  Recent Labs   Lab 08/25/21  0629 08/24/21  0635 08/23/21  0737 08/22/21  0224 08/21/21  0904   * 135*  --  136 136   POTASSIUM 3.9 3.9 3.6 3.2*  3.2* 3.2*   CO2 25 22 --  22 19*   BUN 6* 4*  --  7* 8   CR 0.65* 0.64*  --  0.69* 0.63*    90  --  91 89   ALBUMIN  --  2.4*  --  2.1* 2.1*   BILITOTAL  --  0.6  --  0.4 0.5   ALKPHOS  --  77  --  53 54   ALT  --  23  --  21 24   AST  --  22  --  21 25     Recent Labs   Lab 08/25/21  0629 08/24/21  0635 08/23/21  0737   WBC 13.0* 16.3* 13.0*   HGB 12.0* 11.7* 11.2*   HCT 37.3* 36.0* 34.6*    313 256     No results for input(s): INR in the last 168 hours.  Glucose Values Latest Ref Rng & Units 8/18/2021 8/19/2021 8/20/2021 8/21/2021 8/22/2021 8/24/2021 8/25/2021   Bedside Glucose (mg/dl )  - -- -- -- -- -- -- --   GLUCOSE 70 - 125 mg/dL 163(H) 111 85 89 91 90 102   Some recent data might be hidden         Pertinent Radiology   Radiology Results: Personally reviewed impression/s  MR Ankle Right w/o & w Contrast    Result Date: 8/19/2021  EXAM: MR ANKLE RIGHT W/O and W CONTRAST LOCATION: [facility] DATE/TIME: 8/18/2021 10:18 PM INDICATION: Right lower extremity pain and swelling. Tibial osteomyelitis. COMPARISON: MRI of the tibia fibula 08/18/2021 TECHNIQUE: Routine. Additional postgadolinium T1 sequences were obtained. IV CONTRAST: 10ml gadavist FINDINGS: There is extensive subcutaneous edema with enhancement surrounding the ankle and extending into the foot and lower leg compatible with cellulitis. There is some poorly defined fluid within the subcutaneous fat about the ankle. No well-defined organized fluid collection to suggest an abscess. No involvement  of the deeper soft tissues. No associated marrow abnormality to suggest osteomyelitis. There is a small focus of subchondral marrow edema and likely developing cystic change involving the cuboid adjacent to the calcaneocuboid joint. No joint effusion or synovitis. Mild chronic thickening of the Achilles tendon. No abnormal tendon sheath fluid.     IMPRESSION: 1.  Extensive cellulitis. 2.  No focal abscess. 3.  No evidence of osteomyelitis.    MR Femur Thigh Right wo & w Contrast    Result Date: 8/19/2021  FINAL REPORT I agree with the preliminary report. There are findings highly suspicious for acute on chronic osteomyelitis involving the distal aspect of the right femur. Linear areas of signal abnormality may represent small sinus tract. There are surrounding soft tissue changes in the distal, anterior thigh which can be seen with infection including myositis and fasciitis. Superimposed pathologic fracture of the right femur may also be present as there is some cortical offset in the distal femoral shaft. Correlation with CT of the femur may be useful for further assessment of possible distal femoral fracture. Subcutaneous edema diffusely throughout the right thigh distally and anteriorly in the right thigh with reactive lymphadenopathy right inguinal region. Final Interpretation Dictated By: Lee Leos Date: 8/19/2021 PRELIMINARY REPORT EXAM: MR FEMUR THIGH RIGHT WO AND W CONTRAST LOCATION: [facility] DATE/TIME: 8/18/2021 10:18 PM INDICATION: Cellulitis with sepsis with elevated lactate and right leg pain. Evaluate for osteomyelitis or deep tissue space infection. COMPARISON: MRI of the tibia and fibula 08/18/2021. TECHNIQUE: Routine. Additional postgadolinium T1 sequences were obtained. IV CONTRAST: 10 ml. FINDINGS: JOINTS AND BONES: -Replacement of the normal T1 signal involving the distal right femur particularly along the lateral aspect of the distal right femur above the level of  the condyles (series 2, image 54). There is associated reactive edema in this area. Additionally, there appears to be breach of the cortex by linear fluid extending along the anterior cortex and through the anterior cortex into the intramedullary space (series 5, image 48-50). These findings are suspicious for osteomyelitis with draining sinus tracts. More proximal aspect of the right femur demonstrates no evidence for replacement of the normal T1 signal or evidence for intramedullary edema. No fracture or dislocation. TENDONS: -No tendon tear, tendinopathy, or tenosynovitis. MUSCLES AND SOFT TISSUES: -Diffuse muscular edema involving the anterior compartment of the distal right thigh particularly surrounding the area of the draining presumed sinus tracts. There is small amount of fluid between the muscle and the anterior cortex of the distal right femur. Minimal amount of interfascial fluid involving the distal medial and lateral aspects of the right thigh. Severe subcutaneous edema diffusely throughout the right thigh. Moderate fluid involving the subcutaneous tissues of the mid and anterior right thigh. Enlarged right inguinal lymph node, likely reactive.     IMPRESSION: 1.  Findings highly suspicious for osteomyelitis involving the distal aspect of the right femur with replacement of normal T1 signal distally and surrounding reactive edema. Additionally, there are linear areas of increased T2 signal abnormality extending through the anterior cortex possibly representing small sinus tracts draining from the right femur suggesting chronic osteomyelitis. There is cortical thickening throughout this area and fluid along the anterior cortex in the distal right thigh. 2.  Additionally, there is diffuse edema in the anterior musculature of the distal right thigh with minimal layering fluid which can be seen with myositis and fasciitis. 3.  Subcutaneous edema diffusely throughout the right thigh distally and anteriorly  in the right thigh with reactive lymphadenopathy right inguinal region. 4.  Final report following review by musculoskeletal radiologist. Preliminary Interpretation Dictated By: Curt L. Behrns, MD Date: 8/19/2021    MR Tibia Fibula Lower Leg Right wo & w Contr    Result Date: 8/19/2021  FINAL REPORT I agree with the preliminary report. There are signal changes in the right proximal tibia which raises concern for an acute on chronic osteomyelitis. Additional soft tissue findings suspicious for draining sinus tracts as described. Final Interpretation Dictated By: Lee Leos Date: 8/19/2021 PRELIMINARY REPORT EXAM: MR TIBIA FIBULA LOWER LEG RIGHT WO AND W CONTR LOCATION: [facility] DATE/TIME: 8/18/2021 10:18 PM INDICATION: Evaluate for osteomyelitis involving the tibia and fibula with severe right lower extremity pain and soft tissue swelling. COMPARISON: None. TECHNIQUE: Routine. Additional postgadolinium T1 sequences were obtained. IV CONTRAST: 10 ml Gadavist. FINDINGS: BONES: -Heterogeneous areas of decreased T1 signal throughout the proximal right tibia with irregularity to the cortex with linear fluidlike areas in the anterior right cortex. These linear areas of fluid in the anterior medial right cortex (series 6, image 13-17) are suspicious for linear draining sinus tracts. Overall findings are suspicious for osteomyelitis in the proximal right tibia, likely chronic. Distal right tibia and fibula demonstrate no evidence for other areas suspicious for osteomyelitis. SOFT TISSUES:  -Severe subcutaneous edema diffusely throughout the right lower extremity greatest near the mid to lower right lower extremity down to the level of the ankle. MUSCLES: -No abnormality in the visualized musculature.     IMPRESSION: 1.  Heterogeneous decreased T1 signal throughout the proximal right tibia with irregularity of the anterior cortex with draining linear areas of fluid signal intensity into the soft  tissue suspicious for draining sinus tracts from an area of suspected chronic osteomyelitis in the proximal right tibia. 2.  Severe subcutaneous edema right lower extremity. 3.  Final report following review by musculoskeletal radiologist. Preliminary Interpretation Dictated By: Curt L. Behrns, MD Date: 8/19/2021     CT Femur Thigh Right w/o Contrast    Result Date: 8/19/2021  EXAM: CT FEMUR THIGH RIGHT WITHOUT CONTRAST LOCATION: [facility] DATE/TIME: 8/19/2021 7:43 PM INDICATION: Upper leg pain, stress fracture suspected, negative x-ray. COMPARISON: None. TECHNIQUE: Noncontrast. Axial, sagittal and coronal thin-section reconstruction. Dose reduction techniques were used. FINDINGS: There is cortical deformity involving the femoral distal diametaphysis with intramedullary lucency and sclerosis. This is of indeterminate etiology but could be related to an old healed fracture. Chronic osteomyelitis might have this appearance. No evidence of acute fracture. The remainder of the femur appears within normal limits. Similar sclerosis and lucency is noted in the proximal tibia, only partly included on the scan, also of indeterminate etiology. Subcutaneous stranding and  nodularity is noted throughout the thigh. Immediately distal to the inguinal region, there is a 3.8 x 2.6 cm soft tissue density nodule within the deep subcutaneous fat. External iliac lymphadenopathy is suspected on the proximal edge of the scan.     IMPRESSION: 1.  Distal femoral region of mild cortical deformity and adjacent intramedullary lucency and sclerosis of indeterminate etiology. Possibilities include an old healed fracture. Chronic osteomyelitis might have a similar appearance. A similar finding is seen within the proximal tibia but only partly included on the scan. 2.  No evidence of femoral fracture. 3.  Suspected pelvic external iliac lymphadenopathy, not well evaluated with this scan. There is also a 3.8 x 2.6 cm soft  tissue mass in the anteromedial aspect of the upper thigh/distal inguinal region. Immediately proximal to this, there are multiple surgical clips. 4.  Extensive subcutaneous stranding and nodularity circumferentially within the thigh, greatest distally.     Echocardiogram Complete    Result Date: 2021  [id number]^JOSE^[place] [address]  Name: CABRERA GHOSH MRN: [id number] : 1980 Study Date: 2021 08:46 AM Age: 40 yrs Gender: Male Patient Location: [facility] Reason For Study: Endocarditis Ordering Physician: VANNA GARCIA Performed By: CANDIDO  BSA: 2.5 m2 Height: 71 in Weight: 308 lb HR: 107 BP: 114/59 mmHg ______________________________________________________________________________ Procedure Definity (NDC #33967-251) given intravenously. Complete Echo Adult. Technically difficult study.Extremely difficult acoustic windows despite the use of contrast for endcardial border definition. ______________________________________________________________________________ Interpretation Summary  1. Left ventricular size, wall thickness, and systolic function are normal. The estimated left ventricular ejection fraction is 55%. 2. Right ventricular size and systolic function are normal. 3. No hemodynamically significant valvular abnormalities. 4. No prior study available for comparison. ______________________________________________________________________________ I      WMSI = 1.00     % Normal = 100  X - Cannot   0 -                      (2) - Mildly 2 -          Segments  Size Interpret    Hyperkinetic 1 - Normal  Hypokinetic  Hypokinetic  1-2     small                                                    7 -          3-5    moderate 3 - Akinetic 4 -          5 -         6 - Akinetic Dyskinetic   6-14    large              Dyskinetic   Aneurysmal  w/scar       w/scar       15-16   diffuse  Left Ventricle The left ventricle is normal in  size. There is mild concentric left ventricular hypertrophy. Left ventricular systolic function is normal. The visual ejection fraction is 55-60%. Diastolic function not assessed due to tachycardia. No regional wall motion abnormalities noted.  Right Ventricle Normal right ventricle size and systolic function.  Atria Normal left atrial size. Right atrial size is normal.  Mitral Valve Mitral valve leaflets appear normal. There is trace mitral regurgitation. There is no mitral valve stenosis.  Tricuspid Valve Tricuspid valve leaflets appear normal. There is trace to mild tricuspid regurgitation. Right ventricle systolic pressure estimate normal. The right ventricular systolic pressure is elevated at 12.4 mmHg. There is no tricuspid stenosis.  Aortic Valve Aortic valve leaflets appear normal. No aortic regurgitation is present. No aortic stenosis is present.  Pulmonic Valve The pulmonic valve is not well visualized. There is trace pulmonic valvular regurgitation. There is no pulmonic valvular stenosis.  Vessels The aorta root is normal. IVC diameter <2.1 cm collapsing >50% with sniff suggests a normal RA pressure of 3 mmHg.  Pericardium There is no pericardial effusion.  Rhythm The rhythm was sinus tachycardia.  ______________________________________________________________________________ MMode/2D Measurements & Calculations RVDd: 4.2 cm IVSd: 1.2 cm LVIDd: 4.5 cm LVIDs: 3.0 cm LVPWd: 1.2 cm FS: 32.9 %  LV mass(C)d: 203.0 grams LV mass(C)dI: 80.1 grams/m2 Ao root diam: 3.9 cm LA dimension: 3.6 cm asc Aorta Diam: 3.1 cm LA/Ao: 0.93 LVOT diam: 2.3 cm LVOT area: 4.0 cm2 LA Volume Indexed (AL/bp): 27.4 ml/m2 RWT: 0.54  Doppler Measurements & Calculations Ao V2 max: 156.1 cm/sec Ao max PG: 10.0 mmHg Ao V2 mean: 105.9 cm/sec Ao mean P.3 mmHg Ao V2 VTI: 28.7 cm GREGORIO(I,D): 3.4 cm2 GREGORIO(V,D): 3.3 cm2 LV V1 max P.5 mmHg LV V1 max: 127.9 cm/sec LV V1 VTI: 24.3 cm  SV(LVOT): 97.4 ml SI(LVOT): 38.4 ml/m2 PA acc time: 0.12  sec TR max aaron: 176.2 cm/sec TR max P.4 mmHg AV Aaron Ratio (DI): 0.82 GREGORIO Index (cm2/m2): 1.3  ______________________________________________________________________________ Report approved by: Miladis Holguin 2021 09:44 AM       XR Chest Port 1 View    Result Date: 2021  EXAM: XR CHEST PORT 1 VIEW LOCATION: [facility] DATE/TIME: 2021 10:45 AM INDICATION: Shortness of breath. COMPARISON: 2021     IMPRESSION: New suspicious area of increased opacity right lung base laterally near the right costophrenic angle could represent a small focal area of alveolar infiltrate related to pneumonia in the proper clinical setting. Left lung clear. Normal heart size and pulmonary vascularity. No overt osseous abnormality.    CT Chest Abdomen Pelvis w/o Contrast    Result Date: 2021  EXAM: CT CHEST ABDOMEN PELVIS W/O CONTRAST LOCATION: [facility] DATE/TIME: 2021 11:42 PM INDICATION: Sepsis COMPARISON: CTA chest 2013. CT abdomen and pelvis 2005. Chest radiographs 2021. MR of the right lower extremity earlier today. TECHNIQUE: CT scan of the chest, abdomen, and pelvis was performed without IV contrast. Multiplanar reformats were obtained. Dose reduction techniques were used. CONTRAST: None. FINDINGS: LUNGS AND PLEURA: Mild atelectasis bilaterally. Lungs otherwise clear. No pleural fluid or pneumothorax. MEDIASTINUM/AXILLAE: There is lobulated soft tissue intermingled with lobules of fat in the left anterior mediastinum which measures up to 7.5 x 3.6 cm on axial images (image 51 series 4). A similar finding with less intermixed fat measured up to 6.7 x 3.2 cm on 2013. There are now enlarged lymph nodes extending superiorly lateral to the left common carotid artery and internal jugular vein which measure up to 2 cm axillary dissection clips on the left. (image 16 of series 4). CORONARY ARTERY CALCIFICATION: None.  HEPATOBILIARY: Hepatic steatosis. Normal gallbladder and bile ducts. PANCREAS: Normal. SPLEEN: Normal. ADRENAL GLANDS: Normal. KIDNEYS/BLADDER: Excreted contrast in the urinary system from a prior study. Kidneys and bladder otherwise normal. BOWEL: Normal. No obstruction or inflammation. Normal appendix. LYMPH NODES: Multiple enlarged and inflamed lymph nodes of the abdominal retroperitoneum measure up to 2.3 cm near the IVC. Inflamed retroperitoneal lymphadenopathy in the pelvis near the iliac vessels is asymmetric on the right, notably the same side as  the presumed infectious process involving the right leg on the prior MR lower extremity study from today. The largest right external iliac chain lymph node is 3.2 cm. The largest included right inguinal lymph node is 3.0 cm. Right inguinal lymphadenectomy clips are present. VASCULATURE: Unremarkable. PELVIC ORGANS: Normal. MUSCULOSKELETAL: Edema of the subcutaneous fat of the right groin and included anterior right thigh consistent with cellulitis. This was more fully imaged on the MR examination from earlier today.     IMPRESSION: 1.  Inflammatory lymphadenopathy of the retroperitoneum of the abdomen, right pelvis, and right inguinal station likely secondary to the presumed infectious process involving the right leg as demonstrated on the MR examination from earlier today. Involvement with lymphoma is not excluded. 2.  Lobulated soft tissue of the anterior mediastinum has mildly increased in size compared back to the remote study of 04/22/2013. There are now some enlarged lymph nodes superior to this near the left carotid and jugular vessels. Findings are concerning for active lymphoma. Clinical correlation recommended. 3.  Hepatic steatosis. I discussed the findings with at on .    EKG Results: not reviewed.

## 2021-08-25 NOTE — PROGRESS NOTES
[facility] ID Inpatient follow up       Patient:  Romeo Chong  Date of birth 1980, Medical record number [id number]  Date of Visit:  08/25/2021  Attending Physician: Tony Herndon MD         Assessment and Recommendations:   Assessment:  Romeo Chong is a 40 year old male with   1.  History of Hodgkin lymphoma diagnosed 15 years ago.  2.  Right lower extremity lymphedema secondary to inguinal lymphadenopathy from Hodgkin lymphoma?  3.  Multiple intra-abdominal and retroperitoneal adenopathy  4.  Morbid obesity with BMI of 43.54.  5.  Right lower extremity cellulitis with osteomyelitis.  MRI R femur with findings suggestive of osteomyelitis of the distal right femur.   Clinically felt worse with persistent fever, increased white count and pain quite out of proportion for clinical appearance on 8/23.  CK was however normal.  And CT scan of the lower extremity did not show any worsening disease.  Surgery saw and had low suspicion for necrotizing fasciitis.  Input is greatly appreciated.  IV linezolid added on 8/23/2021.  Initial plan was to add clindamycin for antitoxin activity but the patient reports significant reaction to clindamycin although no documentation on this.  Feels much better. Fever resolved. WBC improved today.  Continue to feel better.  6.  Streptococcus agalactiae bacteremia.  Positive blood culture on 8/17.  Follow blood cultures on 8/18 -19 -20 -21 no growth.   7.  Possible chronic osteomyelitis of the proximal R tibia and distal right femur as seen on MRI.  Has no known sinus tract.  Chronic osteomyelitis is unlikely.    Antimicrobials:  Linezolid 8/23-  Meropenem: 8/22-  Ceftriaxone: 8/18-21  Ancef 8/21-22  Flagyl 8/19-21  Zosyn 1 dose 8/18  Aztreonam 8/18-19        Recommendations:  1. Continue IV meropenem and linezolid for now.  2. Nortriptyline on hold for now  3. Input from surgery is appreciated.  Elevation of the leg.  4. Hoping to change to p.o.  antibiotic tomorrow for discharge.    Discussed with the patient, nursing staff.     ID will follow.      Maegan Sandoval MD.  [facility].   [facility]  Office Telephone [phone].  Fax [phone]  [facility] paging            Interval History:     HPI:  The interval history was reviewed.   Continues to feel much better.  Fever resolved for now 36 hours.  Very grateful for improvement.  MRI results also reviewed with the patient.  He has not had any draining sinus along the incision on his right lower extremity.  Has no known sinus tract.    Pertinent cultures include:  No results found for: CULT    Recent Inflammatory Biomarkers:   Recent Labs   Lab Test 21  0629 21  0635 21  0737 21  0224 21  0904 21  1827 21  0746 21  1410   CRP  --   --   --   --   --  44.1* 41.3* 20.0*   PCAL  --   --   --   --   --   --   --  16.05*   WBC 13.0* 16.3* 13.0* 11.2* 12.1* 12.1* 12.5* 11.3*            Review of Systems:   CONSTITUTIONAL:    Temp Max: Temp (24hrs), Av.5  F (37.5  C), Min:98.3  F (36.8  C), Max:102.2  F (39  C)  ROS negative except for findings in the HPI.         Current Medications (antimicrobials listed in bold):       amLODIPine  2.5 mg Oral Daily     enoxaparin ANTICOAGULANT  40 mg Subcutaneous Q24H     famotidine  40 mg Oral QAM     linezolid  600 mg Intravenous Q12H     meropenem  2 g Intravenous Q8H     polyethylene glycol  17 g Oral Daily     potassium chloride  20 mEq Oral BID              Allergies:     Allergies   Allergen Reactions     Vancomycin Anaphylaxis     Peanut Oil Itching     Tree Nuts [Nuts] Itching     Erythromycin Unknown     Latex Unknown     Added based on information entered during case entry, please review and add reactions, type, and severity as needed     Other Environmental Allergy Unknown     DUST     Pollen Extracts [Pollen Extract] Unknown     Zosyn  [Piperacillin-Tazobactam In D5w] Tinnitus and Itching     Oxycodone Itching and Rash            Physical Exam:   Vitals were reviewed  Patient Vitals for the past 24 hrs:   BP Temp Temp src Pulse Resp SpO2   08/25/21 0708 130/64 98.3  F (36.8  C) Oral 81 18 94 %   08/25/21 0412 (!) 147/80 98.3  F (36.8  C) Oral 80 17 95 %   08/24/21 2355 136/86 98.5  F (36.9  C) Oral 80 20 98 %   08/24/21 2036 118/66 98.1  F (36.7  C) Oral 71 20 97 %   08/24/21 1554 132/81 97.9  F (36.6  C) Oral 83 20 94 %       Physical Examination:  Gen: Pleasant in a moderate distress.  Looks toxic.  HEENT: NCAT. EOMI. PERRL.  Neck: No bruit, JVD or thyromegaly.  Lungs: Clear to ascultation bilat with no crackles or wheezes.  Card: RRR. NSR. No RMG. Peripheral pulses present and symmetric. No edema.  Abd: Soft NT ND. No mass. Normal bowel sounds.  Skin: No rash.  Extr: Thigh less painful compared to yesterday.  Leg elevated with some wrinkles on the medial aspect of the leg.  A questionable phlegmon on the lateral aspect of the leg.  Neuro: Alert and oriented to place time and person. Cranial nerves II to XII intact. Motor and sensory intact. Normal gait.           Laboratory Data:   ID Labs:  Recent Labs   Lab Test 08/20/21  1827 08/19/21  0746 08/18/21  1410   CRP 44.1* 41.3* 20.0*     Recent Labs   Lab Test 08/25/21  0629 08/24/21  0635 08/23/21  0737 08/22/21  0224 08/21/21  0904 08/20/21  1827   WBC 13.0* 16.3* 13.0* 11.2* 12.1* 12.1*     Recent Labs   Lab Test 08/25/21  0629 08/24/21  0635 08/22/21  0224 08/21/21  0904   CR 0.65* 0.64* 0.69* 0.63*   GFRESTIMATED >90 >90 >90 >90       Hematology Studies  Recent Labs   Lab Test 08/25/21  0629 08/24/21  0635 08/23/21  0737 08/22/21  0224 08/21/21  0904 08/20/21  1827 08/19/21  0746   WBC 13.0* 16.3* 13.0* 11.2* 12.1* 12.1* 12.5*   ANEU  --   --   --  9.6* 11.0* 11.5* 10.0*   AEOS  --   --   --  0.0 0.0 0.0 0.0   HCT 37.3* 36.0* 34.6* 34.3* 33.9* 34.9* 40.8    313 256 204 172 167 203        Metabolic  Recent Labs   Lab Test 08/25/21  0629 08/24/21  0635 08/22/21  0224   * 135* 136   BUN 6* 4* 7*   CO2 25 22 22   CR 0.65* 0.64* 0.69*   GFRESTIMATED >90 >90 >90       Hepatic Studies  Recent Labs   Lab Test 08/24/21  0635 08/22/21  0224 08/21/21  0904   BILITOTAL 0.6 0.4 0.5   ALKPHOS 77 53 54   ALBUMIN 2.4* 2.1* 2.1*   AST 22 21 25   ALT 23 21 24     Blood Culture Line, venous  Order: [id number]  Collected:  8/17/2021 10:56 PM Status:  Final result   Visible to patient:  Yes (MyChart)  Specimen Information: Line, venous; Blood         0 Result Notes  Culture Positive on the 1st day of incubationAbnormal        Streptococcus agalactiae (Group B Streptococcus)Panic     2 of 2 bottles      Resulting Agency: [facility]   Susceptibility     Streptococcus agalactiae (Group B Streptococcus)     MEAGAN     Ampicillin 0.12 ug/mL Susceptible     Cefotaxime <=0.25 ug/mL Susceptible     Ceftriaxone <=0.25 ug/mL Susceptible     Clindamycin >0.5 ug/mL Resistant     Meropenem <=0.06 ug/mL Susceptible     Penicillin 0.06 ug/mL Susceptible     Vancomycin 0.5 ug/mL Susceptible                 Specimen Collected: 08/17/21 10:56 PM Last Resulted: 08/21/21  9:32 AM               ImmunologlobulinsNo lab results found.         Imaging Data:     Reviewed  Study Result    Narrative & Impression   EXAM: CT FEMUR THIGH RIGHT WITH CONTRAST  LOCATION: [facility]  DATE/TIME: 8/23/2021 11:51 AM     INDICATION: Right lower extremity cellulitis and leg swelling. Suspected osteomyelitis. History of lymphoma.  COMPARISON: CT 8/19/2021, MRI 8/18/2021.  TECHNIQUE: Noncontrast. Axial, sagittal and coronal thin-section reconstruction. Dose reduction techniques were used.      FINDINGS: Extensive subcutaneous soft tissue stranding involving the right thigh and visualized lower leg with nodularity or small foci of fluid, mainly distally, is unchanged. Right iliac, inguinal, and proximal thigh lymphadenopathy is  unchanged. There   is some surrounding soft tissue stranding suggesting inflammatory change. There are surgical clips in the right inguinal region.     Chronic deformity with mixed lucency and chronic appearing sclerosis of the distal right femur and the visualized proximal right tibia unchanged. Lucent cortical defects and/or sinus tracts within the distal right femur are stable. There is an old screw   track in the distal femur. Suspected additional postoperative tracks in the proximal tibia. No new bony destruction.     There is an 8 mm circumscribed lytic lesion in the right iliac wing on series 4, image 1; this area was not included on the prior exams.                                                                      IMPRESSION:  1.  Overall no significant change in the right thigh since prior. There is extensive subcutaneous soft tissue stranding in the right thigh and lower leg which could represent cellulitis or edema. Associated foci of subcutaneous nodularity or fluid   unchanged.  2.  Chronic deformity, mixed lucency and sclerosis of the distal right femur and proximal tibia are unchanged.  3.  Small cortical defects and/or sinus tracts in the distal right femur are stable and may be related to superimposed acute osteomyelitis as suggested on previous MRI.  4.  Right inguinal and iliac lymphadenopathy stable.  5.  Small indeterminate lytic lesion right iliac wing, not included on prior exams.

## 2021-08-25 NOTE — PROGRESS NOTES
Care Management Follow Up    Length of Stay (days): 7    Expected Discharge Date: 08/30/2021     Concerns to be Addressed:     Sepsis, lymphedema  Patient plan of care discussed at interdisciplinary rounds: Yes    Anticipated Discharge Disposition:  Home with family     Anticipated Discharge Services:  TBD  Anticipated Discharge DME:      Education Provided on the Discharge Plan:  yes  Patient/Family in Agreement with the Plan: yes     Referrals Placed by CM/SW:  none  Private pay costs discussed: Not applicable    Additional Information:  SW met with pt in pt room, pt agreeable to visit. Pt states he lives with his aunt and has many family members in the area that will provide him with support at discharge. He declines need for services once home stating he has the home therapy exercises and recommendations from previously. Pt has lymph wraps at home and knows how to apply.  He does not want home care. Pt had questions about resources, SW provided pt with resources and how to access. Pt to discharge to his aunts home with family to transport.      AGA Moon

## 2021-08-25 NOTE — PLAN OF CARE
Shift from 0700 to 1930-  Problem: Pain Chronic (Persistent)  Goal: Acceptable Pain Control and Functional Ability  Outcome: Improving  Intervention: Develop Pain Management Plan  Recent Flowsheet Documentation  Taken 8/25/2021 1122 by Tricia Cotton, RN  Pain Management Interventions:   medication (see MAR)   declines   emotional support  Taken 8/25/2021 1045 by Tricia Cotton, RN  Pain Management Interventions:   medication (see MAR)   emotional support  Intervention: Manage Persistent Pain  Recent Flowsheet Documentation  Taken 8/25/2021 0845 by Tricia Cotton, RN  Bowel Elimination Promotion:   adequate fluid intake promoted   ambulation promoted

## 2021-08-25 NOTE — PLAN OF CARE
Shift from 0700 to 1530-    Problem: Pain Chronic (Persistent)  Goal: Acceptable Pain Control and Functional Ability  8/25/2021 1620 by Tricia Cotton RN  Outcome: Improving  8/25/2021 1616 by Tricia Cotton RN  Outcome: Improving  Intervention: Develop Pain Management Plan  Recent Flowsheet Documentation  Taken 8/25/2021 1122 by Tricia Cotton, RN  Pain Management Interventions:   medication (see MAR)   declines   emotional support  Taken 8/25/2021 1045 by Tricia Cotton RN  Pain Management Interventions:   medication (see MAR)   emotional support  Intervention: Manage Persistent Pain  Recent Flowsheet Documentation  Taken 8/25/2021 0845 by Tricia Cotton, RN  Bowel Elimination Promotion:   adequate fluid intake promoted   ambulation promoted    RLE pain and swelling improving.   Pain controlled with norco x1 dose today.     Patient needs a lot encouragement to ambulate in hallway. Up in room to bathroom. Elevating RLE today.     Continued on IV antibiotics. He is really happy about improvements.   Patient on scheduled Kdur and K+ protocol. K+ 3.9; Recheck in am.

## 2021-08-25 NOTE — PLAN OF CARE
Problem: Pain Chronic (Persistent)  Goal: Acceptable Pain Control and Functional Ability  Intervention: Develop Pain Management Plan  Recent Flowsheet Documentation  Taken 8/24/2021 2130 by Shoshana Castro, RN  Pain Management Interventions:   medication (see MAR)   repositioned   PRN norco given for RLE discomfort.  See MAR.  Pt report effective.    Atarax declined.  Pt preferring trendelenburg positioning for RLE at heart level.  Pt reports this was recommended by PT.    Weak palatable right pedal pulse.  +2 edema in that RLE.      Shoshana Castro, RN

## 2021-08-26 VITALS
WEIGHT: 301.44 LBS | SYSTOLIC BLOOD PRESSURE: 134 MMHG | HEART RATE: 78 BPM | OXYGEN SATURATION: 98 % | RESPIRATION RATE: 18 BRPM | HEIGHT: 71 IN | DIASTOLIC BLOOD PRESSURE: 70 MMHG | TEMPERATURE: 97.9 F | BODY MASS INDEX: 42.2 KG/M2

## 2021-08-26 LAB
BACTERIA BLD CULT: NO GROWTH
ERYTHROCYTE [DISTWIDTH] IN BLOOD BY AUTOMATED COUNT: 14.9 % (ref 10–15)
HCT VFR BLD AUTO: 39.8 % (ref 40–53)
HGB BLD-MCNC: 12.8 G/DL (ref 13.3–17.7)
MCH RBC QN AUTO: 26 PG (ref 26.5–33)
MCHC RBC AUTO-ENTMCNC: 32.2 G/DL (ref 31.5–36.5)
MCV RBC AUTO: 81 FL (ref 78–100)
PLATELET # BLD AUTO: 442 10E3/UL (ref 150–450)
POTASSIUM BLD-SCNC: 4.2 MMOL/L (ref 3.5–5)
RBC # BLD AUTO: 4.92 10E6/UL (ref 4.4–5.9)
WBC # BLD AUTO: 9.1 10E3/UL (ref 4–11)

## 2021-08-26 PROCEDURE — 99232 SBSQ HOSP IP/OBS MODERATE 35: CPT | Performed by: STUDENT IN AN ORGANIZED HEALTH CARE EDUCATION/TRAINING PROGRAM

## 2021-08-26 PROCEDURE — 250N000013 HC RX MED GY IP 250 OP 250 PS 637: Performed by: INTERNAL MEDICINE

## 2021-08-26 PROCEDURE — 99239 HOSP IP/OBS DSCHRG MGMT >30: CPT | Performed by: FAMILY MEDICINE

## 2021-08-26 PROCEDURE — 99231 SBSQ HOSP IP/OBS SF/LOW 25: CPT | Performed by: PHYSICIAN ASSISTANT

## 2021-08-26 PROCEDURE — 84132 ASSAY OF SERUM POTASSIUM: CPT | Performed by: INTERNAL MEDICINE

## 2021-08-26 PROCEDURE — 36415 COLL VENOUS BLD VENIPUNCTURE: CPT | Performed by: INTERNAL MEDICINE

## 2021-08-26 PROCEDURE — 250N000013 HC RX MED GY IP 250 OP 250 PS 637: Performed by: PAIN MEDICINE

## 2021-08-26 PROCEDURE — 250N000011 HC RX IP 250 OP 636: Performed by: INTERNAL MEDICINE

## 2021-08-26 PROCEDURE — 250N000013 HC RX MED GY IP 250 OP 250 PS 637: Performed by: STUDENT IN AN ORGANIZED HEALTH CARE EDUCATION/TRAINING PROGRAM

## 2021-08-26 PROCEDURE — 85014 HEMATOCRIT: CPT | Performed by: INTERNAL MEDICINE

## 2021-08-26 PROCEDURE — 258N000003 HC RX IP 258 OP 636: Performed by: INTERNAL MEDICINE

## 2021-08-26 RX ORDER — LINEZOLID 600 MG/1
600 TABLET, FILM COATED ORAL EVERY 12 HOURS SCHEDULED
Status: DISCONTINUED | OUTPATIENT
Start: 2021-08-26 | End: 2021-08-26 | Stop reason: HOSPADM

## 2021-08-26 RX ORDER — LINEZOLID 600 MG/1
600 TABLET, FILM COATED ORAL EVERY 12 HOURS
Qty: 14 TABLET | Refills: 0 | Status: SHIPPED | OUTPATIENT
Start: 2021-08-26 | End: 2021-09-02

## 2021-08-26 RX ORDER — NORTRIPTYLINE HCL 25 MG
25 CAPSULE ORAL AT BEDTIME
Qty: 30 CAPSULE | Refills: 1
Start: 2021-09-06 | End: 2021-09-23

## 2021-08-26 RX ORDER — POLYETHYLENE GLYCOL 3350 17 G/17G
17 POWDER, FOR SOLUTION ORAL DAILY PRN
Qty: 510 G | COMMUNITY
Start: 2021-08-26 | End: 2021-09-09

## 2021-08-26 RX ADMIN — AMLODIPINE BESYLATE 2.5 MG: 2.5 TABLET ORAL at 11:49

## 2021-08-26 RX ADMIN — HYDROCODONE BITARTRATE AND ACETAMINOPHEN 2 TABLET: 10; 325 TABLET ORAL at 15:55

## 2021-08-26 RX ADMIN — LINEZOLID 600 MG: 600 TABLET, FILM COATED ORAL at 11:54

## 2021-08-26 RX ADMIN — FAMOTIDINE 40 MG: 20 TABLET, FILM COATED ORAL at 11:49

## 2021-08-26 RX ADMIN — POTASSIUM CHLORIDE 20 MEQ: 20 TABLET, EXTENDED RELEASE ORAL at 11:50

## 2021-08-26 RX ADMIN — MEROPENEM 2 G: 1 INJECTION, POWDER, FOR SOLUTION INTRAVENOUS at 04:30

## 2021-08-26 RX ADMIN — HYDROCODONE BITARTRATE AND ACETAMINOPHEN 2 TABLET: 10; 325 TABLET ORAL at 11:50

## 2021-08-26 NOTE — PROGRESS NOTES
General Surgery Progress Note:    Hospital Day # 8    ASSESSMENT:   1. Cellulitis of right lower extremity    2. Elevated lactic acid level    3. Tachycardia        Romeo Chong is a 40 year old male with right leg edema, cellulitis.  Edema continues to improve.      PLAN:   -Continue with elevation as often as possible  -No indication for surgical intervention for soft tissue cellulitis  -Would be ok to discharge at anytime from surgical standpoint once ABX can be made, appreciate ID recs    Noe Golden PA-C  Pager - [phone]  Phone - [phone]   General Surgery      SUBJECTIVE:   Romeo Chong is feeling better, pain and swelling continue to improve, has been elevating as tolerated    Patient Vitals for the past 24 hrs:   BP Temp Temp src Pulse Resp SpO2   08/26/21 0730 (!) 141/80 98.4  F (36.9  C) Oral 77 18 97 %   08/26/21 0334 131/79 97.6  F (36.4  C) Oral 72 20 98 %   08/25/21 2326 128/82 98.3  F (36.8  C) Oral 87 18 98 %   08/25/21 2226 115/70 97.9  F (36.6  C) Oral 82 16 98 %   08/25/21 1541 (!) 144/82 98.3  F (36.8  C) Oral 89 16 96 %   08/25/21 1155 (!) 145/76 98.3  F (36.8  C) Oral 80 20 95 %       Physical Exam:  General: NAD, pleasant  CV:RRR  LUNGS:CTA bilaterally  EXT:edema throughout RLE, much improved compared to when I saw him 2 days ago, much less ttp throughout the leg as well    No results displayed because visit has over 200 results.           Noe Golden PA-C

## 2021-08-26 NOTE — DISCHARGE SUMMARY
[facility]  Hospitalist Discharge Summary      Date of Admission:  8/17/2021  Date of Discharge:  8/26/2021  Discharging Provider: Oliver Carmona MD      Discharge Diagnoses   Active Problems:    Fibrous dysplasia of bone    Essential hypertension, benign    Lymphedema    Obesity, unspecified    H/O Hodgkin's lymphoma    Tachycardia    Cellulitis of right lower extremity    Elevated lactic acid level    Bacterial sepsis (H)    Gram-positive bacteremia      Follow-ups Needed After Discharge   Follow-up Appointments     Follow-up and recommended labs and tests       Follow up with primary care provider, Physician No Ref-Primary, within 7   days to evaluate medication change, to evaluate treatment change, and for   hospital follow- up.  No follow up labs or test are needed.           Unresulted Labs Ordered in the Past 30 Days of this Admission     No orders found from 7/18/2021 to 8/18/2021.        Discharge Disposition   Discharged to home. Declines home health services  Condition at discharge: Good    Hospital Course   Patient is a 40-year-old male with a history of Hodgkin's lymphoma, right lower extremity lymphedema secondary to inguinal lymphadenopathy, hypertension, chronic pain syndrome, morbid obesity, fibrous dysplasia of femur and tibia on the right that presented with right leg edema and cellulitis.        Right lower extremity cellulitis  -With right femur osteomyelitis seen on MRI and CT.  On presentation had elevated pro calcitonin 16, elevated CRP and has had a leukocytosis up to 16.3 that has come down to 9.1 today.  Low suspicion for necrotizing fasciitis per surgery.  Has received a cocktail of antibiotics this admission.  -was on IV meropenem and linezolid. Changed to oral linezolid x 7 days for discharge  -ID and surgery following     Streptococcus agalactiae bacteremia  -Positive blood culture on 8/17 with subsequent cultures no growth  -antibiotics per ID     Hx of  Hodgkin's Lymphoma  -Dx 15 yrs ago. Stage I or II per oncology.  -CT abdomen showing lymphadenopathy in multiple locations     Acute on chronic pain  -on toradol, tylenol and prn dilaudid, vistaril. Nortriptyline on hold  -Pain team following     HTN  -134/70 fair control  -resume home losartan and amlodipine  -held hctz    Acute dyspnea-resolved with IS and 1 dose of lasix. IVF only with antibiotics.     Tachycardia-resolved with IVF and beta blockade     Morbid obesity  -BMI 43     Hx of protein calorie malnutrition  -albumin 2.4 this admission and is on supplements     Hypokalemia  -K 3.2>4.2  -Replace per protocol    Consultations This Hospital Stay   PHARMACY TO DOSE VANCO  INFECTIOUS DISEASES IP CONSULT  SOCIAL WORK IP CONSULT  PAIN MANAGEMENT ADULT IP CONSULT  PHARMACY TO DOSE VANCO  ADVANCE DIRECTIVE IP CONSULT  HEMATOLOGY & ONCOLOGY IP CONSULT  ORTHOPEDIC SURGERY IP CONSULT  PHYSICAL THERAPY ADULT IP CONSULT  OCCUPATIONAL THERAPY ADULT IP CONSULT  SURGERY GENERAL IP CONSULT    Code Status   Full Code    Time Spent on this Encounter   I, Oliver Carmona MD, personally saw the patient today and spent greater than 30 minutes discharging this patient.       Oliver Carmona MD  [address] [address]  Phone: [phone]  Fax: [phone]  ______________________________________________________________________    Physical Exam   Vital Signs: Temp: 97.9  F (36.6  C) Temp src: Oral BP: 134/70 Pulse: 78   Resp: 18 SpO2: 98 % O2 Device: None (Room air)    Weight: 301 lbs 7 oz      Physical Examination:   General appearance - alert, well appearing, and in no distress  Mental status - alert, oriented to person, place, and time, normal mood, behavior, speech, dress, motor activity, and thought processes  HEENT - sclera anicteric,   Respiratory - clear to auscultation, no wheezes, rales or rhonchi,   Cardiac - normal rate, regular rhythm, normal  S1, S2, no murmurs,  Abdomen - soft, nontender, nondistended,  Neurological - alert, oriented, normal speech, no focal findings or movement disorder noted  Musculoskeletal - no joint tenderness, deformity or swelling, full range of motion without pain  Extremities -RLE lymphedema with some tenderness. Left leg appears normal  Skin - surgical scars appear well healed         Primary Care Physician   Physician No Ref-Primary    Discharge Orders      Home Care PT Referral for Hospital Discharge      Home Care OT Referral for Hospital Discharge      Home care nursing referral      Reason for your hospital stay    Right thigh cellulitis     Follow-up and recommended labs and tests     Follow up with primary care provider, Physician No Ref-Primary, within 7 days to evaluate medication change, to evaluate treatment change, and for hospital follow- up.  No follow up labs or test are needed.     Activity    Your activity upon discharge: activity as tolerated     MD face to face encounter    Documentation of Face to Face and Certification for Home Health Services    I certify that patient: Romeo Chong is under my care and that I, or a nurse practitioner or physician's assistant working with me, had a face-to-face encounter that meets the physician face-to-face encounter requirements with this patient on: 8/26/2021.    This encounter with the patient was in whole, or in part, for the following medical condition, which is the primary reason for home health care: 40-year-old male with a history of Hodgkin's lymphoma, right lower extremity lymphedema secondary to inguinal lymphadenopathy, hypertension, chronic pain syndrome, morbid obesity, fibrous dysplasia of femur and tibia on the right that presented with right leg edema and cellulitis..    I certify that, based on my findings, the following services are medically necessary home health services: Nursing, Occupational Therapy, and Physical Therapy.    My clinical findings  support the need for the above services because: Nurse is needed: To assess for worsening infection after changes in medications or other medical regimen.., Occupational Therapy Services are needed to assess and treat cognitive ability and address ADL safety due to impairment in walking from lymphedema and infection., and Physical Therapy Services are needed to assess and treat the following functional impairments: home safety and lymphedema and infection.    Further, I certify that my clinical findings support that this patient is homebound (i.e. absences from home require considerable and taxing effort and are for medical reasons or [employer] services or infrequently or of short duration when for other reasons) because: Requires assistance of another person or specialized equipment to access medical services because patient: Has prohibitive pain during ambulation. and Is unable to exit home safely on own due to: use of walker...    Based on the above findings. I certify that this patient is confined to the home and needs intermittent skilled nursing care, physical therapy and/or speech therapy.  The patient is under my care, and I have initiated the establishment of the plan of care.  This patient will be followed by a physician who will periodically review the plan of care.  Physician/Provider to provide follow up care: No Ref-Primary, Physician    Attending hospital physician (the Medicare certified [facility] provider): Oliver Carmona MD  Physician Signature: See electronic signature associated with these discharge orders.  Date: 8/26/2021     Diet    Follow this diet upon discharge: Orders Placed This Encounter      Snacks/Supplements Adult: Ensure Enlive; With Meals      Regular Diet Adult       Significant Results and Procedures   Most Recent 3 CBC's:Recent Labs   Lab Test 08/26/21  0615 08/25/21  0629 08/24/21  0635   WBC 9.1 13.0* 16.3*   HGB 12.8* 12.0* 11.7*   MCV 81 80 79    383 313     Most  Recent 3 BMP's:Recent Labs   Lab Test 08/26/21  0615 08/25/21  0629 08/24/21  0635 08/22/21  0224   NA  --  135* 135* 136   POTASSIUM 4.2 3.9 3.9 3.2*  3.2*   CHLORIDE  --  102 101 104   CO2  --  25 22 22   BUN  --  6* 4* 7*   CR  --  0.65* 0.64* 0.69*   ANIONGAP  --  8 12 10   NATALYA  --  9.1 9.3 8.7   GLC  --  102 90 91     Most Recent 2 LFT's:Recent Labs   Lab Test 08/24/21  0635 08/22/21 0224   AST 22 21   ALT 23 21   ALKPHOS 77 53   BILITOTAL 0.6 0.4     Most Recent 3 INR's:No lab results found.,   Results for orders placed or performed during the hospital encounter of 08/17/21   US Lower Extremity Venous Duplex Right    Narrative    EXAM: US LOWER EXTREMITY VENOUS DUPLEX RIGHT  LOCATION: [facility]  DATE/TIME: 8/18/2021 1:40 AM    INDICATION: Right leg pain, swelling and redness.  COMPARISON: None.  TECHNIQUE: Venous Duplex ultrasound of the right lower extremity with and without compression, augmentation and duplex. Color flow and spectral Doppler with waveform analysis performed.    FINDINGS: Exam includes the common femoral, femoral, popliteal, and contralateral common femoral veins as well as segmentally visualized deep calf veins and greater saphenous vein. Evaluation of the calf veins is limited by leg swelling and body habitus.    RIGHT: No deep vein thrombosis. No superficial thrombophlebitis. No popliteal cyst.      Impression    IMPRESSION:  1.  No deep venous thrombosis in the right lower extremity.   XR Chest 1 View    Narrative    EXAM: XR CHEST 1 VIEW  LOCATION: [facility]  DATE/TIME: 8/18/2021 1:26 AM    INDICATION: fever, chills  COMPARISON: 10/29/2012.      Impression    IMPRESSION: Negative chest.   CT Chest Abdomen Pelvis w/o Contrast    Narrative    EXAM: CT CHEST ABDOMEN PELVIS W/O CONTRAST  LOCATION: [facility]  DATE/TIME: 8/18/2021 11:42 PM    INDICATION: Sepsis  COMPARISON: CTA chest 04/22/2013. CT abdomen and  pelvis 12/07/2005. Chest radiographs 08/18/2021. MR of the right lower extremity earlier today.  TECHNIQUE: CT scan of the chest, abdomen, and pelvis was performed without IV contrast. Multiplanar reformats were obtained. Dose reduction techniques were used.   CONTRAST: None.    FINDINGS:   LUNGS AND PLEURA: Mild atelectasis bilaterally. Lungs otherwise clear. No pleural fluid or pneumothorax.    MEDIASTINUM/AXILLAE: There is lobulated soft tissue intermingled with lobules of fat in the left anterior mediastinum which measures up to 7.5 x 3.6 cm on axial images (image 51 series 4). A similar finding with less intermixed fat measured up to 6.7 x   3.2 cm on 04/22/2013. There are now enlarged lymph nodes extending superiorly lateral to the left common carotid artery and internal jugular vein which measure up to 2 cm axillary dissection clips on the left. (image 16 of series 4).    CORONARY ARTERY CALCIFICATION: None.    HEPATOBILIARY: Hepatic steatosis. Normal gallbladder and bile ducts.    PANCREAS: Normal.    SPLEEN: Normal.    ADRENAL GLANDS: Normal.    KIDNEYS/BLADDER: Excreted contrast in the urinary system from a prior study. Kidneys and bladder otherwise normal.    BOWEL: Normal. No obstruction or inflammation. Normal appendix.    LYMPH NODES: Multiple enlarged and inflamed lymph nodes of the abdominal retroperitoneum measure up to 2.3 cm near the IVC. Inflamed retroperitoneal lymphadenopathy in the pelvis near the iliac vessels is asymmetric on the right, notably the same side as   the presumed infectious process involving the right leg on the prior MR lower extremity study from today. The largest right external iliac chain lymph node is 3.2 cm. The largest included right inguinal lymph node is 3.0 cm. Right inguinal   lymphadenectomy clips are present.    VASCULATURE: Unremarkable.    PELVIC ORGANS: Normal.    MUSCULOSKELETAL: Edema of the subcutaneous fat of the right groin and included anterior right thigh  consistent with cellulitis. This was more fully imaged on the MR examination from earlier today.      Impression    IMPRESSION:  1.  Inflammatory lymphadenopathy of the retroperitoneum of the abdomen, right pelvis, and right inguinal station likely secondary to the presumed infectious process involving the right leg as demonstrated on the MR examination from earlier today.   Involvement with lymphoma is not excluded.  2.  Lobulated soft tissue of the anterior mediastinum has mildly increased in size compared back to the remote study of 04/22/2013. There are now some enlarged lymph nodes superior to this near the left carotid and jugular vessels. Findings are   concerning for active lymphoma. Clinical correlation recommended.  3.  Hepatic steatosis.    I discussed the findings with at on .   MR Femur Thigh Right wo & w Contrast    Narrative    FINAL REPORT     I agree with the preliminary report.    There are findings highly suspicious for acute on chronic osteomyelitis involving the distal aspect of the right femur. Linear areas of signal abnormality may represent small sinus tract. There are surrounding soft tissue changes in the distal, anterior   thigh which can be seen with infection including myositis and fasciitis.    Superimposed pathologic fracture of the right femur may also be present as there is some cortical offset in the distal femoral shaft. Correlation with CT of the femur may be useful for further assessment of possible distal femoral fracture.    Subcutaneous edema diffusely throughout the right thigh distally and anteriorly in the right thigh with reactive lymphadenopathy right inguinal region.    Final Interpretation Dictated By: Lee Leos  Date: 8/19/2021         PRELIMINARY REPORT    EXAM: MR FEMUR THIGH RIGHT WO AND W CONTRAST  LOCATION: [facility]  DATE/TIME: 8/18/2021 10:18 PM    INDICATION: Cellulitis with sepsis with elevated lactate and right leg pain.  Evaluate for osteomyelitis or deep tissue space infection.  COMPARISON: MRI of the tibia and fibula 08/18/2021.  TECHNIQUE: Routine. Additional postgadolinium T1 sequences were obtained.  IV CONTRAST: 10 ml.    FINDINGS:     JOINTS AND BONES:  -Replacement of the normal T1 signal involving the distal right femur particularly along the lateral aspect of the distal right femur above the level of the condyles (series 2, image 54). There is associated reactive edema in this area. Additionally,   there appears to be breach of the cortex by linear fluid extending along the anterior cortex and through the anterior cortex into the intramedullary space (series 5, image 48-50). These findings are suspicious for osteomyelitis with draining sinus   tracts. More proximal aspect of the right femur demonstrates no evidence for replacement of the normal T1 signal or evidence for intramedullary edema. No fracture or dislocation.    TENDONS:  -No tendon tear, tendinopathy, or tenosynovitis.    MUSCLES AND SOFT TISSUES:  -Diffuse muscular edema involving the anterior compartment of the distal right thigh particularly surrounding the area of the draining presumed sinus tracts. There is small amount of fluid between the muscle and the anterior cortex of the distal right   femur. Minimal amount of interfascial fluid involving the distal medial and lateral aspects of the right thigh. Severe subcutaneous edema diffusely throughout the right thigh. Moderate fluid involving the subcutaneous tissues of the mid and anterior   right thigh. Enlarged right inguinal lymph node, likely reactive.      Impression    IMPRESSION:  1.  Findings highly suspicious for osteomyelitis involving the distal aspect of the right femur with replacement of normal T1 signal distally and surrounding reactive edema. Additionally, there are linear areas of increased T2 signal abnormality   extending through the anterior cortex possibly representing small sinus tracts  draining from the right femur suggesting chronic osteomyelitis. There is cortical thickening throughout this area and fluid along the anterior cortex in the distal right   thigh.    2.  Additionally, there is diffuse edema in the anterior musculature of the distal right thigh with minimal layering fluid which can be seen with myositis and fasciitis.    3.  Subcutaneous edema diffusely throughout the right thigh distally and anteriorly in the right thigh with reactive lymphadenopathy right inguinal region.    4.  Final report following review by musculoskeletal radiologist.    Preliminary Interpretation Dictated By: Curt L. Behrns, MD  Date: 8/19/2021   MR Tibia Fibula Lower Leg Right wo & w Contr    Narrative    FINAL REPORT     I agree with the preliminary report. There are signal changes in the right proximal tibia which raises concern for an acute on chronic osteomyelitis. Additional soft tissue findings suspicious for draining sinus tracts as described.    Final Interpretation Dictated By: Lee Leos  Date: 8/19/2021       PRELIMINARY REPORT    EXAM: MR TIBIA FIBULA LOWER LEG RIGHT WO AND W CONTR  LOCATION: [facility]  DATE/TIME: 8/18/2021 10:18 PM    INDICATION: Evaluate for osteomyelitis involving the tibia and fibula with severe right lower extremity pain and soft tissue swelling.  COMPARISON: None.  TECHNIQUE: Routine. Additional postgadolinium T1 sequences were obtained.  IV CONTRAST: 10 ml Gadavist.    FINDINGS:     BONES:   -Heterogeneous areas of decreased T1 signal throughout the proximal right tibia with irregularity to the cortex with linear fluidlike areas in the anterior right cortex. These linear areas of fluid in the anterior medial right cortex (series 6, image   13-17) are suspicious for linear draining sinus tracts. Overall findings are suspicious for osteomyelitis in the proximal right tibia, likely chronic. Distal right tibia and fibula demonstrate no evidence for  other areas suspicious for osteomyelitis.     SOFT TISSUES:    -Severe subcutaneous edema diffusely throughout the right lower extremity greatest near the mid to lower right lower extremity down to the level of the ankle.    MUSCLES:  -No abnormality in the visualized musculature.      Impression    IMPRESSION:  1.  Heterogeneous decreased T1 signal throughout the proximal right tibia with irregularity of the anterior cortex with draining linear areas of fluid signal intensity into the soft tissue suspicious for draining sinus tracts from an area of suspected   chronic osteomyelitis in the proximal right tibia.    2.  Severe subcutaneous edema right lower extremity.    3.  Final report following review by musculoskeletal radiologist.    Preliminary Interpretation Dictated By: Curt L. Behrns, MD  Date: 8/19/2021    MR Ankle Right w/o & w Contrast    Narrative    EXAM: MR ANKLE RIGHT W/O and W CONTRAST  LOCATION: [facility]  DATE/TIME: 8/18/2021 10:18 PM    INDICATION: Right lower extremity pain and swelling. Tibial osteomyelitis.  COMPARISON: MRI of the tibia fibula 08/18/2021  TECHNIQUE: Routine. Additional postgadolinium T1 sequences were obtained.  IV CONTRAST: 10ml gadavist    FINDINGS:   There is extensive subcutaneous edema with enhancement surrounding the ankle and extending into the foot and lower leg compatible with cellulitis. There is some poorly defined fluid within the subcutaneous fat about the ankle. No well-defined organized   fluid collection to suggest an abscess. No involvement of the deeper soft tissues.    No associated marrow abnormality to suggest osteomyelitis. There is a small focus of subchondral marrow edema and likely developing cystic change involving the cuboid adjacent to the calcaneocuboid joint. No joint effusion or synovitis.    Mild chronic thickening of the Achilles tendon. No abnormal tendon sheath fluid.      Impression    IMPRESSION:  1.  Extensive  cellulitis.  2.  No focal abscess.  3.  No evidence of osteomyelitis.   CT Femur Thigh Right w/o Contrast    Narrative    EXAM: CT FEMUR THIGH RIGHT WITHOUT CONTRAST  LOCATION: [facility]  DATE/TIME: 8/19/2021 7:43 PM    INDICATION: Upper leg pain, stress fracture suspected, negative x-ray.  COMPARISON: None.  TECHNIQUE: Noncontrast. Axial, sagittal and coronal thin-section reconstruction. Dose reduction techniques were used.     FINDINGS: There is cortical deformity involving the femoral distal diametaphysis with intramedullary lucency and sclerosis. This is of indeterminate etiology but could be related to an old healed fracture. Chronic osteomyelitis might have this   appearance. No evidence of acute fracture. The remainder of the femur appears within normal limits. Similar sclerosis and lucency is noted in the proximal tibia, only partly included on the scan, also of indeterminate etiology. Subcutaneous stranding and   nodularity is noted throughout the thigh. Immediately distal to the inguinal region, there is a 3.8 x 2.6 cm soft tissue density nodule within the deep subcutaneous fat. External iliac lymphadenopathy is suspected on the proximal edge of the scan.      Impression    IMPRESSION:  1.  Distal femoral region of mild cortical deformity and adjacent intramedullary lucency and sclerosis of indeterminate etiology. Possibilities include an old healed fracture. Chronic osteomyelitis might have a similar appearance. A similar finding is   seen within the proximal tibia but only partly included on the scan.  2.  No evidence of femoral fracture.  3.  Suspected pelvic external iliac lymphadenopathy, not well evaluated with this scan. There is also a 3.8 x 2.6 cm soft tissue mass in the anteromedial aspect of the upper thigh/distal inguinal region. Immediately proximal to this, there are multiple   surgical clips.  4.  Extensive subcutaneous stranding and nodularity circumferentially  within the thigh, greatest distally.     XR Chest Port 1 View    Narrative    EXAM: XR CHEST PORT 1 VIEW  LOCATION: [facility]  DATE/TIME: 8/21/2021 10:45 AM    INDICATION: Shortness of breath.  COMPARISON: 08/18/2021      Impression    IMPRESSION: New suspicious area of increased opacity right lung base laterally near the right costophrenic angle could represent a small focal area of alveolar infiltrate related to pneumonia in the proper clinical setting. Left lung clear. Normal heart   size and pulmonary vascularity. No overt osseous abnormality.   CT Femur Thigh Right w Contrast    Narrative    EXAM: CT FEMUR THIGH RIGHT WITH CONTRAST  LOCATION: [facility]  DATE/TIME: 8/23/2021 11:51 AM    INDICATION: Right lower extremity cellulitis and leg swelling. Suspected osteomyelitis. History of lymphoma.  COMPARISON: CT 8/19/2021, MRI 8/18/2021.  TECHNIQUE: Noncontrast. Axial, sagittal and coronal thin-section reconstruction. Dose reduction techniques were used.     FINDINGS: Extensive subcutaneous soft tissue stranding involving the right thigh and visualized lower leg with nodularity or small foci of fluid, mainly distally, is unchanged. Right iliac, inguinal, and proximal thigh lymphadenopathy is unchanged. There   is some surrounding soft tissue stranding suggesting inflammatory change. There are surgical clips in the right inguinal region.    Chronic deformity with mixed lucency and chronic appearing sclerosis of the distal right femur and the visualized proximal right tibia unchanged. Lucent cortical defects and/or sinus tracts within the distal right femur are stable. There is an old screw   track in the distal femur. Suspected additional postoperative tracks in the proximal tibia. No new bony destruction.    There is an 8 mm circumscribed lytic lesion in the right iliac wing on series 4, image 1; this area was not included on the prior exams.      Impression     IMPRESSION:  1.  Overall no significant change in the right thigh since prior. There is extensive subcutaneous soft tissue stranding in the right thigh and lower leg which could represent cellulitis or edema. Associated foci of subcutaneous nodularity or fluid   unchanged.  2.  Chronic deformity, mixed lucency and sclerosis of the distal right femur and proximal tibia are unchanged.  3.  Small cortical defects and/or sinus tracts in the distal right femur are stable and may be related to superimposed acute osteomyelitis as suggested on previous MRI.  4.  Right inguinal and iliac lymphadenopathy stable.  5.  Small indeterminate lytic lesion right iliac wing, not included on prior exams.     US Lower Extremity Venous Duplex Right    Narrative    EXAM: US LOWER EXTREMITY VENOUS DUPLEX RIGHT  LOCATION: [facility]  DATE/TIME: 2021 4:46 PM    INDICATION: right LE pain swelling  COMPARISON: 2021  TECHNIQUE: Venous Duplex ultrasound of the right lower extremity with and without compression, augmentation and duplex. Color flow and spectral Doppler with waveform analysis performed.    FINDINGS: Exam includes the common femoral, femoral, popliteal, and contralateral common femoral veins as well as segmentally visualized deep calf veins and greater saphenous vein. The examination was limited by patient pain and swelling.    RIGHT: No deep vein thrombosis. No superficial thrombophlebitis. No popliteal cyst.      Impression    IMPRESSION:  1.  No deep venous thrombosis in the right lower extremity.   Echocardiogram Complete     Value    LVEF  55-60%    Narrative    [id number]  [id number]  [id number]  [id number]^JOSE^[place] [id number]     Name: CABRERA GHOSH  MRN: [id number]  : 1980  Study Date: 2021 08:46 AM  Age: 40 yrs  Gender: Male  Patient Location: [facility]  Reason For Study: Endocarditis  Ordering Physician: JOSE  VANNA  Performed By: CANDIDO     BSA: 2.5 m2  Height: 71 in  Weight: 308 lb  HR: 107  BP: 114/59 mmHg  ______________________________________________________________________________  Procedure  Definity (NDC #10522-089) given intravenously. Complete Echo Adult.  Technically difficult study.Extremely difficult acoustic windows despite the  use of contrast for endcardial border definition.  ______________________________________________________________________________  Interpretation Summary     1. Left ventricular size, wall thickness, and systolic function are normal.  The estimated left ventricular ejection fraction is 55%.  2. Right ventricular size and systolic function are normal.  3. No hemodynamically significant valvular abnormalities.  4. No prior study available for comparison.  ______________________________________________________________________________  I      WMSI = 1.00     % Normal = 100     X - Cannot   0 -                      (2) - Mildly 2 -          Segments  Size  Interpret    Hyperkinetic 1 - Normal  Hypokinetic  Hypokinetic  1-2     small                                                     7 -          3-5      moderate  3 - Akinetic 4 -          5 -         6 - Akinetic Dyskinetic   6-14    large               Dyskinetic   Aneurysmal  w/scar       w/scar       15-16   diffuse     Left Ventricle  The left ventricle is normal in size. There is mild concentric left  ventricular hypertrophy. Left ventricular systolic function is normal. The  visual ejection fraction is 55-60%. Diastolic function not assessed due to  tachycardia. No regional wall motion abnormalities noted.     Right Ventricle  Normal right ventricle size and systolic function.     Atria  Normal left atrial size. Right atrial size is normal.     Mitral Valve  Mitral valve leaflets appear normal. There is trace mitral regurgitation.  There is no mitral valve stenosis.     Tricuspid Valve  Tricuspid valve leaflets appear normal. There  is trace to mild tricuspid  regurgitation. Right ventricle systolic pressure estimate normal. The right  ventricular systolic pressure is elevated at 12.4 mmHg. There is no tricuspid  stenosis.     Aortic Valve  Aortic valve leaflets appear normal. No aortic regurgitation is present. No  aortic stenosis is present.     Pulmonic Valve  The pulmonic valve is not well visualized. There is trace pulmonic valvular  regurgitation. There is no pulmonic valvular stenosis.     Vessels  The aorta root is normal. IVC diameter <2.1 cm collapsing >50% with sniff  suggests a normal RA pressure of 3 mmHg.     Pericardium  There is no pericardial effusion.     Rhythm  The rhythm was sinus tachycardia.     ______________________________________________________________________________  MMode/2D Measurements & Calculations  RVDd: 4.2 cm  IVSd: 1.2 cm  LVIDd: 4.5 cm  LVIDs: 3.0 cm  LVPWd: 1.2 cm  FS: 32.9 %     LV mass(C)d: 203.0 grams  LV mass(C)dI: 80.1 grams/m2  Ao root diam: 3.9 cm  LA dimension: 3.6 cm  asc Aorta Diam: 3.1 cm  LA/Ao: 0.93  LVOT diam: 2.3 cm  LVOT area: 4.0 cm2  LA Volume Indexed (AL/bp): 27.4 ml/m2  RWT: 0.54     Doppler Measurements & Calculations  Ao V2 max: 156.1 cm/sec  Ao max PG: 10.0 mmHg  Ao V2 mean: 105.9 cm/sec  Ao mean P.3 mmHg  Ao V2 VTI: 28.7 cm  GREGORIO(I,D): 3.4 cm2  GREGORIO(V,D): 3.3 cm2  LV V1 max P.5 mmHg  LV V1 max: 127.9 cm/sec  LV V1 VTI: 24.3 cm     SV(LVOT): 97.4 ml  SI(LVOT): 38.4 ml/m2  PA acc time: 0.12 sec  TR max aaron: 176.2 cm/sec  TR max P.4 mmHg  AV Aaron Ratio (DI): 0.82  GREGORIO Index (cm2/m2): 1.3     ______________________________________________________________________________  Report approved by: Miladis Holguin 2021 09:44 AM               Discharge Medications   Current Discharge Medication List      START taking these medications    Details   linezolid (ZYVOX) 600 MG tablet Take 1 tablet (600 mg) by mouth every 12 hours for 7 days  Qty: 14 tablet, Refills: 0     Associated Diagnoses: Cellulitis of right lower extremity; Gram-positive bacteremia      polyethylene glycol (MIRALAX) 17 GM/Dose powder Take 17 g by mouth daily as needed for constipation  Qty: 510 g    Associated Diagnoses: Chronic pain syndrome         CONTINUE these medications which have CHANGED    Details   nortriptyline (PAMELOR) 25 MG capsule Take 1 capsule (25 mg) by mouth At Bedtime  Qty: 30 capsule, Refills: 1    Associated Diagnoses: Chronic low back pain, unspecified back pain laterality, unspecified whether sciatica present         CONTINUE these medications which have NOT CHANGED    Details   amLODIPine (NORVASC) 5 MG tablet [AMLODIPINE (NORVASC) 5 MG TABLET] Take 0.5 tablets (2.5 mg total) by mouth daily.  Qty: 45 tablet, Refills: 0    Associated Diagnoses: Essential hypertension, benign      aspirin-acetaminophen-caffeine (EXCEDRIN MIGRAINE) 250-250-65 mg per tablet [ASPIRIN-ACETAMINOPHEN-CAFFEINE (EXCEDRIN MIGRAINE) 250-250-65 MG PER TABLET] Take 1 tablet by mouth every 6 (six) hours as needed for pain.  Refills: 0    Associated Diagnoses: Nonintractable headache, unspecified chronicity pattern, unspecified headache type      famotidine (PEPCID) 40 MG tablet [FAMOTIDINE (PEPCID) 40 MG TABLET] Take 1 tablet (40 mg total) by mouth every evening.  Qty: 180 tablet, Refills: 0    Associated Diagnoses: Gastric ulcer, unspecified chronicity, unspecified whether gastric ulcer hemorrhage or perforation present      HYDROcodone-acetaminophen (NORCO)  MG per tablet Take 1 tablet by mouth 2 times daily as needed for severe pain  Qty: 56 tablet, Refills: 0    Associated Diagnoses: Chronic pain syndrome      losartan (COZAAR) 50 MG tablet [LOSARTAN (COZAAR) 50 MG TABLET] Take 1 tablet (50 mg total) by mouth daily.  Qty: 90 tablet, Refills: 5    Associated Diagnoses: Essential hypertension, benign      potassium chloride ER (K-TAB/KLOR-CON) 10 MEQ CR tablet TAKE 2 TABLETS BY MOUTH EVERY MORNING AND 1 EVERY  EVENING  Qty: 270 tablet, Refills: 2    Associated Diagnoses: Essential hypertension, benign      vitamin D3 (CHOLECALCIFEROL) 125 MCG (5000 UT) tablet Take 1 tablet (125 mcg) by mouth daily  Qty: 30 tablet, Refills: 1    Associated Diagnoses: Vitamin D deficiency         STOP taking these medications       hydrochlorothiazide (HYDRODIURIL) 50 MG tablet Comments:   Reason for Stopping:         ibuprofen (ADVIL,MOTRIN) 200 MG tablet Comments:   Reason for Stopping:         loratadine (CLARITIN) 10 mg tablet Comments:   Reason for Stopping:             Allergies   Allergies   Allergen Reactions     Vancomycin Anaphylaxis     Peanut Oil Itching     Tree Nuts [Nuts] Itching     Erythromycin Unknown     Latex Unknown     Added based on information entered during case entry, please review and add reactions, type, and severity as needed     Other Environmental Allergy Unknown     DUST     Pollen Extracts [Pollen Extract] Unknown     Zosyn [Piperacillin-Tazobactam In D5w] Tinnitus and Itching     Oxycodone Itching and Rash

## 2021-08-26 NOTE — PLAN OF CARE
Shift from 0700 to 1930-    Patient Dc'd to home at       Problem: Pain Chronic (Persistent)  Goal: Acceptable Pain Control and Functional Ability  Outcome: Improving  Intervention: Develop Pain Management Plan  Recent Flowsheet Documentation  Taken 8/26/2021 1555 by Tricia Cotton RN  Pain Management Interventions:    medication (see MAR)    emotional support  Taken 8/26/2021 1150 by Tricia Cotton RN  Pain Management Interventions:    medication (see MAR)    emotional support  Intervention: Manage Persistent Pain  Recent Flowsheet Documentation  Taken 8/26/2021 1539 by Tricia Cotton RN  Bowel Elimination Promotion:    adequate fluid intake promoted    ambulation promoted  Taken 8/26/2021 0800 by Tricia Cotton RN  Bowel Elimination Promotion:    adequate fluid intake promoted    ambulation promoted   Pain controlled today with prn Norco; Given twice during shift.   Needs a lot of encouragement to be OOB. Informed refusal all day.   Sat in bed and watched TV.    DC'd to home.

## 2021-08-26 NOTE — PROGRESS NOTES
[facility] ID Inpatient follow up       Patient:  Romeo Chong  Date of birth 1980, Medical record number [id number]  Date of Visit:  08/26/2021  Attending Physician: Tony Herndon MD         Assessment and Recommendations:   Assessment:  Romeo Chong is a 40 year old male with   1.  History of Hodgkin lymphoma diagnosed 15 years ago.  2.  Right lower extremity lymphedema secondary to inguinal lymphadenopathy from Hodgkin lymphoma?  3.  Multiple intra-abdominal and retroperitoneal adenopathy  4.  Morbid obesity with BMI of 43.54.  5.  Right lower extremity cellulitis with osteomyelitis.  MRI R femur with findings suggestive of osteomyelitis of the distal right femur.   Clinically felt worse with persistent fever, increased white count and pain quite out of proportion for clinical appearance on 8/23.  CK was however normal.  And CT scan of the lower extremity did not show any worsening disease.  Surgery saw and had low suspicion for necrotizing fasciitis.  Input is greatly appreciated.  IV linezolid added on 8/23/2021.  Initial plan was to add clindamycin for antitoxin activity but the patient reports significant reaction to clindamycin although no documentation on this.  Feels much better. Fever resolved. WBC improved today.  Continue to feel better.  6.  Streptococcus agalactiae bacteremia.  Positive blood culture on 8/17.  Follow blood cultures on 8/18 -19 -20 -21 no growth.   7.  Possible chronic osteomyelitis of the proximal R tibia and distal right femur as seen on MRI.  Has no known sinus tract.  Chronic osteomyelitis is unlikely.    Antimicrobials:  Linezolid 8/23-  Meropenem: 8/22-  Ceftriaxone: 8/18-21  Ancef 8/21-22  Flagyl 8/19-21  Zosyn 1 dose 8/18  Aztreonam 8/18-19        Recommendations:  Discontinue IV meropenem and linezolid.  Can discharge on p.o. linezolid 600 mg twice a day for 7 additional days.  Resume nortriptyline once linezolid therapy is  completed.  Patient to call me if he has recurrent infection.    Discussed with the patient, nursing staff.    ID will sign off.      Maegan Sandoval MD.  [facility].   [facility]  Office Telephone [phone].  Fax [phone]  [facility] paging            Interval History:     HPI:  The interval history was reviewed.   Doing much better.  No fever for 48+ hours.    Pertinent cultures include:  No results found for: CULT    Recent Inflammatory Biomarkers:   Recent Labs   Lab Test 21  0615 21  0629 21  0635 21  0737 21  0224 21  0904 21  1827 21  0746 21  1410   CRP  --   --   --   --   --   --  44.1* 41.3* 20.0*   PCAL  --   --   --   --   --   --   --   --  16.05*   WBC 9.1 13.0* 16.3* 13.0* 11.2* 12.1* 12.1* 12.5* 11.3*            Review of Systems:   CONSTITUTIONAL:    Temp Max: Temp (24hrs), Av.5  F (37.5  C), Min:98.3  F (36.8  C), Max:102.2  F (39  C)  ROS negative except for findings in the HPI.         Current Medications (antimicrobials listed in bold):       amLODIPine  2.5 mg Oral Daily     enoxaparin ANTICOAGULANT  40 mg Subcutaneous Q24H     famotidine  40 mg Oral QAM     linezolid  600 mg Intravenous Q12H     meropenem  2 g Intravenous Q8H     polyethylene glycol  17 g Oral Daily     potassium chloride  20 mEq Oral BID              Allergies:     Allergies   Allergen Reactions     Vancomycin Anaphylaxis     Peanut Oil Itching     Tree Nuts [Nuts] Itching     Erythromycin Unknown     Latex Unknown     Added based on information entered during case entry, please review and add reactions, type, and severity as needed     Other Environmental Allergy Unknown     DUST     Pollen Extracts [Pollen Extract] Unknown     Zosyn [Piperacillin-Tazobactam In D5w] Tinnitus and Itching     Oxycodone Itching and Rash            Physical Exam:   Vitals were reviewed  Patient Vitals for the past 24 hrs:   BP  Temp Temp src Pulse Resp SpO2   08/26/21 0730 (!) 141/80 98.4  F (36.9  C) Oral 77 18 97 %   08/26/21 0334 131/79 97.6  F (36.4  C) Oral 72 20 98 %   08/25/21 2326 128/82 98.3  F (36.8  C) Oral 87 18 98 %   08/25/21 2226 115/70 97.9  F (36.6  C) Oral 82 16 98 %   08/25/21 1541 (!) 144/82 98.3  F (36.8  C) Oral 89 16 96 %   08/25/21 1155 (!) 145/76 98.3  F (36.8  C) Oral 80 20 95 %       Physical Examination:  Gen: Pleasant in a moderate distress.  Looks toxic.  HEENT: NCAT. EOMI. PERRL.  Neck: No bruit, JVD or thyromegaly.  Lungs: Clear to ascultation bilat with no crackles or wheezes.  Card: RRR. NSR. No RMG. Peripheral pulses present and symmetric. No edema.  Abd: Soft NT ND. No mass. Normal bowel sounds.  Skin: No rash.  Extr: Thigh less painful compared to yesterday.  Leg elevated with some wrinkles on the medial aspect of the leg.  A questionable phlegmon on the lateral aspect of the leg.  Neuro: Alert and oriented to place time and person. Cranial nerves II to XII intact. Motor and sensory intact. Normal gait.           Laboratory Data:   ID Labs:  Recent Labs   Lab Test 08/20/21  1827 08/19/21  0746 08/18/21  1410   CRP 44.1* 41.3* 20.0*     Recent Labs   Lab Test 08/26/21  0615 08/25/21  0629 08/24/21  0635 08/23/21  0737 08/22/21  0224 08/21/21  0904   WBC 9.1 13.0* 16.3* 13.0* 11.2* 12.1*     Recent Labs   Lab Test 08/25/21  0629 08/24/21  0635 08/22/21  0224 08/21/21  0904   CR 0.65* 0.64* 0.69* 0.63*   GFRESTIMATED >90 >90 >90 >90       Hematology Studies  Recent Labs   Lab Test 08/26/21  0615 08/25/21  0629 08/24/21  0635 08/23/21  0737 08/22/21  0224 08/21/21  0904 08/20/21  1827 08/19/21  0746   WBC 9.1 13.0* 16.3* 13.0* 11.2* 12.1* 12.1* 12.5*   ANEU  --   --   --   --  9.6* 11.0* 11.5* 10.0*   AEOS  --   --   --   --  0.0 0.0 0.0 0.0   HCT 39.8* 37.3* 36.0* 34.6* 34.3* 33.9* 34.9* 40.8    383 313 256 204 172 167 203       Metabolic  Recent Labs   Lab Test 08/25/21  0629 08/24/21  0635  08/22/21  0224   * 135* 136   BUN 6* 4* 7*   CO2 25 22 22   CR 0.65* 0.64* 0.69*   GFRESTIMATED >90 >90 >90       Hepatic Studies  Recent Labs   Lab Test 08/24/21  0635 08/22/21  0224 08/21/21  0904   BILITOTAL 0.6 0.4 0.5   ALKPHOS 77 53 54   ALBUMIN 2.4* 2.1* 2.1*   AST 22 21 25   ALT 23 21 24     Blood Culture Line, venous  Order: [id number]  Collected:  8/17/2021 10:56 PM Status:  Final result   Visible to patient:  Yes (MyChart)  Specimen Information: Line, venous; Blood         0 Result Notes  Culture Positive on the 1st day of incubationAbnormal        Streptococcus agalactiae (Group B Streptococcus)Panic     2 of 2 bottles      Resulting Agency: [facility]   Susceptibility     Streptococcus agalactiae (Group B Streptococcus)     MEAGAN     Ampicillin 0.12 ug/mL Susceptible     Cefotaxime <=0.25 ug/mL Susceptible     Ceftriaxone <=0.25 ug/mL Susceptible     Clindamycin >0.5 ug/mL Resistant     Meropenem <=0.06 ug/mL Susceptible     Penicillin 0.06 ug/mL Susceptible     Vancomycin 0.5 ug/mL Susceptible                 Specimen Collected: 08/17/21 10:56 PM Last Resulted: 08/21/21  9:32 AM               ImmunologlobulinsNo lab results found.         Imaging Data:     Reviewed  Study Result    Narrative & Impression   EXAM: CT FEMUR THIGH RIGHT WITH CONTRAST  LOCATION: [facility]  DATE/TIME: 8/23/2021 11:51 AM     INDICATION: Right lower extremity cellulitis and leg swelling. Suspected osteomyelitis. History of lymphoma.  COMPARISON: CT 8/19/2021, MRI 8/18/2021.  TECHNIQUE: Noncontrast. Axial, sagittal and coronal thin-section reconstruction. Dose reduction techniques were used.      FINDINGS: Extensive subcutaneous soft tissue stranding involving the right thigh and visualized lower leg with nodularity or small foci of fluid, mainly distally, is unchanged. Right iliac, inguinal, and proximal thigh lymphadenopathy is unchanged. There   is some surrounding soft tissue stranding suggesting  inflammatory change. There are surgical clips in the right inguinal region.     Chronic deformity with mixed lucency and chronic appearing sclerosis of the distal right femur and the visualized proximal right tibia unchanged. Lucent cortical defects and/or sinus tracts within the distal right femur are stable. There is an old screw   track in the distal femur. Suspected additional postoperative tracks in the proximal tibia. No new bony destruction.     There is an 8 mm circumscribed lytic lesion in the right iliac wing on series 4, image 1; this area was not included on the prior exams.                                                                      IMPRESSION:  1.  Overall no significant change in the right thigh since prior. There is extensive subcutaneous soft tissue stranding in the right thigh and lower leg which could represent cellulitis or edema. Associated foci of subcutaneous nodularity or fluid   unchanged.  2.  Chronic deformity, mixed lucency and sclerosis of the distal right femur and proximal tibia are unchanged.  3.  Small cortical defects and/or sinus tracts in the distal right femur are stable and may be related to superimposed acute osteomyelitis as suggested on previous MRI.  4.  Right inguinal and iliac lymphadenopathy stable.  5.  Small indeterminate lytic lesion right iliac wing, not included on prior exams.

## 2021-08-26 NOTE — PLAN OF CARE
Prn norco given x1 for pain on RLE rated 8/10. Patient asleep most of the shift. Uses urinal at bedside. RLL elevated on pillow. VSS. Receiving iv antibiotics for cellulitis.

## 2021-08-26 NOTE — PROGRESS NOTES
CLINICAL NUTRITION SERVICES    Reviewed nutrition risk factors due to LOS. Pt is tolerating diet, eating well per nursing documentation. No nutrition issues identified at this time. RD will sign off. Please place consult if additional nutrition concerns arise.

## 2021-08-26 NOTE — PLAN OF CARE
Occupational Therapy Discharge Summary    Reason for therapy discharge:    Discharge to home    Progress towards therapy goal(s). See goals on Care Plan in [facility] electronic health record for goal details.  Goals partially met.  Barriers to achieving goals:   discharge from facility.    Therapy recommendation(s):    No further therapy is recommended.

## 2021-08-26 NOTE — PLAN OF CARE
Physical Therapy Discharge Summary    Reason for therapy discharge:    Discharged to home.    Progress towards therapy goal(s). See goals on Care Plan in [facility] electronic health record for goal details.  Goals partially met.  Barriers to achieving goals:   discharge from facility.    Therapy recommendation(s):    Patient not seen on day of discharge.

## 2021-08-27 ENCOUNTER — PATIENT OUTREACH (OUTPATIENT)
Dept: CARE COORDINATION | Facility: CLINIC | Age: 41
End: 2021-08-27

## 2021-08-27 DIAGNOSIS — Z71.89 OTHER SPECIFIED COUNSELING: ICD-10-CM

## 2021-08-27 NOTE — PROGRESS NOTES
"Clinic Care Coordination Contact  [facility]: Post-Discharge Note  SITUATION                                                      Admission:    Admission Date: 08/17/21   Reason for Admission: Fibrous dysplasia of bone  Discharge:   Discharge Date: 08/26/21  Discharge Diagnosis: Fibrous dysplasia of bone    BACKGROUND                                                      Romeo Chong is a 40 year old old male with h/o hypertension, asthma, chronic pain syndrome, fibrous dysplasia of bone, chronic lymphedema, and obesity who presents to this ED via private car for evaluation of chills and leg pain.     ASSESSMENT           Discharge Assessment  How are you doing now that you are home?: \"I'm fine just resting\"  How are your symptoms? (Red Flag symptoms escalate to triage hotline per guidelines): Unchanged  Do you feel your condition is stable enough to be safe at home until your provider visit?: Yes  Does the patient have their discharge instructions? : Yes  Does the patient have questions regarding their discharge instructions? : No  Were you started on any new medications or were there changes to any of your previous medications? : Yes  Does the patient have all of their medications?: Yes  Do you have questions regarding any of your medications? : No  Do you have all of your needed medical supplies or equipment (DME)?  (i.e. oxygen tank, CPAP, cane, etc.): Yes  Discharge follow-up appointment scheduled within 14 calendar days? : No  Is patient agreeable to assistance with scheduling? : No    Post-op ([facility] CTA Only)  If the patient had a surgery or procedure, do they have any questions for a nurse?: No         PLAN                                                      Outpatient Plan:    Follow-up and recommended labs and tests      Follow up with primary care provider, Physician No Ref-Primary, within 7   days to evaluate medication change, to evaluate treatment change, and for   hospital follow- up.  No " follow up labs or test are needed.    Future Appointments   Date Time Provider Department Center   [facility]   9/9/2021 11:00 AM Yeimi Levine MD MDVSCR [facility]   9/21/2021  2:40 PM Mony Monsivais, PAMeirC MRPNCR [facility]         For any urgent concerns, please contact our 24 hour nurse triage line: [phone] ([phone])         RICKY Brooks  [phone]  [facility]

## 2021-08-31 DIAGNOSIS — G89.4 CHRONIC PAIN SYNDROME: ICD-10-CM

## 2021-08-31 RX ORDER — HYDROCODONE BITARTRATE AND ACETAMINOPHEN 10; 325 MG/1; MG/1
1 TABLET ORAL 2 TIMES DAILY PRN
Qty: 56 TABLET | Refills: 0 | Status: SHIPPED | OUTPATIENT
Start: 2021-09-07 | End: 2021-09-21

## 2021-08-31 NOTE — TELEPHONE ENCOUNTER
Medication being requested: Norco  Last visit date: 7/29/21.  Provider: PEPITO  Next visit date: 9/21/21.  Provider: PEPITO  UDT date: 6/2021  Agreement date: 6/2021   (Last fill date; name; strength; provider; MME; quantity):  08/10/2021  Hydrocodone-Acetamin  Mg  56.00 28 Ra War    Pertinent between visit information about requested medication (telephone, mychart, prior authorization, concerns, comments): No  Script being sent to provider by nurse- dates and quantity:   Pending Prescriptions:                       Disp   Refills    HYDROcodone-acetaminophen (NORCO)  *56 tab*0            Sig: Take 1 tablet by mouth 2 times daily as needed           for severe pain    Pharmacy cued: [facility]

## 2021-09-09 ENCOUNTER — OFFICE VISIT (OUTPATIENT)
Dept: VASCULAR SURGERY | Facility: CLINIC | Age: 41
End: 2021-09-09
Attending: STUDENT IN AN ORGANIZED HEALTH CARE EDUCATION/TRAINING PROGRAM
Payer: COMMERCIAL

## 2021-09-09 ENCOUNTER — ANCILLARY PROCEDURE (OUTPATIENT)
Dept: VASCULAR ULTRASOUND | Facility: CLINIC | Age: 41
End: 2021-09-09
Attending: STUDENT IN AN ORGANIZED HEALTH CARE EDUCATION/TRAINING PROGRAM
Payer: COMMERCIAL

## 2021-09-09 VITALS
DIASTOLIC BLOOD PRESSURE: 90 MMHG | SYSTOLIC BLOOD PRESSURE: 138 MMHG | TEMPERATURE: 97.9 F | RESPIRATION RATE: 12 BRPM | HEART RATE: 72 BPM

## 2021-09-09 DIAGNOSIS — E66.01 MORBID OBESITY (H): ICD-10-CM

## 2021-09-09 DIAGNOSIS — I89.0 LYMPHEDEMA: ICD-10-CM

## 2021-09-09 DIAGNOSIS — C81.90 HODGKIN LYMPHOMA, UNSPECIFIED HODGKIN LYMPHOMA TYPE, UNSPECIFIED BODY REGION (H): Primary | ICD-10-CM

## 2021-09-09 DIAGNOSIS — M79.89 LEG SWELLING: ICD-10-CM

## 2021-09-09 DIAGNOSIS — M85.00 FIBROUS DYSPLASIA OF BONE: ICD-10-CM

## 2021-09-09 PROCEDURE — 99205 OFFICE O/P NEW HI 60 MIN: CPT | Performed by: STUDENT IN AN ORGANIZED HEALTH CARE EDUCATION/TRAINING PROGRAM

## 2021-09-09 PROCEDURE — 93970 EXTREMITY STUDY: CPT | Mod: 26 | Performed by: INTERNAL MEDICINE

## 2021-09-09 PROCEDURE — 93970 EXTREMITY STUDY: CPT

## 2021-09-09 ASSESSMENT — PAIN SCALES - GENERAL: PAINLEVEL: EXTREME PAIN (8)

## 2021-09-09 NOTE — PROGRESS NOTES
Leg Swelling Consult         I have been asked to see Romeo Chong referred by Physician No Ref-Primary     Date of Service: September 9, 2021     Chief Complaint:  Right lower extremity lymphedema    History of Present Illness:   Romeo Chong is a 41 y/o male with PMH of Hodgkin's lymphoma, HTN, chronic pain syndrome, morbid obesity, fibrous dysplasia of right femur/tibia and right lower extremity lymphedema who presents to clinic for RLE lymphedema to establish care as he recently moved from [place].  Patient has a diagnosis of RLE lymphedema that he has had since 2006 when he was diagnosed with Hodgkin's lymphoma (lymphedema secondary to inguinal lymphadenopathy). He also has a history of recurrent cellulitis and was recently hospitalized at [facility] for RLE cellulitis. Right femur osteomyelitis was also seen on MRI/CT. Patient's condition progressed to sepsis (streptococcus agalactiae bacteremia) and he was managed initially with IV meropenem and linezolid, and then transitioned to oral linezolid for 7 days upon discharge.  Patient states that he was previously seeing a lymphedema specialist in [place], and does recall having imaging/lymphoscintography in the past. He moved back to [place] after his daughter was murdered last year.   He has been wearing compression regularly and wrapping daily for several months. He also had a lymphedema pump that he used to use, but does not have this anymore as he has lost it.   He also continues with manual drainage techniques as previously instructed by lymphedema therapy in [place]. He states that he does this every day.   His compression socks are quite worn, and he has had them for years without replacement. His wraps are also very worn and he has had them for a while. Patient states that the top of his compression sock has become constricted, and he feels that it is cutting his circulation off at times.   He has no open wounds at this time, but has had  wounds in the past per his history.   During this most recent hospitalization, it was discovered on CT imaging that his Hodgkin's lymphoma has likely returned, and he had an Oncology consult while inpatient, though no outpatient follow up was arranged upon discharge.   Top part of sock feels like circulation is getting cut off. He denies any increased warmth, erythema or other concerning signs for infection, and it appears that his cellulitis has resolved. Per patient history, he has seen Dr. Wynn when in [place] about 10 years ago or so.      Past Medical History:    Past Medical History:   Diagnosis Date     Bacterial sepsis (H) 8/19/2021     Cancer (H)      Chronic pain syndrome 5/6/2021     Essential hypertension, benign 7/14/2010     Fibrous dysplasia of bone 4/26/2009    Formatting of this note might be different from the original. Discovered in right tibia and femur at 13 years old.  He had surgery when  he was 14 years old.   Last Assessment & Plan:  Formatting of this note might be different from the original. Diagnosed at 13, 14 surgeries since that time   Formatting of this note might be different from the original. Formatting of this note might be different      Gastric ulcer, unspecified chronicity, unspecified whether gastric ulcer hemorrhage or perforation present 5/6/2021     H/O Hodgkin's lymphoma 5/6/2021     Hyperlipidemia      Hypertension      Hypertriglyceridemia 5/8/2011     Stress hyperglycemia 4/26/2009        Surgical History:   Past Surgical History:   Procedure Laterality Date     FRACTURE SURGERY       HC REMOVAL HEEL SPUR, CALCANEUS Left 6/25/2021    Procedure: EXCISION, BONE SPUR, FOOT, WITH PLANTAR FASCIA RELEASE;  Surgeon: Stephon Singleton DPM;  Location: [facility];  Service: Podiatry        Medications:    Current Outpatient Medications   Medication     amLODIPine (NORVASC) 5 MG tablet     aspirin-acetaminophen-caffeine (EXCEDRIN MIGRAINE) 250-250-65 mg per tablet      famotidine (PEPCID) 40 MG tablet     HYDROcodone-acetaminophen (NORCO)  MG per tablet     losartan (COZAAR) 50 MG tablet     nortriptyline (PAMELOR) 25 MG capsule     potassium chloride ER (K-TAB/KLOR-CON) 10 MEQ CR tablet     vitamin D3 (CHOLECALCIFEROL) 125 MCG (5000 UT) tablet     No current facility-administered medications for this visit.        Allergies:    Allergies   Allergen Reactions     Vancomycin Anaphylaxis     Peanut Oil Itching     Tree Nuts [Nuts] Itching     Erythromycin Unknown     Latex Unknown     Added based on information entered during case entry, please review and add reactions, type, and severity as needed     Other Environmental Allergy Unknown     DUST     Pollen Extracts [Pollen Extract] Unknown     Zosyn [Piperacillin-Tazobactam In D5w] Tinnitus and Itching     Oxycodone Itching and Rash        Family history:   Family History   Problem Relation Age of Onset     Cirrhosis Mother      Hypertension Mother      Diabetes Type 2  Father      Kidney failure Father      Cerebrovascular Disease Father      Hypertension Father      Cerebrovascular Disease Sister      Hypertension Paternal Grandmother      Hypertension Paternal Grandfather         Social History:   Social History     Tobacco Use     Smoking status: Former Smoker     Packs/day: 1.00     Start date: 1999     Quit date: 2009     Years since quittin.3     Smokeless tobacco: Never Used   Substance Use Topics     Alcohol use: Yes     Drug use: Never          Labs:      I personally reviewed the following lab results today and those on care everywhere, if indicated     CRP   Date Value Ref Range Status   2021 44.1 (H) 0.0-<0.8 mg/dL Final      No results found for: SED   Last Renal Panel:  Sodium   Date Value Ref Range Status   2021 135 (L) 136 - 145 mmol/L Final     Potassium   Date Value Ref Range Status   2021 4.2 3.5 - 5.0 mmol/L Final     Chloride   Date Value Ref Range Status   2021 102  98 - 107 mmol/L Final     Carbon Dioxide (CO2)   Date Value Ref Range Status   08/25/2021 25 22 - 31 mmol/L Final     Anion Gap   Date Value Ref Range Status   08/25/2021 8 5 - 18 mmol/L Final     Glucose   Date Value Ref Range Status   08/25/2021 102 70 - 125 mg/dL Final     Urea Nitrogen   Date Value Ref Range Status   08/25/2021 6 (L) 8 - 22 mg/dL Final     Creatinine   Date Value Ref Range Status   08/25/2021 0.65 (L) 0.70 - 1.30 mg/dL Final     GFR Estimate   Date Value Ref Range Status   08/25/2021 >90 >60 mL/min/1.73m2 Final     Comment:     As of July 11, 2021, eGFR is calculated by the CKD-EPI creatinine equation, without race adjustment. eGFR can be influenced by muscle mass, exercise, and diet. The reported eGFR is an estimation only and is only applicable if the renal function is stable.   06/23/2021 >60 >60 mL/min/1.73m2 Final     Calcium   Date Value Ref Range Status   08/25/2021 9.1 8.5 - 10.5 mg/dL Final     Albumin   Date Value Ref Range Status   08/24/2021 2.4 (L) 3.5 - 5.0 g/dL Final      Lab Results   Component Value Date    WBC 9.1 08/26/2021     Lab Results   Component Value Date    RBC 4.92 08/26/2021     Lab Results   Component Value Date    HGB 12.8 08/26/2021     Lab Results   Component Value Date    HCT 39.8 08/26/2021     No components found for: MCT  Lab Results   Component Value Date    MCV 81 08/26/2021     Lab Results   Component Value Date    MCH 26.0 08/26/2021     Lab Results   Component Value Date    MCHC 32.2 08/26/2021     Lab Results   Component Value Date    RDW 14.9 08/26/2021     Lab Results   Component Value Date     08/26/2021         Imaging:     I personally reviewed the following imaging results today and those on care everywhere, if indicated     CT Chest/Abdomen/Pelvis (8/18/21):  IMPRESSION:  1.  Inflammatory lymphadenopathy of the retroperitoneum of the abdomen, right pelvis, and right inguinal station likely secondary to the presumed infectious process  involving the right leg as demonstrated on the MR examination from earlier today.   Involvement with lymphoma is not excluded.  2.  Lobulated soft tissue of the anterior mediastinum has mildly increased in size compared back to the remote study of 04/22/2013. There are now some enlarged lymph nodes superior to this near the left carotid and jugular vessels. Findings are   concerning for active lymphoma. Clinical correlation recommended.  3.  Hepatic steatosis.    CT Femur Thigh Right With Contrast (8/23/21):  IMPRESSION:  1.  Overall no significant change in the right thigh since prior. There is extensive subcutaneous soft tissue stranding in the right thigh and lower leg which could represent cellulitis or edema. Associated foci of subcutaneous nodularity or fluid   unchanged.  2.  Chronic deformity, mixed lucency and sclerosis of the distal right femur and proximal tibia are unchanged.  3.  Small cortical d       efects and/or sinus tracts in the distal right femur are stable and may be related to superimposed acute osteomyelitis as suggested on previous MRI.  4.  Right inguinal and iliac lymphadenopathy stable.  5.  Small indeterminate lytic lesion right iliac wing, not included on prior exams.    Bilateral Venous Insufficiency Ultrasound (9/9/21):  Impression:    Right Deep Vein Findings: Patent deep venous system with no evidence of DVT, right common femoral vein reflux  Left Deep Vein Findings: Patent deep venous system with no evidence of DVT left common femoral vein reflux in addition to distal femoral vein and popliteal vein  Superficial Vein Findings:   Right Greater Saphenous vein: Patent Greater Saphenous Vein without evidence of reflux.  Right Small Saphenous Vein: Patent Small Saphenous Vein without evidence of reflux.  Left Greater Saphenous Vein: Patent Greater Saphenous Vein without evidence of reflux.  Left Small Saphenous Vein: Patent Small Saphenous vein with Reflux noted At the mid calf with a  Maximum diameter of 6 mm.  Perforating and Accessory Veins: N/A     Review of Symptoms:  Full 12 point review of systems is negative, except as noted above.     Physical Exam:     Vital Signs: BP (!) 138/90   Pulse 72   Temp 97.9  F (36.6  C)   Resp 12  There is no height or weight on file to calculate BMI.   Wt Readings from Last 2 Encounters:   08/23/21 301 lb 7 oz (136.7 kg)   07/29/21 270 lb (122.5 kg)        Circumferential volume measures:    Circumferential Measures 9/9/2021   Right just above MTP 27   Right Ankle 38   Right Widest Calf 56.5   Right Thigh Up 10cm 56   Right Knee to Ankle 30   Left - just above MTP 25   Left Ankle 23   Left Widest Calf 42   Left Thigh Up 10cm 49   Left Knee to Ankle 35     Image:          Ulceration(s)/Wound(s):   No open wounds or ulcerations      General: In no apparent distress.      Psych: Alert and oriented X 3. Affect normal.     HEENT: Atraumatic, normocephalic     Respiratory:  Breathing comfortably on room air    Abdominal: Normal bowel sounds without any pain, guarding or rigidity. Exam compromised by body habitus.    Musculoskeletal:  Limited ROM at right knee (previous quad tendon transfer per history) ortherwise normal range of motion in hips, knees and ankles bilaterally.  There is no active joint synovitis, erythema, swelling or joint laxity.      Neurological:  Sensation is intact to pinprick and light touch in both legs Strength testing is normal in hip flexion, knee flexion, knee extension, ankle dorsiflexion and great toe extension bilaterally. Deep tendon reflexes knee jerks and ankle jerks are normal bilaterally.     Vascular: Dorsalis pedis and posterior tibialis pulses are strong and equal bilaterally to manual palpation. There are no significant telangietasias, medial ankle venous flares, venous varicosities and spider veins.      Integumentary: Skin of the legs is  dry, taut, shiny, erythematous, crusting and thickened.  Nails are thickened and thin  surrounding skin Significant right lower extremity edema noted to mid thigh level on exam today. No open wounds on either lower extremity. Stemmer's sign positive on right.     Impression:     1.   Right lower extremity lymphedema   2.   Hodgkin's lymphoma  3.   Morbid Obesity  4.   Recurrent cellulitis  5.   Right femur osteomyelitis  6.   Fibrous dysplasia of right femur/tibia    Plan:     1. Type of swelling was reviewed in detail with the patient.  Questions were answered and education was completed.     2. New short stretch wraps provided for patient today. RLE was wrapped in clinic, and patient was re-educated on wrapping.  3. Lymphedema Therapy referral placed to re-establish management with MLD and continuing compression.  4. Orthotics Referral placed and patient is to be fitted for new compression socks once measures have stabilized with therapy.  5. Oncology referral placed today to help patient re-establish care.  6. Patient should continue to monitor for signs/symptoms of recurrent cellulitis.  7. Will order lymphedema flexitouch pump for ongoing aid in management of lymphedema  8. Patient will follow up in 4 months, or when needed.  If any questions or concerns they are to contact the clinic.      Thank you very much for referring Romeo Chong  If you have any questions please feel free to contact me at [phone].          Yeimi Levine MD  Wound Care and Lymphedema   [facility]   [phone]

## 2021-09-09 NOTE — PROGRESS NOTES
Moved from [place] 4/21 right leg swelling has used a pump which he nol onger has, history cellulitis. Recent hospitalization at [facility] for septic also non hodgkin's lymphoma has returned.  Wears knee high compression socks and velcro. Has US prior to apt.

## 2021-09-09 NOTE — PATIENT INSTRUCTIONS
Please monitor for signs of skin breakdown/wound development. Call our clinic to be scheduled sooner if you notice any skin breakdown. [phone].    Comprilan Wrapping Instructions                 1. Before bandaging, carefully      Apply lotion into the skin.   Avoid any open sores    2. Measure out the tubular bandage  (Stockinette) to be used as underwrap:  twice the leg length from the tip of the  foot to the groin.    3. Gather up the bandage  (Stockinette) along its entire length..    4. ...and pull onto the leg as far as  the groin. The section remaining at  both ends is later pulled over the  padding material.    5.   Begin with two turns of the        short-stretch bandage       (Comprilan ) foot     No bandage tension...      6.  Take the bandage into a         extended figure of eight    7.  The bandage runs in figures of       eight 2-3 times around the upper       ankle and the foot...    8.  Bandage the rest of the lower leg      In a circular method up to the knee.    Between the individual turns of bandage is especially important. The bandage should  be smoothed down well and creasing avoided.    Compression bandages used in the management of lymphedema should be properly washed on a regular basis, so skin cells and oils won t become trapped in the fibers of the bandages and damage the integrity of the textile. Compression bandages may be machine or hand washed; machine wash is generally the preferred method. Once the bandages go through the spin cycle they are easy to hang and will dry much faster.  Daily washing is recommended, especially if lotions or creams are being used. If the bandages are machine washed it is recommended to place the unrolled bandages in a mesh laundry bag in order to protect the fabric during the washing cycle (the gentle cycle should be utilized).  Bandages are best washed in warm water (between 108 - 140oF); if the bandages are very dirty, they may be boil-washed up to  203oF.  It is best to have more than one set of bandages - one to wear, one to wash. The sets should be applied alternately to allow the  build in  elasticity of the bandages to recover and to prolong their effectiveness.    Tips for hand washing procedures:  1. Start by filling a bowl, bucket, sink, or small tub with water.   2. The compression bandages should be dipped gently into the water to dampen.   3. Add a small amount of washing solution (see below).   4. Let the compression bandages soak for a few minutes.   5. For better cleaning, gently rub the fibers of the compression bandages together without stretching them excessively.   6. Then, empty the tub and refill with water - dip or rinse the clean compression bandages thoroughly to rid the bandages of residual salts and oils from perspiration.   7. Gently squeeze the compression bandages to remove excess water.   8. Lay flat to dry

## 2021-09-10 ENCOUNTER — PATIENT OUTREACH (OUTPATIENT)
Dept: ONCOLOGY | Facility: CLINIC | Age: 41
End: 2021-09-10

## 2021-09-10 NOTE — PROGRESS NOTES
"Referral from DR LYNDSEY VIRAMONTES (PM&R) to Oncology to establish oncology care for concern RE: HODGKIN LYMPHOMA recurrence  My Clinical Question Is: Patient with history of Hodgkin's lymphoma and concern for recurrence of his lymphoma on recent imaging during recent hospitalization. for sepsis. Needs to establish care with Oncology as he moved from [place].     Chart reviewed and writer placed OUTGOING CALL to pt, no answer. LVM introducing my role as nurse navigator with [employer] and that we have recd the referral to oncology. Requested callback to number below, scheduling team to call pt Monday  Chart reviewed and indicates pt's oncology care was \" in [place] about 15 yrs ago\"  Unable to verify details with pt, waiting on return call.   Notified Intake records team that those records will be needed prior to the consult.      September 13, 2021 writer notified by records team (SEE PRE-VISIT ENCOUNTER) originally diagnosed @ [facility] in 2005. Pt then treated @ [facility] for about a year. After that pt advised he didn't see anyone until he met w/ Dr. Yang Doty @ [facility] from 1/17/18 - 8/31/18). After that pt advised he did not follow up with anyone until recent admission. The notes from [facility] are now viewable in Care Everywhere.  Per further review of chart ([place] [facility] records not available yet), as of 2018 pt DID NOT CARRY A DX OR HX OF LYMPHOMA    From BOOKMARKED oncology visit note in 2018, [place]  history of mediastinal mass vs enlarged thymus and unexplained lymphadenopathy.     1. Lymphadenopathy: Long standing - concern for indolent lymphoma vs reactive process.   Labs: CBC with diff, CMP, LDH, uric acid, HIV, Hepatitis B and C serologies, CMV Ab, EBV DNA PCR, IgG subclasses, IgG, IgA, IgM, SPEP (patient did have them drawn after last visit)  PET-CT appears stable, likely reactive lymphadenopathy and thymic enlargement  Consider PET-CT in one year or early if concern for " "malignancy    Awaiting [facility] records, but it appears that the 2/26/18 visit in [place] was the last \"oncology\" visit pt has had, needs to establish oncology care within our institution now for new LAD, sx + hx  Records team collecting records including slides and discussing ALVAREZ from pt for [facility] records    Future Appointments   Date Time Provider Department Center   9/21/2021  2:40 PM [facility]   9/23/2021  1:45 PM Sivakumar Poon MD [facility]   1/27/2022 11:30 AM [facility]     Ann-Marie Bach, RN, BSN, OCN  Hematology/Oncology Nurse Navigator  [facility]  [phone]           "

## 2021-09-13 NOTE — TELEPHONE ENCOUNTER
RECORDS STATUS - ALL OTHER DIAGNOSIS      Action    Action Taken 21  Spoke w/ Patient - Pt originally diagnosed in  @ [facility]. Pt was at [facility] from  - . Pt advised he didn't see anyone/treast until meeting w/ Dr. Yang Armijo @ [facility] (detailed below). Pt advised after last meeting w/ Dr. Peters he didn't meet w/ anyone else. Pt advised all Tx @ [facility], [facility] & [facility].    Discussed need for ALVAREZ for Hx [facility] recs. Pt advised he would go a family members house to print/scan ALVAREZ & email back to me today. Emailed pt ALVAREZ.     21  LVM re ALVAREZ.    21  Spoke w/ Hannah @ [facility] - per her, they have no records for patient at all/no longer exist.  9:10 AM         RECORDS RECEIVED FROM: [facility], [facility], [facility]   DATE RECEIVED:    NOTES STATUS DETAILS   OFFICE NOTE from referring provider [facility] Dr. Yeimi Levine: 21   OFFICE NOTE from medical oncologist  - [facility] Dr. Yang Peters: 18   DISCHARGE SUMMARY from hospital [facility]/CE - [facility]  12, 21, 21    [facility]  09   DISCHARGE REPORT from the ER  - [facility] 10/23/08, 09, 3/1/09, 09, 09   OPERATIVE REPORT [facility] 21   MEDICATION LIST [facility] 21   CLINICAL TRIAL TREATMENTS TO DATE     LABS     PATHOLOGY REPORTS [facility] received   [facility] [employer] [facility]  12: [phone]    [facility]  05: [phone]   ANYTHING RELATED TO DIAGNOSIS Epic 21   GENONOMIC TESTING     TYPE:     IMAGING (NEED IMAGES & REPORT)     CT SCANS PACS 12: [facility]   MRI     MAMMO     ULTRASOUND     PET Received 18: [facility] ([employer])

## 2021-09-18 NOTE — PROGRESS NOTES
"Video Start Time: 308 PM    PAIN CENTER PROGRESS NOTE    Subjective:   Romeo Chong presents for evaluation of low back pain.  Also right leg and bilateral feet, history of fibrous dysplasia of bone since age 13.  Has had previous surgery on right tibia, has resultant lymphedema since 2006.  Comorbid conditions include Asthma, HTN, hypertriglyceridemia, lymphedema, obesity, Vitamin D deficiency, allergic rhinitis, nonintractable headache, gastric ulcer, h/o hodgkin's lymphoma    Major issues:  1. Chronic, continuous use of opioids    2. Chronic pain syndrome    3. Fibrous dysplasia of bone    4. Lymphedema      Pain location and description: See CMA note  Radiation of pain: legs to feet  Gait disturbance: None reported, denies recent falls.  Exacerbating factors: Any movement, recent infection/hospitalization  Alleviating factors: Medications, sleep, laying down  Associated symptoms: Denies weight loss, fever/chills, bowel or bladder incontinence.  Functional pain symptoms:  See CMA note  Adverse effects of medications: None reported  Current treatment efficacy: Fair  Current treatment compliance: New to pain center - unexpected UDT at consult. Had repeat UDT as expected and reports taking medications as prescribed.  Reports self-escalation of medication this refill (see below).    Since last visit, he had MTM visit with Kodak Lyons PharmD on 08/09/2021:  \"  ASSESSMENT:                             Medication Adherence/Access: No issues identified        Chronic Pain: Pain was improved when using Norco - ran out due to confusion on the process and who would continue prescribed. Last urine was as expected. Will discuss refills with Mony Monsivais PA-C.      Appropriately started Vitamin D supplement. Plan for repeat levels after 12 weeks of therapy (Mid October).      Mood/Sleep: Excessive sedation with Nortriptyline 50 mg. As pt was in increased pain at last MTM visit, this was an increased dose from previous " "equivalent of amitriptyline. Pt agreeable to trying a lower dose. Preferred to try 25 mg vs 10 mg. However, if continued excessive sedation on 25 mg, pt will call back and we can decrease dose.      Hypertension: Last blood pressure just above goal less than 140/90.  Pt was encouraged to schedule with a primary to manage (pt thought Dr. Perez was his primary, but Dr. Preez is part of the covering pool and not permanent provider at [facility]).  Does have some opportunities for simplification of regimen.  Amlodipine may be increasing edema.  Higher doses of hydrochlorothiazide likely not more helpful for BP and edema, and may be contributing to more K+ wasting.  Increasing doses of losartan may help to hold potassium WNL. Will defer these adjustments until follow-up.      Gastric Ulcer: Improved with continued Famotidine use. Continue to monitor ibuprofen use as this can worsen symptoms, but likely okay to continue for now.      Allergies: No symptoms at this time. Okay to keep Loratadine on hand for PRN use.         PLAN:                             -PharmD to discuss [place] refills with Mony Monsivais PA-C   -Restart Nortriptyline at 25 mg daily   -Pt to establish with new PCP         Follow-up: 9/21 with Mony Monsivais PA-C   Pt preferred to wait on scheduling with MTM until 9/21 visit.\"    He had ED to hospital visit on 08/17/21-08/26/21 reviewed today:  \"[facility]  Hospitalist Discharge Summary       Date of Admission:  8/17/2021  Date of Discharge:  8/26/2021  Discharging Provider: Oliver Carmona MD     Discharge Diagnoses     Active Problems:    Fibrous dysplasia of bone    Essential hypertension, benign    Lymphedema    Obesity, unspecified    H/O Hodgkin's lymphoma    Tachycardia    Cellulitis of right lower extremity    Elevated lactic acid level    Bacterial sepsis (H)    Gram-positive bacteremia     Follow-ups Needed After Discharge     Follow-up Appointments     " Follow-up and recommended labs and tests       Follow up with primary care provider, Physician No Ref-Primary, within 7   days to evaluate medication change, to evaluate treatment change, and for   hospital follow- up.  No follow up labs or test are needed.               Unresulted Labs Ordered in the Past 30 Days of this Admission      No orders found from 7/18/2021 to 8/18/2021.             Discharge Disposition      Discharged to home. Declines home health services  Condition at discharge: Good       Hospital Course     Patient is a 40-year-old male with a history of Hodgkin's lymphoma, right lower extremity lymphedema secondary to inguinal lymphadenopathy, hypertension, chronic pain syndrome, morbid obesity, fibrous dysplasia of femur and tibia on the right that presented with right leg edema and cellulitis.     Right lower extremity cellulitis  -With right femur osteomyelitis seen on MRI and CT.  On presentation had elevated pro calcitonin 16, elevated CRP and has had a leukocytosis up to 16.3 that has come down to 9.1 today.  Low suspicion for necrotizing fasciitis per surgery.  Has received a cocktail of antibiotics this admission.  -was on IV meropenem and linezolid. Changed to oral linezolid x 7 days for discharge  -ID and surgery following     Streptococcus agalactiae bacteremia  -Positive blood culture on 8/17 with subsequent cultures no growth  -antibiotics per ID     Hx of Hodgkin's Lymphoma  -Dx 15 yrs ago. Stage I or II per oncology.  -CT abdomen showing lymphadenopathy in multiple locations     Acute on chronic pain  -on toradol, tylenol and prn dilaudid, vistaril. Nortriptyline on hold  -Pain team following     HTN  -134/70 fair control  -resume home losartan and amlodipine  -held hctz     Acute dyspnea-resolved with IS and 1 dose of lasix. IVF only with antibiotics.     Tachycardia-resolved with IVF and beta blockade     Morbid obesity  -BMI 43     Hx of protein calorie malnutrition  -albumin 2.4  "this admission and is on supplements     Hypokalemia  -K 3.2>4.2  -Replace per protocol\"    He states he will see oncology this Thursday to review Hodgkin's Lymphoma.       He saw vascular surgeon Dr. Levine on 09/09/2021:  \"   Impression:      1.   Right lower extremity lymphedema   2.   Hodgkin's lymphoma  3.   Morbid Obesity  4.   Recurrent cellulitis  5.   Right femur osteomyelitis  6.   Fibrous dysplasia of right femur/tibia     Plan:      1. Type of swelling was reviewed in detail with the patient.  Questions were answered and education was completed.     2. New short stretch wraps provided for patient today. RLE was wrapped in clinic, and patient was re-educated on wrapping.  3. Lymphedema Therapy referral placed to re-establish management with MLD and continuing compression.  4. Orthotics Referral placed and patient is to be fitted for new compression socks once measures have stabilized with therapy.  5. Oncology referral placed today to help patient re-establish care.  6. Patient should continue to monitor for signs/symptoms of recurrent cellulitis.  7. Will order lymphedema flexitouch pump for ongoing aid in management of lymphedema  8. Patient will follow up in 4 months, or when needed.  If any questions or concerns they are to contact the clinic.       Thank you very much for referring Romeo Chong  If you have any questions please feel free to contact me at [phone]. \"    He states he hasn't obtained the compressions yet but has appointment.  He states swelling since the hospital, he is resting more and laying down.  Small activities are draining him.  He states the dilaudid worked well while he was inpatient.  Reviewed inpatient pain note by ARCENIO Liriano on 08/18/2021:  \"   PLAN:   1) Pain is consistent with acute upon chronic pain. The patient's home MME was 22-30mg daily. Agree with avoid IV opioids at this time.   2)Multimodal Medication Therapy  Topical: consider  NSAID'S: Toradol 30 mg " "every 6 hours prn   Muscle Relaxants: none  Adjuvants: Tylenol 650 mg tid, vistaril 25-50 mg every 4 hours prn (pain, pruritis)  Antidepressants/anxiolytics: nortriptyline 25 mg every hs  Opioids: hydrocodone/APAP 10/325 mg tablet every 6 hours prn   IV Pain medication: none (consider 1 mg IV hydromorphone prior to MRI)   3)Non-medication interventions  Pharmacy consult- appreciate recommendations   Acupuncture consult- as available Mon and Thursday   Integrative consult - called referral to [phone]   4)Constipation Prophylaxis  Daily stool softener/laxative  5) Follow up   -Opioid prescriber has been Pain Specialist  -Discharge Recommendations - We recommend prescribing the following at the time of discharge: follow up with Chronic Pain Provider.  Should not require any additional prescriptions.\"    He states the nortriptyline was stopped due to \"sleep paralysis\"  at night and hasn't resumed it since being home. He states he wants to discuss his Vicodin dosing 10/325 mg he states there are times he needs to take 2 tabs instead of 1 tab. He states there is a couple days it was 2 tabs BID.  Usually will take 2 tabs at nighttime dose instead of 1. He states he used to be dosing it this way in [place] after hospital.  He states he only takes dosing morning and night.  He states this helps out throughout day and can get to the restroom without much pain.  Denies side effects on stronger dose.  #6 tabs left.  We review his refill is not due until 10/05 and he has self-escalated dose.  He also was not taking his home medication during hospital stay so went through an additional 10 days (#20 tabs) extra.      He states pain level is increased since hospital. He states his pain is worsened in his leg and feels pain associated with swelling.  He states in his thigh it feels someone punched it real hard and his his calf is throbbing and aching pain.  His pain in ankle/foot is sharp and stabbing.  He denies numbness and " "tingling in leg.  Off oral antibiotics and he states he needs to set up appointment with ID.  He states he wants to follow-up as he was concerned about a \"bone infection\" and worried about amputation.     Medical cannabis enrollment: He has not yet attended, he is certified per website and can submit enrollment application.  He states the $200 fee is too high for him as he doesn't have source of income right now.  Is applying for SSDI with an  and wants to wait until this is completed for reduced fee.  He states he is not using recreational THC.    Consult recommendations:  LESI L4-5 or lumbar facet injection - will wait to order until after physical exam  PT evaluation and treatment as referred  Medication trial amitriptyline 20 mg x 3 nights, then 30 mg for insomnia/pain.    Discussed Vicodin 10/325 mg BID prn after UDT results, consider ketamine trial (out of pocket expense)  Behavioral health diagnostic assessment    He had PT evaluation on 06/21/2021.      Low back pain -   06/02/2021 TFESI L5-S1 bilateral \"hurt like hell\" during and after the procedure like someone was stabbing the nerve.  He states didn't help for more than a few days, unsure if pain is worse now.  Trying to find different ways to lay on back and elevate with pillows.  Denies steroid side effects or complications.  [facility].  Hasn't seen surgeon yet in consult.    Diagnostic assessment with Kiaan Masterson on 07/19/2021:  \"Recommendations: Follow pain center plan of care. Schedule and attend psychotherapy to further address mental health and chronic pain symptoms. Consider EMDR. Follow up with psychiatry to address mental health medication concerns.      Diagnosis: Adjustment disorder with anxiety and depressed mood  Chronic Pain Syndrome  Grief/loss  PTSD, chronic\"  States this went well and wants to use psychotherapy services..  He is living with elderly aunt right now.  Adaptive apartment which helps " "him out.  He states he has SSDI shouldn't be working, can't keep a job due to health.       Previous Diagnostics & Treatments for Pain:  Surgery - Right lower extremity and 18 surgeries total   Injections - TFESI bilateral L5-S1 with Dr. Dobson on 06/02/2021  Imaging - See below  Physical Therapy - Lymphedema therapy daily at home.  Chiropractor/Acupuncture - Tried acupuncture for carpal tunel  Massage - lymphedema massage of leg  TENS or bracing - Doesn't use back brace due to lymphedema, denies TENs  Pain Psychology or biofeedback - Previous therapist.    Other - compression wraps, heating pad and tiger balm, compression shirt helps low back pain a bit     Pertinent Pain Medications:  See med list.     Previously tried medications:    NSAIDs: Not helpful, Ibuprofen helps headaches (stress)    Acetaminophen: Not helpful    Antidepressants: Cymbalta in the past, nortriptyline was asleep for 24 hours    Gabapentinoids: Gabapentin was \"the worst\" caused lymph node swelling, denies Lyrica    Opioids: oxycodone also helpful, states given other opioids in the ED (fentanyl helped but was scared to have it), Morphine in IV caused headaches, Dilaudid     Topicals: Tiger Balm    Supplements: Tried turmeric, apple cider vinegar     Muscle Relaxants: Tries to avoid as it has him feel groggy in the morning previously on flexeril and tizanidine, valium    Other: Trazodone feels \"zombie\" like in the morning, tried THC in [place] which helped sleep.      Review of Systems  Constitutional: Sleep disturbance due to pain.  Denies fever, chills, night sweats, lethargy, weight loss, weight gain.  Musculoskeletal: Positive for back pain, foot pain, muscle pain.  Denies recent falls.  Gastrointestional: Denies difficulty swallowing, change in appetite, abdominal pain, constipation, nausea, vomiting, diarrhea, fecal incontinence.  Genitourinary: Denies urinary incontinence, dysuria, hematuria, UTI, frequency, hesitancy, change in " libido/erectile dysfunction.  Neurologic: Denies headaches, confusion, seizure,  changes in balance, changes in speech.  Psychiatric: Depression, anxiety, grief.  Denies memory loss, psychoses, suicidal ideation, substance use/abuse.     Objective:     Physical Exam  Constitutional- General appearance: Normal.  Well developed, comfortable, overweight, and appearance reflects stated age.  No acute distress or pain behaviors noted.  Presents alone today.  Psychiatric- Judgment and insight: Normal.  Speech: Normal rhythm.  Thought process: Normal.  No abnormal thoughts reported. Alert & Oriented to person, place, and time.  Recent and remote memory: Normal.  Observed mood: concerned  Respiratory- Breathing is non-labored; normal rhythm and rate.  Dermatologic- Exposed skin is clean, dry, and intact to inspection.  Musculoskeletal- Gait and station: Seated for entire visit in his car, not driving.    *Opioid Universal Precautions:    UDT -  Reviewed from 07/30/2021 as expected, also reviewed random in hospital 08/24/21  Opioid Agreement - 06/17/2021  Pharmacy- as documented    [place]  Reviewed - 09/21/21   Pill Count - n/a  Psychological evaluation - 07/19/2021 Kiana Masterson [facility]  MME - prescribed to 20, increased dose to 30-40  Pharmacogenetic testing - N/A    Imaging:  MR Lumbar Spine without Contrast 05/12/2021:  IMPRESSION:   CONCLUSION:  1.  Multilevel degenerative changes of the lumbar spine as described in detail above, greatest at L5-S1, where there is moderate lateral recess stenosis with abutment and mild displacement of the traversing left S1 nerve root, mild right neuroforaminal   stenosis, and mild to moderate left neuroforaminal stenosis.  2.  At L4-L5, there is mild left lateral recess stenosis with possible abutment of the traversing left L5 nerve root.     EXAM: XR SHOULDER LEFT 2 OR MORE VWS  LOCATION: [facility]  DATE/TIME: 5/12/2021 7:36 AM     INDICATION: Chronic left  "shoulder pain  COMPARISON: None.     IMPRESSION:   Normal alignment without a fracture or dislocation. Acromiohumeral space is preserved. No evidence of calcific tendinitis. Adjacent chest wall is unremarkable. Numerous clips in left axilla.     EXAM: XR FEET BILATERAL 2 VWS  LOCATION: [facility]  DATE/TIME: 5/12/2021 7:36 AM     INDICATION: Chronic bilateral foot pain  COMPARISON: None.     IMPRESSION:   Normal alignment without a fracture or dislocation. No effusions at the ankle     There is a very small plantar calcaneal spur in the left foot. Mild hallux valgus deformity bilaterally.     No other degenerative changes noted.    CT Femur thigh right with contrast 08/23/2021:                                                                   IMPRESSION:  1.  Overall no significant change in the right thigh since prior. There is extensive subcutaneous soft tissue stranding in the right thigh and lower leg which could represent cellulitis or edema. Associated foci of subcutaneous nodularity or fluid   unchanged.  2.  Chronic deformity, mixed lucency and sclerosis of the distal right femur and proximal tibia are unchanged.  3.  Small cortical defects and/or sinus tracts in the distal right femur are stable and may be related to superimposed acute osteomyelitis as suggested on previous MRI.  4.  Right inguinal and iliac lymphadenopathy stable.  5.  Small indeterminate lytic lesion right iliac wing, not included on prior exams.     MR ankle right with and without contrast 08/18/2021                                                                   IMPRESSION:  1.  Extensive cellulitis.  2.  No focal abscess.  3.  No evidence of osteomyelitis.     MR Femur thigh right with and without contrast 08/18/2021:  \"IMPRESSION:  1.  Findings highly suspicious for osteomyelitis involving the distal aspect of the right femur with replacement of normal T1 signal distally and surrounding reactive edema. " "Additionally, there are linear areas of increased T2 signal abnormality   extending through the anterior cortex possibly representing small sinus tracts draining from the right femur suggesting chronic osteomyelitis. There is cortical thickening throughout this area and fluid along the anterior cortex in the distal right   thigh.     2.  Additionally, there is diffuse edema in the anterior musculature of the distal right thigh with minimal layering fluid which can be seen with myositis and fasciitis.     3.  Subcutaneous edema diffusely throughout the right thigh distally and anteriorly in the right thigh with reactive lymphadenopathy right inguinal region.     4.  Final report following review by musculoskeletal radiologist.\"    Assessment:   Romeo Chong presents today for low back pain, [facility] with [facility] demonstrating multilevel degenerative changes, greatest at L5-S1.  Recently had TFESI bilateral L5-S1 which patient reports did not lessen pain symptoms.  He has not had lumbar surgical consult. Patient also has history of lymphedema in right LE following treatment for Non-Hodgkin's lymphoma and history of right LE surgery for fibrous dysplasia of bone.  This worsened over the month due to cellulitis requiring hospitalization and also likely osteomyelitis in right femur.  He was followed by ID with IV and oral antibiotics completed. He was seen by vascular and will see oncology this week as well. He states he has increased pain and has been self-escalating doses of Vicodin since hospitalization.  He did not discuss this with discharge providers or pain provider - review his opioid agreement today in detail and risks/safety concerns with self-escalation.  He will have adjustment in dosing to 3 tabs daily prn and will be implemented at next refill date as I will not refill early.  We review today how to reach provider with concerns through mychart or nurse triage line.  He is also interested " in medical cannabis for pain but cannot afford the out of pocket expense at this time.  Would like him to resume psychotherapy visits and call care coordinator for additional support.  Will see in 6 weeks in clinic and follow refills closely (consider limited prescriptions), random UDT and possibly pill count for monitoring compliance.  Any further episodes would result in case review and likely buprenorphine medication plan.    Plan:   Discussed Vicodin 10/325 mg 1 tab twice a day as needed for pain on severe pain days may take 1 tab in the morning and 2 tabs at night.  Refill sent for 10/05/21.  We discussed per your agreement you should not adjust medications on your own or increase without speaking to a provider.  If you are hospitalized, ok to have the pain provider call me to discuss or you can call/send a [employer] message after discharge to review medication doses - we cannot make a dose change without an appointment.    Continue with vascular and lymphedema therapies  Keep appointment with oncology as scheduled.  Resume PT at [facility] as able and obtain compression wraps as ordered  Continue with Kiana Masterson for behavioral health visits.    To help with appointments you may call:  RICKY Brooks  [phone]  [facility]  [facility]     Follow-up in 6 weeks with Mony CUELLO in office by 11/02 to evaluate the above plan of care.    Mony Monsivais PA-C  [facility]   [address]  [place] [address]  Ph: [phone]  Fax: [phone]    Video-Visit Details    Type of service:  Video Visit    Video End Time: 337 PM    Originating Location (pt. Location): Home    Distant Location (provider location):  [facility]     Platform used for Video Visit: Doximity    49 minutes spent on the date of the encounter doing chart review, history and exam, documentation,and further activities.

## 2021-09-21 ENCOUNTER — VIRTUAL VISIT (OUTPATIENT)
Dept: PALLIATIVE MEDICINE | Facility: OTHER | Age: 41
End: 2021-09-21
Payer: COMMERCIAL

## 2021-09-21 DIAGNOSIS — G89.4 CHRONIC PAIN SYNDROME: ICD-10-CM

## 2021-09-21 DIAGNOSIS — F11.90 CHRONIC, CONTINUOUS USE OF OPIOIDS: Primary | ICD-10-CM

## 2021-09-21 DIAGNOSIS — M85.00 FIBROUS DYSPLASIA OF BONE: ICD-10-CM

## 2021-09-21 DIAGNOSIS — I89.0 LYMPHEDEMA: ICD-10-CM

## 2021-09-21 PROCEDURE — 99215 OFFICE O/P EST HI 40 MIN: CPT | Mod: 95 | Performed by: PHYSICIAN ASSISTANT

## 2021-09-21 RX ORDER — VITAMIN E 268 MG
400 CAPSULE ORAL
COMMUNITY
End: 2021-11-16

## 2021-09-21 RX ORDER — HYDROCODONE BITARTRATE AND ACETAMINOPHEN 10; 325 MG/1; MG/1
TABLET ORAL
Qty: 70 TABLET | Refills: 0 | Status: SHIPPED | OUTPATIENT
Start: 2021-10-05 | End: 2021-11-02

## 2021-09-21 RX ORDER — IBUPROFEN 800 MG/1
800 TABLET, FILM COATED ORAL
COMMUNITY
End: 2021-11-02

## 2021-09-21 ASSESSMENT — PAIN SCALES - GENERAL: PAINLEVEL: EXTREME PAIN (9)

## 2021-09-21 NOTE — PROGRESS NOTES
"Pt is scheduled for video visit for his right leg, bilateral foot and lower back pain.  If video is dropped, please call [phone]  AVS- Will obtain from [email or web address]  No additional people will be on the visit    Pain score: 9  Constant   What does your pain feel like:\" Feels like I'm being punched or have been punched in my leg.\" Other pain varies in character.  Does the pain interfere with:  Work: N/A  Walking/distance: yes, after walking about 10 feet  Sleep: yes  Daily activities: yes  Relationships/social life: Yes, especially in the last month.  Mood: \"If pain gets too bad I get irritable, otherwise I'm ok\"  F= 7        "

## 2021-09-21 NOTE — LETTER
"    9/21/2021         RE: Romeo Chong  [address]  [place] [address]        Dear Colleague,    Thank you for referring your patient, Romeo Chong, to the [facility]. Please see a copy of my visit note below.    Video Start Time: 308 PM    PAIN CENTER PROGRESS NOTE    Subjective:   Romeo Chong presents for evaluation of low back pain.  Also right leg and bilateral feet, history of fibrous dysplasia of bone since age 13.  Has had previous surgery on right tibia, has resultant lymphedema since 2006.  Comorbid conditions include Asthma, HTN, hypertriglyceridemia, lymphedema, obesity, Vitamin D deficiency, allergic rhinitis, nonintractable headache, gastric ulcer, h/o hodgkin's lymphoma    Major issues:  1. Chronic, continuous use of opioids    2. Chronic pain syndrome    3. Fibrous dysplasia of bone    4. Lymphedema      Pain location and description: See CMA note  Radiation of pain: legs to feet  Gait disturbance: None reported, denies recent falls.  Exacerbating factors: Any movement, recent infection/hospitalization  Alleviating factors: Medications, sleep, laying down  Associated symptoms: Denies weight loss, fever/chills, bowel or bladder incontinence.  Functional pain symptoms:  See CMA note  Adverse effects of medications: None reported  Current treatment efficacy: Fair  Current treatment compliance: New to pain center - unexpected UDT at consult. Had repeat UDT as expected and reports taking medications as prescribed.  Reports self-escalation of medication this refill (see below).    Since last visit, he had MTM visit with [employer] on 08/09/2021:  \"  ASSESSMENT:                             Medication Adherence/Access: No issues identified        Chronic Pain: Pain was improved when using Norco - ran out due to confusion on the process and who would continue prescribed. Last urine was as expected. Will discuss refills with Mony Monsivais PA-C.      Appropriately " "started Vitamin D supplement. Plan for repeat levels after 12 weeks of therapy (Mid October).      Mood/Sleep: Excessive sedation with Nortriptyline 50 mg. As pt was in increased pain at last MTM visit, this was an increased dose from previous equivalent of amitriptyline. Pt agreeable to trying a lower dose. Preferred to try 25 mg vs 10 mg. However, if continued excessive sedation on 25 mg, pt will call back and we can decrease dose.      Hypertension: Last blood pressure just above goal less than 140/90.  Pt was encouraged to schedule with a primary to manage (pt thought Dr. Perez was his primary, but Dr. Perez is part of the covering pool and not permanent provider at [facility]).  Does have some opportunities for simplification of regimen.  Amlodipine may be increasing edema.  Higher doses of hydrochlorothiazide likely not more helpful for BP and edema, and may be contributing to more K+ wasting.  Increasing doses of losartan may help to hold potassium WNL. Will defer these adjustments until follow-up.      Gastric Ulcer: Improved with continued Famotidine use. Continue to monitor ibuprofen use as this can worsen symptoms, but likely okay to continue for now.      Allergies: No symptoms at this time. Okay to keep Loratadine on hand for PRN use.         PLAN:                             -PharmD to discuss [place] refills with Mony Monsivais PA-C   -Restart Nortriptyline at 25 mg daily   -Pt to establish with new PCP         Follow-up: 9/21 with Mony Monsivais PA-C   Pt preferred to wait on scheduling with MTM until 9/21 visit.\"    He had ED to hospital visit on 08/17/21-08/26/21 reviewed today:  \"[facility]  Hospitalist Discharge Summary       Date of Admission:  8/17/2021  Date of Discharge:  8/26/2021  Discharging Provider: Oliver Carmona MD     Discharge Diagnoses     Active Problems:    Fibrous dysplasia of bone    Essential hypertension, benign    Lymphedema    Obesity, " unspecified    H/O Hodgkin's lymphoma    Tachycardia    Cellulitis of right lower extremity    Elevated lactic acid level    Bacterial sepsis (H)    Gram-positive bacteremia     Follow-ups Needed After Discharge     Follow-up Appointments     Follow-up and recommended labs and tests       Follow up with primary care provider, Physician No Ref-Primary, within 7   days to evaluate medication change, to evaluate treatment change, and for   hospital follow- up.  No follow up labs or test are needed.               Unresulted Labs Ordered in the Past 30 Days of this Admission      No orders found from 7/18/2021 to 8/18/2021.             Discharge Disposition      Discharged to home. Declines home health services  Condition at discharge: Good       Hospital Course     Patient is a 40-year-old male with a history of Hodgkin's lymphoma, right lower extremity lymphedema secondary to inguinal lymphadenopathy, hypertension, chronic pain syndrome, morbid obesity, fibrous dysplasia of femur and tibia on the right that presented with right leg edema and cellulitis.     Right lower extremity cellulitis  -With right femur osteomyelitis seen on MRI and CT.  On presentation had elevated pro calcitonin 16, elevated CRP and has had a leukocytosis up to 16.3 that has come down to 9.1 today.  Low suspicion for necrotizing fasciitis per surgery.  Has received a cocktail of antibiotics this admission.  -was on IV meropenem and linezolid. Changed to oral linezolid x 7 days for discharge  -ID and surgery following     Streptococcus agalactiae bacteremia  -Positive blood culture on 8/17 with subsequent cultures no growth  -antibiotics per ID     Hx of Hodgkin's Lymphoma  -Dx 15 yrs ago. Stage I or II per oncology.  -CT abdomen showing lymphadenopathy in multiple locations     Acute on chronic pain  -on toradol, tylenol and prn dilaudid, vistaril. Nortriptyline on hold  -Pain team following     HTN  -134/70 fair control  -resume home losartan  "and amlodipine  -held hctz     Acute dyspnea-resolved with IS and 1 dose of lasix. IVF only with antibiotics.     Tachycardia-resolved with IVF and beta blockade     Morbid obesity  -BMI 43     Hx of protein calorie malnutrition  -albumin 2.4 this admission and is on supplements     Hypokalemia  -K 3.2>4.2  -Replace per protocol\"    He states he will see oncology this Thursday to review Hodgkin's Lymphoma.       He saw vascular surgeon Dr. Levine on 09/09/2021:  \"   Impression:      1.   Right lower extremity lymphedema   2.   Hodgkin's lymphoma  3.   Morbid Obesity  4.   Recurrent cellulitis  5.   Right femur osteomyelitis  6.   Fibrous dysplasia of right femur/tibia     Plan:      1. Type of swelling was reviewed in detail with the patient.  Questions were answered and education was completed.     2. New short stretch wraps provided for patient today. RLE was wrapped in clinic, and patient was re-educated on wrapping.  3. Lymphedema Therapy referral placed to re-establish management with MLD and continuing compression.  4. Orthotics Referral placed and patient is to be fitted for new compression socks once measures have stabilized with therapy.  5. Oncology referral placed today to help patient re-establish care.  6. Patient should continue to monitor for signs/symptoms of recurrent cellulitis.  7. Will order lymphedema flexitouch pump for ongoing aid in management of lymphedema  8. Patient will follow up in 4 months, or when needed.  If any questions or concerns they are to contact the clinic.       Thank you very much for referring Romeo Chong  If you have any questions please feel free to contact me at [phone]. \"    He states he hasn't obtained the compressions yet but has appointment.  He states swelling since the hospital, he is resting more and laying down.  Small activities are draining him.  He states the dilaudid worked well while he was inpatient.  Reviewed inpatient pain note by Libby Carmona, " "CNS on 08/18/2021:  \"   PLAN:   1) Pain is consistent with acute upon chronic pain. The patient's home MME was 22-30mg daily. Agree with avoid IV opioids at this time.   2)Multimodal Medication Therapy  Topical: consider  NSAID'S: Toradol 30 mg every 6 hours prn   Muscle Relaxants: none  Adjuvants: Tylenol 650 mg tid, vistaril 25-50 mg every 4 hours prn (pain, pruritis)  Antidepressants/anxiolytics: nortriptyline 25 mg every hs  Opioids: hydrocodone/APAP 10/325 mg tablet every 6 hours prn   IV Pain medication: none (consider 1 mg IV hydromorphone prior to MRI)   3)Non-medication interventions  Pharmacy consult- appreciate recommendations   Acupuncture consult- as available Mon and Thursday   Integrative consult - called referral to [phone]   4)Constipation Prophylaxis  Daily stool softener/laxative  5) Follow up   -Opioid prescriber has been Pain Specialist  -Discharge Recommendations - We recommend prescribing the following at the time of discharge: follow up with Chronic Pain Provider.  Should not require any additional prescriptions.\"    He states the nortriptyline was stopped due to \"sleep paralysis\"  at night and hasn't resumed it since being home. He states he wants to discuss his Vicodin dosing 10/325 mg he states there are times he needs to take 2 tabs instead of 1 tab. He states there is a couple days it was 2 tabs BID.  Usually will take 2 tabs at nighttime dose instead of 1. He states he used to be dosing it this way in [place] after hospital.  He states he only takes dosing morning and night.  He states this helps out throughout day and can get to the restroom without much pain.  Denies side effects on stronger dose.  #6 tabs left.  We review his refill is not due until 10/05 and he has self-escalated dose.  He also was not taking his home medication during hospital stay so went through an additional 10 days (#20 tabs) extra.      He states pain level is increased since hospital. He states his pain is " "worsened in his leg and feels pain associated with swelling.  He states in his thigh it feels someone punched it real hard and his his calf is throbbing and aching pain.  His pain in ankle/foot is sharp and stabbing.  He denies numbness and tingling in leg.  Off oral antibiotics and he states he needs to set up appointment with ID.  He states he wants to follow-up as he was concerned about a \"bone infection\" and worried about amputation.     Medical cannabis enrollment: He has not yet attended, he is certified per website and can submit enrollment application.  He states the $200 fee is too high for him as he doesn't have source of income right now.  Is applying for SSDI with an  and wants to wait until this is completed for reduced fee.  He states he is not using recreational THC.    Consult recommendations:  LESI L4-5 or lumbar facet injection - will wait to order until after physical exam  PT evaluation and treatment as referred  Medication trial amitriptyline 20 mg x 3 nights, then 30 mg for insomnia/pain.    Discussed Vicodin 10/325 mg BID prn after UDT results, consider ketamine trial (out of pocket expense)  Behavioral health diagnostic assessment    He had PT evaluation on 06/21/2021.      Low back pain -   06/02/2021 TFESI L5-S1 bilateral \"hurt like hell\" during and after the procedure like someone was stabbing the nerve.  He states didn't help for more than a few days, unsure if pain is worse now.  Trying to find different ways to lay on back and elevate with pillows.  Denies steroid side effects or complications.  He [facility] with [facility].  Hasn't seen surgeon yet in consult.    Diagnostic assessment with Kiana Masterson on 07/19/2021:  \"Recommendations: Follow pain center plan of care. Schedule and attend psychotherapy to further address mental health and chronic pain symptoms. Consider EMDR. Follow up with psychiatry to address mental health medication concerns.      Diagnosis: " "Adjustment disorder with anxiety and depressed mood  Chronic Pain Syndrome  Grief/loss  PTSD, chronic\"  States this went well and wants to use psychotherapy services..  He is living with elderly aunt right now.  Adaptive apartment which helps him out.  He states he has SSDI shouldn't be working, can't keep a job due to health.       Previous Diagnostics & Treatments for Pain:  Surgery - Right lower extremity and 18 surgeries total   Injections - TFESI bilateral L5-S1 with Dr. Dobson on 06/02/2021  Imaging - See below  Physical Therapy - Lymphedema therapy daily at home.  Chiropractor/Acupuncture - Tried acupuncture for carpal tunel  Massage - lymphedema massage of leg  TENS or bracing - Doesn't use back brace due to lymphedema, denies TENs  Pain Psychology or biofeedback - Previous therapist.    Other - compression wraps, heating pad and tiger balm, compression shirt helps low back pain a bit     Pertinent Pain Medications:  See med list.     Previously tried medications:    NSAIDs: Not helpful, Ibuprofen helps headaches (stress)    Acetaminophen: Not helpful    Antidepressants: Cymbalta in the past, nortriptyline was asleep for 24 hours    Gabapentinoids: Gabapentin was \"the worst\" caused lymph node swelling, denies Lyrica    Opioids: oxycodone also helpful, states given other opioids in the ED (fentanyl helped but was scared to have it), Morphine in IV caused headaches, Dilaudid     Topicals: Tiger Balm    Supplements: Tried turmeric, apple cider vinegar     Muscle Relaxants: Tries to avoid as it has him feel groggy in the morning previously on flexeril and tizanidine, valium    Other: Trazodone feels \"zombie\" like in the morning, tried THC in [place] which helped sleep.      Review of Systems  Constitutional: Sleep disturbance due to pain.  Denies fever, chills, night sweats, lethargy, weight loss, weight gain.  Musculoskeletal: Positive for back pain, foot pain, muscle pain.  Denies recent " falls.  Gastrointestional: Denies difficulty swallowing, change in appetite, abdominal pain, constipation, nausea, vomiting, diarrhea, fecal incontinence.  Genitourinary: Denies urinary incontinence, dysuria, hematuria, UTI, frequency, hesitancy, change in libido/erectile dysfunction.  Neurologic: Denies headaches, confusion, seizure,  changes in balance, changes in speech.  Psychiatric: Depression, anxiety, grief.  Denies memory loss, psychoses, suicidal ideation, substance use/abuse.     Objective:     Physical Exam  Constitutional- General appearance: Normal.  Well developed, comfortable, overweight, and appearance reflects stated age.  No acute distress or pain behaviors noted.  Presents alone today.  Psychiatric- Judgment and insight: Normal.  Speech: Normal rhythm.  Thought process: Normal.  No abnormal thoughts reported. Alert & Oriented to person, place, and time.  Recent and remote memory: Normal.  Observed mood: concerned  Respiratory- Breathing is non-labored; normal rhythm and rate.  Dermatologic- Exposed skin is clean, dry, and intact to inspection.  Musculoskeletal- Gait and station: Seated for entire visit in his car, not driving.    *Opioid Universal Precautions:    UDT -  Reviewed from 07/30/2021 as expected, also reviewed random in hospital 08/24/21  Opioid Agreement - 06/17/2021  Pharmacy- as documented    [place]  Reviewed - 09/21/21   Pill Count - n/a  Psychological evaluation - 07/19/2021 Kiana Masterson [facility]  MME - prescribed to 20, increased dose to 30-40  Pharmacogenetic testing - N/A    Imaging:  MR Lumbar Spine without Contrast 05/12/2021:  IMPRESSION:   CONCLUSION:  1.  Multilevel degenerative changes of the lumbar spine as described in detail above, greatest at L5-S1, where there is moderate lateral recess stenosis with abutment and mild displacement of the traversing left S1 nerve root, mild right neuroforaminal   stenosis, and mild to moderate left neuroforaminal stenosis.  2.  At  L4-L5, there is mild left lateral recess stenosis with possible abutment of the traversing left L5 nerve root.     EXAM: XR SHOULDER LEFT 2 OR MORE VWS  LOCATION: [facility]  DATE/TIME: 5/12/2021 7:36 AM     INDICATION: Chronic left shoulder pain  COMPARISON: None.     IMPRESSION:   Normal alignment without a fracture or dislocation. Acromiohumeral space is preserved. No evidence of calcific tendinitis. Adjacent chest wall is unremarkable. Numerous clips in left axilla.     EXAM: XR FEET BILATERAL 2 VWS  LOCATION: [facility]  DATE/TIME: 5/12/2021 7:36 AM     INDICATION: Chronic bilateral foot pain  COMPARISON: None.     IMPRESSION:   Normal alignment without a fracture or dislocation. No effusions at the ankle     There is a very small plantar calcaneal spur in the left foot. Mild hallux valgus deformity bilaterally.     No other degenerative changes noted.    CT Femur thigh right with contrast 08/23/2021:                                                                   IMPRESSION:  1.  Overall no significant change in the right thigh since prior. There is extensive subcutaneous soft tissue stranding in the right thigh and lower leg which could represent cellulitis or edema. Associated foci of subcutaneous nodularity or fluid   unchanged.  2.  Chronic deformity, mixed lucency and sclerosis of the distal right femur and proximal tibia are unchanged.  3.  Small cortical defects and/or sinus tracts in the distal right femur are stable and may be related to superimposed acute osteomyelitis as suggested on previous MRI.  4.  Right inguinal and iliac lymphadenopathy stable.  5.  Small indeterminate lytic lesion right iliac wing, not included on prior exams.     MR ankle right with and without contrast 08/18/2021                                                                   IMPRESSION:  1.  Extensive cellulitis.  2.  No focal abscess.  3.  No evidence of osteomyelitis.  "    MR Femur thigh right with and without contrast 08/18/2021:  \"IMPRESSION:  1.  Findings highly suspicious for osteomyelitis involving the distal aspect of the right femur with replacement of normal T1 signal distally and surrounding reactive edema. Additionally, there are linear areas of increased T2 signal abnormality   extending through the anterior cortex possibly representing small sinus tracts draining from the right femur suggesting chronic osteomyelitis. There is cortical thickening throughout this area and fluid along the anterior cortex in the distal right   thigh.     2.  Additionally, there is diffuse edema in the anterior musculature of the distal right thigh with minimal layering fluid which can be seen with myositis and fasciitis.     3.  Subcutaneous edema diffusely throughout the right thigh distally and anteriorly in the right thigh with reactive lymphadenopathy right inguinal region.     4.  Final report following review by musculoskeletal radiologist.\"    Assessment:   Romeo Chong presents today for low back pain, [facility] with [facility] demonstrating multilevel degenerative changes, greatest at L5-S1.  Recently had TFESI bilateral L5-S1 which patient reports did not lessen pain symptoms.  He has not had lumbar surgical consult. Patient also has history of lymphedema in right LE following treatment for Non-Hodgkin's lymphoma and history of right LE surgery for fibrous dysplasia of bone.  This worsened over the month due to cellulitis requiring hospitalization and also likely osteomyelitis in right femur.  He was followed by ID with IV and oral antibiotics completed. He was seen by vascular and will see oncology this week as well. He states he has increased pain and has been self-escalating doses of Vicodin since hospitalization.  He did not discuss this with discharge providers or pain provider - review his opioid agreement today in detail and risks/safety concerns with " self-escalation.  He will have adjustment in dosing to 3 tabs daily prn and will be implemented at next refill date as I will not refill early.  We review today how to reach provider with concerns through [employer] or nurse triage line.  He is also interested in medical cannabis for pain but cannot afford the out of pocket expense at this time.  Would like him to resume psychotherapy visits and call care coordinator for additional support.  Will see in 6 weeks in clinic and follow refills closely (consider limited prescriptions), random UDT and possibly pill count for monitoring compliance.  Any further episodes would result in case review and likely buprenorphine medication plan.    Plan:   Discussed Vicodin 10/325 mg 1 tab twice a day as needed for pain on severe pain days may take 1 tab in the morning and 2 tabs at night.  Refill sent for 10/05/21.  We discussed per your agreement you should not adjust medications on your own or increase without speaking to a provider.  If you are hospitalized, ok to have the pain provider call me to discuss or you can call/send a [employer] message after discharge to review medication doses - we cannot make a dose change without an appointment.    Continue with vascular and lymphedema therapies  Keep appointment with oncology as scheduled.  Resume PT at [facility] as able and obtain compression wraps as ordered  Continue with Kiana Masterson for behavioral health visits.    To help with appointments you may call:  RICKY Brooks  [phone]  [facility]  [facility]     Follow-up in 6 weeks with Mony CUELLO in office by 11/02 to evaluate the above plan of care.    Moyn Monsivais PA-C  [facility]   [address]  [place] [address]  Ph: [phone]  Fax: [phone]    Video-Visit Details    Type of service:  Video Visit    Video End Time: 337 PM    Originating Location (pt. Location): Home    Distant Location  "(provider location):  [facility]     Platform used for Video Visit: Doximity    49 minutes spent on the date of the encounter doing chart review, history and exam, documentation,and further activities.      Pt is scheduled for video visit for his right leg, bilateral foot and lower back pain.  If video is dropped, please call [phone]  AVS- Will obtain from [employer]  No additional people will be on the visit    Pain score: 9  Constant   What does your pain feel like:\" Feels like I'm being punched or have been punched in my leg.\" Other pain varies in character.  Does the pain interfere with:  Work: N/A  Walking/distance: yes, after walking about 10 feet  Sleep: yes  Daily activities: yes  Relationships/social life: Yes, especially in the last month.  Mood: \"If pain gets too bad I get irritable, otherwise I'm ok\"  F= 7            Again, thank you for allowing me to participate in the care of your patient.        Sincerely,        Mony Monsivias PA-C    "

## 2021-09-21 NOTE — PATIENT INSTRUCTIONS
Continue Vicodin 10/325 mg 1 tab twice a day as needed for pain.  Did discuss adjusting dosing for increased pain as needed to 1 tab in the morning and 2 tabs at night.  Refill sent for 10/05/21.  We discussed per your agreement you should not adjust medications on your own or increase without speaking to a provider.  If you are hospitalized, ok to have the pain provider call me to discuss or you can call/send a [employer] message after discharge to review medication doses - we cannot make a dose change without an appointment.    Continue with vascular and lymphedema therapies  Keep appointment with oncology as scheduled.  Resume PT at [facility] as able and obtain compression wraps as ordered  Continue with Kiana Masterson for behavioral health visits.    To help with appointments you may call:  RICKY Brooks  [phone]  Connected Care Resource [facility]     Follow-up in 6 weeks with Mony CUELLO in office by 11/02 to evaluate the above plan of care.
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
## 2021-09-23 ENCOUNTER — LAB (OUTPATIENT)
Dept: LAB | Facility: CLINIC | Age: 41
End: 2021-09-23
Payer: COMMERCIAL

## 2021-09-23 ENCOUNTER — ONCOLOGY VISIT (OUTPATIENT)
Dept: ONCOLOGY | Facility: CLINIC | Age: 41
End: 2021-09-23
Attending: STUDENT IN AN ORGANIZED HEALTH CARE EDUCATION/TRAINING PROGRAM
Payer: COMMERCIAL

## 2021-09-23 ENCOUNTER — PRE VISIT (OUTPATIENT)
Dept: ONCOLOGY | Facility: CLINIC | Age: 41
End: 2021-09-23

## 2021-09-23 VITALS
TEMPERATURE: 98.1 F | DIASTOLIC BLOOD PRESSURE: 95 MMHG | SYSTOLIC BLOOD PRESSURE: 145 MMHG | RESPIRATION RATE: 16 BRPM | BODY MASS INDEX: 40.46 KG/M2 | HEART RATE: 81 BPM | WEIGHT: 289 LBS | HEIGHT: 71 IN | OXYGEN SATURATION: 97 %

## 2021-09-23 DIAGNOSIS — R78.81 GRAM-POSITIVE BACTEREMIA: Primary | ICD-10-CM

## 2021-09-23 DIAGNOSIS — Z71.9 ENCOUNTER FOR CONSULTATION: Primary | ICD-10-CM

## 2021-09-23 DIAGNOSIS — C81.90 HODGKIN LYMPHOMA, UNSPECIFIED HODGKIN LYMPHOMA TYPE, UNSPECIFIED BODY REGION (H): ICD-10-CM

## 2021-09-23 LAB
ALBUMIN SERPL-MCNC: 4 G/DL (ref 3.4–5)
ALP SERPL-CCNC: 80 U/L (ref 40–150)
ALT SERPL W P-5'-P-CCNC: 40 U/L (ref 0–70)
ANION GAP SERPL CALCULATED.3IONS-SCNC: 8 MMOL/L (ref 3–14)
AST SERPL W P-5'-P-CCNC: 20 U/L (ref 0–45)
BASOPHILS # BLD AUTO: 0 10E3/UL (ref 0–0.2)
BASOPHILS NFR BLD AUTO: 1 %
BILIRUB SERPL-MCNC: 0.5 MG/DL (ref 0.2–1.3)
BUN SERPL-MCNC: 6 MG/DL (ref 7–30)
CALCIUM SERPL-MCNC: 9.6 MG/DL (ref 8.5–10.1)
CHLORIDE BLD-SCNC: 107 MMOL/L (ref 94–109)
CO2 SERPL-SCNC: 27 MMOL/L (ref 20–32)
CREAT SERPL-MCNC: 0.56 MG/DL (ref 0.66–1.25)
EOSINOPHIL # BLD AUTO: 0.1 10E3/UL (ref 0–0.7)
EOSINOPHIL NFR BLD AUTO: 3 %
ERYTHROCYTE [DISTWIDTH] IN BLOOD BY AUTOMATED COUNT: 15 % (ref 10–15)
FERRITIN SERPL-MCNC: 119 NG/ML (ref 26–388)
GFR SERPL CREATININE-BSD FRML MDRD: >90 ML/MIN/1.73M2
GLUCOSE BLD-MCNC: 86 MG/DL (ref 70–99)
HCT VFR BLD AUTO: 44.5 % (ref 40–53)
HGB BLD-MCNC: 13.9 G/DL (ref 13.3–17.7)
IMM GRANULOCYTES # BLD: 0 10E3/UL
IMM GRANULOCYTES NFR BLD: 0 %
LDH SERPL L TO P-CCNC: 162 U/L (ref 85–227)
LYMPHOCYTES # BLD AUTO: 1.1 10E3/UL (ref 0.8–5.3)
LYMPHOCYTES NFR BLD AUTO: 27 %
MCH RBC QN AUTO: 25.6 PG (ref 26.5–33)
MCHC RBC AUTO-ENTMCNC: 31.2 G/DL (ref 31.5–36.5)
MCV RBC AUTO: 82 FL (ref 78–100)
MONOCYTES # BLD AUTO: 0.4 10E3/UL (ref 0–1.3)
MONOCYTES NFR BLD AUTO: 10 %
NEUTROPHILS # BLD AUTO: 2.3 10E3/UL (ref 1.6–8.3)
NEUTROPHILS NFR BLD AUTO: 59 %
NRBC # BLD AUTO: 0 10E3/UL
NRBC BLD AUTO-RTO: 0 /100
PLATELET # BLD AUTO: 217 10E3/UL (ref 150–450)
POTASSIUM BLD-SCNC: 3.8 MMOL/L (ref 3.4–5.3)
PROT SERPL-MCNC: 8.8 G/DL (ref 6.8–8.8)
RBC # BLD AUTO: 5.42 10E6/UL (ref 4.4–5.9)
SODIUM SERPL-SCNC: 142 MMOL/L (ref 133–144)
TOTAL PROTEIN SERUM FOR ELP: 8.4 G/DL (ref 6.8–8.8)
WBC # BLD AUTO: 3.9 10E3/UL (ref 4–11)

## 2021-09-23 PROCEDURE — 82232 ASSAY OF BETA-2 PROTEIN: CPT | Performed by: STUDENT IN AN ORGANIZED HEALTH CARE EDUCATION/TRAINING PROGRAM

## 2021-09-23 PROCEDURE — 83615 LACTATE (LD) (LDH) ENZYME: CPT | Performed by: STUDENT IN AN ORGANIZED HEALTH CARE EDUCATION/TRAINING PROGRAM

## 2021-09-23 PROCEDURE — G0463 HOSPITAL OUTPT CLINIC VISIT: HCPCS

## 2021-09-23 PROCEDURE — 82947 ASSAY GLUCOSE BLOOD QUANT: CPT | Performed by: STUDENT IN AN ORGANIZED HEALTH CARE EDUCATION/TRAINING PROGRAM

## 2021-09-23 PROCEDURE — 86334 IMMUNOFIX E-PHORESIS SERUM: CPT | Mod: TC | Performed by: STUDENT IN AN ORGANIZED HEALTH CARE EDUCATION/TRAINING PROGRAM

## 2021-09-23 PROCEDURE — 82728 ASSAY OF FERRITIN: CPT | Performed by: STUDENT IN AN ORGANIZED HEALTH CARE EDUCATION/TRAINING PROGRAM

## 2021-09-23 PROCEDURE — 88189 FLOWCYTOMETRY/READ 16 & >: CPT | Performed by: PATHOLOGY

## 2021-09-23 PROCEDURE — 83883 ASSAY NEPHELOMETRY NOT SPEC: CPT | Performed by: STUDENT IN AN ORGANIZED HEALTH CARE EDUCATION/TRAINING PROGRAM

## 2021-09-23 PROCEDURE — 36415 COLL VENOUS BLD VENIPUNCTURE: CPT | Performed by: STUDENT IN AN ORGANIZED HEALTH CARE EDUCATION/TRAINING PROGRAM

## 2021-09-23 PROCEDURE — 88321 CONSLTJ&REPRT SLD PREP ELSWR: CPT | Performed by: PATHOLOGY

## 2021-09-23 PROCEDURE — 84165 PROTEIN E-PHORESIS SERUM: CPT | Mod: TC | Performed by: PATHOLOGY

## 2021-09-23 PROCEDURE — 99205 OFFICE O/P NEW HI 60 MIN: CPT | Performed by: STUDENT IN AN ORGANIZED HEALTH CARE EDUCATION/TRAINING PROGRAM

## 2021-09-23 PROCEDURE — 88185 FLOWCYTOMETRY/TC ADD-ON: CPT | Performed by: STUDENT IN AN ORGANIZED HEALTH CARE EDUCATION/TRAINING PROGRAM

## 2021-09-23 PROCEDURE — 85025 COMPLETE CBC W/AUTO DIFF WBC: CPT | Performed by: STUDENT IN AN ORGANIZED HEALTH CARE EDUCATION/TRAINING PROGRAM

## 2021-09-23 PROCEDURE — 84155 ASSAY OF PROTEIN SERUM: CPT | Mod: 91 | Performed by: STUDENT IN AN ORGANIZED HEALTH CARE EDUCATION/TRAINING PROGRAM

## 2021-09-23 ASSESSMENT — PAIN SCALES - GENERAL: PAINLEVEL: EXTREME PAIN (8)

## 2021-09-23 ASSESSMENT — MIFFLIN-ST. JEOR: SCORE: 2248.03

## 2021-09-23 NOTE — NURSING NOTE
"Oncology Rooming Note    September 23, 2021 1:57 PM   Romeo Chong is a 40 year old male who presents for:    Chief Complaint   Patient presents with     Oncology Clinic Visit     Hodgkin lymphoma, unspecified Hodgkin lymphoma type, unspecified body region      Initial Vitals: BP (!) 145/95   Pulse 81   Temp 98.1  F (36.7  C)   Resp 16   Ht 1.803 m (5' 11\")   Wt 131.1 kg (289 lb)   SpO2 97%   BMI 40.31 kg/m   Estimated body mass index is 40.31 kg/m  as calculated from the following:    Height as of this encounter: 1.803 m (5' 11\").    Weight as of this encounter: 131.1 kg (289 lb). Body surface area is 2.56 meters squared.  Extreme Pain (8) Comment: Data Unavailable   No LMP for male patient.  Allergies reviewed: Yes  Medications reviewed: Yes    Medications: Medication refills not needed today.  Pharmacy name entered into [facility]:    [id number] [place], [place] - [facility] AT [facility] [address] [facility] [place], [place] - [facility]    Clinical concerns: New patient       Lennox Stearns MA            "
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
## 2021-09-24 ENCOUNTER — LAB (OUTPATIENT)
Dept: LAB | Facility: CLINIC | Age: 41
End: 2021-09-24
Payer: COMMERCIAL

## 2021-09-24 ENCOUNTER — TELEPHONE (OUTPATIENT)
Dept: INFECTIOUS DISEASES | Facility: CLINIC | Age: 41
End: 2021-09-24

## 2021-09-24 ENCOUNTER — DOCUMENTATION ONLY (OUTPATIENT)
Dept: ORTHOPEDICS | Facility: CLINIC | Age: 41
End: 2021-09-24

## 2021-09-24 DIAGNOSIS — Z71.9 ENCOUNTER FOR CONSULTATION: Primary | ICD-10-CM

## 2021-09-24 LAB
ALBUMIN SERPL ELPH-MCNC: 4.3 G/DL (ref 3.7–5.1)
ALPHA1 GLOB SERPL ELPH-MCNC: 0.5 G/DL (ref 0.2–0.4)
ALPHA2 GLOB SERPL ELPH-MCNC: 0.8 G/DL (ref 0.5–0.9)
B-GLOBULIN SERPL ELPH-MCNC: 1.1 G/DL (ref 0.6–1)
B2 MICROGLOB TUMOR MARKER SER-MCNC: 1.5 MG/L
GAMMA GLOB SERPL ELPH-MCNC: 1.7 G/DL (ref 0.7–1.6)
KAPPA LC FREE SER-MCNC: 2.53 MG/DL (ref 0.33–1.94)
KAPPA LC FREE/LAMBDA FREE SER NEPH: 1.9 {RATIO} (ref 0.26–1.65)
LAMBDA LC FREE SERPL-MCNC: 1.33 MG/DL (ref 0.57–2.63)
M PROTEIN SERPL ELPH-MCNC: 0 G/DL
PATH REPORT.COMMENTS IMP SPEC: NORMAL
PATH REPORT.FINAL DX SPEC: NORMAL
PATH REPORT.MICROSCOPIC SPEC OTHER STN: NORMAL
PATH REPORT.RELEVANT HX SPEC: NORMAL
PROT PATTERN SERPL ELPH-IMP: ABNORMAL
PROT PATTERN SERPL IFE-IMP: NORMAL

## 2021-09-24 PROCEDURE — 86334 IMMUNOFIX E-PHORESIS SERUM: CPT | Mod: 26 | Performed by: PATHOLOGY

## 2021-09-24 PROCEDURE — 84165 PROTEIN E-PHORESIS SERUM: CPT | Mod: 26 | Performed by: PATHOLOGY

## 2021-09-24 PROCEDURE — 88321 CONSLTJ&REPRT SLD PREP ELSWR: CPT | Performed by: PATHOLOGY

## 2021-09-24 NOTE — TELEPHONE ENCOUNTER
4:36pm paged Dr. Poon as pt was just seen on 9/23.     Per Dr. Poon- is already aware and was addressed and has been fully treated. The culture was results from 8/17 labs.

## 2021-09-24 NOTE — TELEPHONE ENCOUNTER
ID TEAM    Please review and advise on scheduling.  Procedure: Infectious Disease Referral Status: Needs Scheduling   Requested appt date: 9/23/2021 (Approximate) Authorizing: Sivakumar Poon MD in  ONCOLOGY ADULT   Expires: 9/23/2022 Priority: Routine: Next available opening   Diagnosis: Gram-positive bacteremia [R78.81]

## 2021-09-24 NOTE — PROGRESS NOTES
S: Romeo came in on 9/24/2021 to be measured for new right compression knee high garments 20-30 mmHg. RX on-file is current from Dr. Levine.    A: Different options were discussed with Romeo that would be more lower-profile to help his right leg with the compression garment fit well underneath his clothes. Romeo liked the idea of trying the SIGVARIS Compreflex Complete Velcro calf piece and the Medi 550 custom knee high 20-30 mmHg. Measurements were taken for both items. The SIGVARIS Compreflex Complete (XL, Reg) was pulled from the  stock supply. Romeo was assisted in donning the compression garment. Romeo was showed how to set and use the accutabs. He is familiar with the use and care and we did touch on some of those instructions again. Romeo wore the item out of the clinic today and signed the necessary paperwork. A delivery appnt was scheduled two weeks out on 10/8 at 12:30 pm in [place] for Romeo to come back and try on the Medi 550 knee high 20-30 mmHg. Measurements were sent for fabrication to [employer].    P: Romeo is to call with any further needs.  Goal is to maintain a home program.

## 2021-09-27 LAB
PATH REPORT.COMMENTS IMP SPEC: NORMAL
PATH REPORT.COMMENTS IMP SPEC: NORMAL
PATH REPORT.FINAL DX SPEC: NORMAL
PATH REPORT.GROSS SPEC: NORMAL
PATH REPORT.MICROSCOPIC SPEC OTHER STN: NORMAL
PATH REPORT.RELEVANT HX SPEC: NORMAL
PATH REPORT.RELEVANT HX SPEC: NORMAL
PATH REPORT.SITE OF ORIGIN SPEC: NORMAL

## 2021-09-29 LAB
PATH REPORT.COMMENTS IMP SPEC: NORMAL
PATH REPORT.FINAL DX SPEC: NORMAL
PATH REPORT.GROSS SPEC: NORMAL
PATH REPORT.MICROSCOPIC SPEC OTHER STN: NORMAL
PATH REPORT.RELEVANT HX SPEC: NORMAL
PATH REPORT.RELEVANT HX SPEC: NORMAL
PATH REPORT.SITE OF ORIGIN SPEC: NORMAL
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
## 2021-10-05 ENCOUNTER — ANCILLARY PROCEDURE (OUTPATIENT)
Dept: PET IMAGING | Facility: CLINIC | Age: 41
End: 2021-10-05
Attending: STUDENT IN AN ORGANIZED HEALTH CARE EDUCATION/TRAINING PROGRAM
Payer: COMMERCIAL

## 2021-10-05 DIAGNOSIS — C81.90 HODGKIN LYMPHOMA, UNSPECIFIED HODGKIN LYMPHOMA TYPE, UNSPECIFIED BODY REGION (H): ICD-10-CM

## 2021-10-05 LAB — GLUCOSE SERPL-MCNC: 106 MG/DL (ref 70–99)

## 2021-10-05 RX ORDER — IOPAMIDOL 755 MG/ML
50-125 INJECTION, SOLUTION INTRAVASCULAR ONCE
Status: COMPLETED | OUTPATIENT
Start: 2021-10-05 | End: 2021-10-05

## 2021-10-05 RX ADMIN — IOPAMIDOL 125 ML: 755 INJECTION, SOLUTION INTRAVASCULAR at 14:21

## 2021-10-05 NOTE — DISCHARGE INSTRUCTIONS

## 2021-10-07 ENCOUNTER — APPOINTMENT (OUTPATIENT)
Dept: LAB | Facility: CLINIC | Age: 41
End: 2021-10-07
Attending: STUDENT IN AN ORGANIZED HEALTH CARE EDUCATION/TRAINING PROGRAM
Payer: COMMERCIAL

## 2021-10-07 ENCOUNTER — ONCOLOGY VISIT (OUTPATIENT)
Dept: ONCOLOGY | Facility: CLINIC | Age: 41
End: 2021-10-07
Attending: STUDENT IN AN ORGANIZED HEALTH CARE EDUCATION/TRAINING PROGRAM
Payer: COMMERCIAL

## 2021-10-07 VITALS
BODY MASS INDEX: 40.45 KG/M2 | TEMPERATURE: 98.6 F | RESPIRATION RATE: 18 BRPM | WEIGHT: 290 LBS | SYSTOLIC BLOOD PRESSURE: 156 MMHG | HEART RATE: 90 BPM | OXYGEN SATURATION: 96 % | DIASTOLIC BLOOD PRESSURE: 91 MMHG

## 2021-10-07 DIAGNOSIS — R77.8 ABNORMAL SPEP: ICD-10-CM

## 2021-10-07 DIAGNOSIS — C85.90 INDOLENT LYMPHOMA (H): ICD-10-CM

## 2021-10-07 DIAGNOSIS — R59.1 LYMPHADENOPATHY: Primary | ICD-10-CM

## 2021-10-07 LAB
ANION GAP SERPL CALCULATED.3IONS-SCNC: 7 MMOL/L (ref 3–14)
BASOPHILS # BLD AUTO: 0 10E3/UL (ref 0–0.2)
BASOPHILS NFR BLD AUTO: 0 %
BUN SERPL-MCNC: 4 MG/DL (ref 7–30)
CALCIUM SERPL-MCNC: 9.1 MG/DL (ref 8.5–10.1)
CHLORIDE BLD-SCNC: 107 MMOL/L (ref 94–109)
CO2 SERPL-SCNC: 26 MMOL/L (ref 20–32)
CREAT SERPL-MCNC: 0.59 MG/DL (ref 0.66–1.25)
EOSINOPHIL # BLD AUTO: 0.2 10E3/UL (ref 0–0.7)
EOSINOPHIL NFR BLD AUTO: 4 %
ERYTHROCYTE [DISTWIDTH] IN BLOOD BY AUTOMATED COUNT: 14.6 % (ref 10–15)
ERYTHROCYTE [SEDIMENTATION RATE] IN BLOOD BY WESTERGREN METHOD: 10 MM/HR (ref 0–15)
GFR SERPL CREATININE-BSD FRML MDRD: >90 ML/MIN/1.73M2
GLUCOSE BLD-MCNC: 124 MG/DL (ref 70–99)
HBV CORE AB SERPL QL IA: NONREACTIVE
HBV SURFACE AB SERPL IA-ACNC: 2.46 M[IU]/ML
HBV SURFACE AG SERPL QL IA: NONREACTIVE
HCT VFR BLD AUTO: 44 % (ref 40–53)
HCV AB SERPL QL IA: NONREACTIVE
HGB BLD-MCNC: 13.7 G/DL (ref 13.3–17.7)
HIV 1+2 AB+HIV1 P24 AG SERPL QL IA: NONREACTIVE
IMM GRANULOCYTES # BLD: 0 10E3/UL
IMM GRANULOCYTES NFR BLD: 0 %
LDH SERPL L TO P-CCNC: 171 U/L (ref 85–227)
LYMPHOCYTES # BLD AUTO: 1.2 10E3/UL (ref 0.8–5.3)
LYMPHOCYTES NFR BLD AUTO: 25 %
MCH RBC QN AUTO: 25.6 PG (ref 26.5–33)
MCHC RBC AUTO-ENTMCNC: 31.1 G/DL (ref 31.5–36.5)
MCV RBC AUTO: 82 FL (ref 78–100)
MONOCYTES # BLD AUTO: 0.3 10E3/UL (ref 0–1.3)
MONOCYTES NFR BLD AUTO: 7 %
NEUTROPHILS # BLD AUTO: 3.2 10E3/UL (ref 1.6–8.3)
NEUTROPHILS NFR BLD AUTO: 64 %
NRBC # BLD AUTO: 0 10E3/UL
NRBC BLD AUTO-RTO: 0 /100
PLATELET # BLD AUTO: 278 10E3/UL (ref 150–450)
POTASSIUM BLD-SCNC: 3.7 MMOL/L (ref 3.4–5.3)
RBC # BLD AUTO: 5.36 10E6/UL (ref 4.4–5.9)
SODIUM SERPL-SCNC: 140 MMOL/L (ref 133–144)
T PALLIDUM AB SER QL: NONREACTIVE
WBC # BLD AUTO: 4.9 10E3/UL (ref 4–11)

## 2021-10-07 PROCEDURE — 83615 LACTATE (LD) (LDH) ENZYME: CPT | Performed by: STUDENT IN AN ORGANIZED HEALTH CARE EDUCATION/TRAINING PROGRAM

## 2021-10-07 PROCEDURE — 86803 HEPATITIS C AB TEST: CPT | Performed by: STUDENT IN AN ORGANIZED HEALTH CARE EDUCATION/TRAINING PROGRAM

## 2021-10-07 PROCEDURE — 36415 COLL VENOUS BLD VENIPUNCTURE: CPT | Performed by: STUDENT IN AN ORGANIZED HEALTH CARE EDUCATION/TRAINING PROGRAM

## 2021-10-07 PROCEDURE — 86704 HEP B CORE ANTIBODY TOTAL: CPT | Performed by: STUDENT IN AN ORGANIZED HEALTH CARE EDUCATION/TRAINING PROGRAM

## 2021-10-07 PROCEDURE — 86706 HEP B SURFACE ANTIBODY: CPT | Performed by: STUDENT IN AN ORGANIZED HEALTH CARE EDUCATION/TRAINING PROGRAM

## 2021-10-07 PROCEDURE — 87389 HIV-1 AG W/HIV-1&-2 AB AG IA: CPT | Performed by: STUDENT IN AN ORGANIZED HEALTH CARE EDUCATION/TRAINING PROGRAM

## 2021-10-07 PROCEDURE — 85652 RBC SED RATE AUTOMATED: CPT | Performed by: STUDENT IN AN ORGANIZED HEALTH CARE EDUCATION/TRAINING PROGRAM

## 2021-10-07 PROCEDURE — 86038 ANTINUCLEAR ANTIBODIES: CPT | Performed by: STUDENT IN AN ORGANIZED HEALTH CARE EDUCATION/TRAINING PROGRAM

## 2021-10-07 PROCEDURE — 85025 COMPLETE CBC W/AUTO DIFF WBC: CPT | Performed by: STUDENT IN AN ORGANIZED HEALTH CARE EDUCATION/TRAINING PROGRAM

## 2021-10-07 PROCEDURE — 99215 OFFICE O/P EST HI 40 MIN: CPT | Performed by: STUDENT IN AN ORGANIZED HEALTH CARE EDUCATION/TRAINING PROGRAM

## 2021-10-07 PROCEDURE — 80048 BASIC METABOLIC PNL TOTAL CA: CPT | Performed by: STUDENT IN AN ORGANIZED HEALTH CARE EDUCATION/TRAINING PROGRAM

## 2021-10-07 PROCEDURE — 86780 TREPONEMA PALLIDUM: CPT | Performed by: STUDENT IN AN ORGANIZED HEALTH CARE EDUCATION/TRAINING PROGRAM

## 2021-10-07 PROCEDURE — 87340 HEPATITIS B SURFACE AG IA: CPT | Performed by: STUDENT IN AN ORGANIZED HEALTH CARE EDUCATION/TRAINING PROGRAM

## 2021-10-07 ASSESSMENT — PAIN SCALES - GENERAL: PAINLEVEL: EXTREME PAIN (8)
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
## 2021-10-07 NOTE — NURSING NOTE
Chief Complaint   Patient presents with     Labs Only     venipuncture, vitals checked     Judie Alfred RN on 10/7/2021 at 1:54 PM

## 2021-10-08 ENCOUNTER — DOCUMENTATION ONLY (OUTPATIENT)
Dept: ORTHOPEDICS | Facility: CLINIC | Age: 41
End: 2021-10-08

## 2021-10-08 LAB
ANA PAT SER IF-IMP: ABNORMAL
ANA SER QL IF: POSITIVE
ANA TITR SER IF: ABNORMAL {TITER}

## 2021-10-08 NOTE — PROGRESS NOTES
S: Romeo had to cancel his appointment to try on the custom compression stocking ordered in for him. He would have to schedule weeks out to be seen and since he has been wearing compression garments a plan was made to have Romeo stop in to  the stocking then set up a follow-up appointment.    A: No assessment was made. Romeo has wore compression garments and recently had a delivery appnt for velcro compression so is familiar with use, care, and how to wear the compression stocking. Romeo did set up a follow-up appnt on 11/3/2021.    P: Romeo is to call with any further needs and/or show up to the scheduled follow-up appnt a few weeks out.  Goal is to maintain a home program.

## 2021-10-11 ENCOUNTER — TELEPHONE (OUTPATIENT)
Dept: ONCOLOGY | Facility: CLINIC | Age: 41
End: 2021-10-11

## 2021-10-11 DIAGNOSIS — R59.1 LYMPHADENOPATHY: ICD-10-CM

## 2021-10-11 DIAGNOSIS — I89.0 LYMPHEDEMA: Primary | ICD-10-CM

## 2021-10-11 DIAGNOSIS — A41.9 BACTERIAL SEPSIS (H): Primary | ICD-10-CM

## 2021-10-11 DIAGNOSIS — M86.9 OSTEOMYELITIS, UNSPECIFIED SITE, UNSPECIFIED TYPE (H): ICD-10-CM

## 2021-10-11 DIAGNOSIS — R78.81 GRAM-POSITIVE BACTEREMIA: ICD-10-CM

## 2021-10-11 NOTE — TELEPHONE ENCOUNTER
Discussed case with Dr. Palma in Rheumeatology regarding higly positive RAVINDER      Ref Range & Units 4 d ago    RAVINDER interpretation Negative PositiveAbnormal     Comment:    Negative:              <1:40   Borderline Positive:   1:40 - 1:80   Positive:              >1:80    RAVINDER pattern 1  Dense fine speckled     RAVINDER titer 1  >1:1280          Given longsting systemic symptoms and no clear diagnosis, she stated that this warranted a full consultation for evaluation for connective tissue disorder.     I called Romeo and left a message stating that I would like to refer him to rheumatology. Order has been placed    Sivakumar Poon MD   Instructor  Division of Hematology, Oncology and Transplantation  [facility]  P: [phone]

## 2021-10-14 ENCOUNTER — TELEPHONE (OUTPATIENT)
Dept: RHEUMATOLOGY | Facility: CLINIC | Age: 41
End: 2021-10-14

## 2021-10-14 NOTE — TELEPHONE ENCOUNTER
----- Message from Acacia Aguirre CMA sent at 10/12/2021 10:57 AM CDT -----  Regarding: FW: autoimmune adenopathy?    ----- Message -----  From: Frannie Fernandez MD  Sent: 10/11/2021   3:23 PM CDT  To: Acacia Aguirre CMA  Subject: FW: autoimmune adenopathy?                       Acacia, this patient will be referred to us for positive RAVINDER with lymphadenopathy. My first available new patient visit is Nov 1st and I can see him then. If anyone else has an earlier first available visit, could you schedule him for that provider? He's fairly complex and needs a full evaluation.  Frannie    ----- Message -----  From: Sivakumar Poon MD  Sent: 10/11/2021  12:08 PM CDT  To: Frannie Fernandez MD  Subject: RE: autoimmune adenopathy?                       Thank you Frannie!    His clinical status hasn't changed much it seems in last 3 years or so so I think he can wait but sooner would be nice. I think his osteomyelitis is treated but that's unclear since he has no follow up with ID. I'm trying to figure that out.    He never got chemo.    Thanks for looking at him for me!    Sivakumar            ----- Message -----  From: Frannie Fernandez MD  Sent: 10/11/2021  10:31 AM CDT  To: Sivakumar Poon MD  Subject: RE: autoimmune adenopathy?                       Sivakumar, this flaquito has a strongly positive RAVINDER and symptoms (lymphadeopathy) so merits full workup for connective tissue disease. I don't see that they saw typical changes of lupus or kikuchi in lymph node biopsy prior. Did he actually get chemo? Is his osteomyelitis felt to be fully addressed? Please refer over to us. My first availability is Nov 1 but if you feel he should be seen sooner I can check who else has an opening.    Frannie  ----- Message -----  From: Sivakumar Poon MD  Sent: 10/11/2021  10:18 AM CDT  To: Frannie Fernandez MD  Subject: autoimmune adenopathy?                           Elton Kathleen    Thanks much for looking at this. Its just such a weird case but basically  "he  says he was diagnosed with \"hodgkin lymphoma\" at 25 years old but then was lost to follow up basically without treatment.     Basically all his biopsies have been negative but he has this persistent adenopathy that doesn't really seem to be changing. 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 His recent PET hasn't changed much since 2018 so I'm not excited about biopsying again.    I want to entertain other causes of persistent adenopathy so am looking at infectious causes. I got an RAVINDER which was highly positive but know this is highly nonspecific. CRP up but ESR normal.     He doesn't seem to have any skin involvement or inflammatory joints but I'm just wondering if this jumps out as any autoimmune diseases you can think of and what other screening tests I should do?     I think I could put in a \"consult\" for this if you want to get paid    Sivakumar              "

## 2021-10-14 NOTE — TELEPHONE ENCOUNTER
Left a message for patient to return my call to discuss appointment dates and times. Writer would like to offer an appointment 11/22/2021 at 2pm with Dr. Higuera. Please alert rheumatology staff when patient returns call.    Acacia Aguirre CMA   10/14/2021 1:08 PM

## 2021-10-14 NOTE — TELEPHONE ENCOUNTER
11/1 is a VV day for Dr. Fernandez.  Does she want to see this patient in person or virtual on 11/1?

## 2021-10-17 ENCOUNTER — HEALTH MAINTENANCE LETTER (OUTPATIENT)
Age: 41
End: 2021-10-17

## 2021-10-26 NOTE — TELEPHONE ENCOUNTER
Pt is calling to see if he could do a video visit for 11/1 as he just got out of the hospital for being septic. Writer did try to get a hold of Acacia but was unsuccessful. Please call pt at [phone].

## 2021-10-28 NOTE — TELEPHONE ENCOUNTER
Contact Pt; Pt states he is still hospitalized and canceled his appt on 11/1.  Pt was rescheduled with the next available provider, Dr. Zimmerman on 12/9/21.  Pt was added onto the waitlist as well.

## 2021-10-29 NOTE — TELEPHONE ENCOUNTER
Please advise patient that the urine drug screen from 6/15/21 had shown no prescription medications present in urine sample.  Patient has previously been prescribed quite a few different medications to control his pain including amitriptyline (elavil) and nabumetone (relafen).  I understand that he had run out of the hydrocodone/acetaminophen by the time this test was performed, so would not expect to see that in the urine, but would have thought that other medications would be detected.      Please review/update medication list with patient prior to consideration of refill request.      Thanks,  Shahab Perez MD     Detail Level: Generalized Detail Level: Simple Detail Level: Detailed

## 2021-11-02 ENCOUNTER — VIRTUAL VISIT (OUTPATIENT)
Dept: SURGERY | Facility: CLINIC | Age: 41
End: 2021-11-02
Payer: COMMERCIAL

## 2021-11-02 VITALS — BODY MASS INDEX: 40.6 KG/M2 | HEIGHT: 71 IN | WEIGHT: 290 LBS

## 2021-11-02 DIAGNOSIS — I10 ESSENTIAL HYPERTENSION, BENIGN: ICD-10-CM

## 2021-11-02 DIAGNOSIS — G89.4 CHRONIC PAIN SYNDROME: ICD-10-CM

## 2021-11-02 DIAGNOSIS — E78.1 HYPERTRIGLYCERIDEMIA: ICD-10-CM

## 2021-11-02 DIAGNOSIS — E66.01 MORBID OBESITY (H): Primary | ICD-10-CM

## 2021-11-02 DIAGNOSIS — I89.0 LYMPHEDEMA: ICD-10-CM

## 2021-11-02 PROCEDURE — 99215 OFFICE O/P EST HI 40 MIN: CPT | Mod: 95 | Performed by: FAMILY MEDICINE

## 2021-11-02 PROCEDURE — 99417 PROLNG OP E/M EACH 15 MIN: CPT | Performed by: FAMILY MEDICINE

## 2021-11-02 RX ORDER — CARVEDILOL 3.12 MG/1
3.12 TABLET ORAL 2 TIMES DAILY WITH MEALS
COMMUNITY
Start: 2021-10-25 | End: 2021-12-31

## 2021-11-02 RX ORDER — HYDROCODONE BITARTRATE AND ACETAMINOPHEN 10; 325 MG/1; MG/1
TABLET ORAL
Qty: 70 TABLET | Refills: 0 | Status: SHIPPED | OUTPATIENT
Start: 2021-11-09 | End: 2021-11-16

## 2021-11-02 RX ORDER — APIXABAN 5 MG (74)
KIT ORAL
COMMUNITY
Start: 2021-10-25 | End: 2021-12-31

## 2021-11-02 ASSESSMENT — MIFFLIN-ST. JEOR: SCORE: 2247.56

## 2021-11-02 NOTE — PROGRESS NOTES
Romeo Chong is 41 year old  male who presents for a billable video visit today.    How would you like to obtain your AVS? MyChart  If dropped from the video visit, the video invitation should be resent by: Text to cell phone: [phone]  Will anyone else be joining your video visit? 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    After 30 minute review of medical history with the patient, it became clear that he will not be candidate for bariatric surgery in the near future. His other current health problems will need to be sorted out and stabilized prior to bariatric surgery. At this point we will try to help him with non surgical weight loss. We could consider starting the process of preparing for bariatric surgery in the future.     We discussed healthy habits to assist with weight loss. At this point he will work on minimizing eating out, trying to incorporate some vegetables in his diet and cutting out sugared beverages. We could consider referring him for pool therapy in the future. We will tr to move up his visit with our dietician and I will plan to see him in follow up in 2 months. We could consider starting topamax at that time.            BARIATRIC RECOMMENDATIONS      Total time spent on the date of this encounter doing: chart review, review of test results, patient visit, physical exam, education, counseling, developing plan of care and documenting = 80 minutes.     History Surrounding Consultation  He has  "had several past supervised and unsupervised weight loss attempts.   Unfortunately there was not durable weight maintenance.  History of bulimia, anorexia, or binge eating disorder? no  If Present has eating disorder been in remission at least 3 years? Na      Dietary History  Meals per day: 2-3  Snacks: yes  Typical Snack: popcorn, chips, ice cream sandwich  Who does the grocery shopping? He does  Who does the cooking? He does  A Typical meal includes: B: egg sandwich or cereal or oatmeal  L:white castle fish sandwich with fries and pink lemonaide  D: chicken sandwich or chicken nuggets    Most meals are fast food    Regular Pop: sugared beverages 40 oz per day  Juice: occasional  Caffeine: does not drink      Physical Activity Patterns  Current physical activity routine includes: none    Limitations from being physically active on a regular basis includes: chronic pain in his R leg      Past Medical History  HTN: yes  Dyslipidemia: yes  BINA: negative test 10 years ago- had tonsilectomy and adenoidectomy  Obesity Hypoventilation: NO  DM2: no DX: no Most recent AIC: na  Neuropathy: yes- back  Nephropathy: no  Kidney Stones: no  Ophthalmopathy: no  IFG or \"pre-DM\": no  MI: no  CVA:no  CHF: systolic heart failure- sepsis  Heart Valves: no  Previous cardiac testing includes: echo 8/21   1. Left ventricular size, wall thickness, and systolic function are normal.  The estimated left ventricular ejection fraction is 55%.  2. Right ventricular size and systolic function are normal.  3. No hemodynamically significant valvular abnormalities.  4. No prior study available for comparison.    Patient states he had heart failure on his hospitaliztion in October in [place].     Findings:   Left Ventricle   The left ventricular chamber size is moderately dilated.  There is normal left ventricular wall thickness.  The left ventricular systolic function is mild to moderately decreased.  The visually estimated ejection fraction is 40%. "  There is mild global hypokinesis.     Cancers: lymphadenopathy concerning for indolent lymphoma vs inflammatory disorder followed by oncology  DVT: no  PE: PE 10/21      Medications   Current Outpatient Medications   Medication     aspirin-acetaminophen-caffeine (EXCEDRIN MIGRAINE) 250-250-65 mg per tablet     carvedilol (COREG) 3.125 MG tablet     ELIQUIS DVT/PE STARTER PACK 5 MG TBPK     famotidine (PEPCID) 40 MG tablet     HYDROcodone-acetaminophen (NORCO)  MG per tablet     losartan (COZAAR) 50 MG tablet     potassium chloride ER (K-TAB/KLOR-CON) 10 MEQ CR tablet     vitamin B-12 (CYANOCOBALAMIN) 1000 MCG tablet     vitamin D3 (CHOLECALCIFEROL) 125 MCG (5000 UT) tablet     vitamin E (TOCOPHEROL) 400 units (180 mg) capsule     No current facility-administered medications for this visit.     Allergies   Allergies   Allergen Reactions     Vancomycin Anaphylaxis     Peanut Oil Itching     Tree Nuts [Nuts] Itching     Erythromycin Unknown     Latex Unknown     Added based on information entered during case entry, please review and add reactions, type, and severity as needed     Other Environmental Allergy Unknown     DUST     Pollen Extracts [Pollen Extract] Unknown     Zosyn [Piperacillin-Tazobactam In D5w] Tinnitus and Itching     Oxycodone Itching and Rash     Past Surgical History  Past Surgical History:   Procedure Laterality Date     FRACTURE SURGERY       HC REMOVAL HEEL SPUR, CALCANEUS Left 6/25/2021    Procedure: EXCISION, BONE SPUR, FOOT, WITH PLANTAR FASCIA RELEASE;  Surgeon: Stephon Singleton DPM;  Location: [facility];  Service: Podiatry     TONSILLECTOMY, ADENOIDECTOMY ADULT, COMBINED         Family History  Family History   Problem Relation Age of Onset     Cirrhosis Mother      Hypertension Mother      Diabetes Type 2  Father      Kidney failure Father      Cerebrovascular Disease Father      Hypertension Father      Cerebrovascular Disease Sister      Hypertension Paternal  "Grandmother      Hypertension Paternal Grandfather        Social History  Social History     Socioeconomic History     Marital status: Single     Spouse name: None     Number of children: None     Years of education: None     Highest education level: None   Occupational History     None   Tobacco Use     Smoking status: Former Smoker     Packs/day: 1.00     Start date: 1999     Quit date: 2009     Years since quittin.5     Smokeless tobacco: Never Used   Substance and Sexual Activity     Alcohol use: Not Currently     Drug use: Never     Sexual activity: Not Currently   Other Topics Concern     None   Social History Narrative    , 3 children, 1  recently. Non smoker. Socially drinks alcohol. Not working.      Social Determinants of Health     Financial Resource Strain:      Difficulty of Paying Living Expenses:    Food Insecurity:      Worried About Running Out of Food in the Last Year:      Ran Out of Food in the Last Year:    Transportation Needs:      Lack of Transportation (Medical):      Lack of Transportation (Non-Medical):    Physical Activity:      Days of Exercise per Week:      Minutes of Exercise per Session:    Stress:      Feeling of Stress :    Social Connections:      Frequency of Communication with Friends and Family:      Frequency of Social Gatherings with Friends and Family:      Attends Orthodoxy Services:      Active Member of Clubs or Organizations:      Attends Club or Organization Meetings:      Marital Status:    Intimate Partner Violence: Not At Risk     Fear of Current or Ex-Partner: No     Emotionally Abused: No     Physically Abused: No     Sexually Abused: No       Physical Exam  Vitals: Ht 1.803 m (5' 11\")   Wt 131.5 kg (290 lb)   BMI 40.45 kg/m    Weight:   Wt Readings from Last 4 Encounters:   21 131.5 kg (290 lb)   10/07/21 131.5 kg (290 lb)   21 131.1 kg (289 lb)   21 136.7 kg (301 lb 7 oz)     [unfilled]  BMI: Body mass index is 40.45 " kg/m .      GENERAL: Healthy, alert and no distress  EYES: Eyes grossly normal to inspection.  No discharge or erythema, or obvious scleral/conjunctival abnormalities.  RESP: No audible wheeze, cough, or visible cyanosis.  No visible retractions or increased work of breathing.    SKIN: Visible skin clear. No significant rash, abnormal pigmentation or lesions.  NEURO: Cranial nerves grossly intact.  Mentation and speech appropriate for age.  PSYCH: Mentation appears normal, affect normal/bright, judgement and insight intact, normal speech and appearance well-groomed.                                          Video-Visit Details    Type of service:  Video Visit    Video End Time (time video stopped): 11:58 AM  Originating Location (pt. Location): Home    Distant Location (provider location):  [facility]     Platform used for Video Visit: [employer]

## 2021-11-02 NOTE — TELEPHONE ENCOUNTER
Refill request: hydrocodone  Request for call back to discuss recent hospitalization    10/18/21-10/25/21admission to hospital(cellulitis and cardiac issues)  Patient reports being in the hospital approximately 1 week out in [place], he reports doing somewhat better at this time but still some ongoing issues with cellulitis. He will be returning back to [place] and intends to follow up with provider as scheduled. Patient wants the provider to be updated as ED and hospital admission.  Hospitalization record is available via care everywhere.  Due to hospitalization this will delay the next refill being due    Last apt with RW: 9/21/21  Next apt with RW: 11/16/21    UDT -6/17/21   CSA- 6/17/21    Will cue for a start date out 7 days due to hospitalization    Pending Prescriptions:                       Disp   Refills    HYDROcodone-acetaminophen (NORCO)  *70 tab*0            Sig: Take 1 tab po in the am and 1-2 tabs po at           bedtime prn pain.  #70 tabs to last 28 days.    John [place]

## 2021-11-02 NOTE — PATIENT INSTRUCTIONS

## 2021-11-02 NOTE — TELEPHONE ENCOUNTER
"Reviewed PDMP 10/25/21 was given Vicodin 10/325 mg #30 tabs as dosing was changed upon discharge. Reviewed hospital records 10/18/21 - 10/25/2021:  \"Discharge Diagnosis & Hospital Problems     Active Hospital Problems   Diagnosis POA     *Severe sepsis with acute organ dysfunction due to group B Streptococcus (HCC) [A40.1, R65.20] Yes     HTN (hypertension) [I10] Yes   Chronic     Cellulitis of right lower extremity [L03.115] Yes     Lymphedema [I89.0] Yes     Mediastinal mass [J98.59] Yes     Gastroesophageal reflux disease without esophagitis [K21.9] Yes     Resolved Hospital Problems   No resolved problems to display.       Follow-up   Follow-up Issues for PCP:   Provider: Shahab Perez MD  Notes: Patient is being discharged on IV Rocephin for bacteremia due to Streptococcus agalactiae. Of note during this hospitalization he was found to have reduced ejection fraction. Cardiology was consulted and they recommended outpatient follow-up with cardiology for ischemic work-up once his infection has resolved. Patient will likely see cardiology in [place], if he decides to see cardiology in [place] he will need a new referral from primary care provider. 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 He will require outpatient follow-up with oncology in [place].    Follow-up with Specialist:   Specialty: Cardiology  Notes: Follow-up for systolic heart disease.    Pending Results:    Summary of Hospitalization   HPI: Per H&P dated 10/18: Mr. Chong is a 41 y.o. male with with history of anterior mediastinal mass concerning for indolent lymphoma versus inflammatory disorder, followed by oncologist in [place] (patient last seen on 10/7/2021), right groin lymph node resection with resultant lymphedema, sepsis secondary to gram positive bacteremia secondary to right lower extremity cellulitis (recently admitted on 8/17/2021, completed antibiotics), presents to the " emergency department with complaint of constellation of symptoms; nausea, vomiting, chills and fever, similar to symptoms when he had sepsis secondary to right lower extremity cellulitis back in August.     On presenting to emergency department, he was like to be febrile and tachycardic, sepsis alert was called, patient received appropriate IV fluids and was given a dose of Rocephin and clindamycin. Patient lactic acid was noted to be elevated at 3.7, he had no leukocytosis or left shift. Chest x-ray was unremarkable. Urinalysis was also unremarkable. Patient COVID-19 testing is pending.     Just prior to my evaluation, patient did complain of chest pain, EKG performed by emergency room provider indicated sinus tach at a rate of 120, right bundle branch block. Per ER provider, not consistent with STEMI. Troponin was ordered. Patient states pain is achy in quality, radiates to his back. States had similar pain before when he had his sepsis back in August. Patient recently traveled from [place] to [place].    Hospital Course: Patient was subsequently admitted to the hospital, he was kept on antibiotics, his blood cultures initial set both of them grew Streptococcus agalactiae. He was kept on IV Ancef. His transthoracic echocardiogram did not show any cardiac vegetation, it showed EF of 40%. Cardiology was consulted, they recommended no intervention currently or transesophageal echocardiogram. Cardiology recommended outpatient follow-up for ischemic work-up due to low ejection fraction. Of note he was also found to have a pulmonary embolus. He is started on Eliquis on discharge. His imaging also showed extensive lymphadenopathy consistent with his diagnosis of indolent lymphoma. He will follow up with oncology as outpatient. He had fevers, improved. His repeat set of blood cultures was completely negative. He is being discharged home in a stable condition on IV Rocephin. He will follow up with primary care, cardiology  "as outpatient.    Of note during hospitalization he had 2 episodes that were concerning for possible underlying seizure activity, neurology was consulted, he underwent MRI brain that was negative for any acute findings. He also underwent EEG that was unremarkable. Per neurology these episodes were likely related to Dilaudid. Dilaudid was stopped completely.    Consults: Cardiology and Neurology\"    CONTINUE these medications which have CHANGED   Details   HYDROcodone-acetaminophen (NORCO)  MG per tablet Take 2 (two) tablets by mouth every 8 (eight) hours as needed for Pain.  Qty: 30 tablet, Refills: 0      He should be aware that the increased dose of Vicodin was for acute phase only, once the #30 additional tabs are gone he should reduce dosing to previous chronic dose which I have approved refill.      "

## 2021-11-09 ENCOUNTER — OFFICE VISIT (OUTPATIENT)
Dept: INFECTIOUS DISEASES | Facility: CLINIC | Age: 41
End: 2021-11-09
Payer: COMMERCIAL

## 2021-11-09 VITALS
BODY MASS INDEX: 40.45 KG/M2 | HEART RATE: 68 BPM | DIASTOLIC BLOOD PRESSURE: 112 MMHG | SYSTOLIC BLOOD PRESSURE: 156 MMHG | TEMPERATURE: 98.7 F | WEIGHT: 290 LBS

## 2021-11-09 DIAGNOSIS — L08.9 RECURRENT INFECTION OF SKIN: Primary | ICD-10-CM

## 2021-11-09 DIAGNOSIS — I89.0 CHRONIC ACQUIRED LYMPHEDEMA: ICD-10-CM

## 2021-11-09 PROCEDURE — 99214 OFFICE O/P EST MOD 30 MIN: CPT | Performed by: STUDENT IN AN ORGANIZED HEALTH CARE EDUCATION/TRAINING PROGRAM

## 2021-11-09 RX ORDER — CEFUROXIME AXETIL 500 MG/1
500 TABLET ORAL 2 TIMES DAILY
Qty: 60 TABLET | Refills: 5 | Status: SHIPPED | OUTPATIENT
Start: 2021-11-09 | End: 2021-12-09

## 2021-11-09 NOTE — PROGRESS NOTES
[facility] follow up.    Name: Romeo Chong  :   1980  MRN:   [id number]  PCP:    No Ref-Primary, Physician  DOS:    21      CC: Recurrent infection.    HPI/Interval History:  Romeo Chong is a 41 year old old male with the history of chronic lymphedema secondary to right inguinal lymphadenopathy from Hodgkin lymphoma.  Also has multiple intra-abdominal and retroperitoneal adenopathy, morbid obesity.  He was hospitalized in 2021 for group B streptococcus bacteremia associated with right lower extremity cellulitis.  He had a prolonged hospital stay and was discharged on oral antibiotic that he completed at the beginning of 2021.  He was admitted to the hospital in [facility] for group B streptococcus bacteremia secondary to right lower extremity cellulitis.  Was also found to be in heart failure.  Was treated with IV ceftriaxone until 11/3/2021.  He is in clinic today to discuss recurrent infection.  He has not seen lymphedema recently.  Reported having compression stocking but unclear if he wears it.  He denies any fever, chills, night sweats.    ===========================  Past Medical History:  Past Medical History:   Diagnosis Date     Bacterial sepsis (H) 2021     Cancer (H)      Chronic pain syndrome 2021     Essential hypertension, benign 2010     Fibrous dysplasia of bone 2009    Formatting of this note might be different from the original. Discovered in right tibia and femur at 13 years old.  He had surgery when  he was 14 years old.   Last Assessment & Plan:  Formatting of this note might be different from the original. Diagnosed at 13, 14 surgeries since that time   Formatting of this note might be different from the original. Formatting of this note might be different      Gastric ulcer, unspecified chronicity, unspecified whether gastric ulcer hemorrhage or perforation present 2021     H/O Hodgkin's lymphoma 2021      Hyperlipidemia      Hypertension      Hypertriglyceridemia 2011     Stress hyperglycemia 2009       Past Surgical History:  Past Surgical History:   Procedure Laterality Date     FRACTURE SURGERY       HC REMOVAL HEEL SPUR, CALCANEUS Left 2021    Procedure: EXCISION, BONE SPUR, FOOT, WITH PLANTAR FASCIA RELEASE;  Surgeon: Stephon Singleton DPM;  [facility]  Service: Podiatry     TONSILLECTOMY, ADENOIDECTOMY ADULT, COMBINED         Social History:  Social History     Socioeconomic History     Marital status: Single     Spouse name: Not on file     Number of children: Not on file     Years of education: Not on file     Highest education level: Not on file   Occupational History     Not on file   Tobacco Use     Smoking status: Former Smoker     Packs/day: 1.00     Start date: 1999     Quit date: 2009     Years since quittin.5     Smokeless tobacco: Never Used   Substance and Sexual Activity     Alcohol use: Not Currently     Drug use: Never     Sexual activity: Not Currently   Other Topics Concern     Not on file   Social History Narrative    , 3 children, 1  recently. Non smoker. Socially drinks alcohol. Not working.      Social Determinants of Health     Financial Resource Strain: Not on file   Food Insecurity: Not on file   Transportation Needs: Not on file   Physical Activity: Not on file   Stress: Not on file   Social Connections: Not on file   Intimate Partner Violence: Not At Risk     Fear of Current or Ex-Partner: No     Emotionally Abused: No     Physically Abused: No     Sexually Abused: No   Housing Stability: Not on file       Family Medical History:  Family History   Problem Relation Age of Onset     Cirrhosis Mother      Hypertension Mother      Diabetes Type 2  Father      Kidney failure Father      Cerebrovascular Disease Father      Hypertension Father      Cerebrovascular Disease Sister      Hypertension Paternal Grandmother       Hypertension Paternal Grandfather        Allergies:     Allergies   Allergen Reactions     Vancomycin Anaphylaxis     Peanut Oil Itching     Tree Nuts [Nuts] Itching     Erythromycin Unknown     Latex Unknown     Added based on information entered during case entry, please review and add reactions, type, and severity as needed     Other Environmental Allergy Unknown     DUST     Pollen Extracts [Pollen Extract] Unknown     Zosyn [Piperacillin-Tazobactam In D5w] Tinnitus and Itching     Oxycodone Itching and Rash       Medications:  Current Outpatient Medications   Medication Sig Dispense Refill     cefuroxime (CEFTIN) 500 MG tablet Take 1 tablet (500 mg) by mouth 2 times daily 60 tablet 5     aspirin-acetaminophen-caffeine (EXCEDRIN MIGRAINE) 250-250-65 mg per tablet [ASPIRIN-ACETAMINOPHEN-CAFFEINE (EXCEDRIN MIGRAINE) 250-250-65 MG PER TABLET] Take 1 tablet by mouth every 6 (six) hours as needed for pain.  0     carvedilol (COREG) 3.125 MG tablet Take 3.125 mg by mouth       ELIQUIS DVT/PE STARTER PACK 5 MG TBPK        famotidine (PEPCID) 40 MG tablet [FAMOTIDINE (PEPCID) 40 MG TABLET] Take 1 tablet (40 mg total) by mouth every evening. (Patient taking differently: Take 40 mg by mouth every morning ) 180 tablet 0     HYDROcodone-acetaminophen (NORCO)  MG per tablet Take 1 tab po in the am and 1-2 tabs po at bedtime prn pain.  #70 tabs to last 28 days. 70 tablet 0     losartan (COZAAR) 50 MG tablet [LOSARTAN (COZAAR) 50 MG TABLET] Take 1 tablet (50 mg total) by mouth daily. 90 tablet 5     potassium chloride ER (K-TAB/KLOR-CON) 10 MEQ CR tablet TAKE 2 TABLETS BY MOUTH EVERY MORNING AND 1 EVERY EVENING 270 tablet 2     vitamin B-12 (CYANOCOBALAMIN) 1000 MCG tablet Take 1,000 mcg by mouth        vitamin D3 (CHOLECALCIFEROL) 125 MCG (5000 UT) tablet Take 1 tablet (125 mcg) by mouth daily 30 tablet 1     vitamin E (TOCOPHEROL) 400 units (180 mg) capsule Take 400 Units by mouth         Immunizations:  Immunization  History   Administered Date(s) Administered     Influenza (IIV3) PF 10/03/2009     TD (ADULT, 7+) 10/22/2004     Tdap (Adacel,Boostrix) 04/22/2011       ==================    Review of System:  12 points review of system is negative except for findings in the HPI.    Exam  VS: BP (!) 156/112   Pulse 68   Temp 98.7  F (37.1  C)   Wt 131.5 kg (290 lb)   BMI 40.45 kg/m      Gen: Pleasant in no acute distress.  HEENT: NCAT. EOMI.  Neck: No LAD.  Lungs: Clear to auscultation bilaterally with no crackles or wheezes.   Card: RRR. No RMG. Peripheral pulses present and symmetrical. No edema.   Abd: Soft NT ND. No hepatomegaly or splenomegaly.  Ext: No edema  Lymph: Right lower extremity lymphedema  Skin: No rash  Neuro: Alert and oriented to place time and person. Cranial nerves grossly intact.     Labs:  Reviewed    Imaging:  Reviewed    Assessment:  Romeo Chong is a 41 year old old male with chronic right lower extremity lymphedema secondary to cemented adenopathy possibly secondary to non-Hodgkin's lymphoma.  In ID clinic for recurrence right lower extremity cellulitis associated with bacteremia.  The recurrence of the infection is secondary to the lymphedema.  Will refer him back to lymphedema clinic.  Given the multiple episodes of sepsis, will start on chronic suppressive therapy with cefuroxime.  Recent diagnosis of PE and heart failure.    Recommendations:  Referral to lymphedema clinic  Start chronic suppressive therapy with cefuroxime 500 mg twice daily  Follow-up in 3 months.  Weight loss will be helpful.    Discussed with the patient.    Maegan Sandoval MD  [facility]  [facility]  Office Telephone [phone].  Fax [phone]  [facility] paging

## 2021-11-12 ENCOUNTER — VIRTUAL VISIT (OUTPATIENT)
Dept: SURGERY | Facility: CLINIC | Age: 41
End: 2021-11-12
Payer: COMMERCIAL

## 2021-11-12 DIAGNOSIS — Z71.3 NUTRITIONAL COUNSELING: ICD-10-CM

## 2021-11-12 DIAGNOSIS — E66.01 CLASS 3 SEVERE OBESITY DUE TO EXCESS CALORIES WITH SERIOUS COMORBIDITY AND BODY MASS INDEX (BMI) OF 40.0 TO 44.9 IN ADULT (H): ICD-10-CM

## 2021-11-12 DIAGNOSIS — E66.813 CLASS 3 SEVERE OBESITY DUE TO EXCESS CALORIES WITH SERIOUS COMORBIDITY AND BODY MASS INDEX (BMI) OF 40.0 TO 44.9 IN ADULT (H): ICD-10-CM

## 2021-11-12 DIAGNOSIS — I10 ESSENTIAL HYPERTENSION, BENIGN: ICD-10-CM

## 2021-11-12 DIAGNOSIS — E78.1 HYPERTRIGLYCERIDEMIA: Primary | ICD-10-CM

## 2021-11-12 PROCEDURE — 97802 MEDICAL NUTRITION INDIV IN: CPT | Performed by: DIETITIAN, REGISTERED

## 2021-11-12 NOTE — LETTER
11/12/2021         RE: Romeo Chong  [address]  [address]  [place] [address]        Dear Colleague,    Thank you for referring your patient, Romeo Chong, to the [facility] [place]. Please see a copy of my visit note below.    Romeo Chong is a 41 year old who is being evaluated via a billable video visit.      How would you like to obtain your AVS? MyChart  If the video visit is dropped, the invitation should be resent by: Text to cell phone: [phone]  Will anyone else be joining your video visit? No      Video Start Time: 1:10 PM      Medical Weight Loss Initial Diet Evaluation  Assessment:  Romeo is presenting today for a new weight management nutrition consultation. Pt has had an initial appointment with Dr. Ortiz.  Weight loss medication: None .     Anthropometrics:    Pt's weight is 290 lbs  Initial weight: 290 lbs   Weight change: 0 lbs   BMI: There is no height or weight on file to calculate BMI.   Ideal body weight: 75.3 kg (166 lb 0.1 oz)  Adjusted ideal body weight: 97.8 kg (215 lb 9.7 oz)    Estimated RMR ([place]-St Jeor equation):  2251 kcals x 1.2 (sedentary) = 2701 kcals (for weight maintenance)    Recommended Protein Intake: 60-80 grams of protein/day    Medical History:  Patient Active Problem List   Diagnosis     Asthma     Edema     Fibrous dysplasia of bone     Essential hypertension, benign     Hypertriglyceridemia     Lymphedema     Obesity, unspecified     Stress hyperglycemia     Vitamin D deficiency     Allergic rhinitis due to animals     Nonintractable headache, unspecified chronicity pattern, unspecified headache type     Chronic pain syndrome     Gastric ulcer, unspecified chronicity, unspecified whether gastric ulcer hemorrhage or perforation present     Chronic low back pain, unspecified back pain laterality, unspecified whether sciatica present     H/O Hodgkin's lymphoma     Obesity (BMI 35.0-39.9) with  comorbidity (H)     Calcaneal spur, left     Tachycardia     Cellulitis of right lower extremity     Elevated lactic acid level     Bacterial sepsis (H)     Gram-positive bacteremia     HTN (hypertension)     Osteomyelitis, unspecified site, unspecified type (H)      Diabetes: No   HbA1c:  No results found for: HGBA1C    Nutrition History:   Food allergies/intolerances/cultural or religous food customs: Yes Peanuts/Tree Nuts, Lactose-Mainly Milk, Caffeine     Vitamins/Mineral Supplementation: On occasion, Vitamin D, Vitamin E, Vitamin B12, Vitamin C    Dietary Recall:  Breakfast: skipped or oatmeal or cereal or morning star breakfast sandwich or chips or pop tart   Lunch: fish sandwich with french fries or chickless nuggets or chickless jose  Dinner: elo or rice and been burrito from [employer] or black bean crunch wrap  Typical Snacks: chips, popcorn, occasional cupcake or donut    Overnight eating: No     Eating out: 2 times/week     Beverages: Water, Ginger Ale (more recently)    Exercise: no set regimen-due to Lymphedema     Nutrition Diagnosis (PES statement):   Overweight/Obesity (NC 3.3) related to overeating and poor lifestyle habits as evidenced by patient report of excessive energy intake, lack of exercise and BMI of 40.45 kg/m2.     Nutrition Intervention  1. Food and/or Nutrient Delivery   a. Placed emphasis on importance of developing a healthy meal routine, aiming for 3 meals a day and no snacks.    2. Nutrition Education   a. Discussed with patient how to build a meal: the importance of including a lean/low fat protein at each meal, include a source of vegetables at a minimum of lunch and dinner and limiting carbohydrate intake to <25% per meal.  b. Educated on sources of lean protein, portion sizes, the amount of grams found in each source. Recommend patient to aim for 20-30g protein at each meal.  c. Educated on how to read a food label: keeping total fat <10g and sugar <10g per  servingJimi lees. Discussed the importance of adequate hydration, with emphasis on drinking 64oz of water or zero calorie beverages per day.    3. Nutrition Counseling   a. Encouraged importance of developing routine exercise for health benefits and weight loss.      Goals established by patient:   1. Focus on protein first, aiming for at least 20-30 grams of protein/meal, 3 meals/day.    2. Obtain referral for Physical Therapy.   Handouts provided:  Non Surgical Weight Loss packet    Assessment/Plan:    Pt will follow up in 2 month(s) with bariatrician and 1 month(s) with dietitian.       Video-Visit Details    Type of service:  Video Visit    Video End Time:1:33 PM    Originating Location (pt. Location): Home    Distant Location (provider location):  [facility]     Platform used for Video Visit: Nancy Pacheco RD        Again, thank you for allowing me to participate in the care of your patient.        Sincerely,        Rhina Pacheco RD

## 2021-11-12 NOTE — PROGRESS NOTES
Romeo Chong is a 41 year old who is being evaluated via a billable video visit.      How would you like to obtain your AVS? MyChart  If the video visit is dropped, the invitation should be resent by: Text to cell phone: [phone]  Will anyone else be joining your video visit? No      Video Start Time: 1:10 PM      Medical Weight Loss Initial Diet Evaluation  Assessment:  Romeo is presenting today for a new weight management nutrition consultation. Pt has had an initial appointment with Dr. Ortiz.  Weight loss medication: None .     Anthropometrics:    Pt's weight is 290 lbs  Initial weight: 290 lbs   Weight change: 0 lbs   BMI: There is no height or weight on file to calculate BMI.   Ideal body weight: 75.3 kg (166 lb 0.1 oz)  Adjusted ideal body weight: 97.8 kg (215 lb 9.7 oz)    Estimated RMR ([place]-St Jeor equation):  2251 kcals x 1.2 (sedentary) = 2701 kcals (for weight maintenance)    Recommended Protein Intake: 60-80 grams of protein/day    Medical History:  Patient Active Problem List   Diagnosis     Asthma     Edema     Fibrous dysplasia of bone     Essential hypertension, benign     Hypertriglyceridemia     Lymphedema     Obesity, unspecified     Stress hyperglycemia     Vitamin D deficiency     Allergic rhinitis due to animals     Nonintractable headache, unspecified chronicity pattern, unspecified headache type     Chronic pain syndrome     Gastric ulcer, unspecified chronicity, unspecified whether gastric ulcer hemorrhage or perforation present     Chronic low back pain, unspecified back pain laterality, unspecified whether sciatica present     H/O Hodgkin's lymphoma     Obesity (BMI 35.0-39.9) with comorbidity (H)     Calcaneal spur, left     Tachycardia     Cellulitis of right lower extremity     Elevated lactic acid level     Bacterial sepsis (H)     Gram-positive bacteremia     HTN (hypertension)     Osteomyelitis, unspecified site, unspecified type (H)      Diabetes: No   HbA1c:  No  results found for: HGBA1C    Nutrition History:   Food allergies/intolerances/cultural or religous food customs: Yes Peanuts/Tree Nuts, Lactose-Mainly Milk, Caffeine     Vitamins/Mineral Supplementation: On occasion, Vitamin D, Vitamin E, Vitamin B12, Vitamin C    Dietary Recall:  Breakfast: skipped or oatmeal or cereal or morning star breakfast sandwich or chips or pop tart   Lunch: fish sandwich with french fries or chickless nuggets or chickless jose  Dinner: elo or rice and been burrito from [employer] or black bean crunch wrap  Typical Snacks: chips, popcorn, occasional cupcake or donut    Overnight eating: No     Eating out: 2 times/week     Beverages: Water, Ginger Ale (more recently)    Exercise: no set regimen-due to Lymphedema     Nutrition Diagnosis (PES statement):   Overweight/Obesity (NC 3.3) related to overeating and poor lifestyle habits as evidenced by patient report of excessive energy intake, lack of exercise and BMI of 40.45 kg/m2.     Nutrition Intervention  1. Food and/or Nutrient Delivery   a. Placed emphasis on importance of developing a healthy meal routine, aiming for 3 meals a day and no snacks.    2. Nutrition Education   a. Discussed with patient how to build a meal: the importance of including a lean/low fat protein at each meal, include a source of vegetables at a minimum of lunch and dinner and limiting carbohydrate intake to <25% per meal.  b. Educated on sources of lean protein, portion sizes, the amount of grams found in each source. Recommend patient to aim for 20-30g protein at each meal.  c. Educated on how to read a food label: keeping total fat <10g and sugar <10g per serving.  d. Discussed the importance of adequate hydration, with emphasis on drinking 64oz of water or zero calorie beverages per day.    3. Nutrition Counseling   a. Encouraged importance of developing routine exercise for health benefits and weight loss.      Goals established by patient:   1. Focus on  protein first, aiming for at least 20-30 grams of protein/meal, 3 meals/day.    2. Obtain referral for Physical Therapy.   Handouts provided:  Non Surgical Weight Loss packet    Assessment/Plan:    Pt will follow up in 2 month(s) with bariatrician and 1 month(s) with dietitian.       Video-Visit Details    Type of service:  Video Visit    Video End Time:1:33 PM    Originating Location (pt. Location): Home    Distant Location (provider location):  [facility] [place]     Platform used for Video Visit: Nancy Pacheco RD

## 2021-11-15 NOTE — PROGRESS NOTES
"Video Start Time: 312 PM    PAIN CENTER PROGRESS NOTE    Subjective:   Romeo Chong presents for evaluation of low back pain.  Also right leg and bilateral feet, history of fibrous dysplasia of bone since age 13.  Has had previous surgery on right tibia, has resultant lymphedema since 2006.  Comorbid conditions include Asthma, HTN, hypertriglyceridemia, lymphedema, obesity, Vitamin D deficiency, allergic rhinitis, nonintractable headache, gastric ulcer, h/o hodgkin's lymphoma    Major issues:  1. Chronic pain syndrome    2. Chronic, continuous use of opioids    3. Fibrous dysplasia of bone    4. Plantar fasciitis    5. Degeneration of lumbar intervertebral disc      Pain location and description: See CMA note.  Severe Pain (7)  Radiation of pain: legs to feet  Gait disturbance: None reported, denies recent falls.  Exacerbating factors: Any movement, recent infection/hospitalization  Alleviating factors: Medications, sleep, laying down  Associated symptoms: Denies weight loss, fever/chills, bowel or bladder incontinence.  Functional pain symptoms:  See CMA note  Adverse effects of medications: None reported  Current treatment efficacy: Fair  Current treatment compliance: New to pain center - unexpected UDT at consult. Had repeat UDT as expected and reports taking medications as prescribed.  Reported self-escalation of medication 09/2021    Since last visit, he had hospitalization reviewed 10/18/2021-10/25/2021  \"Active Hospital Problems   Diagnosis POA     *Severe sepsis with acute organ dysfunction due to group B Streptococcus (HCC) [A40.1, R65.20] Yes     HTN (hypertension) [I10] Yes   Chronic     Cellulitis of right lower extremity [L03.115] Yes     Lymphedema [I89.0] Yes     Mediastinal mass [J98.59] Yes     Gastroesophageal reflux disease without esophagitis [K21.9] Yes     Resolved Hospital Problems   No resolved problems to display.       Follow-up   Follow-up Issues for PCP:   Provider: Shahab Perez, " MD  Notes: Patient is being discharged on IV Rocephin for bacteremia due to Streptococcus agalactiae. Of note during this hospitalization he was found to have reduced ejection fraction. Cardiology was consulted and they recommended outpatient follow-up with cardiology for ischemic work-up once his infection has resolved. Patient will likely see cardiology in [place], if he decides to see cardiology in [place] he will need a new referral from primary care provider. 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 He will require outpatient follow-up with oncology in [place].    Follow-up with Specialist:   Specialty: Cardiology  Notes: Follow-up for systolic heart disease.    Pending Results:    Summary of Hospitalization   HPI: Per H&P dated 10/18: Mr. Chong is a 41 y.o. 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     On presenting to emergency department, he was like to be febrile and tachycardic, sepsis alert was called, patient received appropriate IV fluids and was given a dose of Rocephin and clindamycin. Patient lactic acid was noted to be elevated at 3.7, he had no leukocytosis or left shift. Chest x-ray was unremarkable. Urinalysis was also unremarkable. Patient COVID-19 testing is pending.     Just prior to my evaluation, patient did complain of chest pain, EKG performed by emergency room provider  "indicated sinus tach at a rate of 120, right bundle branch block. Per ER provider, not consistent with STEMI. Troponin was ordered. Patient states pain is achy in quality, radiates to his back. States had similar pain before when he had his sepsis back in August. Patient recently traveled from [place] to [place].    Hospital Course: Patient was subsequently admitted to the hospital, he was kept on antibiotics, his blood cultures initial set both of them grew Streptococcus agalactiae. He was kept on IV Ancef. His transthoracic echocardiogram did not show any cardiac vegetation, it showed EF of 40%. Cardiology was consulted, they recommended no intervention currently or transesophageal echocardiogram. Cardiology recommended outpatient follow-up for ischemic work-up due to low ejection fraction. Of note he was also found to have a pulmonary embolus. He is started on Eliquis on discharge. His imaging also showed extensive lymphadenopathy consistent with his diagnosis of indolent lymphoma. He will follow up with oncology as outpatient. He had fevers, improved. His repeat set of blood cultures was completely negative. He is being discharged home in a stable condition on IV Rocephin. He will follow up with primary care, cardiology as outpatient.    Of note during hospitalization he had 2 episodes that were concerning for possible underlying seizure activity, neurology was consulted, he underwent MRI brain that was negative for any acute findings. He also underwent EEG that was unremarkable. Per neurology these episodes were likely related to Dilaudid. Dilaudid was stopped completely.    Consults: Cardiology and Neurology\"    He states he had outpatient PICC line for 1 week and he saw ID provider and is on oral antibiotic.  He states he has not seen PCP yet will be tomorrow.  He states he will get cardiologist referral.  He states he is feeling since being home his pain in knee has worsened.  He states his leg pain is " "unchanged.  He is unsure if this is related to the infection.  He states his outer thigh when he bends his leg is painful and hurts to move it.  He states he is using elevation and rest for his pain.  He states he is limited with movement due to pain but doing \"a little bit.\"  He states he is independent in ADLs, dressing is difficult.  He is driving short distances, avoiding long distances due to swelling.  Wearing his compressions.  For knee is using heating pad also on lower back.        Right knee pain mostly with weight bearing, feels discomfort without WB.  He states it feels like it has hit it constantly; denies knee joint erythema, warmth, swelling.  Denies clicking, catching, popping sensation except when he turns a certain way.  He states when he moves his knee laterally he feels clicking in the knee.    States he is still having concerns with plantar fasciitis even after surgery; he is doing at home PT exercises and feels like surgery made it worse.  He has been fitted for orthotics wearing in his shoes.  He states when he first puts these on it feels good and after 30-60 minutes pain increase.  Dr. Singleton is podiatrist.      He did have change upon discharge in Norco 10/325 mg to take 2 tabs every 8 hours prn pain was given an additional #30 tabs to do this.  He did notify this provider by phone.  He reports this dose was allowing for less pain 4-5/10 and he states he has resumed the dose 10/325 mg BID prn helps but the level of pain is 6-7/10 after medication.   He denies side effects on this dosing.      He is following [facility].    Had visit with Dr. Ortiz on 11/02/2021 for bariatric recommendations.     Medical cannabis enrollment: He has not yet attended, he is certified per website and can submit enrollment application.  He states the $200 fee is too high for him as he doesn't have source of income right now.  Is applying for SSDI with an  and wants to wait until this " "is completed for reduced fee.  He states he is not using recreational THC.    Consult recommendations:  LESI L4-5 or lumbar facet injection - will wait to order until after physical exam  PT evaluation and treatment as referred  Medication trial amitriptyline 20 mg x 3 nights, then 30 mg for insomnia/pain.    Discussed Vicodin 10/325 mg BID prn after UDT results, consider ketamine trial (out of pocket expense)  Behavioral health diagnostic assessment    He had PT evaluation on 06/21/2021.  Has not returned due to hospitalizations/infections.    Low back pain -   06/02/2021 TFESI L5-S1 bilateral \"hurt like hell\" during and after the procedure like someone was stabbing the nerve.  He states didn't help for more than a few days, unsure if pain is worse now.  Trying to find different ways to lay on back and elevate with pillows.  Denies steroid side effects or complications.  [facility].  Hasn't seen surgeon yet in consult.    Diagnostic assessment with Kiana Masterson on 07/19/2021:  \"Recommendations: Follow pain center plan of care. Schedule and attend psychotherapy to further address mental health and chronic pain symptoms. Consider EMDR. Follow up with psychiatry to address mental health medication concerns.      Diagnosis: Adjustment disorder with anxiety and depressed mood  Chronic Pain Syndrome  Grief/loss  PTSD, chronic\"  States this went well and wants to use psychotherapy services..  He is living with elderly aunt right now.  Adaptive apartment which helps him out.  He states he has SSDI shouldn't be working, can't keep a job due to health.       Previous Diagnostics & Treatments for Pain:  Surgery - Right lower extremity and 18 surgeries total   Injections - TFESI bilateral L5-S1 with Dr. Dobson on 06/02/2021  Imaging - See below  Physical Therapy - Lymphedema therapy daily at home.  Chiropractor/Acupuncture - Tried acupuncture for carpal tunel  Massage - lymphedema massage " "of leg  TENS or bracing - Doesn't use back brace due to lymphedema, denies TENs  Pain Psychology or biofeedback - Previous therapist.    Other - compression wraps, heating pad and tiger balm, compression shirt helps low back pain a bit     Pertinent Pain Medications:  See med list.     Previously tried medications:    NSAIDs: Not helpful, Ibuprofen helps headaches (stress)    Acetaminophen: Not helpful    Antidepressants: Cymbalta in the past, nortriptyline was asleep for 24 hours    Gabapentinoids: Gabapentin was \"the worst\" caused lymph node swelling, denies Lyrica    Opioids: oxycodone also helpful, states given other opioids in the ED (fentanyl helped but was scared to have it), Morphine in IV caused headaches, Dilaudid     Topicals: Tiger Balm    Supplements: Tried turmeric, apple cider vinegar     Muscle Relaxants: Tries to avoid as it has him feel groggy in the morning previously on flexeril and tizanidine, valium    Other: Trazodone feels \"zombie\" like in the morning, tried THC in [place] which helped sleep.      Current Outpatient Medications   Medication Sig Dispense Refill     aspirin-acetaminophen-caffeine (EXCEDRIN MIGRAINE) 250-250-65 mg per tablet [ASPIRIN-ACETAMINOPHEN-CAFFEINE (EXCEDRIN MIGRAINE) 250-250-65 MG PER TABLET] Take 1 tablet by mouth every 6 (six) hours as needed for pain.  0     carvedilol (COREG) 3.125 MG tablet Take 3.125 mg by mouth       cefuroxime (CEFTIN) 500 MG tablet Take 1 tablet (500 mg) by mouth 2 times daily 60 tablet 5     ELIQUIS DVT/PE STARTER PACK 5 MG TBPK        famotidine (PEPCID) 40 MG tablet [FAMOTIDINE (PEPCID) 40 MG TABLET] Take 1 tablet (40 mg total) by mouth every evening. (Patient taking differently: Take 40 mg by mouth every morning ) 180 tablet 0     HYDROcodone-acetaminophen (NORCO)  MG per tablet Take 1 tab po in the am and 1-2 tabs po at bedtime prn pain.  #70 tabs to last 28 days. 70 tablet 0     losartan (COZAAR) 50 MG tablet [LOSARTAN (COZAAR) " 50 MG TABLET] Take 1 tablet (50 mg total) by mouth daily. 90 tablet 5     potassium chloride ER (K-TAB/KLOR-CON) 10 MEQ CR tablet TAKE 2 TABLETS BY MOUTH EVERY MORNING AND 1 EVERY EVENING 270 tablet 2     vitamin B-12 (CYANOCOBALAMIN) 1000 MCG tablet Take 1,000 mcg by mouth        vitamin D3 (CHOLECALCIFEROL) 125 MCG (5000 UT) tablet Take 1 tablet (125 mcg) by mouth daily 30 tablet 1     vitamin E (TOCOPHEROL) 400 units (180 mg) capsule Take 400 Units by mouth         Review of Systems  Constitutional: Sleep disturbance due to pain.  Denies fever, chills, night sweats, lethargy, weight loss, weight gain.  Musculoskeletal: Positive for back pain, foot pain, muscle pain.  Denies recent falls.  Gastrointestional: Denies difficulty swallowing, change in appetite, abdominal pain, constipation, nausea, vomiting, diarrhea, fecal incontinence.  Genitourinary: Denies urinary incontinence, dysuria, hematuria, UTI, frequency, hesitancy, change in libido/erectile dysfunction.  Neurologic: Denies headaches, confusion, seizure,  changes in balance, changes in speech.  Psychiatric: Depression, anxiety, grief.  Denies memory loss, psychoses, suicidal ideation, substance use/abuse.     Objective:     Physical Exam  Constitutional- General appearance: Normal.  Well developed, uncomfortable, overweight, and appearance reflects stated age.  No acute distress or pain behaviors noted.  Presents alone today.  Psychiatric- Judgment and insight: Normal.  Speech: Normal rhythm.  Thought process: Normal.  No abnormal thoughts reported. Alert & Oriented to person, place, and time.  Recent and remote memory: Normal.  Observed mood: concerned  Respiratory- Breathing is non-labored; normal rhythm and rate.  Dermatologic- Exposed skin is clean, dry, and intact to inspection.  Musculoskeletal- Gait and station: Seated for entire visit    *Opioid [place] Precautions:    UDT -  07/30/2021 as expected  Opioid Agreement - 06/17/2021  Pharmacy- as  documented    [place]  Reviewed - 11/16/2021 expected  Pill Count - n/a  Psychological evaluation - 07/19/2021 Kiana Masterson [facility]  MME - 40, temporary increase to 60  Pharmacogenetic testing - N/A    Imaging:  MR Lumbar Spine without Contrast 05/12/2021:  IMPRESSION:   CONCLUSION:  1.  Multilevel degenerative changes of the lumbar spine as described in detail above, greatest at L5-S1, where there is moderate lateral recess stenosis with abutment and mild displacement of the traversing left S1 nerve root, mild right neuroforaminal   stenosis, and mild to moderate left neuroforaminal stenosis.  2.  At L4-L5, there is mild left lateral recess stenosis with possible abutment of the traversing left L5 nerve root.     EXAM: XR SHOULDER LEFT 2 OR MORE VWS  LOCATION: [facility]  DATE/TIME: 5/12/2021 7:36 AM     INDICATION: Chronic left shoulder pain  COMPARISON: None.     IMPRESSION:   Normal alignment without a fracture or dislocation. Acromiohumeral space is preserved. No evidence of calcific tendinitis. Adjacent chest wall is unremarkable. Numerous clips in left axilla.     EXAM: XR FEET BILATERAL 2 VWS  LOCATION: [facility]  DATE/TIME: 5/12/2021 7:36 AM     INDICATION: Chronic bilateral foot pain  COMPARISON: None.     IMPRESSION:   Normal alignment without a fracture or dislocation. No effusions at the ankle     There is a very small plantar calcaneal spur in the left foot. Mild hallux valgus deformity bilaterally.     No other degenerative changes noted.    CT Femur thigh right with contrast 08/23/2021:                                                                   IMPRESSION:  1.  Overall no significant change in the right thigh since prior. There is extensive subcutaneous soft tissue stranding in the right thigh and lower leg which could represent cellulitis or edema. Associated foci of subcutaneous nodularity or fluid   unchanged.  2.  Chronic deformity, mixed  "lucency and sclerosis of the distal right femur and proximal tibia are unchanged.  3.  Small cortical defects and/or sinus tracts in the distal right femur are stable and may be related to superimposed acute osteomyelitis as suggested on previous MRI.  4.  Right inguinal and iliac lymphadenopathy stable.  5.  Small indeterminate lytic lesion right iliac wing, not included on prior exams.     MR ankle right with and without contrast 08/18/2021                                                                   IMPRESSION:  1.  Extensive cellulitis.  2.  No focal abscess.  3.  No evidence of osteomyelitis.     MR Femur thigh right with and without contrast 08/18/2021:  \"IMPRESSION:  1.  Findings highly suspicious for osteomyelitis involving the distal aspect of the right femur with replacement of normal T1 signal distally and surrounding reactive edema. Additionally, there are linear areas of increased T2 signal abnormality   extending through the anterior cortex possibly representing small sinus tracts draining from the right femur suggesting chronic osteomyelitis. There is cortical thickening throughout this area and fluid along the anterior cortex in the distal right   thigh.     2.  Additionally, there is diffuse edema in the anterior musculature of the distal right thigh with minimal layering fluid which can be seen with myositis and fasciitis.     3.  Subcutaneous edema diffusely throughout the right thigh distally and anteriorly in the right thigh with reactive lymphadenopathy right inguinal region.     4.  Final report following review by musculoskeletal radiologist.\"    Assessment:   Romeo Chong presents today for low back pain, [facility] with MRI demonstrating multilevel degenerative changes, greatest at L5-S1.  Had TFESI bilateral L5-S1 which patient reports did not lessen pain symptoms.  He has not had lumbar surgical consult. Patient also has history of lymphedema in right LE " following treatment for Non-Hodgkin's lymphoma and history of right LE surgery for fibrous dysplasia of bone.  This worsened over the month due to cellulitis requiring hospitalization and also likely osteomyelitis in right femur.  He was followed by ID with IV and oral antibiotics. He was seen by vascular and oncology.  He states he has increased pain since hospitalization and reporting new right knee pain without injury - he will have adjustment in dosing to 1-2 tabs TID  daily prn which we discuss as temporary with goal to reduce back to 4 tabs daily at next visit.  He is also interested in medical cannabis for pain but cannot afford the out of pocket expense at this time.  Would like him to resume psychotherapy visits and call care coordinator for additional support.  As provider is leaving the pain center, asking PCP to help manage refills as his dose is within CDC guidelines and to return to pain center as needed for additional evaluations/recommendations.    Plan:   Discussed Vicodin 10/325 mg 1-2 tabs up to 3 times a day as needed  Refill sent for 11/23/21 for new dosing.    Have right knee x-ray done at PCP office visit tomorrow  Continue with vascular and lymphedema therapies  Keep appointment with oncology as scheduled.  Medical cannabis is being considered - is waiting on social security determination for cost.    See Dr. Singleton in follow-up for any further recommendations on foot pain.  Follow-up in 8 weeks with primary care provider as your opioid dose is within CDC guidelines (MME increasing up to 60).  I have sent a staff message to your provider today outlining our discussion and he/she may call me to further discuss if needed.  If your pain worsens or you need further evaluation and treatment, you may return to the pain center.  My last day in the office is 12/29/2021.    Mony Monsivais PA-C  [facility]   [address]  [place] [address]  Ph:  [phone]  Fax: [phone]    Video-Visit Details    Type of service:  Video Visit    Video End Time: 336 PM    Originating Location (pt. Location): Home    Distant Location (provider location):  [facility]     Platform used for Video Visit: Nancy

## 2021-11-16 ENCOUNTER — VIRTUAL VISIT (OUTPATIENT)
Dept: PALLIATIVE MEDICINE | Facility: OTHER | Age: 41
End: 2021-11-16
Payer: COMMERCIAL

## 2021-11-16 DIAGNOSIS — F11.90 CHRONIC, CONTINUOUS USE OF OPIOIDS: ICD-10-CM

## 2021-11-16 DIAGNOSIS — M85.00 FIBROUS DYSPLASIA OF BONE: ICD-10-CM

## 2021-11-16 DIAGNOSIS — M72.2 PLANTAR FASCIITIS: ICD-10-CM

## 2021-11-16 DIAGNOSIS — M51.369 DEGENERATION OF LUMBAR INTERVERTEBRAL DISC: ICD-10-CM

## 2021-11-16 DIAGNOSIS — G89.4 CHRONIC PAIN SYNDROME: Primary | ICD-10-CM

## 2021-11-16 PROCEDURE — 99214 OFFICE O/P EST MOD 30 MIN: CPT | Mod: 95 | Performed by: PHYSICIAN ASSISTANT

## 2021-11-16 RX ORDER — HYDROCODONE BITARTRATE AND ACETAMINOPHEN 10; 325 MG/1; MG/1
1-2 TABLET ORAL 3 TIMES DAILY PRN
Qty: 180 TABLET | Refills: 0 | Status: SHIPPED | OUTPATIENT
Start: 2021-11-23 | End: 2021-12-17

## 2021-11-16 ASSESSMENT — PAIN SCALES - GENERAL: PAINLEVEL: SEVERE PAIN (7)

## 2021-11-16 NOTE — PATIENT INSTRUCTIONS
Discussed Vicodin 10/325 mg 1-2 tabs up to 3 times a day as needed  Refill sent for 11/23/21 for new dosing.    Have right knee x-ray done at PCP office visit tomorrow  Continue with vascular and lymphedema therapies  Keep appointment with oncology as scheduled.  Medical cannabis is being considered - is waiting on social security determination for cost.    See Dr. Singleton in follow-up for any further recommendations on foot pain.  Follow-up in 8 weeks with primary care provider as your opioid dose is within CDC guidelines (MME increasing up to 60).  I have sent a staff message to your provider today outlining our discussion and he/she may call me to further discuss if needed.  If your pain worsens or you need further evaluation and treatment, you may return to the pain center.  My last day in the office is 12/29/2021.

## 2021-11-16 NOTE — PROGRESS NOTES
Romeo is a 41 year old who is being evaluated via a billable video visit.      How would you like to obtain your AVS? TagArrayhart  If the video visit is dropped, the invitation should be resent by: Text to cell phone: [phone]  Will anyone else be joining your video visit? No       Platform used for Video Visit: Doximity     Pain score 7  Constant   What does your pain like feel during a flare? Stabbing, throbbing  Does the pain interfere with:  Work ----- NA  Walking ------ yes  Sleep ------- yes  Daily activities ------no  Relationships -------yes  F=6    UDT -6/17/21  CSA- 6/17/21    hydrocodone at 1000 this am

## 2021-11-16 NOTE — LETTER
"    11/16/2021         RE: Romeo Chong  [address]  [address]  [place] [address]        Dear Colleague,    Thank you for referring your patient, Romeo Chong, to the [facility]. Please see a copy of my visit note below.    Video Start Time: 312 PM    PAIN CENTER PROGRESS NOTE    Subjective:   Romeo Chong presents for evaluation of low back pain.  Also right leg and bilateral feet, history of fibrous dysplasia of bone since age 13.  Has had previous surgery on right tibia, has resultant lymphedema since 2006.  Comorbid conditions include Asthma, HTN, hypertriglyceridemia, lymphedema, obesity, Vitamin D deficiency, allergic rhinitis, nonintractable headache, gastric ulcer, h/o hodgkin's lymphoma    Major issues:  1. Chronic pain syndrome    2. Chronic, continuous use of opioids    3. Fibrous dysplasia of bone    4. Plantar fasciitis    5. Degeneration of lumbar intervertebral disc      Pain location and description: See CMA note.  Severe Pain (7)  Radiation of pain: legs to feet  Gait disturbance: None reported, denies recent falls.  Exacerbating factors: Any movement, recent infection/hospitalization  Alleviating factors: Medications, sleep, laying down  Associated symptoms: Denies weight loss, fever/chills, bowel or bladder incontinence.  Functional pain symptoms:  See CMA note  Adverse effects of medications: None reported  Current treatment efficacy: Fair  Current treatment compliance: New to pain center - unexpected UDT at consult. Had repeat UDT as expected and reports taking medications as prescribed.  Reported self-escalation of medication 09/2021    Since last visit, he had hospitalization reviewed 10/18/2021-10/25/2021  \"Active Hospital Problems   Diagnosis POA     *Severe sepsis with acute organ dysfunction due to group B Streptococcus (HCC) [A40.1, R65.20] Yes     HTN (hypertension) [I10] Yes   Chronic     Cellulitis of right lower extremity [L03.115] Yes     Lymphedema " [I89.0] Yes     Mediastinal mass [J98.59] Yes     Gastroesophageal reflux disease without esophagitis [K21.9] Yes     Resolved Hospital Problems   No resolved problems to display.       Follow-up   Follow-up Issues for PCP:   Provider: Shahab Perez MD  Notes: Patient is being discharged on IV Rocephin for bacteremia due to Streptococcus agalactiae. Of note during this hospitalization he was found to have reduced ejection fraction. Cardiology was consulted and they recommended outpatient follow-up with cardiology for ischemic work-up once his infection has resolved. Patient will likely see cardiology in [place], if he decides to see cardiology in [place] he will need a new referral from primary care provider. 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 He will require outpatient follow-up with oncology in [place].    Follow-up with Specialist:   Specialty: Cardiology  Notes: Follow-up for systolic heart disease.    Pending Results:    Summary of Hospitalization   HPI: Per H&P dated 10/18: Mr. Chong is a 41 y.o. 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     On presenting to emergency department, he was like to be febrile and tachycardic, sepsis alert was called, patient received appropriate IV fluids and was given a dose of Rocephin and clindamycin. Patient lactic acid was noted to be elevated at  "3.7, he had no leukocytosis or left shift. Chest x-ray was unremarkable. Urinalysis was also unremarkable. Patient COVID-19 testing is pending.     Just prior to my evaluation, patient did complain of chest pain, EKG performed by emergency room provider indicated sinus tach at a rate of 120, right bundle branch block. Per ER provider, not consistent with STEMI. Troponin was ordered. Patient states pain is achy in quality, radiates to his back. States had similar pain before when he had his sepsis back in August. Patient recently traveled from [place] to [place].    Hospital Course: Patient was subsequently admitted to the hospital, he was kept on antibiotics, his blood cultures initial set both of them grew Streptococcus agalactiae. He was kept on IV Ancef. His transthoracic echocardiogram did not show any cardiac vegetation, it showed EF of 40%. Cardiology was consulted, they recommended no intervention currently or transesophageal echocardiogram. Cardiology recommended outpatient follow-up for ischemic work-up due to low ejection fraction. Of note he was also found to have a pulmonary embolus. He is started on Eliquis on discharge. His imaging also showed extensive lymphadenopathy consistent with his diagnosis of indolent lymphoma. He will follow up with oncology as outpatient. He had fevers, improved. His repeat set of blood cultures was completely negative. He is being discharged home in a stable condition on IV Rocephin. He will follow up with primary care, cardiology as outpatient.    Of note during hospitalization he had 2 episodes that were concerning for possible underlying seizure activity, neurology was consulted, he underwent MRI brain that was negative for any acute findings. He also underwent EEG that was unremarkable. Per neurology these episodes were likely related to Dilaudid. Dilaudid was stopped completely.    Consults: Cardiology and Neurology\"    He states he had outpatient PICC line for 1 week " "and he saw ID provider and is on oral antibiotic.  He states he has not seen PCP yet will be tomorrow.  He states he will get cardiologist referral.  He states he is feeling since being home his pain in knee has worsened.  He states his leg pain is unchanged.  He is unsure if this is related to the infection.  He states his outer thigh when he bends his leg is painful and hurts to move it.  He states he is using elevation and rest for his pain.  He states he is limited with movement due to pain but doing \"a little bit.\"  He states he is independent in ADLs, dressing is difficult.  He is driving short distances, avoiding long distances due to swelling.  Wearing his compressions.  For knee is using heating pad also on lower back.        Right knee pain mostly with weight bearing, feels discomfort without WB.  He states it feels like it has hit it constantly; denies knee joint erythema, warmth, swelling.  Denies clicking, catching, popping sensation except when he turns a certain way.  He states when he moves his knee laterally he feels clicking in the knee.    States he is still having concerns with plantar fasciitis even after surgery; he is doing at home PT exercises and feels like surgery made it worse.  He has been fitted for orthotics wearing in his shoes.  He states when he first puts these on it feels good and after 30-60 minutes pain increase.  Dr. Singleton is podiatrist.      He did have change upon discharge in Norco 10/325 mg to take 2 tabs every 8 hours prn pain was given an additional #30 tabs to do this.  He did notify this provider by phone.  He reports this dose was allowing for less pain 4-5/10 and he states he has resumed the dose 10/325 mg BID prn helps but the level of pain is 6-7/10 after medication.   He denies side effects on this dosing.      He is following [facility].    Had visit with Dr. Ortiz on 11/02/2021 for bariatric recommendations.     Medical cannabis enrollment: He " "has not yet attended, he is certified per website and can submit enrollment application.  He states the $200 fee is too high for him as he doesn't have source of income right now.  Is applying for SSDI with an  and wants to wait until this is completed for reduced fee.  He states he is not using recreational THC.    Consult recommendations:  LESI L4-5 or lumbar facet injection - will wait to order until after physical exam  PT evaluation and treatment as referred  Medication trial amitriptyline 20 mg x 3 nights, then 30 mg for insomnia/pain.    Discussed Vicodin 10/325 mg BID prn after UDT results, consider ketamine trial (out of pocket expense)  Behavioral health diagnostic assessment    He had PT evaluation on 06/21/2021.  Has not returned due to hospitalizations/infections.    Low back pain -   06/02/2021 TFESI L5-S1 bilateral \"hurt like hell\" during and after the procedure like someone was stabbing the nerve.  He states didn't help for more than a few days, unsure if pain is worse now.  Trying to find different ways to lay on back and elevate with pillows.  Denies steroid side effects or complications.  [facility] with [facility].  Hasn't seen surgeon yet in consult.    Diagnostic assessment with Kiana Masterson on 07/19/2021:  \"Recommendations: Follow pain center plan of care. Schedule and attend psychotherapy to further address mental health and chronic pain symptoms. Consider EMDR. Follow up with psychiatry to address mental health medication concerns.      Diagnosis: Adjustment disorder with anxiety and depressed mood  Chronic Pain Syndrome  Grief/loss  PTSD, chronic\"  States this went well and wants to use psychotherapy services..  He is living with elderly aunt right now.  Adaptive apartment which helps him out.  He states he has SSDI shouldn't be working, can't keep a job due to health.       Previous Diagnostics & Treatments for Pain:  Surgery - Right lower extremity and 18 " "surgeries total   Injections - TFESI bilateral L5-S1 with Dr. Dobson on 06/02/2021  Imaging - See below  Physical Therapy - Lymphedema therapy daily at home.  Chiropractor/Acupuncture - Tried acupuncture for carpal tunel  Massage - lymphedema massage of leg  TENS or bracing - Doesn't use back brace due to lymphedema, denies TENs  Pain Psychology or biofeedback - Previous therapist.    Other - compression wraps, heating pad and tiger balm, compression shirt helps low back pain a bit     Pertinent Pain Medications:  See med list.     Previously tried medications:    NSAIDs: Not helpful, Ibuprofen helps headaches (stress)    Acetaminophen: Not helpful    Antidepressants: Cymbalta in the past, nortriptyline was asleep for 24 hours    Gabapentinoids: Gabapentin was \"the worst\" caused lymph node swelling, denies Lyrica    Opioids: oxycodone also helpful, states given other opioids in the ED (fentanyl helped but was scared to have it), Morphine in IV caused headaches, Dilaudid     Topicals: Tiger Balm    Supplements: Tried turmeric, apple cider vinegar     Muscle Relaxants: Tries to avoid as it has him feel groggy in the morning previously on flexeril and tizanidine, valium    Other: Trazodone feels \"zombie\" like in the morning, tried THC in [place] which helped sleep.      Current Outpatient Medications   Medication Sig Dispense Refill     aspirin-acetaminophen-caffeine (EXCEDRIN MIGRAINE) 250-250-65 mg per tablet [ASPIRIN-ACETAMINOPHEN-CAFFEINE (EXCEDRIN MIGRAINE) 250-250-65 MG PER TABLET] Take 1 tablet by mouth every 6 (six) hours as needed for pain.  0     carvedilol (COREG) 3.125 MG tablet Take 3.125 mg by mouth       cefuroxime (CEFTIN) 500 MG tablet Take 1 tablet (500 mg) by mouth 2 times daily 60 tablet 5     ELIQUIS DVT/PE STARTER PACK 5 MG TBPK        famotidine (PEPCID) 40 MG tablet [FAMOTIDINE (PEPCID) 40 MG TABLET] Take 1 tablet (40 mg total) by mouth every evening. (Patient taking differently: Take 40 " mg by mouth every morning ) 180 tablet 0     HYDROcodone-acetaminophen (NORCO)  MG per tablet Take 1 tab po in the am and 1-2 tabs po at bedtime prn pain.  #70 tabs to last 28 days. 70 tablet 0     losartan (COZAAR) 50 MG tablet [LOSARTAN (COZAAR) 50 MG TABLET] Take 1 tablet (50 mg total) by mouth daily. 90 tablet 5     potassium chloride ER (K-TAB/KLOR-CON) 10 MEQ CR tablet TAKE 2 TABLETS BY MOUTH EVERY MORNING AND 1 EVERY EVENING 270 tablet 2     vitamin B-12 (CYANOCOBALAMIN) 1000 MCG tablet Take 1,000 mcg by mouth        vitamin D3 (CHOLECALCIFEROL) 125 MCG (5000 UT) tablet Take 1 tablet (125 mcg) by mouth daily 30 tablet 1     vitamin E (TOCOPHEROL) 400 units (180 mg) capsule Take 400 Units by mouth         Review of Systems  Constitutional: Sleep disturbance due to pain.  Denies fever, chills, night sweats, lethargy, weight loss, weight gain.  Musculoskeletal: Positive for back pain, foot pain, muscle pain.  Denies recent falls.  Gastrointestional: Denies difficulty swallowing, change in appetite, abdominal pain, constipation, nausea, vomiting, diarrhea, fecal incontinence.  Genitourinary: Denies urinary incontinence, dysuria, hematuria, UTI, frequency, hesitancy, change in libido/erectile dysfunction.  Neurologic: Denies headaches, confusion, seizure,  changes in balance, changes in speech.  Psychiatric: Depression, anxiety, grief.  Denies memory loss, psychoses, suicidal ideation, substance use/abuse.     Objective:     Physical Exam  Constitutional- General appearance: Normal.  Well developed, uncomfortable, overweight, and appearance reflects stated age.  No acute distress or pain behaviors noted.  Presents alone today.  Psychiatric- Judgment and insight: Normal.  Speech: Normal rhythm.  Thought process: Normal.  No abnormal thoughts reported. Alert & Oriented to person, place, and time.  Recent and remote memory: Normal.  Observed mood: concerned  Respiratory- Breathing is non-labored; normal rhythm  and rate.  Dermatologic- Exposed skin is clean, dry, and intact to inspection.  Musculoskeletal- Gait and station: Seated for entire visit    *Opioid [place] Precautions:    UDT -  07/30/2021 as expected  Opioid Agreement - 06/17/2021  Pharmacy- as documented    [place]  Reviewed - 11/16/2021 expected  Pill Count - n/a  Psychological evaluation - 07/19/2021 Kiana Masterson [facility]  MME - 40, temporary increase to 60  Pharmacogenetic testing - N/A    Imaging:  MR Lumbar Spine without Contrast 05/12/2021:  IMPRESSION:   CONCLUSION:  1.  Multilevel degenerative changes of the lumbar spine as described in detail above, greatest at L5-S1, where there is moderate lateral recess stenosis with abutment and mild displacement of the traversing left S1 nerve root, mild right neuroforaminal   stenosis, and mild to moderate left neuroforaminal stenosis.  2.  At L4-L5, there is mild left lateral recess stenosis with possible abutment of the traversing left L5 nerve root.     EXAM: XR SHOULDER LEFT 2 OR MORE VWS  LOCATION: [facility]  DATE/TIME: 5/12/2021 7:36 AM     INDICATION: Chronic left shoulder pain  COMPARISON: None.     IMPRESSION:   Normal alignment without a fracture or dislocation. Acromiohumeral space is preserved. No evidence of calcific tendinitis. Adjacent chest wall is unremarkable. Numerous clips in left axilla.     EXAM: XR FEET BILATERAL 2 VWS  LOCATION: [facility]  DATE/TIME: 5/12/2021 7:36 AM     INDICATION: Chronic bilateral foot pain  COMPARISON: None.     IMPRESSION:   Normal alignment without a fracture or dislocation. No effusions at the ankle     There is a very small plantar calcaneal spur in the left foot. Mild hallux valgus deformity bilaterally.     No other degenerative changes noted.    CT Femur thigh right with contrast 08/23/2021:                                                                   IMPRESSION:  1.  Overall no significant change in the  "right thigh since prior. There is extensive subcutaneous soft tissue stranding in the right thigh and lower leg which could represent cellulitis or edema. Associated foci of subcutaneous nodularity or fluid   unchanged.  2.  Chronic deformity, mixed lucency and sclerosis of the distal right femur and proximal tibia are unchanged.  3.  Small cortical defects and/or sinus tracts in the distal right femur are stable and may be related to superimposed acute osteomyelitis as suggested on previous MRI.  4.  Right inguinal and iliac lymphadenopathy stable.  5.  Small indeterminate lytic lesion right iliac wing, not included on prior exams.     MR ankle right with and without contrast 08/18/2021                                                                   IMPRESSION:  1.  Extensive cellulitis.  2.  No focal abscess.  3.  No evidence of osteomyelitis.     MR Femur thigh right with and without contrast 08/18/2021:  \"IMPRESSION:  1.  Findings highly suspicious for osteomyelitis involving the distal aspect of the right femur with replacement of normal T1 signal distally and surrounding reactive edema. Additionally, there are linear areas of increased T2 signal abnormality   extending through the anterior cortex possibly representing small sinus tracts draining from the right femur suggesting chronic osteomyelitis. There is cortical thickening throughout this area and fluid along the anterior cortex in the distal right   thigh.     2.  Additionally, there is diffuse edema in the anterior musculature of the distal right thigh with minimal layering fluid which can be seen with myositis and fasciitis.     3.  Subcutaneous edema diffusely throughout the right thigh distally and anteriorly in the right thigh with reactive lymphadenopathy right inguinal region.     4.  Final report following review by musculoskeletal radiologist.\"    Assessment:   Romeo Chong presents today for low back pain, assessed at the [facility] with " MRI demonstrating multilevel degenerative changes, greatest at L5-S1.  Had TFESI bilateral L5-S1 which patient reports did not lessen pain symptoms.  He has not had lumbar surgical consult. Patient also has history of lymphedema in right LE following treatment for Non-Hodgkin's lymphoma and history of right LE surgery for fibrous dysplasia of bone.  This worsened over the month due to cellulitis requiring hospitalization and also likely osteomyelitis in right femur.  He was followed by ID with IV and oral antibiotics. He was seen by vascular and oncology.  He states he has increased pain since hospitalization and reporting new right knee pain without injury - he will have adjustment in dosing to 1-2 tabs TID  daily prn which we discuss as temporary with goal to reduce back to 4 tabs daily at next visit.  He is also interested in medical cannabis for pain but cannot afford the out of pocket expense at this time.  Would like him to resume psychotherapy visits and call care coordinator for additional support.  As provider is leaving the pain center, asking PCP to help manage refills as his dose is within CDC guidelines and to return to pain center as needed for additional evaluations/recommendations.    Plan:   Discussed Vicodin 10/325 mg 1-2 tabs up to 3 times a day as needed  Refill sent for 11/23/21 for new dosing.    Have right knee x-ray done at PCP office visit tomorrow  Continue with vascular and lymphedema therapies  Keep appointment with oncology as scheduled.  Medical cannabis is being considered - is waiting on social security determination for cost.    See Dr. Singleton in follow-up for any further recommendations on foot pain.  Follow-up in 8 weeks with primary care provider as your opioid dose is within CDC guidelines (MME increasing up to 60).  I have sent a staff message to your provider today outlining our discussion and he/she may call me to further discuss if needed.  If your pain worsens or you need  further evaluation and treatment, you may return to the pain center.  My last day in the office is 12/29/2021.    Mony Monsivais PA-C  [facility]   [address]  [place] [address]  Ph: [phone]  Fax: [phone]    Video-Visit Details    Type of service:  Video Visit    Video End Time: 336 PM    Originating Location (pt. Location): Home    Distant Location (provider location):  [facility] [place]     Platform used for Video Visit: Nancy Walters is a 41 year old who is being evaluated via a billable video visit.      How would you like to obtain your AVS? MyChart  If the video visit is dropped, the invitation should be resent by: Text to cell phone: [phone]  Will anyone else be joining your video visit? No       Platform used for Video Visit: Nancy     Pain score 7  Constant   What does your pain like feel during a flare? Stabbing, throbbing  Does the pain interfere with:  Work ----- NA  Walking ------ yes  Sleep ------- yes  Daily activities ------no  Relationships -------yes  F=6    UDT -6/17/21  CSA- 6/17/21    hydrocodone at 1000 this am        Again, thank you for allowing me to participate in the care of your patient.        Sincerely,        Mony Monsivais PA-C

## 2021-11-17 ENCOUNTER — OFFICE VISIT (OUTPATIENT)
Dept: FAMILY MEDICINE | Facility: CLINIC | Age: 41
End: 2021-11-17
Payer: COMMERCIAL

## 2021-11-17 VITALS
HEART RATE: 67 BPM | SYSTOLIC BLOOD PRESSURE: 138 MMHG | HEIGHT: 71 IN | DIASTOLIC BLOOD PRESSURE: 98 MMHG | WEIGHT: 295 LBS | OXYGEN SATURATION: 97 % | BODY MASS INDEX: 41.3 KG/M2 | RESPIRATION RATE: 20 BRPM

## 2021-11-17 DIAGNOSIS — A40.9 SEPSIS DUE TO STREPTOCOCCUS SPECIES, UNSPECIFIED WHETHER ACUTE ORGAN DYSFUNCTION PRESENT (H): Primary | ICD-10-CM

## 2021-11-17 DIAGNOSIS — I50.21 ACUTE SYSTOLIC CONGESTIVE HEART FAILURE (H): ICD-10-CM

## 2021-11-17 DIAGNOSIS — I26.99 OTHER ACUTE PULMONARY EMBOLISM WITHOUT ACUTE COR PULMONALE (H): ICD-10-CM

## 2021-11-17 DIAGNOSIS — R94.30 LOW LEFT VENTRICULAR EJECTION FRACTION: ICD-10-CM

## 2021-11-17 DIAGNOSIS — I89.0 LYMPHEDEMA: ICD-10-CM

## 2021-11-17 DIAGNOSIS — I10 ESSENTIAL HYPERTENSION, BENIGN: ICD-10-CM

## 2021-11-17 LAB
ALBUMIN SERPL-MCNC: 3.8 G/DL (ref 3.5–5)
ALP SERPL-CCNC: 71 U/L (ref 45–120)
ALT SERPL W P-5'-P-CCNC: 27 U/L (ref 0–45)
ANION GAP SERPL CALCULATED.3IONS-SCNC: 14 MMOL/L (ref 5–18)
AST SERPL W P-5'-P-CCNC: 22 U/L (ref 0–40)
BILIRUB SERPL-MCNC: 0.5 MG/DL (ref 0–1)
BUN SERPL-MCNC: 8 MG/DL (ref 8–22)
CALCIUM SERPL-MCNC: 9.6 MG/DL (ref 8.5–10.5)
CHLORIDE BLD-SCNC: 105 MMOL/L (ref 98–107)
CO2 SERPL-SCNC: 20 MMOL/L (ref 22–31)
CREAT SERPL-MCNC: 0.63 MG/DL (ref 0.7–1.3)
ERYTHROCYTE [DISTWIDTH] IN BLOOD BY AUTOMATED COUNT: 14.6 % (ref 10–15)
GFR SERPL CREATININE-BSD FRML MDRD: >90 ML/MIN/1.73M2
GLUCOSE BLD-MCNC: 94 MG/DL (ref 70–125)
HCT VFR BLD AUTO: 41 % (ref 40–53)
HGB BLD-MCNC: 13.1 G/DL (ref 13.3–17.7)
MCH RBC QN AUTO: 26.1 PG (ref 26.5–33)
MCHC RBC AUTO-ENTMCNC: 32 G/DL (ref 31.5–36.5)
MCV RBC AUTO: 82 FL (ref 78–100)
PLATELET # BLD AUTO: 177 10E3/UL (ref 150–450)
POTASSIUM BLD-SCNC: 4.2 MMOL/L (ref 3.5–5)
PROT SERPL-MCNC: 8.4 G/DL (ref 6–8)
RBC # BLD AUTO: 5.01 10E6/UL (ref 4.4–5.9)
SODIUM SERPL-SCNC: 139 MMOL/L (ref 136–145)
WBC # BLD AUTO: 3.7 10E3/UL (ref 4–11)

## 2021-11-17 PROCEDURE — 85027 COMPLETE CBC AUTOMATED: CPT | Performed by: FAMILY MEDICINE

## 2021-11-17 PROCEDURE — 36415 COLL VENOUS BLD VENIPUNCTURE: CPT | Performed by: FAMILY MEDICINE

## 2021-11-17 PROCEDURE — 80053 COMPREHEN METABOLIC PANEL: CPT | Performed by: FAMILY MEDICINE

## 2021-11-17 PROCEDURE — 99214 OFFICE O/P EST MOD 30 MIN: CPT | Performed by: FAMILY MEDICINE

## 2021-11-17 RX ORDER — LOSARTAN POTASSIUM 50 MG/1
50 TABLET ORAL DAILY
Qty: 90 TABLET | Refills: 0 | Status: SHIPPED | OUTPATIENT
Start: 2021-11-17 | End: 2022-04-13

## 2021-11-17 ASSESSMENT — ASTHMA QUESTIONNAIRES
QUESTION_3 LAST FOUR WEEKS HOW OFTEN DID YOUR ASTHMA SYMPTOMS (WHEEZING, COUGHING, SHORTNESS OF BREATH, CHEST TIGHTNESS OR PAIN) WAKE YOU UP AT NIGHT OR EARLIER THAN USUAL IN THE MORNING: NOT AT ALL
QUESTION_2 LAST FOUR WEEKS HOW OFTEN HAVE YOU HAD SHORTNESS OF BREATH: NOT AT ALL
QUESTION_5 LAST FOUR WEEKS HOW WOULD YOU RATE YOUR ASTHMA CONTROL: COMPLETELY CONTROLLED
ACT_TOTALSCORE: 25
QUESTION_1 LAST FOUR WEEKS HOW MUCH OF THE TIME DID YOUR ASTHMA KEEP YOU FROM GETTING AS MUCH DONE AT WORK, SCHOOL OR AT HOME: NONE OF THE TIME
QUESTION_4 LAST FOUR WEEKS HOW OFTEN HAVE YOU USED YOUR RESCUE INHALER OR NEBULIZER MEDICATION (SUCH AS ALBUTEROL): NOT AT ALL

## 2021-11-17 ASSESSMENT — MIFFLIN-ST. JEOR: SCORE: 2270.24

## 2021-11-17 NOTE — PROGRESS NOTES
Assessment & Plan     Sepsis due to Streptococcus species, unspecified whether acute organ dysfunction present (H)  Overall improvements  Recheck  - CBC with platelets; Future  - CBC with platelets    Other acute pulmonary embolism without acute cor pulmonale (H)  Refill  - apixaban ANTICOAGULANT (ELIQUIS) 5 MG tablet; Take 1 tablet (5 mg) by mouth 2 times daily  Continue for 6 months     Acute systolic congestive heart failure (H)  Low left ventricular ejection fraction  Ejection fraction of 40%  Follow-up with outpatient cardiology  - Adult Cardiology Eval Referral; Future    Essential hypertension, benign  Stable  Continue with the current plan    - losartan (COZAAR) 50 MG tablet; Take 1 tablet (50 mg) by mouth daily  - Comprehensive metabolic panel (BMP + Alb, Alk Phos, ALT, AST, Total. Bili, TP)    Lymphedema  Chronic and due to lymph node biopsy  Follow-up with lymphedema clinic  Chronic pain of the left leg managed by pain specialist   on chronic suppressive therapy with cefuroxime.                    Return in about 3 months (around 2/17/2022) for Follow up.    Chely Frye MD  [facility]    Junior Walters is a 41 year old who presents for post hospital follow-up with outpatient diagnosis of severe sepsis due to strep infection and discharged on IV Rocephin for bacteremia.  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  He was also found to have a pulmonary embolus and started on Eliquis at discharge.  His repeat set of blood cultures were completely negative.  Concerning of possible underlying seizure activity neurology was consulted and underwent MRI brain and EEG with  "negative findings.  Seen and in consultation by the ID on the 11/9, will be on oral antibiotics for 6 months.   recurrence of the infection is secondary to the lymphedema.  Will refer him back to lymphedema clinic.   on chronic suppressive therapy with cefuroxime.    Chronic pain of the left leg ( resulting from lymphedema and several sugeries ) managed by pain specialist.         Hospital Follow-up Visit:    Hospital/Nursing Home/IP Rehab Facility: [facility]  Date of Admission: 10/18/2021  Date of Discharge: 10/25/2021  Reason(s) for Admission:  Cellulitis, sepsis      Was your hospitalization related to COVID-19? No   Problems taking medications regularly:  None  Medication changes since discharge: None  Problems adhering to non-medication therapy:  None    Summary of hospitalization:  [facility] discharge summary reviewed  Diagnostic Tests/Treatments reviewed.  Follow up needed:   Cardiology, lymphedema, ID, pain,  and oncology    Other Healthcare Providers Involved in Patient s Care:           Update since discharge: stable.       Post Discharge Medication Reconciliation: discharge medications reconciled, continue medications without change.  Plan of care communicated with patient                Review of Systems         Objective    BP (!) 138/98   Pulse 67   Resp 20   Ht 1.803 m (5' 11\")   Wt 133.8 kg (295 lb)   SpO2 97%   BMI 41.14 kg/m    Body mass index is 41.14 kg/m .     Physical Exam   GENERAL: healthy, alert and no distress  RESP: lungs clear to auscultation - no rales, rhonchi or wheezes  CV: regular rate and rhythm, normal S1 S2, no S3 or S4, no murmur, click or rub, no peripheral edema and peripheral pulses strong  MS: right extremity lymphedema                "

## 2021-11-18 ASSESSMENT — ASTHMA QUESTIONNAIRES: ACT_TOTALSCORE: 25

## 2021-11-18 ASSESSMENT — PATIENT HEALTH QUESTIONNAIRE - PHQ9: SUM OF ALL RESPONSES TO PHQ QUESTIONS 1-9: 4

## 2021-11-18 NOTE — NURSING NOTE
9/23/2021-Met with patient, introduced myself, answered questions and provided some basic information on my role as RN Care Coordinator with Doctor Cleve. Hodgkin's lymphoma. Will continue to follow for ongoing needs. Given writers/clinic resource number.

## 2021-11-29 ENCOUNTER — TELEPHONE (OUTPATIENT)
Dept: FAMILY MEDICINE | Facility: CLINIC | Age: 41
End: 2021-11-29
Payer: COMMERCIAL

## 2021-11-29 NOTE — TELEPHONE ENCOUNTER
Requesting refill of carvedilol.    Carvedilol 3.125 MG tablet    Pt states he is out of medication.    [facility] on [address] is the preferred pharmacy.

## 2021-12-03 ENCOUNTER — NURSE TRIAGE (OUTPATIENT)
Dept: FAMILY MEDICINE | Facility: CLINIC | Age: 41
End: 2021-12-03
Payer: COMMERCIAL

## 2021-12-03 DIAGNOSIS — I10 BENIGN ESSENTIAL HYPERTENSION: Primary | ICD-10-CM

## 2021-12-03 NOTE — TELEPHONE ENCOUNTER
Pt is out of medication.  Per patient he was prescribed carvedilol as a result of a recent emergency room and does not see vascular provider for several weeks.  Requesting RX.    Carvedilol 3.125 mg    Last office visit w/PCP = 11/17/2021    [facility] on [address] is the preferred pharmacy.

## 2021-12-04 RX ORDER — CARVEDILOL 3.12 MG/1
3.12 TABLET ORAL 2 TIMES DAILY WITH MEALS
Qty: 10 TABLET | Refills: 0 | Status: SHIPPED | OUTPATIENT
Start: 2021-12-04 | End: 2021-12-10

## 2021-12-04 NOTE — TELEPHONE ENCOUNTER
"Romeo called again.  He is out of his carvedilol 3.125 mg twice daily.  He saw his pcp after a recent hospitalization for sepsis, PE and heart failure.  Carvedilol was prescribed while in the hospital.  At his follow up visit with pcp on 11/17/21, some medications, new from time he was hospitalized, were refilled, Carvedilol was not.    Romeo is asking for a refill of this.  I called the on call, Dr Villa and asked for an okay to send a Rx in to a pharmacy in [place], [place] where Romeo is currently.    Dr Villa okayed 5 days worth of this being sent in. I called and gave this as a verbal order..  I called the Rx in to [address]  [phone].    Romeo also stated he has not heard from the cardiology clinic that pcp referred him to. Romeo does not know how to reach them to make an appointment.    Romeo will be back in [facility]. He was only prescribed 5 days worth of the carvedilol, will Dr Frye refill this?   Please call patient once this had been addressed.    Patient stated it is okay to leave a detailed message on his voicemail if he doesn't answer.    Reason for Disposition    [1] Request for URGENT new prescription or refill of \"essential\" medication (i.e., likelihood of harm to patient if not taken) AND [2] triager unable to fill per unit policy    Additional Information    Negative: MORE THAN A DOUBLE DOSE of a prescription or over-the-counter (OTC) drug    Negative: [1] DOUBLE DOSE (an extra dose or lesser amount) of over-the-counter (OTC) drug AND [2] any symptoms (e.g., dizziness, nausea, pain, sleepiness)    Negative: [1] DOUBLE DOSE (an extra dose or lesser amount) of prescription drug AND [2] any symptoms (e.g., dizziness, nausea, pain, sleepiness)    Negative: Took another person's prescription drug    Protocols used: MEDICATION QUESTION CALL-A-UTE [facility]IR RN [facility]     "

## 2021-12-06 NOTE — TELEPHONE ENCOUNTER
Writer called and spoke with patient letting him know that the provider had filled 10 tablets of the medication for him, he very much appreciated the call.

## 2021-12-06 NOTE — TELEPHONE ENCOUNTER
"Romeo called again.  He is out of his carvedilol 3.125 mg twice daily.  He saw his pcp after a recent hospitalization for sepsis, PE and heart failure.  Carvedilol was prescribed while in the hospital.  At his follow up visit with pcp on 11/17/21, some medications, new from time he was hospitalized, were refilled, Carvedilol was not.     Romeo is asking for a refill of this.  I called the on call, Dr Villa and asked for an okay to send a Rx in to a pharmacy in [place], [place] where Romeo is currently.     Dr Villa okayed 5 days worth of this being sent in. I called and gave this as a verbal order..  I called the Rx in to [address]  [phone].     Romeo also stated he has not heard from the cardiology clinic that pcp referred him to. Romeo does not know how to reach them to make an appointment.     Romeo will be back in [facility]. He was only prescribed 5 days worth of the carvedilol, will Dr Frye refill this?   Please call patient once this had been addressed.     Patient stated it is okay to leave a detailed message on his voicemail if he doesn't answer.     Reason for Disposition    [1] Request for URGENT new prescription or refill of \"essential\" medication (i.e., likelihood of harm to patient if not taken) AND [2] triager unable to fill per unit policy   "

## 2021-12-06 NOTE — TELEPHONE ENCOUNTER
I see that you filled 10 tabs for him recently, I will call him and make sure that he got these.

## 2021-12-17 DIAGNOSIS — G89.4 CHRONIC PAIN SYNDROME: ICD-10-CM

## 2021-12-17 RX ORDER — HYDROCODONE BITARTRATE AND ACETAMINOPHEN 10; 325 MG/1; MG/1
1-2 TABLET ORAL 3 TIMES DAILY PRN
Qty: 180 TABLET | Refills: 0 | Status: SHIPPED | OUTPATIENT
Start: 2021-12-23 | End: 2022-01-11

## 2021-12-17 NOTE — TELEPHONE ENCOUNTER
Pt leaves voice message regarding prescription refill for [place]. Asking if he should be getting his next refill through his PCP until has a new pain clinic provider or should it still be through [facility]. Please call back.

## 2021-12-17 NOTE — TELEPHONE ENCOUNTER
RN spoke to patient and informed patient the pain clinic will continue to prescribe until seen by PCP on 2/16/22 then PCP will take over.    Received call from patient requesting refill(s) of Norco    Last dispensed from pharmacy on 11/23/21    Patient's last office/virtual visit by prescribing provider on 11/16/21 RW  Next office/virtual appointment scheduled for 2/16/22 with PCP    Last urine drug screen date 6/2021  Current opioid agreement on file (completed within the last year) Yes Date of opioid agreement: 6/2021    E-prescribe to [facility] pharmacy  Pending Prescriptions:                       Disp   Refills    HYDROcodone-acetaminophen (NORCO)  *180 ta*0            Sig: Take 1-2 tablets by mouth 3 times daily as needed           for pain Use dates: 12/23/21-1/20/22

## 2021-12-18 ENCOUNTER — MYC MEDICAL ADVICE (OUTPATIENT)
Dept: FAMILY MEDICINE | Facility: CLINIC | Age: 41
End: 2021-12-18
Payer: COMMERCIAL

## 2021-12-18 DIAGNOSIS — I50.23 ACUTE ON CHRONIC SYSTOLIC HEART FAILURE (H): Primary | ICD-10-CM

## 2021-12-22 NOTE — TELEPHONE ENCOUNTER
Can we call to triage this patient? Is he actively having chest pain or symptoms of heart failure decompensation?

## 2021-12-23 NOTE — TELEPHONE ENCOUNTER
"Contacted patient who expressed that he has been having chest pain that comes and goes over the past few days.  Describes it as pressure and pain over sternum to left side of chest.  Also states \"was radiating up to neck and down arm.\"  Denies increase in swelling in legs, but states he has lymphoedema and legs are baseline.  Does have increase in sob recently with exertion and going up stairs.  Writer advised that patient be seen in ED setting for evaluation.  Also that a referral can be placed, but first needs to seen in ED for acute symptoms.  Patient verbalized understanding and says he will go today.  He again asked for urgent cardiology referral.  Informed PCP out of office but this will be routed to covering provider.   "

## 2021-12-24 ENCOUNTER — PATIENT OUTREACH (OUTPATIENT)
Dept: CARE COORDINATION | Facility: CLINIC | Age: 41
End: 2021-12-24
Payer: COMMERCIAL

## 2021-12-24 DIAGNOSIS — E66.01 MORBID OBESITY (H): Primary | ICD-10-CM

## 2021-12-24 NOTE — PROGRESS NOTES
Clinic Care Coordination Contact  [facility]       Clinical Data: Care Coordinator Outreach  Outreach attempted x 1.  No answer at todays call   Care Coordinator will try to reach patient again in 3-5 business days.  Due to holidays  12/27/2021

## 2021-12-27 ENCOUNTER — MYC MEDICAL ADVICE (OUTPATIENT)
Dept: FAMILY MEDICINE | Facility: CLINIC | Age: 41
End: 2021-12-27
Payer: COMMERCIAL

## 2021-12-27 DIAGNOSIS — K25.9 GASTRIC ULCER, UNSPECIFIED CHRONICITY, UNSPECIFIED WHETHER GASTRIC ULCER HEMORRHAGE OR PERFORATION PRESENT: ICD-10-CM

## 2021-12-28 ENCOUNTER — PATIENT OUTREACH (OUTPATIENT)
Dept: CARE COORDINATION | Facility: CLINIC | Age: 41
End: 2021-12-28
Payer: COMMERCIAL

## 2021-12-28 NOTE — LETTER
[facility] CARE COORDINATION  [address]  [place] [address]    December 28, 2021    Romeo Chong  [address]  [place] [address]      Dear Romeo,    I am a clinic community health worker who works with Chely Frye MD at [facility]. I have been trying to reach you recently to introduce Clinic Care Coordination and to see if there was anything I could assist you with.  Below is a description of clinic care coordination and how I can further assist you.      The clinic care coordination team is made up of a registered nurse,  and community health worker who understand the health care system. The goal of clinic care coordination is to help you manage your health and improve access to the health care system in the most efficient manner. The team can assist you in meeting your health care goals by providing education, coordinating services, strengthening the communication among your providers and supporting you with any resource needs.    Please feel free to contact me at [phone] with any questions or concerns. We are focused on providing you with the highest-quality healthcare experience possible and that all starts with you.     Sincerely,     Francisca

## 2021-12-28 NOTE — PROGRESS NOTES
Clinic Care Coordination Contact  [facility]/Voicemail       Clinical Data: Care Coordinator Outreach  Outreach attempted x 2.  Left message on patient's voicemail with call back information and requested return call.  Plan: Care Coordinator sent care coordination introduction letter on 12/28/2021   via [employer]. Care Coordinator will do no further outreaches at this time.

## 2021-12-29 RX ORDER — FAMOTIDINE 40 MG/1
40 TABLET, FILM COATED ORAL EVERY EVENING
Qty: 180 TABLET | Refills: 0 | Status: SHIPPED | OUTPATIENT
Start: 2021-12-29 | End: 2022-03-02

## 2021-12-31 ENCOUNTER — OFFICE VISIT (OUTPATIENT)
Dept: CARDIOLOGY | Facility: CLINIC | Age: 41
End: 2021-12-31
Payer: COMMERCIAL

## 2021-12-31 ENCOUNTER — TELEPHONE (OUTPATIENT)
Dept: SURGERY | Facility: CLINIC | Age: 41
End: 2021-12-31

## 2021-12-31 VITALS
SYSTOLIC BLOOD PRESSURE: 140 MMHG | BODY MASS INDEX: 42.12 KG/M2 | HEART RATE: 72 BPM | WEIGHT: 302 LBS | RESPIRATION RATE: 16 BRPM | DIASTOLIC BLOOD PRESSURE: 78 MMHG

## 2021-12-31 DIAGNOSIS — I42.9 CARDIOMYOPATHY, UNSPECIFIED TYPE (H): ICD-10-CM

## 2021-12-31 DIAGNOSIS — I10 BENIGN ESSENTIAL HYPERTENSION: ICD-10-CM

## 2021-12-31 DIAGNOSIS — Z86.711 HISTORY OF PULMONARY EMBOLISM: ICD-10-CM

## 2021-12-31 DIAGNOSIS — I50.22 CHRONIC SYSTOLIC CONGESTIVE HEART FAILURE (H): Primary | ICD-10-CM

## 2021-12-31 PROCEDURE — 99205 OFFICE O/P NEW HI 60 MIN: CPT | Performed by: INTERNAL MEDICINE

## 2021-12-31 RX ORDER — CARVEDILOL 12.5 MG/1
12.5 TABLET ORAL 2 TIMES DAILY WITH MEALS
Qty: 60 TABLET | Refills: 11 | Status: SHIPPED | OUTPATIENT
Start: 2021-12-31 | End: 2023-02-28

## 2021-12-31 RX ORDER — FUROSEMIDE 40 MG
TABLET ORAL
Qty: 30 TABLET | Refills: 4 | Status: SHIPPED | OUTPATIENT
Start: 2021-12-31 | End: 2023-04-07

## 2021-12-31 NOTE — PATIENT INSTRUCTIONS
Romeo Chong,    It is my pleasure to see you today at the [facility].    My recommendations for this visit are:    1.  Continue current medications, increase carvedilol from 3.125 mg bid to 12.5 mg bid  2.  Start Lasix 40 mg as needed for shortness of breath  3.  Stress cardiac MRI for further evaluation  4.  Will see you again in 2 months    Yadiel Cantrell MD, PhD

## 2021-12-31 NOTE — PROGRESS NOTES
Click to link to [facility] HEART CARE NOTE    Thank you, Dr. Cazares, for asking me to see Romeo Chong in consultation at [facility] to evaluate shortness of breath.      Assessment/Plan:   1.  Cardiomyopathy, LVEF of 40%, chronic systolic congestive heart failure: The patient's left ventricle was moderately dilated, moderately reduced left ventricular systolic function 40% from previous study LVEF of 60%.  This could be caused by severe sepsis.  However, the patient has a several risk of factors including morbid obesity, hypertension, hyperlipidemia, family history of CAD.  His chest CTA was also reported coronary atherosclerosis.  We discussed further evaluation because of unclear etiology of her cardiomyopathy, multiple risk of factors and worsening shortness of breath.  After discussion, stress cardiac MRI is requested for further evaluation including myocardial ischemia, heart function and structure and viability.  Continue losartan 50 mg daily, increase carvedilol from 3.125 mg twice a day to 12.5 mg twice a day for better control his blood pressure and also CHF.  Lasix 40 mg as needed for shortness of breath is prescribed.  Follow-up with lymphedema clinic to manage right leg lymphedema.    2.  Primary hypertension: His blood pressure is mildly high.  Continue losartan 50 mg daily, increase carvedilol as mentioned above.  Continue to monitor his blood pressure, the target is less than 130/80 mmHg.    3.  History of pulmonary embolism: He is on anticoagulation Eliquis 5 mg twice a day.    4.  Morbid obesity: Lifestyle modification.    His medical records, laboratory tests from [facility] are reviewed.    Thank you for the opportunity to be involved in the care of Romeo Chong. If you have any questions, please feel free to contact me.  I will see the patient again in 6 months and as needed    Much or all of the text in this note was  generated through the use of Dragon Dictate voice-to-text software. Errors in spelling or words which seem out of context are unintentional.   Sound alike errors, in particular, may have escaped editing.       History of Present Illness:   It is my pleasure to see Romeo Chong at the [facility] for evaluation of shortness of breath. Romeo Chong is a 41 year old male with a medical history of right leg lymphedema with multiple cellulitis, benign essential hypertension, morbid obesity, gastric or reflux disease, pulmonary embolism, history of asthma, history of Hodgkin's lymphoma.    The patient was admitted to a hospital in [place] in the end of October 2021 due to severe sepsis.  He was found to have acute systolic congestive heart failure.  His left ventricle was moderately dilated with moderately reduced left ventricular systolic function to 40%.  He had an echocardiogram in August 2021 which was normal.  He had no chest pain.  He complains of worsening dyspnea on exertion over last 2 weeks.  He gained about 20 pounds over last several weeks.  He feels fatigued easily.  He did not pay attention to his diet.  His blood pressure is mildly high.    Past Medical History:     Patient Active Problem List   Diagnosis     Asthma     Edema     Fibrous dysplasia of bone     Essential hypertension, benign     Hypertriglyceridemia     Lymphedema     Obesity, unspecified     Stress hyperglycemia     Vitamin D deficiency     Allergic rhinitis due to animals     Nonintractable headache, unspecified chronicity pattern, unspecified headache type     Chronic pain syndrome     Gastric ulcer, unspecified chronicity, unspecified whether gastric ulcer hemorrhage or perforation present     Chronic low back pain, unspecified back pain laterality, unspecified whether sciatica present     H/O Hodgkin's lymphoma     Obesity (BMI 35.0-39.9) with comorbidity (H)     Calcaneal spur, left      Tachycardia     Cellulitis of right lower extremity     Elevated lactic acid level     Bacterial sepsis (H)     Gram-positive bacteremia     HTN (hypertension)     Osteomyelitis, unspecified site, unspecified type (H)       Past Surgical History:     Past Surgical History:   Procedure Laterality Date     FRACTURE SURGERY       HC REMOVAL HEEL SPUR, CALCANEUS Left 6/25/2021    Procedure: EXCISION, BONE SPUR, FOOT, WITH PLANTAR FASCIA RELEASE;  Surgeon: Stephon Singleton DPM;  Location: [facility];  Service: Podiatry     TONSILLECTOMY, ADENOIDECTOMY ADULT, COMBINED         Family History:     Family History   Problem Relation Age of Onset     Cirrhosis Mother      Hypertension Mother      Diabetes Type 2  Father      Kidney failure Father      Cerebrovascular Disease Father      Hypertension Father      Cerebrovascular Disease Sister      Hypertension Paternal Grandmother      Hypertension Paternal Grandfather        Social History:    reports that he quit smoking about 12 years ago. He started smoking about 22 years ago. He smoked 1.00 pack per day. He has never used smokeless tobacco. He reports previous alcohol use. He reports that he does not use drugs.    Review of Systems:   12 systems are reviewed negative except for in HPI.    Meds:     Current Outpatient Medications:      apixaban ANTICOAGULANT (ELIQUIS) 5 MG tablet, Take 1 tablet (5 mg) by mouth 2 times daily, Disp: 60 tablet, Rfl: 5     aspirin-acetaminophen-caffeine (EXCEDRIN MIGRAINE) 250-250-65 mg per tablet, [ASPIRIN-ACETAMINOPHEN-CAFFEINE (EXCEDRIN MIGRAINE) 250-250-65 MG PER TABLET] Take 1 tablet by mouth every 6 (six) hours as needed for pain., Disp: , Rfl: 0     carvedilol (COREG) 12.5 MG tablet, Take 1 tablet (12.5 mg) by mouth 2 times daily (with meals), Disp: 60 tablet, Rfl: 11     famotidine (PEPCID) 40 MG tablet, Take 1 tablet (40 mg) by mouth every evening, Disp: 180 tablet, Rfl: 0     furosemide (LASIX) 40 MG tablet, Take Lasix 40  mg as needed for shortness of breath, Disp: 30 tablet, Rfl: 4     HYDROcodone-acetaminophen (NORCO)  MG per tablet, Take 1-2 tablets by mouth 3 times daily as needed for pain Use dates: 12/23/21-1/20/22, Disp: 180 tablet, Rfl: 0     losartan (COZAAR) 50 MG tablet, Take 1 tablet (50 mg) by mouth daily, Disp: 90 tablet, Rfl: 0     potassium chloride ER (K-TAB/KLOR-CON) 10 MEQ CR tablet, TAKE 2 TABLETS BY MOUTH EVERY MORNING AND 1 EVERY EVENING, Disp: 270 tablet, Rfl: 2     Allergies:   Vancomycin, Peanut oil, Tree nuts [nuts], Erythromycin, Latex, Other environmental allergy, Pollen extracts [pollen extract], Zosyn [piperacillin-tazobactam in d5w], and Oxycodone    Objective:      Physical Exam  137 kg (302 lb)     Body mass index is 42.12 kg/m .  BP (!) 140/78 (BP Location: Left arm, Patient Position: Sitting, Cuff Size: Adult Large)   Pulse 72   Resp 16   Wt 137 kg (302 lb)   BMI 42.12 kg/m      General Appearance:   Awake, Alert, No acute distress.   HEENT:  Pupil equal, reactive to light. No scleral icterus; the mucous membranes were moist. No oral ulcers or thrush.    Neck: No cervical bruits. No JVD. No thyromegaly. No lymph node enlargement or tenderness.   Chest: The spine was straight. The chest was symmetric.   Lungs:   Respirations unlabored. Lungs are clear to auscultation. No crackles. No wheezing.   Cardiovascular:   RRR, normal first and second heart sounds with no murmurs. No rubs or gallops.    Abdomen:  Obese. Soft. No tenderness. Non-distended. Bowels sounds are present   Extremities: Right leg lymphoedema. No edema in left leg.   Skin: No rashes or ulcers. Warm, Dry.   Musculoskeletal: No tenderness. No deformity.   Neurologic: Mood and affect are appropriate. No focal deficits.         EKG:  Personally reivewed  Sinus tachycardia   Right bundle branch block   Abnormal ECG   When compared with ECG of 18-AUG-2021 04:52,   No significant change was found      Cardiac Imaging Studies  ECHO  on 10-:  Left ventricle is moderately dilated, moderate global hypokinesis, LVEF of 40%, normal right ventricle size and function, no obvious valvular disease.    ECHO on 8-:  1. Left ventricular size, wall thickness, and systolic function are normal.  The estimated left ventricular ejection fraction is 55%.  2. Right ventricular size and systolic function are normal.  3. No hemodynamically significant valvular abnormalities.  4. No prior study available for comparison.    Lab Review   Lab Results   Component Value Date     11/17/2021    CO2 20 11/17/2021    BUN 8 11/17/2021     Lab Results   Component Value Date    WBC 3.7 11/17/2021    HGB 13.1 11/17/2021    HCT 41.0 11/17/2021    MCV 82 11/17/2021     11/17/2021

## 2021-12-31 NOTE — LETTER
12/31/2021    Chely Frye MD  [address]  [place] [address]    RE: Romeo UMESH Chong     Dear Colleague,     I had the pleasure of seeing Romeo Chong in the [facility].      Click to link to [facility] HEART CARE NOTE    Thank you, Dr. Cazares, for asking me to see Romeo UMESH Chong in consultation at [facility] to evaluate shortness of breath.      Assessment/Plan:   1.  Cardiomyopathy, LVEF of 40%, chronic systolic congestive heart failure: The patient's left ventricle was moderately dilated, moderately reduced left ventricular systolic function 40% from previous study LVEF of 60%.  This could be caused by severe sepsis.  However, the patient has a several risk of factors including morbid obesity, hypertension, hyperlipidemia, family history of CAD.  His chest CTA was also reported coronary atherosclerosis.  We discussed further evaluation because of unclear etiology of her cardiomyopathy, multiple risk of factors and worsening shortness of breath.  After discussion, stress cardiac MRI is requested for further evaluation including myocardial ischemia, heart function and structure and viability.  Continue losartan 50 mg daily, increase carvedilol from 3.125 mg twice a day to 12.5 mg twice a day for better control his blood pressure and also CHF.  Lasix 40 mg as needed for shortness of breath is prescribed.  Follow-up with lymphedema clinic to manage right leg lymphedema.    2.  Primary hypertension: His blood pressure is mildly high.  Continue losartan 50 mg daily, increase carvedilol as mentioned above.  Continue to monitor his blood pressure, the target is less than 130/80 mmHg.    3.  History of pulmonary embolism: He is on anticoagulation Eliquis 5 mg twice a day.    4.  Morbid obesity: Lifestyle modification.    His medical records, laboratory tests from [facility] are reviewed.    Thank you for the  opportunity to be involved in the care of Romeo Chong. If you have any questions, please feel free to contact me.  I will see the patient again in 6 months and as needed    Much or all of the text in this note was generated through the use of Dragon Dictate voice-to-text software. Errors in spelling or words which seem out of context are unintentional.   Sound alike errors, in particular, may have escaped editing.       History of Present Illness:   It is my pleasure to see Romeo Chong at the [facility] clinic for evaluation of shortness of breath. Romeo Chong is a 41 year old male with a medical history of right leg lymphedema with multiple cellulitis, benign essential hypertension, morbid obesity, gastric or reflux disease, pulmonary embolism, history of asthma, history of Hodgkin's lymphoma.    The patient was admitted to a hospital in [place] in the end of October 2021 due to severe sepsis.  He was found to have acute systolic congestive heart failure.  His left ventricle was moderately dilated with moderately reduced left ventricular systolic function to 40%.  He had an echocardiogram in August 2021 which was normal.  He had no chest pain.  He complains of worsening dyspnea on exertion over last 2 weeks.  He gained about 20 pounds over last several weeks.  He feels fatigued easily.  He did not pay attention to his diet.  His blood pressure is mildly high.    Past Medical History:     Patient Active Problem List   Diagnosis     Asthma     Edema     Fibrous dysplasia of bone     Essential hypertension, benign     Hypertriglyceridemia     Lymphedema     Obesity, unspecified     Stress hyperglycemia     Vitamin D deficiency     Allergic rhinitis due to animals     Nonintractable headache, unspecified chronicity pattern, unspecified headache type     Chronic pain syndrome     Gastric ulcer, unspecified chronicity, unspecified whether gastric ulcer hemorrhage or perforation  present     Chronic low back pain, unspecified back pain laterality, unspecified whether sciatica present     H/O Hodgkin's lymphoma     Obesity (BMI 35.0-39.9) with comorbidity (H)     Calcaneal spur, left     Tachycardia     Cellulitis of right lower extremity     Elevated lactic acid level     Bacterial sepsis (H)     Gram-positive bacteremia     HTN (hypertension)     Osteomyelitis, unspecified site, unspecified type (H)     Past Surgical History:     Past Surgical History:   Procedure Laterality Date     FRACTURE SURGERY       HC REMOVAL HEEL SPUR, CALCANEUS Left 6/25/2021    Procedure: EXCISION, BONE SPUR, FOOT, WITH PLANTAR FASCIA RELEASE;  Surgeon: Stephon Singleton DPM;  Location: [facility];  Service: Podiatry     TONSILLECTOMY, ADENOIDECTOMY ADULT, COMBINED       Family History:     Family History   Problem Relation Age of Onset     Cirrhosis Mother      Hypertension Mother      Diabetes Type 2  Father      Kidney failure Father      Cerebrovascular Disease Father      Hypertension Father      Cerebrovascular Disease Sister      Hypertension Paternal Grandmother      Hypertension Paternal Grandfather        Social History:    reports that he quit smoking about 12 years ago. He started smoking about 22 years ago. He smoked 1.00 pack per day. He has never used smokeless tobacco. He reports previous alcohol use. He reports that he does not use drugs.    Review of Systems:   12 systems are reviewed negative except for in HPI.    Meds:     Current Outpatient Medications:      apixaban ANTICOAGULANT (ELIQUIS) 5 MG tablet, Take 1 tablet (5 mg) by mouth 2 times daily, Disp: 60 tablet, Rfl: 5     aspirin-acetaminophen-caffeine (EXCEDRIN MIGRAINE) 250-250-65 mg per tablet, [ASPIRIN-ACETAMINOPHEN-CAFFEINE (EXCEDRIN MIGRAINE) 250-250-65 MG PER TABLET] Take 1 tablet by mouth every 6 (six) hours as needed for pain., Disp: , Rfl: 0     carvedilol (COREG) 12.5 MG tablet, Take 1 tablet (12.5 mg) by mouth 2 times  daily (with meals), Disp: 60 tablet, Rfl: 11     famotidine (PEPCID) 40 MG tablet, Take 1 tablet (40 mg) by mouth every evening, Disp: 180 tablet, Rfl: 0     furosemide (LASIX) 40 MG tablet, Take Lasix 40 mg as needed for shortness of breath, Disp: 30 tablet, Rfl: 4     HYDROcodone-acetaminophen (NORCO)  MG per tablet, Take 1-2 tablets by mouth 3 times daily as needed for pain Use dates: 12/23/21-1/20/22, Disp: 180 tablet, Rfl: 0     losartan (COZAAR) 50 MG tablet, Take 1 tablet (50 mg) by mouth daily, Disp: 90 tablet, Rfl: 0     potassium chloride ER (K-TAB/KLOR-CON) 10 MEQ CR tablet, TAKE 2 TABLETS BY MOUTH EVERY MORNING AND 1 EVERY EVENING, Disp: 270 tablet, Rfl: 2     Allergies:   Vancomycin, Peanut oil, Tree nuts [nuts], Erythromycin, Latex, Other environmental allergy, Pollen extracts [pollen extract], Zosyn [piperacillin-tazobactam in d5w], and Oxycodone    Objective:      Physical Exam  137 kg (302 lb)     Body mass index is 42.12 kg/m .  BP (!) 140/78 (BP Location: Left arm, Patient Position: Sitting, Cuff Size: Adult Large)   Pulse 72   Resp 16   Wt 137 kg (302 lb)   BMI 42.12 kg/m      General Appearance:   Awake, Alert, No acute distress.   HEENT:  Pupil equal, reactive to light. No scleral icterus; the mucous membranes were moist. No oral ulcers or thrush.    Neck: No cervical bruits. No JVD. No thyromegaly. No lymph node enlargement or tenderness.   Chest: The spine was straight. The chest was symmetric.   Lungs:   Respirations unlabored. Lungs are clear to auscultation. No crackles. No wheezing.   Cardiovascular:   RRR, normal first and second heart sounds with no murmurs. No rubs or gallops.    Abdomen:  Obese. Soft. No tenderness. Non-distended. Bowels sounds are present   Extremities: Right leg lymphoedema. No edema in left leg.   Skin: No rashes or ulcers. Warm, Dry.   Musculoskeletal: No tenderness. No deformity.   Neurologic: Mood and affect are appropriate. No focal deficits.        EKG:  Personally reivewed  Sinus tachycardia   Right bundle branch block   Abnormal ECG   When compared with ECG of 18-AUG-2021 04:52,   No significant change was found      Cardiac Imaging Studies  ECHO on 10-:  Left ventricle is moderately dilated, moderate global hypokinesis, LVEF of 40%, normal right ventricle size and function, no obvious valvular disease.    ECHO on 8-:  1. Left ventricular size, wall thickness, and systolic function are normal.  The estimated left ventricular ejection fraction is 55%.  2. Right ventricular size and systolic function are normal.  3. No hemodynamically significant valvular abnormalities.  4. No prior study available for comparison.    Lab Review   Lab Results   Component Value Date     11/17/2021    CO2 20 11/17/2021    BUN 8 11/17/2021     Lab Results   Component Value Date    WBC 3.7 11/17/2021    HGB 13.1 11/17/2021    HCT 41.0 11/17/2021    MCV 82 11/17/2021     11/17/2021         Thank you for allowing me to participate in the care of your patient.    Sincerely,     Yadiel Cantrell MD   [facility]  cc:   Yu Cazares, DO  [address] [place] [address]

## 2021-12-31 NOTE — TELEPHONE ENCOUNTER
Attempts made to contact patient in regards to video visit that was scheduled at 1 pm today with this writer.  Patient did not link onto video visit nor answer the phone with attempts made, therefore left patient a detailed message including call center contact information to call and reschedule this appointment at his earliest convenience.   
EMS

## 2022-01-03 PROBLEM — R59.1 LYMPHADENOPATHY: Status: ACTIVE | Noted: 2018-01-17

## 2022-01-03 PROBLEM — A40.1 SEVERE SEPSIS WITH ACUTE ORGAN DYSFUNCTION DUE TO GROUP B STREPTOCOCCUS (H): Status: ACTIVE | Noted: 2021-10-18

## 2022-01-03 PROBLEM — J98.59 MEDIASTINAL MASS: Status: ACTIVE | Noted: 2021-10-18

## 2022-01-03 PROBLEM — K21.9 GASTROESOPHAGEAL REFLUX DISEASE WITHOUT ESOPHAGITIS: Status: ACTIVE | Noted: 2021-10-18

## 2022-01-03 PROBLEM — R65.20 SEVERE SEPSIS WITH ACUTE ORGAN DYSFUNCTION DUE TO GROUP B STREPTOCOCCUS (H): Status: ACTIVE | Noted: 2021-10-18

## 2022-01-11 DIAGNOSIS — G89.4 CHRONIC PAIN SYNDROME: ICD-10-CM

## 2022-01-11 RX ORDER — HYDROCODONE BITARTRATE AND ACETAMINOPHEN 10; 325 MG/1; MG/1
1-2 TABLET ORAL 3 TIMES DAILY PRN
Qty: 180 TABLET | Refills: 0 | Status: SHIPPED | OUTPATIENT
Start: 2022-01-20 | End: 2022-03-05

## 2022-01-11 NOTE — TELEPHONE ENCOUNTER
2/17/21 RN spoke to patient and informed patient the pain clinic will continue to prescribe until seen by PCP on 2/16/22 then PCP will take over.  Provider (RW) agreed to this plan.     Received call from patient requesting refill(s) of Norco     Last dispensed from pharmacy on 12/23/21     Patient's last office/virtual visit by prescribing provider on 11/16/21 RW  Next office/virtual appointment scheduled for 2/16/22 with PCP     Last urine drug screen date 6/2021  Current opioid agreement on file (completed within the last year) Yes Date of opioid agreement: 6/2021     E-prescribe to [facility] pharmacy  Pending Prescriptions:                       Disp   Refills    HYDROcodone-acetaminophen (NORCO)  *180 ta*0            Sig: Take 1-2 tablets by mouth 3 times daily as needed           for pain Use dates: 1/20/22-2/19/22

## 2022-01-12 VITALS — WEIGHT: 279.7 LBS | BODY MASS INDEX: 39.01 KG/M2

## 2022-01-18 ENCOUNTER — VIRTUAL VISIT (OUTPATIENT)
Dept: SURGERY | Facility: CLINIC | Age: 42
End: 2022-01-18
Payer: COMMERCIAL

## 2022-01-18 VITALS
HEART RATE: 73 BPM | SYSTOLIC BLOOD PRESSURE: 145 MMHG | RESPIRATION RATE: 16 BRPM | OXYGEN SATURATION: 98 % | DIASTOLIC BLOOD PRESSURE: 90 MMHG | TEMPERATURE: 98.4 F

## 2022-01-18 VITALS
SYSTOLIC BLOOD PRESSURE: 140 MMHG | DIASTOLIC BLOOD PRESSURE: 88 MMHG | BODY MASS INDEX: 37.66 KG/M2 | OXYGEN SATURATION: 96 % | HEART RATE: 88 BPM | WEIGHT: 270 LBS | TEMPERATURE: 98.6 F

## 2022-01-18 VITALS
SYSTOLIC BLOOD PRESSURE: 138 MMHG | DIASTOLIC BLOOD PRESSURE: 92 MMHG | WEIGHT: 274 LBS | HEART RATE: 77 BPM | OXYGEN SATURATION: 98 %

## 2022-01-18 VITALS
TEMPERATURE: 98 F | SYSTOLIC BLOOD PRESSURE: 136 MMHG | DIASTOLIC BLOOD PRESSURE: 86 MMHG | RESPIRATION RATE: 18 BRPM | HEART RATE: 72 BPM

## 2022-01-18 VITALS
WEIGHT: 279.6 LBS | RESPIRATION RATE: 16 BRPM | BODY MASS INDEX: 39 KG/M2 | SYSTOLIC BLOOD PRESSURE: 147 MMHG | HEART RATE: 67 BPM | DIASTOLIC BLOOD PRESSURE: 68 MMHG

## 2022-01-18 DIAGNOSIS — Z71.3 NUTRITIONAL COUNSELING: ICD-10-CM

## 2022-01-18 DIAGNOSIS — E66.01 CLASS 3 SEVERE OBESITY DUE TO EXCESS CALORIES WITH SERIOUS COMORBIDITY AND BODY MASS INDEX (BMI) OF 40.0 TO 44.9 IN ADULT (H): ICD-10-CM

## 2022-01-18 DIAGNOSIS — I10 ESSENTIAL HYPERTENSION, BENIGN: Primary | ICD-10-CM

## 2022-01-18 DIAGNOSIS — E66.813 CLASS 3 SEVERE OBESITY DUE TO EXCESS CALORIES WITH SERIOUS COMORBIDITY AND BODY MASS INDEX (BMI) OF 40.0 TO 44.9 IN ADULT (H): ICD-10-CM

## 2022-01-18 DIAGNOSIS — E78.1 HYPERTRIGLYCERIDEMIA: ICD-10-CM

## 2022-01-18 PROCEDURE — 97803 MED NUTRITION INDIV SUBSEQ: CPT | Mod: 95 | Performed by: DIETITIAN, REGISTERED

## 2022-01-18 ASSESSMENT — PATIENT HEALTH QUESTIONNAIRE - PHQ9: SUM OF ALL RESPONSES TO PHQ QUESTIONS 1-9: 3

## 2022-01-18 NOTE — PROGRESS NOTES
Romeo Chong is a 41 year old who is being evaluated via a billable video visit.      How would you like to obtain your AVS? MyChart  If the video visit is dropped, the invitation should be resent by: Text to cell phone: [phone]  Will anyone else be joining your video visit? No      Video Start Time: 11:09 am      Medical  Weight Loss Follow-Up Diet Evaluation  Assessment:  Romeo is presenting today for a follow up weight management nutrition consultation. Pt has had an initial appointment with Dr. Ortiz  Weight loss medication: None .   Pt's weight is 287 lbs   Initial weight: 290 lbs   Weight change: 3 lbs (down from initial)     No flowsheet data found.  BMI: There is no height or weight on file to calculate BMI.  Ideal body weight: 75.3 kg (166 lb 0.1 oz)  Adjusted ideal body weight: 100.2 kg (220 lb 12.9 oz)    Estimated RMR (Valencia-St Jeor equation):   2237 kcals x 1.2 (sedentary) = 2684 kcals (for weight maintenance)     Recommended Protein Intake: 60-80 grams of protein/day  Patient Active Problem List:  Patient Active Problem List   Diagnosis     Asthma     Edema     Fibrous dysplasia of bone     Essential hypertension, benign     Hypertriglyceridemia     Lymphedema     Obesity, unspecified     Stress hyperglycemia     Vitamin D deficiency     Allergic rhinitis due to animals     Nonintractable headache, unspecified chronicity pattern, unspecified headache type     Chronic pain syndrome     Gastric ulcer, unspecified chronicity, unspecified whether gastric ulcer hemorrhage or perforation present     Chronic low back pain, unspecified back pain laterality, unspecified whether sciatica present     H/O Hodgkin's lymphoma     Obesity (BMI 35.0-39.9) with comorbidity (H)     Calcaneal spur, left     Tachycardia     Cellulitis of right lower extremity     Elevated lactic acid level     Bacterial sepsis (H)     Gram-positive bacteremia     HTN (hypertension)     Osteomyelitis, unspecified site,  unspecified type (H)     Gastroesophageal reflux disease without esophagitis     Lymphadenopathy     Mediastinal mass     Severe sepsis with acute organ dysfunction due to group B Streptococcus (H)     Diabetes: No     Progress on goals from last visit: Patient reports that he has been following a bland diet more recently, noting that he currently has a stomach ulcer.  Patient reports that he is also trying to be mindful regarding fat consumption, noting that he is working on reducing fast food consumption as well as eliminating high fat snacks (I.e. chips).      Dietary Recall:  Breakfast: more recently-oatmeal with Lactaid milk  Lunch:cheese or egg sandwich or elo brand for alternatives for meat  Dinner:similar to lunch   Typical snacks: applesauce, chips (working on eliminating), occasional Oreos  Eating out: working on reducing/eliminating frequency to almost nothing  Beverages: Water 3-4 bottles/day (sometimes more)  Exercise: no set regimen-pain issues, lymphedema, some light household chores during the day    Nutrition Diagnosis:    Overweight/Obesity (NC 3.3) related to overeating and poor lifestyle habits as evidenced by patient's subjective statements (excessive energy intake, lack of exercise) and BMI of 40.1 kg/m2.     Intervention:  1. Food and/or nutrient delivery: balanced meals, adequate protein   2. Nutrition education: protein intake, vegetable and fruit consumption  3. Nutrition counseling: praised patient for positive changes made thus far      Monitoring/Evaluation:    Goals:  1. Focus on protein first, aiming for 20-30 grams of protein/meal, 3 meals/day.   2. Work on increased vegetable consumption, aiming for 3 servings/day.   3. Continue to work on reducing high fat/high sugar foods along with eliminating fast food consumption.     Patient to follow up in 1 month(s) with bariatrician and 2 month(s) with RD      Video-Visit Details    Type of service:  Video Visit    Video End  Time:11:28 AM    Originating Location (pt. Location): Home    Distant Location (provider location):  [facility]     Platform used for Video Visit: Nancy Pacheco RD

## 2022-01-18 NOTE — LETTER
1/18/2022         RE: Romeo Chong  [address]  [address]  [place] [address]        Dear Colleague,    Thank you for referring your patient, Romeo Chong, to the [facility] CARE [place]. Please see a copy of my visit note below.    Romeo Chong is a 41 year old who is being evaluated via a billable video visit.      How would you like to obtain your AVS? MyChart  If the video visit is dropped, the invitation should be resent by: Text to cell phone: [phone]  Will anyone else be joining your video visit? No      Video Start Time: 11:09 am      Medical  Weight Loss Follow-Up Diet Evaluation  Assessment:  Romeo is presenting today for a follow up weight management nutrition consultation. Pt has had an initial appointment with Dr. Ortiz  Weight loss medication: None .   Pt's weight is 287 lbs   Initial weight: 290 lbs   Weight change: 3 lbs (down from initial)     No flowsheet data found.  BMI: There is no height or weight on file to calculate BMI.  Ideal body weight: 75.3 kg (166 lb 0.1 oz)  Adjusted ideal body weight: 100.2 kg (220 lb 12.9 oz)    Estimated RMR (Worth-St Jeor equation):   2237 kcals x 1.2 (sedentary) = 2684 kcals (for weight maintenance)     Recommended Protein Intake: 60-80 grams of protein/day  Patient Active Problem List:  Patient Active Problem List   Diagnosis     Asthma     Edema     Fibrous dysplasia of bone     Essential hypertension, benign     Hypertriglyceridemia     Lymphedema     Obesity, unspecified     Stress hyperglycemia     Vitamin D deficiency     Allergic rhinitis due to animals     Nonintractable headache, unspecified chronicity pattern, unspecified headache type     Chronic pain syndrome     Gastric ulcer, unspecified chronicity, unspecified whether gastric ulcer hemorrhage or perforation present     Chronic low back pain, unspecified back pain laterality, unspecified whether sciatica present     H/O Hodgkin's  lymphoma     Obesity (BMI 35.0-39.9) with comorbidity (H)     Calcaneal spur, left     Tachycardia     Cellulitis of right lower extremity     Elevated lactic acid level     Bacterial sepsis (H)     Gram-positive bacteremia     HTN (hypertension)     Osteomyelitis, unspecified site, unspecified type (H)     Gastroesophageal reflux disease without esophagitis     Lymphadenopathy     Mediastinal mass     Severe sepsis with acute organ dysfunction due to group B Streptococcus (H)     Diabetes: No     Progress on goals from last visit: Patient reports that he has been following a bland diet more recently, noting that he currently has a stomach ulcer.  Patient reports that he is also trying to be mindful regarding fat consumption, noting that he is working on reducing fast food consumption as well as eliminating high fat snacks (I.e. chips).      Dietary Recall:  Breakfast: more recently-oatmeal with Lactaid milk  Lunch:cheese or egg sandwich or elo brand for alternatives for meat  Dinner:similar to lunch   Typical snacks: applesauce, chips (working on eliminating), occasional Oreos  Eating out: working on reducing/eliminating frequency to almost nothing  Beverages: Water 3-4 bottles/day (sometimes more)  Exercise: no set regimen-pain issues, lymphedema, some light household chores during the day    Nutrition Diagnosis:    Overweight/Obesity (NC 3.3) related to overeating and poor lifestyle habits as evidenced by patient's subjective statements (excessive energy intake, lack of exercise) and BMI of 40.1 kg/m2.     Intervention:  1. Food and/or nutrient delivery: balanced meals, adequate protein   2. Nutrition education: protein intake, vegetable and fruit consumption  3. Nutrition counseling: praised patient for positive changes made thus far      Monitoring/Evaluation:    Goals:  1. Focus on protein first, aiming for 20-30 grams of protein/meal, 3 meals/day.   2. Work on increased vegetable consumption, aiming  for 3 servings/day.   3. Continue to work on reducing high fat/high sugar foods along with eliminating fast food consumption.     Patient to follow up in 1 month(s) with bariatrician and 2 month(s) with RD      Video-Visit Details    Type of service:  Video Visit    Video End Time:11:28 AM    Originating Location (pt. Location): Home    Distant Location (provider location):  [facility] SURG[facility] BARIATR[facility]     Platform used for Video Visit: Nancy Pacheco RD        Again, thank you for allowing me to participate in the care of your patient.        Sincerely,        Rhina Pacheco RD

## 2022-01-20 ENCOUNTER — VIRTUAL VISIT (OUTPATIENT)
Dept: ONCOLOGY | Facility: CLINIC | Age: 42
End: 2022-01-20
Attending: STUDENT IN AN ORGANIZED HEALTH CARE EDUCATION/TRAINING PROGRAM
Payer: COMMERCIAL

## 2022-01-20 DIAGNOSIS — R59.1 LYMPHADENOPATHY: Primary | ICD-10-CM

## 2022-01-20 PROCEDURE — G0463 HOSPITAL OUTPT CLINIC VISIT: HCPCS | Mod: PN,RTG | Performed by: STUDENT IN AN ORGANIZED HEALTH CARE EDUCATION/TRAINING PROGRAM

## 2022-01-20 PROCEDURE — 99215 OFFICE O/P EST HI 40 MIN: CPT | Mod: 95 | Performed by: STUDENT IN AN ORGANIZED HEALTH CARE EDUCATION/TRAINING PROGRAM
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
## 2022-01-21 ENCOUNTER — TELEPHONE (OUTPATIENT)
Dept: PALLIATIVE MEDICINE | Facility: OTHER | Age: 42
End: 2022-01-21
Payer: COMMERCIAL

## 2022-01-21 NOTE — TELEPHONE ENCOUNTER
Patient calls, request to process a PA for hydrocodone 10/325 mg  HYDROcodone-acetaminophen (NORCO)  MG per tablet 180 tablet 0 1/20/2022  No   Sig - Route: Take 1-2 tablets by mouth 3 times daily as needed for pain Use dates: 1/20/22-2/19/22 - Oral   Sent to pharmacy as: HYDROcodone-Acetaminophen  MG Oral Tablet (NORCO)   Class: E-Prescribe   Earliest Fill Date: 1/20/2022   Order: [id number]   E-Prescribing Status: Receipt confirmed by pharmacy (1/11/2022 11:34 AM CST)

## 2022-01-24 NOTE — TELEPHONE ENCOUNTER
Pt calling to check on the status of PA. Stated he is having a lot of pain and would like to avoid going to the ER by getting his medication. Requesting call back.

## 2022-01-26 NOTE — TELEPHONE ENCOUNTER
PA Initiation    Medication: HYDROCODONE-APAP  MG  Insurance Company:    Pharmacy Filling the Rx: [id number] [place], [place] - [address] AT [address] [address]  Filling Pharmacy Phone: [phone]  Filling Pharmacy Fax:    Start Date: 1/26/2022

## 2022-01-28 NOTE — TELEPHONE ENCOUNTER
PA FOLLOW UP. CALLED 01/28/2022 TO [facility] SPOKE TO GIULIA MOODY STATED PA IN PROCESS UNDER CLINICAL REVIEW STATUS.  PA DEPT WILL FOLLOW UP AGAIN IN 1-2 BUSINESS DAYS.

## 2022-02-01 NOTE — TELEPHONE ENCOUNTER
Prior Authorization Approval    Authorization Effective Date: 12/27/2021  Authorization Expiration Date: 2/1/2023  Medication: HYDROCODONE-APAP  MG  Approved Dose/Quantity: 180  Reference #:     Insurance Company:    Expected CoPay:       CoPay Card Available:      Foundation Assistance Needed:    Which Pharmacy is filling the prescription (Not needed for infusion/clinic administered): [id number] [place] [address] [facility] [address]  Pharmacy Notified: Yes THEY RECEIVED PAID CLAIM. ZERO COPAY.  Patient Notified: Yes CALLED AND SPOKE TO CABRERA. HE WILL ARRANGE  WITH PHARMACY      CALLED [facility] TO FOLLOW UP ON PA. AGENT STATED HAS BEEN APPROVED TODAY 02/01/2022. CASE#[id number] COVERAGE DATES 12/27/21 TO 02/01/02023

## 2022-02-07 ENCOUNTER — VIRTUAL VISIT (OUTPATIENT)
Dept: SURGERY | Facility: CLINIC | Age: 42
End: 2022-02-07
Payer: COMMERCIAL

## 2022-02-07 ENCOUNTER — PATIENT OUTREACH (OUTPATIENT)
Dept: CARE COORDINATION | Facility: CLINIC | Age: 42
End: 2022-02-07

## 2022-02-07 VITALS — HEIGHT: 71 IN | BODY MASS INDEX: 38.64 KG/M2 | WEIGHT: 276 LBS

## 2022-02-07 DIAGNOSIS — Z86.69 HISTORY OF MIGRAINE HEADACHES: ICD-10-CM

## 2022-02-07 DIAGNOSIS — I10 ESSENTIAL HYPERTENSION, BENIGN: ICD-10-CM

## 2022-02-07 DIAGNOSIS — E66.01 MORBID OBESITY (H): Primary | ICD-10-CM

## 2022-02-07 PROCEDURE — 99214 OFFICE O/P EST MOD 30 MIN: CPT | Mod: 95 | Performed by: FAMILY MEDICINE

## 2022-02-07 RX ORDER — METFORMIN HCL 500 MG
TABLET, EXTENDED RELEASE 24 HR ORAL
Qty: 90 TABLET | Refills: 1 | Status: SHIPPED | OUTPATIENT
Start: 2022-02-07 | End: 2022-05-23

## 2022-02-07 ASSESSMENT — MIFFLIN-ST. JEOR: SCORE: 2184.06

## 2022-02-07 NOTE — PROGRESS NOTES
Oncology Distress Screening Follow-up  Clinical Social Work  [facility]    Identified Concern and Score From Distress Screenin. How concerned are you about knowing what resources are available to help you?  10 Abnormal          9. If you want to be contacted by one of our professionals, I can send a message to them right now.  Oncology Social Worker                 Date of Distress Screenin22      Data: Romeo Chong is a 40 year old male with PMH significant for fibrous dysplasia c/b chronic lymphedema, gastric ulcers, HLD, HTN, recent admission for bacterial sepsis and a h/o lymphadenopathy concerning for lymphoma presents follow up.At time of last visit, Patient scored positive on distress screen.  called Patient today with intention of introducing them to psychosocial services and support, and following up on elevated distress.        Intervention/Education Provided: Phone call to patient about distress screening. No answer - message left. Provided contact information in order to reach writer.       Follow-up Required: Will remain available for support and await patient's return call.        Ashley YUNG, TONY  - Oncology  Phone : [phone]  Pager: [phone]

## 2022-02-07 NOTE — PATIENT INSTRUCTIONS

## 2022-02-07 NOTE — PROGRESS NOTES
Romeo Chong is 41 year old  male who presents for a billable video visit today.    How would you like to obtain your AVS? MyChart  If dropped from the video visit, the video invitation should be resent by: Text to cell phone: [phone]  Will anyone else be joining your video visit? No      Video Start Time: 10:41 AM    Are there any specific questions or needs that you would like addressed at your visit today? No        Video-Visit Details    Type of service:  Video Visit    Video End Time (time video stopped): 11:03 AM  Originating Location (pt. Location): Home    Distant Location (provider location):  [facility] [place]     Platform used for Video Visit: Nancy

## 2022-02-07 NOTE — PROGRESS NOTES
Bariatric Care Clinic Non Surgical Follow up Visit   Date of visit: 2/6/2022  Physician: ERICK Ortiz MD, MD  Primary Care is Chely Frye.  Romeo Chong   41 year old  male     Initial Weight: 290#  Initial BMI: 40.45  Today's Weight:   Wt Readings from Last 1 Encounters:   02/07/22 125.2 kg (276 lb)     Body mass index is 38.49 kg/m .           Assessment and Plan   Assessment: Romeo is a 41 year old year old male who presents for medical weight management.      Plan:    1. Morbid obesity (H)  Patient was congratulated on his success thus far. Healthy habits to assist with further weight loss were discussed. We discussed medication that may assist with weight loss. Metformin was prescribed. Risks/ benefits and possible side effects were discussed and questions were answered. Written information was given. He may also be a candidate for topamax.      2. History of migraine headaches  This may improve with healthy habits and weight loss.    3. Essential hypertension, benign  This may improve with healthy habits and weight loss.    Follow up next available with our dietician and in 3 months with myself.           INTERIM HISTORY  Patient initially thought he wanted bariatric surgery but do to significant uncontrolled medical issues he is working with us on nonsurgical weight loss. He recently got over Covid. He didn't have much of an appetite. He has cut back on sugared beverages. He has cut back on cheese.    DIETARY HISTORY  Meals Per Day: 3  Eating Protein First?: working on it  Food Diary: B:oatmeal, sometimes with fruit, or sometimes eggs L:rice and vegetables sometimes with beans and sometimes shrimp D:fish or shrimp, sometimes rice, often vegetables  Snacks Per Day: occasional  Typical Snack: whole grain crackers, [employer], sometimes fried  Fluid Intake: 3-4 bottles per day  Portion Control: working on it  Calorie Containing Beverages: none  Typical Protein Food Choices: fish,  chicken  Choosing Whole Grains: sometimes  Meals at Restaurant per week:0-1    Positive Changes Since Last Visit: cut back on sugared beverages and fast food  Struggling With: exercise    Knowledgeable in Reading Food Labels: yes  Getting Adequate Protein: working on it  Sleeping 7-8 hours/day no  Stress management watching TV, reaches out to friends    PHYSICAL ACTIVITY PATTERNS:  Unable, ADLs only    REVIEW OF SYSTEMS  GENERAL/CONSTITUTIONAL:  Fatigue: yes  HEENT:  Vision changes, glaucoma: not discussed  PSYCHIATRIC:  Moods: Grieving the death of his daughter  MUSCULOSKELETAL/RHEUMATOLOGIC  Arthralgias: yes  Myalgias: yes  ENDOCRINE:  Monitoring Blood Sugars: na  Sugars Well Controlled: na  No personal or family history of medullary thyroid cancer not discussed  :  Birth control: na       Patient Profile   Social History     Social History Narrative    , 3 children, 1  recently. Non smoker. Socially drinks alcohol. Not working.         Past Medical History   Past Medical History:   Diagnosis Date     Bacterial sepsis (H) 2021     Cancer (H)      Chronic pain syndrome 2021     Essential hypertension, benign 2010     Fibrous dysplasia of bone 2009    Formatting of this note might be different from the original. Discovered in right tibia and femur at 13 years old.  He had surgery when  he was 14 years old.   Last Assessment & Plan:  Formatting of this note might be different from the original. Diagnosed at 13, 14 surgeries since that time   Formatting of this note might be different from the original. Formatting of this note might be different      Gastric ulcer, unspecified chronicity, unspecified whether gastric ulcer hemorrhage or perforation present 2021     H/O Hodgkin's lymphoma 2021     Hyperlipidemia      Hypertension      Hypertriglyceridemia 2011     Stress hyperglycemia 2009     Patient Active Problem List   Diagnosis     Asthma     Edema     Fibrous  "dysplasia of bone     Essential hypertension, benign     Hypertriglyceridemia     Lymphedema     Obesity, unspecified     Stress hyperglycemia     Vitamin D deficiency     Allergic rhinitis due to animals     Nonintractable headache, unspecified chronicity pattern, unspecified headache type     Chronic pain syndrome     Gastric ulcer, unspecified chronicity, unspecified whether gastric ulcer hemorrhage or perforation present     Chronic low back pain, unspecified back pain laterality, unspecified whether sciatica present     H/O Hodgkin's lymphoma     Obesity (BMI 35.0-39.9) with comorbidity (H)     Calcaneal spur, left     Tachycardia     Cellulitis of right lower extremity     Elevated lactic acid level     Bacterial sepsis (H)     Gram-positive bacteremia     HTN (hypertension)     Osteomyelitis, unspecified site, unspecified type (H)     Gastroesophageal reflux disease without esophagitis     Lymphadenopathy     Mediastinal mass     Severe sepsis with acute organ dysfunction due to group B Streptococcus (H)       Past Surgical History  He has a past surgical history that includes fracture surgery; REMOVAL OF HEEL SPUR (Left, 6/25/2021); and Tonsillectomy, adenoidectomy adult, combined.     Examination   Ht 1.803 m (5' 11\")   Wt 125.2 kg (276 lb)   BMI 38.49 kg/m    GENERAL: Healthy, alert and no distress  EYES: Eyes grossly normal to inspection.  No discharge or erythema, or obvious scleral/conjunctival abnormalities.  RESP: No audible wheeze, cough, or visible cyanosis.  No visible retractions or increased work of breathing.    SKIN: Visible skin clear. No significant rash, abnormal pigmentation or lesions.  NEURO: Cranial nerves grossly intact.  Mentation and speech appropriate for age.  PSYCH: Mentation appears normal, affect normal/bright, judgement and insight intact, normal speech and appearance well-groomed.       Counseling:   We reviewed the important post op bariatric recommendations:  -eating 3 " meals daily  -eating protein first, getting >60gm protein daily  -eating slowly, chewing food well  -avoiding/limiting calorie containing beverages  -limiting starchy vegetables and carbohydrates, choosing wheat, not white with breads,   crackers, pastas, gokul, bagels, tortillas, rice  -limiting restaurant or cafeteria eating to twice a week or less    We discussed the importance of restorative sleep and stress management in maintaining a healthy weight.  We discussed the National Weight Control Registry healthy weight maintenance strategies and ways to optimize metabolism.  We discussed the importance of physical activity including cardiovascular and strength training in maintaining a healthier weight.    Total time spent on the date of this encounter doing: chart review, review of test results, patient visit, physical exam, education, counseling, developing plan of care and documenting = 37 minutes.         ERICK Ortiz MD  [facility]

## 2022-02-07 NOTE — LETTER
2/7/2022         RE: Romeo Chong  [address]  [address]  [place] [address]        Dear Colleague,    Thank you for referring your patient, Romeo Chong, to the [facility] [place]. Please see a copy of my visit note below.    Bariatric Care Clinic Non Surgical Follow up Visit   Date of visit: 2/6/2022  Physician: ERICK Ortiz MD, MD  Primary Care is Chely Frye.  Romeo Chong   41 year old  male     Initial Weight: 290#  Initial BMI: 40.45  Today's Weight:   Wt Readings from Last 1 Encounters:   02/07/22 125.2 kg (276 lb)     Body mass index is 38.49 kg/m .           Assessment and Plan   Assessment: Romeo is a 41 year old year old male who presents for medical weight management.      Plan:    1. Morbid obesity (H)  Patient was congratulated on his success thus far. Healthy habits to assist with further weight loss were discussed. We discussed medication that may assist with weight loss. Metformin was prescribed. Risks/ benefits and possible side effects were discussed and questions were answered. Written information was given. He may also be a candidate for topamax.      2. History of migraine headaches  This may improve with healthy habits and weight loss.    3. Essential hypertension, benign  This may improve with healthy habits and weight loss.    Follow up next available with our dietician and in 3 months with myself.           INTERIM HISTORY  Patient initially thought he wanted bariatric surgery but do to significant uncontrolled medical issues he is working with us on nonsurgical weight loss. He recently got over Covid. He didn't have much of an appetite. He has cut back on sugared beverages. He has cut back on cheese.    DIETARY HISTORY  Meals Per Day: 3  Eating Protein First?: working on it  Food Diary: B:oatmeal, sometimes with fruit, or sometimes eggs L:rice and vegetables sometimes with beans and sometimes shrimp D:fish or shrimp,  sometimes rice, often vegetables  Snacks Per Day: occasional  Typical Snack: whole grain crackers, [employer], sometimes fried  Fluid Intake: 3-4 bottles per day  Portion Control: working on it  Calorie Containing Beverages: none  Typical Protein Food Choices: fish, chicken  Choosing Whole Grains: sometimes  Meals at Restaurant per week:0-1    Positive Changes Since Last Visit: cut back on sugared beverages and fast food  Struggling With: exercise    Knowledgeable in Reading Food Labels: yes  Getting Adequate Protein: working on it  Sleeping 7-8 hours/day no  Stress management watching TV, reaches out to friends    PHYSICAL ACTIVITY PATTERNS:  Unable, ADLs only    REVIEW OF SYSTEMS  GENERAL/CONSTITUTIONAL:  Fatigue: yes  HEENT:  Vision changes, glaucoma: not discussed  PSYCHIATRIC:  Moods: Grieving the death of his daughter  MUSCULOSKELETAL/RHEUMATOLOGIC  Arthralgias: yes  Myalgias: yes  ENDOCRINE:  Monitoring Blood Sugars: na  Sugars Well Controlled: na  No personal or family history of medullary thyroid cancer not discussed  :  Birth control: na       Patient Profile   Social History     Social History Narrative    , 3 children, 1  recently. Non smoker. Socially drinks alcohol. Not working.         Past Medical History   Past Medical History:   Diagnosis Date     Bacterial sepsis (H) 2021     Cancer (H)      Chronic pain syndrome 2021     Essential hypertension, benign 2010     Fibrous dysplasia of bone 2009    Formatting of this note might be different from the original. Discovered in right tibia and femur at 13 years old.  He had surgery when  he was 14 years old.   Last Assessment & Plan:  Formatting of this note might be different from the original. Diagnosed at 13, 14 surgeries since that time   Formatting of this note might be different from the original. Formatting of this note might be different      Gastric ulcer, unspecified chronicity, unspecified whether  "gastric ulcer hemorrhage or perforation present 5/6/2021     H/O Hodgkin's lymphoma 5/6/2021     Hyperlipidemia      Hypertension      Hypertriglyceridemia 5/8/2011     Stress hyperglycemia 4/26/2009     Patient Active Problem List   Diagnosis     Asthma     Edema     Fibrous dysplasia of bone     Essential hypertension, benign     Hypertriglyceridemia     Lymphedema     Obesity, unspecified     Stress hyperglycemia     Vitamin D deficiency     Allergic rhinitis due to animals     Nonintractable headache, unspecified chronicity pattern, unspecified headache type     Chronic pain syndrome     Gastric ulcer, unspecified chronicity, unspecified whether gastric ulcer hemorrhage or perforation present     Chronic low back pain, unspecified back pain laterality, unspecified whether sciatica present     H/O Hodgkin's lymphoma     Obesity (BMI 35.0-39.9) with comorbidity (H)     Calcaneal spur, left     Tachycardia     Cellulitis of right lower extremity     Elevated lactic acid level     Bacterial sepsis (H)     Gram-positive bacteremia     HTN (hypertension)     Osteomyelitis, unspecified site, unspecified type (H)     Gastroesophageal reflux disease without esophagitis     Lymphadenopathy     Mediastinal mass     Severe sepsis with acute organ dysfunction due to group B Streptococcus (H)       Past Surgical History  He has a past surgical history that includes fracture surgery; REMOVAL OF HEEL SPUR (Left, 6/25/2021); and Tonsillectomy, adenoidectomy adult, combined.     Examination   Ht 1.803 m (5' 11\")   Wt 125.2 kg (276 lb)   BMI 38.49 kg/m    GENERAL: Healthy, alert and no distress  EYES: Eyes grossly normal to inspection.  No discharge or erythema, or obvious scleral/conjunctival abnormalities.  RESP: No audible wheeze, cough, or visible cyanosis.  No visible retractions or increased work of breathing.    SKIN: Visible skin clear. No significant rash, abnormal pigmentation or lesions.  NEURO: Cranial nerves grossly " intact.  Mentation and speech appropriate for age.  PSYCH: Mentation appears normal, affect normal/bright, judgement and insight intact, normal speech and appearance well-groomed.       Counseling:   We reviewed the important post op bariatric recommendations:  -eating 3 meals daily  -eating protein first, getting >60gm protein daily  -eating slowly, chewing food well  -avoiding/limiting calorie containing beverages  -limiting starchy vegetables and carbohydrates, choosing wheat, not white with breads,   crackers, pastas, gokul, bagels, tortillas, rice  -limiting restaurant or cafeteria eating to twice a week or less    We discussed the importance of restorative sleep and stress management in maintaining a healthy weight.  We discussed the National Weight Control Registry healthy weight maintenance strategies and ways to optimize metabolism.  We discussed the importance of physical activity including cardiovascular and strength training in maintaining a healthier weight.    Total time spent on the date of this encounter doing: chart review, review of test results, patient visit, physical exam, education, counseling, developing plan of care and documenting = 37 minutes.         ERICK Ortiz MD  [facility]             Romeo Chong is 41 year old  male who presents for a billable video visit today.    How would you like to obtain your AVS? MyChart  If dropped from the video visit, the video invitation should be resent by: Text to cell phone: [phone]  Will anyone else be joining your video visit? No      Video Start Time: 10:41 AM    Are there any specific questions or needs that you would like addressed at your visit today? No        Video-Visit Details    Type of service:  Video Visit    Video End Time (time video stopped): 11:03 AM  Originating Location (pt. Location): Home    Distant Location (provider location):  [facility] [place]      Platform used for Video Visit: Nancy      Again, thank you for allowing me to participate in the care of your patient.        Sincerely,        ERICK Ortiz MD

## 2022-02-14 RX ORDER — CEFUROXIME AXETIL 500 MG/1
500 TABLET ORAL 2 TIMES DAILY
COMMUNITY
End: 2022-02-15

## 2022-02-15 ENCOUNTER — TELEPHONE (OUTPATIENT)
Dept: INFECTIOUS DISEASES | Facility: CLINIC | Age: 42
End: 2022-02-15

## 2022-02-15 ENCOUNTER — VIRTUAL VISIT (OUTPATIENT)
Dept: INFECTIOUS DISEASES | Facility: CLINIC | Age: 42
End: 2022-02-15
Payer: COMMERCIAL

## 2022-02-15 DIAGNOSIS — L08.9 RECURRENT INFECTION OF SKIN: Primary | ICD-10-CM

## 2022-02-15 DIAGNOSIS — U07.1 CLINICAL DIAGNOSIS OF COVID-19: ICD-10-CM

## 2022-02-15 PROCEDURE — 99213 OFFICE O/P EST LOW 20 MIN: CPT | Mod: 95 | Performed by: STUDENT IN AN ORGANIZED HEALTH CARE EDUCATION/TRAINING PROGRAM

## 2022-02-15 RX ORDER — CEFUROXIME AXETIL 500 MG/1
500 TABLET ORAL 2 TIMES DAILY
Qty: 60 TABLET | Refills: 11 | Status: SHIPPED | OUTPATIENT
Start: 2022-02-15 | End: 2022-03-17

## 2022-02-15 NOTE — PROGRESS NOTES
Romeo is a 41 year old who is being evaluated via a billable video visit.      How would you like to obtain your AVS? MyChart  If the video visit is dropped, the invitation should be resent by: Text to cell phone: [phone]   Will anyone else be joining your video visit? No      Video Start Time: 9:40 AM  Video-Visit Details    Type of service:  Video Visit    Video End Time: 10:04    Originating Location (pt. Location): Home    Distant Location (provider location):  [facility]     Platform used for Video Visit: [facility] follow up.    Name: Romeo Chong  :   1980  MRN:   [id number]  PCP:    No Ref-Primary, Physician  DOS:    02/15/22      CC: Recurrent infection.    HPI/Interval History:  Romeo Chong is a 41 year old old male with the history of chronic lymphedema secondary to right inguinal lymphadenopathy from Hodgkin lymphoma.  Also has multiple intra-abdominal and retroperitoneal adenopathy, morbid obesity.  He was hospitalized in 2021 for group B streptococcus bacteremia associated with right lower extremity cellulitis.  He had a prolonged hospital stay and was discharged on oral antibiotic that he completed at the beginning of 2021.  He was admitted to the hospital in [facility] for group B streptococcus bacteremia secondary to right lower extremity cellulitis.  Was also found to be in heart failure.  Was treated with IV ceftriaxone until 11/3/2021.  He is in clinic today to discuss recurrent infection.  He has not seen lymphedema recently.  Reported having compression stocking but unclear if he wears it.  He denies any fever, chills, night sweats.    02/15/22: On virtual visit today, the patient is doing well.  He has not had any recurrent cellulitis since initiation of therapy with suppressive therapy.  He is very pleased with not being a hospital for the last 5 months.  On a different note, the patient is unvaccinated  against COVID-19 mostly based on disinformation.  He had Covid recently.    ===========================  Past Medical History:  Past Medical History:   Diagnosis Date     Bacterial sepsis (H) 2021     Cancer (H)      Chronic pain syndrome 2021     Essential hypertension, benign 2010     Fibrous dysplasia of bone 2009    Formatting of this note might be different from the original. Discovered in right tibia and femur at 13 years old.  He had surgery when  he was 14 years old.   Last Assessment & Plan:  Formatting of this note might be different from the original. Diagnosed at 13, 14 surgeries since that time   Formatting of this note might be different from the original. Formatting of this note might be different      Gastric ulcer, unspecified chronicity, unspecified whether gastric ulcer hemorrhage or perforation present 2021     H/O Hodgkin's lymphoma 2021     Hyperlipidemia      Hypertension      Hypertriglyceridemia 2011     Stress hyperglycemia 2009       Past Surgical History:  Past Surgical History:   Procedure Laterality Date     FRACTURE SURGERY       HC REMOVAL HEEL SPUR, CALCANEUS Left 2021    Procedure: EXCISION, BONE SPUR, FOOT, WITH PLANTAR FASCIA RELEASE;  Surgeon: Stephon Singleton DPM;  Location: [facility];  Service: Podiatry     TONSILLECTOMY, ADENOIDECTOMY ADULT, COMBINED         Social History:  Social History     Socioeconomic History     Marital status: Single     Spouse name: Not on file     Number of children: Not on file     Years of education: Not on file     Highest education level: Not on file   Occupational History     Not on file   Tobacco Use     Smoking status: Former Smoker     Packs/day: 1.00     Start date: 1999     Quit date: 2009     Years since quittin.8     Smokeless tobacco: Never Used   Substance and Sexual Activity     Alcohol use: Not Currently     Drug use: Never     Sexual activity: Not Currently   Other  Topics Concern     Not on file   Social History Narrative    , 3 children, 1  recently. Non smoker. Socially drinks alcohol. Not working.      Social Determinants of Health     Financial Resource Strain: Not on file   Food Insecurity: Not on file   Transportation Needs: Not on file   Physical Activity: Not on file   Stress: Not on file   Social Connections: Not on file   Intimate Partner Violence: Not At Risk     Fear of Current or Ex-Partner: No     Emotionally Abused: No     Physically Abused: No     Sexually Abused: No   Housing Stability: Not on file       Family Medical History:  Family History   Problem Relation Age of Onset     Cirrhosis Mother      Hypertension Mother      Diabetes Type 2  Father      Kidney failure Father      Cerebrovascular Disease Father      Hypertension Father      Cerebrovascular Disease Sister      Hypertension Paternal Grandmother      Hypertension Paternal Grandfather        Allergies:     Allergies   Allergen Reactions     Vancomycin Anaphylaxis     Peanut Oil Itching     Tree Nuts [Nuts] Itching     Erythromycin Unknown     Latex Unknown     Added based on information entered during case entry, please review and add reactions, type, and severity as needed     Other Environmental Allergy Unknown     DUST     Pollen Extracts [Pollen Extract] Unknown     Zosyn [Piperacillin-Tazobactam In D5w] Tinnitus and Itching     Oxycodone Itching and Rash       Medications:  Current Outpatient Medications   Medication Sig Dispense Refill     cefuroxime (CEFTIN) 500 MG tablet Take 1 tablet (500 mg) by mouth 2 times daily 60 tablet 11     apixaban ANTICOAGULANT (ELIQUIS) 5 MG tablet Take 1 tablet (5 mg) by mouth 2 times daily 60 tablet 5     aspirin-acetaminophen-caffeine (EXCEDRIN MIGRAINE) 250-250-65 mg per tablet [ASPIRIN-ACETAMINOPHEN-CAFFEINE (EXCEDRIN MIGRAINE) 250-250-65 MG PER TABLET] Take 1 tablet by mouth every 6 (six) hours as needed for pain.  0     carvedilol (COREG) 12.5  MG tablet Take 1 tablet (12.5 mg) by mouth 2 times daily (with meals) 60 tablet 11     famotidine (PEPCID) 40 MG tablet Take 1 tablet (40 mg) by mouth every evening 180 tablet 0     furosemide (LASIX) 40 MG tablet Take Lasix 40 mg as needed for shortness of breath 30 tablet 4     HYDROcodone-acetaminophen (NORCO)  MG per tablet Take 1-2 tablets by mouth 3 times daily as needed for pain Use dates: 1/20/22-2/19/22 180 tablet 0     losartan (COZAAR) 50 MG tablet Take 1 tablet (50 mg) by mouth daily 90 tablet 0     metFORMIN (GLUCOPHAGE-XR) 500 MG 24 hr tablet 1 tablet with dinner daily for 2 weeks then 2 tablets with dinner 90 tablet 1     potassium chloride ER (K-TAB/KLOR-CON) 10 MEQ CR tablet TAKE 2 TABLETS BY MOUTH EVERY MORNING AND 1 EVERY EVENING 270 tablet 2       Immunizations:  Immunization History   Administered Date(s) Administered     Influenza (IIV3) PF 10/03/2009     TD (ADULT, 7+) 10/22/2004     Tdap (Adacel,Boostrix) 04/22/2011       ==================    Review of System:  12 points review of system is negative except for findings in the HPI.    Exam  VS: There were no vitals taken for this visit.    Gen: Pleasant in no acute distress.  HEENT: NCAT.   Lungs: Breathing comfortably.  Skin: No rash  Neuro: Alert and oriented to place time and person. Cranial nerves grossly intact.     Labs:  Reviewed    Imaging:  Reviewed    Assessment:  Romeo Chong is a 41 year old old male with chronic right lower extremity lymphedema secondary to cemented adenopathy possibly secondary to non-Hodgkin's lymphoma.  In ID clinic for recurrence right lower extremity cellulitis associated with bacteremia.  The recurrence of the infection is secondary to the lymphedema.  Doing well on chronic suppressive therapy with cefuroxime.    Recommendations:  Continue current suppressive therapy with cefuroxime.  New refills put on it for a year.  Follow-up in 4 to 5 months.  Counseled on Covid vaccination.      Discussed with  the patient.    Maegan Sandoval MD  [facility]  Office Telephone [phone].  Fax [phone]  [facility] paging

## 2022-02-25 DIAGNOSIS — G89.4 CHRONIC PAIN SYNDROME: ICD-10-CM

## 2022-02-25 NOTE — TELEPHONE ENCOUNTER
Writer called and spoke with patient, he had previously scheduled a V V for 3/2, he was okay with changing that to an office visit. Visit changed.

## 2022-02-25 NOTE — TELEPHONE ENCOUNTER
Refill request: norco 10/325 mg  Patient is requesting this refill from the pain clinic however it appears that he may be transitioning to primary care.  Please review and provide refill if part of plan of care. If not taking over please route back to support pool.

## 2022-03-02 ENCOUNTER — OFFICE VISIT (OUTPATIENT)
Dept: FAMILY MEDICINE | Facility: CLINIC | Age: 42
End: 2022-03-02
Payer: COMMERCIAL

## 2022-03-02 VITALS
TEMPERATURE: 97.9 F | DIASTOLIC BLOOD PRESSURE: 85 MMHG | BODY MASS INDEX: 40.32 KG/M2 | HEIGHT: 71 IN | SYSTOLIC BLOOD PRESSURE: 145 MMHG | HEART RATE: 72 BPM | OXYGEN SATURATION: 98 % | WEIGHT: 288 LBS | RESPIRATION RATE: 16 BRPM

## 2022-03-02 DIAGNOSIS — Z02.4 ENCOUNTER FOR DRIVER'S LICENSE HISTORY AND PHYSICAL: ICD-10-CM

## 2022-03-02 DIAGNOSIS — A40.9 SEPSIS DUE TO STREPTOCOCCUS SPECIES, UNSPECIFIED WHETHER ACUTE ORGAN DYSFUNCTION PRESENT (H): ICD-10-CM

## 2022-03-02 DIAGNOSIS — R55 SYNCOPE, UNSPECIFIED SYNCOPE TYPE: ICD-10-CM

## 2022-03-02 DIAGNOSIS — K25.9 GASTRIC ULCER, UNSPECIFIED CHRONICITY, UNSPECIFIED WHETHER GASTRIC ULCER HEMORRHAGE OR PERFORATION PRESENT: ICD-10-CM

## 2022-03-02 DIAGNOSIS — Z13.220 SCREENING FOR HYPERLIPIDEMIA: ICD-10-CM

## 2022-03-02 DIAGNOSIS — I10 BENIGN ESSENTIAL HYPERTENSION: ICD-10-CM

## 2022-03-02 DIAGNOSIS — G89.4 CHRONIC PAIN SYNDROME: Primary | ICD-10-CM

## 2022-03-02 LAB
AMPHETAMINES UR QL SCN: ABNORMAL
BARBITURATES UR QL: ABNORMAL
BENZODIAZ UR QL: ABNORMAL
CANNABINOIDS UR QL SCN: ABNORMAL
COCAINE UR QL: ABNORMAL
CREAT UR-MCNC: 240 MG/DL
OPIATES UR QL SCN: ABNORMAL
OXYCODONE UR QL: ABNORMAL
PCP UR QL SCN: ABNORMAL

## 2022-03-02 PROCEDURE — 99215 OFFICE O/P EST HI 40 MIN: CPT | Performed by: FAMILY MEDICINE

## 2022-03-02 PROCEDURE — 80307 DRUG TEST PRSMV CHEM ANLYZR: CPT | Performed by: FAMILY MEDICINE

## 2022-03-02 RX ORDER — HYDROCODONE BITARTRATE AND ACETAMINOPHEN 10; 325 MG/1; MG/1
1-2 TABLET ORAL 3 TIMES DAILY PRN
Qty: 180 TABLET | Refills: 0 | Status: CANCELLED | OUTPATIENT
Start: 2022-03-02

## 2022-03-02 RX ORDER — FAMOTIDINE 40 MG/1
40 TABLET, FILM COATED ORAL 2 TIMES DAILY
Qty: 180 TABLET | Refills: 1 | Status: SHIPPED | OUTPATIENT
Start: 2022-03-02 | End: 2022-03-07

## 2022-03-02 RX ORDER — NICOTINE 14MG/24HR
250 PATCH, TRANSDERMAL 24 HOURS TRANSDERMAL DAILY
Qty: 90 CAPSULE | Refills: 1 | Status: SHIPPED | OUTPATIENT
Start: 2022-03-02 | End: 2022-04-13

## 2022-03-02 NOTE — PROGRESS NOTES
Assessment & Plan     (G89.4) Chronic pain syndrome  (primary encounter diagnosis)  Comment:  reviewed   Plan: Urine Drugs of Abuse Screen Panel 1 - Drug         Screen (Full),       No aberrancies    Pain Management Referral- [facility]       (I10) Benign essential hypertension  Comment: Elevated BP,  managed on carvedilol, losartan, Lasix and potassium supplements  Plan: Refill current management, monitor Bps  Follow-up in a month    (R55) Syncope, unspecified syncope type  (Z02.4) Encounter for 's license issues   Comment: history of Syncopy. rule out cardiac and neurological cause   Plan: Adult Neurology  Referral            (A40.9) Sepsis due to Streptococcus species, unspecified whether acute organ dysfunction present (H)  Comment:   Plan: Saccharomyces boulardii (PROBIOTIC) 250 MG CAPS            (K25.9) Gastric ulcer, unspecified chronicity, unspecified whether gastric ulcer hemorrhage or perforation present  Comment: Refill  Plan: famotidine (PEPCID) 40 MG tablet              I spent a total of 59 minutes on the day of the visit.   Time spent doing chart review, history and exam, documentation and further activities per the note             No follow-ups on file.    Chely Frye MD  [facility]    Junior Walters is a 41 year old who presents for med check and follow-up:    Chronic pain:  Previously managed by the pain clinic, where his provider is no longer available, and was plan to follow-up in primary to discuss future management.  He is in need of a refill of Norco.    Hypertension:   managed on carvedilol, losartan, Lasix and potassium supplements  Recent BMP laboratory work-up were essentially unremarkable.    History of PUD:  Currently managed on famotidine taking 2 tabs daily.  Is also asking for a probiotic prescription as he is on a chronic antibiotic use for the recent case of syncope due to sepsis.  He has a 's medical evaluation  "which needs to certify in order to get his revoked license back.  He notes that this issue was brought to his attention recently when he was pulled over by a .  The license was apparently revoked due to past syncopy,         Review of Systems         Objective    BP (!) 145/85   Pulse 72   Temp 97.9  F (36.6  C)   Resp 16   Ht 1.803 m (5' 11\")   Wt 130.6 kg (288 lb)   SpO2 98%   BMI 40.17 kg/m    Body mass index is 40.17 kg/m .       Physical Exam                   Answers for HPI/ROS submitted by the patient on 3/2/2022  How many servings of fruits and vegetables do you eat daily?: 4 or more  On average, how many sweetened beverages do you drink each day (Examples: soda, juice, sweet tea, etc.  Do NOT count diet or artificially sweetened beverages)?: 0  How many minutes a day do you exercise enough to make your heart beat faster?: 9 or less  How many days a week do you exercise enough to make your heart beat faster?: 3 or less  How many days per week do you miss taking your medication?: 0  What is the reason for your visit today?: Pain med refill  When did your symptoms begin?: More than a month  What are your symptoms?: Leg and back pain  How would you describe these symptoms?: Severe  Are your symptoms:: Staying the same  Have you had these symptoms before?: Yes  Is there anything that makes you feel worse?: Walking  Is there anything that makes you feel better?: Pain meds      "

## 2022-03-03 ENCOUNTER — TELEPHONE (OUTPATIENT)
Dept: FAMILY MEDICINE | Facility: CLINIC | Age: 42
End: 2022-03-03
Payer: COMMERCIAL

## 2022-03-04 ENCOUNTER — TELEPHONE (OUTPATIENT)
Dept: NEUROLOGY | Facility: CLINIC | Age: 42
End: 2022-03-04
Payer: COMMERCIAL

## 2022-03-04 RX ORDER — HYDROCODONE BITARTRATE AND ACETAMINOPHEN 10; 325 MG/1; MG/1
1-2 TABLET ORAL 3 TIMES DAILY PRN
Qty: 180 TABLET | Refills: 0 | Status: CANCELLED | OUTPATIENT
Start: 2022-03-04

## 2022-03-04 NOTE — TELEPHONE ENCOUNTER
Patient calling to check status of below refill request.  Stating he has not heard anything about the medication being refilled from his pharmacy yet.  Completed visit with Dr Frye on 3/2/22 and had Urine drug screen performed at that time.  Please call patient with update on refill request as he is completely out of medication at this time.    Patient can be reached at [phone]

## 2022-03-04 NOTE — TELEPHONE ENCOUNTER
[facility] Call Center    Phone Message    May a detailed message be left on voicemail: yes     Reason for Call: Appointment Intake    Referring Provider Name:  Ref Chely Frye MD   Diagnosis and/or Symptoms: Syncope, unspecified syncope type    PRIORITY REFERRAL 1-2 WEEKS    Patient is scheduled in June and has been added to the wait list. Please review and contact the patient to schedule for a sooner appointment.    Action Taken: Message routed to:  [facility]    Travel Screening: Not Applicable

## 2022-03-05 RX ORDER — HYDROCODONE BITARTRATE AND ACETAMINOPHEN 10; 325 MG/1; MG/1
1-2 TABLET ORAL 3 TIMES DAILY PRN
Qty: 180 TABLET | Refills: 0 | Status: SHIPPED | OUTPATIENT
Start: 2022-03-05 | End: 2022-03-30

## 2022-03-07 ENCOUNTER — TELEPHONE (OUTPATIENT)
Dept: FAMILY MEDICINE | Facility: CLINIC | Age: 42
End: 2022-03-07
Payer: COMMERCIAL

## 2022-03-07 DIAGNOSIS — K25.9 GASTRIC ULCER, UNSPECIFIED CHRONICITY, UNSPECIFIED WHETHER GASTRIC ULCER HEMORRHAGE OR PERFORATION PRESENT: ICD-10-CM

## 2022-03-07 RX ORDER — FAMOTIDINE 40 MG/1
40 TABLET, FILM COATED ORAL 2 TIMES DAILY
Qty: 180 TABLET | Refills: 1 | Status: SHIPPED | OUTPATIENT
Start: 2022-03-07 | End: 2022-05-19

## 2022-03-07 NOTE — TELEPHONE ENCOUNTER
[facility] pharmacy calling for clarification on famotidine (PEPCID) 40 mg tablet.    Seeking information on instructions.

## 2022-03-11 NOTE — TELEPHONE ENCOUNTER
Date: 6/7/2022 Status: Scheduled   Time: 10:00 AM Length: 20   Visit Type: RETURN [657] Copay: $0.00   Provider: Maegan Sandoval MD Department: [facility]IOUS [facility]   Bill Area: Infectious Disease [facility]

## 2022-03-21 ENCOUNTER — TELEPHONE (OUTPATIENT)
Dept: FAMILY MEDICINE | Facility: CLINIC | Age: 42
End: 2022-03-21
Payer: COMMERCIAL

## 2022-03-21 NOTE — TELEPHONE ENCOUNTER
Writer called and spoke with the patient, he stated that he has an appointment with rheumatology in May, which  confirmed and he thanked  for the call.

## 2022-03-21 NOTE — TELEPHONE ENCOUNTER
----- Message from Sivakumar Poon MD sent at 3/21/2022  9:28 AM CDT -----  Regarding: follow up  [facility] Frye    I'm a Oncologist that saw Romeo a couple of times. I'm concerned that he has a rheumatologic disorder causing his lymphadenopathy (highly positive RAVINDER)    I ordered a rheum consult but it appears that Romeo keeps cancelling it. I would encourage him to go if you have an opportunity.     Thank you  Sivakumar Poon

## 2022-03-23 ENCOUNTER — VIRTUAL VISIT (OUTPATIENT)
Dept: SURGERY | Facility: CLINIC | Age: 42
End: 2022-03-23
Payer: COMMERCIAL

## 2022-03-23 DIAGNOSIS — I10 ESSENTIAL HYPERTENSION, BENIGN: Primary | ICD-10-CM

## 2022-03-23 DIAGNOSIS — E78.1 HYPERTRIGLYCERIDEMIA: ICD-10-CM

## 2022-03-23 DIAGNOSIS — E66.812 CLASS 2 SEVERE OBESITY DUE TO EXCESS CALORIES WITH SERIOUS COMORBIDITY AND BODY MASS INDEX (BMI) OF 38.0 TO 38.9 IN ADULT (H): ICD-10-CM

## 2022-03-23 DIAGNOSIS — E66.01 CLASS 2 SEVERE OBESITY DUE TO EXCESS CALORIES WITH SERIOUS COMORBIDITY AND BODY MASS INDEX (BMI) OF 38.0 TO 38.9 IN ADULT (H): ICD-10-CM

## 2022-03-23 DIAGNOSIS — Z71.3 NUTRITIONAL COUNSELING: ICD-10-CM

## 2022-03-23 PROCEDURE — 97803 MED NUTRITION INDIV SUBSEQ: CPT | Mod: 95 | Performed by: DIETITIAN, REGISTERED

## 2022-03-23 NOTE — LETTER
3/23/2022         RE: Romeo Chong  [address]  [address]  [place] [address]        Dear Colleague,    Thank you for referring your patient, Romeo Chong, to the [facility] AND BARIATRICS CARE [place]. Please see a copy of my visit note below.    Romeo Chong is a 41 year old who is being evaluated via a billable video visit.      How would you like to obtain your AVS? MyChart  If the video visit is dropped, the invitation should be resent by: Text to cell phone: [phone]  Will anyone else be joining your video visit? No      Video Start Time: 11:00 AM      Medical  Weight Loss Follow-Up Diet Evaluation  Assessment:  Romeo is presenting today for a follow up weight management nutrition consultation. Pt has had an initial appointment with Dr. Ortiz.   Weight loss medication: Metformin .   Pt's weight is 275 lbs   Initial weight: 290 lbs  Weight change: 15 lbs (down)     No flowsheet data found.  BMI: There is no height or weight on file to calculate BMI.  Ideal body weight: 75.3 kg (166 lb 0.1 oz)  Adjusted ideal body weight: 97.4 kg (214 lb 12.9 oz)    Estimated RMR ([place]-St Jeor equation):   2183 kcals x 1.3 (light active) = 2838 kcals (for weight maintenance)     Recommended Protein Intake: 60-80 grams of protein/day  Patient Active Problem List:  Patient Active Problem List   Diagnosis     Asthma     Edema     Fibrous dysplasia of bone     Essential hypertension, benign     Hypertriglyceridemia     Lymphedema     Obesity, unspecified     Stress hyperglycemia     Vitamin D deficiency     Allergic rhinitis due to animals     Nonintractable headache, unspecified chronicity pattern, unspecified headache type     Chronic pain syndrome     Gastric ulcer, unspecified chronicity, unspecified whether gastric ulcer hemorrhage or perforation present     Chronic low back pain, unspecified back pain laterality, unspecified whether sciatica present     H/O Hodgkin's lymphoma      Obesity (BMI 35.0-39.9) with comorbidity (H)     Calcaneal spur, left     Tachycardia     Cellulitis of right lower extremity     Elevated lactic acid level     Bacterial sepsis (H)     Gram-positive bacteremia     HTN (hypertension)     Osteomyelitis, unspecified site, unspecified type (H)     Gastroesophageal reflux disease without esophagitis     Lymphadenopathy     Mediastinal mass     Severe sepsis with acute organ dysfunction due to group B Streptococcus (H)     Diabetes: No     Progress on goals from last visit: Patient reports that he has switched from white to whole grain.  Patient reports that he consumes zero sugar pop when craving a pop and has increased his water consumption as well.  Patient reports that he has also been staying away from fried foods as well.      Dietary Recall:  Breakfast: [place] or eggs (made into a breakfast)   Lunch:skipped or salmon, cauliflower mashed, veggies or salad   Dinner:sandwich or fish   Typical snacks: organic popcorn   Beverages: Water 3-4 bottles (16.9-20 oz) daily   Exercise: increased movement as able due to pain issues    Nutrition Diagnosis:    Overweight/Obesity (NC 3.3) related to overeating and poor lifestyle habits as evidenced by patient's subjective statements (h/o excessive energy intake, lack of exercise) and BMI of 38.5 kg/m2.     Intervention:  1. Food and/or nutrient delivery: balanced meals, adequate protein   2. Nutrition education: Lean Protein Sources   3. Nutrition counseling: exercise regimen, praised patient for positive changes made     Monitoring/Evaluation:    Goals:  1. Work on consistency of eating 3 meals/day.   2. Aim for 60-80 grams of protein/day, dividing it up into 20-30 grams of protein/meal, 3 meals/day.   3. Increase exercise as able/tolerated pending pain issues.     Patient to follow up in 2 month(s) with bariatrician and prn with RD      Video-Visit Details    Type of service:  Video Visit    Video End Time:11:12  AM    Originating Location (pt. Location): Home    Distant Location (provider location):  [facility] SURGERY [facility] AND BARIATRICS CARE [place]     Platform used for Video Visit: Nancy Pacheco RD        Again, thank you for allowing me to participate in the care of your patient.        Sincerely,        Rhina Pacheco RD

## 2022-03-23 NOTE — PROGRESS NOTES
Romeo Chong is a 41 year old who is being evaluated via a billable video visit.      How would you like to obtain your AVS? MyChart  If the video visit is dropped, the invitation should be resent by: Text to cell phone: [phone]  Will anyone else be joining your video visit? No      Video Start Time: 11:00 AM      Medical  Weight Loss Follow-Up Diet Evaluation  Assessment:  Romeo is presenting today for a follow up weight management nutrition consultation. Pt has had an initial appointment with Dr. Ortiz.   Weight loss medication: Metformin .   Pt's weight is 275 lbs   Initial weight: 290 lbs  Weight change: 15 lbs (down)     No flowsheet data found.  BMI: There is no height or weight on file to calculate BMI.  Ideal body weight: 75.3 kg (166 lb 0.1 oz)  Adjusted ideal body weight: 97.4 kg (214 lb 12.9 oz)    Estimated RMR ([place]-St Jeor equation):   2183 kcals x 1.3 (light active) = 2838 kcals (for weight maintenance)     Recommended Protein Intake: 60-80 grams of protein/day  Patient Active Problem List:  Patient Active Problem List   Diagnosis     Asthma     Edema     Fibrous dysplasia of bone     Essential hypertension, benign     Hypertriglyceridemia     Lymphedema     Obesity, unspecified     Stress hyperglycemia     Vitamin D deficiency     Allergic rhinitis due to animals     Nonintractable headache, unspecified chronicity pattern, unspecified headache type     Chronic pain syndrome     Gastric ulcer, unspecified chronicity, unspecified whether gastric ulcer hemorrhage or perforation present     Chronic low back pain, unspecified back pain laterality, unspecified whether sciatica present     H/O Hodgkin's lymphoma     Obesity (BMI 35.0-39.9) with comorbidity (H)     Calcaneal spur, left     Tachycardia     Cellulitis of right lower extremity     Elevated lactic acid level     Bacterial sepsis (H)     Gram-positive bacteremia     HTN (hypertension)     Osteomyelitis, unspecified site, unspecified  type (H)     Gastroesophageal reflux disease without esophagitis     Lymphadenopathy     Mediastinal mass     Severe sepsis with acute organ dysfunction due to group B Streptococcus (H)     Diabetes: No     Progress on goals from last visit: Patient reports that he has switched from white to whole grain.  Patient reports that he consumes zero sugar pop when craving a pop and has increased his water consumption as well.  Patient reports that he has also been staying away from fried foods as well.      Dietary Recall:  Breakfast: [place] or eggs (made into a breakfast)   Lunch:skipped or salmon, cauliflower mashed, veggies or salad   Dinner:sandwich or fish   Typical snacks: organic popcorn   Beverages: Water 3-4 bottles (16.9-20 oz) daily   Exercise: increased movement as able due to pain issues    Nutrition Diagnosis:    Overweight/Obesity (NC 3.3) related to overeating and poor lifestyle habits as evidenced by patient's subjective statements (h/o excessive energy intake, lack of exercise) and BMI of 38.5 kg/m2.     Intervention:  1. Food and/or nutrient delivery: balanced meals, adequate protein   2. Nutrition education: Lean Protein Sources   3. Nutrition counseling: exercise regimen, praised patient for positive changes made     Monitoring/Evaluation:    Goals:  1. Work on consistency of eating 3 meals/day.   2. Aim for 60-80 grams of protein/day, dividing it up into 20-30 grams of protein/meal, 3 meals/day.   3. Increase exercise as able/tolerated pending pain issues.     Patient to follow up in 2 month(s) with bariatrician and prn with ANGIE      Video-Visit Details    Type of service:  Video Visit    Video End Time:11:12 AM    Originating Location (pt. Location): Home    Distant Location (provider location):  [facility] SURGERY [facility] AND [facility]IATRICS CARE [place]     Platform used for Video Visit: Nancy Pacheco RD

## 2022-04-08 ENCOUNTER — OFFICE VISIT (OUTPATIENT)
Dept: NEUROLOGY | Facility: CLINIC | Age: 42
End: 2022-04-08
Payer: COMMERCIAL

## 2022-04-08 VITALS
WEIGHT: 297 LBS | DIASTOLIC BLOOD PRESSURE: 117 MMHG | SYSTOLIC BLOOD PRESSURE: 171 MMHG | HEART RATE: 63 BPM | HEIGHT: 75 IN | BODY MASS INDEX: 36.93 KG/M2

## 2022-04-08 DIAGNOSIS — Z86.19 HX OF SEPSIS: ICD-10-CM

## 2022-04-08 DIAGNOSIS — R56.9 SEIZURE-LIKE ACTIVITY (H): Primary | ICD-10-CM

## 2022-04-08 PROCEDURE — 99205 OFFICE O/P NEW HI 60 MIN: CPT | Performed by: PSYCHIATRY & NEUROLOGY

## 2022-04-08 RX ORDER — CEPHALEXIN 500 MG/1
CAPSULE ORAL
COMMUNITY
Start: 2021-06-25 | End: 2022-05-23

## 2022-04-08 NOTE — NURSING NOTE
Chief Complaint   Patient presents with     New Patient     syncope     Lori Talavera MA on 4/8/2022 at 9:59 AM

## 2022-04-08 NOTE — LETTER
4/8/2022         RE: Romeo Chong  [address]  [address]  [place] [address]        Dear Colleague,    Thank you for referring your patient, Romeo Chong, to the [facility] [place]. Please see a copy of my visit note below.    NEUROLOGY OUTPATIENT CONSULT NOTE   Apr 8, 2022     CHIEF COMPLAINT/REASON FOR VISIT/REASON FOR CONSULT  Patient presents with:  New Patient: syncope    REASON FOR CONSULTATION-seizure-like activity    REFERRAL SOURCE  Dr. Frye  CC Dr. Frye    HISTORY OF PRESENT ILLNESS  Romeo Chong is a 41 year old male seen today for driving clearance for seizure-like activity.  Patient reports that he is to be continued system and frequently gets attacks of sepsis.  He has had 6 attacks of sepsis.  He was found unresponsive during one of the attacks of sepsis in 2021.  Was admitted to the hospital in [place].  During the hospitalization he was encephalopathic.  He had 2 spells where he his head was shaking with some ongoing confusion and loss of consciousness.  He was seen by the neurologist in the hospital.  MRI brain and EEG were negative.  The spells were thought to be related to Dilaudid and this was stopped.  He was never put on any seizure medication.  Because of the spells his driving license has been revoked and is looking to get back to driving.    He reports no other spells of seizures.  Does have some spells of lightheadedness related to bradycardia.  Is following up with the heart doctor.  No history of TBI's or other seizures.  No substance use or alcohol abuse.  No family history of seizures.  Has not had any other spells since then.    Previous history is reviewed and this is unchanged.    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 Discovered in right tibia and femur at 13 years old.  He had surgery when  he was 14 years old.   Last Assessment & Plan:  Formatting of this note might be different from the original. Diagnosed at 13, 14 surgeries since that time   Formatting of this note might be different from the original. Formatting of this note might be different      Gastric ulcer, unspecified chronicity, unspecified whether gastric ulcer hemorrhage or perforation present 5/6/2021     H/O Hodgkin's lymphoma 5/6/2021     Hyperlipidemia      Hypertension      Hypertriglyceridemia 5/8/2011     Stress hyperglycemia 4/26/2009     Patient Active Problem List   Diagnosis     Asthma     Edema     Fibrous dysplasia of bone     Essential hypertension, benign     Hypertriglyceridemia     Lymphedema     Obesity, unspecified     Stress hyperglycemia     Vitamin D deficiency     Allergic rhinitis due to animals     Nonintractable headache, unspecified chronicity pattern, unspecified headache type     Chronic pain syndrome     Gastric ulcer, unspecified chronicity, unspecified whether gastric ulcer hemorrhage or perforation present     Chronic low back pain, unspecified back pain laterality, unspecified whether sciatica present     H/O Hodgkin's lymphoma     Obesity (BMI 35.0-39.9) with comorbidity (H)     Calcaneal spur, left     Tachycardia     Cellulitis of right lower extremity     Elevated lactic acid level     Bacterial sepsis (H)     Gram-positive bacteremia     HTN (hypertension)     Osteomyelitis, unspecified site, unspecified type (H)     Gastroesophageal reflux disease without esophagitis     Lymphadenopathy     Mediastinal mass     Severe sepsis with acute organ dysfunction due to group B Streptococcus (H)   Reviewed and otherwise noncontributory.  Remote history of Hodgkin's lymphoma.    FAMILY HISTORY  Family History   Problem Relation Age of Onset     Cirrhosis Mother       Hypertension Mother      Diabetes Type 2  Father      Kidney failure Father      Cerebrovascular Disease Father      Hypertension Father      Cerebrovascular Disease Sister      Hypertension Paternal Grandmother      Hypertension Paternal Grandfather    Reviewed and noncontributory.  Negative for seizures.    SOCIAL HISTORY  Social History     Tobacco Use     Smoking status: Former Smoker     Packs/day: 1.00     Start date: 1999     Quit date: 2009     Years since quittin.9     Smokeless tobacco: Never Used   Substance Use Topics     Alcohol use: Not Currently     Drug use: Never       SYSTEMS REVIEW  Twelve-system ROS was done and other than the HPI this was negative except for pain in the right leg.  Does have some chronic lymphedema.  Also complains of joint pain and back pain.    MEDICATIONS  apixaban ANTICOAGULANT (ELIQUIS) 5 MG tablet, Take 1 tablet (5 mg) by mouth 2 times daily  carvedilol (COREG) 12.5 MG tablet, Take 1 tablet (12.5 mg) by mouth 2 times daily (with meals)  famotidine (PEPCID) 40 MG tablet, Take 1 tablet (40 mg) by mouth 2 times daily  furosemide (LASIX) 40 MG tablet, Take Lasix 40 mg as needed for shortness of breath  HYDROcodone-acetaminophen (NORCO)  MG per tablet, Take 1-2 tablets by mouth 3 times daily as needed for pain  losartan (COZAAR) 50 MG tablet, Take 1 tablet (50 mg) by mouth daily  metFORMIN (GLUCOPHAGE-XR) 500 MG 24 hr tablet, 1 tablet with dinner daily for 2 weeks then 2 tablets with dinner  potassium chloride ER (K-TAB/KLOR-CON) 10 MEQ CR tablet, TAKE 2 TABLETS BY MOUTH EVERY MORNING AND 1 EVERY EVENING  Saccharomyces boulardii (PROBIOTIC) 250 MG CAPS, Take 1 capsule (250 mg) by mouth daily  cephALEXin (KEFLEX) 500 MG capsule, TAKE 1 CAPSULE BY MOUTH TWICE DAILY FOR 7 DAYS    No current facility-administered medications on file prior to visit.       PHYSICAL EXAMINATION  VITALS: BP (!) 171/117 (BP Location: Left arm, Patient Position: Sitting)   Pulse 63    "Ht 1.905 m (6' 3\")   Wt 134.7 kg (297 lb)   BMI 37.12 kg/m    GENERAL: Healthy appearing, alert, no acute distress, normal habitus.  CARDIOVASCULAR: Extremities warm and well perfused. Pulses present.   EYES: Funduscopic exam limited.  NEUROLOGICAL:  Patient is awake and oriented to self, place and time.  Attention span is normal.  Memory is grossly intact.  Language is fluent and follows commands appropriately.  Appropriate fund of knowledge. Cranial nerves 2-12 are intact. There is no pronator drift.  Motor exam shows 5/5 strength in all extremities except in the right leg related to pain.  Tone is symmetric bilaterally in upper and lower extremities.  Reflexes are symmetric and 2+ in upper extremities and lower extremities. Sensory exam is grossly intact to light touch, pin prick and vibration.  Finger to nose and heel to shin is without dysmetria.  Romberg is negative.  Gait is normal and the patient is able to do tandem walk and walk on toes and heels with some difficulty due to right leg pain.    DIAGNOSTICS  MRI 2021  1.  No acute infarct or intracranial mass lesion.   2.  Nonspecific multifocal cerebral white matter disease may reflect   sequelae of nonamyloid microangiopathy.     EEG 2021  Impression: No evidence of significant epileptiform discharges or   focal abnormalities on this record. Normal awake and sleep EEG.    ECHO  1. Left ventricular size, wall thickness, and systolic function are normal.  The estimated left ventricular ejection fraction is 55%.  2. Right ventricular size and systolic function are normal.  3. No hemodynamically significant valvular abnormalities.  4. No prior study available for comparison.    OUTSIDE RECORDS  Outside referral notes and chart notes were reviewed and pertinent information has been summarized (in addition to the HPI):-Patient was seen in primary care.  Was referred to neurology.  Patient had episode of syncope and cardiac and neurological causes need to be " ruled out.  Syncope was due to sepsis.  He was recently pulled over by a  and needs a driving license evaluation done.  Is been seen in the ER for chest pain in January 2020    In October 2021 when he had some seizure-like activity.        Sodium 134 in hospital      IMPRESSION/REPORT/PLAN  Seizure-like activity (H)  Hx of sepsis    This is a 41 year old male with 2 episodes of seizure-like activity that happened in the hospital while he was admitted for sepsis.  MRI brain and EEG were noncontributory at that point.  Per neurologist who evaluated the patient during the hospitalization these were thought to be related to Dilaudid.  Patient was not started on seizure medication.  He has been free of episodes since then.    Given that the patient was not diagnosed with epilepsy and the spells were thought to be provoked due to Dilaudid use it would be appropriate for the patient to drive at this point.  The EEG was done while he was having sepsis/having metabolic encephalopathy which can decrease the accuracy of the EEG and I would recommend getting another outpatient EEG to evaluate for epilepsy before he starts driving.     Would also recommend cardiac clearance for driving.  Patient does have an appointment with cardiology already set up.    I can see him back on as-needed basis unless EEG is abnormal.    -     EEG Routine; Future    Return for In-Clinic Visit (must), After testing.    Over 68 minutes were spent coordinating the care for the patient on the day of the encounter.  This includes previsit, during visit and post visit activities as documented above.  Time spent reviewing outside records.  Counseling patient.  Multiple outside tests reviewed.  High risk with extensive testing.  (Activities include but not inclusive of reviewing chart, reviewing outside records, reviewing labs and imaging study results as well as the images, patient visit time including getting history and exam,  use if  applicable, review of test results with the patient and coming up with a plan in a shared model, counseling patient and family, education and answering patient questions, EMR , EMR diagnosis entry and problem list management, medication reconciliation and prescription management if applicable, paperwork if applicable, printing documents and documentation of the visit activities.)  Billing:-4 data points, 4 problem points, 4 risk points      Vik Campos MD  Neurologist  [facility]  Tel:- [phone]    This note was dictated using voice recognition software.  Any grammatical or context distortions are unintentional and inherent to the software.        Again, thank you for allowing me to participate in the care of your patient.        Sincerely,        Vik Campos MD

## 2022-04-08 NOTE — PROGRESS NOTES
NEUROLOGY OUTPATIENT CONSULT NOTE   Apr 8, 2022     CHIEF COMPLAINT/REASON FOR VISIT/REASON FOR CONSULT  Patient presents with:  New Patient: syncope    REASON FOR CONSULTATION-seizure-like activity    REFERRAL SOURCE  Dr. Frye  CC Dr. Frye    HISTORY OF PRESENT ILLNESS  Romeo Chong is a 41 year old male seen today for driving clearance for seizure-like activity.  Patient reports that he is to be continued system and frequently gets attacks of sepsis.  He has had 6 attacks of sepsis.  He was found unresponsive during one of the attacks of sepsis in 2021.  Was admitted to the hospital in [place].  During the hospitalization he was encephalopathic.  He had 2 spells where he his head was shaking with some ongoing confusion and loss of consciousness.  He was seen by the neurologist in the hospital.  MRI brain and EEG were negative.  The spells were thought to be related to Dilaudid and this was stopped.  He was never put on any seizure medication.  Because of the spells his driving license has been revoked and is looking to get back to driving.    He reports no other spells of seizures.  Does have some spells of lightheadedness related to bradycardia.  Is following up with the heart doctor.  No history of TBI's or other seizures.  No substance use or alcohol abuse.  No family history of seizures.  Has not had any other spells since then.    Previous history is reviewed and this is unchanged.    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 Discovered in right tibia and femur at 13 years old.  He had surgery when  he was 14 years old.   Last Assessment & Plan:  Formatting of this note might be different from the original. Diagnosed at 13, 14  surgeries since that time   Formatting of this note might be different from the original. Formatting of this note might be different      Gastric ulcer, unspecified chronicity, unspecified whether gastric ulcer hemorrhage or perforation present 5/6/2021     H/O Hodgkin's lymphoma 5/6/2021     Hyperlipidemia      Hypertension      Hypertriglyceridemia 5/8/2011     Stress hyperglycemia 4/26/2009     Patient Active Problem List   Diagnosis     Asthma     Edema     Fibrous dysplasia of bone     Essential hypertension, benign     Hypertriglyceridemia     Lymphedema     Obesity, unspecified     Stress hyperglycemia     Vitamin D deficiency     Allergic rhinitis due to animals     Nonintractable headache, unspecified chronicity pattern, unspecified headache type     Chronic pain syndrome     Gastric ulcer, unspecified chronicity, unspecified whether gastric ulcer hemorrhage or perforation present     Chronic low back pain, unspecified back pain laterality, unspecified whether sciatica present     H/O Hodgkin's lymphoma     Obesity (BMI 35.0-39.9) with comorbidity (H)     Calcaneal spur, left     Tachycardia     Cellulitis of right lower extremity     Elevated lactic acid level     Bacterial sepsis (H)     Gram-positive bacteremia     HTN (hypertension)     Osteomyelitis, unspecified site, unspecified type (H)     Gastroesophageal reflux disease without esophagitis     Lymphadenopathy     Mediastinal mass     Severe sepsis with acute organ dysfunction due to group B Streptococcus (H)   Reviewed and otherwise noncontributory.  Remote history of Hodgkin's lymphoma.    FAMILY HISTORY  Family History   Problem Relation Age of Onset     Cirrhosis Mother      Hypertension Mother      Diabetes Type 2  Father      Kidney failure Father      Cerebrovascular Disease Father      Hypertension Father      Cerebrovascular Disease Sister      Hypertension Paternal Grandmother      Hypertension Paternal Grandfather    Reviewed and  "noncontributory.  Negative for seizures.    SOCIAL HISTORY  Social History     Tobacco Use     Smoking status: Former Smoker     Packs/day: 1.00     Start date: 1999     Quit date: 2009     Years since quittin.9     Smokeless tobacco: Never Used   Substance Use Topics     Alcohol use: Not Currently     Drug use: Never       SYSTEMS REVIEW  Twelve-system ROS was done and other than the HPI this was negative except for pain in the right leg.  Does have some chronic lymphedema.  Also complains of joint pain and back pain.    MEDICATIONS  apixaban ANTICOAGULANT (ELIQUIS) 5 MG tablet, Take 1 tablet (5 mg) by mouth 2 times daily  carvedilol (COREG) 12.5 MG tablet, Take 1 tablet (12.5 mg) by mouth 2 times daily (with meals)  famotidine (PEPCID) 40 MG tablet, Take 1 tablet (40 mg) by mouth 2 times daily  furosemide (LASIX) 40 MG tablet, Take Lasix 40 mg as needed for shortness of breath  HYDROcodone-acetaminophen (NORCO)  MG per tablet, Take 1-2 tablets by mouth 3 times daily as needed for pain  losartan (COZAAR) 50 MG tablet, Take 1 tablet (50 mg) by mouth daily  metFORMIN (GLUCOPHAGE-XR) 500 MG 24 hr tablet, 1 tablet with dinner daily for 2 weeks then 2 tablets with dinner  potassium chloride ER (K-TAB/KLOR-CON) 10 MEQ CR tablet, TAKE 2 TABLETS BY MOUTH EVERY MORNING AND 1 EVERY EVENING  Saccharomyces boulardii (PROBIOTIC) 250 MG CAPS, Take 1 capsule (250 mg) by mouth daily  cephALEXin (KEFLEX) 500 MG capsule, TAKE 1 CAPSULE BY MOUTH TWICE DAILY FOR 7 DAYS    No current facility-administered medications on file prior to visit.       PHYSICAL EXAMINATION  VITALS: BP (!) 171/117 (BP Location: Left arm, Patient Position: Sitting)   Pulse 63   Ht 1.905 m (6' 3\")   Wt 134.7 kg (297 lb)   BMI 37.12 kg/m    GENERAL: Healthy appearing, alert, no acute distress, normal habitus.  CARDIOVASCULAR: Extremities warm and well perfused. Pulses present.   EYES: Funduscopic exam limited.  NEUROLOGICAL:  Patient is " awake and oriented to self, place and time.  Attention span is normal.  Memory is grossly intact.  Language is fluent and follows commands appropriately.  Appropriate fund of knowledge. Cranial nerves 2-12 are intact. There is no pronator drift.  Motor exam shows 5/5 strength in all extremities except in the right leg related to pain.  Tone is symmetric bilaterally in upper and lower extremities.  Reflexes are symmetric and 2+ in upper extremities and lower extremities. Sensory exam is grossly intact to light touch, pin prick and vibration.  Finger to nose and heel to shin is without dysmetria.  Romberg is negative.  Gait is normal and the patient is able to do tandem walk and walk on toes and heels with some difficulty due to right leg pain.    DIAGNOSTICS  MRI 2021  1.  No acute infarct or intracranial mass lesion.   2.  Nonspecific multifocal cerebral white matter disease may reflect   sequelae of nonamyloid microangiopathy.     EEG 2021  Impression: No evidence of significant epileptiform discharges or   focal abnormalities on this record. Normal awake and sleep EEG.    ECHO  1. Left ventricular size, wall thickness, and systolic function are normal.  The estimated left ventricular ejection fraction is 55%.  2. Right ventricular size and systolic function are normal.  3. No hemodynamically significant valvular abnormalities.  4. No prior study available for comparison.    OUTSIDE RECORDS  Outside referral notes and chart notes were reviewed and pertinent information has been summarized (in addition to the HPI):-Patient was seen in primary care.  Was referred to neurology.  Patient had episode of syncope and cardiac and neurological causes need to be ruled out.  Syncope was due to sepsis.  He was recently pulled over by a  and needs a driving license evaluation done.  Is been seen in the ER for chest pain in January 2020    In October 2021 when he had some seizure-like activity.        Sodium 134 in  hospital      IMPRESSION/REPORT/PLAN  Seizure-like activity (H)  Hx of sepsis    This is a 41 year old male with 2 episodes of seizure-like activity that happened in the hospital while he was admitted for sepsis.  MRI brain and EEG were noncontributory at that point.  Per neurologist who evaluated the patient during the hospitalization these were thought to be related to Dilaudid.  Patient was not started on seizure medication.  He has been free of episodes since then.    Given that the patient was not diagnosed with epilepsy and the spells were thought to be provoked due to Dilaudid use it would be appropriate for the patient to drive at this point.  The EEG was done while he was having sepsis/having metabolic encephalopathy which can decrease the accuracy of the EEG and I would recommend getting another outpatient EEG to evaluate for epilepsy before he starts driving.     Would also recommend cardiac clearance for driving.  Patient does have an appointment with cardiology already set up.    I can see him back on as-needed basis unless EEG is abnormal.    -     EEG Routine; Future    Return for In-Clinic Visit (must), After testing.    Over 68 minutes were spent coordinating the care for the patient on the day of the encounter.  This includes previsit, during visit and post visit activities as documented above.  Time spent reviewing outside records.  Counseling patient.  Multiple outside tests reviewed.  High risk with extensive testing.  (Activities include but not inclusive of reviewing chart, reviewing outside records, reviewing labs and imaging study results as well as the images, patient visit time including getting history and exam,  use if applicable, review of test results with the patient and coming up with a plan in a shared model, counseling patient and family, education and answering patient questions, EMR , EMR diagnosis entry and problem list management, medication reconciliation  and prescription management if applicable, paperwork if applicable, printing documents and documentation of the visit activities.)  Billing:-4 data points, 4 problem points, 4 risk points      Vik Campos MD  Neurologist  [facility]  Tel:- [phone]    This note was dictated using voice recognition software.  Any grammatical or context distortions are unintentional and inherent to the software.

## 2022-04-11 ENCOUNTER — ANCILLARY PROCEDURE (OUTPATIENT)
Dept: NEUROLOGY | Facility: CLINIC | Age: 42
End: 2022-04-11
Attending: PSYCHIATRY & NEUROLOGY
Payer: COMMERCIAL

## 2022-04-11 ENCOUNTER — OFFICE VISIT (OUTPATIENT)
Dept: NEUROLOGY | Facility: CLINIC | Age: 42
End: 2022-04-11

## 2022-04-11 VITALS
HEART RATE: 56 BPM | SYSTOLIC BLOOD PRESSURE: 189 MMHG | BODY MASS INDEX: 36.93 KG/M2 | HEIGHT: 75 IN | DIASTOLIC BLOOD PRESSURE: 122 MMHG | WEIGHT: 297 LBS

## 2022-04-11 DIAGNOSIS — R56.9 SEIZURE-LIKE ACTIVITY (H): Primary | ICD-10-CM

## 2022-04-11 DIAGNOSIS — Z86.19 HX OF SEPSIS: ICD-10-CM

## 2022-04-11 DIAGNOSIS — R56.9 SEIZURE-LIKE ACTIVITY (H): ICD-10-CM

## 2022-04-11 PROCEDURE — 95816 EEG AWAKE AND DROWSY: CPT | Performed by: PSYCHIATRY & NEUROLOGY

## 2022-04-11 PROCEDURE — 99213 OFFICE O/P EST LOW 20 MIN: CPT | Performed by: PSYCHIATRY & NEUROLOGY

## 2022-04-11 NOTE — LETTER
4/11/2022         RE: Romeo Chong  [address]  [address]  [place] [address]        Dear Colleague,    Thank you for referring your patient, Romeo Chong, to the [facility] [place]. Please see a copy of my visit note below.    NEUROLOGY OUTPATIENT PROGRESS NOTE   Apr 11, 2022     CHIEF COMPLAINT/REASON FOR VISIT/REASON FOR CONSULT  Patient presents with:  Results: Discuss EEG results    REASON FOR CONSULTATION-seizure-like activity    REFERRAL SOURCE  Dr. Frye  CC Dr. Frye    HISTORY OF PRESENT ILLNESS  Romeo Chong is a 41 year old male seen today for driving clearance for seizure-like activity.  Patient reports that he is to be continued system and frequently gets attacks of sepsis.  He has had 6 attacks of sepsis.  He was found unresponsive during one of the attacks of sepsis in 2021.  Was admitted to the hospital in [place].  During the hospitalization he was encephalopathic.  He had 2 spells where he his head was shaking with some ongoing confusion and loss of consciousness.  He was seen by the neurologist in the hospital.  MRI brain and EEG were negative.  The spells were thought to be related to Dilaudid and this was stopped.  He was never put on any seizure medication.  Because of the spells his driving license has been revoked and is looking to get back to driving.    He reports no other spells of seizures.  Does have some spells of lightheadedness related to bradycardia.  Is following up with the heart doctor.  No history of TBI's or other seizures.  No substance use or alcohol abuse.  No family history of seizures.  Has not had any other spells since then.    4/11/22  Patient returns today.  No seizures since she was last seen.  No other spells.  Did complete the EEG today.  Has cardiology appointment later this week.    Previous history is reviewed and this is unchanged.    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 Discovered in right tibia and femur at 13 years old.  He had surgery when  he was 14 years old.   Last Assessment & Plan:  Formatting of this note might be different from the original. Diagnosed at 13, 14 surgeries since that time   Formatting of this note might be different from the original. Formatting of this note might be different      Gastric ulcer, unspecified chronicity, unspecified whether gastric ulcer hemorrhage or perforation present 5/6/2021     H/O Hodgkin's lymphoma 5/6/2021     Hyperlipidemia      Hypertension      Hypertriglyceridemia 5/8/2011     Stress hyperglycemia 4/26/2009     Patient Active Problem List   Diagnosis     Asthma     Edema     Fibrous dysplasia of bone     Essential hypertension, benign     Hypertriglyceridemia     Lymphedema     Obesity, unspecified     Stress hyperglycemia     Vitamin D deficiency     Allergic rhinitis due to animals     Nonintractable headache, unspecified chronicity pattern, unspecified headache type     Chronic pain syndrome     Gastric ulcer, unspecified chronicity, unspecified whether gastric ulcer hemorrhage or perforation present     Chronic low back pain, unspecified back pain laterality, unspecified whether sciatica present     H/O Hodgkin's lymphoma     Obesity (BMI 35.0-39.9) with comorbidity (H)     Calcaneal spur, left     Tachycardia     Cellulitis of right lower extremity     Elevated lactic acid level     Bacterial sepsis (H)     Gram-positive bacteremia     HTN (hypertension)     Osteomyelitis, unspecified site, unspecified type (H)     Gastroesophageal reflux disease without esophagitis     Lymphadenopathy     Mediastinal mass     Severe sepsis with acute organ dysfunction due to group B Streptococcus (H)   Reviewed  and otherwise noncontributory.  Remote history of Hodgkin's lymphoma.    FAMILY HISTORY  Family History   Problem Relation Age of Onset     Cirrhosis Mother      Hypertension Mother      Diabetes Type 2  Father      Kidney failure Father      Cerebrovascular Disease Father      Hypertension Father      Cerebrovascular Disease Sister      Hypertension Paternal Grandmother      Hypertension Paternal Grandfather    Reviewed and noncontributory.  Negative for seizures.    SOCIAL HISTORY  Social History     Tobacco Use     Smoking status: Former Smoker     Packs/day: 1.00     Start date: 1999     Quit date: 2009     Years since quittin.9     Smokeless tobacco: Never Used   Substance Use Topics     Alcohol use: Not Currently     Drug use: Never       SYSTEMS REVIEW  Twelve-system ROS was done and other than the HPI this was negative except for pain in the right leg.  Does have some chronic lymphedema.  Also complains of joint pain and back pain.  No new issues.    MEDICATIONS  apixaban ANTICOAGULANT (ELIQUIS) 5 MG tablet, Take 1 tablet (5 mg) by mouth 2 times daily  carvedilol (COREG) 12.5 MG tablet, Take 1 tablet (12.5 mg) by mouth 2 times daily (with meals)  cephALEXin (KEFLEX) 500 MG capsule, TAKE 1 CAPSULE BY MOUTH TWICE DAILY FOR 7 DAYS  cholecalciferol (VITAMIN D3) 125 mcg (5000 units) capsule, TAKE 1 CAPSULE BY MOUTH EVERY DAY  famotidine (PEPCID) 40 MG tablet, Take 1 tablet (40 mg) by mouth 2 times daily  furosemide (LASIX) 40 MG tablet, Take Lasix 40 mg as needed for shortness of breath  HYDROcodone-acetaminophen (NORCO)  MG per tablet, Take 1-2 tablets by mouth 3 times daily as needed for pain  metFORMIN (GLUCOPHAGE-XR) 500 MG 24 hr tablet, 1 tablet with dinner daily for 2 weeks then 2 tablets with dinner  potassium chloride ER (K-TAB/KLOR-CON) 10 MEQ CR tablet, TAKE 2 TABLETS BY MOUTH EVERY MORNING AND 1 EVERY EVENING    No current facility-administered medications on file prior to visit.    "    PHYSICAL EXAMINATION  VITALS: BP (!) 189/122 (BP Location: Right arm, Patient Position: Sitting)   Pulse 56   Ht 1.905 m (6' 3\")   Wt 134.7 kg (297 lb)   BMI 37.12 kg/m    GENERAL: Healthy appearing, alert, no acute distress, normal habitus.  CARDIOVASCULAR: Extremities warm and well perfused. Pulses present.   NEUROLOGICAL:  Patient is awake and oriented to self, place and time.  Attention span is normal.  Memory is grossly intact.  Language is fluent and follows commands appropriately.  Appropriate fund of knowledge. Cranial nerves 2-12 are intact. There is no pronator drift.  Motor exam shows 5/5 strength in all extremities except in the right leg related to pain.  Tone is symmetric bilaterally in upper and lower extremities.  (Previously reflexes are symmetric and 2+ in upper extremities and lower extremities. Sensory exam is grossly intact to light touch, pin prick and vibration.)  Finger to nose and heel to shin is without dysmetria.  Romberg is negative.  Gait is normal and the patient is able to do tandem walk and walk on toes and heels with some difficulty due to right leg pain.  Exam overall unchanged compared to before.    DIAGNOSTICS  MRI 2021  1.  No acute infarct or intracranial mass lesion.   2.  Nonspecific multifocal cerebral white matter disease may reflect   sequelae of nonamyloid microangiopathy.     EEG 2021  Impression: No evidence of significant epileptiform discharges or   focal abnormalities on this record. Normal awake and sleep EEG.    ECHO  1. Left ventricular size, wall thickness, and systolic function are normal.  The estimated left ventricular ejection fraction is 55%.  2. Right ventricular size and systolic function are normal.  3. No hemodynamically significant valvular abnormalities.  4. No prior study available for comparison.    OUTSIDE RECORDS  Outside referral notes and chart notes were reviewed and pertinent information has been summarized (in addition to the " HPI):-Patient was seen in primary care.  Was referred to neurology.  Patient had episode of syncope and cardiac and neurological causes need to be ruled out.  Syncope was due to sepsis.  He was recently pulled over by a  and needs a driving license evaluation done.  Is been seen in the ER for chest pain in January 2020    In October 2021 when he had some seizure-like activity.        Sodium 134 in hospital      EEG  IMPRESSION/REPORT/PLAN  This is a normal EEG during wakefulness and drowsiness except for mild generalized background dysrhythmia. Further clinical correlation is needed.     Please note that the absence of epileptiform abnormalities does not exclude the possibility of epilepsy in any patient.     IMPRESSION/REPORT/PLAN  Seizure-like activity (H)  Hx of sepsis    This is a 41 year old male with 2 episodes of seizure-like activity that happened in the hospital while he was admitted for sepsis.  MRI brain and EEG were noncontributory at that point.  Per neurologist who evaluated the patient during the hospitalization these were thought to be related to Dilaudid.  Patient was not started on seizure medication.  He has been free of episodes since then.    Given that the patient was not diagnosed with epilepsy and the spells were thought to be provoked due to Dilaudid use there will be no restrictions to driving from the epilepsy standpoint.  The EEG was done while he was having sepsis/having metabolic encephalopathy which can decrease the accuracy of the EEG.  Repeat EEG done in an outpatient basis has not suggested epilepsy either.  We will hold off on seizure medication for right now.    Would also recommend cardiac clearance for driving.  Patient does have an appointment with cardiology already set up later this week.    If patient does have more spells of passing out then he potentially would need to be on seizure medication and put on driving restrictions.    Return if symptoms worsen or fail to  improve, for In-Clinic Visit (must).    Over 22 minutes were spent coordinating the care for the patient on the day of the encounter.  This includes previsit, during visit and post visit activities as documented above.  Counseling patient.  Reviewing chart.  Reviewing EEG results.  (Activities include but not inclusive of reviewing chart, reviewing outside records, reviewing labs and imaging study results as well as the images, patient visit time including getting history and exam,  use if applicable, review of test results with the patient and coming up with a plan in a shared model, counseling patient and family, education and answering patient questions, EMR , EMR diagnosis entry and problem list management, medication reconciliation and prescription management if applicable, paperwork if applicable, printing documents and documentation of the visit activities.)      Vik Campos MD  Neurologist  [facility]  Tel:- [phone]    This note was dictated using voice recognition software.  Any grammatical or context distortions are unintentional and inherent to the software.        Again, thank you for allowing me to participate in the care of your patient.        Sincerely,        Vik Campos MD

## 2022-04-11 NOTE — NURSING NOTE
Chief Complaint   Patient presents with     Results     Discuss EEG results     Jess Felix LPN on 4/11/2022 at 2:49 PM    
16-Aug-2020 14:29

## 2022-04-13 ENCOUNTER — OFFICE VISIT (OUTPATIENT)
Dept: CARDIOLOGY | Facility: CLINIC | Age: 42
End: 2022-04-13
Attending: FAMILY MEDICINE
Payer: COMMERCIAL

## 2022-04-13 VITALS
BODY MASS INDEX: 36.37 KG/M2 | WEIGHT: 291 LBS | SYSTOLIC BLOOD PRESSURE: 150 MMHG | DIASTOLIC BLOOD PRESSURE: 100 MMHG | RESPIRATION RATE: 16 BRPM | HEART RATE: 60 BPM

## 2022-04-13 DIAGNOSIS — R94.30 LOW LEFT VENTRICULAR EJECTION FRACTION: ICD-10-CM

## 2022-04-13 DIAGNOSIS — I89.0 LYMPHEDEMA: ICD-10-CM

## 2022-04-13 DIAGNOSIS — E66.01 MORBID OBESITY (H): ICD-10-CM

## 2022-04-13 DIAGNOSIS — I42.9 CARDIOMYOPATHY, UNSPECIFIED TYPE (H): ICD-10-CM

## 2022-04-13 DIAGNOSIS — I50.22 CHRONIC SYSTOLIC CONGESTIVE HEART FAILURE (H): Primary | ICD-10-CM

## 2022-04-13 DIAGNOSIS — I10 UNCONTROLLED HYPERTENSION: ICD-10-CM

## 2022-04-13 PROCEDURE — 99214 OFFICE O/P EST MOD 30 MIN: CPT | Performed by: INTERNAL MEDICINE

## 2022-04-13 RX ORDER — LOSARTAN POTASSIUM AND HYDROCHLOROTHIAZIDE 12.5; 1 MG/1; MG/1
1 TABLET ORAL DAILY
Qty: 90 TABLET | Refills: 4 | Status: SHIPPED | OUTPATIENT
Start: 2022-04-13 | End: 2023-04-07

## 2022-04-13 NOTE — LETTER
4/13/2022    Chely Frye MD  [address]  [place] [address]    RE: Romeo Chong       Dear Colleague,     I had the pleasure of seeing Romeo Chong in the [facility].      Click to link to [facility] HEART CARE NOTE    Thank you, Dr. Cazares, for asking me to see Romeo Chong in consultation at [facility] to evaluate shortness of breath.      Assessment/Plan:   1.  Cardiomyopathy, LVEF of 40%, chronic systolic congestive heart failure: The patient's left ventricle was moderately dilated, moderately reduced left ventricular systolic function 40% from previous study LVEF of 60%.  This could be caused by severe sepsis, uncontrolled hypertension leading to hypertensive cardiomyopathy, ischemia cardiomyopathy.  He has several risk of factors including morbid obesity, hypertension, hyperlipidemia, family history of CAD.  His chest CTA was also reported coronary atherosclerosis.    His stress cardiac MRI was not done because of for Covid infection.  Reschedule stress cardiac MRI for further evaluation.  Increase losartan to 100 mg daily for better blood pressure control.  Continue carvedilol 12.5 mg twice a day.  His ventricular rate is 60 bpm, may not be able to further titrate up beta-blocker.  Lasix 40 mg as needed for shortness of breath is prescribed.    2.  Uncontrolled hypertension: His blood pressure is high.  Continue carvedilol 12.5 mg twice a day, increase losartan to 100 mg daily for better blood pressure control, the tablets also include 12.5 mg of hydrochlorothiazide.  Continue to monitor his blood pressure, the target is less than 130/80 mmHg.    3.  History of pulmonary embolism: He is on anticoagulation Eliquis 5 mg twice a day.    4.  Right leg lymphedema: Follow-up with lymphedema clinic.      5.  Morbid obesity: Lifestyle modification.    Thank you for the opportunity to be involved in the care of Romeo  UMESH Chong. If you have any questions, please feel free to contact me.  I will see the patient again in 6 months and as needed    Much or all of the text in this note was generated through the use of Dragon Dictate voice-to-text software. Errors in spelling or words which seem out of context are unintentional.   Sound alike errors, in particular, may have escaped editing.       History of Present Illness:   It is my pleasure to see Romeo Chong at the [facility] clinic for routine cardiology follow-up. Romeo Chong is a 41 year old male with a medical history of right leg lymphedema with multiple cellulitis, benign essential hypertension, morbid obesity, gastric reflux disease, pulmonary embolism, history of asthma, history of Hodgkin's lymphoma.    The patient states that he has no chest pain.  He does have significant shortness of breath on exertion.  He lost 5 to 6 pounds since last visit.  He had no palpitations, dizziness.  His right leg lymphedema is stable.  No edema in left leg.  He has no orthopnea, PND.  His blood pressure is 150/100 mmHg today.  He has been taking his medication regularly.      He was scheduled to have a stress cardiac MRI, but it was not done because he got a COVID infection.    Past Medical History:     Patient Active Problem List   Diagnosis     Asthma     Edema     Fibrous dysplasia of bone     Essential hypertension, benign     Hypertriglyceridemia     Lymphedema     Obesity, unspecified     Stress hyperglycemia     Vitamin D deficiency     Allergic rhinitis due to animals     Nonintractable headache, unspecified chronicity pattern, unspecified headache type     Chronic pain syndrome     Gastric ulcer, unspecified chronicity, unspecified whether gastric ulcer hemorrhage or perforation present     Chronic low back pain, unspecified back pain laterality, unspecified whether sciatica present     H/O Hodgkin's lymphoma     Obesity (BMI 35.0-39.9) with comorbidity  (H)     Calcaneal spur, left     Tachycardia     Cellulitis of right lower extremity     Elevated lactic acid level     Bacterial sepsis (H)     Gram-positive bacteremia     HTN (hypertension)     Osteomyelitis, unspecified site, unspecified type (H)     Gastroesophageal reflux disease without esophagitis     Lymphadenopathy     Mediastinal mass     Severe sepsis with acute organ dysfunction due to group B Streptococcus (H)       Past Surgical History:     Past Surgical History:   Procedure Laterality Date     FRACTURE SURGERY       HC REMOVAL HEEL SPUR, CALCANEUS Left 6/25/2021    Procedure: EXCISION, BONE SPUR, FOOT, WITH PLANTAR FASCIA RELEASE;  Surgeon: Stephon Singleton DPM;  Location: [facility];  Service: Podiatry     TONSILLECTOMY, ADENOIDECTOMY ADULT, COMBINED         Family History:     Family History   Problem Relation Age of Onset     Cirrhosis Mother      Hypertension Mother      Diabetes Type 2  Father      Kidney failure Father      Cerebrovascular Disease Father      Hypertension Father      Cerebrovascular Disease Sister      Hypertension Paternal Grandmother      Hypertension Paternal Grandfather        Social History:    reports that he quit smoking about 12 years ago. He started smoking about 22 years ago. He smoked 1.00 pack per day. He has never used smokeless tobacco. He reports previous alcohol use. He reports that he does not use drugs.    Review of Systems:   12 systems are reviewed negative except for in HPI.    Meds:     Current Outpatient Medications:      apixaban ANTICOAGULANT (ELIQUIS) 5 MG tablet, Take 1 tablet (5 mg) by mouth 2 times daily, Disp: 60 tablet, Rfl: 5     carvedilol (COREG) 12.5 MG tablet, Take 1 tablet (12.5 mg) by mouth 2 times daily (with meals), Disp: 60 tablet, Rfl: 11     cephALEXin (KEFLEX) 500 MG capsule, TAKE 1 CAPSULE BY MOUTH TWICE DAILY FOR 7 DAYS, Disp: , Rfl:      cholecalciferol (VITAMIN D3) 125 mcg (5000 units) capsule, TAKE 1 CAPSULE BY MOUTH  EVERY DAY, Disp: , Rfl:      famotidine (PEPCID) 40 MG tablet, Take 1 tablet (40 mg) by mouth 2 times daily, Disp: 180 tablet, Rfl: 1     furosemide (LASIX) 40 MG tablet, Take Lasix 40 mg as needed for shortness of breath, Disp: 30 tablet, Rfl: 4     HYDROcodone-acetaminophen (NORCO)  MG per tablet, Take 1-2 tablets by mouth 3 times daily as needed for pain, Disp: 180 tablet, Rfl: 0     losartan-hydrochlorothiazide (HYZAAR) 100-12.5 MG tablet, Take 1 tablet by mouth daily, Disp: 90 tablet, Rfl: 4     metFORMIN (GLUCOPHAGE-XR) 500 MG 24 hr tablet, 1 tablet with dinner daily for 2 weeks then 2 tablets with dinner, Disp: 90 tablet, Rfl: 1     potassium chloride ER (K-TAB/KLOR-CON) 10 MEQ CR tablet, TAKE 2 TABLETS BY MOUTH EVERY MORNING AND 1 EVERY EVENING, Disp: 270 tablet, Rfl: 2     Allergies:   Vancomycin, Peanut oil, Tree nuts [nuts], Erythromycin, Latex, Other environmental allergy, Pollen extracts [pollen extract], Zosyn [piperacillin-tazobactam in d5w], and Oxycodone    Objective:      Physical Exam  132 kg (291 lb)     Body mass index is 36.37 kg/m .  BP (!) 150/100 (BP Location: Left arm, Patient Position: Sitting, Cuff Size: Adult Large)   Pulse 60   Resp 16   Wt 132 kg (291 lb)   BMI 36.37 kg/m      General Appearance:   Awake, Alert, No acute distress.   HEENT:  Pupil equal, reactive to light. No scleral icterus; the mucous membranes were moist. No oral ulcers or thrush.    Neck: No cervical bruits. No JVD. No thyromegaly. No lymph node enlargement or tenderness.   Chest: The spine was straight. The chest was symmetric.   Lungs:   Respirations unlabored. Lungs are clear to auscultation. No crackles. No wheezing.   Cardiovascular:   RRR, normal first and second heart sounds with no murmurs. No rubs or gallops.    Abdomen:  Obese. Soft. No tenderness. Non-distended. Bowels sounds are present   Extremities: Right leg lymphoedema. No edema in left leg.   Skin: No rashes or ulcers. Warm, Dry.    Musculoskeletal: No tenderness. No deformity.   Neurologic: Mood and affect are appropriate. No focal deficits.         EKG:  Personally reivewed  Sinus tachycardia   Right bundle branch block   Abnormal ECG   When compared with ECG of 18-AUG-2021 04:52,   No significant change was found      Cardiac Imaging Studies  ECHO on 10-:  Left ventricle is moderately dilated, moderate global hypokinesis, LVEF of 40%, normal right ventricle size and function, no obvious valvular disease.    ECHO on 8-:  1. Left ventricular size, wall thickness, and systolic function are normal.  The estimated left ventricular ejection fraction is 55%.  2. Right ventricular size and systolic function are normal.  3. No hemodynamically significant valvular abnormalities.  4. No prior study available for comparison.    Lab Review   Lab Results   Component Value Date     11/17/2021    CO2 20 11/17/2021    BUN 8 11/17/2021     Lab Results   Component Value Date    WBC 3.7 11/17/2021    HGB 13.1 11/17/2021    HCT 41.0 11/17/2021    MCV 82 11/17/2021     11/17/2021                     Thank you for allowing me to participate in the care of your patient.      Sincerely,     Yadiel Cantrell MD     [facility]  cc:   Chely Frye MD  [address] [address]

## 2022-04-13 NOTE — PATIENT INSTRUCTIONS
Romeo Chong,    It is my pleasure to see you today at the [facility].    My recommendations for this visit are:    Continue carvedilol 12.5 mg bid  Increase losartan to 100 mg daily for better blood pressure control  Stress cardiac MRI for evaluation of heart disease and clearance of driving  See you again in 6 months      Yadiel Cantrell MD, PhD

## 2022-04-13 NOTE — PROGRESS NOTES
Click to link to [facility] HEART CARE NOTE    Thank you, Dr. Cazares, for asking me to see Romeo Chong in consultation at [facility] to evaluate shortness of breath.      Assessment/Plan:   1.  Cardiomyopathy, LVEF of 40%, chronic systolic congestive heart failure: The patient's left ventricle was moderately dilated, moderately reduced left ventricular systolic function 40% from previous study LVEF of 60%.  This could be caused by severe sepsis, uncontrolled hypertension leading to hypertensive cardiomyopathy, ischemia cardiomyopathy.  He has several risk of factors including morbid obesity, hypertension, hyperlipidemia, family history of CAD.  His chest CTA was also reported coronary atherosclerosis.    His stress cardiac MRI was not done because of for Covid infection.  Reschedule stress cardiac MRI for further evaluation.  Increase losartan to 100 mg daily for better blood pressure control.  Continue carvedilol 12.5 mg twice a day.  His ventricular rate is 60 bpm, may not be able to further titrate up beta-blocker.  Lasix 40 mg as needed for shortness of breath is prescribed.    2.  Uncontrolled hypertension: His blood pressure is high.  Continue carvedilol 12.5 mg twice a day, increase losartan to 100 mg daily for better blood pressure control, the tablets also include 12.5 mg of hydrochlorothiazide.  Continue to monitor his blood pressure, the target is less than 130/80 mmHg.    3.  History of pulmonary embolism: He is on anticoagulation Eliquis 5 mg twice a day.    4.  Right leg lymphedema: Follow-up with lymphedema clinic.      5.  Morbid obesity: Lifestyle modification.    Thank you for the opportunity to be involved in the care of Romeo Chong. If you have any questions, please feel free to contact me.  I will see the patient again in 6 months and as needed    Much or all of the text in this note was generated through the use of Dragon Dictate  voice-to-text software. Errors in spelling or words which seem out of context are unintentional.   Sound alike errors, in particular, may have escaped editing.       History of Present Illness:   It is my pleasure to see Romeo Chong at the [facility] for routine cardiology follow-up. Romeo Chong is a 41 year old male with a medical history of right leg lymphedema with multiple cellulitis, benign essential hypertension, morbid obesity, gastric reflux disease, pulmonary embolism, history of asthma, history of Hodgkin's lymphoma.    The patient states that he has no chest pain.  He does have significant shortness of breath on exertion.  He lost 5 to 6 pounds since last visit.  He had no palpitations, dizziness.  His right leg lymphedema is stable.  No edema in left leg.  He has no orthopnea, PND.  His blood pressure is 150/100 mmHg today.  He has been taking his medication regularly.      He was scheduled to have a stress cardiac MRI, but it was not done because he got a COVID infection.    Past Medical History:     Patient Active Problem List   Diagnosis     Asthma     Edema     Fibrous dysplasia of bone     Essential hypertension, benign     Hypertriglyceridemia     Lymphedema     Obesity, unspecified     Stress hyperglycemia     Vitamin D deficiency     Allergic rhinitis due to animals     Nonintractable headache, unspecified chronicity pattern, unspecified headache type     Chronic pain syndrome     Gastric ulcer, unspecified chronicity, unspecified whether gastric ulcer hemorrhage or perforation present     Chronic low back pain, unspecified back pain laterality, unspecified whether sciatica present     H/O Hodgkin's lymphoma     Obesity (BMI 35.0-39.9) with comorbidity (H)     Calcaneal spur, left     Tachycardia     Cellulitis of right lower extremity     Elevated lactic acid level     Bacterial sepsis (H)     Gram-positive bacteremia     HTN (hypertension)     Osteomyelitis,  unspecified site, unspecified type (H)     Gastroesophageal reflux disease without esophagitis     Lymphadenopathy     Mediastinal mass     Severe sepsis with acute organ dysfunction due to group B Streptococcus (H)       Past Surgical History:     Past Surgical History:   Procedure Laterality Date     FRACTURE SURGERY       HC REMOVAL HEEL SPUR, CALCANEUS Left 6/25/2021    Procedure: EXCISION, BONE SPUR, FOOT, WITH PLANTAR FASCIA RELEASE;  Surgeon: Stephon Singleton DPM;  Location: [facility];  Service: Podiatry     TONSILLECTOMY, ADENOIDECTOMY ADULT, COMBINED         Family History:     Family History   Problem Relation Age of Onset     Cirrhosis Mother      Hypertension Mother      Diabetes Type 2  Father      Kidney failure Father      Cerebrovascular Disease Father      Hypertension Father      Cerebrovascular Disease Sister      Hypertension Paternal Grandmother      Hypertension Paternal Grandfather        Social History:    reports that he quit smoking about 12 years ago. He started smoking about 22 years ago. He smoked 1.00 pack per day. He has never used smokeless tobacco. He reports previous alcohol use. He reports that he does not use drugs.    Review of Systems:   12 systems are reviewed negative except for in HPI.    Meds:     Current Outpatient Medications:      apixaban ANTICOAGULANT (ELIQUIS) 5 MG tablet, Take 1 tablet (5 mg) by mouth 2 times daily, Disp: 60 tablet, Rfl: 5     carvedilol (COREG) 12.5 MG tablet, Take 1 tablet (12.5 mg) by mouth 2 times daily (with meals), Disp: 60 tablet, Rfl: 11     cephALEXin (KEFLEX) 500 MG capsule, TAKE 1 CAPSULE BY MOUTH TWICE DAILY FOR 7 DAYS, Disp: , Rfl:      cholecalciferol (VITAMIN D3) 125 mcg (5000 units) capsule, TAKE 1 CAPSULE BY MOUTH EVERY DAY, Disp: , Rfl:      famotidine (PEPCID) 40 MG tablet, Take 1 tablet (40 mg) by mouth 2 times daily, Disp: 180 tablet, Rfl: 1     furosemide (LASIX) 40 MG tablet, Take Lasix 40 mg as needed for shortness  of breath, Disp: 30 tablet, Rfl: 4     HYDROcodone-acetaminophen (NORCO)  MG per tablet, Take 1-2 tablets by mouth 3 times daily as needed for pain, Disp: 180 tablet, Rfl: 0     losartan-hydrochlorothiazide (HYZAAR) 100-12.5 MG tablet, Take 1 tablet by mouth daily, Disp: 90 tablet, Rfl: 4     metFORMIN (GLUCOPHAGE-XR) 500 MG 24 hr tablet, 1 tablet with dinner daily for 2 weeks then 2 tablets with dinner, Disp: 90 tablet, Rfl: 1     potassium chloride ER (K-TAB/KLOR-CON) 10 MEQ CR tablet, TAKE 2 TABLETS BY MOUTH EVERY MORNING AND 1 EVERY EVENING, Disp: 270 tablet, Rfl: 2     Allergies:   Vancomycin, Peanut oil, Tree nuts [nuts], Erythromycin, Latex, Other environmental allergy, Pollen extracts [pollen extract], Zosyn [piperacillin-tazobactam in d5w], and Oxycodone    Objective:      Physical Exam  132 kg (291 lb)     Body mass index is 36.37 kg/m .  BP (!) 150/100 (BP Location: Left arm, Patient Position: Sitting, Cuff Size: Adult Large)   Pulse 60   Resp 16   Wt 132 kg (291 lb)   BMI 36.37 kg/m      General Appearance:   Awake, Alert, No acute distress.   HEENT:  Pupil equal, reactive to light. No scleral icterus; the mucous membranes were moist. No oral ulcers or thrush.    Neck: No cervical bruits. No JVD. No thyromegaly. No lymph node enlargement or tenderness.   Chest: The spine was straight. The chest was symmetric.   Lungs:   Respirations unlabored. Lungs are clear to auscultation. No crackles. No wheezing.   Cardiovascular:   RRR, normal first and second heart sounds with no murmurs. No rubs or gallops.    Abdomen:  Obese. Soft. No tenderness. Non-distended. Bowels sounds are present   Extremities: Right leg lymphoedema. No edema in left leg.   Skin: No rashes or ulcers. Warm, Dry.   Musculoskeletal: No tenderness. No deformity.   Neurologic: Mood and affect are appropriate. No focal deficits.         EKG:  Personally reivewed  Sinus tachycardia   Right bundle branch block   Abnormal ECG   When  compared with ECG of 18-AUG-2021 04:52,   No significant change was found      Cardiac Imaging Studies  ECHO on 10-:  Left ventricle is moderately dilated, moderate global hypokinesis, LVEF of 40%, normal right ventricle size and function, no obvious valvular disease.    ECHO on 8-:  1. Left ventricular size, wall thickness, and systolic function are normal.  The estimated left ventricular ejection fraction is 55%.  2. Right ventricular size and systolic function are normal.  3. No hemodynamically significant valvular abnormalities.  4. No prior study available for comparison.    Lab Review   Lab Results   Component Value Date     11/17/2021    CO2 20 11/17/2021    BUN 8 11/17/2021     Lab Results   Component Value Date    WBC 3.7 11/17/2021    HGB 13.1 11/17/2021    HCT 41.0 11/17/2021    MCV 82 11/17/2021     11/17/2021

## 2022-04-19 ENCOUNTER — TRANSFERRED RECORDS (OUTPATIENT)
Dept: HEALTH INFORMATION MANAGEMENT | Facility: CLINIC | Age: 42
End: 2022-04-19
Payer: COMMERCIAL

## 2022-04-24 NOTE — PROGRESS NOTES
NEUROLOGY OUTPATIENT PROGRESS NOTE   Apr 11, 2022     CHIEF COMPLAINT/REASON FOR VISIT/REASON FOR CONSULT  Patient presents with:  Results: Discuss EEG results    REASON FOR CONSULTATION-seizure-like activity    REFERRAL SOURCE  Dr. Frye  CC Dr. Frye    HISTORY OF PRESENT ILLNESS  Romeo Chong is a 41 year old male seen today for driving clearance for seizure-like activity.  Patient reports that he is to be continued system and frequently gets attacks of sepsis.  He has had 6 attacks of sepsis.  He was found unresponsive during one of the attacks of sepsis in 2021.  Was admitted to the hospital in [place].  During the hospitalization he was encephalopathic.  He had 2 spells where he his head was shaking with some ongoing confusion and loss of consciousness.  He was seen by the neurologist in the hospital.  MRI brain and EEG were negative.  The spells were thought to be related to Dilaudid and this was stopped.  He was never put on any seizure medication.  Because of the spells his driving license has been revoked and is looking to get back to driving.    He reports no other spells of seizures.  Does have some spells of lightheadedness related to bradycardia.  Is following up with the heart doctor.  No history of TBI's or other seizures.  No substance use or alcohol abuse.  No family history of seizures.  Has not had any other spells since then.    4/11/22  Patient returns today.  No seizures since she was last seen.  No other spells.  Did complete the EEG today.  Has cardiology appointment later this week.    Previous history is reviewed and this is unchanged.    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 Discovered in right tibia and femur  at 13 years old.  He had surgery when  he was 14 years old.   Last Assessment & Plan:  Formatting of this note might be different from the original. Diagnosed at 13, 14 surgeries since that time   Formatting of this note might be different from the original. Formatting of this note might be different      Gastric ulcer, unspecified chronicity, unspecified whether gastric ulcer hemorrhage or perforation present 5/6/2021     H/O Hodgkin's lymphoma 5/6/2021     Hyperlipidemia      Hypertension      Hypertriglyceridemia 5/8/2011     Stress hyperglycemia 4/26/2009     Patient Active Problem List   Diagnosis     Asthma     Edema     Fibrous dysplasia of bone     Essential hypertension, benign     Hypertriglyceridemia     Lymphedema     Obesity, unspecified     Stress hyperglycemia     Vitamin D deficiency     Allergic rhinitis due to animals     Nonintractable headache, unspecified chronicity pattern, unspecified headache type     Chronic pain syndrome     Gastric ulcer, unspecified chronicity, unspecified whether gastric ulcer hemorrhage or perforation present     Chronic low back pain, unspecified back pain laterality, unspecified whether sciatica present     H/O Hodgkin's lymphoma     Obesity (BMI 35.0-39.9) with comorbidity (H)     Calcaneal spur, left     Tachycardia     Cellulitis of right lower extremity     Elevated lactic acid level     Bacterial sepsis (H)     Gram-positive bacteremia     HTN (hypertension)     Osteomyelitis, unspecified site, unspecified type (H)     Gastroesophageal reflux disease without esophagitis     Lymphadenopathy     Mediastinal mass     Severe sepsis with acute organ dysfunction due to group B Streptococcus (H)   Reviewed and otherwise noncontributory.  Remote history of Hodgkin's lymphoma.    FAMILY HISTORY  Family History   Problem Relation Age of Onset     Cirrhosis Mother      Hypertension Mother      Diabetes Type 2  Father      Kidney failure Father      Cerebrovascular Disease  "Father      Hypertension Father      Cerebrovascular Disease Sister      Hypertension Paternal Grandmother      Hypertension Paternal Grandfather    Reviewed and noncontributory.  Negative for seizures.    SOCIAL HISTORY  Social History     Tobacco Use     Smoking status: Former Smoker     Packs/day: 1.00     Start date: 1999     Quit date: 2009     Years since quittin.9     Smokeless tobacco: Never Used   Substance Use Topics     Alcohol use: Not Currently     Drug use: Never       SYSTEMS REVIEW  Twelve-system ROS was done and other than the HPI this was negative except for pain in the right leg.  Does have some chronic lymphedema.  Also complains of joint pain and back pain.  No new issues.    MEDICATIONS  apixaban ANTICOAGULANT (ELIQUIS) 5 MG tablet, Take 1 tablet (5 mg) by mouth 2 times daily  carvedilol (COREG) 12.5 MG tablet, Take 1 tablet (12.5 mg) by mouth 2 times daily (with meals)  cephALEXin (KEFLEX) 500 MG capsule, TAKE 1 CAPSULE BY MOUTH TWICE DAILY FOR 7 DAYS  cholecalciferol (VITAMIN D3) 125 mcg (5000 units) capsule, TAKE 1 CAPSULE BY MOUTH EVERY DAY  famotidine (PEPCID) 40 MG tablet, Take 1 tablet (40 mg) by mouth 2 times daily  furosemide (LASIX) 40 MG tablet, Take Lasix 40 mg as needed for shortness of breath  HYDROcodone-acetaminophen (NORCO)  MG per tablet, Take 1-2 tablets by mouth 3 times daily as needed for pain  metFORMIN (GLUCOPHAGE-XR) 500 MG 24 hr tablet, 1 tablet with dinner daily for 2 weeks then 2 tablets with dinner  potassium chloride ER (K-TAB/KLOR-CON) 10 MEQ CR tablet, TAKE 2 TABLETS BY MOUTH EVERY MORNING AND 1 EVERY EVENING    No current facility-administered medications on file prior to visit.       PHYSICAL EXAMINATION  VITALS: BP (!) 189/122 (BP Location: Right arm, Patient Position: Sitting)   Pulse 56   Ht 1.905 m (6' 3\")   Wt 134.7 kg (297 lb)   BMI 37.12 kg/m    GENERAL: Healthy appearing, alert, no acute distress, normal habitus.  CARDIOVASCULAR: " Extremities warm and well perfused. Pulses present.   NEUROLOGICAL:  Patient is awake and oriented to self, place and time.  Attention span is normal.  Memory is grossly intact.  Language is fluent and follows commands appropriately.  Appropriate fund of knowledge. Cranial nerves 2-12 are intact. There is no pronator drift.  Motor exam shows 5/5 strength in all extremities except in the right leg related to pain.  Tone is symmetric bilaterally in upper and lower extremities.  (Previously reflexes are symmetric and 2+ in upper extremities and lower extremities. Sensory exam is grossly intact to light touch, pin prick and vibration.)  Finger to nose and heel to shin is without dysmetria.  Romberg is negative.  Gait is normal and the patient is able to do tandem walk and walk on toes and heels with some difficulty due to right leg pain.  Exam overall unchanged compared to before.    DIAGNOSTICS  MRI 2021  1.  No acute infarct or intracranial mass lesion.   2.  Nonspecific multifocal cerebral white matter disease may reflect   sequelae of nonamyloid microangiopathy.     EEG 2021  Impression: No evidence of significant epileptiform discharges or   focal abnormalities on this record. Normal awake and sleep EEG.    ECHO  1. Left ventricular size, wall thickness, and systolic function are normal.  The estimated left ventricular ejection fraction is 55%.  2. Right ventricular size and systolic function are normal.  3. No hemodynamically significant valvular abnormalities.  4. No prior study available for comparison.    OUTSIDE RECORDS  Outside referral notes and chart notes were reviewed and pertinent information has been summarized (in addition to the HPI):-Patient was seen in primary care.  Was referred to neurology.  Patient had episode of syncope and cardiac and neurological causes need to be ruled out.  Syncope was due to sepsis.  He was recently pulled over by a  and needs a driving license evaluation done.  Is been  seen in the ER for chest pain in January 2020    In October 2021 when he had some seizure-like activity.        Sodium 134 in hospital      EEG  IMPRESSION/REPORT/PLAN  This is a normal EEG during wakefulness and drowsiness except for mild generalized background dysrhythmia. Further clinical correlation is needed.     Please note that the absence of epileptiform abnormalities does not exclude the possibility of epilepsy in any patient.     IMPRESSION/REPORT/PLAN  Seizure-like activity (H)  Hx of sepsis    This is a 41 year old male with 2 episodes of seizure-like activity that happened in the hospital while he was admitted for sepsis.  MRI brain and EEG were noncontributory at that point.  Per neurologist who evaluated the patient during the hospitalization these were thought to be related to Dilaudid.  Patient was not started on seizure medication.  He has been free of episodes since then.    Given that the patient was not diagnosed with epilepsy and the spells were thought to be provoked due to Dilaudid use there will be no restrictions to driving from the epilepsy standpoint.  The EEG was done while he was having sepsis/having metabolic encephalopathy which can decrease the accuracy of the EEG.  Repeat EEG done in an outpatient basis has not suggested epilepsy either.  We will hold off on seizure medication for right now.    Would also recommend cardiac clearance for driving.  Patient does have an appointment with cardiology already set up later this week.    If patient does have more spells of passing out then he potentially would need to be on seizure medication and put on driving restrictions.    Return if symptoms worsen or fail to improve, for In-Clinic Visit (must).    Over 22 minutes were spent coordinating the care for the patient on the day of the encounter.  This includes previsit, during visit and post visit activities as documented above.  Counseling patient.  Reviewing chart.  Reviewing EEG  results.  (Activities include but not inclusive of reviewing chart, reviewing outside records, reviewing labs and imaging study results as well as the images, patient visit time including getting history and exam,  use if applicable, review of test results with the patient and coming up with a plan in a shared model, counseling patient and family, education and answering patient questions, EMR , EMR diagnosis entry and problem list management, medication reconciliation and prescription management if applicable, paperwork if applicable, printing documents and documentation of the visit activities.)      Vik Campos MD  Neurologist  [facility]  Tel:- [phone]    This note was dictated using voice recognition software.  Any grammatical or context distortions are unintentional and inherent to the software.

## 2022-04-26 DIAGNOSIS — G89.4 CHRONIC PAIN SYNDROME: ICD-10-CM

## 2022-04-26 RX ORDER — HYDROCODONE BITARTRATE AND ACETAMINOPHEN 10; 325 MG/1; MG/1
1-2 TABLET ORAL 3 TIMES DAILY PRN
Qty: 180 TABLET | Refills: 0 | Status: CANCELLED | OUTPATIENT
Start: 2022-04-26

## 2022-04-26 NOTE — TELEPHONE ENCOUNTER
Patient saw [facility] on 4/19/22.  He will see them again in May, then will take over pain management medications starting in June.  Patient asking Dr Frye to fill one more time to cover May to get him thru to June when Dr BOWLING at the pain clinic will take over.    Any questions, patient can be reached at [phone].

## 2022-04-28 RX ORDER — HYDROCODONE BITARTRATE AND ACETAMINOPHEN 10; 325 MG/1; MG/1
1-2 TABLET ORAL 3 TIMES DAILY PRN
Qty: 180 TABLET | Refills: 0 | Status: SHIPPED | OUTPATIENT
Start: 2022-04-28

## 2022-05-09 ENCOUNTER — PRE VISIT (OUTPATIENT)
Dept: RHEUMATOLOGY | Facility: CLINIC | Age: 42
End: 2022-05-09
Payer: COMMERCIAL

## 2022-05-09 NOTE — TELEPHONE ENCOUNTER
NOTES Status Details   OFFICE NOTE from referring provider Internal 10.11.2021 Sivakumar Poon MD   OFFICE NOTE from other specialist     DISCHARGE SUMMARY from hospital     DISCHARGE REPORT from the ER     MEDICATION LIST Internal    LABS (Any and all labs)      Care Everywhere  /Internal    Biopsy/pathology (Anything related to diagnoses I.e. fluid aspirations, lip biopsy, muscle biopsy)               Imaging (All imaging related to diagnoses)     Echo     HRCT     CXR     EMG                    Scleroderma/Dermatomyositis diagnoses     Previous Cardiology notes      Previous Pulmonary notes     Previous Dermatology notes     Previous GI notes     Lupus diagnoses     Previous Nephrology notes     Previous Dermatology notes     Previous Cardiology notes

## 2022-05-10 ENCOUNTER — TRANSFERRED RECORDS (OUTPATIENT)
Dept: HEALTH INFORMATION MANAGEMENT | Facility: CLINIC | Age: 42
End: 2022-05-10
Payer: COMMERCIAL

## 2022-05-14 DIAGNOSIS — K25.9 GASTRIC ULCER, UNSPECIFIED CHRONICITY, UNSPECIFIED WHETHER GASTRIC ULCER HEMORRHAGE OR PERFORATION PRESENT: ICD-10-CM

## 2022-05-14 DIAGNOSIS — I26.99 OTHER ACUTE PULMONARY EMBOLISM WITHOUT ACUTE COR PULMONALE (H): ICD-10-CM

## 2022-05-17 NOTE — TELEPHONE ENCOUNTER
Routing refill request to provider for review/approval because:  Anticoagulation protocol - route to provider.    Last Written Prescription Date:  11/17/21  Last Fill Quantity: 60,  # refills: 5   Last office visit provider:  3/2/22     Requested Prescriptions   Pending Prescriptions Disp Refills     apixaban ANTICOAGULANT (ELIQUIS) 5 MG tablet 60 tablet 5     Sig: Take 1 tablet (5 mg) by mouth 2 times daily       Direct Oral Anticoagulant Agents Passed - 5/17/2022 12:40 PM        Passed - Normal Platelets on file in past 12 months     Recent Labs   Lab Test 11/17/21  1230                  Passed - Medication is active on med list        Passed - Patient is 18-79 years of age        Passed - Serum creatinine less than or equal to 1.4 on file in past 12 mos     Recent Labs   Lab Test 11/17/21  1230   CR 0.63*       Ok to refill medication if creatinine is low          Passed - Weight is greater than 60 kg for the past year     Wt Readings from Last 3 Encounters:   04/13/22 132 kg (291 lb)   04/11/22 134.7 kg (297 lb)   04/08/22 134.7 kg (297 lb)             Passed - Recent (6 mo) or future (30 days) visit within the authorizing provider's specialty             Salinas Hebert RN 05/17/22 12:40 PM

## 2022-05-18 RX ORDER — FAMOTIDINE 40 MG/1
40 TABLET, FILM COATED ORAL 2 TIMES DAILY
Qty: 180 TABLET | Refills: 1 | Status: CANCELLED | OUTPATIENT
Start: 2022-05-18

## 2022-05-18 NOTE — TELEPHONE ENCOUNTER
Writer called and spoke with the patient and gave him the message about his Rx, he stated that he was happy to hear it and thanked writer for the call.

## 2022-05-18 NOTE — TELEPHONE ENCOUNTER
Pending Prescriptions:                       Disp   Refills    famotidine (PEPCID) 40 MG tablet          180 ta*1            Sig: Take 1 tablet (40 mg) by mouth 2 times daily  Refused Prescriptions:                       Disp   Refills    apixaban ANTICOAGULANT (ELIQUIS) 5 MG tabl*60 tab*5        Sig: Take 1 tablet (5 mg) by mouth 2 times daily  Refused By: ENID HERCULES  Reason for Refusal: Patient needs appointment

## 2022-05-18 NOTE — TELEPHONE ENCOUNTER
pls call patient:   6-month therapy for PE has been completed, will now be discontinued.   Patient to contact Heme/Oncology if continued usage is warranted.

## 2022-05-19 ENCOUNTER — TELEPHONE (OUTPATIENT)
Dept: RHEUMATOLOGY | Facility: CLINIC | Age: 42
End: 2022-05-19

## 2022-05-19 RX ORDER — FAMOTIDINE 40 MG/1
40 TABLET, FILM COATED ORAL 2 TIMES DAILY
Qty: 180 TABLET | Refills: 3 | Status: SHIPPED | OUTPATIENT
Start: 2022-05-19 | End: 2022-11-02

## 2022-05-19 NOTE — TELEPHONE ENCOUNTER
----- Message from Monse Frank RN sent at 5/19/2022 10:06 AM CDT -----  Regarding: FW: Cleve next few weeks  Please call and get pt scheduled for next available.  Can schedule with a fellow, needs to be in person.  If not open to seeing a fellow, can be scheduled with Dr. Higuera in person.    Thanks  Monse   ----- Message -----  From: Frannie Fernandez MD  Sent: 5/13/2022   5:38 PM CDT  To: Sivakumar Poon MD, Monse Frank, RN  Subject: RE: Cleve next few weeks                        I think he needs a rheum eval.     However, I m actually on vacation today and I am incredibly booked through September. I could fit him in if I have a cancellation or see if he can be seen by someone else. Unfortunately there are a lot of patients that need to be seen. If he is urgent we can also see if the on call can evaluate him.     Monse, this patient unfortunately did not show to his visit last week. could we get him to the top of my wait list for cancellations or get him a visit with someone else? I think he would also benefit from fellows clinic and having two rheum eyes on him.     Frannie  ----- Message -----  From: Sivakumar Poon MD  Sent: 5/13/2022  12:07 PM CDT  To: Aiyana Aguilar RN, #  Subject: RE: Cleve next few weeks                        Frannie would you possibly be willing to try to squeeze him in? If you don't think he needs rheum eval let me know  ----- Message -----  From: Aiyana Zuleta RN  Sent: 5/13/2022   8:24 AM CDT  To: Sivakumar Aguilar MD, #  Subject: RE: Cleve next few weeks                        I talked with Romeo yesterday. He did not have a ride for his rheumatology appointment so did not go this past Monday. This is the second time he has not made it. He canceled one other due to COVID. He said that he was doing OK.     He asked if there was somewhere else he could go for rheumatology that did nit have such a long wait. I told him him could  probably go on a wait list at ours but that might not work with his transportation. Looks like they sent him a letter of some sorts.     The plan was to see him back after Rheumatology.    Thoughts,    Hailey, carolina Bronson       ----- Message -----  From: Hailey Cash  Sent: 5/13/2022   6:54 AM CDT  To: Aiyana Zuleta RN  Subject: RE: Cleve next few weeks                        Elton Bronson     It looks like this patient was sent to see Dr Poon but it was cancelled because it was not needed, and now Dr Poon sends this message does he need an appointment?     Thanks   Hailey Cash   ----- Message -----  From: China Mcleod  Sent: 5/12/2022   1:33 PM CDT  To: [id number]  Subject: Cleve next few weeks                              ----- Message -----  From: Sivakumar Poon MD  Sent: 5/12/2022   1:33 PM CDT  To: Aiyana Zuleta RN, [facility]  Subject: follow up                                        This patient is in need of follow up with me.    Could you see if he's willing to come in in the next few weeks?    Tarah Shaver

## 2022-05-22 NOTE — PROGRESS NOTES
[facility]ariat[facility] Care Clinic Non Surgical Follow up Visit   Date of visit: 5/22/2022  Physician: ERICK Ortiz MD, MD  Primary Care is Chely Frye.  Romeo Chong   41 year old  male     Initial Weight: 290#  Initial BMI: 40.45  Today's Weight:   Wt Readings from Last 1 Encounters:   05/23/22 129.7 kg (286 lb)     Body mass index is 39.89 kg/m .           Assessment and Plan   Assessment: Romeo is a 41 year old year old male who presents for medical weight management.      Plan:    1. Morbid obesity (H)  Patient was congratulated on his success thus far. Healthy habits to assist with further weight loss were discussed. He will work on increasing his vegetable intake. He will stop the metformin as he doesn't think it is helping. We will start topamax. Risks, benefits and possible side effects discussed.We discussed the patient's co-morbid conditions including migraine headaches and hypertension. These likely will improve with healthy habits and weight loss.    2. History of migraine headaches  This may improve with healthy habits and weight loss.    3. Essential hypertension, benign  This may improve with healthy habits and weight loss.    Follow up in 1 month with our dietician and in 3 months with myself           INTERIM HISTORY  Patient is taking the metformin and and he doesn't think it is helping. Yesterday was the anniversary of his daughter's murder. He is struggling emotionally.     DIETARY HISTORY  Meals Per Day: 2-3  Eating Protein First?: sometimes  Food Diary: B:skips or oatmeal L:sushi or sunflower butter sandwich and fruit D:salmon and green vegetables  Snacks Per Day: occasional  Typical Snack: popcorn, candy  Fluid Intake: 2-4 bottles per day  Portion Control: working on it  Calorie Containing Beverages: none  Typical Protein Food Choices: fish, sunflower butter  Choosing Whole Grains: sometimes  Meals at Restaurant per week:0    Positive Changes Since Last Visit: He is eating more oatmeal and  has reduced his bread intake, cut out sugared soda  Struggling With: exercise    Knowledgeable in Reading Food Labels: not sure  Getting Adequate Protein: working on it  Sleeping 7-8 hours/day sometimes  Stress management not discussed    PHYSICAL ACTIVITY PATTERNS:  Just trying to move as much as he can    REVIEW OF SYSTEMS  GENERAL/CONSTITUTIONAL:  Fatigue: no  HEENT:  Vision changes, glaucoma: no  CARDIOVASCULAR:  Chest Pain with Exertion: no  PSYCHIATRIC:  Moods: grieving- yesterday was the anniversary of his daughter's murder  MUSCULOSKELETAL/RHEUMATOLOGIC  Arthralgias: yes  Myalgias: yes  ENDOCRINE:  Monitoring Blood Sugars: na  Sugars Well Controlled: na  No personal or family history of medullary thyroid cancer not discussed  :  Birth control: na       Patient Profile   Social History     Social History Narrative    , 3 children, 1  recently. Non smoker. Socially drinks alcohol. Not working.         Past Medical History   Past Medical History:   Diagnosis Date     Bacterial sepsis (H) 2021     Cancer (H)      Chronic pain syndrome 2021     Essential hypertension, benign 2010     Fibrous dysplasia of bone 2009    Formatting of this note might be different from the original. Discovered in right tibia and femur at 13 years old.  He had surgery when  he was 14 years old.   Last Assessment & Plan:  Formatting of this note might be different from the original. Diagnosed at 13, 14 surgeries since that time   Formatting of this note might be different from the original. Formatting of this note might be different      Gastric ulcer, unspecified chronicity, unspecified whether gastric ulcer hemorrhage or perforation present 2021     H/O Hodgkin's lymphoma 2021     Hyperlipidemia      Hypertension      Hypertriglyceridemia 2011     Stress hyperglycemia 2009     Patient Active Problem List   Diagnosis     Asthma     Edema     Fibrous dysplasia of bone     Essential  "hypertension, benign     Hypertriglyceridemia     Lymphedema     Obesity, unspecified     Stress hyperglycemia     Vitamin D deficiency     Allergic rhinitis due to animals     Nonintractable headache, unspecified chronicity pattern, unspecified headache type     Chronic pain syndrome     Gastric ulcer, unspecified chronicity, unspecified whether gastric ulcer hemorrhage or perforation present     Chronic low back pain, unspecified back pain laterality, unspecified whether sciatica present     H/O Hodgkin's lymphoma     Obesity (BMI 35.0-39.9) with comorbidity (H)     Calcaneal spur, left     Tachycardia     Cellulitis of right lower extremity     Elevated lactic acid level     Bacterial sepsis (H)     Gram-positive bacteremia     HTN (hypertension)     Osteomyelitis, unspecified site, unspecified type (H)     Gastroesophageal reflux disease without esophagitis     Lymphadenopathy     Mediastinal mass     Severe sepsis with acute organ dysfunction due to group B Streptococcus (H)       Past Surgical History  He has a past surgical history that includes fracture surgery; REMOVAL OF HEEL SPUR (Left, 6/25/2021); and Tonsillectomy, adenoidectomy adult, combined.     Examination   Ht 1.803 m (5' 11\")   Wt 129.7 kg (286 lb)   BMI 39.89 kg/m    GENERAL: Healthy, alert and no distress  EYES: Eyes grossly normal to inspection.  No discharge or erythema, or obvious scleral/conjunctival abnormalities.  RESP: No audible wheeze, cough, or visible cyanosis.  No visible retractions or increased work of breathing.    SKIN: Visible skin clear. No significant rash, abnormal pigmentation or lesions.  NEURO: Cranial nerves grossly intact.  Mentation and speech appropriate for age.  PSYCH: Mentation appears normal, affect normal/bright, judgement and insight intact, normal speech and appearance well-groomed.       Counseling:   We reviewed the important post op bariatric recommendations:  -eating 3 meals daily  -eating protein first, " getting >60gm protein daily  -eating slowly, chewing food well  -avoiding/limiting calorie containing beverages  -limiting starchy vegetables and carbohydrates, choosing wheat, not white with breads,   crackers, pastas, gokul, bagels, tortillas, rice  -limiting restaurant or cafeteria eating to twice a week or less    We discussed the importance of restorative sleep and stress management in maintaining a healthy weight.  We discussed the National Weight Control Registry healthy weight maintenance strategies and ways to optimize metabolism.  We discussed the importance of physical activity including cardiovascular and strength training in maintaining a healthier weight.    Total time spent on the date of this encounter doing: chart review, review of test results, patient visit, physical exam, education, counseling, developing plan of care and documenting = 34 minutes.         ERICK Ortiz MD  [facility]

## 2022-05-23 ENCOUNTER — VIRTUAL VISIT (OUTPATIENT)
Dept: SURGERY | Facility: CLINIC | Age: 42
End: 2022-05-23
Payer: COMMERCIAL

## 2022-05-23 VITALS — BODY MASS INDEX: 40.04 KG/M2 | WEIGHT: 286 LBS | HEIGHT: 71 IN

## 2022-05-23 DIAGNOSIS — E66.01 MORBID OBESITY (H): Primary | ICD-10-CM

## 2022-05-23 DIAGNOSIS — I10 ESSENTIAL HYPERTENSION, BENIGN: ICD-10-CM

## 2022-05-23 DIAGNOSIS — Z86.69 HISTORY OF MIGRAINE HEADACHES: ICD-10-CM

## 2022-05-23 PROCEDURE — 99214 OFFICE O/P EST MOD 30 MIN: CPT | Mod: 95 | Performed by: FAMILY MEDICINE

## 2022-05-23 RX ORDER — TOPIRAMATE 50 MG/1
TABLET, FILM COATED ORAL
Qty: 90 TABLET | Refills: 1 | Status: SHIPPED | OUTPATIENT
Start: 2022-05-23 | End: 2023-08-25

## 2022-05-23 RX ORDER — CEFUROXIME AXETIL 500 MG/1
TABLET ORAL
COMMUNITY
Start: 2022-05-11 | End: 2023-08-25

## 2022-05-23 NOTE — PROGRESS NOTES
Romeo Chong is 41 year old  male who presents for a billable video visit today.    How would you like to obtain your AVS? MyChart  If dropped from the video visit, the video invitation should be resent by: Text to cell phone: [phone]  Will anyone else be joining your video visit? No      Video Start Time: 9:18 AM    Are there any specific questions or needs that you would like addressed at your visit today? No      Video-Visit Details    Type of service:  Video Visit    Video End Time (time video stopped): 9:32 AM  Originating Location (pt. Location): Home    Distant Location (provider location):  [facility] [place]     Platform used for Video Visit: Nancy

## 2022-05-23 NOTE — LETTER
5/23/2022         RE: Romeo Chong  [address]  [address]  [place] [address]        Dear Colleague,    Thank you for referring your patient, Romeo Chong, to the [facility] [place]. Please see a copy of my visit note below.    Bariatric Care Clinic Non Surgical Follow up Visit   Date of visit: 5/22/2022  Physician: ERICK Ortiz MD, MD  Primary Care is Chely Frye.  Romeo Chong   41 year old  male     Initial Weight: 290#  Initial BMI: 40.45  Today's Weight:   Wt Readings from Last 1 Encounters:   05/23/22 129.7 kg (286 lb)     Body mass index is 39.89 kg/m .           Assessment and Plan   Assessment: Romeo is a 41 year old year old male who presents for medical weight management.      Plan:    1. Morbid obesity (H)  Patient was congratulated on his success thus far. Healthy habits to assist with further weight loss were discussed. He will work on increasing his vegetable intake. He will stop the metformin as he doesn't think it is helping. We will start topamax. Risks, benefits and possible side effects discussed.We discussed the patient's co-morbid conditions including migraine headaches and hypertension. These likely will improve with healthy habits and weight loss.    2. History of migraine headaches  This may improve with healthy habits and weight loss.    3. Essential hypertension, benign  This may improve with healthy habits and weight loss.    Follow up in 1 month with our dietician and in 3 months with myself           INTERIM HISTORY  Patient is taking the metformin and and he doesn't think it is helping. Yesterday was the anniversary of his daughter's murder. He is struggling emotionally.     DIETARY HISTORY  Meals Per Day: 2-3  Eating Protein First?: sometimes  Food Diary: B:skips or oatmeal L:sushi or sunflower butter sandwich and fruit D:salmon and green vegetables  Snacks Per Day: occasional  Typical Snack: popcorn,  candy  Fluid Intake: 2-4 bottles per day  Portion Control: working on it  Calorie Containing Beverages: none  Typical Protein Food Choices: fish, sunflower butter  Choosing Whole Grains: sometimes  Meals at Restaurant per week:0    Positive Changes Since Last Visit: He is eating more oatmeal and has reduced his bread intake, cut out sugared soda  Struggling With: exercise    Knowledgeable in Reading Food Labels: not sure  Getting Adequate Protein: working on it  Sleeping 7-8 hours/day sometimes  Stress management not discussed    PHYSICAL ACTIVITY PATTERNS:  Just trying to move as much as he can    REVIEW OF SYSTEMS  GENERAL/CONSTITUTIONAL:  Fatigue: no  HEENT:  Vision changes, glaucoma: no  CARDIOVASCULAR:  Chest Pain with Exertion: no  PSYCHIATRIC:  Moods: grieving- yesterday was the anniversary of his daughter's murder  MUSCULOSKELETAL/RHEUMATOLOGIC  Arthralgias: yes  Myalgias: yes  ENDOCRINE:  Monitoring Blood Sugars: na  Sugars Well Controlled: na  No personal or family history of medullary thyroid cancer not discussed  :  Birth control: na       Patient Profile   Social History     Social History Narrative    , 3 children, 1  recently. Non smoker. Socially drinks alcohol. Not working.         Past Medical History   Past Medical History:   Diagnosis Date     Bacterial sepsis (H) 2021     Cancer (H)      Chronic pain syndrome 2021     Essential hypertension, benign 2010     Fibrous dysplasia of bone 2009    Formatting of this note might be different from the original. Discovered in right tibia and femur at 13 years old.  He had surgery when  he was 14 years old.   Last Assessment & Plan:  Formatting of this note might be different from the original. Diagnosed at 13, 14 surgeries since that time   Formatting of this note might be different from the original. Formatting of this note might be different      Gastric ulcer, unspecified chronicity, unspecified whether gastric ulcer  "hemorrhage or perforation present 5/6/2021     H/O Hodgkin's lymphoma 5/6/2021     Hyperlipidemia      Hypertension      Hypertriglyceridemia 5/8/2011     Stress hyperglycemia 4/26/2009     Patient Active Problem List   Diagnosis     Asthma     Edema     Fibrous dysplasia of bone     Essential hypertension, benign     Hypertriglyceridemia     Lymphedema     Obesity, unspecified     Stress hyperglycemia     Vitamin D deficiency     Allergic rhinitis due to animals     Nonintractable headache, unspecified chronicity pattern, unspecified headache type     Chronic pain syndrome     Gastric ulcer, unspecified chronicity, unspecified whether gastric ulcer hemorrhage or perforation present     Chronic low back pain, unspecified back pain laterality, unspecified whether sciatica present     H/O Hodgkin's lymphoma     Obesity (BMI 35.0-39.9) with comorbidity (H)     Calcaneal spur, left     Tachycardia     Cellulitis of right lower extremity     Elevated lactic acid level     Bacterial sepsis (H)     Gram-positive bacteremia     HTN (hypertension)     Osteomyelitis, unspecified site, unspecified type (H)     Gastroesophageal reflux disease without esophagitis     Lymphadenopathy     Mediastinal mass     Severe sepsis with acute organ dysfunction due to group B Streptococcus (H)       Past Surgical History  He has a past surgical history that includes fracture surgery; REMOVAL OF HEEL SPUR (Left, 6/25/2021); and Tonsillectomy, adenoidectomy adult, combined.     Examination   Ht 1.803 m (5' 11\")   Wt 129.7 kg (286 lb)   BMI 39.89 kg/m    GENERAL: Healthy, alert and no distress  EYES: Eyes grossly normal to inspection.  No discharge or erythema, or obvious scleral/conjunctival abnormalities.  RESP: No audible wheeze, cough, or visible cyanosis.  No visible retractions or increased work of breathing.    SKIN: Visible skin clear. No significant rash, abnormal pigmentation or lesions.  NEURO: Cranial nerves grossly intact.  " Mentation and speech appropriate for age.  PSYCH: Mentation appears normal, affect normal/bright, judgement and insight intact, normal speech and appearance well-groomed.       Counseling:   We reviewed the important post op bariatric recommendations:  -eating 3 meals daily  -eating protein first, getting >60gm protein daily  -eating slowly, chewing food well  -avoiding/limiting calorie containing beverages  -limiting starchy vegetables and carbohydrates, choosing wheat, not white with breads,   crackers, pastas, gokul, bagels, tortillas, rice  -limiting restaurant or cafeteria eating to twice a week or less    We discussed the importance of restorative sleep and stress management in maintaining a healthy weight.  We discussed the National Weight Control Registry healthy weight maintenance strategies and ways to optimize metabolism.  We discussed the importance of physical activity including cardiovascular and strength training in maintaining a healthier weight.    Total time spent on the date of this encounter doing: chart review, review of test results, patient visit, physical exam, education, counseling, developing plan of care and documenting = 34 minutes.         ERICK Ortiz MD  [facility]             Romeo Chong is 41 year old  male who presents for a billable video visit today.    How would you like to obtain your AVS? MyChart  If dropped from the video visit, the video invitation should be resent by: Text to cell phone: [phone]  Will anyone else be joining your video visit? No      Video Start Time: 9:18 AM    Are there any specific questions or needs that you would like addressed at your visit today? No      Video-Visit Details    Type of service:  Video Visit    Video End Time (time video stopped): 9:32 AM  Originating Location (pt. Location): Home    Distant Location (provider location):  [facility] [place]     Platform used  for Video Visit: Doximity      Again, thank you for allowing me to participate in the care of your patient.        Sincerely,        ERICK Ortiz MD

## 2022-05-27 ENCOUNTER — APPOINTMENT (OUTPATIENT)
Dept: CT IMAGING | Facility: HOSPITAL | Age: 42
End: 2022-05-27
Attending: EMERGENCY MEDICINE
Payer: COMMERCIAL

## 2022-05-27 ENCOUNTER — HOSPITAL ENCOUNTER (EMERGENCY)
Facility: HOSPITAL | Age: 42
Discharge: HOME | End: 2022-05-27
Attending: EMERGENCY MEDICINE | Admitting: EMERGENCY MEDICINE
Payer: COMMERCIAL

## 2022-05-27 VITALS
RESPIRATION RATE: 21 BRPM | OXYGEN SATURATION: 99 % | TEMPERATURE: 97.8 F | SYSTOLIC BLOOD PRESSURE: 177 MMHG | WEIGHT: 280 LBS | HEART RATE: 66 BPM | BODY MASS INDEX: 39.05 KG/M2 | DIASTOLIC BLOOD PRESSURE: 84 MMHG

## 2022-05-27 DIAGNOSIS — R10.84 ABDOMINAL PAIN, GENERALIZED: ICD-10-CM

## 2022-05-27 DIAGNOSIS — J98.59 MEDIASTINAL MASS: ICD-10-CM

## 2022-05-27 DIAGNOSIS — R59.0 RETROPERITONEAL LYMPHADENOPATHY: ICD-10-CM

## 2022-05-27 DIAGNOSIS — R07.9 CHEST PAIN, UNSPECIFIED TYPE: ICD-10-CM

## 2022-05-27 DIAGNOSIS — R94.31 ABNORMAL ELECTROCARDIOGRAM: ICD-10-CM

## 2022-05-27 DIAGNOSIS — R91.8 PULMONARY NODULES: ICD-10-CM

## 2022-05-27 DIAGNOSIS — R55 SYNCOPE, UNSPECIFIED SYNCOPE TYPE: ICD-10-CM

## 2022-05-27 LAB
ALBUMIN SERPL-MCNC: 4.4 G/DL (ref 3.5–5)
ALP SERPL-CCNC: 74 U/L (ref 45–120)
ALT SERPL W P-5'-P-CCNC: 22 U/L (ref 0–45)
ANION GAP SERPL CALCULATED.3IONS-SCNC: 9 MMOL/L (ref 5–18)
AST SERPL W P-5'-P-CCNC: 15 U/L (ref 0–40)
ATRIAL RATE - MUSE: 67 BPM
BASOPHILS # BLD AUTO: 0.1 10E3/UL (ref 0–0.2)
BASOPHILS NFR BLD AUTO: 1 %
BILIRUB DIRECT SERPL-MCNC: 0.2 MG/DL
BILIRUB SERPL-MCNC: 0.7 MG/DL (ref 0–1)
BNP SERPL-MCNC: <10 PG/ML (ref 0–35)
BUN SERPL-MCNC: 10 MG/DL (ref 8–22)
CALCIUM SERPL-MCNC: 10 MG/DL (ref 8.5–10.5)
CHLORIDE BLD-SCNC: 103 MMOL/L (ref 98–107)
CO2 SERPL-SCNC: 27 MMOL/L (ref 22–31)
CREAT SERPL-MCNC: 0.74 MG/DL (ref 0.7–1.3)
D DIMER PPP FEU-MCNC: 1.81 UG/ML FEU (ref 0–0.5)
DIASTOLIC BLOOD PRESSURE - MUSE: NORMAL MMHG
EOSINOPHIL # BLD AUTO: 0 10E3/UL (ref 0–0.7)
EOSINOPHIL NFR BLD AUTO: 0 %
ERYTHROCYTE [DISTWIDTH] IN BLOOD BY AUTOMATED COUNT: 14 % (ref 10–15)
GFR SERPL CREATININE-BSD FRML MDRD: >90 ML/MIN/1.73M2
GLUCOSE BLD-MCNC: 86 MG/DL (ref 70–125)
HCT VFR BLD AUTO: 46.2 % (ref 40–53)
HGB BLD-MCNC: 14.7 G/DL (ref 13.3–17.7)
IMM GRANULOCYTES # BLD: 0 10E3/UL
IMM GRANULOCYTES NFR BLD: 0 %
INR PPP: 1.05 (ref 0.85–1.15)
INTERPRETATION ECG - MUSE: NORMAL
LACTATE SERPL-SCNC: 1.5 MMOL/L (ref 0.7–2)
LIPASE SERPL-CCNC: 28 U/L (ref 0–52)
LYMPHOCYTES # BLD AUTO: 1.5 10E3/UL (ref 0.8–5.3)
LYMPHOCYTES NFR BLD AUTO: 21 %
MAGNESIUM SERPL-MCNC: 1.9 MG/DL (ref 1.8–2.6)
MCH RBC QN AUTO: 25.6 PG (ref 26.5–33)
MCHC RBC AUTO-ENTMCNC: 31.8 G/DL (ref 31.5–36.5)
MCV RBC AUTO: 81 FL (ref 78–100)
MONOCYTES # BLD AUTO: 0.5 10E3/UL (ref 0–1.3)
MONOCYTES NFR BLD AUTO: 8 %
NEUTROPHILS # BLD AUTO: 4.8 10E3/UL (ref 1.6–8.3)
NEUTROPHILS NFR BLD AUTO: 70 %
NRBC # BLD AUTO: 0 10E3/UL
NRBC BLD AUTO-RTO: 0 /100
P AXIS - MUSE: 56 DEGREES
PLATELET # BLD AUTO: 272 10E3/UL (ref 150–450)
POTASSIUM BLD-SCNC: 3.5 MMOL/L (ref 3.5–5)
PR INTERVAL - MUSE: 166 MS
PROT SERPL-MCNC: 8.7 G/DL (ref 6–8)
QRS DURATION - MUSE: 150 MS
QT - MUSE: 422 MS
QTC - MUSE: 445 MS
R AXIS - MUSE: -19 DEGREES
RBC # BLD AUTO: 5.74 10E6/UL (ref 4.4–5.9)
SARS-COV-2 RNA RESP QL NAA+PROBE: NEGATIVE
SODIUM SERPL-SCNC: 139 MMOL/L (ref 136–145)
SYSTOLIC BLOOD PRESSURE - MUSE: NORMAL MMHG
T AXIS - MUSE: -7 DEGREES
TROPONIN I SERPL-MCNC: <0.01 NG/ML (ref 0–0.29)
VENTRICULAR RATE- MUSE: 67 BPM
WBC # BLD AUTO: 6.8 10E3/UL (ref 4–11)

## 2022-05-27 PROCEDURE — 83880 ASSAY OF NATRIURETIC PEPTIDE: CPT | Performed by: EMERGENCY MEDICINE

## 2022-05-27 PROCEDURE — 250N000011 HC RX IP 250 OP 636: Performed by: EMERGENCY MEDICINE

## 2022-05-27 PROCEDURE — 258N000003 HC RX IP 258 OP 636: Performed by: EMERGENCY MEDICINE

## 2022-05-27 PROCEDURE — 83605 ASSAY OF LACTIC ACID: CPT | Performed by: EMERGENCY MEDICINE

## 2022-05-27 PROCEDURE — 93005 ELECTROCARDIOGRAM TRACING: CPT | Performed by: EMERGENCY MEDICINE

## 2022-05-27 PROCEDURE — 70450 CT HEAD/BRAIN W/O DYE: CPT

## 2022-05-27 PROCEDURE — 71275 CT ANGIOGRAPHY CHEST: CPT

## 2022-05-27 PROCEDURE — 36415 COLL VENOUS BLD VENIPUNCTURE: CPT | Performed by: EMERGENCY MEDICINE

## 2022-05-27 PROCEDURE — 80048 BASIC METABOLIC PNL TOTAL CA: CPT | Performed by: EMERGENCY MEDICINE

## 2022-05-27 PROCEDURE — 99285 EMERGENCY DEPT VISIT HI MDM: CPT | Mod: 25

## 2022-05-27 PROCEDURE — 93005 ELECTROCARDIOGRAM TRACING: CPT

## 2022-05-27 PROCEDURE — 96374 THER/PROPH/DIAG INJ IV PUSH: CPT

## 2022-05-27 PROCEDURE — 999N000127 HC STATISTIC PERIPHERAL IV START W US GUIDANCE

## 2022-05-27 PROCEDURE — C9803 HOPD COVID-19 SPEC COLLECT: HCPCS

## 2022-05-27 PROCEDURE — 85004 AUTOMATED DIFF WBC COUNT: CPT | Performed by: EMERGENCY MEDICINE

## 2022-05-27 PROCEDURE — 85379 FIBRIN DEGRADATION QUANT: CPT | Performed by: EMERGENCY MEDICINE

## 2022-05-27 PROCEDURE — 999N000285 HC STATISTIC VASC ACCESS LAB DRAW WITH PIV START

## 2022-05-27 PROCEDURE — 84484 ASSAY OF TROPONIN QUANT: CPT | Performed by: EMERGENCY MEDICINE

## 2022-05-27 PROCEDURE — 83735 ASSAY OF MAGNESIUM: CPT | Performed by: EMERGENCY MEDICINE

## 2022-05-27 PROCEDURE — 74177 CT ABD & PELVIS W/CONTRAST: CPT

## 2022-05-27 PROCEDURE — 87635 SARS-COV-2 COVID-19 AMP PRB: CPT | Performed by: EMERGENCY MEDICINE

## 2022-05-27 PROCEDURE — 83690 ASSAY OF LIPASE: CPT | Performed by: EMERGENCY MEDICINE

## 2022-05-27 PROCEDURE — 82248 BILIRUBIN DIRECT: CPT | Performed by: EMERGENCY MEDICINE

## 2022-05-27 PROCEDURE — 85610 PROTHROMBIN TIME: CPT | Performed by: EMERGENCY MEDICINE

## 2022-05-27 PROCEDURE — 96361 HYDRATE IV INFUSION ADD-ON: CPT

## 2022-05-27 RX ORDER — IOPAMIDOL 755 MG/ML
100 INJECTION, SOLUTION INTRAVASCULAR ONCE
Status: COMPLETED | OUTPATIENT
Start: 2022-05-27 | End: 2022-05-27

## 2022-05-27 RX ORDER — ONDANSETRON 2 MG/ML
4 INJECTION INTRAMUSCULAR; INTRAVENOUS ONCE
Status: COMPLETED | OUTPATIENT
Start: 2022-05-27 | End: 2022-05-27

## 2022-05-27 RX ADMIN — ONDANSETRON 4 MG: 2 INJECTION INTRAMUSCULAR; INTRAVENOUS at 18:16

## 2022-05-27 RX ADMIN — SODIUM CHLORIDE 500 ML: 9 INJECTION, SOLUTION INTRAVENOUS at 20:02

## 2022-05-27 RX ADMIN — IOPAMIDOL 100 ML: 755 INJECTION, SOLUTION INTRAVENOUS at 18:57

## 2022-05-27 NOTE — ED TRIAGE NOTES
"Pt states he had an episode last night \"I think I had a heart attack\".  Pt states that he felt \"the walls closing in and then I don't remember anything, then I woke up with someone in my face\".  Pt states he has systolic heart disease from having sepsis x 7 in past.  Pt having cp,  Feeling dizzy and tired.  States he was woken up by sternal rub.       "

## 2022-05-27 NOTE — ED NOTES
"ED Provider In Triage Note  [facility]  Encounter Date: May 27, 2022    Chief Complaint   Patient presents with     Syncope       Brief HPI:   Romeo Chong is a 41 year old male presenting to the Emergency Department with a chief complaint of chest pain.  \"Think I may have had a heart attack last night\".  Last night had syncopal episode.  PMHx CHF, recurrent sepsis known cardiomyopathy, EF 40% on last echo.  Currently with CP and sob. Had cp before syncope, awoke to someone giving him sternal rub.  Told to go to hospital last night.  Presents today.    Brief Physical Exam:  BP (!) 175/111   Pulse 75   Temp 97.8  F (36.6  C) (Temporal)   Resp 20   Wt 127 kg (280 lb)   SpO2 100%   BMI 39.05 kg/m    General: Non-toxic appearing  HEENT: Atraumatic  Resp: No respiratory distress  Abdomen: Non-peritoneal  Neuro: Alert, oriented, answers questions appropriately  Psych: Behavior appropriate    Plan Initiated in Triage:  Orders Placed This Encounter   Procedures     Chest XR,  PA & LAT     INR     D dimer quantitative     Basic metabolic panel     Lactic acid whole blood     Troponin I     Magnesium     B-Type Natriuretic Peptide ( East Only)     Asymptomatic COVID-19 Virus (Coronavirus) by PCR     ECG 12-LEAD WITH MUSE ([facility])     Peripheral IV catheter     CBC with platelets differential     PIT Dispo:   Place patient in the next available ED bed    Silvestre Gill MD on 5/27/2022 at 1:38 PM    Patient was evaluated by the Physician in Triage due to a limitation of available rooms in the Emergency Department. A plan of care was discussed based on the information obtained on the initial evaluation and patient was consuled to return back to the Emergency Department lobby after this initial evalutaiton until results were obtained or a room became available in the Emergency Department. Patient was counseled not to leave prior to receiving the results of their workup.     Silvestre Gill, " MD  [facility] EMERGENCY DEPARTMENT  [address] [place] [id number]  [phone]     Silevstre Gill MD  05/27/22 4502

## 2022-05-27 NOTE — ED TRIAGE NOTES
Triage Assessment     Row Name 05/27/22 1342       Triage Assessment (Adult)    Airway WDL WDL       Respiratory WDL    Respiratory WDL WDL       Skin Circulation/Temperature WDL    Skin Circulation/Temperature WDL WDL       Cardiac WDL    Cardiac WDL X;chest pain       Chest Pain Assessment    Chest Pain Location midsternal    Character throbbing;pressure       Peripheral/Neurovascular WDL    Peripheral Neurovascular WDL WDL       Cognitive/Neuro/Behavioral WDL    Cognitive/Neuro/Behavioral WDL WDL

## 2022-05-27 NOTE — ED PROVIDER NOTES
"  Emergency Department Encounter     Evaluation Date & Time:   2022  2:02 PM    CHIEF COMPLAINT:  Syncope      Triage Note:       Triage Assessment     Row Name 22 1343       Triage Assessment (Adult)    Airway WDL WDL       Respiratory WDL    Respiratory WDL WDL       Skin Circulation/Temperature WDL    Skin Circulation/Temperature WDL WDL       Cardiac WDL    Cardiac WDL X;chest pain       Chest Pain Assessment    Chest Pain Location midsternal    Character throbbing;pressure       Peripheral/Neurovascular WDL    Peripheral Neurovascular WDL WDL       Cognitive/Neuro/Behavioral WDL    Cognitive/Neuro/Behavioral WDL WDL                Pt states he had an episode last night \"I think I had a heart attack\".  Pt states that he felt \"the walls closing in and then I don't remember anything, then I woke up with someone in my face\".  Pt states he has systolic heart disease from having sepsis x 7 in past.  Pt having cp,  Feeling dizzy and tired.  States he was woken up by sternal rub.         Impression and Plan       FINAL IMPRESSION:    ICD-10-CM    1. Syncope, unspecified syncope type  R55 [facility]  Referral   2. Abnormal electrocardiogram  R94.31 [facility]  Referral   3. Pulmonary nodules  R91.8    4. Mediastinal mass  J98.59    5. Retroperitoneal lymphadenopathy  R59.0    6. Chest pain, unspecified type  R07.9    7. Abdominal pain, generalized  R10.84          ED COURSE & MEDICAL DECISION MAKIN:57 PM Tried to talk with patient, PICC was in the room, will try again later.   3:24 PM I met with the patient to gather history and to perform my initial exam. I discussed the plan for care while in the Emergency Department. PPE (gown, gloves, surgical mask) was worn during patient encounters.   7:43 PM I updated the patient on lab results.  9:11 PM I rechecked with patient to update him on results. I discussed the plan for discharge with the patient, and patientis " agreeable. We discussed supportive cares at home and reasons for return to the ER including new or worsening symptoms - all questions and concerns addressed.  Patient to be discharged by RN.    41 year old male, history of obesity, HTN, HLD, Hodgkin's lymphoma, lymphedema and chronic pain syndrome, who presents for evaluation of chest pain that started last night associated with a syncopal episode. He was with friends who advised to call EMS last night, however he refused. The chest pain had subsided, however recurred this morning and has been constant throughout the day. Today he reports associated shortness of breath. He also reports generalized abdominal pain with diarrhea x 2 days.    On exam, he has RRR with clear lungs. Non-focal neuro exam.     Patient placed on cardiac monitor, IV access established and blood sent for labs.    EKG performed and demonstrated NSR with RBBB and new anterior T wave inversions with no ST-T wave elevation consistent with ACS or pericarditis.  Troponin WNL (<0.01); a single, normal troponin is reassuring that symptoms are not secondary to ACS given that they have been ongoing since last night and I do not think serial troponin testing is indicated.    PE considered. CTA chest performed and demonstrated:  1.  Chest CTA negative for acute pulmonary embolus and thoracic aortic aneurysm/dissection.  2.  Known anterior mediastinal mass similar in overall size with slight interval decrease in soft tissue component and increase in anteriorly seen in the mediastinal fat.  3.  Stable benign tiny pulmonary nodules with no focal consolidation nor pleural effusion.    Head CT also performed given syncopal episode and was normal.    Given abdominal pain, CT abdomen / pelvis was also performed and demonstrated:  1.  Respiratory motion.  2.  Air-fluid levels within nondilated stomach, small bowel and colon with no obstruction or inflammatory change; could represent gastroenteritis.  3.  Right  "retroperitoneal lymphadenopathy has decreased since 08/18/2021 exam.    Labs otherwise unremarkable with no leukocytosis, anemia, significant electrolyte derangements or renal impairment.  Lactate WNL (1.5).  COVID-negative.    Patient states that he feels dehydrated for which he was given an IV fluid bolus.  He is overall feeling better and is comfortable with discharge to home.    Patient discharged to home with follow-up in the [facility] and with his primary care provider.  Return precautions provided.  Patient hemodynamically stable throughout ED course.      At the conclusion of the encounter I discussed the results of all the tests and the disposition. The questions were answered. The patient acknowledged understanding and was agreeable with the care plan.      MEDICATIONS GIVEN IN THE EMERGENCY DEPARTMENT:  Medications   ondansetron (ZOFRAN) injection 4 mg (4 mg Intravenous Given 5/27/22 1816)   iopamidol (ISOVUE-370) solution 100 mL (100 mLs Intravenous Given 5/27/22 1857)   0.9% sodium chloride BOLUS (0 mLs Intravenous Stopped 5/27/22 2036)       NEW PRESCRIPTIONS STARTED AT TODAY'S ED VISIT:  Discharge Medication List as of 5/27/2022  8:38 PM          HPI     HPI     Romeo Chong is a 41 year old male, history of obesity, Hodgkin's lymphoma, hypertension, hyperlipidemia, lymphedema and chronic pain syndrome, who presents to this ED by private vehicle for evaluation of multiple complaints. He reports onset of chest pain last night that started ~10 minutes after eating catfish for dinner. He was sitting in a chair when he had sudden onset of chest pain and then his eyes rolled back, \"walls closed in\", and his forehead and bilateral cheeks went numb. Patient was with \"medical friends\" who state the patient was unresponsive and that his \"heart stopped\", however they did not check his pulse. They were able to arouse the patient with a sternal rub. He does not recall this event, however his " "friends did not report that he had seizure-like activity and patient was not incontinent of urine or stool. Following this incident he had one episode of emesis. Patient's friend wanted to call EMS, but patient refused. The chest pain started to subside until this morning around 0700 (~8.5 hours ago) when it suddenly returned with new onset of shortness of breath. The chest pain is currently dull in nature, but there are periods of intermittent sharpness. The pain is like \"someone hitting or stabbing it\". Currently patient also endorses dizziness that he describes as \"vertigo\" where things are spinning when patient stands up. Patient has had abdominal pain for two days and diarrhea for 1 day.     Denies headache, cough, fever or additional medical concerns or complaints at this time.       REVIEW OF SYSTEMS:  All other systems reviewed and are negative.      Medical History     Past Medical History:   Diagnosis Date     Bacterial sepsis (H) 8/19/2021     Cancer (H)      Chronic pain syndrome 5/6/2021     Essential hypertension, benign 7/14/2010     Fibrous dysplasia of bone 4/26/2009     Gastric ulcer, unspecified chronicity, unspecified whether gastric ulcer hemorrhage or perforation present 5/6/2021     H/O Hodgkin's lymphoma 5/6/2021     Hyperlipidemia      Hypertension      Hypertriglyceridemia 5/8/2011     Stress hyperglycemia 4/26/2009       Past Surgical History:   Procedure Laterality Date     FRACTURE SURGERY       HC REMOVAL HEEL SPUR, CALCANEUS Left 6/25/2021    Procedure: EXCISION, BONE SPUR, FOOT, WITH PLANTAR FASCIA RELEASE;  Surgeon: Stephon Singleton DPM;  Location: [facility];  Service: Podiatry     TONSILLECTOMY, ADENOIDECTOMY ADULT, COMBINED         Family History   Problem Relation Age of Onset     Cirrhosis Mother      Hypertension Mother      Diabetes Type 2  Father      Kidney failure Father      Cerebrovascular Disease Father      Hypertension Father      Cerebrovascular Disease " Sister      Hypertension Paternal Grandmother      Hypertension Paternal Grandfather        Social History     Tobacco Use     Smoking status: Former Smoker     Packs/day: 1.00     Start date: 1999     Quit date: 2009     Years since quittin.0     Smokeless tobacco: Never Used   Substance Use Topics     Alcohol use: Not Currently     Drug use: Never       carvedilol (COREG) 12.5 MG tablet  cefuroxime (CEFTIN) 500 MG tablet  cholecalciferol (VITAMIN D3) 125 mcg (5000 units) capsule  famotidine (PEPCID) 40 MG tablet  furosemide (LASIX) 40 MG tablet  HYDROcodone-acetaminophen (NORCO)  MG per tablet  losartan-hydrochlorothiazide (HYZAAR) 100-12.5 MG tablet  potassium chloride ER (K-TAB/KLOR-CON) 10 MEQ CR tablet  topiramate (TOPAMAX) 50 MG tablet        Physical Exam     First Vitals:  Patient Vitals for the past 24 hrs:   BP Temp Temp src Pulse Resp SpO2 Weight   22 (!) 177/84 -- -- 66 21 99 % --   22 (!) 181/94 -- -- 71 25 98 % --   22 (!) 181/106 -- -- 71 29 98 % --   22 194 (!) 169/95 -- -- 73 25 100 % --   22 193 (!) 158/88 -- -- 68 19 99 % --   22 (!) 169/101 -- -- 73 20 99 % --   22 -- -- -- 75 19 95 % --   22 (!) 168/99 -- -- 69 17 99 % --   22 -- -- -- 69 21 97 % --   22 -- -- -- 68 -- 99 % --   22 -- -- -- -- -- 98 % --   22 181 (!) 175/101 -- -- 80 24 99 % --   22 1800 (!) 170/96 -- -- 82 (!) 31 97 % --   22 1745 (!) 175/102 -- -- 74 21 100 % --   22 1730 (!) 172/98 -- -- 72 21 99 % --   22 1729 -- -- -- -- -- -- 127 kg (280 lb)   22 1715 (!) 173/107 -- -- 74 22 100 % --   22 1700 (!) 183/111 -- -- 72 19 99 % --   22 1645 (!) 182/109 -- -- 75 22 100 % --   22 1630 -- -- -- 67 23 100 % --   22 1615 (!) 155/81 -- -- 71 21 97 % --   22 1600 (!) 153/82 -- -- 71 21 98 % --   22 1545 (!) 148/75 -- -- 74 23 96 % --    05/27/22 1530 (!) 157/81 -- -- 83 (!) 33 98 % --   05/27/22 1515 -- -- -- 67 22 99 % --   05/27/22 1500 -- -- -- 67 21 98 % --   05/27/22 1445 (!) 141/82 -- -- 78 24 98 % --   05/27/22 1430 135/81 -- -- 78 21 96 % --   05/27/22 1415 (!) 145/87 -- -- 78 28 -- --   05/27/22 1342 (!) 175/111 97.8  F (36.6  C) Temporal 75 20 100 % 127 kg (280 lb)       PHYSICAL EXAM:   Physical Exam    GENERAL: Awake, alert.  In no acute distress.   HEENT: Normocephalic, atraumatic. Pupils equal, round and reactive. Conjunctiva normal. EOMI without nystagmus. Tongue is midline.   NECK: No stridor.  PULMONARY: Symmetrical breath sounds without distress.  Lungs clear to auscultation bilaterally without wheezes, rhonchi or rales.  CARDIO: Regular rate and rhythm.  No significant murmur, rub or gallop.  Radial pulses strong and symmetrical.  ABDOMINAL: Abdomen soft, non-distended and non-tender to palpation.   EXTREMITIES: No lower extremity swelling or edema.      NEURO: Alert and oriented to person, place and time.  Cranial nerves III-XII intact.  Strength 5/5 BL upper and lower extremities with sensation to light touch grossly intact.   PSYCH: Normal mood and affect.  SKIN: No rashes.     Results     LAB:  All pertinent labs reviewed and interpreted  Labs Ordered and Resulted from Time of ED Arrival to Time of ED Departure   D DIMER QUANTITATIVE - Abnormal       Result Value    D-Dimer Quantitative 1.81 (*)    CBC WITH PLATELETS AND DIFFERENTIAL - Abnormal    WBC Count 6.8      RBC Count 5.74      Hemoglobin 14.7      Hematocrit 46.2      MCV 81      MCH 25.6 (*)     MCHC 31.8      RDW 14.0      Platelet Count 272      % Neutrophils 70      % Lymphocytes 21      % Monocytes 8      % Eosinophils 0      % Basophils 1      % Immature Granulocytes 0      NRBCs per 100 WBC 0      Absolute Neutrophils 4.8      Absolute Lymphocytes 1.5      Absolute Monocytes 0.5      Absolute Eosinophils 0.0      Absolute Basophils 0.1      Absolute Immature  Granulocytes 0.0      Absolute NRBCs 0.0     HEPATIC FUNCTION PANEL - Abnormal    Bilirubin Total 0.7      Bilirubin Direct 0.2      Protein Total 8.7 (*)     Albumin 4.4      Alkaline Phosphatase 74      AST 15      ALT 22     INR - Normal    INR 1.05     BASIC METABOLIC PANEL - Normal    Sodium 139      Potassium 3.5      Chloride 103      Carbon Dioxide (CO2) 27      Anion Gap 9      Urea Nitrogen 10      Creatinine 0.74      Calcium 10.0      Glucose 86      GFR Estimate >90     LACTIC ACID WHOLE BLOOD - Normal    Lactic Acid 1.5     TROPONIN I - Normal    Troponin I <0.01     MAGNESIUM - Normal    Magnesium 1.9     B-TYPE NATRIURETIC PEPTIDE ( EAST ONLY) - Normal    BNP <10     COVID-19 VIRUS (CORONAVIRUS) BY PCR - Normal    SARS CoV2 PCR Negative     LIPASE - Normal    Lipase 28         RADIOLOGY:  CT Abdomen Pelvis w Contrast   Final Result   IMPRESSION:    1.  Respiratory motion.   2.  Air-fluid levels within nondilated stomach, small bowel and colon with no obstruction or inflammatory change. Findings could represent gastroenteritis.   3.  Right retroperitoneal lymphadenopathy has decreased since 2021 exam.      CT Chest Pulmonary Embolism w Contrast   Final Result   IMPRESSION:   1.  Chest CTA negative for acute pulmonary embolus and thoracic aortic aneurysm/dissection.   2.  Known anterior mediastinal mass similar in overall size with slight interval decrease in soft tissue component and increase in anteriorly seen in the mediastinal fat.   3.  Stable benign tiny pulmonary nodules with no focal consolidation nor pleural effusion.      Head CT w/o contrast   Final Result   IMPRESSION:   1.  Normal head CT.        EC2022, 13:57; NSR with rate of 67 bpm; sinus arrhythmia; normal intervals; RBBB; T wave inversions anterior leads; no ST-T wave elevation consistent with ACS or pericarditis; compared to previous EKG dated 2021, the rate has decreased by 60 bpm, anterior T wave inversions  are new    EKG independently reviewed and interpreted by Ruth Barber MD        I, Karina Xin, am serving as a scribe to document services personally performed by Ruth Barber MD based on my observation and the provider's statements to me. I, Ruth Barber MD attest that Karina Lauren is acting in a scribe capacity, has observed my performance of the services and has documented them in accordance with my direction.    Ruth Barber MD  Emergency Medicine  [facility] EMERGENCY DEPARTMENT             Ruth Barber MD  05/28/22 112

## 2022-05-28 NOTE — DISCHARGE INSTRUCTIONS
Please follow-up with the [facility] this upcoming week; they should call you to arrange appointment. If you are unable to get an appointment with the Cardiology Clinic this upcoming week, please follow-up with your primary care provider for a recheck in the meantime.     Return to the ER for worsening symptoms, worsening / recurrent chest pain, shortness of breath, if you pass out or feel that you might, worsening abdominal pain, nausea / vomiting, fever or other concerns.

## 2022-06-06 ENCOUNTER — HOSPITAL ENCOUNTER (OUTPATIENT)
Dept: MRI IMAGING | Facility: HOSPITAL | Age: 42
Discharge: HOME | End: 2022-06-06
Attending: INTERNAL MEDICINE
Payer: COMMERCIAL

## 2022-06-06 VITALS — OXYGEN SATURATION: 96 % | SYSTOLIC BLOOD PRESSURE: 164 MMHG | DIASTOLIC BLOOD PRESSURE: 100 MMHG | HEART RATE: 67 BPM

## 2022-06-06 DIAGNOSIS — I42.9 CARDIOMYOPATHY, UNSPECIFIED TYPE (H): ICD-10-CM

## 2022-06-06 DIAGNOSIS — I10 UNCONTROLLED HYPERTENSION: ICD-10-CM

## 2022-06-06 DIAGNOSIS — R94.30 LOW LEFT VENTRICULAR EJECTION FRACTION: ICD-10-CM

## 2022-06-06 DIAGNOSIS — I50.22 CHRONIC SYSTOLIC CONGESTIVE HEART FAILURE (H): ICD-10-CM

## 2022-06-06 LAB
ATRIAL RATE - MUSE: 51 BPM
ATRIAL RATE - MUSE: 61 BPM
DIASTOLIC BLOOD PRESSURE - MUSE: NORMAL MMHG
DIASTOLIC BLOOD PRESSURE - MUSE: NORMAL MMHG
INTERPRETATION ECG - MUSE: NORMAL
INTERPRETATION ECG - MUSE: NORMAL
P AXIS - MUSE: 42 DEGREES
P AXIS - MUSE: 51 DEGREES
PR INTERVAL - MUSE: 168 MS
PR INTERVAL - MUSE: 190 MS
QRS DURATION - MUSE: 140 MS
QRS DURATION - MUSE: 156 MS
QT - MUSE: 426 MS
QT - MUSE: 430 MS
QTC - MUSE: 396 MS
QTC - MUSE: 428 MS
R AXIS - MUSE: -14 DEGREES
R AXIS - MUSE: -19 DEGREES
SYSTOLIC BLOOD PRESSURE - MUSE: NORMAL MMHG
SYSTOLIC BLOOD PRESSURE - MUSE: NORMAL MMHG
T AXIS - MUSE: -13 DEGREES
T AXIS - MUSE: 19 DEGREES
VENTRICULAR RATE- MUSE: 51 BPM
VENTRICULAR RATE- MUSE: 61 BPM

## 2022-06-06 PROCEDURE — A9585 GADOBUTROL INJECTION: HCPCS | Performed by: INTERNAL MEDICINE

## 2022-06-06 PROCEDURE — 75563 CARD MRI W/STRESS IMG & DYE: CPT

## 2022-06-06 PROCEDURE — 999N000054 HC STATISTIC EKG NON-CHARGEABLE

## 2022-06-06 PROCEDURE — 250N000011 HC RX IP 250 OP 636: Performed by: INTERNAL MEDICINE

## 2022-06-06 PROCEDURE — 93018 CV STRESS TEST I&R ONLY: CPT | Performed by: GENERAL ACUTE CARE HOSPITAL

## 2022-06-06 PROCEDURE — 93016 CV STRESS TEST SUPVJ ONLY: CPT | Performed by: GENERAL ACUTE CARE HOSPITAL

## 2022-06-06 PROCEDURE — 999N000122 MR MYOCARDIUM  OVERREAD

## 2022-06-06 PROCEDURE — 93010 ELECTROCARDIOGRAM REPORT: CPT | Performed by: INTERNAL MEDICINE

## 2022-06-06 PROCEDURE — 255N000002 HC RX 255 OP 636: Performed by: INTERNAL MEDICINE

## 2022-06-06 PROCEDURE — 75563 CARD MRI W/STRESS IMG & DYE: CPT | Mod: 26 | Performed by: GENERAL ACUTE CARE HOSPITAL

## 2022-06-06 PROCEDURE — 93005 ELECTROCARDIOGRAM TRACING: CPT

## 2022-06-06 PROCEDURE — 93017 CV STRESS TEST TRACING ONLY: CPT | Performed by: GENERAL ACUTE CARE HOSPITAL

## 2022-06-06 RX ORDER — AMINOPHYLLINE 25 MG/ML
50 INJECTION, SOLUTION INTRAVENOUS
Status: DISCONTINUED | OUTPATIENT
Start: 2022-06-06 | End: 2022-06-06 | Stop reason: HOSPADM

## 2022-06-06 RX ORDER — GADOBUTROL 604.72 MG/ML
20 INJECTION INTRAVENOUS ONCE
Status: COMPLETED | OUTPATIENT
Start: 2022-06-06 | End: 2022-06-06

## 2022-06-06 RX ORDER — REGADENOSON 0.08 MG/ML
0.4 INJECTION, SOLUTION INTRAVENOUS ONCE
Status: COMPLETED | OUTPATIENT
Start: 2022-06-06 | End: 2022-06-06

## 2022-06-06 RX ADMIN — AMINOPHYLLINE 50 MG: 25 INJECTION, SOLUTION INTRAVENOUS at 13:01

## 2022-06-06 RX ADMIN — GADOBUTROL 20 ML: 604.72 INJECTION INTRAVENOUS at 12:00

## 2022-06-06 RX ADMIN — REGADENOSON 0.4 MG: 0.08 INJECTION, SOLUTION INTRAVENOUS at 12:51

## 2022-06-28 ENCOUNTER — OFFICE VISIT (OUTPATIENT)
Dept: INFECTIOUS DISEASES | Facility: CLINIC | Age: 42
End: 2022-06-28
Payer: COMMERCIAL

## 2022-06-28 ENCOUNTER — TRANSFERRED RECORDS (OUTPATIENT)
Dept: HEALTH INFORMATION MANAGEMENT | Facility: CLINIC | Age: 42
End: 2022-06-28

## 2022-06-28 VITALS
DIASTOLIC BLOOD PRESSURE: 100 MMHG | HEART RATE: 72 BPM | WEIGHT: 282 LBS | SYSTOLIC BLOOD PRESSURE: 152 MMHG | TEMPERATURE: 98.1 F | BODY MASS INDEX: 39.33 KG/M2

## 2022-06-28 DIAGNOSIS — L08.9 RECURRENT INFECTION OF SKIN: Primary | ICD-10-CM

## 2022-06-28 DIAGNOSIS — I89.0 CHRONIC ACQUIRED LYMPHEDEMA: ICD-10-CM

## 2022-06-28 PROCEDURE — 99213 OFFICE O/P EST LOW 20 MIN: CPT | Performed by: STUDENT IN AN ORGANIZED HEALTH CARE EDUCATION/TRAINING PROGRAM

## 2022-06-28 NOTE — PATIENT INSTRUCTIONS
Thank you for coming today.  I am happy that you have not been to the hospital for infectious reasons for the last 10 months on the cefuroxime.  There is a risk of the bacteria becoming resistant to this antibiotic the longer you stay on it.  Given your young age, our consensus is to stop the cefuroxime after 1 year and see if you have recurrence.  If you do have recurrence, then we will put you back on it after treatment for as long as it works.    Check with your pharmacy to make sure you have enough supply to get you to the end of August 2022.  Call us if you do not.     Maegan Sandoval MD

## 2022-06-28 NOTE — PROGRESS NOTES
[facility] follow up.    Name: Romeo Chong  :   1980  MRN:   [id number]  PCP:    No Ref-Primary, Physician  DOS:    22      CC: Recurrent infection.    HPI/Interval History:  Romeo Chong is a 41 year old old male with the history of chronic lymphedema secondary to right inguinal lymphadenopathy from Hodgkin lymphoma.  Also has multiple intra-abdominal and retroperitoneal adenopathy, morbid obesity.  He was hospitalized in 2021 for group B streptococcus bacteremia associated with right lower extremity cellulitis.  He had a prolonged hospital stay and was discharged on oral antibiotic that he completed at the beginning of 2021.  He was admitted to the hospital in [facility] for group B streptococcus bacteremia secondary to right lower extremity cellulitis.  Was also found to be in heart failure.  Was treated with IV ceftriaxone until 11/3/2021.  He is in clinic today to discuss recurrent infection.  He has not seen lymphedema recently.  Reported having compression stocking but unclear if he wears it.  He denies any fever, chills, night sweats.    22  Seen in clinic today, the patient is doing well.  He has not been admitted to the hospital for infectious related reasons over the last 10 months.  He is quite happy about this.  No fever, chills, night sweats.  He is now vaccinated and COVID-19 but our discussion about it.    ===========================  Past Medical History:  Past Medical History:   Diagnosis Date     Bacterial sepsis (H) 2021     Cancer (H)      Chronic pain syndrome 2021     Essential hypertension, benign 2010     Fibrous dysplasia of bone 2009    Formatting of this note might be different from the original. Discovered in right tibia and femur at 13 years old.  He had surgery when  he was 14 years old.   Last Assessment & Plan:  Formatting of this note might be different from the original. Diagnosed at 13, 14 surgeries since  that time   Formatting of this note might be different from the original. Formatting of this note might be different      Gastric ulcer, unspecified chronicity, unspecified whether gastric ulcer hemorrhage or perforation present 2021     H/O Hodgkin's lymphoma 2021     Hyperlipidemia      Hypertension      Hypertriglyceridemia 2011     Stress hyperglycemia 2009       Past Surgical History:  Past Surgical History:   Procedure Laterality Date     FRACTURE SURGERY       HC REMOVAL HEEL SPUR, CALCANEUS Left 2021    Procedure: EXCISION, BONE SPUR, FOOT, WITH PLANTAR FASCIA RELEASE;  Surgeon: Stephon Singleton DPM;  Location: [facility];  Service: Podiatry     TONSILLECTOMY, ADENOIDECTOMY ADULT, COMBINED         Social History:  Social History     Socioeconomic History     Marital status: Single     Spouse name: Not on file     Number of children: Not on file     Years of education: Not on file     Highest education level: Not on file   Occupational History     Not on file   Tobacco Use     Smoking status: Former Smoker     Packs/day: 1.00     Start date: 1999     Quit date: 2009     Years since quittin.1     Smokeless tobacco: Never Used   Substance and Sexual Activity     Alcohol use: Not Currently     Drug use: Never     Sexual activity: Not Currently   Other Topics Concern     Not on file   Social History Narrative    , 3 children, 1  recently. Non smoker. Socially drinks alcohol. Not working.      Social Determinants of Health     Financial Resource Strain: Not on file   Food Insecurity: Not on file   Transportation Needs: Not on file   Physical Activity: Not on file   Stress: Not on file   Social Connections: Not on file   Intimate Partner Violence: Not At Risk     Fear of Current or Ex-Partner: No     Emotionally Abused: No     Physically Abused: No     Sexually Abused: No   Housing Stability: Not on file       Family Medical History:  Family History    Problem Relation Age of Onset     Cirrhosis Mother      Hypertension Mother      Diabetes Type 2  Father      Kidney failure Father      Cerebrovascular Disease Father      Hypertension Father      Cerebrovascular Disease Sister      Hypertension Paternal Grandmother      Hypertension Paternal Grandfather        Allergies:     Allergies   Allergen Reactions     Vancomycin Anaphylaxis     Peanut Oil Itching     Tree Nuts [Nuts] Itching     Erythromycin Unknown     Latex Unknown     Added based on information entered during case entry, please review and add reactions, type, and severity as needed     Other Environmental Allergy Unknown     DUST     Pollen Extracts [Pollen Extract] Unknown     Zosyn [Piperacillin-Tazobactam In D5w] Tinnitus and Itching     Oxycodone Itching and Rash       Medications:  Current Outpatient Medications   Medication Sig Dispense Refill     carvedilol (COREG) 12.5 MG tablet Take 1 tablet (12.5 mg) by mouth 2 times daily (with meals) 60 tablet 11     cefuroxime (CEFTIN) 500 MG tablet        cholecalciferol (VITAMIN D3) 125 mcg (5000 units) capsule TAKE 1 CAPSULE BY MOUTH EVERY DAY       famotidine (PEPCID) 40 MG tablet Take 1 tablet (40 mg) by mouth 2 times daily 180 tablet 3     furosemide (LASIX) 40 MG tablet Take Lasix 40 mg as needed for shortness of breath 30 tablet 4     HYDROcodone-acetaminophen (NORCO)  MG per tablet Take 1-2 tablets by mouth 3 times daily as needed for pain 180 tablet 0     losartan-hydrochlorothiazide (HYZAAR) 100-12.5 MG tablet Take 1 tablet by mouth daily 90 tablet 4     potassium chloride ER (K-TAB/KLOR-CON) 10 MEQ CR tablet TAKE 2 TABLETS BY MOUTH EVERY MORNING AND 1 EVERY EVENING 270 tablet 2     topiramate (TOPAMAX) 50 MG tablet 1/2 tablet in the evening. You may increase to 1 full tablet after 1 week. 90 tablet 1       Immunizations:  Immunization History   Administered Date(s) Administered     Influenza (IIV3) PF 10/03/2009     TD (ADULT, 7+)  10/22/2004     Tdap (Adacel,Boostrix) 04/22/2011       ==================    Review of System:  12 points review of system is negative except for findings in the HPI.    Exam  VS: BP (!) 152/100   Pulse 72   Temp 98.1  F (36.7  C)   Wt 127.9 kg (282 lb)   BMI 39.33 kg/m      Gen: Pleasant in no acute distress.  HEENT: NCAT. EOMI.  Neck: No LAD.  Lungs: Clear to auscultation bilaterally with no crackles or wheezes.   Card: RRR. No RMG. Peripheral pulses present and symmetrical. No edema.   Abd: Soft NT ND. No hepatomegaly or splenomegaly.  Ext: No edema  Lymph: Right lower extremity lymphedema, much improved from before.  Skin: No rash  Neuro: Alert and oriented to place time and person. Cranial nerves grossly intact.     Labs:  Reviewed    Imaging:  Reviewed    Assessment:  Romeo Chong is a 41 year old old male with chronic right lower extremity lymphedema secondary to  adenopathy possibly secondary to non-Hodgkin's lymphoma.  In ID clinic for recurrence right lower extremity cellulitis associated with bacteremia.  Lymphedema much better.  Wearing compression stocking.  Doing well on chronic suppressive therapy with cefuroxime.  After a long discussion with the patient on the risk of resistance acquisition given his young age and lymphedema being under control, we will stop cefuroxime at 1 year and see how he does.  If he has recurrent cellulitis of the right lower extremity once of cefuroxime, then will resume him back on it and will continue for the morning as long as tolerated.  Given the multiple episodes of sepsis, will start on chronic suppressive therapy with cefuroxime.  Recent diagnosis of PE and heart failure.    Recommendations:  Continue suppressive therapy with cefuroxime until August 2022 for total 1 year.  Will stop afterward and see how he does.  Patient in agreement with plan.    Discussed with the patient.    Maegan Sandoval MD  [facility]  [facility]  [facility]  Office Telephone [phone].  Fax [phone]  [facility] paging

## 2022-07-06 ENCOUNTER — OFFICE VISIT (OUTPATIENT)
Dept: RHEUMATOLOGY | Facility: CLINIC | Age: 42
End: 2022-07-06
Attending: INTERNAL MEDICINE
Payer: COMMERCIAL

## 2022-07-06 VITALS
DIASTOLIC BLOOD PRESSURE: 85 MMHG | WEIGHT: 291.7 LBS | SYSTOLIC BLOOD PRESSURE: 126 MMHG | OXYGEN SATURATION: 96 % | BODY MASS INDEX: 40.68 KG/M2 | TEMPERATURE: 98.4 F | HEART RATE: 52 BPM

## 2022-07-06 DIAGNOSIS — R59.1 LYMPHADENOPATHY: Primary | ICD-10-CM

## 2022-07-06 PROCEDURE — G0463 HOSPITAL OUTPT CLINIC VISIT: HCPCS

## 2022-07-06 PROCEDURE — 99203 OFFICE O/P NEW LOW 30 MIN: CPT | Mod: GC | Performed by: STUDENT IN AN ORGANIZED HEALTH CARE EDUCATION/TRAINING PROGRAM

## 2022-07-06 ASSESSMENT — PAIN SCALES - GENERAL: PAINLEVEL: SEVERE PAIN (6)

## 2022-07-06 NOTE — PROGRESS NOTES
Rheumatology Clinic Initial Consult  07/06/22    Reason for visit: hx of lymphadenopathy    HPI:  Romeo Chong is a 41 year old male with a history of fibrous dysplasia, lymphadenopathy, hypertension, hyperlipidemia, recurrent infections and recent hx of sepsis who is being seen in consultation to evaluate for any autoimmune condition contributing to his lymphadenopathy.     Patient had episode of presyncope in 2005 at the [facility] in [place]. During work up CT chest revealed anterior mediastinal mass measuring 6 x 2.8 cm. He underwent needle biopsy of mediastinal mass in 12/2005 which was not diagnostic. He had CT abdomen/pelvis (12/7/2005) showed retroperitoneal lymph nodes up to 2.7 x 1.8 cm at the aortic bifurcation up to 3.3 x 1.7 cm along the right external iliac chain and up to 3.5 x 2.6 cm in right inguinal region. PET- CT 12/9/2005 showed mild FDG uptake in right inguinal region and possibly retroperitoneal lymph nodes. There was no hypermetabolic activity in anterior mediastinal mass as per charts in care everywhere.     He underwent excisional biopsy (12/21/2005) of left axillary and right inguinal lymph nodes showed reactive nodes.     12/21/2005 excisional biopsy of  left axillary and right inguinal lymph node showed reactive nodes. He moved to [facility] in 2013 where he had repeat PET scan. It showed anterior mediastinal mass measuring 7.9 x 3.8 cm has mild uptake, no pulmonary parenchymal disease or hypermetabolic pulmonary nodules were identified. No hypermetabolic or pathologically enlarged retroperitoneal, mesenteric, or inguinal lymph nodes identified. The right lower extremity demonstrates marked edema with innumerable engorged subcutaneous lymph nodes on the order of 1-2 cm, predominantly along the medial thigh and lower leg. No suspicious lytic or blastic osseous lesions are identified in right lower extremity.  He had an other repeat PET whole body 10/5/2021showed stable  lymphadenopathy. These demonstrate only low levelsvof uptake (Deauville 1-2) suggesting an indolent lymphoma    He has lymphedema in right lower extremity since 2006. He gets recurrent infections cellulitis and has been hospitalized multiple times for management of R lower extremity infection. He had developed GBS bacteremia secondary to right lower extremity cellulitis in August, 2021. He has had multiple episodes of sepsis and he is currently on suppressive antibiotics Cefuroxime for 1 year until august 2022. Lymph nodes will get bigger and painful during infection episode.     He has right leg clot in 2013 that was treated with warfarin. He also had pulmonary embolus and he was treated with Eliquis for 6 months.     Reports pain in his left 3-5 MCP and both elbow joints. Sometime he would notice swelling in MCP joints. Denies stiffness. Feels fatigue but denies any significant changes in appetite or weight. He denies dry eyes/ mouth, oral ulcers, malar rash, photosensitivity, pleurisy, hair loss, skin thickening or any ulcers.     He is former smoker, smoked for 12 yrs pack/day. He quit drinking alcohol in 2014. He is currently using cannabis infrequently. Family hx of lupus in maternal cousin.        14 point ROS collected and negative if not documented above.     Past Medical History  Past Medical History:   Diagnosis Date     Bacterial sepsis (H) 8/19/2021     Cancer (H)      Chronic pain syndrome 5/6/2021     Essential hypertension, benign 7/14/2010     Fibrous dysplasia of bone 4/26/2009    Formatting of this note might be different from the original. Discovered in right tibia and femur at 13 years old.  He had surgery when  he was 14 years old.   Last Assessment & Plan:  Formatting of this note might be different from the original. Diagnosed at 13, 14 surgeries since that time   Formatting of this note might be different from the original. Formatting of this note might be different      Gastric ulcer,  unspecified chronicity, unspecified whether gastric ulcer hemorrhage or perforation present 2021     H/O Hodgkin's lymphoma 2021     Hyperlipidemia      Hypertension      Hypertriglyceridemia 2011     Stress hyperglycemia 2009     Past Surgical History:   Procedure Laterality Date     FRACTURE SURGERY       HC REMOVAL HEEL SPUR, CALCANEUS Left 2021    Procedure: EXCISION, BONE SPUR, FOOT, WITH PLANTAR FASCIA RELEASE;  Surgeon: Stephon Singleton DPM;  Location: [facility];  Service: Podiatry     TONSILLECTOMY, ADENOIDECTOMY ADULT, COMBINED       Social History     Socioeconomic History     Marital status: Single     Spouse name: Not on file     Number of children: Not on file     Years of education: Not on file     Highest education level: Not on file   Occupational History     Not on file   Tobacco Use     Smoking status: Former Smoker     Packs/day: 1.00     Start date: 1999     Quit date: 2009     Years since quittin.1     Smokeless tobacco: Never Used   Substance and Sexual Activity     Alcohol use: Not Currently     Drug use: Never     Sexual activity: Not Currently   Other Topics Concern     Not on file   Social History Narrative    , 3 children, 1  recently. Non smoker. Socially drinks alcohol. Not working.      Social Determinants of Health     Financial Resource Strain: Not on file   Food Insecurity: Not on file   Transportation Needs: Not on file   Physical Activity: Not on file   Stress: Not on file   Social Connections: Not on file   Intimate Partner Violence: Not At Risk     Fear of Current or Ex-Partner: No     Emotionally Abused: No     Physically Abused: No     Sexually Abused: No   Housing Stability: Not on file     Family History   Problem Relation Age of Onset     Cirrhosis Mother      Hypertension Mother      Diabetes Type 2  Father      Kidney failure Father      Cerebrovascular Disease Father      Hypertension Father      Cerebrovascular  Disease Sister      Hypertension Paternal Grandmother      Hypertension Paternal Grandfather        Medications:  Current Outpatient Medications   Medication     carvedilol (COREG) 12.5 MG tablet     cefuroxime (CEFTIN) 500 MG tablet     cholecalciferol (VITAMIN D3) 125 mcg (5000 units) capsule     famotidine (PEPCID) 40 MG tablet     furosemide (LASIX) 40 MG tablet     HYDROcodone-acetaminophen (NORCO)  MG per tablet     losartan-hydrochlorothiazide (HYZAAR) 100-12.5 MG tablet     potassium chloride ER (K-TAB/KLOR-CON) 10 MEQ CR tablet     topiramate (TOPAMAX) 50 MG tablet     No current facility-administered medications for this visit.       Allergies:     Allergies   Allergen Reactions     Vancomycin Anaphylaxis     Peanut Oil Itching     Tree Nuts [Nuts] Itching     Erythromycin Unknown     Latex Unknown     Added based on information entered during case entry, please review and add reactions, type, and severity as needed     Other Environmental Allergy Unknown     DUST     Pollen Extracts [Pollen Extract] Unknown     Zosyn [Piperacillin-Tazobactam In D5w] Tinnitus and Itching     Oxycodone Itching and Rash       /85   Pulse 52   Temp 98.4  F (36.9  C) (Oral)   Wt 132.3 kg (291 lb 11.2 oz)   SpO2 96%   BMI 40.68 kg/m       Physical Exam:  Constitutional: well-developed, appearing stated age; cooperative, Morbidly obese  Skin: no nail pitting, alopecia, rash, nodules or lesions  Eyes: nl EOM, vision, conjunctiva, sclera,   Neck: no mass, non-tender thyroid  Resp: lungs clear to auscultation, breathing comfortably on room air  CV: RRR, no murmurs, rubs or gallops, no edema  GI: soft, non-tender, non-distended  EXT: right lower extremity lymphedema  Neuro: nl cranial nerves, strength, sensation,   Psych: nl judgement, orientation, memory, affect.    MS: All joints look normal with full range of motion, no signs of acute synovitis. Normal  strength. No dactylitis,  tenosynovitis,  enthesopathy.    Lab/Imaging: Reviewed recent labs and imaging.     Assessment/Plan:     #. Right lower extremity lymphedema  #. lymphadenopathy  #. Fibrous dysplasia  #. Hypertension  #. Hx of recurrent infections    41 year old Romeo Chong with hx of chronic lymphedema, fibrous dysplasia, recurrent right LE cellulitis is referred to rheumatology for evaluation of chronic lymphadenopathy of unclear origin.    He has this long standing history of mediastinal mass and diffuse lymphadenopathy since 2005. Interestingly this has remained stable radiologically since that time. Repeat PETscans show mild uptake which has remained stable over this long period of time. Mediastinal and inguinal biopsy have remained negative for malignancy checked in 2005.     He has positive RAVINDER > 1:1280 in a dense speckled pattern, ESR 10, CRP 44 (8/2021 likely 2/2 infection that time), HIV, Hep B and C are negative. No features of systemic CTD outside of widespread lymphadenopathy to support an RAVINDER associated disease such as SLE, Sjogren's, scleroderma and doesn't seem to have any features of RA, sarcoidosis or  IgG4 related disease.  He has significant hx of blood clots in his right lower extremity and lungs that was treated with blood thinners for 3-6 months. However, I could not find antiphospholipid antibodies checked previously.    We have low suspicion of autoimmune condition given stability in his lymphadenopathy and absence of other features of CTD. We would expect worsening/evolving symptoms over this much long period of time in autoimmune condition without any immunosuppressive therapy.  However, we will obtain autoimmune and infectious work up that could potentially contribute to excessive lymphadenopathy. If every thing on labs come back normal then we would certainly encourage for repeating biopsy to rule out any indolent lymphoma. I will follow up with the patient once all labs are done.     Plan:  1) labs POLI, DsDNA, APS,  C3/C4, urine Pr/Cr, RF, CCP, ACE, 1,25 OH, ESR, CRP, IgG subclasses, IgA, IgM, HIV, TB gold, HHV 8, EBV and Quant gold.       Follow up: as needed depending on lab results       The patient was seen and staffed with Dr. All MD .     Braulio Amaro MD  Rheumatology Fellow  Pager [phone]    Staff addendum  I performed the history and physical examination of the patient and discussed the management with the fellow. I reviewed the available lab and imaging studies. I reviewed the fellow's note and agree with the documented findings and plan of care.    Bryon Carrizales MD  Rheumatology

## 2022-07-06 NOTE — NURSING NOTE
Chief Complaint   Patient presents with     Consult       /85   Pulse 52   Temp 98.4  F (36.9  C) (Oral)   Wt 132.3 kg (291 lb 11.2 oz)   SpO2 96%   BMI 40.68 kg/m      Jameson Carrero on 7/6/2022 at 8:02 AM

## 2022-07-08 ENCOUNTER — LAB (OUTPATIENT)
Dept: LAB | Facility: CLINIC | Age: 42
End: 2022-07-08
Payer: COMMERCIAL

## 2022-07-08 DIAGNOSIS — R59.1 LYMPHADENOPATHY: ICD-10-CM

## 2022-07-08 LAB
CREAT UR-MCNC: 148 MG/DL
CRP SERPL-MCNC: 5.4 MG/L (ref 0–8)
ERYTHROCYTE [SEDIMENTATION RATE] IN BLOOD BY WESTERGREN METHOD: 11 MM/HR (ref 0–15)
HIV 1+2 AB+HIV1 P24 AG SERPL QL IA: NONREACTIVE
PROT UR-MCNC: 0.12 G/L
PROT/CREAT 24H UR: 0.08 G/G CR (ref 0–0.2)

## 2022-07-08 PROCEDURE — 36415 COLL VENOUS BLD VENIPUNCTURE: CPT | Performed by: PATHOLOGY

## 2022-07-08 PROCEDURE — 86140 C-REACTIVE PROTEIN: CPT | Performed by: PATHOLOGY

## 2022-07-08 PROCEDURE — 82784 ASSAY IGA/IGD/IGG/IGM EACH: CPT | Mod: 90 | Performed by: PATHOLOGY

## 2022-07-08 PROCEDURE — 86481 TB AG RESPONSE T-CELL SUSP: CPT | Mod: 90 | Performed by: PATHOLOGY

## 2022-07-08 PROCEDURE — 85652 RBC SED RATE AUTOMATED: CPT | Performed by: PATHOLOGY

## 2022-07-08 PROCEDURE — 99000 SPECIMEN HANDLING OFFICE-LAB: CPT | Performed by: PATHOLOGY

## 2022-07-08 PROCEDURE — 86665 EPSTEIN-BARR CAPSID VCA: CPT | Mod: 90 | Performed by: PATHOLOGY

## 2022-07-08 PROCEDURE — 87799 DETECT AGENT NOS DNA QUANT: CPT | Mod: 90 | Performed by: PATHOLOGY

## 2022-07-08 PROCEDURE — 86235 NUCLEAR ANTIGEN ANTIBODY: CPT | Mod: 90 | Performed by: PATHOLOGY

## 2022-07-08 PROCEDURE — 85390 FIBRINOLYSINS SCREEN I&R: CPT | Performed by: PATHOLOGY

## 2022-07-08 PROCEDURE — 86160 COMPLEMENT ANTIGEN: CPT | Mod: 90 | Performed by: PATHOLOGY

## 2022-07-08 PROCEDURE — 85613 RUSSELL VIPER VENOM DILUTED: CPT | Mod: 90 | Performed by: PATHOLOGY

## 2022-07-08 PROCEDURE — 87389 HIV-1 AG W/HIV-1&-2 AB AG IA: CPT | Mod: 90 | Performed by: PATHOLOGY

## 2022-07-08 PROCEDURE — 86146 BETA-2 GLYCOPROTEIN ANTIBODY: CPT | Mod: 90 | Performed by: PATHOLOGY

## 2022-07-08 PROCEDURE — 86200 CCP ANTIBODY: CPT | Mod: 90 | Performed by: PATHOLOGY

## 2022-07-08 PROCEDURE — 86225 DNA ANTIBODY NATIVE: CPT | Mod: 90 | Performed by: PATHOLOGY

## 2022-07-08 PROCEDURE — 84156 ASSAY OF PROTEIN URINE: CPT | Performed by: PATHOLOGY

## 2022-07-08 PROCEDURE — 85730 THROMBOPLASTIN TIME PARTIAL: CPT | Mod: 90 | Performed by: PATHOLOGY

## 2022-07-08 PROCEDURE — 82787 IGG 1 2 3 OR 4 EACH: CPT | Mod: 90 | Performed by: PATHOLOGY

## 2022-07-08 PROCEDURE — 86147 CARDIOLIPIN ANTIBODY EA IG: CPT | Mod: 90 | Performed by: PATHOLOGY

## 2022-07-08 PROCEDURE — 86431 RHEUMATOID FACTOR QUANT: CPT | Mod: 90 | Performed by: PATHOLOGY

## 2022-07-08 PROCEDURE — 82164 ANGIOTENSIN I ENZYME TEST: CPT | Mod: 90 | Performed by: PATHOLOGY

## 2022-07-09 LAB
ACE SERPL-CCNC: 27 U/L
B2 GLYCOPROT1 IGG SERPL IA-ACNC: 0.9 U/ML
B2 GLYCOPROT1 IGM SERPL IA-ACNC: <2.4 U/ML
CARDIOLIPIN IGG SER IA-ACNC: <2 GPL-U/ML
CARDIOLIPIN IGG SER IA-ACNC: NEGATIVE
CARDIOLIPIN IGM SER IA-ACNC: 5.3 MPL-U/ML
CARDIOLIPIN IGM SER IA-ACNC: NEGATIVE
CCP AB SER IA-ACNC: 2.9 U/ML
DSDNA AB SER-ACNC: 0.7 IU/ML

## 2022-07-10 LAB
ENA SM IGG SER IA-ACNC: 1.7 U/ML
ENA SM IGG SER IA-ACNC: NEGATIVE
ENA SS-A AB SER IA-ACNC: 0.6 U/ML
ENA SS-A AB SER IA-ACNC: NEGATIVE
ENA SS-B IGG SER IA-ACNC: <0.6 U/ML
ENA SS-B IGG SER IA-ACNC: NEGATIVE
GAMMA INTERFERON BACKGROUND BLD IA-ACNC: 0 IU/ML
M TB IFN-G BLD-IMP: NEGATIVE
M TB IFN-G CD4+ BCKGRND COR BLD-ACNC: 10 IU/ML
MITOGEN IGNF BCKGRD COR BLD-ACNC: 0 IU/ML
MITOGEN IGNF BCKGRD COR BLD-ACNC: 0 IU/ML
QUANTIFERON MITOGEN: 10 IU/ML
QUANTIFERON NIL TUBE: 0 IU/ML
QUANTIFERON TB1 TUBE: 0 IU/ML
QUANTIFERON TB2 TUBE: 0
U1 SNRNP IGG SER IA-ACNC: 1.6 U/ML
U1 SNRNP IGG SER IA-ACNC: NEGATIVE

## 2022-07-11 LAB
DRVVT SCREEN RATIO: 1.02
EBV VCA IGG SER IA-ACNC: 187 U/ML
EBV VCA IGG SER IA-ACNC: POSITIVE
EBV VCA IGM SER IA-ACNC: <10 U/ML
EBV VCA IGM SER IA-ACNC: NORMAL
HHV8 AB SER QL: NOT DETECTED
HHV8 DNA # SERPL NAA+PROBE: NORMAL CPY/ML
HHV8 QUANT PCR LOG COPY/ML: <3.8 LOG CPY/ML
INR PPP: 1.08 (ref 0.85–1.15)
LA PPP-IMP: NEGATIVE
LUPUS INTERPRETATION: ABNORMAL
PLATELET NEUTRALIZATION: -5 SECONDS
PTT 1:2 MIX: 40 SECONDS (ref 31–45)
PTT RATIO: 1.24
THROMBIN TIME: 18 SECONDS (ref 13–19)

## 2022-07-12 LAB
C3 SERPL-MCNC: 153 MG/DL (ref 81–157)
C4 SERPL-MCNC: 47 MG/DL (ref 13–39)
EBV DNA # SPEC NAA+PROBE: NOT DETECTED COPIES/ML
IGA SERPL-MCNC: 384 MG/DL (ref 84–499)
IGG SERPL-MCNC: 1383 MG/DL (ref 610–1616)
IGG SERPL-MCNC: 1383 MG/DL (ref 610–1616)
IGG1 SER-MCNC: 637 MG/DL (ref 382–929)
IGG2 SER-MCNC: 487 MG/DL (ref 242–700)
IGG3 SER-MCNC: 83 MG/DL (ref 22–176)
IGG4 SER-MCNC: 157 MG/DL (ref 4–86)
IGM SERPL-MCNC: 109 MG/DL (ref 35–242)
RHEUMATOID FACT SER NEPH-ACNC: <6 IU/ML
SUBCLASSES, PERCENT: 99 %

## 2022-07-14 ENCOUNTER — TELEPHONE (OUTPATIENT)
Dept: FAMILY MEDICINE | Facility: CLINIC | Age: 42
End: 2022-07-14

## 2022-07-14 NOTE — TELEPHONE ENCOUNTER
Writer obtained DMV forms from work room and placed them on the provider's desk for review. Last seen in the office 3/2/2022.Would you me to assist him to schedule a visit in regard to these forms?

## 2022-07-14 NOTE — TELEPHONE ENCOUNTER
Patient dropped off forms for Dr. BEAUCHAMP to fill out from the DMV, would like them faxed over to the number provided once filled out. [phone]. Copies are made and original is placed in Dr. BEAUCHAMP's mailbox.

## 2022-07-20 ENCOUNTER — VIRTUAL VISIT (OUTPATIENT)
Dept: FAMILY MEDICINE | Facility: CLINIC | Age: 42
End: 2022-07-20
Payer: COMMERCIAL

## 2022-07-20 DIAGNOSIS — R55 SYNCOPE, UNSPECIFIED SYNCOPE TYPE: ICD-10-CM

## 2022-07-20 DIAGNOSIS — I50.23 ACUTE ON CHRONIC SYSTOLIC HEART FAILURE (H): Primary | ICD-10-CM

## 2022-07-20 DIAGNOSIS — I10 ESSENTIAL HYPERTENSION, BENIGN: ICD-10-CM

## 2022-07-20 PROCEDURE — 99214 OFFICE O/P EST MOD 30 MIN: CPT | Mod: 95 | Performed by: FAMILY MEDICINE

## 2022-07-20 ASSESSMENT — ASTHMA QUESTIONNAIRES
QUESTION_3 LAST FOUR WEEKS HOW OFTEN DID YOUR ASTHMA SYMPTOMS (WHEEZING, COUGHING, SHORTNESS OF BREATH, CHEST TIGHTNESS OR PAIN) WAKE YOU UP AT NIGHT OR EARLIER THAN USUAL IN THE MORNING: NOT AT ALL
ACT_TOTALSCORE: 25
QUESTION_1 LAST FOUR WEEKS HOW MUCH OF THE TIME DID YOUR ASTHMA KEEP YOU FROM GETTING AS MUCH DONE AT WORK, SCHOOL OR AT HOME: NONE OF THE TIME
ACT_TOTALSCORE: 25
QUESTION_4 LAST FOUR WEEKS HOW OFTEN HAVE YOU USED YOUR RESCUE INHALER OR NEBULIZER MEDICATION (SUCH AS ALBUTEROL): NOT AT ALL
QUESTION_2 LAST FOUR WEEKS HOW OFTEN HAVE YOU HAD SHORTNESS OF BREATH: NOT AT ALL
QUESTION_5 LAST FOUR WEEKS HOW WOULD YOU RATE YOUR ASTHMA CONTROL: COMPLETELY CONTROLLED

## 2022-07-20 NOTE — PROGRESS NOTES
Romeo is a 41 year old who is being evaluated via a billable video visit.      How would you like to obtain your AVS? MyChart  If the video visit is dropped, the invitation should be resent by: Text to cell phone: [phone]   Will anyone else be joining your video visit? No          Assessment & Plan     (I50.23) Acute on chronic systolic heart failure (H)  (primary encounter diagnosis)  (I10) Essential hypertension, benign  (R55) Syncope, unspecified syncope type  Chart and form reviewed and appropriate sections were filled in and signed.   Follow up as needed       I spent a total of 30 minutes on the day of the visit.   Time spent doing chart review, history and exam, documentation and further activities per the note               No follow-ups on file.    Chely Frye MD  [facility]    Subjective   Romeo is a 41 year old here to discuss Medical DVM form.   His cardiac and neurology evaluation has not suggested neuro for cardiogenic syncope as the culprit.            Review of Systems         Objective    Vitals - Patient Reported  Pain Score: No Pain (0)        Physical Exam   GENERAL: Healthy, alert and no distress                Video-Visit Details    Video Start Time: 2:50  PM    Type of service:  Video Visit    Video End Time:3:07 PM    Originating Location (pt. Location): Home    Distant Location (provider location):  [facility]     Platform used for Video Visit: [employer]    .  ..  
No, not prescribed...

## 2022-07-21 NOTE — TELEPHONE ENCOUNTER
Forms signed with virtual visit, faxed to the number provided and copy was mailed to the patient.

## 2022-08-09 ENCOUNTER — VIRTUAL VISIT (OUTPATIENT)
Dept: SURGERY | Facility: CLINIC | Age: 42
End: 2022-08-09
Payer: COMMERCIAL

## 2022-08-09 DIAGNOSIS — I10 ESSENTIAL HYPERTENSION, BENIGN: Primary | ICD-10-CM

## 2022-08-09 DIAGNOSIS — E78.1 HYPERTRIGLYCERIDEMIA: ICD-10-CM

## 2022-08-09 DIAGNOSIS — E66.01 CLASS 2 SEVERE OBESITY DUE TO EXCESS CALORIES WITH SERIOUS COMORBIDITY AND BODY MASS INDEX (BMI) OF 39.0 TO 39.9 IN ADULT (H): ICD-10-CM

## 2022-08-09 DIAGNOSIS — Z71.3 NUTRITIONAL COUNSELING: ICD-10-CM

## 2022-08-09 DIAGNOSIS — E66.812 CLASS 2 SEVERE OBESITY DUE TO EXCESS CALORIES WITH SERIOUS COMORBIDITY AND BODY MASS INDEX (BMI) OF 39.0 TO 39.9 IN ADULT (H): ICD-10-CM

## 2022-08-09 PROCEDURE — 97803 MED NUTRITION INDIV SUBSEQ: CPT | Mod: 95 | Performed by: DIETITIAN, REGISTERED

## 2022-08-09 NOTE — PROGRESS NOTES
Romeo Chong is a 41 year old who is being evaluated via a billable telephone visit.      What phone number would you like to be contacted at? [phone]  How would you like to obtain your AVS? [facility]  Weight Loss Follow-Up Diet Evaluation  Assessment:  Romeo is presenting today for a follow up weight management nutrition consultation. Pt has had an initial appointment with Dr. Ortiz.   Weight loss medication: None .   Pt's weight is 280 lbs   Initial weight: 290 lbs  Weight change: 10 lbs (down)    No flowsheet data found.  BMI: There is no height or weight on file to calculate BMI.  Ideal body weight: 75.3 kg (166 lb 0.1 oz)  Adjusted ideal body weight: 98.1 kg (216 lb 4.5 oz)    Estimated RMR (Roberts-St Jeor equation):   2205 kcals x 1.3 (light active) = 2867 kcals (for weight maintenance)     Recommended Protein Intake: 60-80 grams of protein/day  Patient Active Problem List:  Patient Active Problem List   Diagnosis     Edema     Fibrous dysplasia of bone     Essential hypertension, benign     Hypertriglyceridemia     Lymphedema     Obesity, unspecified     Stress hyperglycemia     Vitamin D deficiency     Allergic rhinitis due to animals     Nonintractable headache, unspecified chronicity pattern, unspecified headache type     Chronic pain syndrome     Gastric ulcer, unspecified chronicity, unspecified whether gastric ulcer hemorrhage or perforation present     Chronic low back pain, unspecified back pain laterality, unspecified whether sciatica present     H/O Hodgkin's lymphoma     Obesity (BMI 35.0-39.9) with comorbidity (H)     Calcaneal spur, left     Tachycardia     Cellulitis of right lower extremity     Elevated lactic acid level     Bacterial sepsis (H)     Gram-positive bacteremia     HTN (hypertension)     Osteomyelitis, unspecified site, unspecified type (H)     Gastroesophageal reflux disease without esophagitis     Lymphadenopathy     Mediastinal mass     Severe sepsis with  acute organ dysfunction due to group B Streptococcus (H)     Diabetes: No    Progress on goals from last visit: Patient reports that he is working on moving to his new place next week, which he feels will help him get back on track with proper meal choices and consistency of meals.  Patient reports that he did have a major cardiac event back in May, which has improved.      Dietary Recall:  Breakfast: skipped more recently due to living situation   Lunch:salmon, yellow rice, inna greens   Dinner:sandwich with sunflower seed butter and jelly or baked chicken with erin rice, veggies   Typical snacks: popcorn or rice krispie treats   Eating out: seldom  Beverages: Water (has been working on consuming 64 oz/day)  Exercise: increased movement, working on closing his rings on a daily basis.     Nutrition Diagnosis:    Overweight/Obesity (NC 3.3) related to overeating and poor lifestyle habits as evidenced by patient's subjective statements (h/o excessive energy intake, lack of exercise) and BMI of 39.2 kg/m2.     Intervention:  1. Food and/or nutrient delivery: balanced meals, adequate protein   2. Nutrition education: getting back on track, consistency of meals   3. Nutrition counseling: providing support and encouragement    Monitoring/Evaluation:    Goals:  1. Be consistent with eating 3 meals/day.   2. Focus on protein first at each meal, aiming for 20-30 grams of protein/meal, 3 meals/day.   3. Continue to work on increased movement/activity as able/tolerated.     Patient to follow up in 1 month(s) with bariatrician and 1 month(s) with RD        Phone call duration: 11 minutes      Rhina Pacheco RD

## 2022-08-09 NOTE — LETTER
8/9/2022         RE: Romeo Chong  [address]  [address]  [place] [address]        Dear Colleague,    Thank you for referring your patient, Romeo Chong, to the [facility] [place]. Please see a copy of my visit note below.    Romeo Chong is a 41 year old who is being evaluated via a billable telephone visit.      What phone number would you like to be contacted at? [phone]  How would you like to obtain your AVS? [facility]  Weight Loss Follow-Up Diet Evaluation  Assessment:  Romeo is presenting today for a follow up weight management nutrition consultation. Pt has had an initial appointment with Dr. Ortiz.   Weight loss medication: None .   Pt's weight is 280 lbs   Initial weight: 290 lbs  Weight change: 10 lbs (down)    No flowsheet data found.  BMI: There is no height or weight on file to calculate BMI.  Ideal body weight: 75.3 kg (166 lb 0.1 oz)  Adjusted ideal body weight: 98.1 kg (216 lb 4.5 oz)    Estimated RMR (Richland-St Jeor equation):   2205 kcals x 1.3 (light active) = 2867 kcals (for weight maintenance)     Recommended Protein Intake: 60-80 grams of protein/day  Patient Active Problem List:  Patient Active Problem List   Diagnosis     Edema     Fibrous dysplasia of bone     Essential hypertension, benign     Hypertriglyceridemia     Lymphedema     Obesity, unspecified     Stress hyperglycemia     Vitamin D deficiency     Allergic rhinitis due to animals     Nonintractable headache, unspecified chronicity pattern, unspecified headache type     Chronic pain syndrome     Gastric ulcer, unspecified chronicity, unspecified whether gastric ulcer hemorrhage or perforation present     Chronic low back pain, unspecified back pain laterality, unspecified whether sciatica present     H/O Hodgkin's lymphoma     Obesity (BMI 35.0-39.9) with comorbidity (H)     Calcaneal spur, left     Tachycardia     Cellulitis of right lower extremity      Elevated lactic acid level     Bacterial sepsis (H)     Gram-positive bacteremia     HTN (hypertension)     Osteomyelitis, unspecified site, unspecified type (H)     Gastroesophageal reflux disease without esophagitis     Lymphadenopathy     Mediastinal mass     Severe sepsis with acute organ dysfunction due to group B Streptococcus (H)     Diabetes: No    Progress on goals from last visit: Patient reports that he is working on moving to his new place next week, which he feels will help him get back on track with proper meal choices and consistency of meals.  Patient reports that he did have a major cardiac event back in May, which has improved.      Dietary Recall:  Breakfast: skipped more recently due to living situation   Lunch:salmon, yellow rice, inna greens   Dinner:sandwich with sunflower seed butter and jelly or baked chicken with erin rice, veggies   Typical snacks: popcorn or rice krispie treats   Eating out: seldom  Beverages: Water (has been working on consuming 64 oz/day)  Exercise: increased movement, working on closing his rings on a daily basis.     Nutrition Diagnosis:    Overweight/Obesity (NC 3.3) related to overeating and poor lifestyle habits as evidenced by patient's subjective statements (h/o excessive energy intake, lack of exercise) and BMI of 39.2 kg/m2.     Intervention:  1. Food and/or nutrient delivery: balanced meals, adequate protein   2. Nutrition education: getting back on track, consistency of meals   3. Nutrition counseling: providing support and encouragement    Monitoring/Evaluation:    Goals:  1. Be consistent with eating 3 meals/day.   2. Focus on protein first at each meal, aiming for 20-30 grams of protein/meal, 3 meals/day.   3. Continue to work on increased movement/activity as able/tolerated.     Patient to follow up in 1 month(s) with bariatrician and 1 month(s) with RD        Phone call duration: 11 minutes      Rhina Pacheco RD        Again, thank you  for allowing me to participate in the care of your patient.        Sincerely,        Rhina Pacheco, RD

## 2022-08-23 ENCOUNTER — TRANSFERRED RECORDS (OUTPATIENT)
Dept: HEALTH INFORMATION MANAGEMENT | Facility: CLINIC | Age: 42
End: 2022-08-23

## 2022-08-23 DIAGNOSIS — R59.1 LYMPHADENOPATHY: Primary | ICD-10-CM

## 2022-09-08 ENCOUNTER — TELEPHONE (OUTPATIENT)
Dept: FAMILY MEDICINE | Facility: CLINIC | Age: 42
End: 2022-09-08

## 2022-09-08 NOTE — TELEPHONE ENCOUNTER
Writer called and spoke with the patient regarding his disability paperwork and assisted him to schedule a virtual visit on Mon 9/12 at 5pm.

## 2022-09-12 ENCOUNTER — VIRTUAL VISIT (OUTPATIENT)
Dept: FAMILY MEDICINE | Facility: CLINIC | Age: 42
End: 2022-09-12
Payer: COMMERCIAL

## 2022-09-12 DIAGNOSIS — G89.4 CHRONIC PAIN SYNDROME: Primary | ICD-10-CM

## 2022-09-12 DIAGNOSIS — F48.9 MENTAL HEALTH PROBLEM: ICD-10-CM

## 2022-09-12 DIAGNOSIS — G89.29 CHRONIC LOW BACK PAIN, UNSPECIFIED BACK PAIN LATERALITY, UNSPECIFIED WHETHER SCIATICA PRESENT: ICD-10-CM

## 2022-09-12 DIAGNOSIS — M54.50 CHRONIC LOW BACK PAIN, UNSPECIFIED BACK PAIN LATERALITY, UNSPECIFIED WHETHER SCIATICA PRESENT: ICD-10-CM

## 2022-09-12 DIAGNOSIS — I89.0 LYMPHEDEMA: ICD-10-CM

## 2022-09-12 PROCEDURE — 99215 OFFICE O/P EST HI 40 MIN: CPT | Mod: 95 | Performed by: FAMILY MEDICINE

## 2022-09-12 NOTE — PROGRESS NOTES
Romeo is a 41 year old who is being evaluated via a billable video visit.      How would you like to obtain your AVS? MyChart  If the video visit is dropped, the invitation should be resent by: Text to cell phone: [phone]  Will anyone else be joining your video visit? No          Assessment & Plan     (G89.4) Chronic pain syndrome  (primary encounter diagnosis)  (I89.0) Lymphedema  (M54.50,  G89.29) Chronic low back pain, unspecified back pain laterality, unspecified whether sciatica present  Comment: for disability evaluation   Plan: Physical Therapy Referral            (F48.9) Mental health problem  Comment:   Plan: Adult Mental Health  Referral                I spent a total of 45 minutes on the day of the visit.   Time spent doing chart review, history and exam, documentation and further activities per the note             No follow-ups on file.    Chely Frye MD  [facility]   Romeo is a 41 year old who is presenting to discuss filing for disability.    He has multiple medical problems which is hindering him from working, and has not worked for the past 2 yrs, seeking permanent disability.     Patient has chronic pain syndrome, chronic low back pain, lymphedema which is limiting his physical activities and movements.   His cardiac condition is improving with EF function continuing on cardiac meds.       He is also asking for a mental health referral as he is struggling with depression / anxiety and loss of his daughter.     Review of Systems         Objective    Vitals - Patient Reported  Pain Score: No Pain (0)        Physical Exam   GENERAL: alert and no distress  PSYCH: Mentation appears normal, affect normal/bright, judgement and insight intact, normal speech and appearance well-groomed.                Video-Visit Details    Video Start Time: 5:05 PM    Type of service:  Video Visit    Video End Time:5:21 PM    Originating Location (pt. Location):  Home    Distant Location (provider location):  [facility]     Platform used for Video Visit: [employer]

## 2022-09-20 ENCOUNTER — TRANSFERRED RECORDS (OUTPATIENT)
Dept: HEALTH INFORMATION MANAGEMENT | Facility: CLINIC | Age: 42
End: 2022-09-20

## 2022-09-28 ENCOUNTER — OFFICE VISIT (OUTPATIENT)
Dept: FAMILY MEDICINE | Facility: CLINIC | Age: 42
End: 2022-09-28
Payer: COMMERCIAL

## 2022-09-28 VITALS
DIASTOLIC BLOOD PRESSURE: 80 MMHG | SYSTOLIC BLOOD PRESSURE: 128 MMHG | HEIGHT: 71 IN | OXYGEN SATURATION: 98 % | BODY MASS INDEX: 42.06 KG/M2 | RESPIRATION RATE: 16 BRPM | WEIGHT: 300.4 LBS | HEART RATE: 62 BPM

## 2022-09-28 DIAGNOSIS — G89.29 CHRONIC LOW BACK PAIN, UNSPECIFIED BACK PAIN LATERALITY, UNSPECIFIED WHETHER SCIATICA PRESENT: ICD-10-CM
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
## 2022-09-28 DIAGNOSIS — M54.50 CHRONIC LOW BACK PAIN, UNSPECIFIED BACK PAIN LATERALITY, UNSPECIFIED WHETHER SCIATICA PRESENT: ICD-10-CM

## 2022-09-28 DIAGNOSIS — Z01.818 PREOP GENERAL PHYSICAL EXAM: Primary | ICD-10-CM

## 2022-09-28 PROCEDURE — 99214 OFFICE O/P EST MOD 30 MIN: CPT | Performed by: FAMILY MEDICINE

## 2022-09-28 ASSESSMENT — ENCOUNTER SYMPTOMS
SORE THROAT: 0
FEVER: 0
VOMITING: 0
HEMATURIA: 0
COLOR CHANGE: 0
ABDOMINAL PAIN: 0
COUGH: 0
DYSURIA: 0
BACK PAIN: 0
CHILLS: 0
SHORTNESS OF BREATH: 0
ARTHRALGIAS: 0
EYE PAIN: 0
PALPITATIONS: 0
SEIZURES: 0

## 2022-09-28 NOTE — ASSESSMENT & PLAN NOTE
Suffering after his daughters murder two years ago, would like to meet with a psychotherapist. Mental health referral placed.

## 2022-09-28 NOTE — ASSESSMENT & PLAN NOTE
Patient with chronic low back pain using chronic narcotics.  He has had a history of heart failure spring 2022 and has cardiology following.  Despite this this is a fairly straightforward procedure and I do not anticipate any complications.    planning epidural back procedure here for preop today unable to get into primary care physician so is seeing me because of logistical issues    Surgical Information:  Surgery/Procedure: Epidural back procedure  Surgery Location: [facility]  Surgeon: Dr. Mobley  Surgery Date: 10/6/22
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
## 2022-09-28 NOTE — PATIENT INSTRUCTIONS
Preparing for Your Surgery  Getting started  A nurse will call you to review your health history and instructions. They will give you an arrival time based on your scheduled surgery time. Please be ready to share:    Your doctor's clinic name and phone number    Your medical, surgical and anesthesia history    A list of allergies and sensitivities    A list of medicines, including herbal treatments and over-the-counter drugs    Whether the patient has a legal guardian (ask how to send us the papers in advance)  Please tell us if you're pregnant--or if there's any chance you might be pregnant. Some surgeries may injure a fetus (unborn baby), so they require a pregnancy test. Surgeries that are safe for a fetus don't always need a test, and you can choose whether to have one.   If you have a child who's having surgery, please ask for a copy of Preparing for Your Child's Surgery.    Preparing for surgery    Within 10 to 30 days of surgery: Have a pre-op exam (sometimes called an H&P, or History and Physical). This can be done at a clinic or pre-operative center.  ? If you're having a , you may not need this exam. Talk to your care team.    At your pre-op exam, talk to your care team about all medicines you take. If you need to stop any medicines before surgery, ask when to start taking them again.  ? We do this for your safety. Many medicines can make you bleed too much during surgery. Some change how well surgery (anesthesia) drugs work.    Call your insurance company to let them know you're having surgery. (If you don't have insurance, call [phone].)    Call your clinic if there's any change in your health. This includes signs of a cold or flu (sore throat, runny nose, cough, rash, fever). It also includes a scrape or scratch near the surgery site.    If you have questions on the day of surgery, call your hospital or surgery center.  COVID testing  You may need to be tested for COVID-19 before having  surgery. If so, we will give you instructions (or click here).  Eating and drinking guidelines  For your safety: Unless your surgeon tells you otherwise, follow the guidelines below.    Eat and drink as usual until 8 hours before surgery. After that, no food or milk.    Drink clear liquids until 2 hours before surgery. These are liquids you can see through, like water, Gatorade and Propel Water. You may also have black coffee and tea (no cream or milk).    Nothing by mouth within 2 hours of surgery. This includes gum, candy and breath mints.    If you drink alcohol: Stop drinking it the night before surgery.    If your care team tells you to take medicine on the morning of surgery, it's okay to take it with a sip of water.  Preventing infection    Shower or bathe the night before and morning of your surgery. Follow the instructions your clinic gave you. (If no instructions, use regular soap.)    Don't shave or clip hair near your surgery site. We'll remove the hair if needed.    Don't smoke or vape the morning of surgery. You may chew nicotine gum up to 2 hours before surgery. A nicotine patch is okay.  ? Note: Some surgeries require you to completely quit smoking and nicotine. Check with your surgeon.    Your care team will make every effort to keep you safe from infection. We will:  ? Clean our hands often with soap and water (or an alcohol-based hand rub).  ? Clean the skin at your surgery site with a special soap that kills germs.  ? Give you a special gown to keep you warm. (Cold raises the risk of infection.)  ? Wear special hair covers, masks, gowns and gloves during surgery.  ? Give antibiotic medicine, if prescribed. Not all surgeries need antibiotics.  What to bring on the day of surgery    Photo ID and insurance card    Copy of your health care directive, if you have one    Glasses and hearing aides (bring cases)  ? You can't wear contacts during surgery    Inhaler and eye drops, if you use them (tell us  about these when you arrive)    CPAP machine or breathing device, if you use them    A few personal items, if spending the night    If you have . . .  ? A pacemaker, ICD (cardiac defibrillator) or other implant: Bring the ID card.  ? An implanted stimulator: Bring the remote control.  ? A legal guardian: Bring a copy of the certified (court-stamped) guardianship papers.  Please remove any jewelry, including body piercings. Leave jewelry and other valuables at home.  If you're going home the day of surgery    You must have a responsible adult drive you home. They should stay with you overnight as well.    If you don't have someone to stay with you, and you aren't safe to go home alone, we may keep you overnight. Insurance often won't pay for this.  After surgery  If it's hard to control your pain or you need more pain medicine, please call your surgeon's office.  Questions?   If you have any questions for your care team, list them here: _________________________________________________________________________________________________________________________________________________________________________ ____________________________________ ____________________________________ ____________________________________  For informational purposes only. Not to replace the advice of your health care provider. Copyright   2003, 2019 [facility]. All rights reserved. Clinically reviewed by Debby Guido MD. [employer] [id number] - REV 07/22.

## 2022-09-29 ENCOUNTER — ONCOLOGY VISIT (OUTPATIENT)
Dept: ONCOLOGY | Facility: CLINIC | Age: 42
End: 2022-09-29
Attending: STUDENT IN AN ORGANIZED HEALTH CARE EDUCATION/TRAINING PROGRAM
Payer: COMMERCIAL

## 2022-09-29 ENCOUNTER — TELEPHONE (OUTPATIENT)
Dept: BEHAVIORAL HEALTH | Facility: CLINIC | Age: 42
End: 2022-09-29

## 2022-09-29 ENCOUNTER — LAB (OUTPATIENT)
Dept: LAB | Facility: CLINIC | Age: 42
End: 2022-09-29
Attending: STUDENT IN AN ORGANIZED HEALTH CARE EDUCATION/TRAINING PROGRAM
Payer: COMMERCIAL

## 2022-09-29 VITALS
BODY MASS INDEX: 41.38 KG/M2 | HEART RATE: 70 BPM | OXYGEN SATURATION: 98 % | WEIGHT: 296.7 LBS | RESPIRATION RATE: 18 BRPM | TEMPERATURE: 97.9 F | DIASTOLIC BLOOD PRESSURE: 84 MMHG | SYSTOLIC BLOOD PRESSURE: 149 MMHG

## 2022-09-29 DIAGNOSIS — N52.9 ERECTILE DYSFUNCTION, UNSPECIFIED ERECTILE DYSFUNCTION TYPE: Primary | ICD-10-CM

## 2022-09-29 DIAGNOSIS — R59.1 LYMPHADENOPATHY: ICD-10-CM

## 2022-09-29 LAB
ALBUMIN SERPL BCG-MCNC: 4.4 G/DL (ref 3.5–5.2)
ALP SERPL-CCNC: 71 U/L (ref 40–129)
ALT SERPL W P-5'-P-CCNC: 21 U/L (ref 10–50)
ANION GAP SERPL CALCULATED.3IONS-SCNC: 12 MMOL/L (ref 7–15)
AST SERPL W P-5'-P-CCNC: 22 U/L (ref 10–50)
BASOPHILS # BLD AUTO: 0 10E3/UL (ref 0–0.2)
BASOPHILS NFR BLD AUTO: 1 %
BILIRUB SERPL-MCNC: 0.4 MG/DL
BUN SERPL-MCNC: 12.6 MG/DL (ref 6–20)
CALCIUM SERPL-MCNC: 10.1 MG/DL (ref 8.6–10)
CHLORIDE SERPL-SCNC: 103 MMOL/L (ref 98–107)
CREAT SERPL-MCNC: 0.77 MG/DL (ref 0.67–1.17)
DEPRECATED HCO3 PLAS-SCNC: 25 MMOL/L (ref 22–29)
EOSINOPHIL # BLD AUTO: 0.1 10E3/UL (ref 0–0.7)
EOSINOPHIL NFR BLD AUTO: 2 %
ERYTHROCYTE [DISTWIDTH] IN BLOOD BY AUTOMATED COUNT: 13.4 % (ref 10–15)
GFR SERPL CREATININE-BSD FRML MDRD: >90 ML/MIN/1.73M2
GLUCOSE SERPL-MCNC: 114 MG/DL (ref 70–99)
HCT VFR BLD AUTO: 42.8 % (ref 40–53)
HGB BLD-MCNC: 13.7 G/DL (ref 13.3–17.7)
IMM GRANULOCYTES # BLD: 0 10E3/UL
IMM GRANULOCYTES NFR BLD: 0 %
LDH SERPL L TO P-CCNC: 216 U/L (ref 0–250)
LYMPHOCYTES # BLD AUTO: 1.3 10E3/UL (ref 0.8–5.3)
LYMPHOCYTES NFR BLD AUTO: 30 %
MAGNESIUM SERPL-MCNC: 1.8 MG/DL (ref 1.7–2.3)
MCH RBC QN AUTO: 26.2 PG (ref 26.5–33)
MCHC RBC AUTO-ENTMCNC: 32 G/DL (ref 31.5–36.5)
MCV RBC AUTO: 82 FL (ref 78–100)
MONOCYTES # BLD AUTO: 0.3 10E3/UL (ref 0–1.3)
MONOCYTES NFR BLD AUTO: 8 %
NEUTROPHILS # BLD AUTO: 2.7 10E3/UL (ref 1.6–8.3)
NEUTROPHILS NFR BLD AUTO: 59 %
NRBC # BLD AUTO: 0 10E3/UL
NRBC BLD AUTO-RTO: 0 /100
PHOSPHATE SERPL-MCNC: 4 MG/DL (ref 2.5–4.5)
PLATELET # BLD AUTO: 253 10E3/UL (ref 150–450)
POTASSIUM SERPL-SCNC: 3.7 MMOL/L (ref 3.4–5.3)
PROT SERPL-MCNC: 7.8 G/DL (ref 6.4–8.3)
RBC # BLD AUTO: 5.22 10E6/UL (ref 4.4–5.9)
SODIUM SERPL-SCNC: 140 MMOL/L (ref 136–145)
TOTAL PROTEIN SERUM FOR ELP: 7.7 G/DL (ref 6.4–8.3)
URATE SERPL-MCNC: 5.5 MG/DL (ref 3.4–7)
WBC # BLD AUTO: 4.5 10E3/UL (ref 4–11)

## 2022-09-29 PROCEDURE — 36415 COLL VENOUS BLD VENIPUNCTURE: CPT

## 2022-09-29 PROCEDURE — 83529 ASAY OF INTERLEUKIN-6 (IL-6): CPT | Performed by: STUDENT IN AN ORGANIZED HEALTH CARE EDUCATION/TRAINING PROGRAM

## 2022-09-29 PROCEDURE — 83521 IG LIGHT CHAINS FREE EACH: CPT | Performed by: STUDENT IN AN ORGANIZED HEALTH CARE EDUCATION/TRAINING PROGRAM

## 2022-09-29 PROCEDURE — 83520 IMMUNOASSAY QUANT NOS NONAB: CPT | Performed by: STUDENT IN AN ORGANIZED HEALTH CARE EDUCATION/TRAINING PROGRAM

## 2022-09-29 PROCEDURE — 99215 OFFICE O/P EST HI 40 MIN: CPT | Performed by: STUDENT IN AN ORGANIZED HEALTH CARE EDUCATION/TRAINING PROGRAM

## 2022-09-29 PROCEDURE — 84165 PROTEIN E-PHORESIS SERUM: CPT | Mod: TC | Performed by: PATHOLOGY

## 2022-09-29 PROCEDURE — 84100 ASSAY OF PHOSPHORUS: CPT | Performed by: STUDENT IN AN ORGANIZED HEALTH CARE EDUCATION/TRAINING PROGRAM

## 2022-09-29 PROCEDURE — 83735 ASSAY OF MAGNESIUM: CPT | Performed by: STUDENT IN AN ORGANIZED HEALTH CARE EDUCATION/TRAINING PROGRAM

## 2022-09-29 PROCEDURE — 36415 COLL VENOUS BLD VENIPUNCTURE: CPT | Performed by: STUDENT IN AN ORGANIZED HEALTH CARE EDUCATION/TRAINING PROGRAM

## 2022-09-29 PROCEDURE — 84165 PROTEIN E-PHORESIS SERUM: CPT | Mod: 26 | Performed by: PATHOLOGY

## 2022-09-29 PROCEDURE — 86334 IMMUNOFIX E-PHORESIS SERUM: CPT | Performed by: PATHOLOGY

## 2022-09-29 PROCEDURE — 84550 ASSAY OF BLOOD/URIC ACID: CPT | Performed by: STUDENT IN AN ORGANIZED HEALTH CARE EDUCATION/TRAINING PROGRAM

## 2022-09-29 PROCEDURE — 86645 CMV ANTIBODY IGM: CPT | Performed by: STUDENT IN AN ORGANIZED HEALTH CARE EDUCATION/TRAINING PROGRAM

## 2022-09-29 PROCEDURE — 86644 CMV ANTIBODY: CPT | Performed by: STUDENT IN AN ORGANIZED HEALTH CARE EDUCATION/TRAINING PROGRAM

## 2022-09-29 PROCEDURE — 85025 COMPLETE CBC W/AUTO DIFF WBC: CPT | Performed by: STUDENT IN AN ORGANIZED HEALTH CARE EDUCATION/TRAINING PROGRAM

## 2022-09-29 PROCEDURE — 83615 LACTATE (LD) (LDH) ENZYME: CPT | Performed by: STUDENT IN AN ORGANIZED HEALTH CARE EDUCATION/TRAINING PROGRAM

## 2022-09-29 PROCEDURE — 80053 COMPREHEN METABOLIC PANEL: CPT | Performed by: STUDENT IN AN ORGANIZED HEALTH CARE EDUCATION/TRAINING PROGRAM

## 2022-09-29 PROCEDURE — G0463 HOSPITAL OUTPT CLINIC VISIT: HCPCS

## 2022-09-29 PROCEDURE — 86334 IMMUNOFIX E-PHORESIS SERUM: CPT | Mod: 26 | Performed by: PATHOLOGY

## 2022-09-29 PROCEDURE — 84155 ASSAY OF PROTEIN SERUM: CPT | Mod: 91 | Performed by: STUDENT IN AN ORGANIZED HEALTH CARE EDUCATION/TRAINING PROGRAM

## 2022-09-29 PROCEDURE — 82784 ASSAY IGA/IGD/IGG/IGM EACH: CPT | Performed by: STUDENT IN AN ORGANIZED HEALTH CARE EDUCATION/TRAINING PROGRAM

## 2022-09-29 ASSESSMENT — PAIN SCALES - GENERAL: PAINLEVEL: SEVERE PAIN (6)
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
## 2022-09-29 NOTE — NURSING NOTE
Chief Complaint   Patient presents with     Blood Draw     Vpt blood draw by lab RN. Vitals taken and appointment arrived   Vascular performed venipuncture  Hodan Castillo RN
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
## 2022-09-29 NOTE — NURSING NOTE
"Oncology Rooming Note    September 29, 2022 3:45 PM   Romeo Chong is a 41 year old male who presents for:    Chief Complaint   Patient presents with     Blood Draw     Vpt blood draw by lab RN. Vitals taken and appointment arrived     Oncology Clinic Visit     History of Hodgkin's lymphoma     Initial Vitals: BP (!) 149/84   Pulse 70   Temp 97.9  F (36.6  C) (Oral)   Resp 18   Wt 134.6 kg (296 lb 11.2 oz)   SpO2 98%   BMI 41.38 kg/m   Estimated body mass index is 41.38 kg/m  as calculated from the following:    Height as of 9/28/22: 1.803 m (5' 11\").    Weight as of this encounter: 134.6 kg (296 lb 11.2 oz). Body surface area is 2.6 meters squared.  Severe Pain (6) Comment: Data Unavailable   No LMP for male patient.  Allergies reviewed: Yes  Medications reviewed: Yes    Medications: Medication refills not needed today.  Pharmacy name entered into [facility]:    [facility][id number] - [facility] [address] [facility] [address] [facility] [place], [place] - [address] [id number] - [place], [place] - [address] AT [facility] [address]    Clinical concerns: none       Kiana Peters CMA            "
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
## 2022-09-30 ENCOUNTER — TELEPHONE (OUTPATIENT)
Dept: BEHAVIORAL HEALTH | Facility: CLINIC | Age: 42
End: 2022-09-30

## 2022-09-30 ENCOUNTER — TELEPHONE (OUTPATIENT)
Dept: FAMILY MEDICINE | Facility: CLINIC | Age: 42
End: 2022-09-30

## 2022-09-30 LAB
ALBUMIN SERPL ELPH-MCNC: 4.5 G/DL (ref 3.7–5.1)
ALPHA1 GLOB SERPL ELPH-MCNC: 0.3 G/DL (ref 0.2–0.4)
ALPHA2 GLOB SERPL ELPH-MCNC: 0.6 G/DL (ref 0.5–0.9)
B-GLOBULIN SERPL ELPH-MCNC: 1 G/DL (ref 0.6–1)
CMV DNA SPEC NAA+PROBE-ACNC: NOT DETECTED IU/ML
CMV IGG SERPL IA-ACNC: >10 U/ML
CMV IGG SERPL IA-ACNC: ABNORMAL
CMV IGM SERPL IA-ACNC: <8 AU/ML
CMV IGM SERPL IA-ACNC: NEGATIVE
GAMMA GLOB SERPL ELPH-MCNC: 1.3 G/DL (ref 0.7–1.6)
IGA SERPL-MCNC: 384 MG/DL (ref 84–499)
IGG SERPL-MCNC: 1356 MG/DL (ref 610–1616)
IGM SERPL-MCNC: 116 MG/DL (ref 35–242)
IL6 SERPL-MCNC: 2.4 PG/ML
KAPPA LC FREE SER-MCNC: 2.23 MG/DL (ref 0.33–1.94)
KAPPA LC FREE/LAMBDA FREE SER NEPH: 1.81 {RATIO} (ref 0.26–1.65)
LAMBDA LC FREE SERPL-MCNC: 1.23 MG/DL (ref 0.57–2.63)
M PROTEIN SERPL ELPH-MCNC: 0 G/DL
PROT PATTERN SERPL ELPH-IMP: NORMAL
PROT PATTERN SERPL IFE-IMP: NORMAL

## 2022-09-30 NOTE — TELEPHONE ENCOUNTER
Forms/Letter Request    Type of form/letter: PRE-OP    Have you been seen for this request: Yes 09/28/22    Do we have the form/letter: Yes    When is form/letter needed by: ASAP    How would you like the form/letter returned: Fax to [phone]      RN from [facility] calling requesting pre-op with Dr. Fonseca on 09/28/22 be faxed over asap to number above.
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
## 2022-10-02 ENCOUNTER — HEALTH MAINTENANCE LETTER (OUTPATIENT)
Age: 42
End: 2022-10-02

## 2022-10-03 LAB — A-TUMOR NECROSIS FACT SERPL-MCNC: 8 PG/ML

## 2022-10-05 ENCOUNTER — TELEPHONE (OUTPATIENT)
Dept: BEHAVIORAL HEALTH | Facility: CLINIC | Age: 42
End: 2022-10-05

## 2022-10-05 LAB — SCANNED LAB RESULT: NORMAL

## 2022-10-11 LAB — VEGF SERPL-MCNC: 54 PG/ML

## 2022-10-12 DIAGNOSIS — I10 UNCONTROLLED HYPERTENSION: ICD-10-CM

## 2022-10-12 RX ORDER — LOSARTAN POTASSIUM AND HYDROCHLOROTHIAZIDE 12.5; 1 MG/1; MG/1
1 TABLET ORAL DAILY
Qty: 90 TABLET | Refills: 4 | OUTPATIENT
Start: 2022-10-12

## 2022-10-20 ENCOUNTER — TRANSFERRED RECORDS (OUTPATIENT)
Dept: HEALTH INFORMATION MANAGEMENT | Facility: CLINIC | Age: 42
End: 2022-10-20

## 2022-10-25 ENCOUNTER — HOSPITAL ENCOUNTER (OUTPATIENT)
Dept: PET IMAGING | Facility: CLINIC | Age: 42
Discharge: HOME | End: 2022-10-25
Attending: STUDENT IN AN ORGANIZED HEALTH CARE EDUCATION/TRAINING PROGRAM
Payer: COMMERCIAL

## 2022-10-25 DIAGNOSIS — R59.1 LYMPHADENOPATHY: ICD-10-CM

## 2022-10-25 PROCEDURE — 250N000011 HC RX IP 250 OP 636: Performed by: STUDENT IN AN ORGANIZED HEALTH CARE EDUCATION/TRAINING PROGRAM

## 2022-10-25 PROCEDURE — 70491 CT SOFT TISSUE NECK W/DYE: CPT

## 2022-10-25 PROCEDURE — 343N000001 HC RX 343: Performed by: STUDENT IN AN ORGANIZED HEALTH CARE EDUCATION/TRAINING PROGRAM

## 2022-10-25 PROCEDURE — A9552 F18 FDG: HCPCS | Performed by: STUDENT IN AN ORGANIZED HEALTH CARE EDUCATION/TRAINING PROGRAM

## 2022-10-25 PROCEDURE — 71260 CT THORAX DX C+: CPT | Mod: 26 | Performed by: RADIOLOGY

## 2022-10-25 PROCEDURE — 78816 PET IMAGE W/CT FULL BODY: CPT | Mod: 26 | Performed by: RADIOLOGY

## 2022-10-25 PROCEDURE — 70491 CT SOFT TISSUE NECK W/DYE: CPT | Mod: 26 | Performed by: RADIOLOGY

## 2022-10-25 PROCEDURE — 74177 CT ABD & PELVIS W/CONTRAST: CPT

## 2022-10-25 PROCEDURE — 258N000003 HC RX IP 258 OP 636: Performed by: STUDENT IN AN ORGANIZED HEALTH CARE EDUCATION/TRAINING PROGRAM

## 2022-10-25 PROCEDURE — 78816 PET IMAGE W/CT FULL BODY: CPT | Mod: PS

## 2022-10-25 PROCEDURE — 74177 CT ABD & PELVIS W/CONTRAST: CPT | Mod: 26 | Performed by: RADIOLOGY

## 2022-10-25 RX ORDER — SODIUM CHLORIDE 9 MG/ML
INJECTION, SOLUTION INTRAVENOUS ONCE
Status: COMPLETED | OUTPATIENT
Start: 2022-10-25 | End: 2022-10-25

## 2022-10-25 RX ORDER — IOPAMIDOL 755 MG/ML
10-135 INJECTION, SOLUTION INTRAVASCULAR ONCE
Status: COMPLETED | OUTPATIENT
Start: 2022-10-25 | End: 2022-10-25

## 2022-10-25 RX ADMIN — SODIUM CHLORIDE 60 ML: 9 INJECTION, SOLUTION INTRAVENOUS at 15:27

## 2022-10-25 RX ADMIN — IOPAMIDOL 122 ML: 755 INJECTION, SOLUTION INTRAVENOUS at 15:23

## 2022-10-25 RX ADMIN — FLUDEOXYGLUCOSE F-18 16.67 MCI.: 500 INJECTION, SOLUTION INTRAVENOUS at 14:28

## 2022-11-02 ENCOUNTER — OFFICE VISIT (OUTPATIENT)
Dept: FAMILY MEDICINE | Facility: CLINIC | Age: 42
End: 2022-11-02
Payer: COMMERCIAL

## 2022-11-02 VITALS
HEIGHT: 71 IN | RESPIRATION RATE: 18 BRPM | SYSTOLIC BLOOD PRESSURE: 132 MMHG | WEIGHT: 306 LBS | DIASTOLIC BLOOD PRESSURE: 90 MMHG | HEART RATE: 67 BPM | BODY MASS INDEX: 42.84 KG/M2 | OXYGEN SATURATION: 97 % | TEMPERATURE: 98 F

## 2022-11-02 DIAGNOSIS — Z01.818 PRE-OP EVALUATION: Primary | ICD-10-CM

## 2022-11-02 DIAGNOSIS — I10 ESSENTIAL HYPERTENSION, BENIGN: ICD-10-CM

## 2022-11-02 DIAGNOSIS — Z91.010 PEANUT ALLERGY: ICD-10-CM

## 2022-11-02 DIAGNOSIS — G89.4 CHRONIC PAIN SYNDROME: ICD-10-CM

## 2022-11-02 DIAGNOSIS — K25.9 GASTRIC ULCER, UNSPECIFIED CHRONICITY, UNSPECIFIED WHETHER GASTRIC ULCER HEMORRHAGE OR PERFORATION PRESENT: ICD-10-CM

## 2022-11-02 LAB
ANION GAP SERPL CALCULATED.3IONS-SCNC: 12 MMOL/L (ref 7–15)
ATRIAL RATE - MUSE: 54 BPM
BUN SERPL-MCNC: 8.6 MG/DL (ref 6–20)
CALCIUM SERPL-MCNC: 9.4 MG/DL (ref 8.6–10)
CHLORIDE SERPL-SCNC: 101 MMOL/L (ref 98–107)
CREAT SERPL-MCNC: 0.6 MG/DL (ref 0.67–1.17)
DEPRECATED HCO3 PLAS-SCNC: 26 MMOL/L (ref 22–29)
DIASTOLIC BLOOD PRESSURE - MUSE: NORMAL MMHG
ERYTHROCYTE [DISTWIDTH] IN BLOOD BY AUTOMATED COUNT: 12.8 % (ref 10–15)
GFR SERPL CREATININE-BSD FRML MDRD: >90 ML/MIN/1.73M2
GLUCOSE SERPL-MCNC: 90 MG/DL (ref 70–99)
HCT VFR BLD AUTO: 40.3 % (ref 40–53)
HGB BLD-MCNC: 13.2 G/DL (ref 13.3–17.7)
INTERPRETATION ECG - MUSE: NORMAL
MCH RBC QN AUTO: 26.7 PG (ref 26.5–33)
MCHC RBC AUTO-ENTMCNC: 32.8 G/DL (ref 31.5–36.5)
MCV RBC AUTO: 81 FL (ref 78–100)
P AXIS - MUSE: 48 DEGREES
PLATELET # BLD AUTO: 222 10E3/UL (ref 150–450)
POTASSIUM SERPL-SCNC: 4.2 MMOL/L (ref 3.4–5.3)
PR INTERVAL - MUSE: 182 MS
QRS DURATION - MUSE: 134 MS
QT - MUSE: 440 MS
QTC - MUSE: 417 MS
R AXIS - MUSE: -11 DEGREES
RBC # BLD AUTO: 4.95 10E6/UL (ref 4.4–5.9)
SODIUM SERPL-SCNC: 139 MMOL/L (ref 136–145)
SYSTOLIC BLOOD PRESSURE - MUSE: NORMAL MMHG
T AXIS - MUSE: 4 DEGREES
VENTRICULAR RATE- MUSE: 54 BPM
WBC # BLD AUTO: 4.1 10E3/UL (ref 4–11)

## 2022-11-02 PROCEDURE — 93010 ELECTROCARDIOGRAM REPORT: CPT | Performed by: INTERNAL MEDICINE

## 2022-11-02 PROCEDURE — 99215 OFFICE O/P EST HI 40 MIN: CPT | Performed by: FAMILY MEDICINE

## 2022-11-02 PROCEDURE — 85027 COMPLETE CBC AUTOMATED: CPT | Performed by: FAMILY MEDICINE

## 2022-11-02 PROCEDURE — 36415 COLL VENOUS BLD VENIPUNCTURE: CPT | Performed by: FAMILY MEDICINE

## 2022-11-02 PROCEDURE — 80048 BASIC METABOLIC PNL TOTAL CA: CPT | Performed by: FAMILY MEDICINE

## 2022-11-02 PROCEDURE — 93005 ELECTROCARDIOGRAM TRACING: CPT | Performed by: FAMILY MEDICINE

## 2022-11-02 RX ORDER — EPINEPHRINE 0.3 MG/.3ML
0.3 INJECTION SUBCUTANEOUS PRN
Qty: 2 EACH | Refills: 0 | Status: SHIPPED | OUTPATIENT
Start: 2022-11-02 | End: 2024-02-21

## 2022-11-02 RX ORDER — FAMOTIDINE 40 MG/1
80 TABLET, FILM COATED ORAL 2 TIMES DAILY PRN
Qty: 360 TABLET | Refills: 1 | Status: SHIPPED | OUTPATIENT
Start: 2022-11-02 | End: 2023-06-19

## 2022-11-02 ASSESSMENT — PAIN SCALES - GENERAL: PAINLEVEL: EXTREME PAIN (8)
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
## 2022-11-03 PROBLEM — Z91.010 PEANUT ALLERGY: Status: ACTIVE | Noted: 2022-11-03

## 2022-11-04 ENCOUNTER — TELEPHONE (OUTPATIENT)
Dept: SURGERY | Facility: CLINIC | Age: 42
End: 2022-11-04

## 2022-11-04 NOTE — TELEPHONE ENCOUNTER
Attempts made to contact patient in regards to video visit that was scheduled for today with this writer at 2:30 pm.  Patient did not link onto video visit nor answer the phone with attempts made, therefore left patient a detailed message including call center contact information to call and reschedule this appointment at his earliest convenience.

## 2022-11-14 ENCOUNTER — TRANSFERRED RECORDS (OUTPATIENT)
Dept: HEALTH INFORMATION MANAGEMENT | Facility: CLINIC | Age: 42
End: 2022-11-14

## 2022-11-28 ENCOUNTER — VIRTUAL VISIT (OUTPATIENT)
Dept: UROLOGY | Facility: CLINIC | Age: 42
End: 2022-11-28
Payer: COMMERCIAL

## 2022-11-28 DIAGNOSIS — N52.9 ERECTILE DYSFUNCTION, UNSPECIFIED ERECTILE DYSFUNCTION TYPE: ICD-10-CM

## 2022-11-28 NOTE — LETTER
11/28/2022       RE: Romeo Chong  [address]  [place] [address]     Dear Colleague,    Thank you for referring your patient, Romeo Chong, to the [facility] [place] at [facility]. Please see a copy of my visit note below.    Text messages sent, and unable to reach patient.  Not seen.       Again, thank you for allowing me to participate in the care of your patient.      Sincerely,    Angie Goodson PA-C

## 2022-11-28 NOTE — LETTER
Date:December 1, 2022      Provider requested that no letter be sent. Do not send.       [facility]

## 2022-12-13 ENCOUNTER — TRANSFERRED RECORDS (OUTPATIENT)
Dept: HEALTH INFORMATION MANAGEMENT | Facility: CLINIC | Age: 42
End: 2022-12-13

## 2022-12-27 ENCOUNTER — TELEPHONE (OUTPATIENT)
Dept: FAMILY MEDICINE | Facility: CLINIC | Age: 42
End: 2022-12-27

## 2022-12-27 DIAGNOSIS — R12 HEARTBURN: Primary | ICD-10-CM

## 2022-12-27 NOTE — TELEPHONE ENCOUNTER
FAX [facility] Drug Change Request    FAMOTIDINE 40 mg tablets    Message: Drug not covered by patient plan.  The preferred alternative is CIMETIDINE, NIZATIDINE

## 2022-12-29 ENCOUNTER — VIRTUAL VISIT (OUTPATIENT)
Dept: PSYCHIATRY | Facility: CLINIC | Age: 42
End: 2022-12-29
Payer: COMMERCIAL

## 2022-12-29 ENCOUNTER — VIRTUAL VISIT (OUTPATIENT)
Dept: BEHAVIORAL HEALTH | Facility: CLINIC | Age: 42
End: 2022-12-29
Payer: COMMERCIAL

## 2022-12-29 DIAGNOSIS — Z03.89 NO DIAGNOSIS ON AXIS I: Primary | ICD-10-CM

## 2022-12-29 DIAGNOSIS — Z53.9 ERRONEOUS ENCOUNTER--DISREGARD: Primary | ICD-10-CM

## 2022-12-29 ASSESSMENT — PATIENT HEALTH QUESTIONNAIRE - PHQ9
SUM OF ALL RESPONSES TO PHQ QUESTIONS 1-9: 5
10. IF YOU CHECKED OFF ANY PROBLEMS, HOW DIFFICULT HAVE THESE PROBLEMS MADE IT FOR YOU TO DO YOUR WORK, TAKE CARE OF THINGS AT HOME, OR GET ALONG WITH OTHER PEOPLE: SOMEWHAT DIFFICULT
SUM OF ALL RESPONSES TO PHQ QUESTIONS 1-9: 5
SUM OF ALL RESPONSES TO PHQ QUESTIONS 1-9: 5
10. IF YOU CHECKED OFF ANY PROBLEMS, HOW DIFFICULT HAVE THESE PROBLEMS MADE IT FOR YOU TO DO YOUR WORK, TAKE CARE OF THINGS AT HOME, OR GET ALONG WITH OTHER PEOPLE: SOMEWHAT DIFFICULT
SUM OF ALL RESPONSES TO PHQ QUESTIONS 1-9: 5

## 2022-12-29 NOTE — PROGRESS NOTES
"  DIAGNOSTIC PSYCHIATRIC ASSESSMENT     Name:  Romeo Chong  : 1980     Telemedicine Visit: The patient's condition can be safely assessed and treated via synchronous audio and visual telemedicine encounter.      Reason for Telemedicine Visit: {RP telehealth reason:458000::\"COVID 19 pandemic and the social and physical recommendations by the CDC and [facility].\"}      Originating Site (Patient Location): {RP OP Originating sites:906159::\"Patient's home\"}     Distant Site (Provider Location): {RP OP BEH visit distant site:895079::\"Provider Remote Setting\"}     Consent:  The patient/guardian has verbally consented to: the potential risks and benefits of telemedicine (video visit or phone) versus in person care; bill my insurance or make self-payment for services provided; and responsibility for payment of non-covered services.     Mode of Communication:  {VISIT PLATFORM:690976::\"3225 films video platform \"}     As the provider I attest to compliance with applicable laws and regulations related to telemedicine.    IDENTIFICATION   Patient is a 42 year old year old    Black or   White, male  who presents for intake and medication management with [facility].  Patient was referred by ***.   Patient attended the session {[facility] ATTENDANCE:921527}.   The [facility] psychiatry providers act as a specialty service for Primary Care Providers in the [facility] who seek to optimize medications for unstable patients.  Once medications have been optimized, [facility] providers discharge the patient back to the referring Primary Care Provider for ongoing medication management.  This type of system allows [facility] to serve a high volume of patients.      Patient care team: Patient Care Team:  Chely Frye MD as PCP - General (Family Medicine)  Kodak Lyons, PharmD as Pharmacist (Pharmacist)  Stephon Singleton DPM as Assigned Musculoskeletal Provider  Mony Monsivais PA-C as Assigned Surgical Provider  Cleve, " "Sivakumar DIOR MD as Assigned Cancer Care Provider  Maegan Sandoval MD as Assigned Infectious Disease Provider  Chely Frye MD as Assigned PCP  Yadiel Cantrell MD as Assigned Heart and Vascular Provider  Vik Campos MD as MD (Neurology)  Vik Campos MD as MD (Neurology)  Vik Campos MD as Assigned Neuroscience Provider  Aiyana Zuleta, RN as Specialty Care Coordinator (Hematology & Oncology)  Kodak Lyons, PharmD as Assigned MTM Pharmacist  Angie Goodson PA-C as Physician Assistant (Urology)  Therapist: ***    Available records in [facility] and/or Care Everywhere were reviewed today.   Per PCP on date of referral: \"***\"    LANGUAGE OR COMMUNICATION BARRIERS   Are there language or communication issues or need for modification in treatment? {YES / NO:941271::\"No\"}   Are there ethnic, cultural or Rastafarian factors that may be relevant for therapy? {YES / NO:546131::\"No\"}  Client identified their preferred language to be {RPLANGUAGE:306428::\"fluent English in conversational context\"}  Does the client need the assistance of an  or other support involved in therapy? {YES / NO:013638::\"No\"}                                                 CHIEF COMPLAINT   Patient is a 42 year old,     Black or   White, male who presents for initial psychiatric evaluation with the [facility] ([facility]) for evaluation of {Psych Assess List:419463}.      HISTORY OF PRESENT ILLNESS     ***    PSYCHIATRIC REVIEW OF SYSTEMS:     Depression:  ***   PHQ9 score is {RPPHQ9:794074}  Suicidal ideation:  {RPSUICIDALIDEATION:336306::\"Denies\"}  Mood lability:  {RPMANIA:332565::\"No current symptoms\"}  Psychosis: {RPPSYCHOSIS:491013::\"Denies thought disturbance symptoms or hx of AH, VH, TH, or OH.\",\"Denies having periods of feeling others were plotting to harm them, people reading their mind, reading others mind, receiving special messages from TV, computer, etc.\"}   Anxiety: " "***  GAD7 score is {RPGAD7:595394}  Panic: {RFPANIC:643275}   Social anxiety: ***  PTSD: {RPPTSD:545537}   Trauma history: {RPTRAUMACHILD:900497::\"Denies\"}  Eating Disorder: {RPEDSYS:689070::\"Denies concerns with weight or body image beyond normal concern. \",\"Denies restricting or purging behaviors or excessive exercise for weight control.\"}  ADD / ADHD: {RPADHD:748117::\"Denies previous dx of ADHD prior to age 13. \"}  Autism symptoms:  ***  OCD: {RPOCD:970102::\"Denies hx of obsessions or compulsions irresistible urges to do things repeatedly such as counting, washing hands, checking, etc. Denies hoarding.\",\"No current symptoms\"}    SUBSTANCE USE HISTORY    Tobacco use: ***  Caffeine: {[facility] YES/NO CAFFEINE:124549}  Current alcohol:  ***  Current substance use: ***  Past use alcohol/substance use: ***    PSYCHIATRIC HISTORY:   Past psychiatric diagnoses:   ***    Past psychiatric treatment:  Previous psychiatry: ***  Previous therapist: ***  History of Psychiatric Hospitalizations:   - Inpatient: ***  - IOP/PHP/Day treatment: ***  History of Suicidal Ideation: ***  History of Suicide Attempts:  ***    History of Self-injurious Behavior: ***  History of impulsivity: {YES / NO:852968::\"No\"}   History of Violence/Aggression: {YES / NO:189909::\"No\"}   Firearms/Weapons Access: {[facility] YES/NO FIREARM:316392}  History of Commitment? {YES / NO:221027::\"No\"}   Electroconvulsive Therapy (ECT) or Transcranial Magnetic Stimulation (TMS): {YES / NO:948926::\"No\"}   PharmacogenomicTesting (such as GeneSight): {YES / NO:385807::\"No\"}     SOCIAL HISTORY                                         Born and raised in ***.  Parents {RPPARENTS:991716}  Siblings:  ***  Childhood: {RPCHILDHOO:218629}  Developmental Milestones: {MILESTONES:537121}  Highest education level was {FCC EDUCATIONAL LEVEL:741955}.    Service: {[facility] YES/NO :078010}  Relationship status: ***  Children: ***  Employment status: ***  Legal: ***  Exercise: " ***  Baptist/Spirituality: ***  Current stressors include:***  Supports: ***  Coping mechanisms: ***  Hobbies:  ***  Current living situation: ***  Feels safe at home: ***    MEDICATIONS                                                                                              Current medications reviewed today and are noted below.   Current psychotropic medications:   ***    Past psychotropic medications:  ***    Supplements:   See below      ***    Current Outpatient Medications   Medication Sig     carvedilol (COREG) 12.5 MG tablet Take 1 tablet (12.5 mg) by mouth 2 times daily (with meals)     cefuroxime (CEFTIN) 500 MG tablet      EPINEPHrine (ANY BX GENERIC EQUIV) 0.3 MG/0.3ML injection 2-pack Inject 0.3 mLs (0.3 mg) into the muscle as needed for anaphylaxis May repeat one time in 5-15 minutes if response to initial dose is inadequate.     famotidine (PEPCID) 40 MG tablet Take 2 tablets (80 mg) by mouth 2 times daily as needed for heartburn     HYDROcodone-acetaminophen (NORCO)  MG per tablet Take 1-2 tablets by mouth 3 times daily as needed for pain     losartan-hydrochlorothiazide (HYZAAR) 100-12.5 MG tablet Take 1 tablet by mouth daily     cholecalciferol (VITAMIN D3) 125 mcg (5000 units) capsule TAKE 1 CAPSULE BY MOUTH EVERY DAY (Patient not taking: Reported on 12/29/2022)     cimetidine (TAGAMET) 200 MG tablet Take 1 tablet (200 mg) by mouth 2 times daily (Patient not taking: Reported on 12/29/2022)     furosemide (LASIX) 40 MG tablet Take Lasix 40 mg as needed for shortness of breath (Patient not taking: Reported on 12/29/2022)     potassium chloride ER (K-TAB/KLOR-CON) 10 MEQ CR tablet TAKE 2 TABLETS BY MOUTH EVERY MORNING AND 1 EVERY EVENING (Patient not taking: Reported on 12/29/2022)     topiramate (TOPAMAX) 50 MG tablet 1/2 tablet in the evening. You may increase to 1 full tablet after 1 week. (Patient not taking: Reported on 12/29/2022)     No current facility-administered medications  "for this visit.        VITALS   There were no vitals taken for this visit.    Pulse Readings from Last 5 Encounters:   11/02/22 67   09/29/22 70   09/28/22 62   07/06/22 52   06/28/22 72     Wt Readings from Last 5 Encounters:   11/02/22 138.8 kg (306 lb)   09/29/22 134.6 kg (296 lb 11.2 oz)   09/28/22 136.3 kg (300 lb 6.4 oz)   07/06/22 132.3 kg (291 lb 11.2 oz)   06/28/22 127.9 kg (282 lb)     BP Readings from Last 5 Encounters:   11/02/22 (!) 132/90   09/29/22 (!) 149/84   09/28/22 128/80   07/06/22 126/85   06/28/22 (!) 152/100       LABS & IMAGING                                                                                                                Recent available labs reviewed today.    Recent Labs   Lab Test 11/02/22  1535 09/29/22  1537 05/27/22  1525   CR 0.60* 0.77 0.74   GFRESTIMATED >90 >90 >90     Recent Labs   Lab Test 09/29/22  1537 05/27/22  1525   AST 22 15   ALT 21 22   ALKPHOS 71 74     No lab results found.  ECG *** QTc = ***ms    ALLERGY & IMMUNIZATIONS       Allergies   Allergen Reactions     Vancomycin Anaphylaxis     Peanut Oil Itching     Tree Nuts [Nuts] Itching     Erythromycin Unknown     Other Environmental Allergy Unknown     DUST     Pollen Extracts [Pollen Extract] Unknown     Zosyn [Piperacillin-Tazobactam In D5w] Tinnitus and Itching     Latex Itching and Rash     Added based on information entered during case entry, please review and add reactions, type, and severity as needed     Oxycodone Itching and Rash       MEDICAL & SURGICAL HISTORY    Reviewed past medical and surgical history today.   Pregnant - ***.   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 Discovered in " "right tibia and femur at 13 years old.  He had surgery when  he was 14 years old.   Last Assessment & Plan:  Formatting of this note might be different from the original. Diagnosed at 13, 14 surgeries since that time   Formatting of this note might be different from the original. Formatting of this note might be different      Gastric ulcer, unspecified chronicity, unspecified whether gastric ulcer hemorrhage or perforation present 5/6/2021     H/O Hodgkin's lymphoma 5/6/2021     Hyperlipidemia      Hypertension      Hypertriglyceridemia 5/8/2011     Stress hyperglycemia 4/26/2009       FAMILY MEDICAL AND PSYCHIATRIC HISTORY     Family History   Problem Relation Age of Onset     Cirrhosis Mother      Hypertension Mother      Diabetes Type 2  Father      Kidney failure Father      Cerebrovascular Disease Father      Hypertension Father      Cerebrovascular Disease Sister      Hypertension Paternal Grandmother      Hypertension Paternal Grandfather        Family history of sudden or unexplained death or an event requiring resuscitation in children or young adults, cardiac arrhythmias (eg, Archana-Parkinson-White syndrome), long QT syndrome, catecholaminergic paroxysmal ventricular tachycardia, Brugada syndrome, arrhythmogenic right ventricular dysplasia, hypertrophic cardiomyopathy, dilated cardiomyopathy, or Marfan syndrome?  {YES / NO:054216::\"No\"}    Family psychiatric history: ***  Family substance use history:  ***  Family suicide history: {YES / NO:198953::\"No\"}  Medications family responded to: {[facility]:584854}     MEDICAL REVIEW OF SYSTEMS:   10 systems (general, cardiovascular, respiratory, eyes, ENT, endocrine, GI, , M/S, neurological) were reviewed. Most pertinent finding(s) is/are: ***. The remaining systems are all unremarkable.    MENTAL STATUS EXAM:     Alertness: {a:054906::\"alert \",\"oriented\"}  Appearance: {a:056685::\"adequately groomed\"}  Behavior/Demeanor: {b:475988::\"cooperative\",\"pleasant\",\"calm\"}, " "with {Desc; good/fair/poor:706425::\"good \"}eye contact   Speech: {s:471275::\"normal\",\"regular rate and rhythm\"}  Language: {l:536435::\"intact\",\"no problems\"}  Psychomotor: {p:541729::\"normal or unremarkable\"}  Mood: {m:917445}  Affect: {a:659465::\"full range\",\"appropriate\"}; {was:757795::\"was\"} congruent to mood; {was:559878::\"was\"} congruent to content  Thought Process/Associations: {t:288615::\"unremarkable\"}  Thought Content:  Reports {t:492745::\"none\"};  Denies {t:103731::\"suicidal ideation\",\"violent ideation\",\"delusions\"}  Perception:  Reports {p:090076::\"none\"};  Denies {p:240683::\"auditory hallucinations\",\"visual hallucinations\"}  Insight: {i:038052::\"intact\"}  Judgment: {j:866594::\"intact\"}  Cognition: {co}  Gait and Station: {UNREMARKABLE:959420::\"unremarkable\"}    RISK AND PROTECTIVE FACTORS     Static Risk Factors: {MKSTATICRISKFACTORS:423762}    Dynamic Risk Factors: {MKDYNAMICRISKFACTORS:569300}    Protective Factors: {MKPROTECTIVEFACTORS:676439}    SAFETY ASSESSMENT     Based on review of above risk and protective factors and today's exam, pt is {Risk assessment:966778::\"not at elevated risk of harm to self or others\"}. *** does not meet criteria for a 72 hr hold and remains appropriate for ongoing outpatient care. The patient convincingly *** suicidality today. There was no deceit detected, and the patient presented in a manner that was believable. Local community safety resources printed and reviewed for patient to use if needed.    {SAFETY PLAN:825066}    DSM 5 DIAGNOSIS     {DSM:213625}    MEETS CRITERIA PER DSM 5 AS FOLLOWS:  ***    Medical comorbidities impacting or contributing to clinical picture: {RPCOMORBIDITIES:059875::\"None noted\"}  Known issue that I take into account for their medical decisions, no current exacerbations or new concerns.    ASSESSMENT AND PLAN      ASSESSMENT:  Romeo Chong is a 42 year old    Black or   White, male who presents for initial " "visit with [facility] ([facility]) for medication management. ***    TREATMENT PLAN: Medication side effects and alternatives reviewed. Health promotion activities recommended and reviewed. All questions addressed. Education and counseling completed regarding risks and benefits of medications and psychotherapy options. [facility] Service model reviewed today. Recommend therapy for additional support. Safety plan reviewed as indicated.     MEDICATIONS:   -***    LABS/RADS:   -{RPLABORDER:858001::\"None at this time\"}    PATIENT STATUS:  [facility] MD/DO/NP/PA providers offer care a specialty service for Primary Care Providers in the [facility] that seek to optimize psychotropic medications for unstable patients.  Once medications have been optimized, our providers discharge the patient back to the referring Primary Care Provider for ongoing medication management.  This type of system allows our providers to serve a high volume of patients.   -{mkcareplan:476170::\"Pt will be seen for continued medication stabilization in [facility].\"}    PSYCHOSOCIAL:   -***  -Follow up with primary care provider as planned or for acute medical concerns.    PSYCHOEDUCATION:  {RPEDUCATIONVISIT:451630::\"Medication side effects and alternatives reviewed. Health promotion activities recommended and reviewed today. All questions addressed. Education and counseling completed regarding risks and benefits of medications and psychotherapy options.  Consent provided by patient/guardian\",\"Call the psychiatric nurse line with medication questions or concerns at [phone].\",\"Aware Labshart may be used to communicate with your provider, but this is not intended to be used for emergencies.\",\"Medlineplus.gov is information for patients.  It is run by the [employer] and it contains information about all disorders, diseases and all medications.  \"}    FOLLOW-UP: Follow up in *** weeks    1. Continue all " other treatments (including medications) per primary care provider and/or specialists.   2. To schedule individual or family therapy, call [facility] at [phone].   3. Follow up with primary care provider as planned or for acute medical concerns.  4. Call the psychiatric nurse line with medication questions or concerns at [phone] or [phone].  5. MyChart may be used to communicate with your care team, but this is not intended to be used for emergencies.    CRISIS RESOURCES:    1. Present to the Emergency Department as needed or call after hours crisis line at [phone] or [phone].   2. [place] Crisis Text Line: Text [place] to 598038.  3. Suicide LifeLine Chat: suicidepre7billionideasline.org/chat/.  4. National Suicide Prevention Lifeline: 871.888.1457 (TTY: 342.754.2514). Call anytime for help.  (www.suicidepreventionlifeline.org)  5. National [place] on Mental Illness (www.[place].org): 858.176.6543 or 546-261-8424.  6. Mental Health Association (www.mentalhealth.org): 548.659.7516 or 790-937-7231.    ADMINISTRATIVE BILLING:    Time spent interviewing patient, reviewing referral documents, obtaining and reviewing outside records, communication with other health specialists, and preparing this report on today's date  Video/Phone Start Time: ***  Video/Phone End Time: ***    Signed:   Duane Lynn DNP, [id number]  [facility] ([facility])

## 2022-12-29 NOTE — PROGRESS NOTES
Pt began check-in process with [facility] this morning. [facility] unable to contact pt for [facility] intake.   I attempted to reach pt by phone at 0730, our appointment time and his number went straight to voicemail. I left a  advising I would send a text and email link to join the [facility] video appointment and remained in the [facility] appointment until 0740.     This is pt's second late cancel/no show for [facility]. First late cancellation was on 11/18/22. His care will be returned to PCP and he will need to meet with PCP to discuss if a new referral to [facility] is indicated/warranted.   Appointment cancelled.

## 2022-12-29 NOTE — PROGRESS NOTES
Left Without being Seen    Patient was contacted and checked in by clinic staff prior to the visit.  Patient was instructed on how to enter the virtual visit and links were sent to him.  This [facility] also attempted to send a virtual link from the [facility] visit.  Clinic staff and this [facility] attempted several times to contact patient and messages were left for him to call back.  Patient was unable to be reached for the virtual visit.    Marie Blanco  [facility] Clinician

## 2022-12-29 NOTE — PROGRESS NOTES
Romeo Chong  is being evaluated via a billable video visit.      How would you like to obtain your AVS? [employer]  For the video visit, send the invitation by: Text to cell phone: [phone]  Will anyone else be joining your video visit? Claudette EDWARDS

## 2023-01-26 ENCOUNTER — VIRTUAL VISIT (OUTPATIENT)
Dept: PSYCHOLOGY | Facility: CLINIC | Age: 43
End: 2023-01-26
Attending: FAMILY MEDICINE
Payer: COMMERCIAL
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
## 2023-01-26 NOTE — PROGRESS NOTES
1/26/2023    Patient and therapist scheduled for visit this afternoon. Therapist sent link through e-mail, text and called patient phone. Therapist spoke briefly with patient. Patient shares they were on a break in-between training at work. Patient was encouraged to contact [facility] to reschedule.     Mary YUNG, LGSW   1/26/2023

## 2023-02-01 ENCOUNTER — TRANSFERRED RECORDS (OUTPATIENT)
Dept: HEALTH INFORMATION MANAGEMENT | Facility: CLINIC | Age: 43
End: 2023-02-01

## 2023-02-09 ENCOUNTER — OFFICE VISIT (OUTPATIENT)
Dept: FAMILY MEDICINE | Facility: CLINIC | Age: 43
End: 2023-02-09
Payer: COMMERCIAL

## 2023-02-09 VITALS
SYSTOLIC BLOOD PRESSURE: 119 MMHG | TEMPERATURE: 98.3 F | HEIGHT: 71 IN | WEIGHT: 296 LBS | HEART RATE: 69 BPM | DIASTOLIC BLOOD PRESSURE: 75 MMHG | BODY MASS INDEX: 41.44 KG/M2

## 2023-02-09 DIAGNOSIS — E55.9 VITAMIN D DEFICIENCY: ICD-10-CM

## 2023-02-09 DIAGNOSIS — Z00.00 ROUTINE GENERAL MEDICAL EXAMINATION AT A HEALTH CARE FACILITY: Primary | ICD-10-CM

## 2023-02-09 DIAGNOSIS — E78.1 HYPERTRIGLYCERIDEMIA: ICD-10-CM

## 2023-02-09 DIAGNOSIS — Z13.220 SCREENING FOR HYPERLIPIDEMIA: ICD-10-CM

## 2023-02-09 DIAGNOSIS — I10 ESSENTIAL HYPERTENSION, BENIGN: ICD-10-CM

## 2023-02-09 DIAGNOSIS — Z79.899 ENCOUNTER FOR LONG-TERM (CURRENT) USE OF MEDICATIONS: ICD-10-CM

## 2023-02-09 DIAGNOSIS — N52.9 ERECTILE DYSFUNCTION, UNSPECIFIED ERECTILE DYSFUNCTION TYPE: ICD-10-CM

## 2023-02-09 LAB
ALBUMIN SERPL BCG-MCNC: 4.8 G/DL (ref 3.5–5.2)
ALP SERPL-CCNC: 81 U/L (ref 40–129)
ALT SERPL W P-5'-P-CCNC: 25 U/L (ref 10–50)
ANION GAP SERPL CALCULATED.3IONS-SCNC: 14 MMOL/L (ref 7–15)
AST SERPL W P-5'-P-CCNC: 22 U/L (ref 10–50)
BILIRUB SERPL-MCNC: 0.5 MG/DL
BUN SERPL-MCNC: 11.9 MG/DL (ref 6–20)
CALCIUM SERPL-MCNC: 10 MG/DL (ref 8.6–10)
CHLORIDE SERPL-SCNC: 103 MMOL/L (ref 98–107)
CHOLEST SERPL-MCNC: 191 MG/DL
CREAT SERPL-MCNC: 0.61 MG/DL (ref 0.67–1.17)
DEPRECATED HCO3 PLAS-SCNC: 23 MMOL/L (ref 22–29)
ERYTHROCYTE [DISTWIDTH] IN BLOOD BY AUTOMATED COUNT: 13.1 % (ref 10–15)
GFR SERPL CREATININE-BSD FRML MDRD: >90 ML/MIN/1.73M2
GLUCOSE SERPL-MCNC: 95 MG/DL (ref 70–99)
HCT VFR BLD AUTO: 43 % (ref 40–53)
HDLC SERPL-MCNC: 32 MG/DL
HGB BLD-MCNC: 14 G/DL (ref 13.3–17.7)
LDLC SERPL CALC-MCNC: 136 MG/DL
MCH RBC QN AUTO: 25.8 PG (ref 26.5–33)
MCHC RBC AUTO-ENTMCNC: 32.6 G/DL (ref 31.5–36.5)
MCV RBC AUTO: 79 FL (ref 78–100)
NONHDLC SERPL-MCNC: 159 MG/DL
PLATELET # BLD AUTO: 317 10E3/UL (ref 150–450)
POTASSIUM SERPL-SCNC: 3.9 MMOL/L (ref 3.4–5.3)
PROT SERPL-MCNC: 8.3 G/DL (ref 6.4–8.3)
RBC # BLD AUTO: 5.43 10E6/UL (ref 4.4–5.9)
SODIUM SERPL-SCNC: 140 MMOL/L (ref 136–145)
TRIGL SERPL-MCNC: 115 MG/DL
WBC # BLD AUTO: 5.9 10E3/UL (ref 4–11)

## 2023-02-09 PROCEDURE — 84270 ASSAY OF SEX HORMONE GLOBUL: CPT | Performed by: FAMILY MEDICINE

## 2023-02-09 PROCEDURE — 82306 VITAMIN D 25 HYDROXY: CPT | Performed by: FAMILY MEDICINE

## 2023-02-09 PROCEDURE — 85027 COMPLETE CBC AUTOMATED: CPT | Performed by: FAMILY MEDICINE

## 2023-02-09 PROCEDURE — 99214 OFFICE O/P EST MOD 30 MIN: CPT | Mod: 25 | Performed by: FAMILY MEDICINE

## 2023-02-09 PROCEDURE — 99396 PREV VISIT EST AGE 40-64: CPT | Performed by: FAMILY MEDICINE

## 2023-02-09 PROCEDURE — 84403 ASSAY OF TOTAL TESTOSTERONE: CPT | Performed by: FAMILY MEDICINE

## 2023-02-09 PROCEDURE — 36415 COLL VENOUS BLD VENIPUNCTURE: CPT | Performed by: FAMILY MEDICINE

## 2023-02-09 PROCEDURE — 80053 COMPREHEN METABOLIC PANEL: CPT | Performed by: FAMILY MEDICINE

## 2023-02-09 PROCEDURE — 80061 LIPID PANEL: CPT | Performed by: FAMILY MEDICINE

## 2023-02-09 ASSESSMENT — ENCOUNTER SYMPTOMS
DIARRHEA: 0
NERVOUS/ANXIOUS: 0
PALPITATIONS: 0
HEMATOCHEZIA: 0
JOINT SWELLING: 1
COUGH: 0
WEAKNESS: 0
PARESTHESIAS: 0
DIZZINESS: 0
SHORTNESS OF BREATH: 0
CHILLS: 0
FREQUENCY: 0
NAUSEA: 0
FEVER: 0
MYALGIAS: 0
DYSURIA: 0
HEADACHES: 1
CONSTIPATION: 0
HEARTBURN: 0
ARTHRALGIAS: 1
HEMATURIA: 0
ABDOMINAL PAIN: 0
EYE PAIN: 1
SORE THROAT: 0

## 2023-02-09 ASSESSMENT — PAIN SCALES - GENERAL: PAINLEVEL: SEVERE PAIN (6)

## 2023-02-09 NOTE — PROGRESS NOTES
SUBJECTIVE:   CC: Romeo is an 42 year old who presents for preventative health visit.     Patient has been advised of split billing requirements and indicates understanding: Yes     Healthy Habits:     Getting at least 3 servings of Calcium per day:  Yes    Bi-annual eye exam:  NO    Dental care twice a year:  Yes    Sleep apnea or symptoms of sleep apnea:  None    Diet:  Low salt, Low fat/cholesterol, Carbohydrate counting and Vegetarian/vegan    Frequency of exercise:  1 day/week    Duration of exercise:  15-30 minutes    Taking medications regularly:  Yes    Medication side effects:  Other    PHQ-2 Total Score: 0      PROBLEMS TO ADD ON...  Here to discuss and review meds, requesting refill on vitamin D and potassium  Is also asking for a review of his driving medical evaluation form over the syncopal statement, where the diagnosis needs to be dismissed inorder to qualify for [place] 's license.  He states that past evaluations are determined nonneuro and cardiac source with no recurrence.      Wants to have testosterone level checked, issues with having ED.       Today's PHQ-2 Score:   PHQ-2 (  [employer]) 2023   Q1: Little interest or pleasure in doing things 0   Q2: Feeling down, depressed or hopeless 0   PHQ-2 Score 0   Q1: Little interest or pleasure in doing things Not at all   Q2: Feeling down, depressed or hopeless Not at all   PHQ-2 Score 0           Social History     Tobacco Use     Smoking status: Former     Packs/day: 1.00     Types: Cigarettes     Start date: 1999     Quit date: 2009     Years since quittin.7     Smokeless tobacco: Never   Substance Use Topics     Alcohol use: Not Currently     If you drink alcohol do you typically have >3 drinks per day or >7 drinks per week? No    Alcohol Use 2023   Prescreen: >3 drinks/day or >7 drinks/week? Not Applicable   Prescreen: >3 drinks/day or >7 drinks/week? -       Last PSA: No results found for: PSA    Reviewed orders with  "patient. Reviewed health maintenance and updated orders accordingly - Yes  Lab work is in process  Labs reviewed in EPIC    Reviewed and updated as needed this visit by clinical staff   Tobacco  Allergies  Meds  Problems             Reviewed and updated as needed this visit by Provider     Meds  Problems                Review of Systems   Constitutional: Negative for chills and fever.   HENT: Positive for ear pain. Negative for congestion, hearing loss and sore throat.    Eyes: Positive for pain. Negative for visual disturbance.   Respiratory: Negative for cough and shortness of breath.    Cardiovascular: Positive for peripheral edema. Negative for chest pain and palpitations.   Gastrointestinal: Negative for abdominal pain, constipation, diarrhea, heartburn, hematochezia and nausea.   Genitourinary: Positive for impotence. Negative for dysuria, frequency, genital sores, hematuria, penile discharge and urgency.   Musculoskeletal: Positive for arthralgias and joint swelling. Negative for myalgias.   Skin: Negative for rash.   Neurological: Positive for headaches. Negative for dizziness, weakness and paresthesias.   Psychiatric/Behavioral: Negative for mood changes. The patient is not nervous/anxious.          OBJECTIVE:   /75 (BP Location: Right arm, Patient Position: Right side, Cuff Size: Adult Large)   Pulse 69   Temp 98.3  F (36.8  C) (Temporal)   Ht 1.803 m (5' 11\")   Wt 134.3 kg (296 lb)   BMI 41.28 kg/m      Physical Exam  GENERAL: healthy, alert and no distress  EYES: Eyes grossly normal to inspection, PERRL and conjunctivae and sclerae normal  HENT: normal cephalic/atraumatic, nose and mouth without ulcers or lesions, oropharynx clear and oral mucous membranes moist  NECK: no adenopathy, no asymmetry, masses, or scars and thyroid normal to palpation  RESP: lungs clear to auscultation - no rales, rhonchi or wheezes  CV: regular rate and rhythm, normal S1 S2, no S3 or S4, no murmur, click or " "rub, no peripheral edema and peripheral pulses strong  ABDOMEN: soft, nontender, no hepatosplenomegaly, no masses and bowel sounds normal  MS: no gross musculoskeletal defects noted, no edema  SKIN: no suspicious lesions or rashes  NEURO: Normal strength and tone, mentation intact and speech normal  PSYCH: mentation appears normal, affect normal/bright    Diagnostic Test Results:  Labs reviewed in Epic    ASSESSMENT/PLAN:   (Z00.00) Routine general medical examination at a health care facility  (primary encounter diagnosis)      (I10) Essential hypertension, benign  Comment: Normotensive  Plan: potassium chloride ER (K-TAB/KLOR-CON) 10 MEQ         CR tablet, Comprehensive metabolic panel (BMP +        Alb, Alk Phos, ALT, AST, Total. Bili, TP)        Stable labs  Continue current management    (N52.9) Erectile dysfunction, unspecified erectile dysfunction type  Comment:   Plan: Testosterone Free and Total        Normal level     (E78.1) Hypertriglyceridemia  (Z13.220) Screening for hyperlipidemia  Comment:   Plan: Lipid panel reflex to direct LDL Fasting        elevated LDL with the 10-year ASCVD risk score ([place] SONG, et al., 2019) is: 2.2% recommending to Make an attempt to improve diet, cut back on the carbs and fats,  and exercise more efficiently.     (Z79.899) Encounter for long-term (current) use of medications  Comment:   Plan: CBC with platelets-normal            (E55.9) Vitamin D deficiency  Comment: Normal level  Plan: cholecalciferol (VITAMIN D3) 125 mcg (5000         units) capsule,                Patient has been advised of split billing requirements and indicates understanding: Yes      COUNSELING:   Reviewed preventive health counseling, as reflected in patient instructions       Regular exercise       Healthy diet/nutrition      BMI:   Estimated body mass index is 41.28 kg/m  as calculated from the following:    Height as of this encounter: 1.803 m (5' 11\").    Weight as of this encounter: 134.3 kg " (296 lb).   Weight management plan: Discussed healthy diet and exercise guidelines      He reports that he quit smoking about 13 years ago. His smoking use included cigarettes. He started smoking about 23 years ago. He smoked an average of 1 pack per day. He has never used smokeless tobacco.            Chely Frye MD  [facility]

## 2023-02-10 LAB
SHBG SERPL-SCNC: 18 NMOL/L (ref 11–80)
TESTOST FREE SERPL-MCNC: 9.85 NG/DL
TESTOST SERPL-MCNC: 363 NG/DL (ref 240–950)

## 2023-02-11 LAB — DEPRECATED CALCIDIOL+CALCIFEROL SERPL-MC: 24 UG/L (ref 20–75)

## 2023-02-15 RX ORDER — POTASSIUM CHLORIDE 750 MG/1
TABLET, EXTENDED RELEASE ORAL
Qty: 270 TABLET | Refills: 0 | Status: SHIPPED | OUTPATIENT
Start: 2023-02-15 | End: 2023-05-03

## 2023-02-28 DIAGNOSIS — I10 BENIGN ESSENTIAL HYPERTENSION: ICD-10-CM

## 2023-02-28 RX ORDER — CARVEDILOL 12.5 MG/1
12.5 TABLET ORAL 2 TIMES DAILY WITH MEALS
Qty: 60 TABLET | Refills: 2 | Status: SHIPPED | OUTPATIENT
Start: 2023-02-28 | End: 2023-05-03

## 2023-03-15 ENCOUNTER — TRANSFERRED RECORDS (OUTPATIENT)
Dept: HEALTH INFORMATION MANAGEMENT | Facility: CLINIC | Age: 43
End: 2023-03-15

## 2023-03-16 ENCOUNTER — TELEPHONE (OUTPATIENT)
Dept: FAMILY MEDICINE | Facility: CLINIC | Age: 43
End: 2023-03-16
Payer: COMMERCIAL

## 2023-03-16 NOTE — TELEPHONE ENCOUNTER
Contacted patient who explained he still needs his  Medical Evaluation completed.  States he was cleared by cardio and neuro.  Writer advised care team will follow up with him after chart review, call ended.      Chart review-    4/8/22 and 4/11/22 visits with Neuro both have documentation that patient is clear to drive without restrictions.      4/13/22 visit with Cardio appears to be routine follow up with no mention of syncopal episodes and recent neurology visits with plan to get cleared by both neuro and cardio.  Appears cardiologist was not aware of this as no mention in visit.        Writer sent staff message to patient's cardiologist who he is set to see in May of 2023.  Staff message included that patient needs clearance, and whether he needs visit to be cleared or not.

## 2023-03-20 NOTE — TELEPHONE ENCOUNTER
"Per cardiology team via staff message-      Tanya Bustamante [place] Nurse Pool  [place],     There is mention of clearance from Dr. Cantrell from the stress cardiac mri on 06/15/22. A [employer] message was sent to the patient with results and clearance.     \"You are cleared for driving because the stress cardiac MRI is negative for inducible myocardial ischemia. Your Left Ventricular function is recovered to normal range. If any questions, please let me know.   Thanks,       Dr. Cantrell\"       Thank you,     OSVALDO Martínez to Dr. Cantrell     "

## 2023-03-24 ENCOUNTER — TELEPHONE (OUTPATIENT)
Dept: OTHER | Facility: CLINIC | Age: 43
End: 2023-03-24
Payer: COMMERCIAL

## 2023-03-24 NOTE — TELEPHONE ENCOUNTER
[facility]    Who is the name of the provider?:  Had seen Tianna  What is the location you see this provider at/preferred location?: last seen in [facility]  Person calling / Facility: Romeo  Phone number:  [phone]  Nurse call back needed:  yes    Reason for call:  Patient called today to make appointment for lyphedema, had legs wrapped uses compression  socks ,wants  kneumo boots , to be reevaluated , legs are getting worse , had seen Dr Wynn in [facility] before clinic  here .    Pharmacy location:  na  Outside Imaging: ?   Can we leave a detailed message on this number?  yes

## 2023-03-24 NOTE — TELEPHONE ENCOUNTER
Please schedule patient for 60 minute new patient consult with Dr. Rose or Dr. Delgado at next available.    Appt note:  Self-referred for lymphedema; previously saw Dr. Wynn at [place].    Danisha Neal, TORREYN, RN-[facility]

## 2023-04-05 NOTE — TELEPHONE ENCOUNTER
Patient is looking for update on DMV form.    Advised patient that PCP is back in the office and form is with provider to complete.

## 2023-04-07 ENCOUNTER — OFFICE VISIT (OUTPATIENT)
Dept: OTHER | Facility: CLINIC | Age: 43
End: 2023-04-07
Attending: INTERNAL MEDICINE
Payer: COMMERCIAL

## 2023-04-07 VITALS
SYSTOLIC BLOOD PRESSURE: 132 MMHG | OXYGEN SATURATION: 99 % | BODY MASS INDEX: 43.52 KG/M2 | DIASTOLIC BLOOD PRESSURE: 83 MMHG | HEART RATE: 59 BPM | WEIGHT: 312 LBS

## 2023-04-07 DIAGNOSIS — I10 BENIGN ESSENTIAL HYPERTENSION: ICD-10-CM

## 2023-04-07 DIAGNOSIS — E66.01 MORBID OBESITY (H): ICD-10-CM

## 2023-04-07 DIAGNOSIS — I89.0 LYMPHEDEMA: Primary | ICD-10-CM

## 2023-04-07 DIAGNOSIS — Z85.71 H/O HODGKIN'S LYMPHOMA: ICD-10-CM

## 2023-04-07 DIAGNOSIS — M85.00 FIBROUS DYSPLASIA OF BONE: ICD-10-CM

## 2023-04-07 PROCEDURE — 99205 OFFICE O/P NEW HI 60 MIN: CPT | Performed by: INTERNAL MEDICINE

## 2023-04-07 PROCEDURE — G0463 HOSPITAL OUTPT CLINIC VISIT: HCPCS | Performed by: INTERNAL MEDICINE

## 2023-04-07 NOTE — PATIENT INSTRUCTIONS
Please see Lymphedema therapist referral done     Take lymphatic formula RX and info given     Lose weight     Follow up with Soraya Lennon PA-C in 3 months at [facility]

## 2023-04-07 NOTE — PROGRESS NOTES
[facility]        Patient is here for a consult to discuss Lymphedema.     Pt is currently taking no meds that would impact our treatment plan.    /83 (BP Location: Right arm, Patient Position: Left side, Cuff Size: Adult Regular)   Pulse 59   Wt 312 lb (141.5 kg)   SpO2 99%   BMI 43.52 kg/m      The provider has been notified that the patient has no concerns.     Questions patient would like addressed today are: N/A.    Refills are needed: N/A    Has homecare services and agency name:  Claudette Pandya MA

## 2023-04-07 NOTE — PROGRESS NOTES
[facility] INITIAL VASCULAR MEDICINE CONSULT    ( New patient visit)     PRIMARY HEALTH CARE PROVIDER:  Chely Frye MD      REFERRING HEALTH CARE PROVIDER;  Chely Frye MD    REASON FOR CONSULT: Evaluation and management of known history of right lower extremity lymphedema since 2006.      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  Many years ago he was seen and evaluated by Dr. Wynn then followed by he moved to [place] where he was seen in the lymphedema clinic and they did specific lymphedema related surgeries including the lymph node transfer and other procedures etc. and they only short-lived then followed by again worsening lymphedema.  He has old Velcro wraps and they are wearing out.  He has not seen any edema therapist for quite some time.  Now he is working in [employer] mainly sitting job.  He also has a history of hypertension, hyperlipidemia etc. he denies any snoring or sleep apnea.     He is new to this clinic reviewed available extensive records in the [facility], Care Everywhere and updated chart      PAST MEDICAL HISTORY  Past Medical History:   Diagnosis Date     Asthma 7/14/2010     Bacterial sepsis (H) 8/19/2021     Cancer (H)      Chronic pain syndrome 5/6/2021     Essential hypertension, benign 7/14/2010     Fibrous dysplasia of bone 4/26/2009    Formatting of this note might be different from the original. Discovered in right tibia and femur at 13 years old.  He had surgery when  he was 14 years old.   Last Assessment & Plan:  Formatting of this note might be different from the original. Diagnosed  at 13, 14 surgeries since that time   Formatting of this note might be different from the original. Formatting of this note might be different      Gastric ulcer, unspecified chronicity, unspecified whether gastric ulcer hemorrhage or perforation present 5/6/2021     H/O Hodgkin's lymphoma 5/6/2021     Hyperlipidemia      Hypertension      Hypertriglyceridemia 5/8/2011     Stress hyperglycemia 4/26/2009       CURRENT MEDICATIONS  carvedilol (COREG) 12.5 MG tablet, Take 1 tablet (12.5 mg) by mouth 2 times daily (with meals)  cefuroxime (CEFTIN) 500 MG tablet,   cholecalciferol (VITAMIN D3) 125 mcg (5000 units) capsule, Take 1 capsule (125 mcg) by mouth daily  EPINEPHrine (ANY BX GENERIC EQUIV) 0.3 MG/0.3ML injection 2-pack, Inject 0.3 mLs (0.3 mg) into the muscle as needed for anaphylaxis May repeat one time in 5-15 minutes if response to initial dose is inadequate.  famotidine (PEPCID) 40 MG tablet, Take 2 tablets (80 mg) by mouth 2 times daily as needed for heartburn  HYDROcodone-acetaminophen (NORCO)  MG per tablet, Take 1-2 tablets by mouth 3 times daily as needed for pain  losartan-hydrochlorothiazide (HYZAAR) 100-12.5 MG tablet, Take 1 tablet by mouth daily  potassium chloride ER (K-TAB/KLOR-CON) 10 MEQ CR tablet, Take 2 tablets p.o. every morning and 1 tablet p.o. every evening  topiramate (TOPAMAX) 50 MG tablet, 1/2 tablet in the evening. You may increase to 1 full tablet after 1 week.    No current facility-administered medications on file prior to visit.      PAST SURGICAL HISTORY:  Past Surgical History:   Procedure Laterality Date     FRACTURE SURGERY       HC REMOVAL HEEL SPUR, CALCANEUS Left 6/25/2021    Procedure: EXCISION, BONE SPUR, FOOT, WITH PLANTAR FASCIA RELEASE;  Surgeon: Stephon Singleton DPM;  Location: [facility];  Service: Podiatry     TONSILLECTOMY, ADENOIDECTOMY ADULT, COMBINED         ALLERGIES     Allergies   Allergen Reactions     Vancomycin Anaphylaxis     Peanut Oil  Itching     Tree Nuts [Nuts] Itching     Erythromycin Unknown     Other Environmental Allergy Unknown     DUST     Pollen Extracts [Pollen Extract] Unknown     Zosyn [Piperacillin-Tazobactam In D5w] Tinnitus and Itching     Latex Itching and Rash     Added based on information entered during case entry, please review and add reactions, type, and severity as needed     Oxycodone Itching and Rash       FAMILY HISTORY  Family History   Problem Relation Age of Onset     Cirrhosis Mother      Hypertension Mother      Diabetes Type 2  Father      Kidney failure Father      Cerebrovascular Disease Father      Hypertension Father      Cerebrovascular Disease Sister      Hypertension Paternal Grandmother      Hypertension Paternal Grandfather        VASCULAR FAMILY HISTORY  1st order relative with atherosclerotic PAD: No  1st order relative with AAA: No  Family history of Familial Hyperlipidemia No  Family History of Hypercoagulable state:No    VASCULAR RISK FACTORS  1. Diabetes:No   2. Smoking: quit smoking some time ago.  3. HTN: controlled  4.Hyperlipidemia: Yes - uncontrolled      SOCIAL HISTORY  Social History     Socioeconomic History     Marital status: Single     Spouse name: Not on file     Number of children: Not on file     Years of education: Not on file     Highest education level: Not on file   Occupational History     Not on file   Tobacco Use     Smoking status: Former     Packs/day: 1.00     Types: Cigarettes     Start date: 1999     Quit date: 2009     Years since quittin.9     Smokeless tobacco: Never   Vaping Use     Vaping status: Never Used     Passive vaping exposure: Yes   Substance and Sexual Activity     Alcohol use: Not Currently     Drug use: Never     Sexual activity: Not Currently   Other Topics Concern     Not on file   Social History Narrative    , 3 children, 1  recently. Non smoker. Socially drinks alcohol. Not working.      Social Determinants of Health      Financial Resource Strain: Not on file   Food Insecurity: Not on file   Transportation Needs: Not on file   Physical Activity: Not on file   Stress: Not on file   Social Connections: Not on file   Intimate Partner Violence: Not At Risk (9/29/2022)    Humiliation, Afraid, Rape, and Kick questionnaire      Fear of Current or Ex-Partner: No      Emotionally Abused: No      Physically Abused: No      Sexually Abused: No   Housing Stability: Not on file       ROS:   General: No change in weight, sleep or appetite.  Normal energy.  No fever or chills  Eyes: Negative for vision changes or eye problems  ENT: No problems with ears, nose or throat.  No difficulty swallowing.  Resp: No coughing, wheezing or shortness of breath  CV: No chest pains or palpitations  GI: No nausea, vomiting,  heartburn, abdominal pain, diarrhea, constipation or change in bowel habits  : No urinary frequency or dysuria, bladder or kidney problems  Musculoskeletal: No significant muscle or joint pains  Neurologic: No headaches, numbness, tingling, weakness, problems with balance or coordination  Psychiatric: No problems with anxiety, depression or mental health  Heme/immune/allergy: No history of bleeding or clotting problems or anemia.  No allergies or immune system problems  Endocrine: No history of thyroid disease, diabetes or other endocrine disorders  Skin: No rashes,worrisome lesions or skin problems  Vascular: History of right lower extremity secondary lymphedema developed after lymph node dissection, multiple right lower extremity surgeries approximately 18 of them and also history of recurrent cellulitis, sepsis and also underwent lymphedema specific surgeries in [place] which did not work now recurrent leg swelling  No history of DVT    EXAM:  /83 (BP Location: Right arm, Patient Position: Left side, Cuff Size: Adult Regular)   Pulse 59   Wt 312 lb (141.5 kg)   SpO2 99%   BMI 43.52 kg/m    In general, the patient is a  pleasant male in no apparent distress.    HEENT: NC/AT.  PERRLA.  EOMI.  Sclerae white, not injected.  Nares clear.  Pharynx without erythema or exudate.  Dentition intact.    Neck: No adenopathy.  No thyromegaly. Carotids +2/2 bilaterally without bruits.  No jugular venous distension.   Heart: RRR. Normal S1, S2 splits physiologically. No murmur, rub, click, or gallop. The PMI is in the 5th ICS in the midclavicular line. There is no heave.    Lungs: CTA.  No ronchi, wheezes, rales.  No dullness to percussion.   Abdomen: Soft, nontender, nondistended. No organomegaly. No AAA.  No bruits.   Extremities: Vascular: Right lower extremity classical features of lymphedema with loss of contour of the leg, squaring of the toes, positive Stemmer sign, dorsal hump of the foot.  No evidence of venous insufficiency  Good palpable and dopplerable DP and PT pulses        Labs:  LIPID RESULTS:  Lab Results   Component Value Date    CHOL 191 02/09/2023    HDL 32 (L) 02/09/2023     (H) 02/09/2023    TRIG 115 02/09/2023       LIVER ENZYME RESULTS:  Lab Results   Component Value Date    AST 22 02/09/2023    ALT 25 02/09/2023       CBC RESULTS:  Lab Results   Component Value Date    WBC 5.9 02/09/2023    RBC 5.43 02/09/2023    HGB 14.0 02/09/2023    HCT 43.0 02/09/2023    MCV 79 02/09/2023    MCH 25.8 (L) 02/09/2023    MCHC 32.6 02/09/2023    RDW 13.1 02/09/2023     02/09/2023       BMP RESULTS:  Lab Results   Component Value Date     02/09/2023    POTASSIUM 3.9 02/09/2023    POTASSIUM 3.5 05/27/2022    CHLORIDE 103 02/09/2023    CHLORIDE 103 05/27/2022    CO2 23 02/09/2023    CO2 27 05/27/2022    ANIONGAP 14 02/09/2023    ANIONGAP 9 05/27/2022    GLC 95 02/09/2023    GLC 86 05/27/2022     (A) 10/05/2021    BUN 11.9 02/09/2023    BUN 10 05/27/2022    CR 0.61 (L) 02/09/2023    GFRESTIMATED >90 02/09/2023    GFRESTIMATED >60 06/23/2021    GFRESTBLACK >60 06/23/2021    NATALYA 10.0 02/09/2023      Procedures:        Assessment and Plan:   1. Lymphedema, RLE, secondary lymphedema;    2. H/O Hodgkin's lymphoma, XRT and LN removed ? 2005     3. Fibrous dysplasia of bone, Rt Tibia and femur ( 18 surgeries Rt leg )    4. Morbid obesity (H) BMI> 43    5. Benign essential hypertension      This is a very pleasant young 42-year-old -American male morbidly obese with history of Hodgkin's lymphoma underwent radiation therapy and lymph node removal in 2005 and also fibrous dysplasia of the bone right tibia and femur underwent multiple surgeries approximately 18 of them per patient and also developed multiple times of cellulitis and sepsis requiring antibiotics previously seen and evaluated by Dr. Wynn and he was treated by edema therapist for a while then he moved to [place] where he was seeing lymphedema clinic and underwent specific surgery which helped briefly and then followed by recurrence of lymphedema which is worsening lately.  He has no cellulitis for the last 1 year.  He works in [employer] mostly sitting job.  He has old Velcro wrap which she is wearing out.  He has no history of DVT or PE and no known history of varicose veins.    He has a classical features of secondary lymphedema of right lower extremity due to lymph node dissection, multiple surgeries etc.  He will benefit with ongoing lymphedema therapy with compression, Velcro wraps, MLD, pump therapy etc..    I will make a referral to see the lymphedema therapist    He will benefit with lymphatic formula supplementation prescription and information given    Elevate the legs    Lose weight and if needed enroll in the medical weight loss clinic through the primary care physician's office    Follow-up with Soraya Lennon PA-C at [facility] in 3 months.      - Lymphedema Therapy Referral; Future       60  minutes spent on the date of the encounter doing chart review, history and exam, documentation, and further activities as noted above.  He  is new to this clinic reviewed available extensive records in the [facility] Care Everywhere and updated chart    AVS with written instructions given    Thank you for the consultation    This note was dictated by utilizing Dragon software    Copy of this note to primary care physician    Emily Delgado MD,FASHANNEN, FSVM,FNLA, FACP  Vascular medicine  Clinical hypertension specialist  Clinical lipidologist      .

## 2023-04-10 ENCOUNTER — TELEPHONE (OUTPATIENT)
Dept: FAMILY MEDICINE | Facility: CLINIC | Age: 43
End: 2023-04-10
Payer: COMMERCIAL

## 2023-04-10 NOTE — TELEPHONE ENCOUNTER
Patient notified that form is completed by PCP but front page needs to be completed and signed by patient. Form is placed at  for patient.     Once signed please place in PCP's mailbox. Writer will make copies and fax it out.

## 2023-04-10 NOTE — TELEPHONE ENCOUNTER
"----- Message from Zbigniew Choudhary RN sent at 3/20/2023  8:38 AM CDT -----  Regarding: FW: Driving clearance  Braden Panda the neurology visits on 4/8 and 4/11/22 definitely cleared patient to drive.  See below with regards to cardiology, he was cleared in June of 2022 after cardiac MRI.  Paperwork is back on your desk.    Thanks,  Zbigniew  ----- Message -----  From: Tanya Bustamante  Sent: 3/17/2023   8:17 AM CDT  To: [facility]  Subject: RE: Driving clearance                            [place],    There is mention of clearance from Dr. Cantrell from the stress cardiac mri on 06/15/22. A [employer] message was sent to the patient with results and clearance.    \"You are cleared for driving because the stress cardiac MRI is negative for inducible myocardial ischemia. Your Left Ventricular function is recovered to normal range. If any questions, please let me know.  Thanks,     Dr. Cantrell\"      Thank you,    OSVALDO Martínez to Dr. Cantrell  ----- Message -----  From: Zbigniew Choudhary RN  Sent: 3/16/2023   3:37 PM CDT  To: Yadiel Cantrell MD  Subject: Driving clearance                                Sylwialo Dr. Cantrell,      I am reaching out on behalf of Dr. Frye.  It looks like the last time you seen this patient was April of 2022 for routine cardio follow up.  The patient is under the impression you cleared him for driving but there is no mention of this or the syncopal episodes (seen Neuro 4/8 & 4/11/22) that he was supposed to seek clearance from you for.  Neurology did clear him, but mentioned cardiology needed to do the same.  I am assuming you will need to see him again for clearance to drive?  Dr. Frye can't finish his  Med Eval without clearance.      Happy to assist as needed.    Thank you,  Zbigniew Choudhary RN  [facility]        "

## 2023-04-14 ENCOUNTER — HOSPITAL ENCOUNTER (OUTPATIENT)
Dept: PHYSICAL THERAPY | Facility: REHABILITATION | Age: 43
Discharge: HOME | End: 2023-04-14
Attending: INTERNAL MEDICINE
Payer: COMMERCIAL

## 2023-04-14 DIAGNOSIS — I89.0 LYMPHEDEMA: ICD-10-CM

## 2023-04-14 DIAGNOSIS — I89.0 SECONDARY LYMPHEDEMA: Primary | ICD-10-CM

## 2023-04-14 PROCEDURE — 97535 SELF CARE MNGMENT TRAINING: CPT | Mod: GP | Performed by: PHYSICAL THERAPIST

## 2023-04-14 PROCEDURE — 97140 MANUAL THERAPY 1/> REGIONS: CPT | Mod: GP | Performed by: PHYSICAL THERAPIST

## 2023-04-14 PROCEDURE — 97162 PT EVAL MOD COMPLEX 30 MIN: CPT | Mod: GP | Performed by: PHYSICAL THERAPIST

## 2023-04-16 PROBLEM — I89.0 SECONDARY LYMPHEDEMA: Status: ACTIVE | Noted: 2023-04-16

## 2023-04-16 NOTE — PROGRESS NOTES
[facility]        OUTPATIENT PHYSICAL THERAPY EDEMA EVALUATION  PLAN OF TREATMENT FOR OUTPATIENT REHABILITATION  (COMPLETE FOR INITIAL CLAIMS ONLY)  Patient's Last Name, First Name, Romeo Andrade                           Provider s Name:   [facility] Medical Record No.  [id number]     Start of Care Date:  04/14/23   Onset Date:  04/07/23   Type:  PT   Medical Diagnosis:  Lymphedema   Therapy Diagnosis:  Secondary lymphedema Visits from SOC:  1                                     __________________________________________________________________________________   Plan of Treatment/Functional Goals:    Manual lymph drainage, Gradient compression bandaging, Fit for compression garment, Exercises, Precautions to prevent infection / exacerbation, Education, Manual therapy, Scar mobilization, Soft tissue mobilization, Home management program development        GOALS  1. Goal description: Pt will demonstrate reduction of R LE volume of 15% to allow for decreased risk of infections and improved fit in shoes/clothing       Target date: 07/12/23  2. Goal description: Pt will be indep with home management, including use of pneumatic pump, use of compression garments (day and night), exercise and elevation       Target date: 07/12/23              Treatment Frequency: 1x/week   Treatment duration: up to 90 days    Alden Gan, PT                                    I CERTIFY THE NEED FOR THESE SERVICES FURNISHED UNDER        THIS PLAN OF TREATMENT AND WHILE UNDER MY CARE     (Physician co-signature of this document indicates review and certification of the therapy plan).                   Certification date from: 04/14/23       Certification date to: 07/12/23           Referring physician: Emily Delgado   Initial Assessment  See Epic Evaluation- Start of care:  04/14/23 04/14/23 1300   Quick Adds   Quick Adds Certification   Rehab Discipline   Discipline PT   Type of Visit   Type of visit Initial Edema Evaluation   General Information   Start of care 04/14/23   Referring physician Emily Delgado   Orders Evaluate and treat as indicated   Order date 04/07/23   Medical diagnosis Lymphedema   Onset of illness / date of surgery 04/07/23   Edema onset 04/07/23   Affected body parts RLE   Edema etiology Cancer with lymph node dissection;Radiation;Surgery;Infection  (at least 18 surgeries to R leg over time)   Location - Cancer with lymph node dissection Hodgkin's lymphoma; unknown number of lymph nodes removed (pt reporting B axillary and R groin)   Location - Radiation R leg and groin   Pertinent history of current problem (PT: include personal factors and/or comorbidities that impact the POC; OT: include additional occupational profile info) Pt is a 42 year-old man with chief complaint R LE lymphedema s/p Hodgkin's lymphoma, radiation (to groin and right leg), lymph node dissection (B axillary and R groin), 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 He has had stents in his R leg vessels. He has had recurrent cellulitis infections (and sepsis), last infection a year ago (usually starts in the right groin). He has Velcro wraps to the knee that are now a year old. He wears the garment daily and wears no compression and night, but does elevate and used to have a pneumatic pump (received it in [place] in 2020), but when he became homeless it was thrown out.   Surgical / medical history reviewed Yes   Prior treatment Complete decongestive therapy;Compression garments;Exercise;Compression pump;Elevation;MLD;Gradient compression bandaging  (Pt no longer had pump. Velcro wraps are 1 year old. He has performed self bandaging in the past)   Patient role / employment history Employed  (Pt is busy)   Current assistive devices Standard cane    Fall Risk Screen   Fall screen completed by PT   Have you fallen 2 or more times in the past year? No   Have you fallen and had an injury in the past year? No   Is patient a fall risk? No   Abuse Screen (yes response referral indicated)   Feels Unsafe at Home or Work/School no   Feels Threatened by Someone no   Does Anyone Try to Keep You From Having Contact with Others or Doing Things Outside Your Home? no   Physical Signs of Abuse Present no   Patient needs abuse support services and resources No   System Outcome Measures   Outcome Measures Lymphedema   Lymphedema Life Impact Scale (score range 0-72). A higher score indicates greater impairment.   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 He has had stents in his R leg vessels. He has had recurrent cellulitis infections (and sepsis), last infection a year ago (usually starts in the right groin). He has Velcro wraps to the knee that are now a year old. He wears the garment daily and wears no compression and night, but does elevate and used to have a pneumatic pump (received it in [place] in 2020), but when he became homeless it was thrown out. Most of his swelling is below the right knee, but there is some just above the knee.   Patient / Family Goals   Patient / family goals statement To receive lymphatic compression/pump to help control my lymphedema   Pain   Patient currently in pain Yes   Pain location R leg   Pain rating 7/10   Pain comments Pain is with standing/walking, not necessarily at rest or with palpation to soft tissues   Cognitive Status   Orientation Orientation to person, place and time   Level of consciousness Alert   Follows commands and answers questions 100% of the  time   Personal safety and judgement Intact   Memory Intact   Edema Exam / Assessment   Skin condition Pitting   Skin condition comments Fibrosis distally   Pitting 3+   Pitting location distal R LE   Scar Yes   Location multiple areas of R LE   Mobility limited mobility on scars   Capillary refill Symmetrical   Dorsal pedal pulse Symmetrical   Stemmer sign Positive   Stemmer sign comments R LE   Ulceration No   Girth Measurements   Girth Measurements Refer to separate girth measurement flowsheet   Bed Mobility   Bed mobility Indep   Transfers   Transfers Mod I with uE support   Gait / Locomotion   Gait / Locomotion use of SEC to support R leg   Planned Edema Interventions   Planned edema interventions Manual lymph drainage;Gradient compression bandaging;Fit for compression garment;Exercises;Precautions to prevent infection / exacerbation;Education;Manual therapy;Scar mobilization;Soft tissue mobilization;Home management program development   Clinical Impression   Criteria for skilled therapeutic intervention met Yes   Therapy diagnosis Secondary lymphedema   Influenced by the following impairments / conditions Stage 3   Functional limitations due to impairments / conditions Difficulty walking, pain, difficulty fitting into clothing/shoes   Clinical Presentation Stable/Uncomplicated   Clinical Decision Making (Complexity) Moderate complexity  (co-morbidities, complex medical/surgical history)   Treatment Frequency 1x/week   Treatment duration up to 90 days   Patient / family and/or staff in agreement with plan of care Yes   Risks and benefits of therapy have been explained Yes   Clinical impression comments Pt is a 42 year-old man with chief complaint R LE lymphedema s/p Hodgkin's lymphoma, radiation (to groin and right leg), lymph node dissection (B axillary and R groin), 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 He demonstrates stage 3 R LE secondary lymphedema with 3+ pitting and  fibrosis distally and multiple scars proximally with limited scar mobility. Most swelling is distal to the knee with increasing overhang at the ankle. He has participated in decongestive therapy multiple times in the past with good home management; unfortunately, the pneumatic pump he had was lost during a period of homelessness. At this time, pt is appropriate for a course of decongestive therapy (with focus on self-bandaging because pt is busy with full-time work and cannot attend therapy 3x/week) to reduce the size and severity of fibrosis in the right leg and to get pt fitted with new compression garments (including night time garments, which will be new for him) and obtain a new pneumatic pump to allow for daily self-management of lymphedema.   Goals   Edema Eval Goals 1;2   Goal 1   Goal identifier Volume   Goal description Pt will demonstrate reduction of R LE volume of 15% to allow for decreased risk of infections and improved fit in shoes/clothing   Target date 07/12/23   Goal 2   Goal identifier Home management   Goal description Pt will be indep with home management, including use of pneumatic pump, use of compression garments (day and night), exercise and elevation   Target date 07/12/23   Total Evaluation Time   PT Eval, Moderate Complexity Minutes (24369) 17   Certification   Certification date from 04/14/23   Certification date to 07/12/23   Medical Diagnosis Lymphedema   Certification I certify the need for these services furnished under this plan of treatment and while under my care.  (Physician co-signature of this document indicates review and certification of the therapy plan).      Alden Gan, PT, DPT, CLT  4/16/2023

## 2023-04-16 NOTE — ADDENDUM NOTE
Encounter addended by: Alden Gan, PT on: 4/16/2023 1:50 PM   Actions taken: Problem List modified, Visit diagnoses modified

## 2023-04-16 NOTE — ADDENDUM NOTE
Encounter addended by: Alden Gan, PT on: 4/16/2023 1:35 PM   Actions taken: Clinical Note Signed, Flowsheet accepted, Document created, Document edited

## 2023-04-17 ENCOUNTER — TELEPHONE (OUTPATIENT)
Dept: OTHER | Facility: CLINIC | Age: 43
End: 2023-04-17
Payer: COMMERCIAL

## 2023-04-17 DIAGNOSIS — I89.0 LYMPHEDEMA: Primary | ICD-10-CM

## 2023-04-17 NOTE — TELEPHONE ENCOUNTER
----- Message from Emily Delgado MD sent at 4/15/2023  2:41 PM CDT -----  Regarding: FW: Compression order  Please help mw with this order , I will sign  Thank you    LG  ----- Message -----  From: Alden Gan PT  Sent: 4/14/2023   2:49 PM CDT  To: Emily Delgado MD  Subject: Compression order                                Dr. Delgado,  Thank you for the lymphedema referral for Romeo. I am recommending the following compression garments - if you agree, please generate an order.     - 2 pairs of velcro garments knee high for right leg (including foot compression)  - 1 nighttime compression garment (such as a Tribute) for right leg below the knee    Thank you in advance,    Alden Gan, PT, DPT, CLT   4/14/2023

## 2023-04-28 DIAGNOSIS — I10 UNCONTROLLED HYPERTENSION: ICD-10-CM

## 2023-04-28 RX ORDER — LOSARTAN POTASSIUM AND HYDROCHLOROTHIAZIDE 12.5; 1 MG/1; MG/1
1 TABLET ORAL DAILY
Qty: 90 TABLET | Refills: 3 | Status: CANCELLED | OUTPATIENT
Start: 2023-04-28

## 2023-04-28 NOTE — TELEPHONE ENCOUNTER
"Routing refill request to provider for review/approval because:  Drug not active on patient's medication list  Labs out of range:  creatinine  Discontinued on 4/7/23    Last Written Prescription Date:  4/14/22  Last Fill Quantity: 90,  # refills: 4   Last office visit provider:   2/9/23    Requested Prescriptions   Pending Prescriptions Disp Refills     losartan-hydrochlorothiazide (HYZAAR) 100-12.5 MG tablet 90 tablet 4     Sig: Take 1 tablet by mouth daily       Angiotensin-II Receptors Failed - 4/28/2023  4:21 PM        Failed - Medication is active on med list        Failed - Normal serum creatinine on file in past 12 months     Recent Labs   Lab Test 02/09/23  1204   CR 0.61*       Ok to refill medication if creatinine is low          Passed - Last blood pressure under 140/90 in past 12 months     BP Readings from Last 3 Encounters:   04/07/23 132/83   02/09/23 119/75   11/02/22 (!) 132/90                 Passed - Recent (12 mo) or future (30 days) visit within the authorizing provider's specialty     Patient has had an office visit with the authorizing provider or a provider within the authorizing providers department within the previous 12 mos or has a future within next 30 days. See \"Patient Info\" tab in inbasket, or \"Choose Columns\" in Meds & Orders section of the refill encounter.              Passed - Patient is age 18 or older        Passed - Normal serum potassium on file in past 12 months     Recent Labs   Lab Test 02/09/23  1204   POTASSIUM 3.9                   Diuretics (Including Combos) Protocol Failed - 4/28/2023  4:21 PM        Failed - Medication is active on med list        Failed - Normal serum creatinine on file in past 12 months     Recent Labs   Lab Test 02/09/23  1204   CR 0.61*              Passed - Blood pressure under 140/90 in past 12 months     BP Readings from Last 3 Encounters:   04/07/23 132/83   02/09/23 119/75   11/02/22 (!) 132/90                 Passed - Recent (12 mo) or " "future (30 days) visit within the authorizing provider's specialty     Patient has had an office visit with the authorizing provider or a provider within the authorizing providers department within the previous 12 mos or has a future within next 30 days. See \"Patient Info\" tab in inbasket, or \"Choose Columns\" in Meds & Orders section of the refill encounter.              Passed - Patient is age 18 or older        Passed - Normal serum potassium on file in past 12 months     Recent Labs   Lab Test 02/09/23  1204   POTASSIUM 3.9                    Passed - Normal serum sodium on file in past 12 months     Recent Labs   Lab Test 02/09/23  1204                      RICK MCDONOUGH RN 04/28/23 4:23 PM  "

## 2023-05-01 NOTE — TELEPHONE ENCOUNTER
Please call patient to clarify if still taking this medication ( previously stated not taking )

## 2023-05-01 NOTE — TELEPHONE ENCOUNTER
Attempted to contact patient, message indicating that phone line has been disconnected.  It appears as though the patient initiated this request not the pharmacy.

## 2023-05-03 ENCOUNTER — OFFICE VISIT (OUTPATIENT)
Dept: FAMILY MEDICINE | Facility: CLINIC | Age: 43
End: 2023-05-03
Payer: COMMERCIAL

## 2023-05-03 VITALS
SYSTOLIC BLOOD PRESSURE: 157 MMHG | OXYGEN SATURATION: 96 % | TEMPERATURE: 98.1 F | HEIGHT: 71 IN | WEIGHT: 312 LBS | BODY MASS INDEX: 43.68 KG/M2 | DIASTOLIC BLOOD PRESSURE: 96 MMHG | RESPIRATION RATE: 16 BRPM | HEART RATE: 59 BPM

## 2023-05-03 DIAGNOSIS — Z01.818 PRE-OP EVALUATION: Primary | ICD-10-CM

## 2023-05-03 DIAGNOSIS — I10 BENIGN ESSENTIAL HYPERTENSION: ICD-10-CM

## 2023-05-03 DIAGNOSIS — I10 UNCONTROLLED HYPERTENSION: ICD-10-CM

## 2023-05-03 DIAGNOSIS — I89.0 SECONDARY LYMPHEDEMA: ICD-10-CM

## 2023-05-03 DIAGNOSIS — I10 PRIMARY HYPERTENSION: ICD-10-CM

## 2023-05-03 DIAGNOSIS — G89.4 CHRONIC PAIN SYNDROME: ICD-10-CM

## 2023-05-03 DIAGNOSIS — Z91.010 PEANUT ALLERGY: ICD-10-CM

## 2023-05-03 DIAGNOSIS — Z85.71 H/O HODGKIN'S LYMPHOMA: ICD-10-CM

## 2023-05-03 DIAGNOSIS — I10 ESSENTIAL HYPERTENSION, BENIGN: ICD-10-CM

## 2023-05-03 PROBLEM — A40.1 SEVERE SEPSIS WITH ACUTE ORGAN DYSFUNCTION DUE TO GROUP B STREPTOCOCCUS (H): Status: RESOLVED | Noted: 2021-10-18 | Resolved: 2023-05-03

## 2023-05-03 PROBLEM — A41.9 BACTERIAL SEPSIS (H): Status: RESOLVED | Noted: 2021-08-19 | Resolved: 2023-05-03

## 2023-05-03 PROBLEM — R65.20 SEVERE SEPSIS WITH ACUTE ORGAN DYSFUNCTION DUE TO GROUP B STREPTOCOCCUS (H): Status: RESOLVED | Noted: 2021-10-18 | Resolved: 2023-05-03

## 2023-05-03 LAB
ANION GAP SERPL CALCULATED.3IONS-SCNC: 13 MMOL/L (ref 7–15)
ATRIAL RATE - MUSE: 53 BPM
BUN SERPL-MCNC: 14.1 MG/DL (ref 6–20)
CALCIUM SERPL-MCNC: 9.3 MG/DL (ref 8.6–10)
CHLORIDE SERPL-SCNC: 106 MMOL/L (ref 98–107)
CREAT SERPL-MCNC: 0.64 MG/DL (ref 0.67–1.17)
DEPRECATED HCO3 PLAS-SCNC: 23 MMOL/L (ref 22–29)
DIASTOLIC BLOOD PRESSURE - MUSE: NORMAL MMHG
GFR SERPL CREATININE-BSD FRML MDRD: >90 ML/MIN/1.73M2
GLUCOSE SERPL-MCNC: 111 MG/DL (ref 70–99)
INTERPRETATION ECG - MUSE: NORMAL
P AXIS - MUSE: 44 DEGREES
POTASSIUM SERPL-SCNC: 4.1 MMOL/L (ref 3.4–5.3)
PR INTERVAL - MUSE: 180 MS
QRS DURATION - MUSE: 114 MS
QT - MUSE: 434 MS
QTC - MUSE: 407 MS
R AXIS - MUSE: -6 DEGREES
SODIUM SERPL-SCNC: 142 MMOL/L (ref 136–145)
SYSTOLIC BLOOD PRESSURE - MUSE: NORMAL MMHG
T AXIS - MUSE: 12 DEGREES
VENTRICULAR RATE- MUSE: 53 BPM

## 2023-05-03 PROCEDURE — 93005 ELECTROCARDIOGRAM TRACING: CPT | Performed by: FAMILY MEDICINE

## 2023-05-03 PROCEDURE — 80048 BASIC METABOLIC PNL TOTAL CA: CPT | Performed by: FAMILY MEDICINE

## 2023-05-03 PROCEDURE — 99215 OFFICE O/P EST HI 40 MIN: CPT | Performed by: FAMILY MEDICINE

## 2023-05-03 PROCEDURE — 36415 COLL VENOUS BLD VENIPUNCTURE: CPT | Performed by: FAMILY MEDICINE

## 2023-05-03 PROCEDURE — 93010 ELECTROCARDIOGRAM REPORT: CPT | Performed by: GENERAL ACUTE CARE HOSPITAL

## 2023-05-03 RX ORDER — HYDROCODONE BITARTRATE AND ACETAMINOPHEN 10; 325 MG/1; MG/1
1-2 TABLET ORAL 3 TIMES DAILY PRN
Qty: 180 TABLET | Refills: 0 | Status: CANCELLED | OUTPATIENT
Start: 2023-05-03

## 2023-05-03 RX ORDER — POTASSIUM CHLORIDE 750 MG/1
TABLET, EXTENDED RELEASE ORAL
Qty: 270 TABLET | Refills: 1 | Status: SHIPPED | OUTPATIENT
Start: 2023-05-03 | End: 2023-08-25

## 2023-05-03 RX ORDER — LOSARTAN POTASSIUM AND HYDROCHLOROTHIAZIDE 12.5; 1 MG/1; MG/1
1 TABLET ORAL DAILY
Qty: 90 TABLET | Refills: 1 | Status: CANCELLED | OUTPATIENT
Start: 2023-05-03

## 2023-05-03 RX ORDER — CARVEDILOL 12.5 MG/1
12.5 TABLET ORAL 2 TIMES DAILY WITH MEALS
Qty: 180 TABLET | Refills: 1 | Status: CANCELLED | OUTPATIENT
Start: 2023-05-03 | End: 2023-10-30

## 2023-05-03 RX ORDER — CARVEDILOL 12.5 MG/1
12.5 TABLET ORAL 2 TIMES DAILY WITH MEALS
Qty: 60 TABLET | Refills: 2 | Status: SHIPPED | OUTPATIENT
Start: 2023-05-03 | End: 2023-08-25

## 2023-05-03 ASSESSMENT — PAIN SCALES - GENERAL: PAINLEVEL: EXTREME PAIN (8)
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
## 2023-05-10 ENCOUNTER — TELEPHONE (OUTPATIENT)
Dept: FAMILY MEDICINE | Facility: CLINIC | Age: 43
End: 2023-05-10
Payer: COMMERCIAL

## 2023-05-10 NOTE — TELEPHONE ENCOUNTER
FAX     [facility]       General Call    Contacts       Type Contact Phone/Fax    05/10/2023 08:38 AM CDT Phone (Incoming) Romeo Chong (Self) [phone] (M)        Reason for Call:  Pre Op Documentation    What are your questions or concerns:  Procedure is tomorrow morning at [facility]    Documentation has not been received.    Date of last appointment with provider: 05/03/2023    Could we send this information to you in [place] or would you prefer to receive a phone call?:   Patient would prefer a phone call   Okay to leave a detailed message?: Yes at Cell number on file:    Telephone Information:   Mobile [phone]

## 2023-05-12 ENCOUNTER — TRANSFERRED RECORDS (OUTPATIENT)
Dept: HEALTH INFORMATION MANAGEMENT | Facility: CLINIC | Age: 43
End: 2023-05-12
Payer: COMMERCIAL

## 2023-05-18 ENCOUNTER — MEDICAL CORRESPONDENCE (OUTPATIENT)
Dept: HEALTH INFORMATION MANAGEMENT | Facility: CLINIC | Age: 43
End: 2023-05-18
Payer: COMMERCIAL

## 2023-05-18 ENCOUNTER — TELEPHONE (OUTPATIENT)
Dept: FAMILY MEDICINE | Facility: CLINIC | Age: 43
End: 2023-05-18
Payer: COMMERCIAL

## 2023-05-18 NOTE — TELEPHONE ENCOUNTER
Called and spoke with patient. DMV form was not located in forms bin and has not been scanned into media.     I asked what type of form and he said its from [place] regarding him getting his driving license here in [place]. Expressed that he was been waiting on this form for months now.     Offered to call [place] [employer] at [phone] to get form faxed to us. Was unable to get through to someone.     Regarding the prescription the form was located signed by Dr. BEAUCHAMP and faxed. Copy was placed in forms bin until scanned into chart.

## 2023-05-18 NOTE — TELEPHONE ENCOUNTER
Reason for Call:  Other call back    Detailed comments: Two issues     4/10/23 Encounter - DMV has not received fax.  This is impacting patients [place].  Requesting call back and urgent action to get form faxed.    Refill Encounter 04/28/23 Patient needs refill and has been out of medication.      Phone Number Patient can be reached at: Cell number on file:    Telephone Information:   Mobile [phone]   Verified phone number, per patient it has never been disconnected.     Best Time: As soon as feasible.     Can we leave a detailed message on this number? YES but would prefer to speak directly with staff.     Call taken on 5/18/2023 at 8:47 AM by Aubrie Riggins

## 2023-05-18 NOTE — TELEPHONE ENCOUNTER
Form was located, patient actually never came in to sign the forms.     Forms placed back at the  for signature. Once patient signs copy will be made for medical records and forms bin.

## 2023-05-19 ENCOUNTER — THERAPY VISIT (OUTPATIENT)
Dept: PHYSICAL THERAPY | Facility: REHABILITATION | Age: 43
End: 2023-05-19
Payer: COMMERCIAL

## 2023-05-19 DIAGNOSIS — I89.0 SECONDARY LYMPHEDEMA: Primary | ICD-10-CM

## 2023-05-19 PROCEDURE — 97140 MANUAL THERAPY 1/> REGIONS: CPT | Mod: GP | Performed by: PHYSICAL THERAPIST

## 2023-05-19 PROCEDURE — 97535 SELF CARE MNGMENT TRAINING: CPT | Mod: GP | Performed by: PHYSICAL THERAPIST

## 2023-05-19 NOTE — TELEPHONE ENCOUNTER
This med managed by PCP. Updated pharmacy with this information  -sea  
Patient tolerated procedure well.

## 2023-05-24 ENCOUNTER — TELEPHONE (OUTPATIENT)
Dept: FAMILY MEDICINE | Facility: CLINIC | Age: 43
End: 2023-05-24
Payer: COMMERCIAL

## 2023-05-24 DIAGNOSIS — I10 UNCONTROLLED HYPERTENSION: Primary | ICD-10-CM

## 2023-05-24 RX ORDER — LOSARTAN POTASSIUM AND HYDROCHLOROTHIAZIDE 12.5; 1 MG/1; MG/1
1 TABLET ORAL DAILY
Qty: 90 TABLET | Refills: 4 | Status: CANCELLED | OUTPATIENT
Start: 2023-05-24

## 2023-05-25 NOTE — TELEPHONE ENCOUNTER
"Routing refill request to provider for review/approval because:  Drug not active on patient's medication list  Labs out of range:  creatinine  Blood pressure failed    Last Written Prescription Date:  NA  Last Fill Quantity: NA,  # refills: NA   Last office visit provider:  5/3/2023     Requested Prescriptions   Pending Prescriptions Disp Refills     losartan-hydrochlorothiazide (HYZAAR) 100-12.5 MG tablet 90 tablet 4     Sig: Take 1 tablet by mouth daily       Angiotensin-II Receptors Failed - 5/24/2023  3:37 PM        Failed - Last blood pressure under 140/90 in past 12 months     BP Readings from Last 3 Encounters:   05/03/23 (!) 157/96   04/07/23 132/83   02/09/23 119/75                 Failed - Medication is active on med list        Failed - Normal serum creatinine on file in past 12 months     Recent Labs   Lab Test 05/03/23  1540   CR 0.64*       Ok to refill medication if creatinine is low          Passed - Recent (12 mo) or future (30 days) visit within the authorizing provider's specialty     Patient has had an office visit with the authorizing provider or a provider within the authorizing providers department within the previous 12 mos or has a future within next 30 days. See \"Patient Info\" tab in inbasket, or \"Choose Columns\" in Meds & Orders section of the refill encounter.              Passed - Patient is age 18 or older        Passed - Normal serum potassium on file in past 12 months     Recent Labs   Lab Test 05/03/23  1540   POTASSIUM 4.1                   Diuretics (Including Combos) Protocol Failed - 5/24/2023  3:37 PM        Failed - Blood pressure under 140/90 in past 12 months     BP Readings from Last 3 Encounters:   05/03/23 (!) 157/96   04/07/23 132/83   02/09/23 119/75                 Failed - Medication is active on med list        Failed - Normal serum creatinine on file in past 12 months     Recent Labs   Lab Test 05/03/23  1540   CR 0.64*              Passed - Recent (12 mo) or future " "(30 days) visit within the authorizing provider's specialty     Patient has had an office visit with the authorizing provider or a provider within the authorizing providers department within the previous 12 mos or has a future within next 30 days. See \"Patient Info\" tab in inbasket, or \"Choose Columns\" in Meds & Orders section of the refill encounter.              Passed - Patient is age 18 or older        Passed - Normal serum potassium on file in past 12 months     Recent Labs   Lab Test 05/03/23  1540   POTASSIUM 4.1                    Passed - Normal serum sodium on file in past 12 months     Recent Labs   Lab Test 05/03/23  1540                      Aiyana Powers RN 05/25/23 12:23 PM  "

## 2023-05-30 NOTE — TELEPHONE ENCOUNTER
Confirmed pt is taking this medication- losartan-hydrochlorothiazide (HYZAAR) 100-12.5 MG tablet. Pt states need refill.

## 2023-05-31 ENCOUNTER — MEDICAL CORRESPONDENCE (OUTPATIENT)
Dept: HEALTH INFORMATION MANAGEMENT | Facility: CLINIC | Age: 43
End: 2023-05-31
Payer: COMMERCIAL

## 2023-05-31 NOTE — TELEPHONE ENCOUNTER
This was prescribed on 5/24/2023 and sent to [facility].   Has been recently confirmed that the patient is taking this medication   please contact pharmacy/ patient.

## 2023-05-31 NOTE — TELEPHONE ENCOUNTER
[facility] calling.  Have not received RX for losartan-hydrochlorothiazide (HYZAAR) 100-12.5 mg tablets.    Requesting RX is sent as soon as possible, patient is out of medication and reports Clinic advised patient RX was sent on 05/24/2023

## 2023-06-01 RX ORDER — LOSARTAN POTASSIUM AND HYDROCHLOROTHIAZIDE 12.5; 1 MG/1; MG/1
1 TABLET ORAL DAILY
Qty: 90 TABLET | Refills: 3 | Status: SHIPPED | OUTPATIENT
Start: 2023-06-01 | End: 2023-08-25

## 2023-06-01 NOTE — TELEPHONE ENCOUNTER
Medication is not in current med list. Routing back to PCP to advise and send medication in, if appropriate.       Sal Syed Jr., CMA on 6/1/2023 at 7:04 AM

## 2023-06-02 ENCOUNTER — TELEPHONE (OUTPATIENT)
Dept: PHYSICAL THERAPY | Facility: REHABILITATION | Age: 43
End: 2023-06-02

## 2023-06-09 ENCOUNTER — THERAPY VISIT (OUTPATIENT)
Dept: PHYSICAL THERAPY | Facility: REHABILITATION | Age: 43
End: 2023-06-09
Payer: COMMERCIAL

## 2023-06-09 DIAGNOSIS — I89.0 SECONDARY LYMPHEDEMA: Primary | ICD-10-CM

## 2023-06-09 PROCEDURE — 97140 MANUAL THERAPY 1/> REGIONS: CPT | Mod: GP | Performed by: PHYSICAL THERAPIST

## 2023-06-13 ENCOUNTER — TRANSFERRED RECORDS (OUTPATIENT)
Dept: HEALTH INFORMATION MANAGEMENT | Facility: CLINIC | Age: 43
End: 2023-06-13
Payer: COMMERCIAL

## 2023-06-13 DIAGNOSIS — K25.9 GASTRIC ULCER, UNSPECIFIED CHRONICITY, UNSPECIFIED WHETHER GASTRIC ULCER HEMORRHAGE OR PERFORATION PRESENT: ICD-10-CM

## 2023-06-14 NOTE — TELEPHONE ENCOUNTER
"Last Written Prescription Date:  11/2/2022  Last Fill Quantity: 360,  # refills: 1   Last office visit provider: 5/3/2023 with PCP Dr RIVERA Frye   Please note: allergy contraindication--check with provider.  Requested Prescriptions   Pending Prescriptions Disp Refills     famotidine (PEPCID) 40 MG tablet 360 tablet 1     Sig: Take 2 tablets (80 mg) by mouth 2 times daily as needed for heartburn       H2 Blockers Protocol Passed - 6/13/2023  3:08 PM        Passed - Patient is age 12 or older        Passed - Recent (12 mo) or future (30 days) visit within the authorizing provider's specialty     Patient has had an office visit with the authorizing provider or a provider within the authorizing providers department within the previous 12 mos or has a future within next 30 days. See \"Patient Info\" tab in inbasket, or \"Choose Columns\" in Meds & Orders section of the refill encounter.              Passed - Medication is active on med list             Kim Velez RN 06/14/23 3:21 PM  "

## 2023-06-19 RX ORDER — FAMOTIDINE 40 MG/1
80 TABLET, FILM COATED ORAL 2 TIMES DAILY PRN
Qty: 360 TABLET | Refills: 1 | Status: SHIPPED | OUTPATIENT
Start: 2023-06-19 | End: 2024-02-21

## 2023-07-12 ENCOUNTER — TRANSFERRED RECORDS (OUTPATIENT)
Dept: HEALTH INFORMATION MANAGEMENT | Facility: CLINIC | Age: 43
End: 2023-07-12
Payer: COMMERCIAL

## 2023-07-12 NOTE — PROGRESS NOTES
DISCHARGE  Reason for Discharge: Patient has failed to schedule further appointments.    Equipment Issued: N/A    Discharge Plan: Patient to continue home program.    Referring Provider:  Chely Frye      See last treatment session for further information.   Alden Gan, PT, DPT, CLT  7/12/2023

## 2023-07-20 ENCOUNTER — HOSPITAL ENCOUNTER (OUTPATIENT)
Dept: GENERAL RADIOLOGY | Facility: HOSPITAL | Age: 43
Discharge: HOME | End: 2023-07-20
Payer: COMMERCIAL

## 2023-07-20 DIAGNOSIS — M25.572 PAIN IN LEFT ANKLE AND JOINTS OF LEFT FOOT: ICD-10-CM

## 2023-07-20 PROCEDURE — 73600 X-RAY EXAM OF ANKLE: CPT | Mod: LT

## 2023-08-02 ENCOUNTER — TRANSFERRED RECORDS (OUTPATIENT)
Dept: HEALTH INFORMATION MANAGEMENT | Facility: CLINIC | Age: 43
End: 2023-08-02
Payer: COMMERCIAL

## 2023-08-25 ENCOUNTER — OFFICE VISIT (OUTPATIENT)
Dept: CARDIOLOGY | Facility: CLINIC | Age: 43
End: 2023-08-25
Payer: COMMERCIAL

## 2023-08-25 VITALS
DIASTOLIC BLOOD PRESSURE: 94 MMHG | SYSTOLIC BLOOD PRESSURE: 148 MMHG | BODY MASS INDEX: 44.35 KG/M2 | HEART RATE: 66 BPM | RESPIRATION RATE: 16 BRPM | WEIGHT: 315 LBS

## 2023-08-25 DIAGNOSIS — I10 UNCONTROLLED HYPERTENSION: Primary | ICD-10-CM

## 2023-08-25 DIAGNOSIS — Z86.79 HISTORY OF CARDIOMYOPATHY: ICD-10-CM

## 2023-08-25 DIAGNOSIS — I89.0 LYMPHEDEMA: ICD-10-CM

## 2023-08-25 DIAGNOSIS — E66.01 MORBID OBESITY (H): ICD-10-CM

## 2023-08-25 PROCEDURE — 99214 OFFICE O/P EST MOD 30 MIN: CPT | Performed by: INTERNAL MEDICINE

## 2023-08-25 RX ORDER — CARVEDILOL 12.5 MG/1
12.5 TABLET ORAL 2 TIMES DAILY WITH MEALS
Qty: 180 TABLET | Refills: 4 | Status: SHIPPED | OUTPATIENT
Start: 2023-08-25 | End: 2024-09-25

## 2023-08-25 RX ORDER — METHOCARBAMOL 500 MG/1
TABLET, FILM COATED ORAL
COMMUNITY
Start: 2023-08-02

## 2023-08-25 RX ORDER — LOSARTAN POTASSIUM AND HYDROCHLOROTHIAZIDE 12.5; 1 MG/1; MG/1
1 TABLET ORAL DAILY
Qty: 90 TABLET | Refills: 3 | Status: SHIPPED | OUTPATIENT
Start: 2023-08-25 | End: 2024-08-27

## 2023-08-25 NOTE — PROGRESS NOTES
Click to link to [facility] HEART CARE NOTE       Assessment/Plan:   1.  Cardiomyopathy, LVEF of 40%, chronic systolic congestive heart failure: The patient's left ventricle was recovered to normal range of 57% per cardiac MRI in June 2022.  His stress cardiac MRI is negative for inducible myocardial ischemia.  No previous myocardial infarction in all myocardial scar.  Right ventricle is mildly enlarged with mildly reduced right ventricular systolic function, LVEF of 46% borderline low.  This could be related to his obesity.  He has no signs of congestive heart failure.  He is on hydrochlorothiazide 12.5 mg daily.  Discussed a low-salt diet.    2.  Uncontrolled hypertension: His blood pressure is high because he did not take her carvedilol over last 2 to 3 weeks.  Represcribe carvedilol 12.5 mg twice a day.  Continue losartan-hydrochlorothiazide 100 -12.5 mg daily, refilled his prescriptions.  Continue to monitor his blood pressure.     3.  Right leg lymphedema: Follow-up with lymphedema clinic.      4.  Morbid obesity: Lifestyle modification.    Thank you for the opportunity to be involved in the care of Romeo Chong. If you have any questions, please feel free to contact me.  I will see the patient again in 12 months and as needed    Much or all of the text in this note was generated through the use of Dragon Dictate voice-to-text software. Errors in spelling or words which seem out of context are unintentional.   Sound alike errors, in particular, may have escaped editing.       History of Present Illness:   It is my pleasure to see Romeo Chong at the [facility] clinic for routine cardiology follow-up. Romeo Chong is a 42 year old male with a medical history of right leg lymphedema with multiple cellulitis, benign essential hypertension, morbid obesity, gastric reflux disease, pulmonary embolism, history of asthma, history of Hodgkin's  lymphoma.    The patient states that he has occasional atypical chest pain which is less frequent, improved.  He has mild shortness of breath on exertion is improved.  He had no palpitations, dizziness, orthopnea, PND.  His right leg edema is secondary to lymphedema, stable.  He did not take his carvedilol 12.5 mg twice a day over last 2 to 3 weeks.  His blood pressure is high today.      Past Medical History:     Patient Active Problem List   Diagnosis    Edema    Fibrous dysplasia of bone    Essential hypertension, benign    Hypertriglyceridemia    Lymphedema    Obesity, unspecified    Stress hyperglycemia    Vitamin D deficiency    Allergic rhinitis due to animals    Nonintractable headache, unspecified chronicity pattern, unspecified headache type    Chronic pain syndrome    Gastric ulcer, unspecified chronicity, unspecified whether gastric ulcer hemorrhage or perforation present    Chronic low back pain, unspecified back pain laterality, unspecified whether sciatica present    H/O Hodgkin's lymphoma    Obesity (BMI 35.0-39.9) with comorbidity (H)    Calcaneal spur, left    Tachycardia    Cellulitis of right lower extremity    Elevated lactic acid level    Gram-positive bacteremia    Osteomyelitis, unspecified site, unspecified type (H)    Gastroesophageal reflux disease without esophagitis    Lymphadenopathy    Mediastinal mass    Murder of relative    Peanut allergy    Secondary lymphedema       Past Surgical History:     Past Surgical History:   Procedure Laterality Date    FRACTURE SURGERY      HC REMOVAL HEEL SPUR, CALCANEUS Left 6/25/2021    Procedure: EXCISION, BONE SPUR, FOOT, WITH PLANTAR FASCIA RELEASE;  Surgeon: Stephon Singleton DPM;  Location: [facility];  Service: Podiatry    TONSILLECTOMY, ADENOIDECTOMY ADULT, COMBINED         Family History:     Family History   Problem Relation Age of Onset    Cirrhosis Mother     Hypertension Mother     Diabetes Type 2  Father     Kidney failure  Father     Cerebrovascular Disease Father     Hypertension Father     Cerebrovascular Disease Sister     Hypertension Paternal Grandmother     Hypertension Paternal Grandfather        Social History:    reports that he quit smoking about 14 years ago. His smoking use included cigarettes. He started smoking about 24 years ago. He smoked an average of 1 pack per day. He has never used smokeless tobacco. He reports that he does not currently use alcohol. He reports that he does not use drugs.    Review of Systems:   12 systems are reviewed negative except for in HPI.    Meds:     Current Outpatient Medications:     carvedilol (COREG) 12.5 MG tablet, Take 1 tablet (12.5 mg) by mouth 2 times daily (with meals), Disp: 180 tablet, Rfl: 4    EPINEPHrine (ANY BX GENERIC EQUIV) 0.3 MG/0.3ML injection 2-pack, Inject 0.3 mLs (0.3 mg) into the muscle as needed for anaphylaxis May repeat one time in 5-15 minutes if response to initial dose is inadequate., Disp: 2 each, Rfl: 0    famotidine (PEPCID) 40 MG tablet, Take 2 tablets (80 mg) by mouth 2 times daily as needed for heartburn, Disp: 360 tablet, Rfl: 1    HYDROcodone-acetaminophen (NORCO)  MG per tablet, Take 1-2 tablets by mouth 3 times daily as needed for pain, Disp: 180 tablet, Rfl: 0    losartan-hydrochlorothiazide (HYZAAR) 100-12.5 MG tablet, Take 1 tablet by mouth daily, Disp: 90 tablet, Rfl: 3    methocarbamol (ROBAXIN) 500 MG tablet, TAKE 1/2 TABLET TO 2 TABLETS BY ORAL ROUTE 4 TIMES EVERY DAY AS NEEDED FOR MUSCLE SPASMS, Disp: , Rfl:      Allergies:   Vancomycin, Peanut oil, Tree nuts [nuts], Erythromycin, Other environmental allergy, Pollen extracts [pollen extract], Zosyn [piperacillin-tazobactam in dex], Latex, and Oxycodone    Objective:      Physical Exam  144.2 kg (318 lb)     Body mass index is 44.35 kg/m .  BP (!) 148/94 (BP Location: Right arm, Patient Position: Sitting, Cuff Size: Adult Large)   Pulse 66   Resp 16   Wt 144.2 kg (318 lb)   BMI  44.35 kg/m      General Appearance:   Awake, Alert, No acute distress.   HEENT:  Pupil equal, reactive to light. No scleral icterus; the mucous membranes were moist. No oral ulcers or thrush.    Neck: No cervical bruits. No JVD. No thyromegaly. No lymph node enlargement or tenderness.   Chest: The spine was straight. The chest was symmetric.   Lungs:   Respirations unlabored. Lungs are clear to auscultation. No crackles. No wheezing.   Cardiovascular:   RRR, normal first and second heart sounds with no murmurs. No rubs or gallops.    Abdomen:  Obese. Soft. No tenderness. Non-distended. Bowels sounds are present   Extremities: Right leg lymphoedema. No edema in left leg.   Skin: No rashes or ulcers. Warm, Dry.   Musculoskeletal: No tenderness. No deformity.   Neurologic: Mood and affect are appropriate. No focal deficits.         EKG:  Personally reivewed  Sinus bradycardia   Nonspecific T wave abnormality   Abnormal ECG   When compared with ECG of 02-NOV-2022 14:46,   Right bundle branch block is no longer Present     Cardiac Imaging Studies  ECHO on 10-:  Left ventricle is moderately dilated, moderate global hypokinesis, LVEF of 40%, normal right ventricle size and function, no obvious valvular disease.    ECHO on 8-:  1. Left ventricular size, wall thickness, and systolic function are normal.  The estimated left ventricular ejection fraction is 55%.  2. Right ventricular size and systolic function are normal.  3. No hemodynamically significant valvular abnormalities.  4. No prior study available for comparison.    Stress cardiac MRI on 6-6-2022:  1.  Pharmacological Regadenoson stress cardiac MRI is negative for inducible myocardial ischemia.   2.  No evidence of myocardial infarction, focal or diffuse nonvascular scarring or fibrosis, or infiltrative disease.  3.  Left ventricular size is mildly enlarged. Wall thickness and systolic function are normal. The quantified left ventricular ejection  fraction is 57%.    4.  Right ventricular size is mildly enlarged. Systolic function is mildly reduced.  The quantified right ventricular ejection fraction is 46%.  5.  Mild left atrial enlargement.  6.  No significant valvular abnormalities.    Lab Review   Lab Results   Component Value Date     11/17/2021    CO2 20 11/17/2021    BUN 8 11/17/2021     Lab Results   Component Value Date    WBC 3.7 11/17/2021    HGB 13.1 11/17/2021    HCT 41.0 11/17/2021    MCV 82 11/17/2021     11/17/2021

## 2023-08-25 NOTE — LETTER
8/25/2023    Chely Frye MD  [address]  [place] [address]    RE: Romeo Chong       Dear Colleague,     I had the pleasure of seeing Romeo Chong in the [facility].      Click to link to [facility] HEART CARE NOTE       Assessment/Plan:   1.  Cardiomyopathy, LVEF of 40%, chronic systolic congestive heart failure: The patient's left ventricle was recovered to normal range of 57% per cardiac MRI in June 2022.  His stress cardiac MRI is negative for inducible myocardial ischemia.  No previous myocardial infarction in all myocardial scar.  Right ventricle is mildly enlarged with mildly reduced right ventricular systolic function, LVEF of 46% borderline low.  This could be related to his obesity.  He has no signs of congestive heart failure.  He is on hydrochlorothiazide 12.5 mg daily.  Discussed a low-salt diet.    2.  Uncontrolled hypertension: His blood pressure is high because he did not take her carvedilol over last 2 to 3 weeks.  Represcribe carvedilol 12.5 mg twice a day.  Continue losartan-hydrochlorothiazide 100 -12.5 mg daily, refilled his prescriptions.  Continue to monitor his blood pressure.     3.  Right leg lymphedema: Follow-up with lymphedema clinic.      4.  Morbid obesity: Lifestyle modification.    Thank you for the opportunity to be involved in the care of Romeo Chong. If you have any questions, please feel free to contact me.  I will see the patient again in 12 months and as needed    Much or all of the text in this note was generated through the use of Dragon Dictate voice-to-text software. Errors in spelling or words which seem out of context are unintentional.   Sound alike errors, in particular, may have escaped editing.       History of Present Illness:   It is my pleasure to see Romeo Chong at the [facility] for routine cardiology follow-up. Romeo Chong is a 42 year old male  with a medical history of right leg lymphedema with multiple cellulitis, benign essential hypertension, morbid obesity, gastric reflux disease, pulmonary embolism, history of asthma, history of Hodgkin's lymphoma.    The patient states that he has occasional atypical chest pain which is less frequent, improved.  He has mild shortness of breath on exertion is improved.  He had no palpitations, dizziness, orthopnea, PND.  His right leg edema is secondary to lymphedema, stable.  He did not take his carvedilol 12.5 mg twice a day over last 2 to 3 weeks.  His blood pressure is high today.      Past Medical History:     Patient Active Problem List   Diagnosis    Edema    Fibrous dysplasia of bone    Essential hypertension, benign    Hypertriglyceridemia    Lymphedema    Obesity, unspecified    Stress hyperglycemia    Vitamin D deficiency    Allergic rhinitis due to animals    Nonintractable headache, unspecified chronicity pattern, unspecified headache type    Chronic pain syndrome    Gastric ulcer, unspecified chronicity, unspecified whether gastric ulcer hemorrhage or perforation present    Chronic low back pain, unspecified back pain laterality, unspecified whether sciatica present    H/O Hodgkin's lymphoma    Obesity (BMI 35.0-39.9) with comorbidity (H)    Calcaneal spur, left    Tachycardia    Cellulitis of right lower extremity    Elevated lactic acid level    Gram-positive bacteremia    Osteomyelitis, unspecified site, unspecified type (H)    Gastroesophageal reflux disease without esophagitis    Lymphadenopathy    Mediastinal mass    Murder of relative    Peanut allergy    Secondary lymphedema       Past Surgical History:     Past Surgical History:   Procedure Laterality Date    FRACTURE SURGERY      HC REMOVAL HEEL SPUR, CALCANEUS Left 6/25/2021    Procedure: EXCISION, BONE SPUR, FOOT, WITH PLANTAR FASCIA RELEASE;  Surgeon: Stephon Singleton DPM;  Location: [facility];  Service: Podiatry     TONSILLECTOMY, ADENOIDECTOMY ADULT, COMBINED         Family History:     Family History   Problem Relation Age of Onset    Cirrhosis Mother     Hypertension Mother     Diabetes Type 2  Father     Kidney failure Father     Cerebrovascular Disease Father     Hypertension Father     Cerebrovascular Disease Sister     Hypertension Paternal Grandmother     Hypertension Paternal Grandfather        Social History:    reports that he quit smoking about 14 years ago. His smoking use included cigarettes. He started smoking about 24 years ago. He smoked an average of 1 pack per day. He has never used smokeless tobacco. He reports that he does not currently use alcohol. He reports that he does not use drugs.    Review of Systems:   12 systems are reviewed negative except for in HPI.    Meds:     Current Outpatient Medications:     carvedilol (COREG) 12.5 MG tablet, Take 1 tablet (12.5 mg) by mouth 2 times daily (with meals), Disp: 180 tablet, Rfl: 4    EPINEPHrine (ANY BX GENERIC EQUIV) 0.3 MG/0.3ML injection 2-pack, Inject 0.3 mLs (0.3 mg) into the muscle as needed for anaphylaxis May repeat one time in 5-15 minutes if response to initial dose is inadequate., Disp: 2 each, Rfl: 0    famotidine (PEPCID) 40 MG tablet, Take 2 tablets (80 mg) by mouth 2 times daily as needed for heartburn, Disp: 360 tablet, Rfl: 1    HYDROcodone-acetaminophen (NORCO)  MG per tablet, Take 1-2 tablets by mouth 3 times daily as needed for pain, Disp: 180 tablet, Rfl: 0    losartan-hydrochlorothiazide (HYZAAR) 100-12.5 MG tablet, Take 1 tablet by mouth daily, Disp: 90 tablet, Rfl: 3    methocarbamol (ROBAXIN) 500 MG tablet, TAKE 1/2 TABLET TO 2 TABLETS BY ORAL ROUTE 4 TIMES EVERY DAY AS NEEDED FOR MUSCLE SPASMS, Disp: , Rfl:      Allergies:   Vancomycin, Peanut oil, Tree nuts [nuts], Erythromycin, Other environmental allergy, Pollen extracts [pollen extract], Zosyn [piperacillin-tazobactam in dex], Latex, and Oxycodone    Objective:       Physical Exam  144.2 kg (318 lb)     Body mass index is 44.35 kg/m .  BP (!) 148/94 (BP Location: Right arm, Patient Position: Sitting, Cuff Size: Adult Large)   Pulse 66   Resp 16   Wt 144.2 kg (318 lb)   BMI 44.35 kg/m      General Appearance:   Awake, Alert, No acute distress.   HEENT:  Pupil equal, reactive to light. No scleral icterus; the mucous membranes were moist. No oral ulcers or thrush.    Neck: No cervical bruits. No JVD. No thyromegaly. No lymph node enlargement or tenderness.   Chest: The spine was straight. The chest was symmetric.   Lungs:   Respirations unlabored. Lungs are clear to auscultation. No crackles. No wheezing.   Cardiovascular:   RRR, normal first and second heart sounds with no murmurs. No rubs or gallops.    Abdomen:  Obese. Soft. No tenderness. Non-distended. Bowels sounds are present   Extremities: Right leg lymphoedema. No edema in left leg.   Skin: No rashes or ulcers. Warm, Dry.   Musculoskeletal: No tenderness. No deformity.   Neurologic: Mood and affect are appropriate. No focal deficits.         EKG:  Personally reivewed  Sinus bradycardia   Nonspecific T wave abnormality   Abnormal ECG   When compared with ECG of 02-NOV-2022 14:46,   Right bundle branch block is no longer Present     Cardiac Imaging Studies  ECHO on 10-:  Left ventricle is moderately dilated, moderate global hypokinesis, LVEF of 40%, normal right ventricle size and function, no obvious valvular disease.    ECHO on 8-:  1. Left ventricular size, wall thickness, and systolic function are normal.  The estimated left ventricular ejection fraction is 55%.  2. Right ventricular size and systolic function are normal.  3. No hemodynamically significant valvular abnormalities.  4. No prior study available for comparison.    Stress cardiac MRI on 6-6-2022:  1.  Pharmacological Regadenoson stress cardiac MRI is negative for inducible myocardial ischemia.   2.  No evidence of myocardial infarction,  focal or diffuse nonvascular scarring or fibrosis, or infiltrative disease.  3.  Left ventricular size is mildly enlarged. Wall thickness and systolic function are normal. The quantified left ventricular ejection fraction is 57%.    4.  Right ventricular size is mildly enlarged. Systolic function is mildly reduced.  The quantified right ventricular ejection fraction is 46%.  5.  Mild left atrial enlargement.  6.  No significant valvular abnormalities.    Lab Review   Lab Results   Component Value Date     11/17/2021    CO2 20 11/17/2021    BUN 8 11/17/2021     Lab Results   Component Value Date    WBC 3.7 11/17/2021    HGB 13.1 11/17/2021    HCT 41.0 11/17/2021    MCV 82 11/17/2021     11/17/2021           Thank you for allowing me to participate in the care of your patient.    Sincerely,   Yadiel Cantrell MD   [facility]  cc: No referring provider defined for this encounter.

## 2023-08-31 ENCOUNTER — ANCILLARY PROCEDURE (OUTPATIENT)
Dept: GENERAL RADIOLOGY | Facility: CLINIC | Age: 43
End: 2023-08-31
Attending: PODIATRIST
Payer: COMMERCIAL

## 2023-08-31 ENCOUNTER — OFFICE VISIT (OUTPATIENT)
Dept: PODIATRY | Facility: CLINIC | Age: 43
End: 2023-08-31
Payer: COMMERCIAL

## 2023-08-31 VITALS
HEART RATE: 87 BPM | SYSTOLIC BLOOD PRESSURE: 170 MMHG | WEIGHT: 313 LBS | BODY MASS INDEX: 43.65 KG/M2 | DIASTOLIC BLOOD PRESSURE: 108 MMHG

## 2023-08-31 DIAGNOSIS — S93.602A SPRAIN OF LEFT FOOT, INITIAL ENCOUNTER: ICD-10-CM

## 2023-08-31 DIAGNOSIS — S93.602A SPRAIN OF LEFT FOOT, INITIAL ENCOUNTER: Primary | ICD-10-CM

## 2023-08-31 DIAGNOSIS — S93.402A SPRAIN OF LEFT ANKLE, UNSPECIFIED LIGAMENT, INITIAL ENCOUNTER: ICD-10-CM

## 2023-08-31 PROCEDURE — 73630 X-RAY EXAM OF FOOT: CPT | Mod: TC | Performed by: RADIOLOGY

## 2023-08-31 PROCEDURE — 99213 OFFICE O/P EST LOW 20 MIN: CPT | Performed by: PODIATRIST

## 2023-08-31 PROCEDURE — 73610 X-RAY EXAM OF ANKLE: CPT | Mod: TC | Performed by: RADIOLOGY
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
## 2023-09-28 ENCOUNTER — TRANSFERRED RECORDS (OUTPATIENT)
Dept: HEALTH INFORMATION MANAGEMENT | Facility: CLINIC | Age: 43
End: 2023-09-28
Payer: COMMERCIAL

## 2023-10-04 DIAGNOSIS — R59.1 LYMPHADENOPATHY: Primary | ICD-10-CM

## 2023-10-31 ENCOUNTER — TRANSFERRED RECORDS (OUTPATIENT)
Dept: HEALTH INFORMATION MANAGEMENT | Facility: CLINIC | Age: 43
End: 2023-10-31
Payer: COMMERCIAL

## 2023-11-30 ENCOUNTER — TRANSFERRED RECORDS (OUTPATIENT)
Dept: HEALTH INFORMATION MANAGEMENT | Facility: CLINIC | Age: 43
End: 2023-11-30
Payer: COMMERCIAL

## 2024-01-26 ENCOUNTER — TRANSFERRED RECORDS (OUTPATIENT)
Dept: HEALTH INFORMATION MANAGEMENT | Facility: CLINIC | Age: 44
End: 2024-01-26
Payer: COMMERCIAL

## 2024-02-21 ENCOUNTER — OFFICE VISIT (OUTPATIENT)
Dept: FAMILY MEDICINE | Facility: CLINIC | Age: 44
End: 2024-02-21
Payer: COMMERCIAL

## 2024-02-21 VITALS
OXYGEN SATURATION: 97 % | RESPIRATION RATE: 12 BRPM | HEART RATE: 80 BPM | WEIGHT: 313.9 LBS | BODY MASS INDEX: 43.94 KG/M2 | HEIGHT: 71 IN | DIASTOLIC BLOOD PRESSURE: 95 MMHG | SYSTOLIC BLOOD PRESSURE: 158 MMHG | TEMPERATURE: 97.8 F

## 2024-02-21 DIAGNOSIS — Z91.010 PEANUT ALLERGY: ICD-10-CM

## 2024-02-21 DIAGNOSIS — Z76.89 ENCOUNTER FOR WEIGHT MANAGEMENT: ICD-10-CM

## 2024-02-21 DIAGNOSIS — M25.572 PAIN IN JOINT, ANKLE AND FOOT, LEFT: Primary | ICD-10-CM

## 2024-02-21 DIAGNOSIS — K25.9 GASTRIC ULCER, UNSPECIFIED CHRONICITY, UNSPECIFIED WHETHER GASTRIC ULCER HEMORRHAGE OR PERFORATION PRESENT: ICD-10-CM

## 2024-02-21 PROCEDURE — 99214 OFFICE O/P EST MOD 30 MIN: CPT | Performed by: FAMILY MEDICINE

## 2024-02-21 RX ORDER — EPINEPHRINE 0.3 MG/.3ML
0.3 INJECTION SUBCUTANEOUS PRN
Qty: 2 EACH | Refills: 0 | Status: SHIPPED | OUTPATIENT
Start: 2024-02-21

## 2024-02-21 RX ORDER — FAMOTIDINE 40 MG/1
80 TABLET, FILM COATED ORAL 2 TIMES DAILY PRN
Qty: 360 TABLET | Refills: 1 | Status: CANCELLED | OUTPATIENT
Start: 2024-02-21

## 2024-02-21 RX ORDER — FAMOTIDINE 40 MG/1
40 TABLET, FILM COATED ORAL 2 TIMES DAILY PRN
Qty: 360 TABLET | Refills: 1 | Status: SHIPPED | OUTPATIENT
Start: 2024-02-21 | End: 2024-08-29

## 2024-02-21 ASSESSMENT — PAIN SCALES - GENERAL: PAINLEVEL: EXTREME PAIN (9)

## 2024-02-21 NOTE — PATIENT INSTRUCTIONS - HE
I would recommend the following providers here at the [facility], if you would like to establish care with a new primary care provider:     -Yu Cazares DO (family medicine)   -Ritu Wright MD (internal medicine/pediatrics)  -Daria Chen CNP (internal medicine)  -SIM Gibbs (internal medicine)  -Louis Frye MD (family medicine)    I would be happy to see you in the future, though I will be floating [place] clinics [place], based on the needs of the various clinics. Because of that, I don't have a panel of patients for whom I provide continuity primary care.   
no

## 2024-02-21 NOTE — ASSESSMENT & PLAN NOTE
Ongoing left ankle pain due to high sprain last July' 23  Normal x-ray  Patient concerned with ligament damage    Plan:   Ankle MRI    Mild - moderate joint chondromalacia ( which is a disorder of the  cartilage coating of the articular surfaces of the bone with swelling , and tendon inflammation.     Plan:   Orthopedic referral

## 2024-02-21 NOTE — PROGRESS NOTES
"  Assessment & Plan   Problem List Items Addressed This Visit       Gastric ulcer, unspecified chronicity, unspecified whether gastric ulcer hemorrhage or perforation present    Relevant Medications    famotidine (PEPCID) 40 MG tablet    Peanut allergy    Relevant Medications    EPINEPHrine (ANY BX GENERIC EQUIV) 0.3 MG/0.3ML injection 2-pack    Pain in joint, ankle and foot, left - Primary     Ongoing left ankle pain due to high sprain last July' 23  Normal x-ray  Patient concerned with ligament damage    Plan:   Ankle MRI    Mild - moderate joint chondromalacia ( which is a disorder of the  cartilage coating of the articular surfaces of the bone with swelling , and tendon inflammation.     Plan:   Orthopedic referral          Relevant Orders    Ankle/Foot Bracing Supplies Order Ankle Brace; Left    MR Ankle Left w/o Contrast (Completed)    Orthopedic  Referral    Encounter for weight management     Unable to lose weight with diet and exercise alone    Plan:   Discussed semaglutide for weight management  Follow up in a month to reassess.            Relevant Medications    semaglutide (OZEMPIC) 2 MG/3ML pen               BMI  Estimated body mass index is 43.78 kg/m  as calculated from the following:    Height as of this encounter: 1.803 m (5' 11\").    Weight as of this encounter: 142.4 kg (313 lb 14.4 oz).             Subjective   Romeo is a 43 year old, presenting for the med check and follow-up of ongoing left ankle pain due to high sprain injury last July' 23.  Supportive management have failed to produce improvement and continues to have progressively worsening pain concern with ligament damage or internal derangement.  He is supporting ankle with a ankle brace, requesting another as a backup.    He is also interested in medical weight management and weight loss, unable to lose weight with diet and exercise alone.         2/21/2024     3:02 PM   Additional Questions   Roomed by ARCENIO Means " "  Accompanied by NA     History of Present Illness       Reason for visit:  Refills and referal    He eats 2-3 servings of fruits and vegetables daily.He consumes 1 sweetened beverage(s) daily.He exercises with enough effort to increase his heart rate 20 to 29 minutes per day.  He exercises with enough effort to increase his heart rate 3 or less days per week.   He is taking medications regularly.             Objective    BP (!) 158/95 (BP Location: Right arm, Patient Position: Sitting, Cuff Size: Adult Large)   Pulse 80   Temp 97.8  F (36.6  C) (Oral)   Resp 12   Ht 1.803 m (5' 11\")   Wt 142.4 kg (313 lb 14.4 oz)   SpO2 97%   BMI 43.78 kg/m    Body mass index is 43.78 kg/m .    Physical Exam   Left ankle supported with Velcro brace            Signed Electronically by: Chely Frye MD    "

## 2024-02-21 NOTE — ASSESSMENT & PLAN NOTE
Unable to lose weight with diet and exercise alone    Plan:   Discussed semaglutide for weight management  Follow up in a month to reassess.

## 2024-02-23 ENCOUNTER — TRANSFERRED RECORDS (OUTPATIENT)
Dept: HEALTH INFORMATION MANAGEMENT | Facility: CLINIC | Age: 44
End: 2024-02-23
Payer: COMMERCIAL

## 2024-03-07 ENCOUNTER — HOSPITAL ENCOUNTER (OUTPATIENT)
Dept: MRI IMAGING | Facility: HOSPITAL | Age: 44
Discharge: HOME | End: 2024-03-07
Attending: FAMILY MEDICINE | Admitting: FAMILY MEDICINE
Payer: COMMERCIAL

## 2024-03-07 DIAGNOSIS — M25.572 PAIN IN JOINT, ANKLE AND FOOT, LEFT: ICD-10-CM

## 2024-03-07 PROCEDURE — 73721 MRI JNT OF LWR EXTRE W/O DYE: CPT | Mod: LT

## 2024-03-09 ENCOUNTER — HEALTH MAINTENANCE LETTER (OUTPATIENT)
Age: 44
End: 2024-03-09

## 2024-03-15 ENCOUNTER — OFFICE VISIT (OUTPATIENT)
Dept: PODIATRY | Facility: CLINIC | Age: 44
End: 2024-03-15
Payer: COMMERCIAL

## 2024-03-15 VITALS — SYSTOLIC BLOOD PRESSURE: 132 MMHG | HEIGHT: 71 IN | DIASTOLIC BLOOD PRESSURE: 82 MMHG | BODY MASS INDEX: 43.78 KG/M2

## 2024-03-15 DIAGNOSIS — M25.572 PAIN IN JOINT, ANKLE AND FOOT, LEFT: Primary | ICD-10-CM

## 2024-03-15 DIAGNOSIS — M76.62 ACHILLES TENDINITIS, LEFT LEG: ICD-10-CM

## 2024-03-15 DIAGNOSIS — M94.272 CHONDROMALACIA OF LEFT ANKLE: ICD-10-CM

## 2024-03-15 DIAGNOSIS — G89.4 CHRONIC PAIN SYNDROME: ICD-10-CM

## 2024-03-15 DIAGNOSIS — M72.2 PLANTAR FASCIITIS, LEFT: ICD-10-CM

## 2024-03-15 DIAGNOSIS — M76.822 POSTERIOR TIBIAL TENDINITIS, LEFT: ICD-10-CM

## 2024-03-15 PROCEDURE — 99243 OFF/OP CNSLTJ NEW/EST LOW 30: CPT | Performed by: PODIATRIST

## 2024-03-15 NOTE — PROGRESS NOTES
[place] PODIATRY/FOOT & ANKLE SURGERY    ASSESSMENT:  Encounter Diagnoses   Name Primary?    Pain in joint, ankle and foot, left Yes    Achilles tendinitis, left leg     Posterior tibial tendinitis, left     Chondromalacia of left ankle     Plantar fasciitis, left     Chronic pain syndrome      MEDICAL DECISION MAKING:  I reviewed the 2 previous ankle x-ray reports and left foot x-ray report.  No acute findings.  I personally reviewed the x-ray images as well.  No acute findings involving the foot.  The left ankle joint is congruent without any medial medial widening.  No fractures.    His left ankle pain is multifactorial and complicated by his chronic pain syndrome.  Likely contributing to his pain includes posterior tibial tendinitis, insertional Achilles tendinitis, plantar fasciitis, chondromalacia/early arthritis of the ankle.    I reviewed the MRI results with him and correlated to his foot and ankle.  There is nothing found on MRI to suggest an ankle syndesmotic injury.  The MRI did not find any occult fractures.    No indication for any surgical intervention.    Recommendations:  Tri-Lock ankle brace with foot strap medial  Prescription for custom orthoses  Referral to physical therapy  We discussed the option of future steroid injection into the ankle joint.  If pain persist, will need to discuss with his pain clinic.    Romeo reports that he is getting  in August and is hoping to have less pain.    Disclaimer: This note consists of symbols derived from keyboarding, dictation and/or voice recognition software. As a result, there may be errors in the script that have gone undetected. Please consider this when interpreting information found in this chart.    Henri Law DPM, FACFAS, MS    [facility] Department of Podiatry/Foot & Ankle Surgery      ____________________________________________________________________    HPI:       I was asked by Chely Frye MD  to evaluate Romeo Chong in  "consultation for left foot and ankle pain.     Romeo describes an injury that occurred in July 2023.  He initially was evaluated by my partner Dr. Lopez.  Apparently fell down 2 flights of stairs.  Although pain is not as intense as it was initially, pain is persisting to date.  Romeo reports that he sustained a high ankle sprain.  He reports that surgery was discussed and that his ankle is too \"far gone\" for physical therapy  He is currently using a lace up ankle brace.  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 Discovered in right tibia and femur at 13 years old.  He had surgery when  he was 14 years old.   Last Assessment & Plan:  Formatting of this note might be different from the original. Diagnosed at 13, 14 surgeries since that time   Formatting of this note might be different from the original. Formatting of this note might be different     Gastric ulcer, unspecified chronicity, unspecified whether gastric ulcer hemorrhage or perforation present 5/6/2021    H/O Hodgkin's lymphoma 5/6/2021    Hyperlipidemia     Hypertension     Hypertriglyceridemia 5/8/2011    Severe sepsis with acute organ dysfunction due to group B Streptococcus (H) 10/18/2021    Stress hyperglycemia 4/26/2009   *  *  Past Surgical History:   Procedure Laterality Date    FRACTURE SURGERY      HC REMOVAL HEEL SPUR, CALCANEUS Left 6/25/2021    Procedure: EXCISION, BONE SPUR, FOOT, WITH PLANTAR FASCIA RELEASE;  Surgeon: Stephon Singleton DPM;  Location: [facility];  Service: Podiatry    TONSILLECTOMY, ADENOIDECTOMY ADULT, COMBINED     *  *  Current Outpatient Medications   Medication Sig Dispense Refill    carvedilol (COREG) 12.5 MG tablet Take 1 " "tablet (12.5 mg) by mouth 2 times daily (with meals) 180 tablet 4    EPINEPHrine (ANY BX GENERIC EQUIV) 0.3 MG/0.3ML injection 2-pack Inject 0.3 mLs (0.3 mg) into the muscle as needed for anaphylaxis May repeat one time in 5-15 minutes if response to initial dose is inadequate. 2 each 0    famotidine (PEPCID) 40 MG tablet Take 1 tablet (40 mg) by mouth 2 times daily as needed for heartburn 360 tablet 1    HYDROcodone-acetaminophen (NORCO)  MG per tablet Take 1-2 tablets by mouth 3 times daily as needed for pain 180 tablet 0    losartan-hydrochlorothiazide (HYZAAR) 100-12.5 MG tablet Take 1 tablet by mouth daily 90 tablet 3    methocarbamol (ROBAXIN) 500 MG tablet TAKE 1/2 TABLET TO 2 TABLETS BY ORAL ROUTE 4 TIMES EVERY DAY AS NEEDED FOR MUSCLE SPASMS      semaglutide (OZEMPIC) 2 MG/3ML pen Inject 0.5 mg Subcutaneous every 7 days 3 mL 0         EXAM:    Vitals: /82   Ht 1.803 m (5' 11\")   BMI 43.78 kg/m    BMI: Body mass index is 43.78 kg/m .    Constitutional:  Romeo Chong is in no apparent distress, appears well-nourished.  Cooperative with history and physical exam.      Vascular:  Pedal pulses are palpable for both the DP and PT arteries.  CFT < 3 sec.  No edema.      Neuro: Light touch sensation is intact to the L4, L5, S1 distributions  No evidence of weakness, spasticity, or contracture in the lower extremities.     Derm: Normal texture and turgor.  No erythema, ecchymosis, or cyanosis.  No open lesions.     Musculoskeletal:    Lower extremity muscle strength is normal. No gross deformities.  He has generalized pain on palpatory exam or on the left ankle.  Anterior, medial, lateral and near the Achilles insertion.  There is pain on palpation to the plantar medial heel and along the plantar fascial band.  Pain on palpation over the left posterior tibial tendon and with inversion of the foot against resistance.  Pain with stressing the ankle syndesmosis.  Pes planus.      EXAM: MR ANKLE LEFT " W/O CONTRAST  LOCATION: [facility]  DATE: 3/7/2024     INDICATION: Pain in joint, ankle and foot, left.  COMPARISON: Left ankle radiographic exam 08/31/2023.  TECHNIQUE: Unenhanced.     FINDINGS:      TENDONS:   -Peroneal: Peroneus longus and brevis tendons are intact. No tendinopathy or tenosynovitis. No subluxation.  -Medial: Mild posterior tibialis tendinopathy with mild tenosynovitis. Flexor digitorum longus and flexor hallucis longus tendons are normal. No tenosynovitis.  -Anterior: Anterior tibialis, extensor hallucis longus, and extensor digitorum longus tendons are normal. No tenosynovitis.  -Achilles: Distal Achilles tendinopathy in the avascular zone. No discrete tear. No retrocalcaneal bursitis.     LIGAMENTS:   -Anterior talofibular ligament: Intact.   -Calcaneofibular ligament: Intact.   -Posterior talofibular ligament: Intact.  -Syndesmotic inferior tibiofibular ligaments: Intact.  -Deltoid ligament complex: Intact.  -Spring ligament complex: Intact.     JOINTS AND BONES:   -Mild - moderate tibiotalar chondromalacia particularly along the lateral ankle joint. Subchondral marrow edema particularly along the posterior distal tibia and medial malleolus. Subcortical cystic change posterior tibial plafond. Trace ankle and   subtalar joint fluid. Mild calcaneocuboid chondromalacia with small joint effusion. No advanced midfoot arthrosis. Small heel spur.     SOFT TISSUES:  -Plantar fascia: Intact. No acute fasciitis or tear.  -Sinus tarsi and tarsal tunnel: Preserved sinus tarsi fat signal. No space-occupying mass is seen along the course of the tarsal tunnel.  -Muscles: Largely preserved intrinsic muscle bulk. No high-grade muscle strain. No drainable fluid collection.                                                                      IMPRESSION:  1.  Mild - moderate tibiotalar chondromalacia, particularly laterally. Reactive edema like marrow signal intensity within the posterior  and medial distal tibia.  2.  Mild posterior tibialis tendinopathy with mild tenosynovitis.  3.  Mild distal Achilles tendinopathy without discrete tear.  4.  No acute left ankle ligament injury or occult fracture.

## 2024-03-15 NOTE — PATIENT INSTRUCTIONS
Thank you for choosing [facility] Podiatry / Foot & Ankle Surgery!    DR. JACKSON'S CLINIC LOCATIONS:     [facility] TRIAGE LINE: [phone]   [address] [place] [address] APPOINTMENTS: [phone]   [place] [address] RADIOLOGY: [phone]   (Every other Tues - Wed - Fri PM) SET UP SURGERY: [phone]    PHYSICAL THERAPY: [phone]   [place] BILLING QUESTIONS: [phone]   [address] FAX: [phone]   [place] [address]    (Thurs & Fri AM)       [place] [facility]THOT[facility] LOCATIONS  [facility]- [address]  [place] [address]  Phone: [phone]  Fax: [phone] [facility]   [address]  [place] [address]  Phone: [phone]  Fax: [phone]   [facility] [place]  [address]  [place] [address]  Phone: [phone]  Fax: [phone] [facility]  [address]  [place] [address]  Phone: [phone]   Fax: [phone]   * Please call any location listed to make an appointment for a casting/fitting. Your referral was sent to their central office and they will all have the order on file.

## 2024-03-15 NOTE — LETTER
3/15/2024         RE: Romeo Chong  [facility] [facility] [address] [facility] [address]        Dear Colleague,    Thank you for referring your patient, Romeo Chong, to the [facility]. Please see a copy of my visit note below.    [place] PODIATRY/FOOT & ANKLE SURGERY    ASSESSMENT:  Encounter Diagnoses   Name Primary?     Pain in joint, ankle and foot, left Yes     Achilles tendinitis, left leg      Posterior tibial tendinitis, left      Chondromalacia of left ankle      Plantar fasciitis, left      Chronic pain syndrome      MEDICAL DECISION MAKING:  I reviewed the 2 previous ankle x-ray reports and left foot x-ray report.  No acute findings.  I personally reviewed the x-ray images as well.  No acute findings involving the foot.  The left ankle joint is congruent without any medial medial widening.  No fractures.    His left ankle pain is multifactorial and complicated by his chronic pain syndrome.  Likely contributing to his pain includes posterior tibial tendinitis, insertional Achilles tendinitis, plantar fasciitis, chondromalacia/early arthritis of the ankle.    I reviewed the MRI results with him and correlated to his foot and ankle.  There is nothing found on MRI to suggest an ankle syndesmotic injury.  The MRI did not find any occult fractures.    No indication for any surgical intervention.    Recommendations:  Tri-Lock ankle brace with foot strap medial  Prescription for custom orthoses  Referral to physical therapy  We discussed the option of future steroid injection into the ankle joint.  If pain persist, will need to discuss with his pain clinic.    Romeo reports that he is getting  in August and is hoping to have less pain.    Disclaimer: This note consists of symbols derived from keyboarding, dictation and/or voice recognition software. As a result, there may be errors in the script that have gone undetected. Please consider this when interpreting information  "found in this chart.    Henri Law, TASHA, FACFAS, MS    [facility] Department of Podiatry/Foot & Ankle Surgery      ____________________________________________________________________    HPI:       I was asked by Chely Frye MD  to evaluate Romeo Chong in consultation for left foot and ankle pain.     Romeo describes an injury that occurred in July 2023.  He initially was evaluated by my partner Dr. Lopez.  Apparently fell down 2 flights of stairs.  Although pain is not as intense as it was initially, pain is persisting to date.  Romeo reports that he sustained a high ankle sprain.  He reports that surgery was discussed and that his ankle is too \"far gone\" for physical therapy  He is currently using a lace up ankle brace.  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 Discovered in right tibia and femur at 13 years old.  He had surgery when  he was 14 years old.   Last Assessment & Plan:  Formatting of this note might be different from the original. Diagnosed at 13, 14 surgeries since that time   Formatting of this note might be different from the original. Formatting of this note might be different      Gastric ulcer, unspecified chronicity, unspecified whether gastric ulcer hemorrhage or perforation present 5/6/2021     H/O Hodgkin's lymphoma 5/6/2021     Hyperlipidemia      Hypertension      Hypertriglyceridemia 5/8/2011     Severe sepsis with acute organ dysfunction due to group B Streptococcus (H) 10/18/2021     Stress hyperglycemia 4/26/2009   *  *  Past Surgical History:   Procedure Laterality Date     FRACTURE SURGERY       HC REMOVAL HEEL SPUR, CALCANEUS Left 6/25/2021    Procedure: EXCISION, BONE SPUR, FOOT, " "WITH PLANTAR FASCIA RELEASE;  Surgeon: Stephon Singleton DPM;  Location: [facility];  Service: Podiatry     TONSILLECTOMY, ADENOIDECTOMY ADULT, COMBINED     *  *  Current Outpatient Medications   Medication Sig Dispense Refill     carvedilol (COREG) 12.5 MG tablet Take 1 tablet (12.5 mg) by mouth 2 times daily (with meals) 180 tablet 4     EPINEPHrine (ANY BX GENERIC EQUIV) 0.3 MG/0.3ML injection 2-pack Inject 0.3 mLs (0.3 mg) into the muscle as needed for anaphylaxis May repeat one time in 5-15 minutes if response to initial dose is inadequate. 2 each 0     famotidine (PEPCID) 40 MG tablet Take 1 tablet (40 mg) by mouth 2 times daily as needed for heartburn 360 tablet 1     HYDROcodone-acetaminophen (NORCO)  MG per tablet Take 1-2 tablets by mouth 3 times daily as needed for pain 180 tablet 0     losartan-hydrochlorothiazide (HYZAAR) 100-12.5 MG tablet Take 1 tablet by mouth daily 90 tablet 3     methocarbamol (ROBAXIN) 500 MG tablet TAKE 1/2 TABLET TO 2 TABLETS BY ORAL ROUTE 4 TIMES EVERY DAY AS NEEDED FOR MUSCLE SPASMS       semaglutide (OZEMPIC) 2 MG/3ML pen Inject 0.5 mg Subcutaneous every 7 days 3 mL 0         EXAM:    Vitals: /82   Ht 1.803 m (5' 11\")   BMI 43.78 kg/m    BMI: Body mass index is 43.78 kg/m .    Constitutional:  Romeo Chong is in no apparent distress, appears well-nourished.  Cooperative with history and physical exam.      Vascular:  Pedal pulses are palpable for both the DP and PT arteries.  CFT < 3 sec.  No edema.      Neuro: Light touch sensation is intact to the L4, L5, S1 distributions  No evidence of weakness, spasticity, or contracture in the lower extremities.     Derm: Normal texture and turgor.  No erythema, ecchymosis, or cyanosis.  No open lesions.     Musculoskeletal:    Lower extremity muscle strength is normal. No gross deformities.  He has generalized pain on palpatory exam or on the left ankle.  Anterior, medial, lateral and near the Achilles " insertion.  There is pain on palpation to the plantar medial heel and along the plantar fascial band.  Pain on palpation over the left posterior tibial tendon and with inversion of the foot against resistance.  Pain with stressing the ankle syndesmosis.  Pes planus.      EXAM: MR ANKLE LEFT W/O CONTRAST  LOCATION: [facility]  DATE: 3/7/2024     INDICATION: Pain in joint, ankle and foot, left.  COMPARISON: Left ankle radiographic exam 08/31/2023.  TECHNIQUE: Unenhanced.     FINDINGS:      TENDONS:   -Peroneal: Peroneus longus and brevis tendons are intact. No tendinopathy or tenosynovitis. No subluxation.  -Medial: Mild posterior tibialis tendinopathy with mild tenosynovitis. Flexor digitorum longus and flexor hallucis longus tendons are normal. No tenosynovitis.  -Anterior: Anterior tibialis, extensor hallucis longus, and extensor digitorum longus tendons are normal. No tenosynovitis.  -Achilles: Distal Achilles tendinopathy in the avascular zone. No discrete tear. No retrocalcaneal bursitis.     LIGAMENTS:   -Anterior talofibular ligament: Intact.   -Calcaneofibular ligament: Intact.   -Posterior talofibular ligament: Intact.  -Syndesmotic inferior tibiofibular ligaments: Intact.  -Deltoid ligament complex: Intact.  -Spring ligament complex: Intact.     JOINTS AND BONES:   -Mild - moderate tibiotalar chondromalacia particularly along the lateral ankle joint. Subchondral marrow edema particularly along the posterior distal tibia and medial malleolus. Subcortical cystic change posterior tibial plafond. Trace ankle and   subtalar joint fluid. Mild calcaneocuboid chondromalacia with small joint effusion. No advanced midfoot arthrosis. Small heel spur.     SOFT TISSUES:  -Plantar fascia: Intact. No acute fasciitis or tear.  -Sinus tarsi and tarsal tunnel: Preserved sinus tarsi fat signal. No space-occupying mass is seen along the course of the tarsal tunnel.  -Muscles: Largely preserved  intrinsic muscle bulk. No high-grade muscle strain. No drainable fluid collection.                                                                      IMPRESSION:  1.  Mild - moderate tibiotalar chondromalacia, particularly laterally. Reactive edema like marrow signal intensity within the posterior and medial distal tibia.  2.  Mild posterior tibialis tendinopathy with mild tenosynovitis.  3.  Mild distal Achilles tendinopathy without discrete tear.  4.  No acute left ankle ligament injury or occult fracture.      Again, thank you for allowing me to participate in the care of your patient.        Sincerely,        Henri Law DPM

## 2024-03-19 ENCOUNTER — TELEPHONE (OUTPATIENT)
Dept: FAMILY MEDICINE | Facility: CLINIC | Age: 44
End: 2024-03-19
Payer: COMMERCIAL

## 2024-03-19 NOTE — TELEPHONE ENCOUNTER
Reason for Call:  Medication Prior Auth Required    Do you use a [facility]? [place] of the pharmacy and phone number for the current request:      [id number] [place] [id number] AT [facility] [address]       Name of the medication requested: semaglutide (OZEMPIC) 2 MG/3ML pen     Other request: Patient is unable to  Rx at pharmacy.  Advised that Prior Authorization is needed and instructed to call clinic.    Last Visit with PCP = 02/21/2024    Pharmacy    Collapse Section  Covered Services    Benefit Type Insurance Type Network Plan Description Dates    Active Coverage -- In PREPAID MEDICAL ASSISTANCE PROGRAM NO COST SHARE Benefit: 01/01/2024-12/31/2024    --   Benefits    Benefit Type Network Time Period Quantity Amount/Percent    Co-Payment N/A Visit -- $0.00    Miscellaneous    Benefit Type Plan Description Time Period Amount/Percent    Non-Covered PREPAID MEDICAL ASSISTANCE PROGRAM NO COST SHARE -- --     In Plan Network? No    Benefit Date: 01/01/2024-12/31/2024        Can we leave a detailed message on this number? YES    Phone number patient can be reached at: Cell number on file:    Telephone Information:   Mobile [phone]       Best Time: any    Call taken on 3/19/2024 at 11:40 AM by Aubrie Riggins

## 2024-03-21 NOTE — TELEPHONE ENCOUNTER
Patient calling to ask about the status of his pre auth     Patient stated he called the insurance and as long as the provider signs it they would be able to go forward     Call back   Contact Information  [phone] (Mobile)

## 2024-03-26 ENCOUNTER — NURSE TRIAGE (OUTPATIENT)
Dept: NURSING | Facility: CLINIC | Age: 44
End: 2024-03-26
Payer: COMMERCIAL

## 2024-03-26 NOTE — TELEPHONE ENCOUNTER
Patient is calling on status for prior authorization for Ozempic.  Patient is requesting a call back from clinic.  Please phone patient regarding prior authorization.        Reason for Disposition   Prescription request for new medicine (not a refill)    Additional Information   Negative: [1] Intentional drug overdose AND [2] suicidal thoughts or ideas   Negative: MORE THAN A DOUBLE DOSE of a prescription or over-the-counter (OTC) drug   Negative: [1] DOUBLE DOSE (an extra dose or lesser amount) of prescription drug AND [2] any symptoms (e.g., dizziness, nausea, pain, sleepiness)   Negative: [1] DOUBLE DOSE (an extra dose or lesser amount) of over-the-counter (OTC) drug AND [2] any symptoms (e.g., dizziness, nausea, pain, sleepiness)   Negative: Took another person's prescription drug   Negative: [1] DOUBLE DOSE (an extra dose or lesser amount) of prescription drug AND [2] NO symptoms  (Exception: A double dose of antibiotics.)   Negative: Diabetes drug error or overdose (e.g., took wrong type of insulin or took extra dose)   Negative: [1] Prescription not at pharmacy AND [2] was prescribed by PCP recently (Exception: Triager has access to EMR and prescription is recorded there. Go to Home Care and confirm for pharmacy.)   Negative: [1] Pharmacy calling with prescription question AND [2] triager unable to answer question   Negative: [1] Caller has URGENT medicine question about med that PCP or specialist prescribed AND [2] triager unable to answer question   Negative: Medicine patch causing local rash or itching   Negative: [1] Caller has medicine question about med NOT prescribed by PCP AND [2] triager unable to answer question (e.g., compatibility with other med, storage)    Protocols used: Medication Question Call-A-

## 2024-03-29 NOTE — TELEPHONE ENCOUNTER
Central Prior Authorization Team   Phone: [phone]    [place] Initiation    Medication: Ozempic (0.25 or 0.5 MG/DOSE) 2MG/3ML pen-injectors  Insurance Company: [employer] - Phone [phone] Fax [phone]  Pharmacy [facility] the Rx[id number] [place], [place] - [facility] [address] AT [facility] [address]  Filling Pharmacy Phone: [phone]  Filling Pharmacy Fax:    Start Date: 3/29/2024

## 2024-04-01 NOTE — TELEPHONE ENCOUNTER
Patient calling to check the status of his pre Auth     Prior Auth for Ozempic was declined on 4/1/2024    Patient is seeing if there is another mediation like Ozempic that could be sent through insurance with out Prior Auth  -He would like this for a first option     If the provider believes appealing and having the appeal sent in would be worth it and could help the patient faster he would like a call back to discuss that option     Patient looking for call back ASAP to discuss as he waited for the prior Auth for awhile and it was denied. Doesn't know how to go further      Contact Information  [phone] (Mobile)

## 2024-04-01 NOTE — TELEPHONE ENCOUNTER
PRIOR AUTHORIZATION DENIED    Medication: Ozempic (0.25 or 0.5 MG/DOSE) 2MG/3ML pen-injectors    Denial Date: 4/1/2024    Denial Rational:     Document: Decision Notes: Our prior authorization criteria for [employer]erred [employer]in Mim[employer]-Enhancers have not been met. From the records that we have received, Ozempic injection was denied for these reasons:   1) Records do not show you have type 2 diabetes mellitus. This is a health issue where your blood sugar is too high.   2) This drug is being used for weight loss treatment. This is not an approved use. Since the criteria have not been met, we are not able to approve. Please look at our list of covered drugs, also known as the formulary, to see what is covered. Prior authorization and quantity limits may apply to covered drugs.       Appeal Information: Review the plan's reasons for denial listed above. Please utilize that information to complete letter and provide specific, detailed clinical information/rationale of your patient's health status to address their denial reasons.

## 2024-04-01 NOTE — TELEPHONE ENCOUNTER
Left message that PCP is not in office until Wednesday, care team to update patient once PCP reviews.

## 2024-04-03 NOTE — TELEPHONE ENCOUNTER
Patient calling back to let the pcp know what he learned form the insurance company     Patient stated that everything that gets sent in will need a Prior Auth   -They would prefer Ozempic to be sent in if possible     Patient is hoping the provider will appeal it    Patient stated they are in need of the medication due to his pain in foot and ankle. This has had an effect on his ability to workout and lose weight     Patient also stated he has lymphedema but is cancer free     If provider unwilling to appeal this what are some Alternatives outside surgrey and Working out due to ankle     Patient stated he needs the medication till he is healed and workout again     Call back    Contact Information  [phone] (Mobile)

## 2024-04-03 NOTE — TELEPHONE ENCOUNTER
LMTCB Please notify patient that PCP won't be writing an appeal for denied PA. Also PCP states there are no alternatives for him for weight loss.    Any questions please advise patient to call us at [phone].    Thank you   Ela KOVACS

## 2024-04-03 NOTE — TELEPHONE ENCOUNTER
Please see earlier message about declined coverage.   Ozempic prescribed for weight loss is not covered by the plan.   PA is already declined

## 2024-04-03 NOTE — TELEPHONE ENCOUNTER
Spoke with patient and relayed that an appeal will not be written due to below given reasons why PA was denied. Writer advised patient to call his insurance to see what alternatives for weight loss medications would be covered and then call us back.    He verbalized understanding.

## 2024-04-04 NOTE — TELEPHONE ENCOUNTER
Patient Returning Call    Reason for call:  returning call to clinic    Information relayed to patient:  relayed message    Patient has additional questions:  No

## 2024-04-19 ENCOUNTER — TRANSFERRED RECORDS (OUTPATIENT)
Dept: HEALTH INFORMATION MANAGEMENT | Facility: CLINIC | Age: 44
End: 2024-04-19
Payer: COMMERCIAL

## 2024-04-30 ENCOUNTER — TELEPHONE (OUTPATIENT)
Dept: FAMILY MEDICINE | Facility: CLINIC | Age: 44
End: 2024-04-30
Payer: COMMERCIAL

## 2024-04-30 NOTE — TELEPHONE ENCOUNTER
General Call    Contacts         Type Contact Phone/Fax    04/30/2024 10:41 AM CDT Phone (Incoming) Romeo Chong (Self) [phone] (M)          Reason for Call:  Patient    What are your questions or concerns:  he would like to see Dr. Duenas to discuss weight management, he had an appointment on 6/13 but was canceled due to the length of the appointment time.    Could we send this information to you in [employer] or would you prefer to receive a phone call?:   Patient would prefer a phone call   Okay to leave a detailed message?: Yes at   Cell number on file:    Telephone Information:   Mobile [phone]     Yenny Moreno   [facility]  Central Scheduler

## 2024-05-17 ENCOUNTER — OFFICE VISIT (OUTPATIENT)
Dept: URGENT CARE | Facility: URGENT CARE | Age: 44
End: 2024-05-17
Payer: COMMERCIAL

## 2024-05-17 VITALS
TEMPERATURE: 97.8 F | OXYGEN SATURATION: 100 % | HEART RATE: 65 BPM | WEIGHT: 315 LBS | HEIGHT: 72 IN | BODY MASS INDEX: 42.66 KG/M2 | RESPIRATION RATE: 18 BRPM | DIASTOLIC BLOOD PRESSURE: 87 MMHG | SYSTOLIC BLOOD PRESSURE: 155 MMHG

## 2024-05-17 DIAGNOSIS — M25.532 LEFT WRIST PAIN: ICD-10-CM
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
## 2024-05-17 PROCEDURE — 99213 OFFICE O/P EST LOW 20 MIN: CPT | Performed by: NURSE PRACTITIONER

## 2024-05-17 RX ORDER — HYDROCODONE BITARTRATE AND ACETAMINOPHEN 5; 325 MG/1; MG/1
1 TABLET ORAL EVERY 6 HOURS PRN
Qty: 10 TABLET | Refills: 0 | Status: SHIPPED | OUTPATIENT
Start: 2024-05-17 | End: 2024-05-20

## 2024-05-17 ASSESSMENT — ENCOUNTER SYMPTOMS
BACK PAIN: 1
NEUROLOGICAL NEGATIVE: 1
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
## 2024-05-22 ENCOUNTER — HOSPITAL ENCOUNTER (EMERGENCY)
Facility: CLINIC | Age: 44
Discharge: HOME | End: 2024-05-22
Attending: EMERGENCY MEDICINE | Admitting: EMERGENCY MEDICINE
Payer: COMMERCIAL

## 2024-05-22 VITALS
HEART RATE: 71 BPM | OXYGEN SATURATION: 99 % | SYSTOLIC BLOOD PRESSURE: 177 MMHG | DIASTOLIC BLOOD PRESSURE: 105 MMHG | TEMPERATURE: 98 F | RESPIRATION RATE: 22 BRPM | BODY MASS INDEX: 43.4 KG/M2 | WEIGHT: 315 LBS

## 2024-05-22 DIAGNOSIS — G89.29 ACUTE EXACERBATION OF CHRONIC LOW BACK PAIN: ICD-10-CM

## 2024-05-22 DIAGNOSIS — M54.50 ACUTE EXACERBATION OF CHRONIC LOW BACK PAIN: ICD-10-CM

## 2024-05-22 PROCEDURE — 99283 EMERGENCY DEPT VISIT LOW MDM: CPT

## 2024-05-22 PROCEDURE — 250N000013 HC RX MED GY IP 250 OP 250 PS 637: Performed by: EMERGENCY MEDICINE

## 2024-05-22 PROCEDURE — 250N000012 HC RX MED GY IP 250 OP 636 PS 637: Performed by: EMERGENCY MEDICINE

## 2024-05-22 RX ORDER — TIZANIDINE 2 MG/1
2 TABLET ORAL ONCE
Status: COMPLETED | OUTPATIENT
Start: 2024-05-22 | End: 2024-05-22

## 2024-05-22 RX ORDER — HYDROMORPHONE HYDROCHLORIDE 2 MG/1
2 TABLET ORAL EVERY 6 HOURS PRN
Qty: 3 TABLET | Refills: 0 | Status: SHIPPED | OUTPATIENT
Start: 2024-05-22

## 2024-05-22 RX ORDER — HYDROMORPHONE HYDROCHLORIDE 2 MG/1
2 TABLET ORAL ONCE
Status: COMPLETED | OUTPATIENT
Start: 2024-05-22 | End: 2024-05-22

## 2024-05-22 RX ADMIN — HYDROMORPHONE HYDROCHLORIDE 2 MG: 2 TABLET ORAL at 13:16

## 2024-05-22 RX ADMIN — DEXAMETHASONE 10 MG: 2 TABLET ORAL at 13:16

## 2024-05-22 ASSESSMENT — ACTIVITIES OF DAILY LIVING (ADL)
ADLS_ACUITY_SCORE: 37

## 2024-05-22 NOTE — ED TRIAGE NOTES
L4-L5 bulging disk, bent over this morning and felt a pop, now has tingling in left leg, difficulty walking. No loss of bowel or bladder control. Scheduled for an ablation tomorrow morning in same area.

## 2024-05-22 NOTE — DISCHARGE INSTRUCTIONS
We do not recommend acute imaging for back pain.  Please use pain medication tonight and see your surgical team tomorrow.

## 2024-05-22 NOTE — ED NOTES
Patient was able to walk down the terrell with staff and noted that his pain felt omid when walking.
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
## 2024-05-23 ENCOUNTER — PATIENT OUTREACH (OUTPATIENT)
Dept: FAMILY MEDICINE | Facility: CLINIC | Age: 44
End: 2024-05-23
Payer: COMMERCIAL

## 2024-05-23 NOTE — TELEPHONE ENCOUNTER
"Patient seen in ED yesterday for back pain. Patient had an \"ablation\" today at the [facility] in [place].    Patient had questions regarding Ozempic denial, explained insurance denied the PA. Patient would like to try Wegovy instead. He would like a hypertension diagnosis added to that \"for insurance purposes.\"    Patient also has also been taking an  prescription of Metformin and said that has helped \"water weight come off\". Would like a Metformin prescription. Explained PCP might want a visit to discuss this. Patient verbalized understanding.          Transitions of Care Outreach  Chief Complaint   Patient presents with    Hospital F/U       Most Recent Admission Date: 2024   Most Recent Admission Diagnosis:      Most Recent Discharge Date: 2024   Most Recent Discharge Diagnosis: Acute exacerbation of chronic low back pain - M54.50, G89.29     Transitions of Care Assessment    Discharge Assessment  How are you doing now that you are home?: Patient had an \"ablation\" today at the [facility] in [place].  How are your symptoms? (Red Flag symptoms escalate to triage hotline per guidelines): Unchanged  Do you know how to contact your clinic care team if you have future questions or changes to your health status? : Yes  Does the patient have their discharge instructions? : Yes  Does the patient have questions regarding their discharge instructions? : No  Were you started on any new medications or were there changes to any of your previous medications? : Yes (Dilaudid)  Does the patient have all of their medications?: No (see comment) (Requesting a new prescription for Wegovy and Metformin)  Do you have questions regarding any of your medications? : Yes (see comment) (Requesting a new prescription for Wegovy and Metformin)  Do you have all of your needed medical supplies or equipment (DME)?  (i.e. oxygen tank, CPAP, cane, etc.): Yes    Follow " up Plan     Discharge Follow-Up  Discharge follow up appointment scheduled in alignment with recommended follow up timeframe or Transitions of Risk Category? (Low = within 30 days; Moderate= within 14 days; High= within 7 days): Yes  Discharge Follow Up Appointment Date: 05/24/24  Discharge Follow Up Appointment Scheduled with?: Specialty Care Provider ([facility] per patient)    Future Appointments   Date Time Provider Department Center   7/1/2024  2:00 PM Arturo Solis MD [facility]       Outpatient Plan as outlined on AVS reviewed with patient.    For any urgent concerns, please contact our 24 hour nurse triage line: [phone] ([phone])

## 2024-05-24 ENCOUNTER — TRANSFERRED RECORDS (OUTPATIENT)
Dept: HEALTH INFORMATION MANAGEMENT | Facility: CLINIC | Age: 44
End: 2024-05-24
Payer: COMMERCIAL

## 2024-05-30 ENCOUNTER — TELEPHONE (OUTPATIENT)
Dept: FAMILY MEDICINE | Facility: CLINIC | Age: 44
End: 2024-05-30

## 2024-05-30 ENCOUNTER — OFFICE VISIT (OUTPATIENT)
Dept: FAMILY MEDICINE | Facility: CLINIC | Age: 44
End: 2024-05-30
Payer: COMMERCIAL

## 2024-05-30 VITALS
HEIGHT: 72 IN | DIASTOLIC BLOOD PRESSURE: 80 MMHG | SYSTOLIC BLOOD PRESSURE: 130 MMHG | RESPIRATION RATE: 12 BRPM | HEART RATE: 68 BPM | WEIGHT: 315 LBS | TEMPERATURE: 98.1 F | OXYGEN SATURATION: 98 % | BODY MASS INDEX: 42.66 KG/M2

## 2024-05-30 DIAGNOSIS — E66.813 CLASS 3 SEVERE OBESITY WITH SERIOUS COMORBIDITY AND BODY MASS INDEX (BMI) OF 40.0 TO 44.9 IN ADULT, UNSPECIFIED OBESITY TYPE (H): Primary | ICD-10-CM

## 2024-05-30 DIAGNOSIS — I89.0 SECONDARY LYMPHEDEMA: ICD-10-CM

## 2024-05-30 DIAGNOSIS — I10 ESSENTIAL HYPERTENSION, BENIGN: ICD-10-CM

## 2024-05-30 DIAGNOSIS — Z76.89 ENCOUNTER FOR WEIGHT MANAGEMENT: Primary | ICD-10-CM

## 2024-05-30 DIAGNOSIS — R73.03 PREDIABETES: Primary | ICD-10-CM

## 2024-05-30 DIAGNOSIS — G89.29 CHRONIC LOW BACK PAIN, UNSPECIFIED BACK PAIN LATERALITY, UNSPECIFIED WHETHER SCIATICA PRESENT: ICD-10-CM

## 2024-05-30 DIAGNOSIS — E66.813 CLASS 3 SEVERE OBESITY WITH SERIOUS COMORBIDITY AND BODY MASS INDEX (BMI) OF 40.0 TO 44.9 IN ADULT, UNSPECIFIED OBESITY TYPE (H): ICD-10-CM

## 2024-05-30 DIAGNOSIS — M54.50 CHRONIC LOW BACK PAIN, UNSPECIFIED BACK PAIN LATERALITY, UNSPECIFIED WHETHER SCIATICA PRESENT: ICD-10-CM

## 2024-05-30 DIAGNOSIS — E78.5 HYPERLIPIDEMIA LDL GOAL <100: ICD-10-CM

## 2024-05-30 DIAGNOSIS — E66.01 CLASS 3 SEVERE OBESITY WITH SERIOUS COMORBIDITY AND BODY MASS INDEX (BMI) OF 40.0 TO 44.9 IN ADULT, UNSPECIFIED OBESITY TYPE (H): ICD-10-CM

## 2024-05-30 DIAGNOSIS — E66.01 CLASS 3 SEVERE OBESITY WITH SERIOUS COMORBIDITY AND BODY MASS INDEX (BMI) OF 40.0 TO 44.9 IN ADULT, UNSPECIFIED OBESITY TYPE (H): Primary | ICD-10-CM

## 2024-05-30 DIAGNOSIS — E78.1 HYPERTRIGLYCERIDEMIA: ICD-10-CM

## 2024-05-30 LAB
ALBUMIN SERPL BCG-MCNC: 4.8 G/DL (ref 3.5–5.2)
ALP SERPL-CCNC: 67 U/L (ref 40–150)
ALT SERPL W P-5'-P-CCNC: 36 U/L (ref 0–70)
ANION GAP SERPL CALCULATED.3IONS-SCNC: 10 MMOL/L (ref 7–15)
AST SERPL W P-5'-P-CCNC: 18 U/L (ref 0–45)
BILIRUB SERPL-MCNC: 0.3 MG/DL
BUN SERPL-MCNC: 8.5 MG/DL (ref 6–20)
CALCIUM SERPL-MCNC: 9.6 MG/DL (ref 8.6–10)
CHLORIDE SERPL-SCNC: 102 MMOL/L (ref 98–107)
CHOLEST SERPL-MCNC: 198 MG/DL
CREAT SERPL-MCNC: 0.64 MG/DL (ref 0.67–1.17)
CREAT UR-MCNC: 101 MG/DL
DEPRECATED HCO3 PLAS-SCNC: 28 MMOL/L (ref 22–29)
EGFRCR SERPLBLD CKD-EPI 2021: >90 ML/MIN/1.73M2
FASTING STATUS PATIENT QL REPORTED: NO
FASTING STATUS PATIENT QL REPORTED: NO
GLUCOSE SERPL-MCNC: 106 MG/DL (ref 70–99)
HBA1C MFR BLD: 6.1 % (ref 0–5.6)
HDLC SERPL-MCNC: 35 MG/DL
LDLC SERPL CALC-MCNC: 132 MG/DL
MICROALBUMIN UR-MCNC: <12 MG/L
MICROALBUMIN/CREAT UR: NORMAL MG/G{CREAT}
NONHDLC SERPL-MCNC: 163 MG/DL
POTASSIUM SERPL-SCNC: 4.4 MMOL/L (ref 3.4–5.3)
PROT SERPL-MCNC: 8 G/DL (ref 6.4–8.3)
SODIUM SERPL-SCNC: 140 MMOL/L (ref 135–145)
TRIGL SERPL-MCNC: 155 MG/DL

## 2024-05-30 PROCEDURE — 82043 UR ALBUMIN QUANTITATIVE: CPT | Performed by: FAMILY MEDICINE

## 2024-05-30 PROCEDURE — 80061 LIPID PANEL: CPT | Performed by: FAMILY MEDICINE

## 2024-05-30 PROCEDURE — 80053 COMPREHEN METABOLIC PANEL: CPT | Performed by: FAMILY MEDICINE

## 2024-05-30 PROCEDURE — 36415 COLL VENOUS BLD VENIPUNCTURE: CPT | Performed by: FAMILY MEDICINE

## 2024-05-30 PROCEDURE — 99214 OFFICE O/P EST MOD 30 MIN: CPT | Performed by: FAMILY MEDICINE

## 2024-05-30 PROCEDURE — 83036 HEMOGLOBIN GLYCOSYLATED A1C: CPT | Performed by: FAMILY MEDICINE

## 2024-05-30 PROCEDURE — 82570 ASSAY OF URINE CREATININE: CPT | Performed by: FAMILY MEDICINE

## 2024-05-30 RX ORDER — LANCETS
EACH MISCELLANEOUS
Qty: 100 EACH | Refills: 6 | Status: SHIPPED | OUTPATIENT
Start: 2024-05-30

## 2024-05-30 ASSESSMENT — PAIN SCALES - GENERAL: PAINLEVEL: MILD PAIN (3)

## 2024-05-30 NOTE — ASSESSMENT & PLAN NOTE
S/p admission to the emergency room on 5/22/2024 for acute exacerbation of chronic low back pain.  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  He underwent ablation on 5/23/2024, with ongoing pain management through the pain clinic.

## 2024-05-30 NOTE — TELEPHONE ENCOUNTER
Test Results/ Question       Who ordered the test:  PCP [facility]    Type of test: Lab and A1C    Date of test:  5/30/2024    Where was the test performed:  [facility]    What are your questions/concerns?:      With pre diabetes lab result, would wegovy be an option now?    If so     -Patient would like to go forward with that if he can       Could we send this information to you in [employer] or would you prefer to receive a phone call?:   Patient would prefer a phone call   Okay to leave a detailed message?: Yes at Cell number on file:    Telephone Information:   Mobile [phone]

## 2024-05-30 NOTE — PROGRESS NOTES
Assessment & Plan   Problem List Items Addressed This Visit       Essential hypertension, benign     Doing well on the cardiovascular meds.   Labs today   Continue current management   Call for refills in 3 months.          Relevant Orders    Albumin Random Urine Quantitative with Creat Ratio (Completed)    Hyperlipidemia LDL goal <100     Elevated LDL and low HDL,   The 10-year ASCVD risk score (Lub[place] SONG, et al., 2019) is: 2.7%  Recommending Statin (lipid lowering medication) to mitigate the risk         Obesity, unspecified    Relevant Medications    metFORMIN (GLUCOPHAGE) 500 MG tablet    Chronic low back pain, unspecified back pain laterality, unspecified whether sciatica present     S/p admission to the emergency room on 5/22/2024 for acute exacerbation of chronic low back pain.  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  He underwent ablation on 5/23/2024, with ongoing pain management through the pain clinic.         Secondary lymphedema    Relevant Orders    Lymphedema/Compression Pump Order for DME - ONLY FOR DME    Encounter for weight management - Primary     Unable to lose weight with diet and exercise alone  Ozempic not covered by the plan    Plan:   Restart Metformin per epic   Follow up in 3 month to reassess.            Relevant Medications    metFORMIN (GLUCOPHAGE) 500 MG tablet    blood glucose monitoring (NO BRAND SPECIFIED) meter device kit    blood glucose (NO BRAND SPECIFIED) test strip    thin (NO BRAND SPECIFIED) lancets    Other Relevant Orders    Comprehensive metabolic panel (BMP + Alb, Alk Phos, ALT, AST, Total. Bili, TP) (Completed)    Hemoglobin A1c (Completed)           MED REC REQUIRED  Post Medication Reconciliation Status: discharge medications reconciled and changed, per note/orders  BMI  Estimated body mass index is 44.76 kg/m  as calculated from the following:    Height as of this encounter: 1.829 m (6').    Weight  as of this encounter: 149.7 kg (330 lb).   Weight management plan: Per epic           Subjective   Romeo is a 43 year old, presenting for the following health issues:  Hospital F/U (For back pain at [facility] ED on 5/22/24.)        5/30/2024     7:53 AM   Additional Questions   Roomed by Antonio GILLIS MA   Accompanied by Self     HPI       ED/UC Followup:    Facility:  [facility] ED  Date of visit: 5/22/24  Reason for visit: Back pain  Current Status: Better but still in pain    Romeo was admitted to the emergency room on 5/22/2024 for acute exacerbation of chronic low back pain.  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  He underwent ablation on 5/23/2024, with ongoing pain management through the pain clinic.    He has chronic lymphedema, With a history of Hodgkin's lymphoma, managed with help of compression stockings but not always helpful.  He's had compression device to further help with the swelling in the past and requesting a DME order.     In addition, interested in further weight management.  Ozempic has previously been declined by the insurance and would like to retry the metformin.               Objective    /80   Pulse 68   Temp 98.1  F (36.7  C) (Oral)   Resp 12   Ht 1.829 m (6')   Wt 149.7 kg (330 lb)   SpO2 98%   BMI 44.76 kg/m    Body mass index is 44.76 kg/m .  Physical Exam               Signed Electronically by: Chely Frye MD

## 2024-05-30 NOTE — TELEPHONE ENCOUNTER
Order/Referral Request    Who is requesting: Patient     Referral being requested: referral for dietitian    Reason service is needed/diagnosis:   Hemoglobin A1C  0.0 - 5.6 % 6.1 High     Comment: Normal <5.7%  Prediabetes 5.7-6.4%    Diabetes 6.5% or higher     Has this been discussed with Provider: Yes    Does patient have a preference on a Group/Provider/Facility? No    Does patient have an appointment scheduled?: Yes: 5/30/2024    Where to send orders: Place orders within Epic    Could we send this information to you in [employer] or would you prefer to receive a phone call?:   Patient would prefer a phone call   Okay to leave a detailed message?: No at Work number on file:  There is no work phone number on file. or Cell number on file:    Telephone Information:   Mobile [phone]

## 2024-05-30 NOTE — ASSESSMENT & PLAN NOTE
Doing well on the cardiovascular meds.   Labs today   Continue current management   Call for refills in 3 months.

## 2024-05-30 NOTE — ASSESSMENT & PLAN NOTE
Unable to lose weight with diet and exercise alone  Ozempic not covered by the plan    Plan:   Restart Metformin per epic   Follow up in 3 month to reassess.

## 2024-05-31 ENCOUNTER — TELEPHONE (OUTPATIENT)
Dept: FAMILY MEDICINE | Facility: CLINIC | Age: 44
End: 2024-05-31
Payer: COMMERCIAL

## 2024-05-31 ENCOUNTER — MYC MEDICAL ADVICE (OUTPATIENT)
Dept: INTERNAL MEDICINE | Facility: CLINIC | Age: 44
End: 2024-05-31
Payer: COMMERCIAL

## 2024-05-31 DIAGNOSIS — R73.03 PREDIABETES: Primary | ICD-10-CM

## 2024-05-31 NOTE — TELEPHONE ENCOUNTER
Patient Returning Call    Reason for call:  Andres is faxing over prior authorization for this medication Semaglutide-Weight Management (WEGOVY) 0.5 MG/0.5ML pen please send back    Information relayed to patient:      Patient has additional questions:  Yes    What are your questions/concerns:  above    Who does the patient want to speak with:      Is an  needed?:  No      Could we send this information to you in [facility] or would you prefer to receive a phone call?:   No preference   Okay to leave a detailed message?: No at Other phone number:  [phone]

## 2024-06-01 NOTE — TELEPHONE ENCOUNTER
OFFICE VISIT    Patient: Ira Kapoor   : 1947 MRN: [id number]    SUBJECTIVE:  Chief Complaint   Patient presents with    Transitional Care Management    Diarrhea    fatigue    Insomnia    Shortness of Breath     The recording was initiated after a verbal consent was obtained from the patient to record this visit for documentation in their clinical record.    A 76 year old female presents for  a transitional care management.    HISTORY OF PRESENT ILLNESS:     Historian: Self.    Date of admission: 2024  Date of discharge: 2024  Admitted at: [facility]  Discharge diagnosis: ST elevation myocardial infarction (STEMI), unspecified artery, Acute systolic heart failure.  Discharge physician: Carmen DU    History of ST elevation myocardial infarction (STEMI): She was discharged on Wednesday at night 10 o'clock. She is not able to sleep and thinks he is not able to sleep and she thinks it is due to new medication. Her kids told that she was not interested in get bypass surgery or anything done. She was intubated for  Catheterization secondary to dyspnea and  multiple stents were placed in [place]. Denies chest discomfort, but has more breathing issues. Her breathing was getting worse, so she had gone to hospital and was told she had a heart attack a week before which she was not knowing. Denies any swelling. She was not given any inhaler with discharge. She is using a walker at home. Inquires about cardiology appointments. She was advised to follow up in 1-2 weeks with Dr. Harley Marte MD at [facility] in [place], and they contacted yesterday but she was told to wait until a month. She was given Plavix, Aspirin minimum a year, and was advised to get Echo, and three months. Her BP was low. Two of her BP medications were stopped, Irbesartan. She was on Bisoprolol but was replaced with metoprolol  She was not given water pill medication to make her feel to  Spoke with patient he stated he is unsure if pharmacy has the medication he will contact them and will call us back if needed.    urinate in the hospital. Her ejection fraction was 25%. She feels safe being at home. Has swellings but not more than normal and she is wearing normal shoes, and they are not too tight. Denies any blood in bowel or any bowel issues. Her last labs done on 29th May showed Potassium is normal, Magnesium is normal, her blood count is low at 8.7.     Shortness of breath: She went to [place] with trouble breathing issues. She still feels she is running a marathon as her heart is still weak. Denies wheezing, but she is coughing sometimes but not terrible and it is more like clearing her throat. She is using a wheelchair as she is not able to walk for 10 steps without a walker due to shortness of breath. She was checked for oxygen levels every time she was having shortness of breath and was told her oxygen levels were good. She was given oxygen for comfort. States nervousness, anxiousness is not affecting her breathing. She did not have any breathing tests to check for emphysema, COPD. She felt better when oxygen was given to her and even she was able to sleep well. She is sleeping on a bed with 2 pillows.     Generalized weakness: She is feeling too tired.     Oral thrush: She has got thrush in her mouth, and needs treatment for that. She has white spots in the back of the tongue and in the back of the throat, your throat is sore. She has a sore throat. States she just took Tums and thinks whitish in tongue is due to Tums.     History of tobacco use: She is not smoking cigarettes, and won't take any more cigarettes. She does not drink alcohol.    Hyperlipidemia LDL goal <100: Even she was advised to increase her Crestor dosage to 20 mg from 5 mg, and Ezetimibe was given at hospital.     Stage 3 chronic kidney disease, unspecified whether stage 3a or 3b CKD (CMD): Has stage 3 chronic kidney disease.    ST elevation myocardial infarction (STEMI), unspecified artery (CMD): Has ST elevation myocardial infarction.    History of  myocardial infarction: Has history of myocardial infarction.    Gout: She had gout in both big toes when she was in hospital. Her gout is better on right leg than the left one. Has no bruising since she came home or after taking the pick line out. States on of her arm is not good.     Denies bowel issues.     PAST MEDICAL HISTORY:  Past Medical History:   Diagnosis Date    COVID 07/27/2022    reduced dose of Paxlovid given with CC 50    FRACTURE CLOSED METATARSAL 10/10/2004    Left, fx at the base of the 5th Metatarsal    FRACTURE CLOSED RADIUS,UNSPEC (RADIUS ONLY) 05/14/1988    Bilarteral radial fractures    Hemorrhoids     History of tobacco use 05/25/2010    HYPERGLYCEMIA     Hyperlipidemia     Hypertension     Personal history of colonic polyps 08/21/2008    One hyperplastic sigmoid polyp     MEDICATIONS:  Current Outpatient Medications   Medication Sig Dispense Refill    clopidogrel (PLAVIX) 75 MG tablet Take 75 mg by mouth daily.      ezetimibe (ZETIA) 10 MG tablet Take 10 mg by mouth daily.      losartan (COZAAR) 25 MG tablet Take 12.5 mg by mouth daily.      metoPROLOL succinate (TOPROL-XL) 25 MG 24 hr tablet Take 25 mg by mouth daily.      nitroGLYCERIN (NITROSTAT) 0.4 MG sublingual tablet Place 0.4 mg under the tongue every 5 minutes as needed.      rosuvastatin (CRESTOR) 20 MG tablet Take 1 tablet by mouth daily.      nystatin (MYCOSTATIN) 697062 UNIT/ML suspension Take 5 mLs by mouth 4 times daily. Use 1 mL by mouth 4 times daily until clear 60 mL 1    albuterol 108 (90 Base) MCG/ACT inhaler Inhale 2 puffs into the lungs every 4 hours as needed for Shortness of Breath or Wheezing. 1 each 11    clindamycin (Cleocin) 300 MG capsule Take 2 capsules by mouth 1 time. 1 hour prior to procedure 16 capsule 0    Vitamin Mixture (MARTY-C PO) Take by mouth daily.      aspirin 81 MG tablet Take 1 tablet by mouth daily.       No current facility-administered medications for this visit.     ALLERGIES:  Allergies as of  2024 - Reviewed 2024   Allergen Reaction Noted    Penicillin v RASH 2008    Vicodin [hydrocodone-acetaminophen] NAUSEA 2015    Gabapentin Other (See Comments) 2014    Lipitor [atorvastatin calcium] WEAKNESS 2008    Lotrel [amlodipine besy-benazepril hcl] Cough 2008    Pneumococcal vaccine SWELLING 01/15/2013    Pravastatin sodium MYALGIA 2008    Cephalexin Other (See Comments) 10/03/2011    Lyrica Other (See Comments) 2016    Vytorin MYALGIA 2018     FAMILY HISTORY:  Family History   Problem Relation Age of Onset    Hypertension Mother     Cancer Mother         Myeloma    Hypertension Father     Cancer Father 70        Colon    Diabetes Sister         Half sister    Gastrointestinal Sister         colon polyps    Other Son          from complications of Covid     SOCIAL HISTORY:  Social History     Tobacco Use    Smoking status: Every Day     Current packs/day: 1.00     Average packs/day: 1 pack/day for 20.0 years (20.0 ttl pk-yrs)     Types: Cigarettes    Smokeless tobacco: Never   Vaping Use    Vaping status: never used   Substance Use Topics    Alcohol use: Yes     Alcohol/week: 0.0 standard drinks of alcohol     Comment: social only    Drug use: No     Past Surgical HISTORY  Past Surgical History:   Procedure Laterality Date    Colonoscopy remove lesions by snare  2008    One hyperplastic sigmoid polyp, non-bleeding small internal hemorrhoids       REVIEW OF SYSTEMS:  All systems are reviewed and are negative except as documented in the history of present illness.    OBJECTIVE:  Visit Vitals  /74 (BP Location: RUE - Right upper extremity, Patient Position: Sitting, Cuff Size: Large Adult)   Pulse (!) 106   Resp 18   Wt 88 kg (194 lb)   SpO2 97%   BMI 33.30 kg/m²       PHYSICAL EXAM:    Constitutional: Alert, in no acute distress and current vital signs reviewed. Obese in wheelchair . Ambulating with walker at home.  Head and Face:  Atraumatic and normocephalic.   Eyes: No discharge, no eyelid swelling and the sclerae were normal.   ENT: Oropharynx with discolored tongue and post pharynx c/w thrush  Normal appearing outer ear. Tympanic membranes are bilaterally clear, normal appearing nose and normal lips.   Neck: Normal appearing neck and supple neck.   Pulmonary: Breath sounds clear  but severely diminished to auscultation bilaterally, but no respiratory distress and normal respiratory rate and effort.   Cardiovascular: Normal rate, regular rhythm, normal S1, normal S2 and edema was not present in the lower extremities.   Abdomen: Soft and nontender. Obese.  Skin, Hair, Nails: Normal skin color and pigmentation.  Psychiatric: Alert and awake, interactive and mood/affect were appropriate.      DIAGNOSTIC STUDIES:  LAB RESULTS:  No visits with results within 1 Month(s) from this visit.   Latest known visit with results is:   Lab Services on 04/08/2024   Component Date Value Ref Range Status    Fasting Status 04/08/2024 0  0 - 999 Hours Final    Sodium 04/08/2024 135  135 - 145 mmol/L Final    Potassium 04/08/2024 4.2  3.4 - 5.1 mmol/L Final    Chloride 04/08/2024 106  97 - 110 mmol/L Final    Carbon Dioxide 04/08/2024 23  21 - 32 mmol/L Final    Anion Gap 04/08/2024 10  7 - 19 mmol/L Final    Glucose 04/08/2024 105 (H)  70 - 99 mg/dL Final    BUN 04/08/2024 28 (H)  6 - 20 mg/dL Final    Creatinine 04/08/2024 1.18 (H)  0.51 - 0.95 mg/dL Final    Glomerular Filtration Rate 04/08/2024 48 (L)  >=60 Final    BUN/Cr 04/08/2024 24  7 - 25 Final    Calcium 04/08/2024 10.2  8.4 - 10.2 mg/dL Final    Microalbumin, Urine 04/08/2024 7.05  mg/dL Final    Creatinine, Urine 04/08/2024 106.00  mg/dL Final    Microalbumin/ Creatinine Ratio 04/08/2024 66.5 (H)  <30.0 mg/g Final       ASSESSMENT AND PLAN:  This 76 year old female presents with :  1. Oral thrush    2. History of ST elevation myocardial infarction (STEMI)    3. Shortness of breath    4. Generalized  weakness    5. History of tobacco use    6. Hyperlipidemia LDL goal <100    7. Stage 3 chronic kidney disease, unspecified whether stage 3a or 3b CKD  (CMD)    8. ST elevation myocardial infarction (STEMI), unspecified artery  (CMD)    9. History of myocardial infarction        Orders Placed This Encounter    SERVICE TO CARDIOLOGY    nystatin (MYCOSTATIN) [id number] UNIT/ML suspension    albuterol 108 (90 Base) MCG/ACT inhaler    ipratropium-albuterol (DUONEB) 0.5-2.5 (3) MG/3ML nebulizer solution 3 mL       PLAN:    History of ST elevation myocardial infarction (STEMI)  Discharge summary reviewed and discussed.  Continue current management with Plavix, Aspirin.  Working on getting in to Dr Andrews Schmidt cardiology out at [place].  Discussed really should consider given her feelings signing and getting DNR bracelet, call us if agreeable.   Informed trying to get VNA moving to get in for home health, physical therapy.   Informed stents help, but she is still not getting maximum blood flow to her heart.  Let us know status early next week.  Referred to CARDIOLOGY    Shortness of breath  Ordered Ipratropium-albuterol (DUONEB) 0.5-2.5 (3) MG/3ML nebulizer solution 3 mL. Uncertain if better with HHN. No recent PFTs have been done   Advised using Albuterol inhaler up to 4 times a day as needed.Will give breathing treatment with a peace pipe today and will see if that makes breathing feel better. If it does, that reinforces to send home with an albuterol inhaler now.  Informed just getting oxygen without having evidence of low oxygen levels is extremely difficult, so need to have to suspect that part of breathing issues that are underlying COPD and that are weak heart on top of that.  Advised to make sure her blood pressure is adequate and she is getting enough oxygen in as well.  Referred to CARDIOLOGY    Generalized weakness  Continue current management.  Referred to CARDIOLOGY    Oral thrush  Likely could be oral  thrush.  Advised to use Nystatin suspension 4 times a day swish and swallow until better at least three times a day.   Referred to CARDIOLOGY    History of tobacco use  Continue current management without smoking.   Referred to CARDIOLOGY    Hyperlipidemia LDL goal <100  Continue Crestor 20 mg, and Ezetimibe.    Stage 3 chronic kidney disease, unspecified whether stage 3a or 3b CKD (CMD)  Continue current management.  Referred to CARDIOLOGY    ST elevation myocardial infarction (STEMI), unspecified artery (CMD)  Continue current management    History of myocardial infarction  Continue current management    Follow-up as needed.    No follow-ups on file.    Patient Instructions:     Nystatin suspension 4 times a day swish and swallow until better    Albuterol inhaler up to 4 times a day as needed    Working on getting in to Dr Andrews Schmidt cardiology out at [place]    Trying to get VNA moving to get in for home health, physical therapy .     Let us know status early next week     Really should consider given your feelings signing and getting DNR bracelet, call us if agreeable.        I,  Nivia Noland, have created a visit summary document based on the audio recording between Dr. Valentin Cee MD and this patient for the physician to review, edit as needed, and authenticate.    Creation Date: 6/1/2024   I have reviewed and edited the visit summary above and attest that it is accurate.    Valentin Cee MD  Internal Medicine  [facility]  [facility]

## 2024-06-05 ENCOUNTER — TELEPHONE (OUTPATIENT)
Dept: FAMILY MEDICINE | Facility: CLINIC | Age: 44
End: 2024-06-05
Payer: COMMERCIAL

## 2024-06-05 NOTE — TELEPHONE ENCOUNTER
Reason for Call:  Other call back    Detailed comments: Patient is requesting to speak with Prior Authorization Team - advised that there is not a number to transfer to, can send message.     Forwarding message to Care Team, not sure if this is something that patient should discuss with his insurance for alternative medications.    Phone Number Patient can be reached at: Cell number on file:    Telephone Information:   Mobile [phone]       Best Time: any    Can we leave a detailed message on this number? YES    Call taken on 6/5/2024 at 8:51 AM by Aubrie Riggins

## 2024-06-06 NOTE — TELEPHONE ENCOUNTER
PA Initiation    Medication: WEGOVY 0.5 MG/0.5ML SC SOAJ  Insurance Company: [employer] - Phone [phone] Fax [phone]  Pharmacy Filling the Rx: [id number] [place], [place] - [facility] access [address] [facility] [address]  Filling Pharmacy Phone: [phone]  Filling Pharmacy Fax: [phone]  Start Date: 6/6/2024         Thank you,     Juan Villalba [facility]  Pharmacy Clinic [facility]  [email or web address]   Phone: [phone]  Fax: [phone]

## 2024-06-07 ENCOUNTER — TELEPHONE (OUTPATIENT)
Dept: FAMILY MEDICINE | Facility: CLINIC | Age: 44
End: 2024-06-07
Payer: COMMERCIAL

## 2024-06-07 NOTE — TELEPHONE ENCOUNTER
Pt called stating his pharmacy needs clarification of Wegovy prescription before they can dispense to pt. Pt is leaving town today and would like all his prescriptions in order.    RN will contact pt pharmacy to clarify if they have any questions.    OSVALDO Sanchez.

## 2024-06-07 NOTE — TELEPHONE ENCOUNTER
Called pharmacy, pharmacy stated pt should be on wegovy 0.25 mg for a minimum of 4 weeks prior to titration up on medication due to serious side affects. Will route to PCP to advise.    OSVALDO Sanchez.

## 2024-06-07 NOTE — ADDENDUM NOTE
Addended by: KIN LEE on: 6/7/2024 03:38 PM     Modules accepted: Orders     I have personally performed a face to face diagnostic evaluation on this patient. I have reviewed the ACP note and agree with the history, exam and plan of care, except as noted.

## 2024-06-07 NOTE — TELEPHONE ENCOUNTER
[facility] pharmacy called. Pharmacist states that the wegovy is typically 0.25mg for a month and then 0.5mg for a month as a separate prescription. [facility] pharmacy is requesting new prescriptions be sent in.

## 2024-06-07 NOTE — ASSESSMENT & PLAN NOTE
Elevated LDL and low HDL,   The 10-year ASCVD risk score (Lubna SONG, et al., 2019) is: 2.7%  Recommending Statin (lipid lowering medication) to mitigate the risk

## 2024-06-07 NOTE — TELEPHONE ENCOUNTER
Prior Authorization Approval    Medication: WEGOVY 0.5 MG/0.5ML SC SOAJ  Authorization Effective Date: 6/7/2024  Authorization Expiration Date: 12/7/2024  Approved Dose/Quantity: 2 ml per 28 days  Reference #: [id number]   Insurance Company: [employer] - Phone [phone] Fax [phone]  Expected CoPay: $    CoPay Card Available:      Financial Assistance Needed: No  Which Pharmacy is filling the prescription: [id number] [address] AT [facility] [address]  Pharmacy Notified: Yes  Patient Notified: Yes **asked pharmacy to contact pt when medication is ready to be picked up**          Thank you,     Juan Villalba CPhT  Pharmacy Clinic [email or web address] [email or web address]   Phone: [phone]  Fax: [phone]

## 2024-06-07 NOTE — TELEPHONE ENCOUNTER
Patient is traveling and has to be at airport in 3 hours ( 5:30 PM) needs Dr. Frye or RN to contact [facility] to provide instructions, please review RN below.

## 2024-06-10 NOTE — TELEPHONE ENCOUNTER
Pt calling to ensure he can take metformin with wegovy, advised no interaction.   Educated on side effects of wegovy

## 2024-06-14 ENCOUNTER — TRANSFERRED RECORDS (OUTPATIENT)
Dept: HEALTH INFORMATION MANAGEMENT | Facility: CLINIC | Age: 44
End: 2024-06-14
Payer: COMMERCIAL

## 2024-07-04 ENCOUNTER — TELEPHONE (OUTPATIENT)
Dept: FAMILY MEDICINE | Facility: CLINIC | Age: 44
End: 2024-07-04
Payer: COMMERCIAL

## 2024-07-04 NOTE — TELEPHONE ENCOUNTER
Forms/Letter Request    Type of form/letter:  apartment    Have you been seen for this request: No    Do we have the form/letter: No    When is form/letter needed by: pt needs to have a letter for apartment to allow him to have a service dog to alert for slip and falls and to  things he cant    How would you like the form/letter returned: Place orders within Epic    Patient Notified form requests are processed in 3-5 business days:Yes    Could we send this information to you in [place] or would you prefer to receive a phone call?:   Patient would like to be contacted via [employer]

## 2024-07-05 NOTE — TELEPHONE ENCOUNTER
Called and spoke to patient and relayed covering PCP message. I assisted with getting patient scheduled with Dr. Aly since patient PCP is out of clinic til August. Patient was ok with this as he needs paperwork done as soon as possible.    Thank you   BETHANIE Ye'

## 2024-07-05 NOTE — TELEPHONE ENCOUNTER
Dr. Louis Frye is out of the office for the month of July.    He might need to schedule with another provider to address this in the meantime.      He can wait until Dr. Louis Frye comes back and an office visit is often best to expedite this.    Amadou Mills MD  General Internal Medicine  [facility]  7/5/2024, 2:35 PM

## 2024-07-19 ENCOUNTER — TRANSFERRED RECORDS (OUTPATIENT)
Dept: HEALTH INFORMATION MANAGEMENT | Facility: CLINIC | Age: 44
End: 2024-07-19
Payer: COMMERCIAL

## 2024-07-22 ENCOUNTER — TRANSFERRED RECORDS (OUTPATIENT)
Dept: MULTI SPECIALTY CLINIC | Facility: CLINIC | Age: 44
End: 2024-07-22

## 2024-07-22 LAB — RETINOPATHY: NORMAL

## 2024-08-12 DIAGNOSIS — Z76.89 ENCOUNTER FOR WEIGHT MANAGEMENT: ICD-10-CM

## 2024-08-12 NOTE — TELEPHONE ENCOUNTER
Patient responded via Mychart and confirms he is currently on 0.5 mg dose and tolerating well and would like to increase dose.     Routing to PCP for approval.

## 2024-08-12 NOTE — TELEPHONE ENCOUNTER
Medication Question or Refill    Contacts       Contact Date/Time Type Contact Phone/Fax    08/12/2024 11:07 AM CDT Phone (Incoming) Romeo Chong (Self) [phone] (M)            What medication are you calling about (include dose and sig)?:     Semaglutide-Weight Management (WEGOVY) Per patient not sure but thinks the dose is to be increased    metFORMIN (GLUCOPHAGE) 500 MG tablet         Preferred Pharmacy:    [id number] [address] [place] [address]  [address] [place] [id number]  Phone: [phone] Fax: [phone]      Controlled Substance Agreement on file:   CSA -- Patient Level:     [Media Unavailable] Controlled Substance Agreement - Opioid - Scan on 6/17/2021       Who prescribed the medication?: Dr. Chely Frye    Do you need a refill? Yes - patient thinks the Semaglutide-Weight Management (WEGOVY) is to be increased to higher dose    When did you use the medication last? Metformin today    Patient offered an appointment? Yes: declined offer Next Visit with PCP = 05/30/24 Next Visit with PCP = 12/02/24    Do you have any questions or concerns?  Yes: Patient has disability parking application to be completed.  He will send via [email or web address].       Could we send this information to you in [email or web address] or would you prefer to receive a phone call?:   Patient would like to be contacted via [email or web address]    [phone]

## 2024-08-14 NOTE — TELEPHONE ENCOUNTER
Please call to assist in scheduling a follow-up virtual visit ASAP to further discuss the plan of care.

## 2024-08-19 ENCOUNTER — TRANSFERRED RECORDS (OUTPATIENT)
Dept: HEALTH INFORMATION MANAGEMENT | Facility: CLINIC | Age: 44
End: 2024-08-19
Payer: COMMERCIAL

## 2024-08-24 DIAGNOSIS — I10 UNCONTROLLED HYPERTENSION: ICD-10-CM

## 2024-08-27 ENCOUNTER — HOSPITAL ENCOUNTER (OUTPATIENT)
Dept: NUTRITION | Facility: CLINIC | Age: 44
Discharge: HOME | End: 2024-08-27
Attending: FAMILY MEDICINE | Admitting: FAMILY MEDICINE
Payer: COMMERCIAL

## 2024-08-27 DIAGNOSIS — E66.813 CLASS 3 SEVERE OBESITY WITH SERIOUS COMORBIDITY AND BODY MASS INDEX (BMI) OF 40.0 TO 44.9 IN ADULT, UNSPECIFIED OBESITY TYPE (H): ICD-10-CM

## 2024-08-27 DIAGNOSIS — E66.01 CLASS 3 SEVERE OBESITY WITH SERIOUS COMORBIDITY AND BODY MASS INDEX (BMI) OF 40.0 TO 44.9 IN ADULT, UNSPECIFIED OBESITY TYPE (H): ICD-10-CM

## 2024-08-27 PROCEDURE — 97802 MEDICAL NUTRITION INDIV IN: CPT | Mod: GT,95 | Performed by: DIETITIAN, REGISTERED

## 2024-08-27 RX ORDER — LOSARTAN POTASSIUM AND HYDROCHLOROTHIAZIDE 12.5; 1 MG/1; MG/1
1 TABLET ORAL DAILY
Qty: 90 TABLET | Refills: 0 | Status: SHIPPED | OUTPATIENT
Start: 2024-08-27

## 2024-08-27 NOTE — PROGRESS NOTES
[facility]  Medical Nutrition Therapy    Visit Type: Initial Assessment    Romeo Chong, 43 year old referred by Chely Frye MD  for MNT related to E66.01, Z68.41 (ICD-10-CM) - Class 3 severe obesity with serious comorbidity and body mass index (BMI) of 40.0 to 44.9 in adult, unspecified obesity type (H)     Virtual Visit Details    Type of service:  Video Visit   Video Start Time:  13:00  Video End Time: 13:47    Originating Location (pt. Location): Home    Distant Location (provider location):  On-site  Platform used for Video Visit: [employer]         Nutrition Assessment:  Anthropometrics  Height:   Ht Readings from Last 1 Encounters:   05/30/24 1.829 m (6')         BMI:  44.8   Weight Status:  Obesity Grade III BMI >40   Weight:   Wt Readings from Last 1 Encounters:   05/30/24 149.7 kg (330 lb)       UBW: 330 lb       IBW:  81 kg (178 lb) IBW %: 185%        Weight History:   Wt Readings from Last 20 Encounters:   05/30/24 149.7 kg (330 lb)   05/22/24 145.2 kg (320 lb)   05/17/24 (!) 152.9 kg (337 lb)   02/21/24 142.4 kg (313 lb 14.4 oz)   08/31/23 142 kg (313 lb)   08/25/23 144.2 kg (318 lb)   05/03/23 141.5 kg (312 lb)   04/07/23 141.5 kg (312 lb)   02/09/23 134.3 kg (296 lb)   11/02/22 138.8 kg (306 lb)   09/29/22 134.6 kg (296 lb 11.2 oz)   09/28/22 136.3 kg (300 lb 6.4 oz)   07/06/22 132.3 kg (291 lb 11.2 oz)   06/28/22 127.9 kg (282 lb)   05/27/22 127 kg (280 lb)   05/23/22 129.7 kg (286 lb)   04/13/22 132 kg (291 lb)   04/11/22 134.7 kg (297 lb)   04/08/22 134.7 kg (297 lb)   03/02/22 130.6 kg (288 lb)     -Current wt is 294 lb on home scale. Wt loss of 51 lb (14.8%) over the past 4 months.   -345 lb in April '24    Goal Weight:   -240 lb personal goal. Weighed this amount 13 years ago.     Nutrition History    PMH:   Patient Active Problem List   Diagnosis    Edema    Fibrous dysplasia of bone    Essential hypertension, benign    Hyperlipidemia LDL goal <100    Lymphedema     Obesity, unspecified    Stress hyperglycemia    Vitamin D deficiency    Allergic rhinitis due to animals    Nonintractable headache, unspecified chronicity pattern, unspecified headache type    Chronic pain syndrome    Gastric ulcer, unspecified chronicity, unspecified whether gastric ulcer hemorrhage or perforation present    Chronic low back pain, unspecified back pain laterality, unspecified whether sciatica present    H/O Hodgkin's lymphoma    Calcaneal spur, left    Tachycardia    Cellulitis of right lower extremity    Elevated lactic acid level    Gram-positive bacteremia    Osteomyelitis, unspecified site, unspecified type (H)    Gastroesophageal reflux disease without esophagitis    Lymphadenopathy    Mediastinal mass    Murder of relative    Peanut allergy    Secondary lymphedema    Pain in joint, ankle and foot, left    Encounter for weight management      -Saw an RD in the past about 2 years ago, and it was sort of helpful.   -Taking Wegovy and has noticed a decrease in appetite. Also on Metformin and that has helped with weight loss. Working on portion control and making better food choices. Used to eat 4 slices of pizza and now eats 1-2 slices.   -Gained weight due to stress eating.   -Was tracking calories in the past     Labs:   Recent Labs   Lab Test 05/30/24  0853 02/09/23  1204   CHOL 198 191   HDL 35* 32*   * 136*   TRIG 155* 115      Hemoglobin A1C   Date Value Ref Range Status   05/30/2024 6.1 (H) 0.0 - 5.6 % Final     Comment:     Normal <5.7%   Prediabetes 5.7-6.4%    Diabetes 6.5% or higher     Note: Adopted from ADA consensus guidelines.        Meds:   Current Outpatient Medications   Medication Instructions    blood glucose (NO BRAND SPECIFIED) test strip Use to test blood sugar daily or as directed. To accompany: Blood Glucose Monitor Brands: per insurance.    blood glucose monitoring (NO BRAND SPECIFIED) meter device kit Use to test blood sugar daily or as directed. Preferred blood  glucose meter OR supplies to accompany: Blood Glucose Monitor Brands: per insurance.    carvedilol (COREG) 12.5 mg, Oral, 2 TIMES DAILY WITH MEALS    EPINEPHrine (ANY BX GENERIC EQUIV) 0.3 mg, Intramuscular, PRN, May repeat one time in 5-15 minutes if response to initial dose is inadequate.    famotidine (PEPCID) 40 mg, Oral, 2 TIMES DAILY PRN    HYDROcodone-acetaminophen (NORCO)  MG per tablet 1-2 tablets, Oral, 3 TIMES DAILY PRN    HYDROmorphone (DILAUDID) 2 mg, Oral, EVERY 6 HOURS PRN    losartan-hydrochlorothiazide (HYZAAR) 100-12.5 MG tablet 1 tablet, Oral, DAILY    metFORMIN (GLUCOPHAGE) 1,000 mg, Oral, 2 TIMES DAILY WITH MEALS    methocarbamol (ROBAXIN) 500 MG tablet TAKE 1/2 TABLET TO 2 TABLETS BY ORAL ROUTE 4 TIMES EVERY DAY AS NEEDED FOR MUSCLE SPASMS    Semaglutide-Weight Management (WEGOVY) 0.25 mg, Subcutaneous, WEEKLY    Semaglutide-Weight Management (WEGOVY) 0.5 mg, Subcutaneous, WEEKLY    thin (NO BRAND SPECIFIED) lancets Use with lanceting device. To accompany: Blood Glucose Monitor Brands: per insurance.        Supplements: reviewed      Social/Environmental:   -average sleep per night: not discussed  -level of stress: not discussed      Food Record  Breakfast: Wakes up at 5:00, 6-7:00 am: Hashbrowns in the air fryer   Snack: none  Lunch: 12-1:00 pm: oranges or applesauce or egg sandwich on wheat bread   Snack: none  Dinner: Hashbrowns or something cooked the night before, involves shrimp, erin rice OR pasta 1 cup, 1 cup of fruit   Snack: 4 Twizzlers, SF popsicle   Beverages: Water all day, once in a while SF ginger ale, SF Gatorade  -Take-out once per week- [employer] (rice, beans, tortilla, cheese) or [employer] (wrap 230-300 kcal)   -Pescatarian style diet - has been following this for years    Nutritional Details:   -Food allergies: peanuts, tree nuts  -Food intolerances: none  -Food sensitivities: salmon causes drowsiness   -GI concerns:  none, uses Metamucil of Milk of Magnesium if  needed  -Appetite: good, lower than usual   -Pace of eating: moderate or fast   -Role of cooking: self   -Role of food shopping: self       Physical Activity:  -Has a disability and limited mobility. Takes longer walks with dogs now that he's lost weight.   -Tracks steps and gets in 3,000+ steps per day. Max is 10,000-12,000 steps per day      ASSESSED MALNUTRITION STATUS  % Weight Loss:  > 7.5% in 3 months (severe malnutrition)  % Intake:  Decreased intake does not meet criteria for malnutrition (still meeting nutrition needs for weight loss)  Subcutaneous Fat Loss:  None observed  Loss of Muscle Mass:  None observed  Fluid Retention:  None noted    Malnutrition Diagnosis:  Patient does not meet two of the above criteria necessary for diagnosing malnutrition        Nutrition Diagnosis:  Food and nutrition-related knowledge deficit related to limited prior exposure to nutrition related information as evidenced by BMI 44.8, unbalanced dietary intake, meeting <75% protein needs, and on a weight loss medication.         Nutrition Intervention:  Nutrition Prescription Summary: MNT for Weight Management      Nutrition Education (Content):  -Educated pt on using MyPlate for meal planning and discussed portion sizes   -Discussed recommended and not recommended foods (choosing WGs, fish and seafood, plant-based protein, low-fat dairy, fresh fruits & veggies, unsat fats, and limiting high-sodium and refined sugar foods)  -Educated pt on metabolism and used the fire analogy; talked about the importance of consuming consistent meals/snacks throughout the day and listening to hunger/fullness cues   -Discussed healthy snack options- protein+complex CHO  -Educated pt on reading food labels  -Discussed ways to make healthy choices when dining out (choosing baked, broiled, or grilled, unbreaded meats w/o sauces/gravies or choosing them on the side to control amount used)  -Encouraged pt to drink more water throughout the day and  explained the importance of hydration.   -Encouraged pt to increase daily PA- include cardiovascular activities w/ultimate goal of 60 min per day     Emailed/mailed information discussed including nutrition handouts to patient.       Nutrition Prescription: Macronutrient and Micronutrient details   Dosing weight: Current wt (150 kg) for energy and fluids, IBW (81 kg) for protein   Energy: 8353-2583 kcals/day ([place] Jeor)    Justification:  (obesity, to promote a 2 lb wt loss per week)   Protein: 81-97 g Pro/day (1-1.2 g pro/Kg)    Justification: (obesity guidelines)   Fluid: 2,000-2,200 mL/day   (1 mL/Kcal)     Justification:  (obese)   Fiber:     Men (19-50 years): 38 grams per day         Carbohydrate: 45-65% kcal   <25 g added sugar/day       Fat: 20-35% kcal  <7% kcal from saturated fat   Micronutrient: DRI  <2,300 mg sodium/day            Vitamin and Mineral Supplements: n/a       Patient Engagement:   Assessed learning needs and learning preference: Yes.  Teaching Method(s) used: Explanation  Video    Nutrition Education (Application):  a)  Discussed current eating plans / recommended alternative food choices    b)  Patient verbalizes understanding of diet by asking questions      Anticipate >75% compliance   Stage of Change:  action      Nutrition Goals:  1) Go for a walk with the dogs three times per day (15-20 min each walk)  2) Increase protein intake to 81-97 g per day   3) Track calories and aim for 2,000-2,200 calories per day  4) Check weight at home once per week and goal is 2 lb wt loss per week     Nutrition Follow Up / Monitoring:   Weight, PO intake, PA      Nutrition Recommendations:  Patient to follow-up with RD in 12 weeks.  Patient has RD contact information to call/email if needed.      Start time: 13:00  End time: 13:47    Total Time Duration: 47 min      Mariann Antonio MS, RD, LD, [facility]  Clinical Dietitian  [phone]

## 2024-08-29 ENCOUNTER — VIRTUAL VISIT (OUTPATIENT)
Dept: FAMILY MEDICINE | Facility: CLINIC | Age: 44
End: 2024-08-29
Payer: COMMERCIAL

## 2024-08-29 ENCOUNTER — MYC MEDICAL ADVICE (OUTPATIENT)
Dept: FAMILY MEDICINE | Facility: CLINIC | Age: 44
End: 2024-08-29

## 2024-08-29 DIAGNOSIS — R73.03 PREDIABETES: Primary | ICD-10-CM

## 2024-08-29 DIAGNOSIS — Z74.09 MOBILITY IMPAIRED: Primary | ICD-10-CM

## 2024-08-29 DIAGNOSIS — R53.81 PHYSICAL DEBILITY: ICD-10-CM

## 2024-08-29 DIAGNOSIS — I42.9 CARDIOMYOPATHY, UNSPECIFIED TYPE (H): ICD-10-CM

## 2024-08-29 DIAGNOSIS — K25.9 GASTRIC ULCER, UNSPECIFIED CHRONICITY, UNSPECIFIED WHETHER GASTRIC ULCER HEMORRHAGE OR PERFORATION PRESENT: ICD-10-CM

## 2024-08-29 DIAGNOSIS — Z74.09 MOBILITY IMPAIRED: ICD-10-CM

## 2024-08-29 PROCEDURE — 99215 OFFICE O/P EST HI 40 MIN: CPT | Mod: 95 | Performed by: FAMILY MEDICINE

## 2024-08-29 PROCEDURE — G2211 COMPLEX E/M VISIT ADD ON: HCPCS | Mod: 95 | Performed by: FAMILY MEDICINE

## 2024-08-29 RX ORDER — FAMOTIDINE 40 MG/1
40 TABLET, FILM COATED ORAL 2 TIMES DAILY
Qty: 180 TABLET | Refills: 3 | Status: SHIPPED | OUTPATIENT
Start: 2024-08-29 | End: 2025-08-24

## 2024-08-29 NOTE — ASSESSMENT & PLAN NOTE
Better control of the blood glucose   Weight loss from 345 to 294 lbs   No significant side effect on Wegovy    Plan:   Continue current management   Reassess in 3 months

## 2024-08-29 NOTE — TELEPHONE ENCOUNTER
Disability Parking Certificate    PCP completed for patient today.    Copies made for monthly hold bin and sent to scan.    Original in writer's HOLD bin at desk waiting for response from patient.

## 2024-08-29 NOTE — ASSESSMENT & PLAN NOTE
8/29/2024   Application for disability parking certificate  Existing one is expiring.     Plan:   3- yr certificate based on current impairments (cardiomyopathy, CHF, chronic lymphedema, chronic back pain) signed  Expiry on 8/2027

## 2024-08-29 NOTE — ASSESSMENT & PLAN NOTE
As of 8/23 , per cardiology note  LVEF of 40%, chronic systolic congestive heart failure: The patient's left ventricle was recovered to normal range of 57% per cardiac MRI in June 2022. His stress cardiac MRI is negative for inducible myocardial ischemia.   No previous myocardial infarction in all myocardial scar. Right ventricle is mildly enlarged with mildly reduced right ventricular systolic function, LVEF of 46% borderline low.   This could be related to his obesity.

## 2024-08-29 NOTE — PROGRESS NOTES
Romeo is a 43 year old who is being evaluated via a billable video visit.    How would you like to obtain your AVS? MyChart  If the video visit is dropped, the invitation should be resent by: Text to cell phone: [phone]  Will anyone else be joining your video visit? No      Assessment & Plan   Problem List Items Addressed This Visit       Gastric ulcer, unspecified chronicity, unspecified whether gastric ulcer hemorrhage or perforation present     On famotidine 40 mg twice daily  Satisfactory          Relevant Medications    famotidine (PEPCID) 40 MG tablet    Mobility impaired     8/29/2024   Application for disability parking certificate  Existing one is expiring.     Plan:   3- yr certificate based on current impairments (cardiomyopathy, CHF, chronic lymphedema, chronic back pain) signed  Expiry on 8/2027         Prediabetes - Primary     Better control of the blood glucose   Weight loss from 345 to 294 lbs   No significant side effect on Wegovy    Plan:   Continue current management   Reassess in 3 months         Relevant Medications    Semaglutide-Weight Management (WEGOVY) 0.5 MG/0.5ML pen    Cardiomyopathy, unspecified type (H)     As of 8/23 , per cardiology note  LVEF of 40%, chronic systolic congestive heart failure: The patient's left ventricle was recovered to normal range of 57% per cardiac MRI in June 2022. His stress cardiac MRI is negative for inducible myocardial ischemia.   No previous myocardial infarction in all myocardial scar. Right ventricle is mildly enlarged with mildly reduced right ventricular systolic function, LVEF of 46% borderline low.   This could be related to his obesity.                   The longitudinal plan of care for the diagnosis(es)/condition(s) as documented were addressed during this visit. Due to the added complexity in care, I will continue to support Romeo in the subsequent management and with ongoing continuity of care.      I spent a total of 54 minutes on the  day of the visit.   Time spent by me doing chart review, history and exam, documentation and further activities per the note  Subjective   Romeo is a 43 year old, presenting for the following health issues:    Prediabetes  Better control of the blood glucose   Weight loss from 345 to 294 lbs   No significant side effect.     Gastric ulcer,   On famotidine 40 mg twice daily  Satisfactory     Mobility impaired  Applying for disability parking certificate.   Expiring [place] disability parking certificate            8/29/2024     7:35 AM   Additional Questions   Roomed by ARCENIO Gómez   Accompanied by N/A       Video Start Time: 8:05 AM    History of Present Illness       Reason for visit:  Follow up   He is taking medications regularly.         Objective    Vitals - Patient Reported  Pain Score: No Pain (0)      Vitals:  No vitals were obtained today due to virtual visit.    Physical Exam   GENERAL: alert and no distress  PSYCH: Appropriate affect, tone, and pace of words          Video-Visit Details    Type of service:  Video Visit   Video End Time:8:36 AM  Originating Location (pt. Location): Home    Distant Location (provider location):  On-site  Platform used for Video Visit: Joselyn  Signed Electronically by: Chely Frye MD

## 2024-09-03 NOTE — TELEPHONE ENCOUNTER
"Form placed at  for .    Routing to PCP to advise on patient needing \"basic note from the dr stating I need a service dog\"  "

## 2024-09-17 ENCOUNTER — TRANSFERRED RECORDS (OUTPATIENT)
Dept: HEALTH INFORMATION MANAGEMENT | Facility: CLINIC | Age: 44
End: 2024-09-17
Payer: COMMERCIAL

## 2024-09-23 DIAGNOSIS — I10 UNCONTROLLED HYPERTENSION: Primary | ICD-10-CM

## 2024-09-25 RX ORDER — CARVEDILOL 12.5 MG/1
12.5 TABLET ORAL 2 TIMES DAILY WITH MEALS
Qty: 180 TABLET | Refills: 0 | Status: SHIPPED | OUTPATIENT
Start: 2024-09-25

## 2024-09-26 DIAGNOSIS — R73.03 PREDIABETES: Primary | ICD-10-CM

## 2024-09-26 NOTE — TELEPHONE ENCOUNTER
Patient calling regarding Wegovy.    Patient said pharmacy told him he needs to do the next step and increase Wegovy dose or needs a PA submitted for current dose.    Per chart review, patient has been on 0.5 mg for a couple months. Patient said he is tolerating 0.5 mg dose but progress has plateaued. He has improved his eating habits. He is sometimes nauseous the first day after dose but manages nausea with dramamine.

## 2024-10-14 ENCOUNTER — TRANSFERRED RECORDS (OUTPATIENT)
Dept: HEALTH INFORMATION MANAGEMENT | Facility: CLINIC | Age: 44
End: 2024-10-14
Payer: COMMERCIAL

## 2024-10-23 ENCOUNTER — TELEPHONE (OUTPATIENT)
Dept: FAMILY MEDICINE | Facility: CLINIC | Age: 44
End: 2024-10-23
Payer: COMMERCIAL

## 2024-10-23 NOTE — TELEPHONE ENCOUNTER
Reason for Call:  Medication or medication refill:    Do you use a [facility]?  Name of the pharmacy and phone number for the current request:      [id number] - [place], [place] - [address]     Name of the medication requested: Semaglutide-Weight Management (WEGOVY)     Other request: Patient would like to know if he is supposed to step up  increase dose with Wegovy.  Shares the only side effect he is experiencing is nausea. He feels nausea after he eats and after he takes his morning medication.  Not all the time but about the first 3 days after taking the medication.  Would also like to know if there is a medication to help with nausea.     Can we leave a detailed message on this number? YES    Phone number patient can be reached at: Cell number on file:    Telephone Information:   Mobile [phone]       Best Time: any    Call taken on 10/23/2024 at 10:21 AM by Aubrie Riggins

## 2024-10-24 NOTE — TELEPHONE ENCOUNTER
Contact patient to schedule Virtual Video.  Patient expresses concern that he won't have his medication on Monday.

## 2024-10-28 ENCOUNTER — VIRTUAL VISIT (OUTPATIENT)
Dept: FAMILY MEDICINE | Facility: CLINIC | Age: 44
End: 2024-10-28
Payer: COMMERCIAL

## 2024-10-28 DIAGNOSIS — R11.2 NAUSEA AND VOMITING, UNSPECIFIED VOMITING TYPE: ICD-10-CM

## 2024-10-28 DIAGNOSIS — R73.03 PREDIABETES: Primary | ICD-10-CM

## 2024-10-28 PROCEDURE — 99214 OFFICE O/P EST MOD 30 MIN: CPT | Mod: 95 | Performed by: FAMILY MEDICINE

## 2024-10-28 PROCEDURE — G2211 COMPLEX E/M VISIT ADD ON: HCPCS | Mod: 95 | Performed by: FAMILY MEDICINE

## 2024-10-28 NOTE — PROGRESS NOTES
Romeo is a 44 year old who is being evaluated via a billable video visit.    How would you like to obtain your AVS? MyChart  If the video visit is dropped, the invitation should be resent by: Text to cell phone: [phone]  Will anyone else be joining your video visit? No      Assessment & Plan   Problem List Items Addressed This Visit       Prediabetes - Primary     Better control of the blood glucose, usually hovering around 100 or less.   He has managed to lose the 20 pounds down to 285 from the last meeting.   Wegovy is well-tolerated with no side effects .     Plan:   Continue on Wegovy 1 mg  Continue on metformin as is  lab to check your HgA1c prior to the next follow up appointment.   Recheck in 3 months           Relevant Medications    Semaglutide-Weight Management (WEGOVY) 1 MG/0.5ML pen    Nausea and vomiting, unspecified vomiting type     Mostly noticeable with taking MVI   Not always consistent but noted when taking the MVI not eating, or taking with the protein shakes.   Self managing with Dramamine.     Plan:   Take MVI with food, take with cheese or cracker                  The longitudinal plan of care for the diagnosis(es)/condition(s) as documented were addressed during this visit. Due to the added complexity in care, I will continue to support Romeo in the subsequent management and with ongoing continuity of care.      Subjective   Romeo is a 44 year old, presenting for the following health issues:  Med check and follow-up          10/28/2024    10:24 AM   Additional Questions   Roomed by Idania GALLARDO CMA     Video Start Time: 10:33 AM    History of Present Illness       Diabetes:   He presents for follow up of diabetes.  He is checking home blood glucose two times daily.   He checks blood glucose before meals.  Blood glucose is sometimes over 200 and never under 70. He is aware of hypoglycemia symptoms including shakiness, dizziness and weakness.    He has no concerns regarding his diabetes  at this time.   He is not experiencing numbness or burning in feet, excessive thirst, blurry vision, weight changes or redness, sores or blisters on feet.           He eats 2-3 servings of fruits and vegetables daily.He consumes 0 sweetened beverage(s) daily.He exercises with enough effort to increase his heart rate 10 to 19 minutes per day.  He exercises with enough effort to increase his heart rate 4 days per week.   He is taking medications regularly.                   Objective           Vitals:  No vitals were obtained today due to virtual visit.    Physical Exam   GENERAL: alert and no distress  PSYCH: Appropriate affect, tone, and pace of words          Video-Visit Details    Type of service:  Video Visit   Video End Time:10:46 AM  Originating Location (pt. Location): Home    Distant Location (provider location):  On-site  Platform used for Video Visit: Joselyn  Signed Electronically by: Chely Frye MD

## 2024-10-28 NOTE — ASSESSMENT & PLAN NOTE
Better control of the blood glucose, usually hovering around 100 or less.   He has managed to lose the 20 pounds down to 285 from the last meeting.   Wegovy is well-tolerated with no side effects .     Plan:   Continue on Wegovy 1 mg  Continue on metformin as is  lab to check your HgA1c prior to the next follow up appointment.   Recheck in 3 months

## 2024-10-28 NOTE — ASSESSMENT & PLAN NOTE
Mostly noticeable with taking MVI   Not always consistent but noted when taking the MVI not eating, or taking with the protein shakes.   Self managing with Dramamine.     Plan:   Take MVI with food, take with cheese or cracker

## 2024-11-19 ENCOUNTER — APPOINTMENT (OUTPATIENT)
Dept: GENERAL RADIOLOGY | Facility: CLINIC | Age: 44
End: 2024-11-19
Attending: PHYSICIAN ASSISTANT
Payer: COMMERCIAL

## 2024-11-19 ENCOUNTER — HOSPITAL ENCOUNTER (EMERGENCY)
Facility: CLINIC | Age: 44
Discharge: HOME | End: 2024-11-19
Attending: PHYSICIAN ASSISTANT | Admitting: PHYSICIAN ASSISTANT
Payer: COMMERCIAL

## 2024-11-19 VITALS
DIASTOLIC BLOOD PRESSURE: 109 MMHG | RESPIRATION RATE: 20 BRPM | OXYGEN SATURATION: 97 % | SYSTOLIC BLOOD PRESSURE: 161 MMHG | WEIGHT: 280 LBS | BODY MASS INDEX: 39.2 KG/M2 | TEMPERATURE: 98.6 F | HEIGHT: 71 IN | HEART RATE: 89 BPM

## 2024-11-19 DIAGNOSIS — M54.50 ACUTE EXACERBATION OF CHRONIC LOW BACK PAIN: ICD-10-CM
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
## 2024-11-19 DIAGNOSIS — I10 ESSENTIAL HYPERTENSION, BENIGN: ICD-10-CM

## 2024-11-19 DIAGNOSIS — S89.92XA KNEE INJURY, LEFT, INITIAL ENCOUNTER: ICD-10-CM

## 2024-11-19 DIAGNOSIS — G89.29 ACUTE EXACERBATION OF CHRONIC LOW BACK PAIN: ICD-10-CM

## 2024-11-19 PROCEDURE — 72100 X-RAY EXAM L-S SPINE 2/3 VWS: CPT

## 2024-11-19 PROCEDURE — 250N000013 HC RX MED GY IP 250 OP 250 PS 637: Performed by: PHYSICIAN ASSISTANT

## 2024-11-19 PROCEDURE — 99284 EMERGENCY DEPT VISIT MOD MDM: CPT

## 2024-11-19 PROCEDURE — 73562 X-RAY EXAM OF KNEE 3: CPT | Mod: LT

## 2024-11-19 RX ORDER — HYDROCODONE BITARTRATE AND ACETAMINOPHEN 5; 325 MG/1; MG/1
2 TABLET ORAL ONCE
Status: COMPLETED | OUTPATIENT
Start: 2024-11-19 | End: 2024-11-19

## 2024-11-19 RX ADMIN — HYDROCODONE BITARTRATE AND ACETAMINOPHEN 2 TABLET: 5; 325 TABLET ORAL at 20:08

## 2024-11-19 ASSESSMENT — ACTIVITIES OF DAILY LIVING (ADL)
ADLS_ACUITY_SCORE: 0
ADLS_ACUITY_SCORE: 0

## 2024-11-19 ASSESSMENT — COLUMBIA-SUICIDE SEVERITY RATING SCALE - C-SSRS
2. HAVE YOU ACTUALLY HAD ANY THOUGHTS OF KILLING YOURSELF IN THE PAST MONTH?: NO
1. IN THE PAST MONTH, HAVE YOU WISHED YOU WERE DEAD OR WISHED YOU COULD GO TO SLEEP AND NOT WAKE UP?: NO
6. HAVE YOU EVER DONE ANYTHING, STARTED TO DO ANYTHING, OR PREPARED TO DO ANYTHING TO END YOUR LIFE?: NO

## 2024-11-19 NOTE — Clinical Note
Romeo Chong was seen and treated in our emergency department on 11/19/2024.  He may return to work on 11/21/2024.       If you have any questions or concerns, please don't hesitate to call.      Jocelyn Long PA-C

## 2024-11-20 ENCOUNTER — PATIENT OUTREACH (OUTPATIENT)
Dept: FAMILY MEDICINE | Facility: CLINIC | Age: 44
End: 2024-11-20
Payer: COMMERCIAL

## 2024-11-20 NOTE — ED TRIAGE NOTES
Slip and fall 3 days ago. Hx of chronic pains but left lower back and bilateral knee pain is worse 10/10. Last ibuprofen 2 days ago, took a hydrocodone today. Pt is using a cane.     Triage Assessment (Adult)       Row Name 11/19/24 1917          Triage Assessment    Airway WDL WDL        Respiratory WDL    Respiratory WDL WDL        Skin Circulation/Temperature WDL    Skin Circulation/Temperature WDL WDL        Cardiac WDL    Cardiac WDL X  hypertensive        Peripheral/Neurovascular WDL    Peripheral Neurovascular WDL WDL        Cognitive/Neuro/Behavioral WDL    Cognitive/Neuro/Behavioral WDL WDL

## 2024-11-20 NOTE — DISCHARGE INSTRUCTIONS
Your imaging is reassuring today. Continue supportive cares including rest, ice, and elevation of left leg. Follow-up with scheduled appointments including pain clinic. For persist left knee pain, contact information is provided for local orthopedic clinic. Return to ED with any new or worsening symptoms.

## 2024-11-20 NOTE — PROGRESS NOTES
HEART CARE ENCOUNTER CONSULTATON NOTE      [facility]  [phone]      Assessment/Recommendations   Assessment:   Nonischemic cardiomyopathy, reduced RV ejection fraction: 57%, RVEF 46% without evidence of ischemia/infarction, fibrosis, starring, infiltration, valve abnormalities on cardiac MRI 6/2022. Compensated.  Hypertension: Elevated, more often elevated >140 systolic at home. Carvedilol 12.5 mg BID, losartan-HCTZ 100-12.5 mg daily  Obesity, prediabetes: ~50 lb Weight loss on Wegovy in the last 6 months  History of Hodgkin's lymphoma with RLE lymphedema after treatment  Chronic pain      Plan:   Increase carvedilol to 25 mg twice daily.  Continue to monitor blood pressure at home.  Goal less than 140/90.  Congratulated on significant weight loss, continued healthy/low sodium diet and exercise as able      Follow up in 1 year or sooner as needed     History of Present Illness/Subjective    HPI: Romeo Chong is a 44 year old male with PMHx of nonischemic cardiomyopathy with reduced presents for follow-up.    Patient reports doing well overall.  He is lost approximately 50 pounds in the last 6 months on Wegovy.  Continue to have chronic pain in his back due to lumbar disc changes, recently had a fall that exacerbated this.  He had a high left ankle sprain a while ago that continues to be painful.  Chronic right lower extremity lymphedema following Hodgkin's lymphoma treatment which is stable.  Some chest wall tenderness with palpation that comes and goes likely MSK, this has been present for several years.  Some intermittent shortness of breath climbing stairs to his duplex home that is worse in the colder months typically, did not experience this during the summer. Not with all exertion. He was once tested for sleep apnea, subsequently had adenoidectomy and tonsillectomy.  His wife monitors him during sleep and does not report any apnea episodes, mild snoring.    He tries to stay active  walking half a mile to a mile frequently with his dog, it is limited due to back pain and leg pain.  He has been eating more of a pescatarian diet, making diet changes to benefit his health.  His blood pressure at home which is more often elevated greater than 140 systolic.  Diastolic blood pressure variable.    Got  3 months ago.     He denies lightheadedness, orthopnea, PND, palpitations, and abdominal fullness/bloating.      Cardiac MRI 6/2022 results:  1.  Pharmacological Regadenoson stress cardiac MRI is negative for inducible myocardial ischemia.   2.  No evidence of myocardial infarction, focal or diffuse nonvascular scarring or fibrosis, or  infiltrative disease.  3.  Left ventricular size is mildly enlarged. Wall thickness and systolic function are normal. The  quantified left ventricular ejection fraction is 57%.    4.  Right ventricular size is mildly enlarged. Systolic function is mildly reduced.  The quantified right  ventricular ejection fraction is 46%.  5.  Mild left atrial enlargement.  6.  No significant valvular abnormalities.     Physical Examination  Review of Systems   Vitals: BP (!) 148/88 (BP Location: Left arm, Patient Position: Sitting, Cuff Size: Adult Large)   Pulse 84   Resp 20   Wt 127.5 kg (281 lb)   BMI 39.19 kg/m    BMI= Body mass index is 39.19 kg/m .  Wt Readings from Last 3 Encounters:   11/21/24 127.5 kg (281 lb)   11/19/24 127 kg (280 lb)   05/30/24 149.7 kg (330 lb)           ENT/Mouth: membranes moist, no oral lesions or bleeding gums.      EYES:  no scleral icterus, normal conjunctivae                    Neck: No carotid bruit or thyromegaly   Chest/Lungs:   lungs are clear to auscultation, no rales or wheezing, equal chest wall expansion    Cardiovascular:   Regular. Normal first and second heart sounds with no murmurs, rubs, or gallops; the radial  pulses are intact, RLE edema, LLE without edema       Extremities: no cyanosis or clubbing   Skin: no xanthelasma,  warm.    Neurologic:  no tremors     Psychiatric: alert and oriented x3, calm        Please refer above for cardiac ROS details.        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 Discovered in right tibia and femur at 13 years old.  He had surgery when  he was 14 years old.   Last Assessment & Plan:  Formatting of this note might be different from the original. Diagnosed at 13, 14 surgeries since that time   Formatting of this note might be different from the original. Formatting of this note might be different     Gastric ulcer, unspecified chronicity, unspecified whether gastric ulcer hemorrhage or perforation present 5/6/2021    H/O Hodgkin's lymphoma 5/6/2021    Hyperlipidemia     Hypertension     Hypertriglyceridemia 5/8/2011    Severe sepsis with acute organ dysfunction due to group B Streptococcus (H) 10/18/2021    Stress hyperglycemia 4/26/2009     Past Surgical History:   Procedure Laterality Date    FRACTURE SURGERY      HC REMOVAL HEEL SPUR, CALCANEUS Left 6/25/2021    Procedure: EXCISION, BONE SPUR, FOOT, WITH PLANTAR FASCIA RELEASE;  Surgeon: Stephon Singleton DPM;  Location: [facility] [place];  Service: Podiatry    TONSILLECTOMY, ADENOIDECTOMY ADULT, COMBINED       Family History   Problem Relation Age of Onset    Cirrhosis Mother     Hypertension Mother     Diabetes Type 2  Father     Kidney failure Father     Cerebrovascular Disease Father     Hypertension Father     Cerebrovascular Disease Sister     Hypertension Paternal Grandmother     Hypertension Paternal Grandfather         Social History     Socioeconomic History    Marital status: Single     Spouse  name: Not on file    Number of children: Not on file    Years of education: Not on file    Highest education level: Not on file   Occupational History    Not on file   Tobacco Use    Smoking status: Former     Current packs/day: 0.00     Average packs/day: 1 pack/day for 10.0 years (10.0 ttl pk-yrs)     Types: Cigarettes     Start date: 1999     Quit date: 2009     Years since quitting: 15.5    Smokeless tobacco: Never   Vaping Use    Vaping status: Never Used   Substance and Sexual Activity    Alcohol use: Not Currently    Drug use: Never    Sexual activity: Not Currently   Other Topics Concern    Not on file   Social History Narrative    , 3 children, 1  recently. Non smoker. Socially drinks alcohol. Not working.      Social Drivers of Health     Financial Resource Strain: Not on file   Food Insecurity: No Food Insecurity (10/19/2021)    Received from [facility]    Hunger Vital Sign     Worried About Running Out of Food in the Last Year: Never true     Ran Out of Food in the Last Year: Never true   Transportation Needs: No Transportation Needs (10/19/2021)    Received from [facility]    PRAPARE - Transportation     Lack of Transportation (Medical): No     Lack of Transportation (Non-Medical): No   Physical Activity: Not on file   Stress: Not on file   Social Connections: Not on file   Interpersonal Safety: Low Risk  (2024)    Interpersonal Safety     Do you feel physically and emotionally safe where you currently live?: Yes     Within the past 12 months, have you been hit, slapped, kicked or otherwise physically hurt by someone?: No     Within the past 12 months, have you been humiliated or emotionally abused in other ways by your partner or ex-partner?: No   Housing Stability: Low Risk  (10/19/2021)    Received from [facility]    Housing Stability Vital Sign     Unable to Pay for Housing in the Last Year: No      Number of Places Lived in the Last Year: 1     Unstable Housing in the Last Year: No           Medications  Allergies   Current Outpatient Medications   Medication Sig Dispense Refill    blood glucose (NO BRAND SPECIFIED) test strip Use to test blood sugar daily or as directed. To accompany: Blood Glucose Monitor Brands: per insurance. 100 strip 6    blood glucose monitoring (NO BRAND SPECIFIED) meter device kit Use to test blood sugar daily or as directed. Preferred blood glucose meter OR supplies to accompany: Blood Glucose Monitor Brands: per insurance. 1 kit 0    carvedilol (COREG) 12.5 MG tablet TAKE 1 TABLET(12.5 MG) BY MOUTH TWICE DAILY WITH MEALS 180 tablet 0    EPINEPHrine (ANY BX GENERIC EQUIV) 0.3 MG/0.3ML injection 2-pack Inject 0.3 mLs (0.3 mg) into the muscle as needed for anaphylaxis May repeat one time in 5-15 minutes if response to initial dose is inadequate. 2 each 0    famotidine (PEPCID) 40 MG tablet Take 1 tablet (40 mg) by mouth 2 times daily. 180 tablet 3    HYDROcodone-acetaminophen (NORCO)  MG per tablet Take 1-2 tablets by mouth 3 times daily as needed for pain 180 tablet 0    HYDROmorphone (DILAUDID) 2 MG tablet Take 1 tablet (2 mg) by mouth every 6 hours as needed for pain 3 tablet 0    losartan-hydrochlorothiazide (HYZAAR) 100-12.5 MG tablet Take 1 tablet by mouth daily. **Due for follow up with ** 90 tablet 0    metFORMIN (GLUCOPHAGE) 500 MG tablet Take 2 tablets (1,000 mg) by mouth 2 times daily (with meals) 360 tablet 1    methocarbamol (ROBAXIN) 500 MG tablet TAKE 1/2 TABLET TO 2 TABLETS BY ORAL ROUTE 4 TIMES EVERY DAY AS NEEDED FOR MUSCLE SPASMS      Semaglutide-Weight Management (WEGOVY) 1 MG/0.5ML pen Inject 1 mg subcutaneously once a week. 2 mL 2    thin (NO BRAND SPECIFIED) lancets Use with lanceting device. To accompany: Blood Glucose Monitor Brands: per insurance. 100 each 6       Allergies   Allergen Reactions    Vancomycin Anaphylaxis    Peanut Oil Itching    Tree  "Nuts [Nuts] Itching    Erythromycin Unknown    Other Environmental Allergy Unknown     DUST    Pollen Extracts [Pollen Extract] Unknown    Zosyn [Piperacillin-Tazobactam In Dex] Tinnitus and Itching    Latex Itching and Rash     Added based on information entered during case entry, please review and add reactions, type, and severity as needed    Oxycodone Itching and Rash          Lab Results    Chemistry/lipid CBC Cardiac Enzymes/BNP/TSH/INR   Recent Labs   Lab Test 05/30/24  0853   CHOL 198   HDL 35*   *   TRIG 155*     Recent Labs   Lab Test 05/30/24  0853 02/09/23  1204   * 136*     Recent Labs   Lab Test 05/30/24  0853      POTASSIUM 4.4   CHLORIDE 102   CO2 28   *   BUN 8.5   CR 0.64*   GFRESTIMATED >90   NATALYA 9.6     Recent Labs   Lab Test 05/30/24  0853 05/03/23  1540 02/09/23  1204   CR 0.64* 0.64* 0.61*     Recent Labs   Lab Test 05/30/24  0853   A1C 6.1*          Recent Labs   Lab Test 02/09/23  1204   WBC 5.9   HGB 14.0   HCT 43.0   MCV 79        Recent Labs   Lab Test 02/09/23  1204 11/02/22  1534 09/29/22  1537   HGB 14.0 13.2* 13.7    Recent Labs   Lab Test 05/27/22  1525   TROPONINI <0.01     Recent Labs   Lab Test 05/27/22  1525   BNP <10     No results for input(s): \"TSH\" in the last 65097 hours.  Recent Labs   Lab Test 07/08/22  1038 05/27/22  1525   INR 1.08 1.05          This note has been dictated using voice recognition software. Any grammatical, typographical, or context distortions are unintentional and inherent to the software    Tammi Coles PA-C                                       "

## 2024-11-20 NOTE — ED PROVIDER NOTES
Emergency Department Note      History of Present Illness     Chief Complaint   Fall      HPI   Romeo Chong is a 44 year old male with history of chronic pain syndrome, HTN, Hodgkin's lymphoma who presents for evaluation after fall.  Patient reports he has mobility issues and uses a cane at baseline.  He slipped on the floor in his house 2 days ago and fell directly on his buttocks.  Patient notes that he twisted his left leg when he fell.  He states that he has chronic back pain that has been worse over the past 2 days.  He usually takes  mg Norco 3 times per day for pain and follows with [facility].  Patient notes he has not had any pain medication today as he went to work and cannot take opioid pain medications while at work.  Denies head or neck injury.    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    Gastric ulcer, unspecified chronicity, unspecified whether gastric ulcer hemorrhage or perforation present 5/6/2021    H/O Hodgkin's lymphoma 5/6/2021    Hyperlipidemia     Hypertension     Hypertriglyceridemia 5/8/2011    Severe sepsis with acute organ dysfunction due to group B Streptococcus (H) 10/18/2021    Stress hyperglycemia 4/26/2009       Medications   blood glucose (NO BRAND SPECIFIED) test strip  blood glucose monitoring (NO BRAND SPECIFIED) meter device kit  carvedilol (COREG) 12.5 MG tablet  EPINEPHrine (ANY BX GENERIC EQUIV) 0.3 MG/0.3ML injection 2-pack  famotidine (PEPCID) 40 MG tablet  HYDROcodone-acetaminophen (NORCO)  MG per tablet  HYDROmorphone (DILAUDID) 2 MG tablet  losartan-hydrochlorothiazide (HYZAAR) 100-12.5 MG  "tablet  metFORMIN (GLUCOPHAGE) 500 MG tablet  methocarbamol (ROBAXIN) 500 MG tablet  Semaglutide-Weight Management (WEGOVY) 1 MG/0.5ML pen  thin (NO BRAND SPECIFIED) lancets        Surgical History   Past Surgical History:   Procedure Laterality Date    FRACTURE SURGERY      HC REMOVAL HEEL SPUR, CALCANEUS Left 6/25/2021    Procedure: EXCISION, BONE SPUR, FOOT, WITH PLANTAR FASCIA RELEASE;  Surgeon: Stephon Singleton DPM;  Location: [facility];  Service: Podiatry    TONSILLECTOMY, ADENOIDECTOMY ADULT, COMBINED         Physical Exam     Patient Vitals for the past 24 hrs:   BP Temp Temp src Pulse Resp SpO2 Height Weight   11/19/24 1916 (!) 161/109 98.6  F (37  C) Temporal 89 20 97 % 1.803 m (5' 11\") 127 kg (280 lb)     Physical Exam  Constitutional: Alert, attentive, GCS 15  HENT:    Nose: Nose normal.    Mouth/Throat: Oropharynx is clear, mucous membranes are moist   Eyes: EOM are normal.   CV: regular rate and rhythm; no murmurs, rubs or gallups  Chest: Effort normal and breath sounds normal.   GI:  There is no tenderness. No distension. Normal bowel sounds  MSK: Normal range of motion.  Reproducible anterior left knee tenderness.  No joint laxity. Negative anterior drawer test.  Distal pulses in bilateral lower extremities intact.  No deformity noted.  Reproducible left-sided low back pain.  No step-off deformities or midline tenderness.  Positive straight leg raise on left side.  Neurological: Alert, attentive, Oriented x 3. No facial asymmetry. CN II-XII intact. 5/5 strength in upper extremities and 4/5 in lower extremities. Normal range of motion in all four extremities. Distal sensation in hands and feet intact.  Skin: Skin is warm and dry.      Diagnostics     Lab Results   Labs Ordered and Resulted from Time of ED Arrival to Time of ED Departure - No data to display    Imaging   XR Knee Left 3 Views   Final Result   IMPRESSION: No fracture or malalignment. Mild lateral patellar subluxation. Mild " patellofemoral compartment degenerative change. No effusion.      Lumbar spine XR, 2-3 views   Final Result   IMPRESSION: No fracture. Normal vertebral heights. There is straightening the normal AP curvature with otherwise normal alignment. Mild-to-moderate multilevel disc height loss and facet hypertrophy, greatest L4-5 and L5-S1. Normal extraspinal structures.          EKG   None    Independent Interpretation   X-ray left knee shows no fracture or effusion.    ED Course      Medications Administered   Medications   HYDROcodone-acetaminophen (NORCO) 5-325 MG per tablet 2 tablet (2 tablets Oral $Given 11/19/24 2008)       Procedures   Procedures     Discussion of Management   None    ED Course        Additional Documentation  None    Medical Decision Making / Diagnosis     CMS Diagnoses: None    MIPS       None    MDM   Romeo Chong is a 44 year old male with history of chronic back pain and chronic pain syndrome who presents for evaluation of left knee pain and low back pain after mechanical fall at home 2 days ago.  Vitals are normal though he is mildly hypertensive at 161/109.  Physical exam reveals all 4 extremities are neurovascular tact and there are no focal neurological deficits. C-spine cleared clinically.  Patient denies any head or neck injury during fall.  X-rays obtained as above and show no signs of traumatic injury.  Results communicated with patient at bedside.  He was given his home dose of pain medications today and a work note.  He has a scheduled MRI for his spine and then a pain clinic follow-up in 3 days which is appropriate.  He is otherwise ambulatory with his cane and may follow-up with primary care as needed.  Return precautions to ED discussed including worsening pain and patient is discharged home. Declined to send patient home with oral opioids given pain contract with pain clinic.    Disposition   The patient was discharged.     Diagnosis     ICD-10-CM    1. Fall, initial encounter   W19.XXXA       2. Acute exacerbation of chronic low back pain  M54.50     G89.29       3. Knee injury, left, initial encounter  S89.92XA       4. Essential hypertension, benign  I10            Discharge Medications   Discharge Medication List as of 11/19/2024  8:56 PM            7:44 PM  November 19, 2024  BRANDON DIAZ Emily, PA-C  11/19/24 [id number]

## 2024-11-20 NOTE — TELEPHONE ENCOUNTER
Transitions of Care Outreach  Chief Complaint   Patient presents with    Hospital F/U       Most Recent Admission Date: 11/19/2024   Most Recent Admission Diagnosis:      Most Recent Discharge Date: 11/19/2024   Most Recent Discharge Diagnosis: Fall, initial encounter - W19.XXXA  Acute exacerbation of chronic low back pain - M54.50, G89.29  Knee injury, left, initial encounter - S89.92XA  Essential hypertension, benign - I10     Transitions of Care Assessment         Follow up Plan          Future Appointments   Date Time Provider Department Center   11/21/2024  9:50 AM Tammi Coles PA-C [facility]   11/22/2024 12:30 PM [facility]   12/2/2024  9:00 AM [facility]   12/11/2024 11:00 AM Mariann Antonio RD [facility]       Outpatient Plan as outlined on AVS reviewed with patient.    For any urgent concerns, please contact our 24 hour nurse triage line: [phone])       Laura Stahl RN

## 2024-11-21 ENCOUNTER — TRANSFERRED RECORDS (OUTPATIENT)
Dept: HEALTH INFORMATION MANAGEMENT | Facility: CLINIC | Age: 44
End: 2024-11-21

## 2024-11-21 ENCOUNTER — OFFICE VISIT (OUTPATIENT)
Dept: CARDIOLOGY | Facility: CLINIC | Age: 44
End: 2024-11-21
Payer: COMMERCIAL

## 2024-11-21 VITALS
RESPIRATION RATE: 20 BRPM | DIASTOLIC BLOOD PRESSURE: 88 MMHG | SYSTOLIC BLOOD PRESSURE: 148 MMHG | BODY MASS INDEX: 39.19 KG/M2 | WEIGHT: 281 LBS | HEART RATE: 84 BPM

## 2024-11-21 DIAGNOSIS — I10 ESSENTIAL HYPERTENSION, BENIGN: ICD-10-CM

## 2024-11-21 DIAGNOSIS — I42.9 CARDIOMYOPATHY, UNSPECIFIED TYPE (H): ICD-10-CM

## 2024-11-21 DIAGNOSIS — E66.01 CLASS 2 SEVERE OBESITY DUE TO EXCESS CALORIES WITH SERIOUS COMORBIDITY AND BODY MASS INDEX (BMI) OF 39.0 TO 39.9 IN ADULT (H): Primary | ICD-10-CM

## 2024-11-21 DIAGNOSIS — E78.5 HYPERLIPIDEMIA LDL GOAL <100: ICD-10-CM

## 2024-11-21 DIAGNOSIS — E66.812 CLASS 2 SEVERE OBESITY DUE TO EXCESS CALORIES WITH SERIOUS COMORBIDITY AND BODY MASS INDEX (BMI) OF 39.0 TO 39.9 IN ADULT (H): Primary | ICD-10-CM

## 2024-11-21 RX ORDER — CARVEDILOL 25 MG/1
25 TABLET ORAL 2 TIMES DAILY WITH MEALS
Qty: 180 TABLET | Refills: 3 | Status: SHIPPED | OUTPATIENT
Start: 2024-11-21

## 2024-11-21 NOTE — PATIENT INSTRUCTIONS
It was a pleasure taking part in your care today:    - Monitor blood pressure goal <140/90. Increase carvedilol to 25 mg twice daily.   - Continue other medications   - Continue with healthy diet and exercise  - Follow up in 1 year with Dr. Cantrell    Please call the [facility] clinic with any questions or concerns at [phone].     Tammi Coles PA-C

## 2024-11-21 NOTE — LETTER
11/21/2024    Chely Frye MD  [address]  [facility] [address]    RE: Romeo Chong       Dear Colleague,     I had the pleasure of seeing Romeo Chong in the [facility].    HEART CARE ENCOUNTER CONSULTATON NOTE      [facility]  [phone]      Assessment/Recommendations   Assessment:   Nonischemic cardiomyopathy, reduced RV ejection fraction: 57%, RVEF 46% without evidence of ischemia/infarction, fibrosis, starring, infiltration, valve abnormalities on cardiac MRI 6/2022. Compensated.  Hypertension: Elevated, more often elevated >140 systolic at home. Carvedilol 12.5 mg BID, losartan-HCTZ 100-12.5 mg daily  Obesity, prediabetes: ~50 lb Weight loss on Wegovy in the last 6 months  History of Hodgkin's lymphoma with RLE lymphedema after treatment  Chronic pain      Plan:   Increase carvedilol to 25 mg twice daily.  Continue to monitor blood pressure at home.  Goal less than 140/90.  Congratulated on significant weight loss, continued healthy/low sodium diet and exercise as able      Follow up in 1 year or sooner as needed     History of Present Illness/Subjective    HPI: Romeo Chong is a 44 year old male with PMHx of nonischemic cardiomyopathy with reduced presents for follow-up.    Patient reports doing well overall.  He is lost approximately 50 pounds in the last 6 months on Wegovy.  Continue to have chronic pain in his back due to lumbar disc changes, recently had a fall that exacerbated this.  He had a high left ankle sprain a while ago that continues to be painful.  Chronic right lower extremity lymphedema following Hodgkin's lymphoma treatment which is stable.  Some chest wall tenderness with palpation that comes and goes likely MSK, this has been present for several years.  Some intermittent shortness of breath climbing stairs to his duplex home that is worse in the colder months typically, did not experience this during the summer. Not  with all exertion. He was once tested for sleep apnea, subsequently had adenoidectomy and tonsillectomy.  His wife monitors him during sleep and does not report any apnea episodes, mild snoring.    He tries to stay active walking half a mile to a mile frequently with his dog, it is limited due to back pain and leg pain.  He has been eating more of a pescatarian diet, making diet changes to benefit his health.  His blood pressure at home which is more often elevated greater than 140 systolic.  Diastolic blood pressure variable.    Got  3 months ago.     He denies lightheadedness, orthopnea, PND, palpitations, and abdominal fullness/bloating.      Cardiac MRI 6/2022 results:  1.  Pharmacological Regadenoson stress cardiac MRI is negative for inducible myocardial ischemia.   2.  No evidence of myocardial infarction, focal or diffuse nonvascular scarring or fibrosis, or  infiltrative disease.  3.  Left ventricular size is mildly enlarged. Wall thickness and systolic function are normal. The  quantified left ventricular ejection fraction is 57%.    4.  Right ventricular size is mildly enlarged. Systolic function is mildly reduced.  The quantified right  ventricular ejection fraction is 46%.  5.  Mild left atrial enlargement.  6.  No significant valvular abnormalities.     Physical Examination  Review of Systems   Vitals: BP (!) 148/88 (BP Location: Left arm, Patient Position: Sitting, Cuff Size: Adult Large)   Pulse 84   Resp 20   Wt 127.5 kg (281 lb)   BMI 39.19 kg/m    BMI= Body mass index is 39.19 kg/m .  Wt Readings from Last 3 Encounters:   11/21/24 127.5 kg (281 lb)   11/19/24 127 kg (280 lb)   05/30/24 149.7 kg (330 lb)           ENT/Mouth: membranes moist, no oral lesions or bleeding gums.      EYES:  no scleral icterus, normal conjunctivae                    Neck: No carotid bruit or thyromegaly   Chest/Lungs:   lungs are clear to auscultation, no rales or wheezing, equal chest wall expansion     Cardiovascular:   Regular. Normal first and second heart sounds with no murmurs, rubs, or gallops; the radial  pulses are intact, RLE edema, LLE without edema       Extremities: no cyanosis or clubbing   Skin: no xanthelasma, warm.    Neurologic:  no tremors     Psychiatric: alert and oriented x3, calm        Please refer above for cardiac ROS details.        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 Discovered in right tibia and femur at 13 years old.  He had surgery when  he was 14 years old.   Last Assessment & Plan:  Formatting of this note might be different from the original. Diagnosed at 13, 14 surgeries since that time   Formatting of this note might be different from the original. Formatting of this note might be different      Gastric ulcer, unspecified chronicity, unspecified whether gastric ulcer hemorrhage or perforation present 5/6/2021     H/O Hodgkin's lymphoma 5/6/2021     Hyperlipidemia      Hypertension      Hypertriglyceridemia 5/8/2011     Severe sepsis with acute organ dysfunction due to group B Streptococcus (H) 10/18/2021     Stress hyperglycemia 4/26/2009     Past Surgical History:   Procedure Laterality Date     FRACTURE SURGERY       HC REMOVAL HEEL SPUR, CALCANEUS Left 6/25/2021    Procedure: EXCISION, BONE SPUR, FOOT, WITH PLANTAR FASCIA RELEASE;  Surgeon: Stephon Singleton DPM;  Location: [facility] [place];  Service: Podiatry     TONSILLECTOMY, ADENOIDECTOMY ADULT, COMBINED       Family History   Problem Relation Age of Onset     Cirrhosis Mother      Hypertension Mother      Diabetes Type 2  Father      Kidney failure Father       Cerebrovascular Disease Father      Hypertension Father      Cerebrovascular Disease Sister      Hypertension Paternal Grandmother      Hypertension Paternal Grandfather         Social History     Socioeconomic History     Marital status: Single     Spouse name: Not on file     Number of children: Not on file     Years of education: Not on file     Highest education level: Not on file   Occupational History     Not on file   Tobacco Use     Smoking status: Former     Current packs/day: 0.00     Average packs/day: 1 pack/day for 10.0 years (10.0 ttl pk-yrs)     Types: Cigarettes     Start date: 1999     Quit date: 2009     Years since quitting: 15.5     Smokeless tobacco: Never   Vaping Use     Vaping status: Never Used   Substance and Sexual Activity     Alcohol use: Not Currently     Drug use: Never     Sexual activity: Not Currently   Other Topics Concern     Not on file   Social History Narrative    , 3 children, 1  recently. Non smoker. Socially drinks alcohol. Not working.      Social Drivers of Health     Financial Resource Strain: Not on file   Food Insecurity: No Food Insecurity (10/19/2021)    [facility]    Hunger Vital Sign      Worried About Running Out of Food in the Last Year: Never true      Ran Out of Food in the Last Year: Never true   Transportation Needs: No Transportation Needs (10/19/2021)    [facility]    PRAPARE - Transportation      Lack of Transportation (Medical): No      Lack of Transportation (Non-Medical): No   Physical Activity: Not on file   Stress: Not on file   Social Connections: Not on file   Interpersonal Safety: Low Risk  (2024)    Interpersonal Safety      Do you feel physically and emotionally safe where you currently live?: Yes      Within the past 12 months, have you been hit, slapped, kicked or otherwise physically hurt by someone?: No      Within the past 12 months, have  you been humiliated or emotionally abused in other ways by your partner or ex-partner?: No   Housing Stability: Low Risk  (10/19/2021)    [facility]    Housing Stability Vital Sign      Unable to Pay for Housing in the Last Year: No      Number of Places Lived in the Last Year: 1      Unstable Housing in the Last Year: No           Medications  Allergies   Current Outpatient Medications   Medication Sig Dispense Refill     blood glucose (NO BRAND SPECIFIED) test strip Use to test blood sugar daily or as directed. To accompany: Blood Glucose Monitor Brands: per insurance. 100 strip 6     blood glucose monitoring (NO BRAND SPECIFIED) meter device kit Use to test blood sugar daily or as directed. Preferred blood glucose meter OR supplies to accompany: Blood Glucose Monitor Brands: per insurance. 1 kit 0     carvedilol (COREG) 12.5 MG tablet TAKE 1 TABLET(12.5 MG) BY MOUTH TWICE DAILY WITH MEALS 180 tablet 0     EPINEPHrine (ANY BX GENERIC EQUIV) 0.3 MG/0.3ML injection 2-pack Inject 0.3 mLs (0.3 mg) into the muscle as needed for anaphylaxis May repeat one time in 5-15 minutes if response to initial dose is inadequate. 2 each 0     famotidine (PEPCID) 40 MG tablet Take 1 tablet (40 mg) by mouth 2 times daily. 180 tablet 3     HYDROcodone-acetaminophen (NORCO)  MG per tablet Take 1-2 tablets by mouth 3 times daily as needed for pain 180 tablet 0     HYDROmorphone (DILAUDID) 2 MG tablet Take 1 tablet (2 mg) by mouth every 6 hours as needed for pain 3 tablet 0     losartan-hydrochlorothiazide (HYZAAR) 100-12.5 MG tablet Take 1 tablet by mouth daily. **Due for follow up with ** 90 tablet 0     metFORMIN (GLUCOPHAGE) 500 MG tablet Take 2 tablets (1,000 mg) by mouth 2 times daily (with meals) 360 tablet 1     methocarbamol (ROBAXIN) 500 MG tablet TAKE 1/2 TABLET TO 2 TABLETS BY ORAL ROUTE 4 TIMES EVERY DAY AS NEEDED FOR MUSCLE SPASMS       Semaglutide-Weight Management  "(WEGOVY) 1 MG/0.5ML pen Inject 1 mg subcutaneously once a week. 2 mL 2     thin (NO BRAND SPECIFIED) lancets Use with lanceting device. To accompany: Blood Glucose Monitor Brands: per insurance. 100 each 6       Allergies   Allergen Reactions     Vancomycin Anaphylaxis     Peanut Oil Itching     Tree Nuts [Nuts] Itching     Erythromycin Unknown     Other Environmental Allergy Unknown     DUST     Pollen Extracts [Pollen Extract] Unknown     Zosyn [Piperacillin-Tazobactam In Dex] Tinnitus and Itching     Latex Itching and Rash     Added based on information entered during case entry, please review and add reactions, type, and severity as needed     Oxycodone Itching and Rash          Lab Results    Chemistry/lipid CBC Cardiac Enzymes/BNP/TSH/INR   Recent Labs   Lab Test 05/30/24  0853   CHOL 198   HDL 35*   *   TRIG 155*     Recent Labs   Lab Test 05/30/24  0853 02/09/23  1204   * 136*     Recent Labs   Lab Test 05/30/24  0853      POTASSIUM 4.4   CHLORIDE 102   CO2 28   *   BUN 8.5   CR 0.64*   GFRESTIMATED >90   NATALYA 9.6     Recent Labs   Lab Test 05/30/24  0853 05/03/23  1540 02/09/23  1204   CR 0.64* 0.64* 0.61*     Recent Labs   Lab Test 05/30/24  0853   A1C 6.1*          Recent Labs   Lab Test 02/09/23  1204   WBC 5.9   HGB 14.0   HCT 43.0   MCV 79        Recent Labs   Lab Test 02/09/23  1204 11/02/22  1534 09/29/22  1537   HGB 14.0 13.2* 13.7    Recent Labs   Lab Test 05/27/22  1525   TROPONINI <0.01     Recent Labs   Lab Test 05/27/22  1525   BNP <10     No results for input(s): \"TSH\" in the last 56477 hours.  Recent Labs   Lab Test 07/08/22  1038 05/27/22  1525   INR 1.08 1.05          This note has been dictated using voice recognition software. Any grammatical, typographical, or context distortions are unintentional and inherent to the software    Tammi Coles PA-C                                         Thank you for allowing me to participate in the care of your " patient.      Sincerely,     Tammi Lunsford PA-C     [facility]  cc:   Chely Frye MD  [address]  [place] [address]

## 2024-11-25 ENCOUNTER — TELEPHONE (OUTPATIENT)
Dept: FAMILY MEDICINE | Facility: CLINIC | Age: 44
End: 2024-11-25
Payer: COMMERCIAL

## 2024-11-25 NOTE — TELEPHONE ENCOUNTER
Reason for Call:  Medication or medication refill:    Do you use a [facility]? No  Name of the pharmacy and phone number for the current request:      [id number] - [facility] [address]       Name of the medication requested:     Disp Refills Start End SHAKIRA    Semaglutide-Weight Management (WEGOVY) 1 MG/0.5ML pen 2 mL 2 10/28/2024 -- No   Sig - Route: Inject 1 mg subcutaneously once a week. - Subcutaneous   Sent to pharmacy as: Semaglutide-Weight Management 1 MG/0.5ML Subcutaneous Solution Auto-injector (WEGOVY)       Other request: Patient is unable to  refill for Wegovy. Pharmacy advised patient to contact clinic, insurance will no longer cover dose of 1 mg. Time to increase dose to 1.7.  Today is the day he is supposed to take medication.     Can we leave a detailed message on this number? YES    Phone number patient can be reached at: Cell number on file:    Telephone Information:   Mobile [phone]       Best Time: any    Call taken on 11/25/2024 at 1:28 PM by Aubrie Riggins

## 2024-11-25 NOTE — TELEPHONE ENCOUNTER
For any change in dosage or new recommendations we need to discuss the plan. Please schedule a follow up visit ( Video visit will do) to discuss the plan of care.

## 2024-11-26 ENCOUNTER — TELEPHONE (OUTPATIENT)
Dept: FAMILY MEDICINE | Facility: CLINIC | Age: 44
End: 2024-11-26
Payer: COMMERCIAL

## 2024-12-02 ENCOUNTER — VIRTUAL VISIT (OUTPATIENT)
Dept: FAMILY MEDICINE | Facility: CLINIC | Age: 44
End: 2024-12-02
Payer: COMMERCIAL

## 2024-12-02 DIAGNOSIS — E66.812 CLASS 2 SEVERE OBESITY DUE TO EXCESS CALORIES WITH SERIOUS COMORBIDITY AND BODY MASS INDEX (BMI) OF 39.0 TO 39.9 IN ADULT (H): ICD-10-CM

## 2024-12-02 DIAGNOSIS — E66.01 CLASS 2 SEVERE OBESITY DUE TO EXCESS CALORIES WITH SERIOUS COMORBIDITY AND BODY MASS INDEX (BMI) OF 39.0 TO 39.9 IN ADULT (H): ICD-10-CM

## 2024-12-02 DIAGNOSIS — R73.03 PREDIABETES: Primary | ICD-10-CM

## 2024-12-02 PROCEDURE — G2211 COMPLEX E/M VISIT ADD ON: HCPCS | Mod: 95 | Performed by: FAMILY MEDICINE

## 2024-12-02 PROCEDURE — 99213 OFFICE O/P EST LOW 20 MIN: CPT | Mod: 95 | Performed by: FAMILY MEDICINE

## 2024-12-02 NOTE — PROGRESS NOTES
"Romeo is a 44 year old who is being evaluated via a billable video visit.    How would you like to obtain your AVS? MyChart  If the video visit is dropped, the invitation should be resent by: Text to cell phone: [phone]  Will anyone else be joining your video visit? No      Assessment & Plan     Prediabetes  Going well on Wegovy without intolerance or side effects   Having a hard time with plan coverage on 1 mg.    He is continuing to benefit with weight loss and  ready for the step up to 1.7 mg .     Plan:   - Semaglutide-Weight Management (WEGOVY) 1.7 MG/0.75ML pen; Inject 1.7 mg subcutaneously once a week.  Rechedck HgA1c in a month   Follow up in a month     Class 2 severe obesity due to excess calories with serious comorbidity and body mass index (BMI) of 39.0 to 39.9 in adult (H)          BMI  Estimated body mass index is 39.19 kg/m  as calculated from the following:    Height as of 11/19/24: 1.803 m (5' 11\").    Weight as of 11/21/24: 127.5 kg (281 lb).     The longitudinal plan of care for the diagnosis(es)/condition(s) as documented were addressed during this visit. Due to the added complexity in care, I will continue to support Romeo in the subsequent management and with ongoing continuity of care.          Subjective   Romeo is a 44 year old, presenting for the following health issues:  Medication (Discuss Wegovy medication. )        10/28/2024    10:24 AM   Additional Questions   Roomed by Idania GALLARDO CMA       Video Start Time: 9:00 AM    History of Present Illness       Diabetes:   He presents for follow up of diabetes.  He is checking home blood glucose two times daily.   He checks blood glucose before and after meals.  Blood glucose is never over 200 and never under 70. He is aware of hypoglycemia symptoms including shakiness and dizziness.    He has no concerns regarding his diabetes at this time.  He is having numbness in feet.            He eats 2-3 servings of fruits and vegetables " "daily.He consumes 0 sweetened beverage(s) daily.He exercises with enough effort to increase his heart rate 60 or more minutes per day.  He exercises with enough effort to increase his heart rate 4 days per week.   He is taking medications regularly.         BP Readings from Last 2 Encounters:   11/21/24 (!) 148/88   11/19/24 (!) 161/109     Hemoglobin A1C (%)   Date Value   05/30/2024 6.1 (H)     LDL Cholesterol Calculated (mg/dL)   Date Value   05/30/2024 132 (H)   02/09/2023 136 (H)         How many servings of fruits and vegetables do you eat daily?  2-3  On average, how many sweetened beverages do you drink each day (Examples: soda, juice, sweet tea, etc.  Do NOT count diet or artificially sweetened beverages)?   0  How many days per week do you exercise enough to make your heart beat faster? 4  How many minutes a day do you exercise enough to make your heart beat faster? 60 or more  How many days per week do you miss taking your medication? 0            Objective    Vitals - Patient Reported  Weight (Patient Reported): 124.7 kg (275 lb)  Height (Patient Reported): 180.3 cm (5' 11\")  BMI (Based on Pt Reported Ht/Wt): 38.35  Pain Score: Severe Pain (6)  Pain Loc: Low Back      Vitals:  No vitals were obtained today due to virtual visit.            Video-Visit Details    Type of service:  Video Visit   Video End Time:9:11 AM  Originating Location (pt. Location): Home    Distant Location (provider location):  On-site  Platform used for Video Visit: Joselyn  Signed Electronically by: Chely Frye MD    "

## 2024-12-02 NOTE — ASSESSMENT & PLAN NOTE
Going well on Wegovy without intolerance or side effects   Having a hard time with plan coverage on 1 mg.    He is continuing to benefit with weight loss and  ready for the step up to 1.7 mg .     Plan:   - Semaglutide-Weight Management (WEGOVY) 1.7 MG/0.75ML pen; Inject 1.7 mg subcutaneously once a week.    Continue on metformin as is  Rechedck HgA1c in a month   Follow up in a month

## 2024-12-11 ENCOUNTER — HOSPITAL ENCOUNTER (OUTPATIENT)
Dept: NUTRITION | Facility: CLINIC | Age: 44
Discharge: HOME | End: 2024-12-11
Admitting: FAMILY MEDICINE
Payer: COMMERCIAL

## 2024-12-11 PROCEDURE — 97803 MED NUTRITION INDIV SUBSEQ: CPT | Mod: GT,95 | Performed by: DIETITIAN, REGISTERED

## 2024-12-11 NOTE — PROGRESS NOTES
[facility]  Medical Nutrition Therapy     Visit Type: Re-Assessment     Romeo Chong, 43 year old referred by Chely Frye MD  for MNT related to E66.01, Z68.41 (ICD-10-CM) - Class 3 severe obesity with serious comorbidity and body mass index (BMI) of 40.0 to 44.9 in adult, unspecified obesity type (H)      Virtual Visit Details     Type of service:  Video Visit   Video Start Time:  11:05  Video End Time: 11:20    Originating Location (pt. Location): Home     Distant Location (provider location):  On-site  Platform used for Video Visit: [employer]         Nutrition Assessment:  Anthropometrics  Height:       Ht Readings from Last 1 Encounters:   24 1.829 m (6')           BMI:  44.8   Weight Status:  Obesity Grade III BMI >40   Weight:       Wt Readings from Last 1 Encounters:   24 149.7 kg (330 lb)        UBW: 330 lb       IBW:  81 kg (178 lb) IBW %: 185%         Weight History:   Wt Readings from Last 10 Encounters:   24 127.5 kg (281 lb)   24 127 kg (280 lb)   24 149.7 kg (330 lb)   24 145.2 kg (320 lb)   24 (!) 152.9 kg (337 lb)   24 142.4 kg (313 lb 14.4 oz)   23 142 kg (313 lb)   23 144.2 kg (318 lb)   23 141.5 kg (312 lb)   23 141.5 kg (312 lb)   -345 lb in   -2024 wt: 294 lb on home scale.     -Currently at 275 lb now. Down 62 lb so far in 7 months.   -Wast at 345 lb at one point. Lost 70 lb so far.      Goal Weight:   -240 lb personal goal. Weighed this amount 13 years ago.      Nutrition History     PMH:   Last visit with patient was on 24. Nutrition goals set at that time were the followin) Go for a walk with the dogs three times per day (15-20 min each walk)  -Goes for walks with the dogs. It might be more challenging this winter because she likes to pull and it's slippery. Every morning walks for 20-30 mins. Plans to do some dumbbell exercises if the weather is poor. Out and about every  day with work client. They go grocery shopping and shovels the property.   2) Increase protein intake to 81-97 g per day   -Feels that he's doing well with protein intake.   3) Track calories and aim for 2,000-2,200 calories per day  -Hasn't been tracking calories. With Wegovy he's able to listen to his body and knows when to stop eating.   4) Check weight at home once per week and goal is 2 lb wt loss per week   -Met this goal.          Patient Active Problem List   Diagnosis    Edema    Fibrous dysplasia of bone    Essential hypertension, benign    Hyperlipidemia LDL goal <100    Lymphedema    Obesity, unspecified    Stress hyperglycemia    Vitamin D deficiency    Allergic rhinitis due to animals    Nonintractable headache, unspecified chronicity pattern, unspecified headache type    Chronic pain syndrome    Gastric ulcer, unspecified chronicity, unspecified whether gastric ulcer hemorrhage or perforation present    Chronic low back pain, unspecified back pain laterality, unspecified whether sciatica present    H/O Hodgkin's lymphoma    Calcaneal spur, left    Tachycardia    Cellulitis of right lower extremity    Elevated lactic acid level    Gram-positive bacteremia    Osteomyelitis, unspecified site, unspecified type (H)    Gastroesophageal reflux disease without esophagitis    Lymphadenopathy    Mediastinal mass    Murder of relative    Peanut allergy    Secondary lymphedema    Pain in joint, ankle and foot, left    Encounter for weight management       Labs: Reviewed    Meds:   Current Outpatient Medications   Medication Instructions    blood glucose (NO BRAND SPECIFIED) test strip Use to test blood sugar daily or as directed. To accompany: Blood Glucose Monitor Brands: per insurance.    blood glucose monitoring (NO BRAND SPECIFIED) meter device kit Use to test blood sugar daily or as directed. Preferred blood glucose meter OR supplies to accompany: Blood Glucose Monitor Brands: per insurance.    carvedilol  (COREG) 25 mg, Oral, 2 TIMES DAILY WITH MEALS    EPINEPHrine (ANY BX GENERIC EQUIV) 0.3 mg, Intramuscular, PRN, May repeat one time in 5-15 minutes if response to initial dose is inadequate.    famotidine (PEPCID) 40 mg, Oral, 2 TIMES DAILY    HYDROcodone-acetaminophen (NORCO)  MG per tablet 1-2 tablets, Oral, 3 TIMES DAILY PRN    HYDROmorphone (DILAUDID) 2 mg, Oral, EVERY 6 HOURS PRN    losartan-hydrochlorothiazide (HYZAAR) 100-12.5 MG tablet TAKE 1 TABLET BY MOUTH DAILY. FOLLOW UP WITH.WONG**    metFORMIN (GLUCOPHAGE) 1,000 mg, Oral, 2 TIMES DAILY WITH MEALS    methocarbamol (ROBAXIN) 500 MG tablet TAKE 1/2 TABLET TO 2 TABLETS BY ORAL ROUTE 4 TIMES EVERY DAY AS NEEDED FOR MUSCLE SPASMS    Semaglutide-Weight Management (WEGOVY) 1 mg, Subcutaneous, WEEKLY    Semaglutide-Weight Management (WEGOVY) 1.7 mg, Subcutaneous, WEEKLY    thin (NO BRAND SPECIFIED) lancets Use with lanceting device. To accompany: Blood Glucose Monitor Brands: per insurance.         Supplements: reviewed        Social/Environmental:   -average sleep per night: not discussed  -level of stress: not discussed        Food Record  Breakfast: Wakes up at 5:00, 6-7:00 am: eggs and a croissant   Snack: none  Lunch: 12-1:00 pm: granola bar and oranges (not until Wednesday)   Snack: none  Dinner: Eating earlier due to medication timing. Hashbrowns or something cooked the night before, involves shrimp, erin rice OR pasta 1 cup, 1 cup of fruit OR sometimes baked chicken sometimes   Snack: 4 Twizzlers, SF popsicle   Beverages: Water all day, once in a while SF ginger ale, SF Gatorade  -Take-out once per week- [employer] (rice, beans, tortilla, cheese) or [employer] (wrap 230-300 kcal)   -Pescatarian style diet - has been following this for years       Nutritional Details:   -Food allergies: peanuts, tree nuts  -Food intolerances: none  -Food sensitivities: salmon causes drowsiness   -GI concerns:  none, uses Metamucil of Milk of Magnesium if  needed  -Appetite: good, lower than usual   -Pace of eating: moderate or fast   -Role of cooking: self   -Role of food shopping: self         Physical Activity:  -Has a disability and limited mobility. Takes longer walks with dogs now that he's lost weight.   -Tracks steps and gets in 3,000+ steps per day. Max is 10,000-12,000 steps per day      ASSESSED MALNUTRITION STATUS  % Weight Loss:  > 7.5% in 3 months (severe malnutrition)  % Intake:  Decreased intake does not meet criteria for malnutrition (still meeting nutrition needs for weight loss)  Subcutaneous Fat Loss:  None observed  Loss of Muscle Mass:  None observed  Fluid Retention:  None noted     Malnutrition Diagnosis:  Patient does not meet two of the above criteria necessary for diagnosing malnutrition           Nutrition Diagnosis:  Food and nutrition-related knowledge deficit related to limited prior exposure to nutrition related information as evidenced by BMI 44.8, unbalanced dietary intake, meeting <75% protein needs, and on a weight loss medication.           Nutrition Intervention:  Nutrition Prescription Summary: MNT for Weight Management       Nutrition Education (Content):  -Discussed goals set at last visit and commended patient for reaching them.   -Discussed the importance of consuming protein with each mini meal/snack - discussed protein options  -Encouraged patient to stay active over the winter      Emailed/mailed information discussed including nutrition handouts to patient.         Nutrition Prescription: Macronutrient and Micronutrient details   Dosing weight: Current wt (150 kg) for energy and fluids, IBW (81 kg) for protein   Energy: 7549-5429 kcals/day ([place] [facility] Jeor)     Justification:  (obesity, to promote a 2 lb wt loss per week)    Protein: 81-97 g Pro/day (1-1.2 g pro/Kg)     Justification: (obesity guidelines)    Fluid: 2,000-2,200 mL/day   (1 mL/Kcal)     Justification:  (obese)   Fiber:     Men (19-50 years): 38 grams per  day          Carbohydrate: 45-65% kcal   <25 g added sugar/day       Fat: 20-35% kcal  <7% kcal from saturated fat   Micronutrient: DRI  <2,300 mg sodium/day                Vitamin and Mineral Supplements: n/a        Patient Engagement:   Assessed learning needs and learning preference: Yes.  Teaching Method(s) used: Explanation  Video     Nutrition Education (Application):  a)  Discussed current eating plans / recommended alternative food choices     b)  Patient verbalizes understanding of diet by asking questions       Anticipate >75% compliance   Stage of Change:  action        Nutrition Goals:  1) Go for a walk with the dogs three times per day (15-20 min each walk)  2) Increase protein intake to 81-97 g per day   3) Check weight at home once per week and goal is 2 lb wt loss per week      Nutrition Follow Up / Monitoring:   Weight, PO intake, PA        Nutrition Recommendations:  Patient to follow-up with RD in 12 weeks.  Patient has RD contact information to call/email if needed.        Start time: 11:05  End time: 11:20     Total Time Duration: 15 min        Mariann Antonio MS, RD, LD, [facility]  Clinical Dietitian  [phone]

## 2024-12-20 ENCOUNTER — TRANSFERRED RECORDS (OUTPATIENT)
Dept: HEALTH INFORMATION MANAGEMENT | Facility: CLINIC | Age: 44
End: 2024-12-20
Payer: COMMERCIAL

## 2024-12-31 DIAGNOSIS — R73.03 PREDIABETES: ICD-10-CM

## 2024-12-31 NOTE — TELEPHONE ENCOUNTER
FAX [facility] Prescription Refill Request 12/31/2024 1:22 PM    Semaglutide-Weight Management (WEGOVY) 1.7 MG/0.75ML pen     Last Visit with PCP:  12/02/2024  Next Visit with PCP: nothing scheduled

## 2025-01-17 ENCOUNTER — TRANSFERRED RECORDS (OUTPATIENT)
Dept: HEALTH INFORMATION MANAGEMENT | Facility: CLINIC | Age: 45
End: 2025-01-17
Payer: COMMERCIAL

## 2025-02-19 ENCOUNTER — TRANSFERRED RECORDS (OUTPATIENT)
Dept: HEALTH INFORMATION MANAGEMENT | Facility: CLINIC | Age: 45
End: 2025-02-19
Payer: COMMERCIAL

## 2025-02-23 ENCOUNTER — TRANSFERRED RECORDS (OUTPATIENT)
Dept: MULTI SPECIALTY CLINIC | Facility: CLINIC | Age: 45
End: 2025-02-23

## 2025-02-23 LAB — RETINOPATHY: NORMAL

## 2025-03-11 ENCOUNTER — OFFICE VISIT (OUTPATIENT)
Dept: INTERNAL MEDICINE | Facility: CLINIC | Age: 45
End: 2025-03-11
Payer: COMMERCIAL

## 2025-03-11 VITALS
HEIGHT: 71 IN | WEIGHT: 290.6 LBS | TEMPERATURE: 97.8 F | SYSTOLIC BLOOD PRESSURE: 127 MMHG | RESPIRATION RATE: 16 BRPM | DIASTOLIC BLOOD PRESSURE: 84 MMHG | BODY MASS INDEX: 40.68 KG/M2 | HEART RATE: 73 BPM | OXYGEN SATURATION: 98 %

## 2025-03-11 DIAGNOSIS — M25.572 PAIN IN JOINT, ANKLE AND FOOT, LEFT: ICD-10-CM

## 2025-03-11 DIAGNOSIS — K21.9 GASTROESOPHAGEAL REFLUX DISEASE WITHOUT ESOPHAGITIS: ICD-10-CM

## 2025-03-11 DIAGNOSIS — G89.4 CHRONIC PAIN SYNDROME: ICD-10-CM

## 2025-03-11 DIAGNOSIS — R73.03 PREDIABETES: ICD-10-CM

## 2025-03-11 DIAGNOSIS — I42.9 CARDIOMYOPATHY, UNSPECIFIED TYPE (H): ICD-10-CM

## 2025-03-11 DIAGNOSIS — E66.09 CLASS 2 OBESITY DUE TO EXCESS CALORIES WITHOUT SERIOUS COMORBIDITY WITH BODY MASS INDEX (BMI) OF 35.0 TO 35.9 IN ADULT: ICD-10-CM

## 2025-03-11 DIAGNOSIS — E66.812 CLASS 2 OBESITY DUE TO EXCESS CALORIES WITHOUT SERIOUS COMORBIDITY WITH BODY MASS INDEX (BMI) OF 35.0 TO 35.9 IN ADULT: ICD-10-CM

## 2025-03-11 DIAGNOSIS — I10 ESSENTIAL HYPERTENSION, BENIGN: ICD-10-CM

## 2025-03-11 DIAGNOSIS — Z01.818 PREOP GENERAL PHYSICAL EXAM: Primary | ICD-10-CM

## 2025-03-11 PROCEDURE — 3074F SYST BP LT 130 MM HG: CPT

## 2025-03-11 PROCEDURE — 1125F AMNT PAIN NOTED PAIN PRSNT: CPT

## 2025-03-11 PROCEDURE — 99214 OFFICE O/P EST MOD 30 MIN: CPT

## 2025-03-11 PROCEDURE — 3079F DIAST BP 80-89 MM HG: CPT

## 2025-03-11 ASSESSMENT — PAIN SCALES - GENERAL: PAINLEVEL_OUTOF10: SEVERE PAIN (7)
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
## 2025-03-11 NOTE — PATIENT INSTRUCTIONS
How to Take Your Medication Before Surgery  Preoperative Medication Instructions   Antiplatelet or Anticoagulation Medication Instructions   - We reviewed the medication list and the patient is not on an antiplatelet or anticoagulation medications.    Additional Medication Instructions   - Herbal medications and vitamins: DO NOT TAKE 14 days prior to surgery.   - ACE/ARB/ARNI (lisinopril, enalapril, losartan, valsartan, olmesartan, sacubritril/valsartan) : Continue without modification (e.g., MAC anesthesia, neurosurgery, spine surgery, heart failure, or labile hypertension with risk of hypertension).   - Beta Blockers (atenolol, metoprolol, propranolol) : Continue taking on the day of surgery.   - metformin: DO NOT TAKE day of surgery.   - GLP-1 Injectable (exenitide, liraglutide, semaglutide, dulaglutide, etc.): DO NOT TAKE 7 days before surgery    - Opioids: Continue without modification.   956}   - diclofenac (Voltaren): DO NOT TAKE for 3 days for high bleeding risk or PRN use.   - ibuprofen (Advil, Motrin): DO NOT TAKE 1 day before surgery.    - naproxen (Aleve, Naprosyn): DO NOT TAKE 4 days before surgery.    - SSRIs, SNRIs, TCAs, Antipsychotics: Continue without modification.        Patient Education   Preparing for Your Surgery  For Adults  Getting started  In most cases, a nurse will call to review your health history and instructions. They will give you an arrival time based on your scheduled surgery time. Please be ready to share:  Your doctor's clinic name and phone number  Your medical, surgical, and anesthesia history  A list of allergies and sensitivities  A list of medicines, including herbal treatments and over-the-counter drugs  Whether the patient has a legal guardian (ask how to send us the papers in advance)  Note: You may not receive a call if you were seen at our PAC (Preoperative Assessment Center).  Please tell us if you're pregnant--or if there's any chance you might be pregnant. Some  surgeries may injure a fetus (unborn baby), so they require a pregnancy test. Surgeries that are safe for a fetus don't always need a test, and you can choose whether to have one.   Preparing for surgery  Within 10 to 30 days of surgery: Have a pre-op exam (sometimes called an H&P, or History and Physical). This can be done at a clinic or pre-operative center.  If you're having a , you may not need this exam. Talk to your care team.  At your pre-op exam, talk to your care team about all medicines you take. (This includes CBD oil and any drugs, such as THC, marijuana, and other forms of cannabis.) If you need to stop any medicine before surgery, ask when to start taking it again.  This is for your safety. Many medicines and drugs can make you bleed too much during surgery. Some change how well surgery (anesthesia) drugs work.  Call your insurance company to let them know you're having surgery. (If you don't have insurance, call [phone].)  Call your clinic if there's any change in your health. This includes a scrape or scratch near the surgery site, or any signs of a cold (sore throat, runny nose, cough, rash, fever).  Eating and drinking guidelines  For your safety: Unless your surgeon tells you otherwise, follow the guidelines below.  Eat and drink as normal until 8 hours before you arrive for surgery. After that, no food or milk. You can spit out gum when you arrive.  Drink clear liquids until 2 hours before you arrive. These are liquids you can see through, like water, Gatorade, and Propel Water. They also include plain black coffee and tea (no cream or milk).  No alcohol for 24 hours before you arrive. The night before surgery, stop any drinks that contain THC.  If your care team tells you to take medicine on the morning of surgery, it's okay to take it with a sip of water. No other medicines or drugs are allowed (including CBD oil)--follow your care team's instructions.  If you have questions the  day of surgery, call your hospital or surgery center.   Preventing infection  Shower or bathe the night before and the morning of surgery. Follow the instructions your clinic gave you. (If no instructions, use regular soap.)  Don't shave or clip hair near your surgery site. We'll remove the hair if needed.  Don't smoke or vape the morning of surgery. No chewing tobacco for 6 hours before you arrive. A nicotine patch is okay. You may spit out nicotine gum when you arrive.  For some surgeries, the surgeon will tell you to fully quit smoking and nicotine.  We will make every effort to keep you safe from infection. We will:  Clean our hands often with soap and water (or an alcohol-based hand rub).  Clean the skin at your surgery site with a special soap that kills germs.  Give you a special gown to keep you warm. (Cold raises the risk of infection.)  Wear hair covers, masks, gowns, and gloves during surgery.  Give antibiotic medicine, if prescribed. Not all surgeries need this medicine.  What to bring on the day of surgery  Photo ID and insurance card  Copy of your health care directive, if you have one  Glasses and hearing aids (bring cases)  You can't wear contacts during surgery  Inhaler and eye drops, if you use them (tell us about these when you arrive)  CPAP machine or breathing device, if you use them  A few personal items, if spending the night  If you have . . .  A pacemaker, ICD (cardiac defibrillator), or other implant: Bring the ID card.  An implanted stimulator: Bring the remote control.  A legal guardian: Bring a copy of the certified (court-stamped) guardianship papers.  Please remove any jewelry, including body piercings. Leave jewelry and other valuables at home.  If you're going home the day of surgery  You must have a responsible adult drive you home. They should stay with you overnight as well.  If you don't have someone to stay with you, and you aren't safe to go home alone, we may keep you  overnight. Insurance often won't pay for this.  After surgery  If it's hard to control your pain or you need more pain medicine, please call your surgeon's office.  Questions?   If you have any questions for your care team, list them here:   ____________________________________________________________________________________________________________________________________________________________________________________________________________________________________________________________  For informational purposes only. Not to replace the advice of your health care provider. Copyright   2003, 2019 [facility]. All rights reserved. Clinically reviewed by Chivo Neri MD. SMART[employer] 823851 - REV 08/24.

## 2025-03-16 ENCOUNTER — HEALTH MAINTENANCE LETTER (OUTPATIENT)
Age: 45
End: 2025-03-16

## 2025-03-18 ENCOUNTER — TRANSFERRED RECORDS (OUTPATIENT)
Dept: HEALTH INFORMATION MANAGEMENT | Facility: CLINIC | Age: 45
End: 2025-03-18
Payer: COMMERCIAL

## 2025-03-24 NOTE — TELEPHONE ENCOUNTER
The patient was already treated for the gram positive bacteremia while in patient. Is the patient having symptoms? Is referring having additional blood cultures drawn?   Pt [place]. Pt is currently residing with his mother in [place] in a house with 9 steps, +HR. Pt states his wife and children currently reside in [place] in a house.

## 2025-04-10 ENCOUNTER — OFFICE VISIT (OUTPATIENT)
Dept: FAMILY MEDICINE | Facility: CLINIC | Age: 45
End: 2025-04-10
Payer: COMMERCIAL

## 2025-04-10 VITALS
DIASTOLIC BLOOD PRESSURE: 78 MMHG | BODY MASS INDEX: 42.36 KG/M2 | RESPIRATION RATE: 16 BRPM | WEIGHT: 302.6 LBS | HEIGHT: 71 IN | TEMPERATURE: 97.7 F | OXYGEN SATURATION: 97 % | SYSTOLIC BLOOD PRESSURE: 126 MMHG | HEART RATE: 73 BPM

## 2025-04-10 DIAGNOSIS — E78.5 HYPERLIPIDEMIA LDL GOAL <100: ICD-10-CM

## 2025-04-10 DIAGNOSIS — Z31.69 INFERTILITY COUNSELING: ICD-10-CM

## 2025-04-10 DIAGNOSIS — M72.2 PLANTAR FASCIITIS: ICD-10-CM

## 2025-04-10 DIAGNOSIS — Z76.89 ENCOUNTER FOR WEIGHT MANAGEMENT: ICD-10-CM

## 2025-04-10 DIAGNOSIS — K25.9 GASTRIC ULCER, UNSPECIFIED CHRONICITY, UNSPECIFIED WHETHER GASTRIC ULCER HEMORRHAGE OR PERFORATION PRESENT: ICD-10-CM

## 2025-04-10 DIAGNOSIS — I10 ESSENTIAL HYPERTENSION, BENIGN: ICD-10-CM

## 2025-04-10 DIAGNOSIS — Z01.83 BLOOD TYPING ENCOUNTER: ICD-10-CM

## 2025-04-10 DIAGNOSIS — I42.9 CARDIOMYOPATHY, UNSPECIFIED TYPE (H): ICD-10-CM

## 2025-04-10 DIAGNOSIS — E66.813 CLASS 3 SEVERE OBESITY WITH SERIOUS COMORBIDITY AND BODY MASS INDEX (BMI) OF 40.0 TO 44.9 IN ADULT, UNSPECIFIED OBESITY TYPE: ICD-10-CM

## 2025-04-10 DIAGNOSIS — R73.03 PREDIABETES: Primary | ICD-10-CM

## 2025-04-10 LAB
ABO + RH BLD: NORMAL
ALBUMIN SERPL BCG-MCNC: 4.6 G/DL (ref 3.5–5.2)
ALP SERPL-CCNC: 57 U/L (ref 40–150)
ALT SERPL W P-5'-P-CCNC: 20 U/L (ref 0–70)
ANION GAP SERPL CALCULATED.3IONS-SCNC: 13 MMOL/L (ref 7–15)
AST SERPL W P-5'-P-CCNC: 21 U/L (ref 0–45)
BILIRUB SERPL-MCNC: 0.4 MG/DL
BUN SERPL-MCNC: 12.7 MG/DL (ref 6–20)
CALCIUM SERPL-MCNC: 9.8 MG/DL (ref 8.8–10.4)
CHLORIDE SERPL-SCNC: 101 MMOL/L (ref 98–107)
CHOLEST SERPL-MCNC: 208 MG/DL
CREAT SERPL-MCNC: 0.66 MG/DL (ref 0.67–1.17)
EGFRCR SERPLBLD CKD-EPI 2021: >90 ML/MIN/1.73M2
EST. AVERAGE GLUCOSE BLD GHB EST-MCNC: 111 MG/DL
FASTING STATUS PATIENT QL REPORTED: YES
FASTING STATUS PATIENT QL REPORTED: YES
GLUCOSE SERPL-MCNC: 107 MG/DL (ref 70–99)
HBA1C MFR BLD: 5.5 % (ref 0–5.6)
HCO3 SERPL-SCNC: 25 MMOL/L (ref 22–29)
HDLC SERPL-MCNC: 36 MG/DL
LDLC SERPL CALC-MCNC: 155 MG/DL
NONHDLC SERPL-MCNC: 172 MG/DL
POTASSIUM SERPL-SCNC: 4 MMOL/L (ref 3.4–5.3)
PROT SERPL-MCNC: 7.9 G/DL (ref 6.4–8.3)
SODIUM SERPL-SCNC: 139 MMOL/L (ref 135–145)
SPECIMEN EXP DATE BLD: NORMAL
TRIGL SERPL-MCNC: 87 MG/DL

## 2025-04-10 PROCEDURE — 99214 OFFICE O/P EST MOD 30 MIN: CPT | Performed by: FAMILY MEDICINE

## 2025-04-10 PROCEDURE — 3074F SYST BP LT 130 MM HG: CPT | Performed by: FAMILY MEDICINE

## 2025-04-10 PROCEDURE — 80053 COMPREHEN METABOLIC PANEL: CPT | Performed by: FAMILY MEDICINE

## 2025-04-10 PROCEDURE — G2211 COMPLEX E/M VISIT ADD ON: HCPCS | Performed by: FAMILY MEDICINE

## 2025-04-10 PROCEDURE — 80061 LIPID PANEL: CPT | Performed by: FAMILY MEDICINE

## 2025-04-10 PROCEDURE — 36415 COLL VENOUS BLD VENIPUNCTURE: CPT | Performed by: FAMILY MEDICINE

## 2025-04-10 PROCEDURE — 86901 BLOOD TYPING SEROLOGIC RH(D): CPT | Performed by: FAMILY MEDICINE

## 2025-04-10 PROCEDURE — 1125F AMNT PAIN NOTED PAIN PRSNT: CPT | Performed by: FAMILY MEDICINE

## 2025-04-10 PROCEDURE — 86900 BLOOD TYPING SEROLOGIC ABO: CPT | Performed by: FAMILY MEDICINE

## 2025-04-10 PROCEDURE — 83036 HEMOGLOBIN GLYCOSYLATED A1C: CPT | Performed by: FAMILY MEDICINE

## 2025-04-10 PROCEDURE — 3078F DIAST BP <80 MM HG: CPT | Performed by: FAMILY MEDICINE

## 2025-04-10 RX ORDER — FAMOTIDINE 40 MG/1
40 TABLET, FILM COATED ORAL 2 TIMES DAILY
Qty: 180 TABLET | Refills: 3 | Status: SHIPPED | OUTPATIENT
Start: 2025-04-10

## 2025-04-10 ASSESSMENT — PAIN SCALES - GENERAL: PAINLEVEL_OUTOF10: SEVERE PAIN (8)

## 2025-04-10 NOTE — PROGRESS NOTES
1. Prediabetes    2. Essential hypertension, benign    3. Hyperlipidemia LDL goal <100    4. Cardiomyopathy, unspecified type (H)    5. Gastric ulcer, unspecified chronicity, unspecified whether gastric ulcer hemorrhage or perforation present    6. Encounter for weight management    7. Class 3 severe obesity with serious comorbidity and body mass index (BMI) of 40.0 to 44.9 in adult, unspecified obesity type (H)    8. Infertility counseling    9. Plantar fasciitis    10. Blood typing encounter       Orders Placed This Encounter   Procedures    Hemoglobin A1c    Comprehensive metabolic panel    Lipid panel reflex to direct LDL Fasting    ABO and Rh        Plan of Care:    Discussed discontinuation of Wegovy for now. He Will check with his insurance plan for coverage of alternative weight management options.    Review of Metformin Use:   Discussed patient s interest in discontinuing metformin;   Lab reevaluation showed mildly elevated blood glucose and a normal A1c.  At this point, may discontinuing both Wegovy and metformin, with close monitoring of the blood sugar level, with the average goal below 120.   Elevated cholesterol/LDL with a 10-year ASCVD risk score of 3%  Recommending statin (lipid lowering medication) to further mitigate the risk    Continue magnesium sulfate weekly as needed for constipation relief.    Podiatry:   New referral to podiatry to reassess and manage ongoing plantar fasciitis.    Fertility issues:   Begin the fertility evaluation through spouse s OB/GYN clinic; consider male fertility evaluation depending on findings.    The longitudinal plan of care for the diagnosis(es)/condition(s) as documented were addressed during this visit. Due to the added complexity in care, I will continue to support Romeo in the subsequent management and with ongoing continuity of care.    Subjective   Romeo is a 44-year-old male presenting to discuss possible side effects related to Wegovy and concerns  "regarding fertility. He reports experiencing significant fatigue, described as a \"sleepy, narcoleptic-type\" sensation lasting 2-3 days, noted after the injection doses of Wegovy. He has trialed stopping and restarting the medication, consistently experiencing the same symptoms. He is unsure if this reaction may be due to an interaction with metformin or some other meds , and is interested in discontinuing metformin altogether if an option. Additionally, he reports nausea and constipation while on Wegovy, although he has managed the constipation with weekly magnesium sulfate with some relief.    He is also seeking to revisit podiatry care for plantar fasciitis and may require a new referral.    In terms of fertility issues, Romeo and his partner have been trying to conceive for the past three years without success.           4/10/2025     9:23 AM   Additional Questions   Roomed by Terrell LILLY MA     History of Present Illness       Diabetes:   He presents for follow up of diabetes.  He is checking home blood glucose two times daily.   He checks blood glucose before and after meals.  Blood glucose is never over 200 and never under 70. He is aware of hypoglycemia symptoms including shakiness, dizziness, weakness, lethargy and blurred vision.   He is concerned about other.   He is having excessive thirst.            Reason for visit:  Side effects from meds, refill, checking for fertility    He eats 0-1 servings of fruits and vegetables daily.He consumes 0 sweetened beverage(s) daily.He exercises with enough effort to increase his heart rate 9 or less minutes per day.  He exercises with enough effort to increase his heart rate 3 or less days per week.   He is taking medications regularly.                      Objective    /78   Pulse 73   Temp 97.7  F (36.5  C) (Oral)   Resp 16   Ht 1.803 m (5' 11\")   Wt (!) 137.3 kg (302 lb 9.6 oz)   SpO2 97%   BMI 42.20 kg/m    Body mass index is 42.2 kg/m .  Physical Exam "               Signed Electronically by: Chely Frye MD

## 2025-05-07 ENCOUNTER — TELEPHONE (OUTPATIENT)
Dept: SCHEDULING | Facility: CLINIC | Age: 45
End: 2025-05-07
Payer: COMMERCIAL

## 2025-05-07 ENCOUNTER — TELEPHONE (OUTPATIENT)
Dept: FAMILY MEDICINE | Facility: CLINIC | Age: 45
End: 2025-05-07
Payer: COMMERCIAL

## 2025-05-07 DIAGNOSIS — G89.4 CHRONIC PAIN SYNDROME: Primary | ICD-10-CM

## 2025-05-07 DIAGNOSIS — E11.9 TYPE 2 DIABETES MELLITUS WITHOUT COMPLICATION, WITHOUT LONG-TERM CURRENT USE OF INSULIN (H): ICD-10-CM

## 2025-05-07 NOTE — TELEPHONE ENCOUNTER
Order/Referral Request    Who is requesting: patient    Orders being requested: Pain management    Reason service is needed/diagnosis: Chronic condition    When are orders needed by: Asap    Has this been discussed with Provider: Yes    Does patient have a preference on a Group/Provider/Facility? [facility]    Does patient have an appointment scheduled?: No    Where to send orders: Place orders within Epic    Could we send this information to you in [facility] or would you prefer to receive a phone call?:   Patient would prefer a phone call   Okay to leave a detailed message?: Yes at Cell number on file:    Telephone Information:   Mobile [phone]

## 2025-05-07 NOTE — TELEPHONE ENCOUNTER
FYI - Status Update    Who is Calling: patient    Update: Patient needs new referral to Pain Clinic.  Would like to be seen at [facility] citing continuity of care.  Shares his frustration with [facility] as appointments are frequently cancelled due to provider staffing issues/reschedules.    Patient is wondering if Dr. Fuller would like patient to schedule an appointment with him first?  Shares he just saw PCP recently 04/10/2025.     Does caller want a call/response back: Yes wants to know if he needs appointment to update pain clinic referral to [facility],    Could we send this information to you in [employer] or would you prefer to receive a phone call?:   Patient would like to be contacted via [email or web address] or phone call.

## 2025-05-07 NOTE — TELEPHONE ENCOUNTER
General Call    Contacts       Contact Date/Time Type Contact Phone/Fax    05/07/2025 09:48 AM CDT Phone (Incoming) Romeo Chong (Self) [phone] (M)          Reason for Call:  Medication Update     What are your questions or concerns:  Patient states he was instructed to contact his insurance to find out which GLP medications are covered.  Patient has contacted his insurance and was told with his diabetes diagnosis any GLP is covered but they do require a Prior Authorization. Patient is requesting assistance to get GLP medication. Patient is also requesting Pain Clinic - Central Scheduling created separate encounter.  Will update encounter with additional information provided by patient.     Please also refer to 01/02/2025 Encounter:    Prior Authorization Approval     Medication: WEGOVY 1.7 MG/0.75ML SC SOAJ  Authorization Effective Date: 1/3/2025  Authorization Expiration Date: 1/3/2026  Insurance Company: [employer] - Phone [phone] Fax [phone]  Which Pharmacy is filling the prescription: [id number] [address]  Pharmacy Notified: YES  Patient Notified: **Instructed pharmacy to notify patient when script is ready to /ship.**    Date of last appointment with provider: 04/10/2025    Could we send this information to you in [employer] or would you prefer to receive a phone call?:   Patient would like to be contacted via [employer] or phone call.

## 2025-05-12 ENCOUNTER — TELEPHONE (OUTPATIENT)
Dept: FAMILY MEDICINE | Facility: CLINIC | Age: 45
End: 2025-05-12
Payer: COMMERCIAL

## 2025-05-12 ENCOUNTER — TELEPHONE (OUTPATIENT)
Dept: FAMILY MEDICINE | Facility: CLINIC | Age: 45
End: 2025-05-12

## 2025-05-12 PROBLEM — E11.9 TYPE 2 DIABETES MELLITUS WITHOUT COMPLICATION, WITHOUT LONG-TERM CURRENT USE OF INSULIN (H): Status: ACTIVE | Noted: 2025-05-12

## 2025-05-12 NOTE — TELEPHONE ENCOUNTER
Medication Question or Refill        What medication are you calling about (include dose and sig)?: HYDROcodone-acetaminophen (NORCO)  MG per tablet     Preferred Pharmacy:    [id number] - [place], [place] - [address] [facility]  [address] [facility] [facility] [address]  Phone: [phone] Fax: [phone]      Controlled Substance Agreement on file:   CSA -- Patient Level:     [Media Unavailable] Controlled Substance Agreement - Opioid - Scan on 6/17/2021       Who prescribed the medication?: Pain Clinic advised patient to request refill from PCP.    Per patient he is scheduled at new pain clinic on 06/05/25.    Requesting bridge fill until appointment.  He will be out of medication on May 18, 2025.  Will need refill for 05/18/25 to 06/05/25    Do you need a refill? Yes    When did you use the medication last? today    Patient offered an appointment? No - confirmed appt is scheduled at Pain Clinic for 06/05/25    Do you have any questions or concerns?  No      Could we send this information to you in [facility] or would you prefer to receive a phone call?:   Patient would prefer a phone call   Okay to leave a detailed message?: Yes at Cell number on file:    Telephone Information:   Mobile [phone]

## 2025-05-12 NOTE — TELEPHONE ENCOUNTER
Tele encounter 5/7/25       Patient states he was instructed to contact his insurance to find out which GLP medications are covered.  Patient has contacted his insurance and was told with his diabetes diagnosis any GLP is covered but they do require a Prior Authorization. Patient is requesting assistance to get GLP medication. Patient is also requesting Pain Clinic - Central Scheduling created separate encounter.  Will update encounter with additional information provided by patient.     [facility]    Shabnam BOWLING MA  [facility]

## 2025-05-12 NOTE — TELEPHONE ENCOUNTER
Pt also states that he was supposed to receive the GLP-1 for his diabetes & has not heard about this.     Please call pt back.

## 2025-05-12 NOTE — TELEPHONE ENCOUNTER
"PA needed for GLP medication- Wegovy      Per PCP notes from 4/10      Please see last OV.       \" Discussed discontinuation of Wegovy for now. He Will check with his insurance plan for coverage of alternative weight management options\"           Per telephone enctr 5/7     Patient states he was instructed to contact his insurance to find out which GLP medications are covered.  Patient has contacted his insurance and was told with his diabetes diagnosis any GLP is covered but they do require a Prior Authorization. Patient is requesting assistance to get GLP medication. Patient is also requesting Pain Clinic - Central Scheduling created separate encounter.  Will update encounter with additional information provided by patient.    Sending for PA to be started.     Shabnam BOWLING MA  [facility]     "

## 2025-05-12 NOTE — TELEPHONE ENCOUNTER
PA Initiation    Medication: OZEMPIC (0.25 OR 0.5 MG/DOSE) 2 MG/3ML SC SOPN  Insurance Company: [employer] - Phone [phone] Fax [phone]  Pharmacy Filling the Rx: [id number] [address]  Filling Pharmacy Phone: [phone]  Filling Pharmacy Fax:    Start Date: 5/12/2025  Retail Pharmacy Prior Authorization Team   Phone: [phone]

## 2025-05-12 NOTE — TELEPHONE ENCOUNTER
Spoke to Dr. BEAUCHAMP regarding this. Pt is on Wegovy, but is not tolerating medication well. PCP would like to know if pt wants to try low dose Ozempic or Mounjaro. Pt would like to try Ozempic.       Per pt, and the previous note, PA will be needed no matter what per ins. Will route to PCP to order medication. Please route to pool as high priority so we can initiate PA quickly for pt.       Sal Syed Jr., CMA on 5/12/2025 at 12:06 PM

## 2025-05-12 NOTE — TELEPHONE ENCOUNTER
LMTCB- when pt calls back please relay message before and clarify if pt has reached out to insurance for alternative options.    Shabnam BOWLING MA  [facility]

## 2025-05-12 NOTE — TELEPHONE ENCOUNTER
Please initiate PA for Ozempic only. Wegovy will no longer need one as pt is not tolerating medication well.     Sending as high priority due to pt needing medication for diabetes.      Thank you,  Sal Syed Jr., CMA on 5/12/2025 at 4:21 PM

## 2025-05-12 NOTE — TELEPHONE ENCOUNTER
"Please see last OV.      \" Discussed discontinuation of Wegovy for now. He Will check with his insurance plan for coverage of alternative weight management options\"     "

## 2025-05-13 NOTE — TELEPHONE ENCOUNTER
Spoke with patient.  Relayed message from Dr. Chely Frye. Accepted offer to schedule appointment.  Virtual Video tomorrow 05/14/25 12 Noon.

## 2025-05-13 NOTE — TELEPHONE ENCOUNTER
Prior Authorization Approval    Medication: OZEMPIC (0.25 OR 0.5 MG/DOSE) 2 MG/3ML SC SOPN  Authorization Effective Date: 5/12/2025  Authorization Expiration Date: 5/12/2026  Approved Dose/Quantity:   Reference #:     Insurance Company: [employer] - Phone [phone] Fax [phone]  Expected CoPay: $    CoPay Card Available:      Financial Assistance Needed: No  Which Pharmacy is filling the prescription: [id number] [address]  Pharmacy Notified: Yes  Patient Notified: Instructed pharmacy to notify patient once order is ready.

## 2025-05-14 ENCOUNTER — TELEPHONE (OUTPATIENT)
Dept: FAMILY MEDICINE | Facility: CLINIC | Age: 45
End: 2025-05-14

## 2025-05-14 ENCOUNTER — VIRTUAL VISIT (OUTPATIENT)
Dept: FAMILY MEDICINE | Facility: CLINIC | Age: 45
End: 2025-05-14
Payer: COMMERCIAL

## 2025-05-14 DIAGNOSIS — G89.29 CHRONIC LOW BACK PAIN, UNSPECIFIED BACK PAIN LATERALITY, UNSPECIFIED WHETHER SCIATICA PRESENT: ICD-10-CM

## 2025-05-14 DIAGNOSIS — G89.4 CHRONIC PAIN SYNDROME: ICD-10-CM

## 2025-05-14 DIAGNOSIS — S89.92XA KNEE INJURY, LEFT, INITIAL ENCOUNTER: Primary | ICD-10-CM

## 2025-05-14 DIAGNOSIS — M54.50 CHRONIC LOW BACK PAIN, UNSPECIFIED BACK PAIN LATERALITY, UNSPECIFIED WHETHER SCIATICA PRESENT: ICD-10-CM

## 2025-05-14 PROCEDURE — 1125F AMNT PAIN NOTED PAIN PRSNT: CPT | Mod: 95 | Performed by: FAMILY MEDICINE

## 2025-05-14 PROCEDURE — 98006 SYNCH AUDIO-VIDEO EST MOD 30: CPT | Performed by: FAMILY MEDICINE

## 2025-05-14 RX ORDER — HYDROCODONE BITARTRATE AND ACETAMINOPHEN 10; 325 MG/1; MG/1
1-2 TABLET ORAL 3 TIMES DAILY PRN
Qty: 180 TABLET | Refills: 0 | Status: SHIPPED | OUTPATIENT
Start: 2025-05-14

## 2025-05-14 NOTE — PROGRESS NOTES
"Romeo is a 44 year old who is being evaluated via a billable video visit.    How would you like to obtain your AVS? MyChart  If the video visit is dropped, the invitation should be resent by: Text to cell phone: [phone]  Will anyone else be joining your video visit? No      Assessment & Plan     1. Knee injury, left, initial encounter     Likely soft tissue or internal derangement from recent trauma.    Plan:    Order Left Knee X-ray, 4+ views       2. Chronic pain syndrome    3. Chronic low back pain, unspecified back pain laterality, unspecified whether sciatica present     History of multimodal interventions (RFA, LARON, PT, meds) with persistent symptoms    MRI deferred due to cardiac stents; imaging pending from outside facility    Patient awaiting pain management follow-up (June 5th)    Plan:  Provide one-month interim medication refill of [place] until upcoming pain clinic appointment     reviewed and no concerning findings            Orders Placed This Encounter   Procedures    REVIEW OF HEALTH MAINTENANCE PROTOCOL ORDERS    LT X-RAY KNEE 4+ VIEW       Orders Placed This Encounter   Medications    HYDROcodone-acetaminophen (NORCO)  MG per tablet     Sig: Take 1-2 tablets by mouth 3 times daily as needed for pain.     Dispense:  180 tablet     Refill:  0              BMI  Estimated body mass index is 42.2 kg/m  as calculated from the following:    Height as of 4/10/25: 1.803 m (5' 11\").    Weight as of 4/10/25: 137.3 kg (302 lb 9.6 oz).   Weight management plan: Discussed healthy diet and exercise guidelines      The longitudinal plan of care for the diagnosis(es)/condition(s) as documented were addressed during this visit. Due to the added complexity in care, I will continue to support Romeo in the subsequent management and with ongoing continuity of care.    Subjective   Romeo is a 44-year-old male with a history of chronic low back pain presenting for medication management and follow-up   He " was previously managed by the [facility], and provider-patient relationship has been mutually discontinued, expressing dissatisfaction with his previous care and believes it is time for a change, noting only partial progress.     he has a new appointment scheduled for June 5, 2025.    In view of previous records, he has remained cautious about surgical intervention, but has been open to less invasive options such as injections, with a history of:    Lumbar RFA in May 2024 and May 2023  Transforaminal LARON in October 2022  Multiple trials of conservative therapy including PT, massage, and chiropractic care    He is currently managed with [place] and methocarbamol, and had his last UDT in July 2024. An MRI has not been possible due to cardiac stents, and records from [place] are pending. X-rays from November 2024 show mild to moderate multilevel disc height loss and facet hypertrophy, greatest at L4-5 and L5-S1.    Additionally, Romeo reports acute left knee pain following a recent injury at [facility] two weeks ago when a shopping cart struck his knee. The pain initially involved swelling (which has subsided) and now presents as achy, stabbing, and worsened with stairs. Tenderness is localized to the anterior joint and superior patella. No bruising noted.          5/14/2025    11:40 AM   Additional Questions   Roomed by Terrell LILLY MA       Via the Health Maintenance questionnaire, the patient has reported the following services have been completed -Eye Exam: [facility] 2024-07-22, this information has been sent to the abstraction team.  Video Start Time: 11:50 am     History of Present Illness       Reason for visit:  Bridge for medication regarding chronic conditions    He eats 0-1 servings of fruits and vegetables daily.He consumes 0 sweetened beverage(s) daily.He exercises with enough effort to increase his heart rate 20 to 29 minutes per day.  He exercises with enough effort to increase his heart rate 3  or less days per week.   He is taking medications regularly.                    Objective    Vitals - Patient Reported  Pain Score: Severe Pain (9)      Vitals:  No vitals were obtained today due to virtual visit.    Physical Exam   GENERAL: alert and no distress  PSYCH: Appropriate affect, tone, and pace of words          Video-Visit Details    Type of service:  Video Visit   Video End Time:12:14 PM  Originating Location (pt. Location): Other vehicle     Distant Location (provider location):  On-site  Platform used for Video Visit: Joselyn  Signed Electronically by: Chely Frye MD

## 2025-05-14 NOTE — TELEPHONE ENCOUNTER
General Call      Reason for Call:  Patient calling to state the pharmacy wont release his medication     Patient was prescribed 180 tabs  -Patient stated the pharmacy wont give him even 30     -Patient stated the pharmacy needs the PCP to make a call to them to confirm the medication and the reason     -Patient is traveling to [place] and needs the medication     -Patient stated he is need of getting it to day with his travel plans     [id number] - Health system [address] [place]     [phone]     Could we send this information to you in [facility] or would you prefer to receive a phone call?:   Patient would prefer a phone call   Okay to leave a detailed message?: Yes at Cell number on file:    Telephone Information:   Mobile [phone]

## 2025-05-14 NOTE — TELEPHONE ENCOUNTER
Per PCP, okay to fill, but cannot refill until on/after 06/18/2025.      Called and spoke to pharmacy staff. Relayed msg from PCP above.       Will fill script early this time, but not again, due to filling it too early multiple time for travel, per staff. Due to this, early refills cannot be completed after this one.       Called and relayed to pt that medication will be filled. Pt was thankful for the call.       Sal Syed Jr., CMA on 5/14/2025 at 3:43 PM

## 2025-05-14 NOTE — TELEPHONE ENCOUNTER
Pharmacy calling regarding hydrocodone. Need ok for early refill from PCP.      Pharmacy states patient picked up last Rx on 4/18 which had a start of use date of 4/22.  Reports if pt started medication on 4/18, they still would not be able to fill the medication until tomorrow.  Pharmacy states pt was told they need a provider ok and became argumentative.      Please return call to pharmacy ASAP.

## 2025-06-05 ENCOUNTER — ANCILLARY PROCEDURE (OUTPATIENT)
Dept: GENERAL RADIOLOGY | Facility: CLINIC | Age: 45
End: 2025-06-05
Attending: ANESTHESIOLOGY
Payer: COMMERCIAL

## 2025-06-05 ENCOUNTER — OFFICE VISIT (OUTPATIENT)
Dept: ANESTHESIOLOGY | Facility: CLINIC | Age: 45
End: 2025-06-05
Attending: FAMILY MEDICINE
Payer: COMMERCIAL

## 2025-06-05 VITALS — HEART RATE: 79 BPM | OXYGEN SATURATION: 98 % | DIASTOLIC BLOOD PRESSURE: 87 MMHG | SYSTOLIC BLOOD PRESSURE: 147 MMHG

## 2025-06-05 DIAGNOSIS — M25.562 CHRONIC PAIN OF LEFT KNEE: ICD-10-CM

## 2025-06-05 DIAGNOSIS — G89.29 CHRONIC PAIN OF LEFT KNEE: ICD-10-CM

## 2025-06-05 DIAGNOSIS — T78.2XXA ANAPHYLACTIC SHOCK: ICD-10-CM

## 2025-06-05 DIAGNOSIS — M47.816 LUMBAR SPONDYLOSIS: ICD-10-CM

## 2025-06-05 DIAGNOSIS — G89.4 CHRONIC PAIN SYNDROME: ICD-10-CM

## 2025-06-05 DIAGNOSIS — M54.17 LUMBOSACRAL RADICULOPATHY: Primary | ICD-10-CM

## 2025-06-05 PROCEDURE — 73562 X-RAY EXAM OF KNEE 3: CPT | Mod: LT | Performed by: RADIOLOGY

## 2025-06-05 ASSESSMENT — ANXIETY QUESTIONNAIRES: GAD7 TOTAL SCORE: INCOMPLETE

## 2025-06-05 NOTE — NURSING NOTE
RN reviewed AVS with patient. Patient to contact clinic if any questions/concerns. Patient verbalized understanding. Release of Information form signed for external records from [facility]. Writer faxed form.    Mary Jo Cardenas RN

## 2025-06-05 NOTE — PATIENT INSTRUCTIONS
Referrals:    Immunology Referral placed.       Imaging:     Left Knee Xray ordered.    IMAGING SERVICES HOURS:    All imaging modalities are available from 7 a.m. - 9 p.m. Monday through Friday  X-ray, CT, MRI, and General Ultrasound appointments are available from 7 a.m. -3:30 p.m. on Saturdays  X-ray, CT and MRI appointments are available from 8 a.m. - 4:30 p.m. on Sundays  Please call [phone] to schedule imaging exams       Treatment planning:    Release of Information form signed for release of records from [facility].      Recommended Follow up:      Follow up as needed.        To speak with a nurse, schedule/reschedule/cancel a clinic appointment, or request a medication refill call: [phone]    You can also reach us by [employer]: [email or web address]    Detail Level: Simple

## 2025-06-05 NOTE — NURSING NOTE
Patient presents with:  Consult: New patient      Data Unavailable     Pain Medications       Opioid Combinations Refills Start End     HYDROcodone-acetaminophen (NORCO)  MG per tablet 0 5/14/2025 --    Sig - Route: Take 1-2 tablets by mouth 3 times daily as needed for pain. - Oral    Class: E-Prescribe    Earliest Fill Date: 5/14/2025    No prior authorization was found for this prescription.    Found prior authorization for another prescription for the same medication: Canceled - Other (The medication order is discontinued)            What medications are you using for pain? Hydrocodone-acetaminophen (norco)    (New patients only) Have you been seen by another pain clinic/ provider? [facility] pain clinic    (Return Patients only) What refills are you needing today?

## 2025-06-05 NOTE — PROGRESS NOTES
[facility] : Consultation Note    Patient: Romeo Chong Age: 44 year old   MRN: [id number] Referred by:  Uday     Date of Visit: June 5, 2025    Reason for consultation:    Romeo Chong is a 44 year old male who is seen in consultation today at the request of his provider,Dr. Frye for a comprehensive evaluation and management of pain.  Primary Care Provider is Chely Frye.      Chief complaints:     Low back pain, chronic knee pain        History of Present illness:     Romeo Chong is a 44-year-old male with a complex medical history including chronic low back pain, Hodgkin s lymphoma with associated lymphedema, systolic heart failure, and prior leg stenting. He presents today for chronic lower back pain and radiculopathy symptoms.     Mr. Chong has a longstanding history of lower back pain, with imaging confirming multilevel degenerative disc disease, greatest at the L5-S1 level. Lumbar spine MRI from 2021 revealed moderate lateral recess stenosis with mild displacement of the traversing left S1 nerve root, and mild left lateral recess stenosis at L4-5, possibly affecting the left L5 nerve root. A more recent lumbar spine X-ray from November 2024 corroborates these findings, demonstrating multilevel disc height loss and facet hypertrophy, most pronounced at L4-5 and L5-S1. Despite these objective findings, he has been cautious about pursuing surgical intervention and instead has explored various non-operative strategies.    His treatment history includes multiple modalities of conservative therapy, including physical therapy, chiropractic care, and massage therapy, all yielding limited success. He has undergone several interventional procedures, including lumbar radiofrequency ablations in May 2023 and May 2024, and a transforaminal epidural steroid injection in October 2022. However, his most recent injection at  the [facility] led to an anaphylactic reaction characterized by hives, chest tightness, and shortness of breath, requiring immediate antihistamine treatment. This adverse event has understandably heightened his apprehension regarding future injections and influenced his current care preferences.    Mr. Chong had been followed at [facility], but the provider-patient relationship was mutually discontinued. He expressed dissatisfaction with the pace of progress and a desire for a new direction in his pain management. He has now established care with our clinic.  Currently, his pain regimen includes [place] and methocarbamol.    In addition to his chronic back issues, Mr. Chong presents today with acute left knee pain, sustained approximately two weeks ago when a shopping cart struck him while shopping [facility] [employer]. He experienced initial swelling, which has since resolved, but he continues to report sharp, stabbing pain in the anterior aspect of the knee and superior to the patella. The pain worsens with stair climbing and weightbearing activities. On exam, there is focal tenderness without visible bruising. An X-ray was ordered but has not yet been completed. A prior X-ray from the previous year noted mild degenerative changes within the patellofemoral compartment, suggesting a background of chronic joint wear that may have been exacerbated by this recent injury.    In summary, Mr. Chong presents with chronic lumbar spine pathology complicated by failed conservative and interventional management, medication dependence, and an adverse reaction to prior injection therapy. He now also reports a new left knee injury that requires diagnostic imaging and further evaluation. His care going forward will require a careful balance of pharmacologic pain management, physical rehabilitation, and possibly imaging-guided intervention if deemed safe. Continuity of care, especially in light of his complex  medical and psychosocial background, will be essential to improving his functionality and quality of life.        [place] Prescription Monitoring Program:   Reviewed. No concerns    Review of Systems:  ROS    Patient Supplied Answers To the [facility] Pain Questionnaire       No data to display                         5/25/2021     4:00 PM   TOMASZ-7 SCORE   Total Score 1            3/11/2025     6:45 AM 2/21/2024     2:56 PM   PHQ-2 ( 1999 [employer])   Q1: Little interest or pleasure in doing things 0 0   Q2: Feeling down, depressed or hopeless 0 0   PHQ-2 Score 0  0   Q1: Little interest or pleasure in doing things Not at all Not at all   Q2: Feeling down, depressed or hopeless Not at all Not at all   PHQ-2 Score 0 0       Patient-reported            5/25/2021     4:00 PM 11/17/2021    11:02 AM 12/29/2022     7:00 AM   [facility] Follow-up to PHQ   PHQ-9 9. 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 Discovered in right tibia and femur at 13 years old.  He had surgery when  he was 14 years old.   Last Assessment & Plan:  Formatting of this note might be different from the original. Diagnosed at 13, 14 surgeries since that time   Formatting of this note might be different from the original. Formatting of this note might be different     Gastric ulcer, unspecified chronicity, unspecified whether gastric ulcer hemorrhage or perforation present 05/06/2021    H/O Hodgkin's lymphoma 05/06/2021    Hyperlipidemia     Hypertension     Hypertriglyceridemia 05/08/2011    Severe sepsis with acute organ dysfunction due to  group B Streptococcus (H) 10/18/2021    Stress hyperglycemia 04/26/2009       Past Surgical History:  Past Surgical History:   Procedure Laterality Date    FRACTURE SURGERY      HC REMOVAL HEEL SPUR, CALCANEUS Left 06/25/2021    Procedure: EXCISION, BONE SPUR, FOOT, WITH PLANTAR FASCIA RELEASE;  Surgeon: Stephon Singleton DPM;  Location: [facility];  Service: Podiatry    SOFT TISSUE SURGERY  04/01/2000    TONSILLECTOMY, ADENOIDECTOMY ADULT, COMBINED         Medications:  Current Outpatient Medications   Medication Sig Dispense Refill    blood glucose (NO BRAND SPECIFIED) test strip Use to test blood sugar daily or as directed. To accompany: Blood Glucose Monitor Brands: per insurance. 100 strip 6    blood glucose monitoring (NO BRAND SPECIFIED) meter device kit Use to test blood sugar daily or as directed. Preferred blood glucose meter OR supplies to accompany: Blood Glucose Monitor Brands: per insurance. 1 kit 0    carvedilol (COREG) 25 MG tablet Take 1 tablet (25 mg) by mouth 2 times daily (with meals). 180 tablet 3    EPINEPHrine (ANY BX GENERIC EQUIV) 0.3 MG/0.3ML injection 2-pack Inject 0.3 mLs (0.3 mg) into the muscle as needed for anaphylaxis May repeat one time in 5-15 minutes if response to initial dose is inadequate. 2 each 0    famotidine (PEPCID) 40 MG tablet Take 1 tablet (40 mg) by mouth 2 times daily. 180 tablet 3    HYDROcodone-acetaminophen (NORCO)  MG per tablet Take 1-2 tablets by mouth 3 times daily as needed for pain. 180 tablet 0    losartan-hydrochlorothiazide (HYZAAR) 100-12.5 MG tablet TAKE 1 TABLET BY MOUTH DAILY. FOLLOW UP WITH.WONG** 90 tablet 2    metFORMIN (GLUCOPHAGE) 500 MG tablet Take 2 tablets (1,000 mg) by mouth 2 times daily (with meals). 360 tablet 0    methocarbamol (ROBAXIN) 500 MG tablet TAKE 1/2 TABLET TO 2 TABLETS BY ORAL ROUTE 4 TIMES EVERY DAY AS NEEDED FOR MUSCLE SPASMS      semaglutide (OZEMPIC) 2 MG/3ML pen Inject 0.25 mg subcutaneously every 7 days for 28  days, THEN 0.5 mg every 7 days for 28 days. 4.5 mL 0    Semaglutide-Weight Management (WEGOVY) 1 MG/0.5ML pen Inject 1 mg subcutaneously once a week. (Patient not taking: Reported on 2025) 2 mL 2    Semaglutide-Weight Management (WEGOVY) 1.7 MG/0.75ML pen Inject 1.7 mg subcutaneously once a week. (Patient not taking: Reported on 2025) 3 mL 11    thin (NO BRAND SPECIFIED) lancets Use with lanceting device. To accompany: Blood Glucose Monitor Brands: per insurance. 100 each 6         Medications related to Pain Management:   Medications related to Pain Management (From now, onward)      None            Allergies:       Allergies   Allergen Reactions    Vancomycin Anaphylaxis    Peanut Oil Itching    Tree Nuts [Nuts] Itching    Erythromycin Unknown    Other Environmental Allergy Unknown     DUST    Pollen Extracts [Pollen Extract] Unknown    Zosyn [Piperacillin-Tazobactam In Dex] Tinnitus and Itching    Latex Itching and Rash     Added based on information entered during case entry, please review and add reactions, type, and severity as needed    Oxycodone Itching and Rash       Social History:    Social History     Socioeconomic History    Marital status: Single     Spouse name: Not on file    Number of children: Not on file    Years of education: Not on file    Highest education level: Not on file   Occupational History    Not on file   Tobacco Use    Smoking status: Former     Current packs/day: 0.00     Average packs/day: 1 pack/day for 10.0 years (10.0 ttl pk-yrs)     Types: Cigarettes     Start date: 1999     Quit date: 2009     Years since quittin.1    Smokeless tobacco: Never   Vaping Use    Vaping status: Never Used   Substance and Sexual Activity    Alcohol use: Not Currently    Drug use: Never    Sexual activity: Not Currently   Other Topics Concern    Not on file   Social History Narrative    , 3 children, 1  recently. Non smoker. Socially drinks alcohol. Not working.       Social Drivers of Health     Financial Resource Strain: Not on file   Food Insecurity: No Food Insecurity (10/19/2021)    Received from [facility]    Hunger Vital Sign     Worried About Running Out of Food in the Last Year: Never true     Ran Out of Food in the Last Year: Never true   Transportation Needs: No Transportation Needs (10/19/2021)    Received from [facility]    PRAPARE - Transportation     Lack of Transportation (Medical): No     Lack of Transportation (Non-Medical): No   Physical Activity: Not on file   Stress: Not on file   Social Connections: Not on file   Interpersonal Safety: Low Risk  (3/11/2025)    Interpersonal Safety     Do you feel physically and emotionally safe where you currently live?: Yes     Within the past 12 months, have you been hit, slapped, kicked or otherwise physically hurt by someone?: No     Within the past 12 months, have you been humiliated or emotionally abused in other ways by your partner or ex-partner?: No   Housing Stability: Low Risk  (10/19/2021)    Received from [facility]    Housing Stability Vital Sign     Unable to Pay for Housing in the Last Year: No     Number of Places Lived in the Last Year: 1     Unstable Housing in the Last Year: No     Social History     Social History Narrative    , 3 children, 1  recently. Non smoker. Socially drinks alcohol. Not working.          Family history:  Family History   Problem Relation Age of Onset    Cirrhosis Mother     Hypertension Mother     Diabetes Type 2  Father     Kidney failure Father     Cerebrovascular Disease Father     Hypertension Father     Diabetes Father     Cerebrovascular Disease Sister     Hypertension Paternal Grandmother     Hypertension Paternal Grandfather          Physical Exam:  Vitals:    25 1011 25 1012   BP: (!) 140/101 (!) 147/87   BP Location: Right arm Right arm   Patient Position: Sitting Sitting   Cuff Size: Adult Large Adult Regular   Pulse: 79     SpO2: 98%        General: Awake in no apparent distress.   Eyes: Sclerae are anicteric. PERRLA, EOMI   Neck: supple, no masses.   Lungs: unlabored.   Heart: regular rate and rhythm   Abdomen: soft non tender.  Extremities: Pulses are well palpable, no peripheral edema.   Musculoskeletal: All muscle groups are normal in bulk and tone. The patient changes position without pain behavior. The patient walks with a normal gait. Posture is normal. Muscle strength was rated at 2/5 in all groups RLE secondaru to lymphedema quad weakness and 5/5 in LLE. Examination of the joints reveals preserved range of motion.  The spinous processes in the cervical, thoracic, and lumbar spine are midline, with  marked tenderness over the paraspinous muscles and facet joints.positive provocative signs of facet disease.    There was  tenderness to palpation noted over the sacroiliac joints bilaterally . Daniel s test and leg raise test couldn't be performed.   . Neurologic exam: Sensation to light touch intact throughout all dermatomes bilateral upper extremities and lower extremities  Psychiatric; Normal affect.   Skin: Warm and Dry.          LABORATORY VALUES:   Recent Labs   Lab Test 04/10/25  1020 05/30/24  0853    140   POTASSIUM 4.0 4.4   CHLORIDE 101 102   CO2 25 28   ANIONGAP 13 10   * 106*   BUN 12.7 8.5   CR 0.66* 0.64*   NATALYA 9.8 9.6       CBC RESULTS:   Recent Labs   Lab Test 02/09/23  1204   WBC 5.9   RBC 5.43   HGB 14.0   HCT 43.0   MCV 79   MCH 25.8*   MCHC 32.6   RDW 13.1          Most Recent 3 INR's:  Recent Labs   Lab Test 07/08/22  1038 05/27/22  1525   INR 1.08 1.05         No images are attached to the encounter.     ASSESSMENT/PLAN:                             ASSESSMENT:    Diagnoses         Codes Comments      Lumbosacral radiculopathy    -  Primary M54.17       Chronic pain syndrome     G89.4       Chronic pain of left knee     M25.562, G89.29       Anaphylactic shock     T78.2XXA       Lumbar  spondylosis     M47.816             PLAN:    - Medications.     Hydrocodone per primary care provider  Volatren gel 1% 4 times daily as needed     - Interventional procedures:  Patient had severe anaphylactic shock during epidural injection at the [facility] pain clinic.  Will obtain records of the procedure to determine the nature of the allergic reaction prior to offering pain procedure.    He might be benefited with the left L5-S1 epidural steroid injection and left knee injection under x-ray guidance    - Labs and imaging: Ordered left knee x-ray.  ALVAREZ for epidural injection involving anaphyhlaxit shock.     - Rehab: Patient advised to perform activities as tolerated     - Psychology: No current needs.    - Integrated medicine: None needed    - Disposition: Follow-up as clinically indicated    -Referral to immunology for allergy testing    Assessment will be ongoing with changes in treatment as indicated.  Benefits/risks/alternatives to treatment have been reviewed and the patient has been instructed to contact this office if they have any questions or concerns.  This plan of care has been discussed with the patient and the patient is in agreement.     Apolinar Bright MD, PHD    Answers submitted by the patient for this visit:  Patient Health Questionnaire (G7) (Submitted on 6/5/2025)  TOMASZ 7 TOTAL SCORE: Incomplete

## 2025-06-05 NOTE — LETTER
6/5/2025       RE: Romeo Chong  [address]  [facility] [address]     Dear Colleague,    Thank you for referring your patient, Romeo Chong, to the [facility] [place] [facility]. Please see a copy of my visit note below.    [facility] : Consultation Note    Patient: Romeo Chong Age: 44 year old   MRN: [id number] Referred by:  Uday     Date of Visit: June 5, 2025    Reason for consultation:    Romeo Chong is a 44 year old male who is seen in consultation today at the request of his provider,Dr. Frye for a comprehensive evaluation and management of pain.  Primary Care Provider is Chely Frye.      Chief complaints:     Low back pain, chronic knee pain        History of Present illness:     Romeo Chong is a 44-year-old male with a complex medical history including chronic low back pain, Hodgkin s lymphoma with associated lymphedema, systolic heart failure, and prior leg stenting. He presents today for chronic lower back pain and radiculopathy symptoms.     Mr. Chong has a longstanding history of lower back pain, with imaging confirming multilevel degenerative disc disease, greatest at the L5-S1 level. Lumbar spine MRI from 2021 revealed moderate lateral recess stenosis with mild displacement of the traversing left S1 nerve root, and mild left lateral recess stenosis at L4-5, possibly affecting the left L5 nerve root. A more recent lumbar spine X-ray from November 2024 corroborates these findings, demonstrating multilevel disc height loss and facet hypertrophy, most pronounced at L4-5 and L5-S1. Despite these objective findings, he has been cautious about pursuing surgical intervention and instead has explored various non-operative strategies.    His treatment history includes multiple modalities of  conservative therapy, including physical therapy, chiropractic care, and massage therapy, all yielding limited success. He has undergone several interventional procedures, including lumbar radiofrequency ablations in May 2023 and May 2024, and a transforaminal epidural steroid injection in October 2022. However, his most recent injection at the [facility] led to an anaphylactic reaction characterized by hives, chest tightness, and shortness of breath, requiring immediate antihistamine treatment. This adverse event has understandably heightened his apprehension regarding future injections and influenced his current care preferences.    Mr. Chong had been followed at [facility], but the provider-patient relationship was mutually discontinued. He expressed dissatisfaction with the pace of progress and a desire for a new direction in his pain management. He has now established care with our clinic.  Currently, his pain regimen includes Hol[place] and methocarbamol.    In addition to his chronic back issues, Mr. Chong presents today with acute left knee pain, sustained approximately two weeks ago when a shopping cart struck [facility] He experienced initial swelling, which has since resolved, but he continues to report sharp, stabbing pain in the anterior aspect of the knee and superior to the patella. The pain worsens with stair climbing and weightbearing activities. On exam, there is focal tenderness without visible bruising. An X-ray was ordered but has not yet been completed. A prior X-ray from the previous year noted mild degenerative changes within the patellofemoral compartment, suggesting a background of chronic joint wear that may have been exacerbated by this recent injury.    In summary, Mr. Chong presents with chronic lumbar spine pathology complicated by failed conservative and interventional management, medication dependence, and an adverse reaction to prior  injection therapy. He now also reports a new left knee injury that requires diagnostic imaging and further evaluation. His care going forward will require a careful balance of pharmacologic pain management, physical rehabilitation, and possibly imaging-guided intervention if deemed safe. Continuity of care, especially in light of his complex medical and psychosocial background, will be essential to improving his functionality and quality of life.        [place] Prescription Monitoring Program:   Reviewed. No concerns    Review of Systems:  ROS    Patient Supplied Answers To the [facility] Pain Questionnaire       No data to display                         5/25/2021     4:00 PM   TOMASZ-7 SCORE   Total Score 1            3/11/2025     6:45 AM 2/21/2024     2:56 PM   PHQ-2 ( 1999 [employer])   Q1: Little interest or pleasure in doing things 0 0   Q2: Feeling down, depressed or hopeless 0 0   PHQ-2 Score 0  0   Q1: Little interest or pleasure in doing things Not at all Not at all   Q2: Feeling down, depressed or hopeless Not at all Not at all   PHQ-2 Score 0 0       Patient-reported            5/25/2021     4:00 PM 11/17/2021    11:02 AM 12/29/2022     7:00 AM   [facility] Follow-up to PHQ   PHQ-9 9. 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 Discovered in right tibia and femur at 13 years old.  He had surgery when  he was 14 years old.   Last Assessment & Plan:  Formatting of this note might be different from the original. Diagnosed at 13, 14 surgeries since that time   Formatting of this note might be  different from the original. Formatting of this note might be different      Gastric ulcer, unspecified chronicity, unspecified whether gastric ulcer hemorrhage or perforation present 05/06/2021     H/O Hodgkin's lymphoma 05/06/2021     Hyperlipidemia      Hypertension      Hypertriglyceridemia 05/08/2011     Severe sepsis with acute organ dysfunction due to group B Streptococcus (H) 10/18/2021     Stress hyperglycemia 04/26/2009       Past Surgical History:  Past Surgical History:   Procedure Laterality Date     FRACTURE SURGERY       HC REMOVAL HEEL SPUR, CALCANEUS Left 06/25/2021    Procedure: EXCISION, BONE SPUR, FOOT, WITH PLANTAR FASCIA RELEASE;  Surgeon: Stephon Singleton DPM;  Location: [facility];  Service: Podiatry     SOFT TISSUE SURGERY  04/01/2000     TONSILLECTOMY, ADENOIDECTOMY ADULT, COMBINED         Medications:  Current Outpatient Medications   Medication Sig Dispense Refill     blood glucose (NO BRAND SPECIFIED) test strip Use to test blood sugar daily or as directed. To accompany: Blood Glucose Monitor Brands: per insurance. 100 strip 6     blood glucose monitoring (NO BRAND SPECIFIED) meter device kit Use to test blood sugar daily or as directed. Preferred blood glucose meter OR supplies to accompany: Blood Glucose Monitor Brands: per insurance. 1 kit 0     carvedilol (COREG) 25 MG tablet Take 1 tablet (25 mg) by mouth 2 times daily (with meals). 180 tablet 3     EPINEPHrine (ANY BX GENERIC EQUIV) 0.3 MG/0.3ML injection 2-pack Inject 0.3 mLs (0.3 mg) into the muscle as needed for anaphylaxis May repeat one time in 5-15 minutes if response to initial dose is inadequate. 2 each 0     famotidine (PEPCID) 40 MG tablet Take 1 tablet (40 mg) by mouth 2 times daily. 180 tablet 3     HYDROcodone-acetaminophen (NORCO)  MG per tablet Take 1-2 tablets by mouth 3 times daily as needed for pain. 180 tablet 0     losartan-hydrochlorothiazide (HYZAAR) 100-12.5 MG tablet TAKE 1 TABLET BY MOUTH  DAILY. FOLLOW UP WITHCONSTANTINE** 90 tablet 2     metFORMIN (GLUCOPHAGE) 500 MG tablet Take 2 tablets (1,000 mg) by mouth 2 times daily (with meals). 360 tablet 0     methocarbamol (ROBAXIN) 500 MG tablet TAKE 1/2 TABLET TO 2 TABLETS BY ORAL ROUTE 4 TIMES EVERY DAY AS NEEDED FOR MUSCLE SPASMS       semaglutide (OZEMPIC) 2 MG/3ML pen Inject 0.25 mg subcutaneously every 7 days for 28 days, THEN 0.5 mg every 7 days for 28 days. 4.5 mL 0     Semaglutide-Weight Management (WEGOVY) 1 MG/0.5ML pen Inject 1 mg subcutaneously once a week. (Patient not taking: Reported on 5/14/2025) 2 mL 2     Semaglutide-Weight Management (WEGOVY) 1.7 MG/0.75ML pen Inject 1.7 mg subcutaneously once a week. (Patient not taking: Reported on 5/14/2025) 3 mL 11     thin (NO BRAND SPECIFIED) lancets Use with lanceting device. To accompany: Blood Glucose Monitor Brands: per insurance. 100 each 6         Medications related to Pain Management:   Medications related to Pain Management (From now, onward)      None            Allergies:       Allergies   Allergen Reactions     Vancomycin Anaphylaxis     Peanut Oil Itching     Tree Nuts [Nuts] Itching     Erythromycin Unknown     Other Environmental Allergy Unknown     DUST     Pollen Extracts [Pollen Extract] Unknown     Zosyn [Piperacillin-Tazobactam In Dex] Tinnitus and Itching     Latex Itching and Rash     Added based on information entered during case entry, please review and add reactions, type, and severity as needed     Oxycodone Itching and Rash       Social History:    Social History     Socioeconomic History     Marital status: Single     Spouse name: Not on file     Number of children: Not on file     Years of education: Not on file     Highest education level: Not on file   Occupational History     Not on file   Tobacco Use     Smoking status: Former     Current packs/day: 0.00     Average packs/day: 1 pack/day for 10.0 years (10.0 ttl pk-yrs)     Types: Cigarettes     Start date: 5/1/1999      Quit date: 2009     Years since quittin.1     Smokeless tobacco: Never   Vaping Use     Vaping status: Never Used   Substance and Sexual Activity     Alcohol use: Not Currently     Drug use: Never     Sexual activity: Not Currently   Other Topics Concern     Not on file   Social History Narrative    , 3 children, 1  recently. Non smoker. Socially drinks alcohol. Not working.      Social Drivers of Health     Financial Resource Strain: Not on file   Food Insecurity: No Food Insecurity (10/19/2021)    Received from [employer]    Hunger Vital Sign      Worried About Running Out of Food in the Last Year: Never true      Ran Out of Food in the Last Year: Never true   Transportation Needs: No Transportation Needs (10/19/2021)    Received from [employer]    PRAPARE - Transportation      Lack of Transportation (Medical): No      Lack of Transportation (Non-Medical): No   Physical Activity: Not on file   Stress: Not on file   Social Connections: Not on file   Interpersonal Safety: Low Risk  (3/11/2025)    Interpersonal Safety      Do you feel physically and emotionally safe where you currently live?: Yes      Within the past 12 months, have you been hit, slapped, kicked or otherwise physically hurt by someone?: No      Within the past 12 months, have you been humiliated or emotionally abused in other ways by your partner or ex-partner?: No   Housing Stability: Low Risk  (10/19/2021)    Received from [employer]    Housing Stability Vital Sign      Unable to Pay for Housing in the Last Year: No      Number of Places Lived in the Last Year: 1      Unstable Housing in the Last Year: No     Social History     Social History Narrative    , 3 children, 1  recently. Non smoker. Socially drinks alcohol. Not working.          Family history:  Family History   Problem Relation Age of Onset     Cirrhosis Mother      Hypertension Mother      Diabetes Type 2  Father      Kidney failure  Father      Cerebrovascular Disease Father      Hypertension Father      Diabetes Father      Cerebrovascular Disease Sister      Hypertension Paternal Grandmother      Hypertension Paternal Grandfather          Physical Exam:  Vitals:    06/05/25 1011 06/05/25 1012   BP: (!) 140/101 (!) 147/87   BP Location: Right arm Right arm   Patient Position: Sitting Sitting   Cuff Size: Adult Large Adult Regular   Pulse: 79    SpO2: 98%        General: Awake in no apparent distress.   Eyes: Sclerae are anicteric. PERRLA, EOMI   Neck: supple, no masses.   Lungs: unlabored.   Heart: regular rate and rhythm   Abdomen: soft non tender.  Extremities: Pulses are well palpable, no peripheral edema.   Musculoskeletal: All muscle groups are normal in bulk and tone. The patient changes position without pain behavior. The patient walks with a normal gait. Posture is normal. Muscle strength was rated at 2/5 in all groups RLE secondaru to lymphedema quad weakness and 5/5 in LLE. Examination of the joints reveals preserved range of motion.  The spinous processes in the cervical, thoracic, and lumbar spine are midline, with  marked tenderness over the paraspinous muscles and facet joints.positive provocative signs of facet disease.    There was  tenderness to palpation noted over the sacroiliac joints bilaterally . Daniel s test and leg raise test couldn't be performed.   . Neurologic exam: Sensation to light touch intact throughout all dermatomes bilateral upper extremities and lower extremities  Psychiatric; Normal affect.   Skin: Warm and Dry.          LABORATORY VALUES:   Recent Labs   Lab Test 04/10/25  1020 05/30/24  0853    140   POTASSIUM 4.0 4.4   CHLORIDE 101 102   CO2 25 28   ANIONGAP 13 10   * 106*   BUN 12.7 8.5   CR 0.66* 0.64*   NATALYA 9.8 9.6       CBC RESULTS:   Recent Labs   Lab Test 02/09/23  1204   WBC 5.9   RBC 5.43   HGB 14.0   HCT 43.0   MCV 79   MCH 25.8*   MCHC 32.6   RDW 13.1          Most Recent  3 INR's:  Recent Labs   Lab Test 07/08/22  1038 05/27/22  1525   INR 1.08 1.05         No images are attached to the encounter.     ASSESSMENT/PLAN:                             ASSESSMENT:    Diagnoses         Codes Comments      Lumbosacral radiculopathy    -  Primary M54.17       Chronic pain syndrome     G89.4       Chronic pain of left knee     M25.562, G89.29       Anaphylactic shock     T78.2XXA       Lumbar spondylosis     M47.816             PLAN:    - Medications.     Hydrocodone per primary care provider  Volatren gel 1% 4 times daily as needed     - Interventional procedures:  Patient had severe anaphylactic shock during epidural injection at the [facility] pain clinic.  Will obtain records of the procedure to determine the nature of the allergic reaction prior to offering pain procedure.    He might be benefited with the left L5-S1 epidural steroid injection and left knee injection under x-ray guidance    - Labs and imaging: Ordered left knee x-ray.  ALVAREZ for epidural injection involving anaphyhlaxit shock.     - Rehab: Patient advised to perform activities as tolerated     - Psychology: No current needs.    - Integrated medicine: None needed    - Disposition: Follow-up as clinically indicated    -Referral to immunology for allergy testing    Assessment will be ongoing with changes in treatment as indicated.  Benefits/risks/alternatives to treatment have been reviewed and the patient has been instructed to contact this office if they have any questions or concerns.  This plan of care has been discussed with the patient and the patient is in agreement.     Apolinar Bright MD, PHD    Answers submitted by the patient for this visit:  Patient Health Questionnaire (G7) (Submitted on 6/5/2025)  TOMASZ 7 TOTAL SCORE: Incomplete      Again, thank you for allowing me to participate in the care of your patient.      Sincerely,    Apolinar Bright MD

## 2025-06-09 ENCOUNTER — PATIENT OUTREACH (OUTPATIENT)
Dept: CARE COORDINATION | Facility: CLINIC | Age: 45
End: 2025-06-09
Payer: COMMERCIAL

## 2025-06-09 ENCOUNTER — TELEPHONE (OUTPATIENT)
Dept: FAMILY MEDICINE | Facility: CLINIC | Age: 45
End: 2025-06-09
Payer: COMMERCIAL

## 2025-06-09 ENCOUNTER — TELEPHONE (OUTPATIENT)
Dept: ANESTHESIOLOGY | Facility: CLINIC | Age: 45
End: 2025-06-09
Payer: COMMERCIAL

## 2025-06-09 NOTE — TELEPHONE ENCOUNTER
Will forward onto Dr. Bright as Dr. Louis Frye is out of the office and he has evaluated patient.    Amadou Mills MD  General Internal Medicine  [facility]  6/9/2025, 2:41 PM

## 2025-06-09 NOTE — TELEPHONE ENCOUNTER
S-(situation): Appointment questions    B-(background): Seeing Dr. Frye for pain follow up--pt thought appointment was tomorrow, but it's actually 7/10    A-(assessment): Pt called to confirm if appointment for 6/10 still happening. RN educated that the appointment was set for 7/10 and not 6/10. Pt was frustrated because they're leaving to [place] tomorrow evening to prepare for their uncle's . Pt will return . States has 38 pills left and will only last 5 days--until . States he takes up to 7 pills per day per previous provider instructions. Pt trying to get refill pain medication: HYDROcodone-acetaminophen (NORCO)  MG per tablet at least until pt can be back for appointment 7/10.  Reports contract with previous pain provider has been terminated and so he can't get help from them. Pt willing to meet any provider at any time today or tomorrow so he can get this refilled.    R-(recommendations): Pt wants RN to get in contact with Dr. Apolinar Bright to try and get these pain pills refilled, but RN unsure if this is who to call. RN unsure of who to route to. Routing to covering providers for review.

## 2025-06-09 NOTE — TELEPHONE ENCOUNTER
Called to reach Dr. Bright office per pt request--they're unavailable. Left message for them to call our office back at [phone].    If they call back, please notify them that pt is trying to get refills for their HYDROcodone-acetaminophen (NORCO)  MG per tablet because they're going to [place] tomorrow evening to attend a . Pt will return . States has 38 pills left and will only last 5 days--until . States he takes up to 7 pills per day per previous provider instructions.  Pt stated someone scheduled them for their appointment 6/10 morning, but their appointment was actually scheduled for 7/10 morning.  Please relay that providers here unavailable to see pt and see below for further details.    Please route back to us with decision so we can call pt and give them update.

## 2025-06-09 NOTE — TELEPHONE ENCOUNTER
Health Call Center    Phone Message    May a detailed message be left on voicemail: yes     Reason for Call: Medication Question or concern regarding medication     What on the order needs clarification? Monica is an Rn with Pt's PCP office.  Pt is trying to get an Emergency fill of pain pills today because he is leaving for out of town tomorrow.  They want to know if Dr. Bright is ok with this.  Please call her back to advise.

## 2025-06-10 ENCOUNTER — OFFICE VISIT (OUTPATIENT)
Dept: FAMILY MEDICINE | Facility: CLINIC | Age: 45
End: 2025-06-10
Payer: COMMERCIAL

## 2025-06-10 VITALS
DIASTOLIC BLOOD PRESSURE: 84 MMHG | HEIGHT: 71 IN | WEIGHT: 307.2 LBS | RESPIRATION RATE: 20 BRPM | TEMPERATURE: 98.5 F | OXYGEN SATURATION: 100 % | SYSTOLIC BLOOD PRESSURE: 132 MMHG | HEART RATE: 62 BPM | BODY MASS INDEX: 43.01 KG/M2

## 2025-06-10 DIAGNOSIS — E11.9 TYPE 2 DIABETES MELLITUS WITHOUT COMPLICATION, WITHOUT LONG-TERM CURRENT USE OF INSULIN (H): ICD-10-CM

## 2025-06-10 DIAGNOSIS — M25.572 PAIN IN JOINT, ANKLE AND FOOT, LEFT: ICD-10-CM

## 2025-06-10 DIAGNOSIS — G89.4 CHRONIC PAIN SYNDROME: Primary | ICD-10-CM

## 2025-06-10 DIAGNOSIS — E78.5 HYPERLIPIDEMIA LDL GOAL <100: ICD-10-CM

## 2025-06-10 PROCEDURE — 99214 OFFICE O/P EST MOD 30 MIN: CPT | Performed by: FAMILY MEDICINE

## 2025-06-10 PROCEDURE — 3079F DIAST BP 80-89 MM HG: CPT | Performed by: FAMILY MEDICINE

## 2025-06-10 PROCEDURE — 1125F AMNT PAIN NOTED PAIN PRSNT: CPT | Performed by: FAMILY MEDICINE

## 2025-06-10 PROCEDURE — 3075F SYST BP GE 130 - 139MM HG: CPT | Performed by: FAMILY MEDICINE

## 2025-06-10 RX ORDER — HYDROCODONE BITARTRATE AND ACETAMINOPHEN 10; 325 MG/1; MG/1
1-2 TABLET ORAL 3 TIMES DAILY PRN
Qty: 180 TABLET | Refills: 0 | Status: SHIPPED | OUTPATIENT
Start: 2025-06-10 | End: 2025-06-10

## 2025-06-10 RX ORDER — HYDROCODONE BITARTRATE AND ACETAMINOPHEN 10; 325 MG/1; MG/1
1-2 TABLET ORAL 3 TIMES DAILY PRN
Qty: 180 TABLET | Refills: 0 | Status: SHIPPED | OUTPATIENT
Start: 2025-06-10

## 2025-06-10 RX ORDER — ATORVASTATIN CALCIUM 20 MG/1
20 TABLET, FILM COATED ORAL DAILY
Qty: 90 TABLET | Refills: 3 | Status: SHIPPED | OUTPATIENT
Start: 2025-06-10

## 2025-06-10 ASSESSMENT — PATIENT HEALTH QUESTIONNAIRE - PHQ9
10. IF YOU CHECKED OFF ANY PROBLEMS, HOW DIFFICULT HAVE THESE PROBLEMS MADE IT FOR YOU TO DO YOUR WORK, TAKE CARE OF THINGS AT HOME, OR GET ALONG WITH OTHER PEOPLE: NOT DIFFICULT AT ALL
SUM OF ALL RESPONSES TO PHQ QUESTIONS 1-9: 0
SUM OF ALL RESPONSES TO PHQ QUESTIONS 1-9: 0

## 2025-06-10 ASSESSMENT — PAIN SCALES - GENERAL: PAINLEVEL_OUTOF10: SEVERE PAIN (8)

## 2025-06-10 ASSESSMENT — PAIN SCALES - PAIN ENJOYMENT GENERAL ACTIVITY SCALE (PEG)
PEG_TOTALSCORE: 7.33
INTERFERED_ENJOYMENT_LIFE: 6
INTERFERED_GENERAL_ACTIVITY: 8
AVG_PAIN_PASTWEEK: 8

## 2025-06-10 NOTE — TELEPHONE ENCOUNTER
Spoke to Aubrie at PCP clinic.  Reported to her that per Md here-he would not prescribe medication.  He was seen for a one time visit and MD stated that PCP would be managing pain medication.  Aubrie stated that she would be sending a message to the covering provider at PCP clinic to move forward.    Mariam Yates, TERESSA

## 2025-06-10 NOTE — TELEPHONE ENCOUNTER
FYI - Status Update    Who is Calling: [facility]    Update: [facility] is calling on behalf of Dr. Bright.  Dr. Bright relays he has only seen patient once and will not be seeing patient again. In addition per Dr. Bright controlled substance refill is to be addressed by covering provider for PCP.   Writer did ask if Dr. Bright would respond to message from Dr. Mills = replied no, this phone call is the reply.     Does caller want a call/response back: No

## 2025-06-10 NOTE — TELEPHONE ENCOUNTER
General Call    Dr. Bright please see encounter notes below     Contacts       Contact Date/Time Type Contact Phone/Fax    2025 01:25 PM CDT Phone (Incoming) Romeo Chong (Self) [phone] (M)    2025 01:25 PM CDT Phone (Outgoing) Romeo Chong (Self) [phone] (M)    Talked with Patient     2025 03:19 PM CDT Phone (Outgoing) Pain clinic [phone]     Dr. Bright office          Reason for Call:  Patient calling to inquire if Dr. Bright has seen the request from [email or web address] about the PCP being out of the country for the month.    Covering PCP stated:    Amadou Mills MD   Physician  Specialty: Internal Medicine     Telephone Encounter  Signed     Creation Time: 2025  2:40 PM     Will forward onto Dr. Bright as Dr. Louis Frye is out of the office and he has evaluated patient.     Amadou Mills MD  General Internal Medicine  [facility]  2025, 2:41 PM           What are your questions or concerns:      -Patient takes up to 7 a day not always and has 38 which would give him 5 days if taking all 7    -Patient requesting a prescription to  today before he leaves for the evening to set up and attend a        Patient has an appt 7/3/2025 with the PCP the earliest he could get back in when PCP returns to the [place]     Could we send this information to you in MyChart or would you prefer to receive a phone call?:   Patient would prefer a phone call     Last appt with Deangelo 2025      Okay to leave a detailed message?: Yes at Cell number on file:    Telephone Information:   Mobile [phone]

## 2025-06-10 NOTE — TELEPHONE ENCOUNTER
Being seen in [place] today for this.    Amadou Mills MD  General Internal Medicine  [facility]  6/10/2025, 10:50 AM

## 2025-06-10 NOTE — PROGRESS NOTES
Assessment & Plan     Chronic pain syndrome    No red flags for narcotics use.  His history and treatment is well-documented in his chart.  Will refill his narcotics for the month since his PCP is out of the country during that time.  Will have him do a urine drug screen today as this is out of date.  We briefly discussed buprenorphine and him getting a narcotics contract with his PCP    - Urine Drug Screen Clinic; Future  - naloxone (NARCAN) 4 MG/0.1ML nasal spray; Spray 1 spray (4 mg) into one nostril alternating nostrils as needed for opioid reversal. every 2-3 minutes until assistance arrives  - HYDROcodone-acetaminophen (NORCO)  MG per tablet; Take 1-2 tablets by mouth 3 times daily as needed for pain.    Type 2 diabetes mellitus without complication, without long-term current use of insulin (H)  The current medical regimen is effective;  continue present plan and medications.  Lab Results   Component Value Date    A1C 5.5 04/10/2025    A1C 6.1 05/30/2024         Hyperlipidemia LDL goal <100  The patient is not currently on a statin.  Because he is a diabetic he should be on a statin regardless of his lipids.  I have prescribed Lipitor 20 mg for him to take daily  - atorvastatin (LIPITOR) 20 MG tablet; Take 1 tablet (20 mg) by mouth daily.    Pain in joint, ankle and foot, left  Patient states towards the end of the visit he has a history of left-sided ankle pain that has been going on for 2 years.  Will refer to podiatry for full evaluation  - Orthopedic  Referral; Future            Follow-up   Return in about 4 weeks (around 7/8/2025) for With his PCP.        Junior Walters is a 44 year old, presenting for the following health issues:  Chronic Disease Management (/)        6/10/2025    10:12 AM   Additional Questions   Roomed by Sherie FREDERICK   Accompanied by self         6/10/2025    10:12 AM   Patient Reported Additional Medications   Patient reports taking the following new medications none  "    History of Present Illness       Back Pain:  He presents for follow up of back pain. Patient's back pain is a chronic problem.  Location of back pain:  Right lower back, left lower back, right middle of back, left middle of back and other  Description of back pain: burning, dull ache, sharp, shooting and stabbing  Back pain spreads: right side of neck    Since patient first noticed back pain, pain is: always present, but gets better and worse  Does back pain interfere with his job:  Not applicable       Reason for visit:  Refill on pain meds please see notes    He eats 2-3 servings of fruits and vegetables daily.He consumes 0 sweetened beverage(s) daily.He exercises with enough effort to increase his heart rate 20 to 29 minutes per day.  He exercises with enough effort to increase his heart rate 3 or less days per week.   He is taking medications regularly.        His PCP is out of the country and he needs refills on pain meds and Ozempic- he is going to [place] and won't be back till end of the month and will be out of refills    He had an allergic reaction with possible anaphylaxis after a steroid injection a few months required an epipen and antihistamines.  He has a refer allergy now.       He has chronic lymphedema from lymphoma.  The pain is severe in the right leg.  It started when he was only 22.      He doesn't tolerate gabapentin because it inflames lymph nodes.             Review of Systems  Constitutional, HEENT, cardiovascular, pulmonary, gi and gu systems are negative, except as otherwise noted.      Objective    /84   Pulse 62   Temp 98.5  F (36.9  C) (Oral)   Resp 20   Ht 1.803 m (5' 11\")   Wt (!) 139.3 kg (307 lb 3.2 oz)   SpO2 100%   BMI 42.85 kg/m    Body mass index is 42.85 kg/m .  Physical Exam   GENERAL: alert and no distress  NECK: no adenopathy, no asymmetry, masses, or scars  RESP: lungs clear to auscultation - no rales, rhonchi or wheezes  CV: regular rate and rhythm, normal " S1 S2, no S3 or S4, no murmur, click or rub, no peripheral edema  MS: Lymphedema on the right leg tenderness of the left ankle under a ankle brace.  PSYCH: mentation appears normal, affect normal/bright    No results found for this or any previous visit (from the past 24 hours).        Signed Electronically by: Bree Ochoa DO

## 2025-06-11 ENCOUNTER — PATIENT OUTREACH (OUTPATIENT)
Dept: CARE COORDINATION | Facility: CLINIC | Age: 45
End: 2025-06-11
Payer: COMMERCIAL

## 2025-07-10 ENCOUNTER — TELEPHONE (OUTPATIENT)
Dept: FAMILY MEDICINE | Facility: CLINIC | Age: 45
End: 2025-07-10

## 2025-07-10 DIAGNOSIS — J02.0 STREP THROAT: ICD-10-CM

## 2025-07-10 RX ORDER — CEPHALEXIN 500 MG/1
500 CAPSULE ORAL 3 TIMES DAILY
Qty: 30 CAPSULE | Refills: 0 | Status: SHIPPED | OUTPATIENT
Start: 2025-07-10

## 2025-07-10 NOTE — TELEPHONE ENCOUNTER
Update - Pharmacy Change    Who is Calling: patient    Update: Patient shares he just spoke to Sal (less than 5 minutes ago) and requested RX got to [facility] cephALEXin (KEFLEX) 500 MG capsule   Patient apologizes needs RX to go to [facility]     [id number] [place] [facility] [address]     Does caller want a call/response back: No

## 2025-07-20 ENCOUNTER — HEALTH MAINTENANCE LETTER (OUTPATIENT)
Age: 45
End: 2025-07-20

## 2025-08-04 ENCOUNTER — TELEPHONE (OUTPATIENT)
Dept: SCHEDULING | Facility: CLINIC | Age: 45
End: 2025-08-04
Payer: COMMERCIAL

## 2025-08-07 ENCOUNTER — OFFICE VISIT (OUTPATIENT)
Dept: ANESTHESIOLOGY | Facility: CLINIC | Age: 45
End: 2025-08-07
Payer: COMMERCIAL

## 2025-08-07 ENCOUNTER — VIRTUAL VISIT (OUTPATIENT)
Dept: FAMILY MEDICINE | Facility: CLINIC | Age: 45
End: 2025-08-07
Payer: COMMERCIAL

## 2025-08-07 ENCOUNTER — TELEPHONE (OUTPATIENT)
Dept: FAMILY MEDICINE | Facility: CLINIC | Age: 45
End: 2025-08-07

## 2025-08-07 VITALS — SYSTOLIC BLOOD PRESSURE: 147 MMHG | DIASTOLIC BLOOD PRESSURE: 93 MMHG | OXYGEN SATURATION: 99 % | HEART RATE: 78 BPM

## 2025-08-07 DIAGNOSIS — G89.4 CHRONIC PAIN SYNDROME: ICD-10-CM

## 2025-08-07 DIAGNOSIS — M47.816 LUMBAR SPONDYLOSIS: Primary | ICD-10-CM

## 2025-08-07 DIAGNOSIS — G89.29 CHRONIC LOW BACK PAIN, UNSPECIFIED BACK PAIN LATERALITY, UNSPECIFIED WHETHER SCIATICA PRESENT: Primary | ICD-10-CM

## 2025-08-07 DIAGNOSIS — M54.50 CHRONIC LOW BACK PAIN, UNSPECIFIED BACK PAIN LATERALITY, UNSPECIFIED WHETHER SCIATICA PRESENT: Primary | ICD-10-CM

## 2025-08-07 DIAGNOSIS — M54.16 LUMBAR RADICULOPATHY: ICD-10-CM

## 2025-08-07 LAB
AMPHETAMINES UR QL SCN: NORMAL
BARBITURATES UR QL SCN: NORMAL
BENZODIAZ UR QL SCN: NORMAL
BZE UR QL SCN: NORMAL
CANNABINOIDS UR QL SCN: NORMAL
FENTANYL UR QL: NORMAL
OPIATES UR QL SCN: NORMAL
PCP QUAL URINE (ROCHE): NORMAL

## 2025-08-07 RX ORDER — TIZANIDINE HYDROCHLORIDE 2 MG/1
2 CAPSULE, GELATIN COATED ORAL
Qty: 60 CAPSULE | Refills: 0 | Status: SHIPPED | OUTPATIENT
Start: 2025-08-07

## 2025-08-07 ASSESSMENT — PAIN SCALES - GENERAL: PAINLEVEL_OUTOF10: SEVERE PAIN (9)

## 2025-08-08 ENCOUNTER — TELEPHONE (OUTPATIENT)
Dept: ANESTHESIOLOGY | Facility: CLINIC | Age: 45
End: 2025-08-08

## 2025-08-11 ENCOUNTER — VIRTUAL VISIT (OUTPATIENT)
Dept: FAMILY MEDICINE | Facility: CLINIC | Age: 45
End: 2025-08-11
Payer: COMMERCIAL

## 2025-08-11 DIAGNOSIS — K25.9 GASTRIC ULCER, UNSPECIFIED CHRONICITY, UNSPECIFIED WHETHER GASTRIC ULCER HEMORRHAGE OR PERFORATION PRESENT: ICD-10-CM

## 2025-08-11 DIAGNOSIS — G89.4 CHRONIC PAIN SYNDROME: ICD-10-CM

## 2025-08-11 DIAGNOSIS — I10 ESSENTIAL HYPERTENSION, BENIGN: ICD-10-CM

## 2025-08-11 DIAGNOSIS — E11.9 TYPE 2 DIABETES MELLITUS WITHOUT COMPLICATION, WITHOUT LONG-TERM CURRENT USE OF INSULIN (H): Primary | ICD-10-CM

## 2025-08-11 DIAGNOSIS — I42.9 CARDIOMYOPATHY, UNSPECIFIED TYPE (H): ICD-10-CM

## 2025-08-11 DIAGNOSIS — E78.1 HYPERTRIGLYCERIDEMIA: ICD-10-CM

## 2025-08-11 DIAGNOSIS — E78.5 HYPERLIPIDEMIA LDL GOAL <100: ICD-10-CM

## 2025-08-11 DIAGNOSIS — R73.03 PREDIABETES: ICD-10-CM

## 2025-08-11 DIAGNOSIS — I89.0 LYMPHATIC EDEMA: ICD-10-CM

## 2025-08-11 PROCEDURE — 98007 SYNCH AUDIO-VIDEO EST HI 40: CPT | Performed by: FAMILY MEDICINE

## 2025-08-11 RX ORDER — HYDROCODONE BITARTRATE AND ACETAMINOPHEN 10; 325 MG/1; MG/1
1-2 TABLET ORAL 3 TIMES DAILY PRN
Qty: 150 TABLET | Refills: 0 | Status: SHIPPED | OUTPATIENT
Start: 2025-08-11

## 2025-08-11 RX ORDER — FAMOTIDINE 40 MG/1
40 TABLET, FILM COATED ORAL 2 TIMES DAILY
Qty: 180 TABLET | Refills: 3 | Status: SHIPPED | OUTPATIENT
Start: 2025-08-11

## 2025-08-11 ASSESSMENT — ANXIETY QUESTIONNAIRES
6. BECOMING EASILY ANNOYED OR IRRITABLE: NOT AT ALL
8. IF YOU CHECKED OFF ANY PROBLEMS, HOW DIFFICULT HAVE THESE MADE IT FOR YOU TO DO YOUR WORK, TAKE CARE OF THINGS AT HOME, OR GET ALONG WITH OTHER PEOPLE?: NOT DIFFICULT AT ALL
GAD7 TOTAL SCORE: 0
4. TROUBLE RELAXING: NOT AT ALL
GAD7 TOTAL SCORE: 0
1. FEELING NERVOUS, ANXIOUS, OR ON EDGE: NOT AT ALL
GAD7 TOTAL SCORE: 0
3. WORRYING TOO MUCH ABOUT DIFFERENT THINGS: NOT AT ALL
2. NOT BEING ABLE TO STOP OR CONTROL WORRYING: NOT AT ALL
IF YOU CHECKED OFF ANY PROBLEMS ON THIS QUESTIONNAIRE, HOW DIFFICULT HAVE THESE PROBLEMS MADE IT FOR YOU TO DO YOUR WORK, TAKE CARE OF THINGS AT HOME, OR GET ALONG WITH OTHER PEOPLE: NOT DIFFICULT AT ALL
7. FEELING AFRAID AS IF SOMETHING AWFUL MIGHT HAPPEN: NOT AT ALL
5. BEING SO RESTLESS THAT IT IS HARD TO SIT STILL: NOT AT ALL
7. FEELING AFRAID AS IF SOMETHING AWFUL MIGHT HAPPEN: NOT AT ALL

## 2025-08-11 ASSESSMENT — PATIENT HEALTH QUESTIONNAIRE - PHQ9
SUM OF ALL RESPONSES TO PHQ QUESTIONS 1-9: 0
SUM OF ALL RESPONSES TO PHQ QUESTIONS 1-9: 0
10. IF YOU CHECKED OFF ANY PROBLEMS, HOW DIFFICULT HAVE THESE PROBLEMS MADE IT FOR YOU TO DO YOUR WORK, TAKE CARE OF THINGS AT HOME, OR GET ALONG WITH OTHER PEOPLE: NOT DIFFICULT AT ALL

## 2025-08-13 ENCOUNTER — TELEPHONE (OUTPATIENT)
Dept: ANESTHESIOLOGY | Facility: CLINIC | Age: 45
End: 2025-08-13
Payer: COMMERCIAL

## 2025-08-25 ENCOUNTER — MYC REFILL (OUTPATIENT)
Dept: CARDIOLOGY | Facility: CLINIC | Age: 45
End: 2025-08-25
Payer: COMMERCIAL

## 2025-08-25 DIAGNOSIS — I10 UNCONTROLLED HYPERTENSION: ICD-10-CM

## 2025-08-25 RX ORDER — LOSARTAN POTASSIUM AND HYDROCHLOROTHIAZIDE 12.5; 1 MG/1; MG/1
1 TABLET ORAL DAILY
Qty: 90 TABLET | Refills: 0 | Status: SHIPPED | OUTPATIENT
Start: 2025-08-25